# Patient Record
Sex: FEMALE | Race: WHITE | NOT HISPANIC OR LATINO | Employment: OTHER | URBAN - METROPOLITAN AREA
[De-identification: names, ages, dates, MRNs, and addresses within clinical notes are randomized per-mention and may not be internally consistent; named-entity substitution may affect disease eponyms.]

---

## 2017-01-05 ENCOUNTER — ALLSCRIPTS OFFICE VISIT (OUTPATIENT)
Dept: OTHER | Facility: OTHER | Age: 62
End: 2017-01-05

## 2017-01-10 ENCOUNTER — HOSPITAL ENCOUNTER (OUTPATIENT)
Dept: RADIOLOGY | Facility: HOSPITAL | Age: 62
Discharge: HOME | End: 2017-01-10
Attending: ORTHOPAEDIC SURGERY
Payer: COMMERCIAL

## 2017-01-10 ENCOUNTER — APPOINTMENT (OUTPATIENT)
Dept: PREADMISSION TESTING | Facility: HOSPITAL | Age: 62
End: 2017-01-10
Payer: COMMERCIAL

## 2017-01-10 ENCOUNTER — TRANSCRIBE ORDERS (OUTPATIENT)
Dept: ADMINISTRATIVE | Facility: HOSPITAL | Age: 62
End: 2017-01-10

## 2017-01-10 ENCOUNTER — ANESTHESIA EVENT (OUTPATIENT)
Dept: PERIOP | Facility: HOSPITAL | Age: 62
DRG: 470 | End: 2017-01-10
Payer: COMMERCIAL

## 2017-01-10 ENCOUNTER — HOSPITAL ENCOUNTER (OUTPATIENT)
Dept: NON INVASIVE DIAGNOSTICS | Facility: HOSPITAL | Age: 62
Discharge: HOME | End: 2017-01-10
Attending: ORTHOPAEDIC SURGERY
Payer: COMMERCIAL

## 2017-01-10 ENCOUNTER — APPOINTMENT (OUTPATIENT)
Dept: LAB | Facility: HOSPITAL | Age: 62
End: 2017-01-10
Attending: ORTHOPAEDIC SURGERY
Payer: COMMERCIAL

## 2017-01-10 VITALS
HEIGHT: 69 IN | RESPIRATION RATE: 16 BRPM | SYSTOLIC BLOOD PRESSURE: 128 MMHG | WEIGHT: 199.6 LBS | TEMPERATURE: 97.5 F | HEART RATE: 56 BPM | DIASTOLIC BLOOD PRESSURE: 70 MMHG | BODY MASS INDEX: 29.56 KG/M2

## 2017-01-10 DIAGNOSIS — M17.11 PRIMARY OSTEOARTHRITIS OF RIGHT KNEE: ICD-10-CM

## 2017-01-10 DIAGNOSIS — Z01.818 OTHER SPECIFIED PRE-OPERATIVE EXAMINATION: ICD-10-CM

## 2017-01-10 DIAGNOSIS — Z01.818 OTHER SPECIFIED PRE-OPERATIVE EXAMINATION: Primary | ICD-10-CM

## 2017-01-10 LAB
ALBUMIN SERPL BCP-MCNC: 3.7 G/DL (ref 3.5–5)
ALP SERPL-CCNC: 64 U/L (ref 46–116)
ALT SERPL W P-5'-P-CCNC: 39 U/L (ref 12–78)
ANION GAP SERPL CALCULATED.3IONS-SCNC: 8 MMOL/L (ref 4–13)
AST SERPL W P-5'-P-CCNC: 26 U/L (ref 5–45)
ATRIAL RATE: 56 BPM
BACTERIA UR QL AUTO: ABNORMAL /HPF
BASOPHILS # BLD AUTO: 0.01 THOUSANDS/ΜL (ref 0–0.1)
BASOPHILS NFR BLD AUTO: 0 % (ref 0–1)
BILIRUB SERPL-MCNC: 0.48 MG/DL (ref 0.2–1)
BILIRUB UR QL STRIP: NEGATIVE
BUN SERPL-MCNC: 18 MG/DL (ref 5–25)
CALCIUM SERPL-MCNC: 9.3 MG/DL (ref 8.3–10.1)
CHLORIDE SERPL-SCNC: 104 MMOL/L (ref 100–108)
CLARITY UR: CLEAR
CO2 SERPL-SCNC: 31 MMOL/L (ref 21–32)
COLOR UR: YELLOW
CREAT SERPL-MCNC: 0.82 MG/DL (ref 0.6–1.3)
EOSINOPHIL # BLD AUTO: 0.09 THOUSAND/ΜL (ref 0–0.61)
EOSINOPHIL NFR BLD AUTO: 2 % (ref 0–6)
ERYTHROCYTE [DISTWIDTH] IN BLOOD BY AUTOMATED COUNT: 12.7 % (ref 11.6–15.1)
GFR SERPL CREATININE-BSD FRML MDRD: >60 ML/MIN/1.73SQ M
GLUCOSE SERPL-MCNC: 87 MG/DL (ref 65–140)
GLUCOSE UR STRIP-MCNC: NEGATIVE MG/DL
HCT VFR BLD AUTO: 42.3 % (ref 34.8–46.1)
HGB BLD-MCNC: 14.4 G/DL (ref 11.5–15.4)
HGB UR QL STRIP.AUTO: ABNORMAL
INR PPP: 0.99 (ref 0.86–1.16)
KETONES UR STRIP-MCNC: NEGATIVE MG/DL
LEUKOCYTE ESTERASE UR QL STRIP: NEGATIVE
LYMPHOCYTES # BLD AUTO: 1.01 THOUSANDS/ΜL (ref 0.6–4.47)
LYMPHOCYTES NFR BLD AUTO: 20 % (ref 14–44)
MCH RBC QN AUTO: 32.3 PG (ref 26.8–34.3)
MCHC RBC AUTO-ENTMCNC: 34 G/DL (ref 31.4–37.4)
MCV RBC AUTO: 95 FL (ref 82–98)
MONOCYTES # BLD AUTO: 0.34 THOUSAND/ΜL (ref 0.17–1.22)
MONOCYTES NFR BLD AUTO: 7 % (ref 4–12)
NEUTROPHILS # BLD AUTO: 3.5 THOUSANDS/ΜL (ref 1.85–7.62)
NEUTS SEG NFR BLD AUTO: 71 % (ref 43–75)
NITRITE UR QL STRIP: NEGATIVE
NON-SQ EPI CELLS URNS QL MICRO: ABNORMAL /HPF
NRBC BLD AUTO-RTO: 0 /100 WBCS
P AXIS: 35 DEGREES
PH UR STRIP.AUTO: 6 [PH] (ref 4.5–8)
PLATELET # BLD AUTO: 215 THOUSANDS/UL (ref 149–390)
PMV BLD AUTO: 10.5 FL (ref 8.9–12.7)
POTASSIUM SERPL-SCNC: 4.3 MMOL/L (ref 3.5–5.3)
PR INTERVAL: 160 MS
PROT SERPL-MCNC: 7.3 G/DL (ref 6.4–8.2)
PROT UR STRIP-MCNC: NEGATIVE MG/DL
PROTHROMBIN TIME: 13.1 SECONDS (ref 12–14.3)
QRS AXIS: -21 DEGREES
QRSD INTERVAL: 100 MS
QT INTERVAL: 416 MS
QTC INTERVAL: 401 MS
RBC # BLD AUTO: 4.46 MILLION/UL (ref 3.81–5.12)
RBC #/AREA URNS AUTO: ABNORMAL /HPF
SODIUM SERPL-SCNC: 143 MMOL/L (ref 136–145)
SP GR UR STRIP.AUTO: 1.01 (ref 1–1.03)
T WAVE AXIS: 33 DEGREES
UROBILINOGEN UR QL STRIP.AUTO: 0.2 E.U./DL
VENTRICULAR RATE: 56 BPM
WBC # BLD AUTO: 4.95 THOUSAND/UL (ref 4.31–10.16)
WBC #/AREA URNS AUTO: ABNORMAL /HPF

## 2017-01-10 PROCEDURE — 80053 COMPREHEN METABOLIC PANEL: CPT

## 2017-01-10 PROCEDURE — 85610 PROTHROMBIN TIME: CPT

## 2017-01-10 PROCEDURE — 85025 COMPLETE CBC W/AUTO DIFF WBC: CPT

## 2017-01-10 PROCEDURE — 93005 ELECTROCARDIOGRAM TRACING: CPT

## 2017-01-10 PROCEDURE — 87086 URINE CULTURE/COLONY COUNT: CPT | Performed by: ORTHOPAEDIC SURGERY

## 2017-01-10 PROCEDURE — 81001 URINALYSIS AUTO W/SCOPE: CPT | Performed by: ORTHOPAEDIC SURGERY

## 2017-01-10 PROCEDURE — 71020 HB CHEST X-RAY 2VW FRONTAL&LATL: CPT

## 2017-01-10 PROCEDURE — 36415 COLL VENOUS BLD VENIPUNCTURE: CPT

## 2017-01-10 RX ORDER — LEVOTHYROXINE SODIUM 0.07 MG/1
75 TABLET ORAL DAILY
COMMUNITY
End: 2018-02-09

## 2017-01-10 RX ORDER — SODIUM CHLORIDE 9 MG/ML
125 INJECTION, SOLUTION INTRAVENOUS CONTINUOUS
Status: CANCELLED | OUTPATIENT
Start: 2017-01-10

## 2017-01-10 RX ORDER — LETROZOLE 2.5 MG/1
2.5 TABLET, FILM COATED ORAL EVERY EVENING
COMMUNITY
End: 2018-02-09 | Stop reason: SDUPTHER

## 2017-01-10 RX ORDER — LORATADINE 10 MG/1
10 TABLET ORAL DAILY
COMMUNITY
End: 2020-06-23 | Stop reason: ALTCHOICE

## 2017-01-11 ENCOUNTER — ALLSCRIPTS OFFICE VISIT (OUTPATIENT)
Dept: OTHER | Facility: OTHER | Age: 62
End: 2017-01-11

## 2017-01-11 LAB — BACTERIA UR CULT: NORMAL

## 2017-01-16 RX ORDER — METHOCARBAMOL 500 MG/1
500 TABLET, FILM COATED ORAL EVERY 6 HOURS PRN
Status: CANCELLED | OUTPATIENT
Start: 2017-01-16

## 2017-01-16 RX ORDER — ACETAMINOPHEN 325 MG/1
650 TABLET ORAL EVERY 4 HOURS PRN
Status: CANCELLED | OUTPATIENT
Start: 2017-01-16

## 2017-01-16 RX ORDER — ONDANSETRON 2 MG/ML
4 INJECTION INTRAMUSCULAR; INTRAVENOUS EVERY 6 HOURS PRN
Status: CANCELLED | OUTPATIENT
Start: 2017-01-16

## 2017-01-16 RX ORDER — PANTOPRAZOLE SODIUM 40 MG/1
40 TABLET, DELAYED RELEASE ORAL DAILY
Status: CANCELLED | OUTPATIENT
Start: 2017-01-17

## 2017-01-16 RX ORDER — WARFARIN SODIUM 5 MG/1
5 TABLET ORAL
Status: CANCELLED | OUTPATIENT
Start: 2017-01-16

## 2017-01-16 RX ORDER — SIMETHICONE 80 MG
80 TABLET,CHEWABLE ORAL 4 TIMES DAILY PRN
Status: CANCELLED | OUTPATIENT
Start: 2017-01-16

## 2017-01-16 RX ORDER — OXYCODONE HYDROCHLORIDE 5 MG/1
5 TABLET ORAL EVERY 4 HOURS PRN
Status: CANCELLED | OUTPATIENT
Start: 2017-01-16

## 2017-01-16 RX ORDER — OXYCODONE HYDROCHLORIDE 5 MG/1
10 TABLET ORAL EVERY 4 HOURS PRN
Status: CANCELLED | OUTPATIENT
Start: 2017-01-16

## 2017-01-16 RX ORDER — ACETAMINOPHEN 325 MG/1
650 TABLET ORAL EVERY 6 HOURS PRN
Status: CANCELLED | OUTPATIENT
Start: 2017-01-16

## 2017-01-16 RX ORDER — CALCIUM CARBONATE 200(500)MG
1000 TABLET,CHEWABLE ORAL DAILY PRN
Status: CANCELLED | OUTPATIENT
Start: 2017-01-16

## 2017-01-16 RX ORDER — SODIUM CHLORIDE, SODIUM LACTATE, POTASSIUM CHLORIDE, CALCIUM CHLORIDE 600; 310; 30; 20 MG/100ML; MG/100ML; MG/100ML; MG/100ML
100 INJECTION, SOLUTION INTRAVENOUS CONTINUOUS
Status: CANCELLED | OUTPATIENT
Start: 2017-01-16

## 2017-01-16 RX ORDER — TRAMADOL HYDROCHLORIDE 50 MG/1
50 TABLET ORAL EVERY 4 HOURS PRN
Status: CANCELLED | OUTPATIENT
Start: 2017-01-16

## 2017-01-16 RX ORDER — SENNOSIDES 8.6 MG
1 TABLET ORAL DAILY
Status: CANCELLED | OUTPATIENT
Start: 2017-01-17

## 2017-01-16 RX ORDER — KETOROLAC TROMETHAMINE 30 MG/ML
15 INJECTION, SOLUTION INTRAMUSCULAR; INTRAVENOUS EVERY 6 HOURS PRN
Status: CANCELLED | OUTPATIENT
Start: 2017-01-16 | End: 2017-01-18

## 2017-01-20 ENCOUNTER — APPOINTMENT (INPATIENT)
Dept: RADIOLOGY | Facility: HOSPITAL | Age: 62
DRG: 470 | End: 2017-01-20
Payer: COMMERCIAL

## 2017-01-20 ENCOUNTER — ANESTHESIA (OUTPATIENT)
Dept: PERIOP | Facility: HOSPITAL | Age: 62
DRG: 470 | End: 2017-01-20
Payer: COMMERCIAL

## 2017-01-20 ENCOUNTER — HOSPITAL ENCOUNTER (INPATIENT)
Facility: HOSPITAL | Age: 62
LOS: 2 days | Discharge: HOME HEALTH | DRG: 470 | End: 2017-01-22
Attending: ORTHOPAEDIC SURGERY | Admitting: ORTHOPAEDIC SURGERY
Payer: COMMERCIAL

## 2017-01-20 DIAGNOSIS — M17.11 PRIMARY OSTEOARTHRITIS OF RIGHT KNEE: Primary | ICD-10-CM

## 2017-01-20 LAB
ABO GROUP BLD: NORMAL
BLD GP AB SCN SERPL QL: NEGATIVE
INR PPP: 1.07 (ref 0.86–1.16)
PROTHROMBIN TIME: 13.9 SECONDS (ref 12–14.3)
RH BLD: POSITIVE

## 2017-01-20 PROCEDURE — 0SRC0J9 REPLACEMENT OF RIGHT KNEE JOINT WITH SYNTHETIC SUBSTITUTE, CEMENTED, OPEN APPROACH: ICD-10-PCS | Performed by: ORTHOPAEDIC SURGERY

## 2017-01-20 PROCEDURE — G8979 MOBILITY GOAL STATUS: HCPCS

## 2017-01-20 PROCEDURE — 73560 X-RAY EXAM OF KNEE 1 OR 2: CPT

## 2017-01-20 PROCEDURE — C1776 JOINT DEVICE (IMPLANTABLE): HCPCS | Performed by: ORTHOPAEDIC SURGERY

## 2017-01-20 PROCEDURE — G8978 MOBILITY CURRENT STATUS: HCPCS

## 2017-01-20 PROCEDURE — 85610 PROTHROMBIN TIME: CPT | Performed by: PHYSICIAN ASSISTANT

## 2017-01-20 PROCEDURE — 86901 BLOOD TYPING SEROLOGIC RH(D): CPT | Performed by: ORTHOPAEDIC SURGERY

## 2017-01-20 PROCEDURE — 86850 RBC ANTIBODY SCREEN: CPT | Performed by: ORTHOPAEDIC SURGERY

## 2017-01-20 PROCEDURE — 86900 BLOOD TYPING SEROLOGIC ABO: CPT | Performed by: ORTHOPAEDIC SURGERY

## 2017-01-20 PROCEDURE — 97162 PT EVAL MOD COMPLEX 30 MIN: CPT

## 2017-01-20 PROCEDURE — 94762 N-INVAS EAR/PLS OXIMTRY CONT: CPT

## 2017-01-20 PROCEDURE — C1713 ANCHOR/SCREW BN/BN,TIS/BN: HCPCS | Performed by: ORTHOPAEDIC SURGERY

## 2017-01-20 DEVICE — IMPLANTABLE DEVICE: Type: IMPLANTABLE DEVICE | Site: KNEE | Status: FUNCTIONAL

## 2017-01-20 DEVICE — POSTERIOR STABILIZED FEMORAL
Type: IMPLANTABLE DEVICE | Site: KNEE | Status: FUNCTIONAL
Brand: TRIATHLON

## 2017-01-20 DEVICE — IMPLANTABLE DEVICE: Type: IMPLANTABLE DEVICE | Status: FUNCTIONAL

## 2017-01-20 DEVICE — UNIVERSAL TIBIAL BASEPLATE
Type: IMPLANTABLE DEVICE | Site: KNEE | Status: FUNCTIONAL
Brand: TRIATHLON

## 2017-01-20 DEVICE — ASYMMETRIC PATELLA
Type: IMPLANTABLE DEVICE | Site: KNEE | Status: FUNCTIONAL
Brand: TRIATHLON

## 2017-01-20 RX ORDER — PROPOFOL 10 MG/ML
INJECTION, EMULSION INTRAVENOUS CONTINUOUS PRN
Status: DISCONTINUED | OUTPATIENT
Start: 2017-01-20 | End: 2017-01-20 | Stop reason: SURG

## 2017-01-20 RX ORDER — OXYCODONE HYDROCHLORIDE 5 MG/1
5 TABLET ORAL EVERY 4 HOURS PRN
Status: DISCONTINUED | OUTPATIENT
Start: 2017-01-21 | End: 2017-01-22 | Stop reason: HOSPADM

## 2017-01-20 RX ORDER — SIMETHICONE 80 MG
80 TABLET,CHEWABLE ORAL 4 TIMES DAILY PRN
Status: DISCONTINUED | OUTPATIENT
Start: 2017-01-20 | End: 2017-01-22 | Stop reason: HOSPADM

## 2017-01-20 RX ORDER — KETOROLAC TROMETHAMINE 30 MG/ML
15 INJECTION, SOLUTION INTRAMUSCULAR; INTRAVENOUS EVERY 6 HOURS PRN
Status: DISCONTINUED | OUTPATIENT
Start: 2017-01-20 | End: 2017-01-20

## 2017-01-20 RX ORDER — BUPIVACAINE HYDROCHLORIDE 7.5 MG/ML
INJECTION, SOLUTION INTRASPINAL AS NEEDED
Status: DISCONTINUED | OUTPATIENT
Start: 2017-01-20 | End: 2017-01-20 | Stop reason: SURG

## 2017-01-20 RX ORDER — TRAMADOL HYDROCHLORIDE 50 MG/1
50 TABLET ORAL EVERY 4 HOURS PRN
Qty: 30 TABLET | Refills: 0 | Status: SHIPPED | OUTPATIENT
Start: 2017-01-21 | End: 2017-01-31

## 2017-01-20 RX ORDER — CALCIUM CARBONATE 200(500)MG
1000 TABLET,CHEWABLE ORAL DAILY PRN
Status: DISCONTINUED | OUTPATIENT
Start: 2017-01-20 | End: 2017-01-22 | Stop reason: HOSPADM

## 2017-01-20 RX ORDER — LORATADINE 10 MG/1
10 TABLET ORAL DAILY
Status: DISCONTINUED | OUTPATIENT
Start: 2017-01-20 | End: 2017-01-22 | Stop reason: HOSPADM

## 2017-01-20 RX ORDER — NALOXONE HYDROCHLORIDE 0.4 MG/ML
0.1 INJECTION, SOLUTION INTRAMUSCULAR; INTRAVENOUS; SUBCUTANEOUS
Status: ACTIVE | OUTPATIENT
Start: 2017-01-20 | End: 2017-01-21

## 2017-01-20 RX ORDER — MIDAZOLAM HYDROCHLORIDE 1 MG/ML
INJECTION INTRAMUSCULAR; INTRAVENOUS AS NEEDED
Status: DISCONTINUED | OUTPATIENT
Start: 2017-01-20 | End: 2017-01-20 | Stop reason: SURG

## 2017-01-20 RX ORDER — WARFARIN SODIUM 5 MG/1
5 TABLET ORAL
Status: DISCONTINUED | OUTPATIENT
Start: 2017-01-20 | End: 2017-01-20

## 2017-01-20 RX ORDER — FENTANYL CITRATE/PF 50 MCG/ML
25 SYRINGE (ML) INJECTION
Status: DISCONTINUED | OUTPATIENT
Start: 2017-01-20 | End: 2017-01-20 | Stop reason: HOSPADM

## 2017-01-20 RX ORDER — TRANEXAMIC ACID 100 MG/ML
INJECTION, SOLUTION INTRAVENOUS AS NEEDED
Status: DISCONTINUED | OUTPATIENT
Start: 2017-01-20 | End: 2017-01-20 | Stop reason: SURG

## 2017-01-20 RX ORDER — NALBUPHINE HCL 10 MG/ML
2 AMPUL (ML) INJECTION
Status: ACTIVE | OUTPATIENT
Start: 2017-01-20 | End: 2017-01-21

## 2017-01-20 RX ORDER — HYDROMORPHONE HCL 110MG/55ML
0.5 PATIENT CONTROLLED ANALGESIA SYRINGE INTRAVENOUS EVERY 2 HOUR PRN
Status: ACTIVE | OUTPATIENT
Start: 2017-01-20 | End: 2017-01-21

## 2017-01-20 RX ORDER — SODIUM CHLORIDE 9 MG/ML
125 INJECTION, SOLUTION INTRAVENOUS CONTINUOUS
Status: DISCONTINUED | OUTPATIENT
Start: 2017-01-20 | End: 2017-01-22 | Stop reason: HOSPADM

## 2017-01-20 RX ORDER — ONDANSETRON 2 MG/ML
4 INJECTION INTRAMUSCULAR; INTRAVENOUS EVERY 6 HOURS PRN
Status: DISCONTINUED | OUTPATIENT
Start: 2017-01-21 | End: 2017-01-22 | Stop reason: HOSPADM

## 2017-01-20 RX ORDER — OXYCODONE HYDROCHLORIDE AND ACETAMINOPHEN 5; 325 MG/1; MG/1
2 TABLET ORAL EVERY 4 HOURS PRN
Status: DISCONTINUED | OUTPATIENT
Start: 2017-01-20 | End: 2017-01-22 | Stop reason: HOSPADM

## 2017-01-20 RX ORDER — ACETAMINOPHEN 325 MG/1
650 TABLET ORAL EVERY 4 HOURS PRN
Status: DISCONTINUED | OUTPATIENT
Start: 2017-01-20 | End: 2017-01-22 | Stop reason: HOSPADM

## 2017-01-20 RX ORDER — ACETAMINOPHEN 325 MG/1
650 TABLET ORAL EVERY 6 HOURS PRN
Status: DISCONTINUED | OUTPATIENT
Start: 2017-01-20 | End: 2017-01-22 | Stop reason: HOSPADM

## 2017-01-20 RX ORDER — METHOCARBAMOL 500 MG/1
500 TABLET, FILM COATED ORAL EVERY 6 HOURS PRN
Status: DISCONTINUED | OUTPATIENT
Start: 2017-01-20 | End: 2017-01-22 | Stop reason: HOSPADM

## 2017-01-20 RX ORDER — ONDANSETRON 2 MG/ML
4 INJECTION INTRAMUSCULAR; INTRAVENOUS ONCE
Status: DISCONTINUED | OUTPATIENT
Start: 2017-01-20 | End: 2017-01-20 | Stop reason: HOSPADM

## 2017-01-20 RX ORDER — SENNOSIDES 8.6 MG
1 TABLET ORAL DAILY
Status: DISCONTINUED | OUTPATIENT
Start: 2017-01-20 | End: 2017-01-22 | Stop reason: HOSPADM

## 2017-01-20 RX ORDER — OXYCODONE HYDROCHLORIDE 5 MG/1
10 TABLET ORAL EVERY 4 HOURS PRN
Status: DISCONTINUED | OUTPATIENT
Start: 2017-01-21 | End: 2017-01-22 | Stop reason: HOSPADM

## 2017-01-20 RX ORDER — OXYCODONE HYDROCHLORIDE 5 MG/1
5 TABLET ORAL EVERY 4 HOURS PRN
Qty: 30 TABLET | Refills: 0 | Status: SHIPPED | OUTPATIENT
Start: 2017-01-21 | End: 2017-01-31

## 2017-01-20 RX ORDER — PROPOFOL 10 MG/ML
INJECTION, EMULSION INTRAVENOUS AS NEEDED
Status: DISCONTINUED | OUTPATIENT
Start: 2017-01-20 | End: 2017-01-20 | Stop reason: SURG

## 2017-01-20 RX ORDER — MAGNESIUM HYDROXIDE 1200 MG/15ML
LIQUID ORAL AS NEEDED
Status: DISCONTINUED | OUTPATIENT
Start: 2017-01-20 | End: 2017-01-20 | Stop reason: HOSPADM

## 2017-01-20 RX ORDER — ROPIVACAINE HYDROCHLORIDE 5 MG/ML
INJECTION, SOLUTION EPIDURAL; INFILTRATION; PERINEURAL AS NEEDED
Status: DISCONTINUED | OUTPATIENT
Start: 2017-01-20 | End: 2017-01-20 | Stop reason: SURG

## 2017-01-20 RX ORDER — ONDANSETRON 2 MG/ML
INJECTION INTRAMUSCULAR; INTRAVENOUS AS NEEDED
Status: DISCONTINUED | OUTPATIENT
Start: 2017-01-20 | End: 2017-01-20 | Stop reason: SURG

## 2017-01-20 RX ORDER — MORPHINE SULFATE 0.5 MG/ML
INJECTION, SOLUTION EPIDURAL; INTRATHECAL; INTRAVENOUS AS NEEDED
Status: DISCONTINUED | OUTPATIENT
Start: 2017-01-20 | End: 2017-01-20 | Stop reason: SURG

## 2017-01-20 RX ORDER — WARFARIN SODIUM 5 MG/1
5 TABLET ORAL
Status: DISCONTINUED | OUTPATIENT
Start: 2017-01-21 | End: 2017-01-20

## 2017-01-20 RX ORDER — DIPHENHYDRAMINE HYDROCHLORIDE 50 MG/ML
25 INJECTION INTRAMUSCULAR; INTRAVENOUS EVERY 6 HOURS PRN
Status: DISPENSED | OUTPATIENT
Start: 2017-01-20 | End: 2017-01-21

## 2017-01-20 RX ORDER — FENTANYL CITRATE 50 UG/ML
INJECTION, SOLUTION INTRAMUSCULAR; INTRAVENOUS AS NEEDED
Status: DISCONTINUED | OUTPATIENT
Start: 2017-01-20 | End: 2017-01-20 | Stop reason: SURG

## 2017-01-20 RX ORDER — PANTOPRAZOLE SODIUM 40 MG/1
40 TABLET, DELAYED RELEASE ORAL
Status: DISCONTINUED | OUTPATIENT
Start: 2017-01-20 | End: 2017-01-22 | Stop reason: HOSPADM

## 2017-01-20 RX ORDER — TRAMADOL HYDROCHLORIDE 50 MG/1
50 TABLET ORAL EVERY 4 HOURS PRN
Status: DISCONTINUED | OUTPATIENT
Start: 2017-01-21 | End: 2017-01-22 | Stop reason: HOSPADM

## 2017-01-20 RX ORDER — ASPIRIN 325 MG
325 TABLET, DELAYED RELEASE (ENTERIC COATED) ORAL 2 TIMES DAILY
Status: DISCONTINUED | OUTPATIENT
Start: 2017-01-20 | End: 2017-01-22 | Stop reason: HOSPADM

## 2017-01-20 RX ORDER — OXYCODONE HYDROCHLORIDE AND ACETAMINOPHEN 5; 325 MG/1; MG/1
1 TABLET ORAL EVERY 4 HOURS PRN
Status: DISPENSED | OUTPATIENT
Start: 2017-01-20 | End: 2017-01-21

## 2017-01-20 RX ORDER — METOCLOPRAMIDE HYDROCHLORIDE 5 MG/ML
5 INJECTION INTRAMUSCULAR; INTRAVENOUS EVERY 6 HOURS PRN
Status: ACTIVE | OUTPATIENT
Start: 2017-01-20 | End: 2017-01-21

## 2017-01-20 RX ORDER — WARFARIN SODIUM 2 MG/1
2 TABLET ORAL
Refills: 0
Start: 2017-01-21 | End: 2017-01-20 | Stop reason: HOSPADM

## 2017-01-20 RX ORDER — SODIUM CHLORIDE, SODIUM LACTATE, POTASSIUM CHLORIDE, CALCIUM CHLORIDE 600; 310; 30; 20 MG/100ML; MG/100ML; MG/100ML; MG/100ML
100 INJECTION, SOLUTION INTRAVENOUS CONTINUOUS
Status: DISCONTINUED | OUTPATIENT
Start: 2017-01-20 | End: 2017-01-22 | Stop reason: HOSPADM

## 2017-01-20 RX ORDER — ONDANSETRON 2 MG/ML
4 INJECTION INTRAMUSCULAR; INTRAVENOUS EVERY 4 HOURS PRN
Status: ACTIVE | OUTPATIENT
Start: 2017-01-20 | End: 2017-01-21

## 2017-01-20 RX ORDER — LEVOTHYROXINE SODIUM 0.07 MG/1
75 TABLET ORAL
Status: DISCONTINUED | OUTPATIENT
Start: 2017-01-21 | End: 2017-01-22 | Stop reason: HOSPADM

## 2017-01-20 RX ADMIN — OXYCODONE HYDROCHLORIDE AND ACETAMINOPHEN 1 TABLET: 5; 325 TABLET ORAL at 15:34

## 2017-01-20 RX ADMIN — SODIUM CHLORIDE, SODIUM LACTATE, POTASSIUM CHLORIDE, AND CALCIUM CHLORIDE 100 ML/HR: .6; .31; .03; .02 INJECTION, SOLUTION INTRAVENOUS at 11:43

## 2017-01-20 RX ADMIN — FENTANYL CITRATE 100 MCG: 50 INJECTION, SOLUTION INTRAMUSCULAR; INTRAVENOUS at 08:12

## 2017-01-20 RX ADMIN — ROPIVACAINE HYDROCHLORIDE 25 ML: 5 INJECTION, SOLUTION EPIDURAL; INFILTRATION; PERINEURAL at 08:14

## 2017-01-20 RX ADMIN — PANTOPRAZOLE SODIUM 40 MG: 40 TABLET, DELAYED RELEASE ORAL at 14:56

## 2017-01-20 RX ADMIN — CEFAZOLIN SODIUM 1000 MG: 1 SOLUTION INTRAVENOUS at 18:00

## 2017-01-20 RX ADMIN — MORPHINE SULFATE 0.15 MG: 0.5 INJECTION, SOLUTION EPIDURAL; INTRATHECAL; INTRAVENOUS at 08:57

## 2017-01-20 RX ADMIN — SODIUM CHLORIDE: 0.9 INJECTION, SOLUTION INTRAVENOUS at 10:13

## 2017-01-20 RX ADMIN — PROPOFOL 50 MCG/KG/MIN: 10 INJECTION, EMULSION INTRAVENOUS at 09:04

## 2017-01-20 RX ADMIN — TRANEXAMIC ACID 1 G: 100 INJECTION, SOLUTION INTRAVENOUS at 09:00

## 2017-01-20 RX ADMIN — SODIUM CHLORIDE, SODIUM LACTATE, POTASSIUM CHLORIDE, AND CALCIUM CHLORIDE 100 ML/HR: .6; .31; .03; .02 INJECTION, SOLUTION INTRAVENOUS at 22:16

## 2017-01-20 RX ADMIN — ASPIRIN 325 MG: 325 TABLET, DELAYED RELEASE ORAL at 18:03

## 2017-01-20 RX ADMIN — SODIUM CHLORIDE 125 ML/HR: 0.9 INJECTION, SOLUTION INTRAVENOUS at 07:33

## 2017-01-20 RX ADMIN — PROPOFOL 20 MG: 10 INJECTION, EMULSION INTRAVENOUS at 09:20

## 2017-01-20 RX ADMIN — MIDAZOLAM HYDROCHLORIDE 4 MG: 1 INJECTION, SOLUTION INTRAMUSCULAR; INTRAVENOUS at 08:12

## 2017-01-20 RX ADMIN — VANCOMYCIN HYDROCHLORIDE 1 G: 1 INJECTION, POWDER, LYOPHILIZED, FOR SOLUTION INTRAVENOUS at 08:40

## 2017-01-20 RX ADMIN — BUPIVACAINE HYDROCHLORIDE IN DEXTROSE 1.5 ML: 7.5 INJECTION, SOLUTION SUBARACHNOID at 08:57

## 2017-01-20 RX ADMIN — ONDANSETRON HYDROCHLORIDE 4 MG: 2 INJECTION, SOLUTION INTRAVENOUS at 09:32

## 2017-01-20 RX ADMIN — CEFAZOLIN SODIUM 2000 MG: 2 SOLUTION INTRAVENOUS at 09:00

## 2017-01-20 RX ADMIN — SENNOSIDES 8.6 MG: 8.6 TABLET, FILM COATED ORAL at 14:56

## 2017-01-20 RX ADMIN — LORATADINE 10 MG: 10 TABLET ORAL at 14:55

## 2017-01-20 RX ADMIN — DEXAMETHASONE SODIUM PHOSPHATE 4 MG: 10 INJECTION INTRAMUSCULAR; INTRAVENOUS at 09:32

## 2017-01-21 LAB
ANION GAP SERPL CALCULATED.3IONS-SCNC: 5 MMOL/L (ref 4–13)
BUN SERPL-MCNC: 14 MG/DL (ref 5–25)
CALCIUM SERPL-MCNC: 8.4 MG/DL (ref 8.3–10.1)
CHLORIDE SERPL-SCNC: 107 MMOL/L (ref 100–108)
CO2 SERPL-SCNC: 29 MMOL/L (ref 21–32)
CREAT SERPL-MCNC: 0.82 MG/DL (ref 0.6–1.3)
GFR SERPL CREATININE-BSD FRML MDRD: >60 ML/MIN/1.73SQ M
GLUCOSE SERPL-MCNC: 149 MG/DL (ref 65–140)
HCT VFR BLD AUTO: 31.8 % (ref 34.8–46.1)
HGB BLD-MCNC: 10.6 G/DL (ref 11.5–15.4)
PLATELET # BLD AUTO: 148 THOUSANDS/UL (ref 149–390)
PMV BLD AUTO: 10.6 FL (ref 8.9–12.7)
POTASSIUM SERPL-SCNC: 4.4 MMOL/L (ref 3.5–5.3)
SODIUM SERPL-SCNC: 141 MMOL/L (ref 136–145)

## 2017-01-21 PROCEDURE — 85049 AUTOMATED PLATELET COUNT: CPT | Performed by: PHYSICIAN ASSISTANT

## 2017-01-21 PROCEDURE — 85014 HEMATOCRIT: CPT | Performed by: PHYSICIAN ASSISTANT

## 2017-01-21 PROCEDURE — 97535 SELF CARE MNGMENT TRAINING: CPT

## 2017-01-21 PROCEDURE — 97530 THERAPEUTIC ACTIVITIES: CPT

## 2017-01-21 PROCEDURE — 97116 GAIT TRAINING THERAPY: CPT

## 2017-01-21 PROCEDURE — 80048 BASIC METABOLIC PNL TOTAL CA: CPT | Performed by: INTERNAL MEDICINE

## 2017-01-21 PROCEDURE — 97110 THERAPEUTIC EXERCISES: CPT

## 2017-01-21 PROCEDURE — 85018 HEMOGLOBIN: CPT | Performed by: PHYSICIAN ASSISTANT

## 2017-01-21 RX ADMIN — METHOCARBAMOL 500 MG: 500 TABLET ORAL at 17:59

## 2017-01-21 RX ADMIN — OXYCODONE HYDROCHLORIDE 10 MG: 5 TABLET ORAL at 19:16

## 2017-01-21 RX ADMIN — LORATADINE 10 MG: 10 TABLET ORAL at 08:03

## 2017-01-21 RX ADMIN — PANTOPRAZOLE SODIUM 40 MG: 40 TABLET, DELAYED RELEASE ORAL at 05:33

## 2017-01-21 RX ADMIN — ASPIRIN 325 MG: 325 TABLET, DELAYED RELEASE ORAL at 17:58

## 2017-01-21 RX ADMIN — OXYCODONE HYDROCHLORIDE 10 MG: 5 TABLET ORAL at 23:20

## 2017-01-21 RX ADMIN — SENNOSIDES 8.6 MG: 8.6 TABLET, FILM COATED ORAL at 08:03

## 2017-01-21 RX ADMIN — TRAMADOL HYDROCHLORIDE 50 MG: 50 TABLET, COATED ORAL at 13:10

## 2017-01-21 RX ADMIN — TRAMADOL HYDROCHLORIDE 50 MG: 50 TABLET, COATED ORAL at 17:58

## 2017-01-21 RX ADMIN — SODIUM CHLORIDE, SODIUM LACTATE, POTASSIUM CHLORIDE, AND CALCIUM CHLORIDE 100 ML/HR: .6; .31; .03; .02 INJECTION, SOLUTION INTRAVENOUS at 16:25

## 2017-01-21 RX ADMIN — OXYCODONE HYDROCHLORIDE AND ACETAMINOPHEN 1 TABLET: 5; 325 TABLET ORAL at 03:08

## 2017-01-21 RX ADMIN — ASPIRIN 325 MG: 325 TABLET, DELAYED RELEASE ORAL at 08:03

## 2017-01-21 RX ADMIN — LEVOTHYROXINE SODIUM 75 MCG: 75 TABLET ORAL at 06:26

## 2017-01-21 RX ADMIN — CEFAZOLIN SODIUM 1000 MG: 1 SOLUTION INTRAVENOUS at 02:56

## 2017-01-21 RX ADMIN — OXYCODONE HYDROCHLORIDE AND ACETAMINOPHEN 1 TABLET: 5; 325 TABLET ORAL at 08:03

## 2017-01-21 RX ADMIN — OXYCODONE HYDROCHLORIDE 5 MG: 5 TABLET ORAL at 13:11

## 2017-01-21 RX ADMIN — DIPHENHYDRAMINE HYDROCHLORIDE 25 MG: 50 INJECTION, SOLUTION INTRAMUSCULAR; INTRAVENOUS at 03:03

## 2017-01-21 RX ADMIN — METHOCARBAMOL 500 MG: 500 TABLET ORAL at 23:20

## 2017-01-22 VITALS
HEART RATE: 74 BPM | WEIGHT: 190 LBS | DIASTOLIC BLOOD PRESSURE: 64 MMHG | TEMPERATURE: 99.9 F | RESPIRATION RATE: 18 BRPM | HEIGHT: 69 IN | SYSTOLIC BLOOD PRESSURE: 111 MMHG | BODY MASS INDEX: 28.14 KG/M2 | OXYGEN SATURATION: 96 %

## 2017-01-22 LAB
HCT VFR BLD AUTO: 33.8 % (ref 34.8–46.1)
HGB BLD-MCNC: 11.3 G/DL (ref 11.5–15.4)

## 2017-01-22 PROCEDURE — 85014 HEMATOCRIT: CPT | Performed by: PHYSICIAN ASSISTANT

## 2017-01-22 PROCEDURE — 85018 HEMOGLOBIN: CPT | Performed by: PHYSICIAN ASSISTANT

## 2017-01-22 PROCEDURE — 97530 THERAPEUTIC ACTIVITIES: CPT

## 2017-01-22 PROCEDURE — 97116 GAIT TRAINING THERAPY: CPT

## 2017-01-22 RX ORDER — KETOROLAC TROMETHAMINE 30 MG/ML
15 INJECTION, SOLUTION INTRAMUSCULAR; INTRAVENOUS EVERY 6 HOURS PRN
Status: DISCONTINUED | OUTPATIENT
Start: 2017-01-22 | End: 2017-01-22 | Stop reason: HOSPADM

## 2017-01-22 RX ADMIN — SENNOSIDES 8.6 MG: 8.6 TABLET, FILM COATED ORAL at 08:29

## 2017-01-22 RX ADMIN — OXYCODONE HYDROCHLORIDE 10 MG: 5 TABLET ORAL at 03:20

## 2017-01-22 RX ADMIN — OXYCODONE HYDROCHLORIDE 5 MG: 5 TABLET ORAL at 11:13

## 2017-01-22 RX ADMIN — LEVOTHYROXINE SODIUM 75 MCG: 75 TABLET ORAL at 06:00

## 2017-01-22 RX ADMIN — TRAMADOL HYDROCHLORIDE 50 MG: 50 TABLET, COATED ORAL at 12:36

## 2017-01-22 RX ADMIN — METHOCARBAMOL 500 MG: 500 TABLET ORAL at 11:14

## 2017-01-22 RX ADMIN — TRAMADOL HYDROCHLORIDE 50 MG: 50 TABLET, COATED ORAL at 08:30

## 2017-01-22 RX ADMIN — METHOCARBAMOL 500 MG: 500 TABLET ORAL at 05:48

## 2017-01-22 RX ADMIN — ASPIRIN 325 MG: 325 TABLET, DELAYED RELEASE ORAL at 08:29

## 2017-01-22 RX ADMIN — SODIUM CHLORIDE, SODIUM LACTATE, POTASSIUM CHLORIDE, AND CALCIUM CHLORIDE 100 ML/HR: .6; .31; .03; .02 INJECTION, SOLUTION INTRAVENOUS at 12:20

## 2017-01-22 RX ADMIN — PANTOPRAZOLE SODIUM 40 MG: 40 TABLET, DELAYED RELEASE ORAL at 05:48

## 2017-01-22 RX ADMIN — OXYCODONE HYDROCHLORIDE 5 MG: 5 TABLET ORAL at 16:00

## 2017-01-22 RX ADMIN — LORATADINE 10 MG: 10 TABLET ORAL at 08:29

## 2017-01-23 ENCOUNTER — GENERIC CONVERSION - ENCOUNTER (OUTPATIENT)
Dept: OTHER | Facility: OTHER | Age: 62
End: 2017-01-23

## 2017-02-08 ENCOUNTER — APPOINTMENT (OUTPATIENT)
Dept: PHYSICAL THERAPY | Age: 62
End: 2017-02-08
Payer: COMMERCIAL

## 2017-02-08 PROCEDURE — 97161 PT EVAL LOW COMPLEX 20 MIN: CPT

## 2017-02-13 ENCOUNTER — TRANSCRIBE ORDERS (OUTPATIENT)
Dept: ADMINISTRATIVE | Facility: HOSPITAL | Age: 62
End: 2017-02-13

## 2017-02-13 ENCOUNTER — GENERIC CONVERSION - ENCOUNTER (OUTPATIENT)
Dept: OTHER | Facility: OTHER | Age: 62
End: 2017-02-13

## 2017-02-13 ENCOUNTER — HOSPITAL ENCOUNTER (OUTPATIENT)
Dept: NON INVASIVE DIAGNOSTICS | Facility: CLINIC | Age: 62
Discharge: HOME | End: 2017-02-13
Payer: COMMERCIAL

## 2017-02-13 ENCOUNTER — APPOINTMENT (OUTPATIENT)
Dept: PHYSICAL THERAPY | Age: 62
End: 2017-02-13
Payer: COMMERCIAL

## 2017-02-13 DIAGNOSIS — R60.9 EDEMA, UNSPECIFIED TYPE: Primary | ICD-10-CM

## 2017-02-13 DIAGNOSIS — R60.9 EDEMA, UNSPECIFIED TYPE: ICD-10-CM

## 2017-02-13 PROCEDURE — 93971 EXTREMITY STUDY: CPT

## 2017-02-13 PROCEDURE — 97140 MANUAL THERAPY 1/> REGIONS: CPT

## 2017-02-13 PROCEDURE — 97110 THERAPEUTIC EXERCISES: CPT

## 2017-02-16 ENCOUNTER — APPOINTMENT (OUTPATIENT)
Dept: PHYSICAL THERAPY | Age: 62
End: 2017-02-16
Payer: COMMERCIAL

## 2017-02-16 PROCEDURE — 97110 THERAPEUTIC EXERCISES: CPT

## 2017-02-16 PROCEDURE — 97140 MANUAL THERAPY 1/> REGIONS: CPT

## 2017-02-20 ENCOUNTER — APPOINTMENT (OUTPATIENT)
Dept: PHYSICAL THERAPY | Age: 62
End: 2017-02-20
Payer: COMMERCIAL

## 2017-02-20 PROCEDURE — 97110 THERAPEUTIC EXERCISES: CPT

## 2017-02-20 PROCEDURE — 97140 MANUAL THERAPY 1/> REGIONS: CPT

## 2017-02-23 ENCOUNTER — APPOINTMENT (OUTPATIENT)
Dept: PHYSICAL THERAPY | Age: 62
End: 2017-02-23
Payer: COMMERCIAL

## 2017-02-23 PROCEDURE — 97110 THERAPEUTIC EXERCISES: CPT

## 2017-02-23 PROCEDURE — 97140 MANUAL THERAPY 1/> REGIONS: CPT

## 2017-02-27 ENCOUNTER — APPOINTMENT (OUTPATIENT)
Dept: PHYSICAL THERAPY | Age: 62
End: 2017-02-27
Payer: COMMERCIAL

## 2017-02-27 PROCEDURE — 97140 MANUAL THERAPY 1/> REGIONS: CPT

## 2017-02-27 PROCEDURE — 97110 THERAPEUTIC EXERCISES: CPT

## 2017-03-02 ENCOUNTER — APPOINTMENT (OUTPATIENT)
Dept: PHYSICAL THERAPY | Age: 62
End: 2017-03-02
Payer: COMMERCIAL

## 2017-03-02 PROCEDURE — 97110 THERAPEUTIC EXERCISES: CPT

## 2017-03-02 PROCEDURE — 97140 MANUAL THERAPY 1/> REGIONS: CPT

## 2017-03-02 PROCEDURE — 97010 HOT OR COLD PACKS THERAPY: CPT

## 2017-03-06 ENCOUNTER — APPOINTMENT (OUTPATIENT)
Dept: PHYSICAL THERAPY | Age: 62
End: 2017-03-06
Payer: COMMERCIAL

## 2017-03-06 PROCEDURE — 97110 THERAPEUTIC EXERCISES: CPT

## 2017-03-06 PROCEDURE — 97140 MANUAL THERAPY 1/> REGIONS: CPT

## 2017-03-09 ENCOUNTER — APPOINTMENT (OUTPATIENT)
Dept: PHYSICAL THERAPY | Age: 62
End: 2017-03-09
Payer: COMMERCIAL

## 2017-03-09 PROCEDURE — 97110 THERAPEUTIC EXERCISES: CPT

## 2017-03-09 PROCEDURE — 97140 MANUAL THERAPY 1/> REGIONS: CPT

## 2017-03-11 ENCOUNTER — LAB (OUTPATIENT)
Dept: LAB | Age: 62
End: 2017-03-11
Payer: COMMERCIAL

## 2017-03-11 ENCOUNTER — TRANSCRIBE ORDERS (OUTPATIENT)
Dept: ADMINISTRATIVE | Age: 62
End: 2017-03-11

## 2017-03-11 DIAGNOSIS — E03.9 UNSPECIFIED HYPOTHYROIDISM: Primary | ICD-10-CM

## 2017-03-11 DIAGNOSIS — E03.9 UNSPECIFIED HYPOTHYROIDISM: ICD-10-CM

## 2017-03-11 LAB
ALBUMIN SERPL BCP-MCNC: 3.6 G/DL (ref 3.5–5)
ALP SERPL-CCNC: 62 U/L (ref 46–116)
ALT SERPL W P-5'-P-CCNC: 25 U/L (ref 12–78)
ANION GAP SERPL CALCULATED.3IONS-SCNC: 6 MMOL/L (ref 4–13)
AST SERPL W P-5'-P-CCNC: 11 U/L (ref 5–45)
BASOPHILS # BLD AUTO: 0.01 THOUSANDS/ΜL (ref 0–0.1)
BASOPHILS NFR BLD AUTO: 0 % (ref 0–1)
BILIRUB SERPL-MCNC: 0.46 MG/DL (ref 0.2–1)
BUN SERPL-MCNC: 13 MG/DL (ref 5–25)
CALCIUM SERPL-MCNC: 9.1 MG/DL (ref 8.3–10.1)
CHLORIDE SERPL-SCNC: 105 MMOL/L (ref 100–108)
CO2 SERPL-SCNC: 30 MMOL/L (ref 21–32)
CREAT SERPL-MCNC: 0.85 MG/DL (ref 0.6–1.3)
CREAT UR-MCNC: 52.8 MG/DL
EOSINOPHIL # BLD AUTO: 0.14 THOUSAND/ΜL (ref 0–0.61)
EOSINOPHIL NFR BLD AUTO: 3 % (ref 0–6)
ERYTHROCYTE [DISTWIDTH] IN BLOOD BY AUTOMATED COUNT: 13.1 % (ref 11.6–15.1)
EST. AVERAGE GLUCOSE BLD GHB EST-MCNC: 111 MG/DL
GFR SERPL CREATININE-BSD FRML MDRD: >60 ML/MIN/1.73SQ M
GLUCOSE SERPL-MCNC: 84 MG/DL (ref 65–140)
HBA1C MFR BLD: 5.5 % (ref 4.2–6.3)
HCT VFR BLD AUTO: 40.5 % (ref 34.8–46.1)
HGB BLD-MCNC: 13.2 G/DL (ref 11.5–15.4)
LYMPHOCYTES # BLD AUTO: 1.15 THOUSANDS/ΜL (ref 0.6–4.47)
LYMPHOCYTES NFR BLD AUTO: 25 % (ref 14–44)
MAGNESIUM SERPL-MCNC: 2.3 MG/DL (ref 1.6–2.6)
MCH RBC QN AUTO: 31.1 PG (ref 26.8–34.3)
MCHC RBC AUTO-ENTMCNC: 32.6 G/DL (ref 31.4–37.4)
MCV RBC AUTO: 96 FL (ref 82–98)
MICROALBUMIN UR-MCNC: <5 MG/L (ref 0–20)
MICROALBUMIN/CREAT 24H UR: <9 MG/G CREATININE (ref 0–30)
MONOCYTES # BLD AUTO: 0.27 THOUSAND/ΜL (ref 0.17–1.22)
MONOCYTES NFR BLD AUTO: 6 % (ref 4–12)
NEUTROPHILS # BLD AUTO: 3.07 THOUSANDS/ΜL (ref 1.85–7.62)
NEUTS SEG NFR BLD AUTO: 66 % (ref 43–75)
NRBC BLD AUTO-RTO: 0 /100 WBCS
PHOSPHATE SERPL-MCNC: 3.4 MG/DL (ref 2.3–4.1)
PLATELET # BLD AUTO: 242 THOUSANDS/UL (ref 149–390)
PMV BLD AUTO: 10.5 FL (ref 8.9–12.7)
POTASSIUM SERPL-SCNC: 4 MMOL/L (ref 3.5–5.3)
PROT SERPL-MCNC: 7.1 G/DL (ref 6.4–8.2)
RBC # BLD AUTO: 4.24 MILLION/UL (ref 3.81–5.12)
SODIUM SERPL-SCNC: 141 MMOL/L (ref 136–145)
T4 FREE SERPL-MCNC: 1.09 NG/DL (ref 0.76–1.46)
TSH SERPL DL<=0.05 MIU/L-ACNC: 3.22 UIU/ML (ref 0.36–3.74)
WBC # BLD AUTO: 4.65 THOUSAND/UL (ref 4.31–10.16)

## 2017-03-11 PROCEDURE — 36415 COLL VENOUS BLD VENIPUNCTURE: CPT

## 2017-03-11 PROCEDURE — 83036 HEMOGLOBIN GLYCOSYLATED A1C: CPT

## 2017-03-11 PROCEDURE — 80053 COMPREHEN METABOLIC PANEL: CPT

## 2017-03-11 PROCEDURE — 82570 ASSAY OF URINE CREATININE: CPT | Performed by: SPECIALIST

## 2017-03-11 PROCEDURE — 84100 ASSAY OF PHOSPHORUS: CPT

## 2017-03-11 PROCEDURE — 84439 ASSAY OF FREE THYROXINE: CPT

## 2017-03-11 PROCEDURE — 82043 UR ALBUMIN QUANTITATIVE: CPT | Performed by: SPECIALIST

## 2017-03-11 PROCEDURE — 83735 ASSAY OF MAGNESIUM: CPT

## 2017-03-11 PROCEDURE — 85025 COMPLETE CBC W/AUTO DIFF WBC: CPT

## 2017-03-11 PROCEDURE — 84443 ASSAY THYROID STIM HORMONE: CPT

## 2017-03-13 ENCOUNTER — APPOINTMENT (OUTPATIENT)
Dept: PHYSICAL THERAPY | Age: 62
End: 2017-03-13
Payer: COMMERCIAL

## 2017-03-13 PROCEDURE — 97110 THERAPEUTIC EXERCISES: CPT

## 2017-03-13 PROCEDURE — 97140 MANUAL THERAPY 1/> REGIONS: CPT

## 2017-03-16 ENCOUNTER — APPOINTMENT (OUTPATIENT)
Dept: PHYSICAL THERAPY | Age: 62
End: 2017-03-16
Payer: COMMERCIAL

## 2017-03-16 PROCEDURE — 97140 MANUAL THERAPY 1/> REGIONS: CPT

## 2017-03-16 PROCEDURE — 97110 THERAPEUTIC EXERCISES: CPT

## 2017-03-20 ENCOUNTER — APPOINTMENT (OUTPATIENT)
Dept: PHYSICAL THERAPY | Age: 62
End: 2017-03-20
Payer: COMMERCIAL

## 2017-03-20 PROCEDURE — 97140 MANUAL THERAPY 1/> REGIONS: CPT

## 2017-03-20 PROCEDURE — 97110 THERAPEUTIC EXERCISES: CPT

## 2017-03-22 ENCOUNTER — HOSPITAL ENCOUNTER (OUTPATIENT)
Dept: NON INVASIVE DIAGNOSTICS | Facility: CLINIC | Age: 62
Discharge: HOME | End: 2017-03-22
Payer: COMMERCIAL

## 2017-03-22 ENCOUNTER — ALLSCRIPTS OFFICE VISIT (OUTPATIENT)
Dept: OTHER | Facility: OTHER | Age: 62
End: 2017-03-22

## 2017-03-22 DIAGNOSIS — M79.604 PAIN OF RIGHT LEG: ICD-10-CM

## 2017-03-22 PROCEDURE — 93971 EXTREMITY STUDY: CPT

## 2017-03-23 ENCOUNTER — APPOINTMENT (OUTPATIENT)
Dept: PHYSICAL THERAPY | Age: 62
End: 2017-03-23
Payer: COMMERCIAL

## 2017-03-23 PROCEDURE — 97140 MANUAL THERAPY 1/> REGIONS: CPT

## 2017-03-23 PROCEDURE — 97110 THERAPEUTIC EXERCISES: CPT

## 2017-03-27 ENCOUNTER — APPOINTMENT (OUTPATIENT)
Dept: PHYSICAL THERAPY | Age: 62
End: 2017-03-27
Payer: COMMERCIAL

## 2017-03-27 PROCEDURE — 97110 THERAPEUTIC EXERCISES: CPT

## 2017-03-27 PROCEDURE — 97140 MANUAL THERAPY 1/> REGIONS: CPT

## 2017-03-30 ENCOUNTER — APPOINTMENT (OUTPATIENT)
Dept: PHYSICAL THERAPY | Age: 62
End: 2017-03-30
Payer: COMMERCIAL

## 2017-03-30 PROCEDURE — 97140 MANUAL THERAPY 1/> REGIONS: CPT

## 2017-03-30 PROCEDURE — 97110 THERAPEUTIC EXERCISES: CPT

## 2017-04-03 ENCOUNTER — APPOINTMENT (OUTPATIENT)
Dept: PHYSICAL THERAPY | Age: 62
End: 2017-04-03
Payer: COMMERCIAL

## 2017-04-03 PROCEDURE — 97110 THERAPEUTIC EXERCISES: CPT

## 2017-04-03 PROCEDURE — 97140 MANUAL THERAPY 1/> REGIONS: CPT

## 2017-04-06 ENCOUNTER — APPOINTMENT (OUTPATIENT)
Dept: PHYSICAL THERAPY | Age: 62
End: 2017-04-06
Payer: COMMERCIAL

## 2017-04-06 PROCEDURE — 97110 THERAPEUTIC EXERCISES: CPT

## 2017-04-06 PROCEDURE — 97140 MANUAL THERAPY 1/> REGIONS: CPT

## 2017-04-10 ENCOUNTER — APPOINTMENT (OUTPATIENT)
Dept: PHYSICAL THERAPY | Age: 62
End: 2017-04-10
Payer: COMMERCIAL

## 2017-04-10 PROCEDURE — 97140 MANUAL THERAPY 1/> REGIONS: CPT

## 2017-04-10 PROCEDURE — 97110 THERAPEUTIC EXERCISES: CPT

## 2017-04-13 ENCOUNTER — APPOINTMENT (OUTPATIENT)
Dept: PHYSICAL THERAPY | Age: 62
End: 2017-04-13
Payer: COMMERCIAL

## 2017-04-13 PROCEDURE — 97140 MANUAL THERAPY 1/> REGIONS: CPT

## 2017-04-13 PROCEDURE — 97110 THERAPEUTIC EXERCISES: CPT

## 2017-05-15 ENCOUNTER — GENERIC CONVERSION - ENCOUNTER (OUTPATIENT)
Dept: OTHER | Facility: OTHER | Age: 62
End: 2017-05-15

## 2017-05-25 ENCOUNTER — APPOINTMENT (OUTPATIENT)
Dept: LAB | Age: 62
End: 2017-05-25
Payer: COMMERCIAL

## 2017-05-25 ENCOUNTER — TRANSCRIBE ORDERS (OUTPATIENT)
Dept: ADMINISTRATIVE | Age: 62
End: 2017-05-25

## 2017-05-25 DIAGNOSIS — C50.411 MALIGNANT NEOPLASM OF UPPER-OUTER QUADRANT OF RIGHT FEMALE BREAST (HCC): ICD-10-CM

## 2017-05-25 LAB
ALBUMIN SERPL BCP-MCNC: 3.6 G/DL (ref 3.5–5)
ALP SERPL-CCNC: 70 U/L (ref 46–116)
ALT SERPL W P-5'-P-CCNC: 28 U/L (ref 12–78)
ANION GAP SERPL CALCULATED.3IONS-SCNC: 6 MMOL/L (ref 4–13)
AST SERPL W P-5'-P-CCNC: 19 U/L (ref 5–45)
BASOPHILS # BLD AUTO: 0.02 THOUSANDS/ΜL (ref 0–0.1)
BASOPHILS NFR BLD AUTO: 0 % (ref 0–1)
BILIRUB SERPL-MCNC: 0.61 MG/DL (ref 0.2–1)
BUN SERPL-MCNC: 20 MG/DL (ref 5–25)
CALCIUM SERPL-MCNC: 9.5 MG/DL (ref 8.3–10.1)
CANCER AG27-29 SERPL-ACNC: 10.2 U/ML (ref 0–42.3)
CHLORIDE SERPL-SCNC: 105 MMOL/L (ref 100–108)
CO2 SERPL-SCNC: 32 MMOL/L (ref 21–32)
CREAT SERPL-MCNC: 0.83 MG/DL (ref 0.6–1.3)
EOSINOPHIL # BLD AUTO: 0.14 THOUSAND/ΜL (ref 0–0.61)
EOSINOPHIL NFR BLD AUTO: 3 % (ref 0–6)
ERYTHROCYTE [DISTWIDTH] IN BLOOD BY AUTOMATED COUNT: 13.5 % (ref 11.6–15.1)
GFR SERPL CREATININE-BSD FRML MDRD: >60 ML/MIN/1.73SQ M
GLUCOSE P FAST SERPL-MCNC: 87 MG/DL (ref 65–99)
HCT VFR BLD AUTO: 40.2 % (ref 34.8–46.1)
HGB BLD-MCNC: 13.4 G/DL (ref 11.5–15.4)
LYMPHOCYTES # BLD AUTO: 1.1 THOUSANDS/ΜL (ref 0.6–4.47)
LYMPHOCYTES NFR BLD AUTO: 24 % (ref 14–44)
MCH RBC QN AUTO: 30.7 PG (ref 26.8–34.3)
MCHC RBC AUTO-ENTMCNC: 33.3 G/DL (ref 31.4–37.4)
MCV RBC AUTO: 92 FL (ref 82–98)
MONOCYTES # BLD AUTO: 0.34 THOUSAND/ΜL (ref 0.17–1.22)
MONOCYTES NFR BLD AUTO: 7 % (ref 4–12)
NEUTROPHILS # BLD AUTO: 3.02 THOUSANDS/ΜL (ref 1.85–7.62)
NEUTS SEG NFR BLD AUTO: 66 % (ref 43–75)
NRBC BLD AUTO-RTO: 0 /100 WBCS
PLATELET # BLD AUTO: 206 THOUSANDS/UL (ref 149–390)
PMV BLD AUTO: 10.7 FL (ref 8.9–12.7)
POTASSIUM SERPL-SCNC: 4.2 MMOL/L (ref 3.5–5.3)
PROT SERPL-MCNC: 7.1 G/DL (ref 6.4–8.2)
RBC # BLD AUTO: 4.36 MILLION/UL (ref 3.81–5.12)
SODIUM SERPL-SCNC: 143 MMOL/L (ref 136–145)
WBC # BLD AUTO: 4.63 THOUSAND/UL (ref 4.31–10.16)

## 2017-05-25 PROCEDURE — 36415 COLL VENOUS BLD VENIPUNCTURE: CPT

## 2017-05-25 PROCEDURE — 86300 IMMUNOASSAY TUMOR CA 15-3: CPT

## 2017-05-25 PROCEDURE — 85025 COMPLETE CBC W/AUTO DIFF WBC: CPT

## 2017-05-25 PROCEDURE — 80053 COMPREHEN METABOLIC PANEL: CPT

## 2017-06-02 ENCOUNTER — ALLSCRIPTS OFFICE VISIT (OUTPATIENT)
Dept: OTHER | Facility: OTHER | Age: 62
End: 2017-06-02

## 2017-06-16 ENCOUNTER — TRANSCRIBE ORDERS (OUTPATIENT)
Dept: ADMINISTRATIVE | Age: 62
End: 2017-06-16

## 2017-06-16 ENCOUNTER — APPOINTMENT (OUTPATIENT)
Dept: LAB | Age: 62
End: 2017-06-16
Payer: COMMERCIAL

## 2017-06-16 DIAGNOSIS — Z00.8 HEALTH EXAMINATION IN POPULATION SURVEY: ICD-10-CM

## 2017-06-16 DIAGNOSIS — Z00.8 HEALTH EXAMINATION IN POPULATION SURVEY: Primary | ICD-10-CM

## 2017-06-16 LAB
CHOLEST SERPL-MCNC: 196 MG/DL (ref 50–200)
EST. AVERAGE GLUCOSE BLD GHB EST-MCNC: 117 MG/DL
HBA1C MFR BLD: 5.7 % (ref 4.2–6.3)
HDLC SERPL-MCNC: 67 MG/DL (ref 40–60)
LDLC SERPL CALC-MCNC: 111 MG/DL (ref 0–100)
TRIGL SERPL-MCNC: 90 MG/DL

## 2017-06-16 PROCEDURE — 36415 COLL VENOUS BLD VENIPUNCTURE: CPT

## 2017-06-16 PROCEDURE — 80061 LIPID PANEL: CPT

## 2017-06-16 PROCEDURE — 83036 HEMOGLOBIN GLYCOSYLATED A1C: CPT

## 2017-07-12 ENCOUNTER — TRANSCRIBE ORDERS (OUTPATIENT)
Dept: ADMINISTRATIVE | Facility: HOSPITAL | Age: 62
End: 2017-07-12

## 2017-07-12 ENCOUNTER — ALLSCRIPTS OFFICE VISIT (OUTPATIENT)
Dept: OTHER | Facility: OTHER | Age: 62
End: 2017-07-12

## 2017-07-12 DIAGNOSIS — C50.911 MALIGNANT NEOPLASM OF RIGHT FEMALE BREAST, UNSPECIFIED SITE OF BREAST: ICD-10-CM

## 2017-07-12 DIAGNOSIS — Z17.0 ESTROGEN RECEPTOR POSITIVE: Primary | ICD-10-CM

## 2017-08-17 ENCOUNTER — TRANSCRIBE ORDERS (OUTPATIENT)
Dept: ADMINISTRATIVE | Facility: HOSPITAL | Age: 62
End: 2017-08-17

## 2017-08-22 ENCOUNTER — TRANSCRIBE ORDERS (OUTPATIENT)
Dept: ADMINISTRATIVE | Facility: HOSPITAL | Age: 62
End: 2017-08-22

## 2017-08-22 DIAGNOSIS — N95.9 UNSPECIFIED MENOPAUSAL AND POSTMENOPAUSAL DISORDER: Primary | ICD-10-CM

## 2017-08-28 ENCOUNTER — HOSPITAL ENCOUNTER (OUTPATIENT)
Dept: RADIOLOGY | Age: 62
Discharge: HOME | End: 2017-08-28
Payer: COMMERCIAL

## 2017-08-28 DIAGNOSIS — N95.9 UNSPECIFIED MENOPAUSAL AND POSTMENOPAUSAL DISORDER: ICD-10-CM

## 2017-08-28 PROCEDURE — 77080 DXA BONE DENSITY AXIAL: CPT

## 2017-08-30 ENCOUNTER — APPOINTMENT (OUTPATIENT)
Dept: LAB | Age: 62
End: 2017-08-30
Payer: COMMERCIAL

## 2017-08-30 ENCOUNTER — TRANSCRIBE ORDERS (OUTPATIENT)
Dept: ADMINISTRATIVE | Age: 62
End: 2017-08-30

## 2017-08-30 DIAGNOSIS — E03.9 UNSPECIFIED HYPOTHYROIDISM: Primary | ICD-10-CM

## 2017-08-30 DIAGNOSIS — E03.9 UNSPECIFIED HYPOTHYROIDISM: ICD-10-CM

## 2017-08-30 DIAGNOSIS — C50.411 MALIGNANT NEOPLASM OF UPPER-OUTER QUADRANT OF RIGHT FEMALE BREAST (HCC): ICD-10-CM

## 2017-08-30 LAB
ALBUMIN SERPL BCP-MCNC: 3.4 G/DL (ref 3.5–5)
ALP SERPL-CCNC: 69 U/L (ref 46–116)
ALT SERPL W P-5'-P-CCNC: 25 U/L (ref 12–78)
ANION GAP SERPL CALCULATED.3IONS-SCNC: 6 MMOL/L (ref 4–13)
AST SERPL W P-5'-P-CCNC: 21 U/L (ref 5–45)
BASOPHILS # BLD AUTO: 0.02 THOUSANDS/ΜL (ref 0–0.1)
BASOPHILS NFR BLD AUTO: 0 % (ref 0–1)
BILIRUB SERPL-MCNC: 0.57 MG/DL (ref 0.2–1)
BUN SERPL-MCNC: 20 MG/DL (ref 5–25)
CALCIUM SERPL-MCNC: 9.3 MG/DL (ref 8.3–10.1)
CANCER AG27-29 SERPL-ACNC: 7.1 U/ML (ref 0–42.3)
CHLORIDE SERPL-SCNC: 102 MMOL/L (ref 100–108)
CO2 SERPL-SCNC: 31 MMOL/L (ref 21–32)
CORTIS AM PEAK SERPL-MCNC: 7.8 UG/DL (ref 4.2–22.4)
CREAT SERPL-MCNC: 0.8 MG/DL (ref 0.6–1.3)
CREAT UR-MCNC: 86.2 MG/DL
EOSINOPHIL # BLD AUTO: 0.18 THOUSAND/ΜL (ref 0–0.61)
EOSINOPHIL NFR BLD AUTO: 4 % (ref 0–6)
ERYTHROCYTE [DISTWIDTH] IN BLOOD BY AUTOMATED COUNT: 13 % (ref 11.6–15.1)
EST. AVERAGE GLUCOSE BLD GHB EST-MCNC: 117 MG/DL
GFR SERPL CREATININE-BSD FRML MDRD: 78 ML/MIN/1.73SQ M
GLUCOSE P FAST SERPL-MCNC: 86 MG/DL (ref 65–99)
HBA1C MFR BLD: 5.7 % (ref 4.2–6.3)
HCT VFR BLD AUTO: 41.9 % (ref 34.8–46.1)
HGB BLD-MCNC: 13.8 G/DL (ref 11.5–15.4)
LYMPHOCYTES # BLD AUTO: 1.16 THOUSANDS/ΜL (ref 0.6–4.47)
LYMPHOCYTES NFR BLD AUTO: 24 % (ref 14–44)
MAGNESIUM SERPL-MCNC: 2.3 MG/DL (ref 1.6–2.6)
MCH RBC QN AUTO: 31.2 PG (ref 26.8–34.3)
MCHC RBC AUTO-ENTMCNC: 32.9 G/DL (ref 31.4–37.4)
MCV RBC AUTO: 95 FL (ref 82–98)
MICROALBUMIN UR-MCNC: 8.7 MG/L (ref 0–20)
MICROALBUMIN/CREAT 24H UR: 10 MG/G CREATININE (ref 0–30)
MONOCYTES # BLD AUTO: 0.34 THOUSAND/ΜL (ref 0.17–1.22)
MONOCYTES NFR BLD AUTO: 7 % (ref 4–12)
NEUTROPHILS # BLD AUTO: 3.14 THOUSANDS/ΜL (ref 1.85–7.62)
NEUTS SEG NFR BLD AUTO: 65 % (ref 43–75)
NRBC BLD AUTO-RTO: 0 /100 WBCS
PHOSPHATE SERPL-MCNC: 3.4 MG/DL (ref 2.3–4.1)
PLATELET # BLD AUTO: 212 THOUSANDS/UL (ref 149–390)
PMV BLD AUTO: 10.3 FL (ref 8.9–12.7)
POTASSIUM SERPL-SCNC: 4.3 MMOL/L (ref 3.5–5.3)
PROT SERPL-MCNC: 7.2 G/DL (ref 6.4–8.2)
RBC # BLD AUTO: 4.42 MILLION/UL (ref 3.81–5.12)
SODIUM SERPL-SCNC: 139 MMOL/L (ref 136–145)
T4 FREE SERPL-MCNC: 1.19 NG/DL (ref 0.76–1.46)
TSH SERPL DL<=0.05 MIU/L-ACNC: 3.74 UIU/ML (ref 0.36–3.74)
WBC # BLD AUTO: 4.85 THOUSAND/UL (ref 4.31–10.16)

## 2017-08-30 PROCEDURE — 80053 COMPREHEN METABOLIC PANEL: CPT

## 2017-08-30 PROCEDURE — 84443 ASSAY THYROID STIM HORMONE: CPT

## 2017-08-30 PROCEDURE — 83735 ASSAY OF MAGNESIUM: CPT

## 2017-08-30 PROCEDURE — 82043 UR ALBUMIN QUANTITATIVE: CPT | Performed by: SPECIALIST

## 2017-08-30 PROCEDURE — 82024 ASSAY OF ACTH: CPT

## 2017-08-30 PROCEDURE — 36415 COLL VENOUS BLD VENIPUNCTURE: CPT

## 2017-08-30 PROCEDURE — 83036 HEMOGLOBIN GLYCOSYLATED A1C: CPT

## 2017-08-30 PROCEDURE — 84439 ASSAY OF FREE THYROXINE: CPT

## 2017-08-30 PROCEDURE — 86300 IMMUNOASSAY TUMOR CA 15-3: CPT

## 2017-08-30 PROCEDURE — 82570 ASSAY OF URINE CREATININE: CPT | Performed by: SPECIALIST

## 2017-08-30 PROCEDURE — 85025 COMPLETE CBC W/AUTO DIFF WBC: CPT

## 2017-08-30 PROCEDURE — 82533 TOTAL CORTISOL: CPT

## 2017-08-30 PROCEDURE — 84100 ASSAY OF PHOSPHORUS: CPT

## 2017-08-31 LAB — ACTH PLAS-MCNC: 22.3 PG/ML (ref 7.2–63.3)

## 2017-09-14 ENCOUNTER — GENERIC CONVERSION - ENCOUNTER (OUTPATIENT)
Dept: OTHER | Facility: OTHER | Age: 62
End: 2017-09-14

## 2017-10-18 ENCOUNTER — APPOINTMENT (OUTPATIENT)
Dept: LAB | Facility: HOSPITAL | Age: 62
End: 2017-10-18
Payer: COMMERCIAL

## 2017-10-18 ENCOUNTER — TRANSCRIBE ORDERS (OUTPATIENT)
Dept: LAB | Facility: HOSPITAL | Age: 62
End: 2017-10-18

## 2017-10-18 DIAGNOSIS — Z79.899 ENCOUNTER FOR LONG-TERM (CURRENT) USE OF MEDICATIONS: ICD-10-CM

## 2017-10-18 DIAGNOSIS — M81.0 SENILE OSTEOPOROSIS: ICD-10-CM

## 2017-10-18 DIAGNOSIS — M81.0 SENILE OSTEOPOROSIS: Primary | ICD-10-CM

## 2017-10-18 LAB
ANION GAP SERPL CALCULATED.3IONS-SCNC: 7 MMOL/L (ref 4–13)
BUN SERPL-MCNC: 22 MG/DL (ref 5–25)
CALCIUM SERPL-MCNC: 8.9 MG/DL (ref 8.3–10.1)
CHLORIDE SERPL-SCNC: 105 MMOL/L (ref 100–108)
CO2 SERPL-SCNC: 29 MMOL/L (ref 21–32)
CREAT SERPL-MCNC: 0.82 MG/DL (ref 0.6–1.3)
GFR SERPL CREATININE-BSD FRML MDRD: 76 ML/MIN/1.73SQ M
GLUCOSE SERPL-MCNC: 82 MG/DL (ref 65–140)
POTASSIUM SERPL-SCNC: 4 MMOL/L (ref 3.5–5.3)
SODIUM SERPL-SCNC: 141 MMOL/L (ref 136–145)

## 2017-10-18 PROCEDURE — 80048 BASIC METABOLIC PNL TOTAL CA: CPT

## 2017-10-18 PROCEDURE — 36415 COLL VENOUS BLD VENIPUNCTURE: CPT

## 2017-10-23 ENCOUNTER — GENERIC CONVERSION - ENCOUNTER (OUTPATIENT)
Dept: OTHER | Facility: OTHER | Age: 62
End: 2017-10-23

## 2017-11-01 DIAGNOSIS — C50.911 MALIGNANT NEOPLASM OF RIGHT FEMALE BREAST (HCC): ICD-10-CM

## 2017-11-15 ENCOUNTER — APPOINTMENT (OUTPATIENT)
Dept: LAB | Age: 62
End: 2017-11-15
Payer: COMMERCIAL

## 2017-11-15 ENCOUNTER — TRANSCRIBE ORDERS (OUTPATIENT)
Dept: ADMINISTRATIVE | Age: 62
End: 2017-11-15

## 2017-11-15 DIAGNOSIS — C50.911 MALIGNANT NEOPLASM OF RIGHT FEMALE BREAST (HCC): ICD-10-CM

## 2017-11-15 LAB
BUN SERPL-MCNC: 17 MG/DL (ref 5–25)
CREAT SERPL-MCNC: 0.78 MG/DL (ref 0.6–1.3)
GFR SERPL CREATININE-BSD FRML MDRD: 81 ML/MIN/1.73SQ M

## 2017-11-15 PROCEDURE — 84520 ASSAY OF UREA NITROGEN: CPT

## 2017-11-15 PROCEDURE — 82565 ASSAY OF CREATININE: CPT

## 2017-11-15 PROCEDURE — 36415 COLL VENOUS BLD VENIPUNCTURE: CPT

## 2017-11-17 ENCOUNTER — HOSPITAL ENCOUNTER (OUTPATIENT)
Dept: RADIOLOGY | Facility: HOSPITAL | Age: 62
Discharge: HOME | End: 2017-11-17
Payer: COMMERCIAL

## 2017-11-17 DIAGNOSIS — C50.911 MALIGNANT NEOPLASM OF RIGHT FEMALE BREAST (HCC): ICD-10-CM

## 2017-11-17 DIAGNOSIS — Z17.0 ESTROGEN RECEPTOR POSITIVE: ICD-10-CM

## 2017-11-17 PROCEDURE — 0159T HB CAD BREAST MRI: CPT

## 2017-11-17 PROCEDURE — C8908 MRI W/O FOL W/CONT, BREAST,: HCPCS

## 2017-11-20 ENCOUNTER — HOSPITAL ENCOUNTER (OUTPATIENT)
Dept: MAMMOGRAPHY | Facility: CLINIC | Age: 62
Discharge: HOME | End: 2017-11-20
Payer: COMMERCIAL

## 2017-11-20 DIAGNOSIS — Z17.0 ESTROGEN RECEPTOR POSITIVE: ICD-10-CM

## 2017-11-20 DIAGNOSIS — C50.911 MALIGNANT NEOPLASM OF RIGHT FEMALE BREAST (HCC): ICD-10-CM

## 2017-11-20 PROCEDURE — G0279 TOMOSYNTHESIS, MAMMO: HCPCS

## 2017-11-20 PROCEDURE — G0204 DX MAMMO INCL CAD BI: HCPCS

## 2018-01-03 ENCOUNTER — OFFICE VISIT (OUTPATIENT)
Dept: URGENT CARE | Age: 63
End: 2018-01-03
Payer: COMMERCIAL

## 2018-01-03 PROCEDURE — 99213 OFFICE O/P EST LOW 20 MIN: CPT | Performed by: FAMILY MEDICINE

## 2018-01-03 PROCEDURE — S9088 SERVICES PROVIDED IN URGENT: HCPCS | Performed by: FAMILY MEDICINE

## 2018-01-03 PROCEDURE — 90715 TDAP VACCINE 7 YRS/> IM: CPT

## 2018-01-05 NOTE — PROGRESS NOTES
Assessment   1  Cat bite (879 8,E906 3) (W55 01XA)    Plan   Cat bite    · Doxycycline Monohydrate 100 MG Oral Capsule; TAKE 1 CAPSULE EVERY 12    HOURS DAILY   · Tdap (Boostrix)    Discussion/Summary   Discussion Summary:    Keep site clean, dry and covered  Monitor for worsening signs of infection such as redness, increased swelling, drainage  Seek care if this occurs  Start antibiotic  Take probiotic  Follow up with PCP if no improvement  Cat bite form filled completed  Tdap updated today  Medication Side Effects Reviewed: Possible side effects of new medications were reviewed with the patient/guardian today  Understands and agrees with treatment plan: The treatment plan was reviewed with the patient/guardian  The patient/guardian understands and agrees with the treatment plan    Counseling Documentation With Imm: The patient was counseled regarding instructions for management,-- patient and family education,-- importance of compliance with treatment  Follow Up Instructions: Follow Up with your Primary Care Provider in days  If your symptoms worsen, go to the nearest [place] [address] Emergency Department  Chief Complaint   1  Skin Wound  Chief Complaint Free Text Note Form: c/o cat bite to left thumb x 1/1/2018, c/o redness  tetanus unknown  History of Present Illness   HPI: 57year old female here today with cat bite on left thumb  She was teasing her house cat yesterday and he bite her  She has small puncture on left thumb  She states she feels as though it has gotten red around the area  Unsure of last Tdap  Cat is not vaccinated but is her cat and is a house cat which has never been outside  Hospital Based Practices Required Assessment:      Pain Assessment      the patient states they do not have pain  Abuse And Domestic Violence Screen       Yes, the patient is safe at home  -- The patient states no one is hurting them  Depression And Suicide Screen   No, the patient has not had thoughts of hurting themself  No, the patient has not felt depressed in the past 7 days  Prefered Language is  english  Review of Systems   Focused-Female:      Constitutional: No fever, no chills, feels well, no tiredness, no recent weight gain or loss  Cardiovascular: no complaints of slow or fast heart rate, no chest pain, no palpitations, no leg claudication or lower extremity edema  Respiratory: no complaints of shortness of breath, no wheezing, no dyspnea on exertion, no orthopnea or PND  Integumentary: as noted in HPI  ROS Reviewed:    ROS reviewed  Active Problems   1  History of Atypical lobular hyperplasia of breast (610 8) (N60 99)   2  Carcinoma of right breast, estrogen receptor positive (174 9,V86 0) (C50 911,Z17 0)   3  Cervical Spondylolisthesis (738 4)   4  Encounter for gynecological examination without abnormal finding (V72 31) (Z01 419)   5  Hypothyroidism (244 9) (E03 9)   6  Leukopenia (288 50) (D72 819)   7  Lumbar radiculopathy (724 4) (M54 16)   8  Malignant neoplasm of upper-outer quadrant of right female breast (174 4) (C50 411)   9  Meralgia paraesthetica (355 1) (G57 10)   10  Myelopathy (336 9) (G95 9)   11  Osteoarthritis of right knee (715 96) (M17 11)   12  Osteopenia (733 90) (M85 80)   13  Pre-op examination (V72 84) (Z01 818)   14  Prophylactic use of letrozole (Femara) (V07 52) (Z79 811)   15  Right leg pain (729 5) (M79 604)   16  Seasonal allergies (477 9) (J30 2)   17  Urinary retention (788 20) (R33 9)   18  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 (454 8) (I83 819)   19  Vitamin D deficiency (268 9) (E55 9)    Past Medical History   1  History of Acute deep vein thrombosis of lower limb, unspecified laterality   2  History of Age At First Period 13 Years Old (Menarche)   3  History of Age At First Pregnancy 33 Years Old   4  History of Atypical lobular hyperplasia of breast (610 8) (N60 99)   5   History of Chronic venous hypertension with complication (337 66) (D17 980)   6  History of Encounter for screening mammogram for malignant neoplasm of breast     (V76 12) (Z12 31)   7  History of Fibroadenosis of breast (610 2) (N60 29)   8  History of Foreign body in eye (930 9) (T15 90XA)   9  History of abdominal pain (V13 89) (Z87 898)   10  History of radiation therapy (V15 3) (Z92 3)   11  History of upper respiratory infection (V12 09) (Z87 09)   12  History of urinary incontinence (V13 09) (Z87 898)   13  History of Mammogram Right Breast Microcalcifications (793 81)   14  History of Muscle weakness (728 87) (M62 81)   15  History of Neurogenic bladder (596 54) (N31 9)   16  History of Previously Pregnant With 3  Term Deliveries   16  History of Varicose Veins Of Lower Extremities (454 9)  Active Problems And Past Medical History Reviewed: The active problems and past medical history were reviewed and updated today  Family History   Mother    1  Family history of Bladder Cancer (V16 52)   2  Family history of Uterine Cancer (V16 49)  Father    3  Family history of Laryngeal Cancer (V16 2)   4  Family history of Lung Cancer (V16 1)  Paternal Aunt    11  Family history of Breast Cancer (V16 3)  Family History    6  Family history of Colon Cancer (V16 0)  Family History Reviewed: The family history was reviewed and updated today  Social History    · Denied: History of Alcohol Use (History)   · Caffeine use (V49 89) (F15 90)   · Drinks caffeinated tea   · Denied: History of Drug use   · Marital History -  (V61 03)   · Never A Smoker  Social History Reviewed: The social history was reviewed and updated today  The social history was reviewed and is unchanged  Surgical History   1  History of Biopsy Breast Open   2  History of Biopsy Breast Open   3  History of Foot Surgery   4  History of Hip Surgery   5  History of Right Breast Lumpectomy   6  History of [place] Lymph Node Biopsy   7   History of Tonsillectomy With Adenoidectomy   8  History of Total Knee Replacement Right   9  History of Venous Ligation  Surgical History Reviewed: The surgical history was reviewed and updated today  Current Meds    1  Letrozole 2 5 MG Oral Tablet; TAKE 1 TABLET DAILY; Therapy: 38VQK0146 to ([place])  Requested for: 78GCU7087; Last     Rx:13Nov2017 Ordered   2  Synthroid 75 MCG Oral Tablet; TAKE 1 TABLET EVERY OTHER DAY; Therapy: (Recorded:02Jun2017) to Recorded   3  Synthroid 88 MCG Oral Tablet; take 1 tablet every other day; Therapy: (Recorded:02Jun2017) to Recorded  Medication List Reviewed: The medication list was reviewed and updated today  Allergies   1  Actifed TABS   2  Penicillins   3  Vancomycin HCl SOLR   4  Actifed Cold/Allergy TABS    Vitals   Signs   Recorded: 04HXS1525 08:02AM   Temperature: 97 7 F, Temporal  Heart Rate: 76  Respiration: 20  Systolic: 880  Diastolic: 76  Weight: 611 lb   BMI Calculated: 28 38  BSA Calculated: 2 05  O2 Saturation: 78  LMP:   Pain Scale: 0    Physical Exam        Constitutional      General appearance: No acute distress, well appearing and well nourished  Pulmonary      Respiratory effort: No increased work of breathing or signs of respiratory distress  Auscultation of lungs: Clear to auscultation  Cardiovascular      Palpation of heart: Normal PMI, no thrills  Auscultation of heart: Normal rate and rhythm, normal S1 and S2, without murmurs  Skin      Examination of the skin for lesions: Abnormal  -- small puncture of left thumb, slight redness around site  no warmth        Psychiatric      Orientation to person, place, and time: Normal        Mood and affect: Normal        Future Appointments      Date/Time Provider Specialty Site   10/29/2018 02:40 PM Alejandro Murphy [facility] Obstetrics/Gynecology [facility] MEDICAL AND SURGICAL [facility]   01/10/2018 03:00 PM Sarika Valladares Surgical Oncology [facility]     Signatures    Electronically signed by : Esme Dorsey; [place]  3 2018  8:44AM EST                       (Author)     Electronically signed by : Vickey Swanson DO; Jan 4 2018  7:06AM EST                       (Co-author)

## 2018-01-09 NOTE — MISCELLANEOUS
History of Present Illness  TCM Communication [facility]: The patient is being contacted for follow-up after hospitalization  Hospital course was discussed with the inpatient physician  She was hospitalized at [facility]  The date of admission: 01/20/2017, date of discharge: 01/22/2017  Diagnosis: PRIMARY OSTEOARTHRITIS OF R KNEE, S/P RTKR  She was discharged to home  Medications were not reviewed today  She did not schedule a follow up appointment  She refused a follow up appointment due to PCP FEELS IT MORE IMPORTANT FOR PT TO F/U WITH ORTHO   Communication performed and completed by Jose Antonio Garcia      Active Problems    1  History of Atypical lobular hyperplasia of breast (610 8) (N62)   2  Cervical Spondylolisthesis (738 4)   3  Hypothyroidism (244 9) (E03 9)   4  Leukopenia (288 50) (D72 819)   5  Lumbar radiculopathy (724 4) (M54 16)   6  Malignant neoplasm of upper-outer quadrant of right female breast (174 4) (C50 411)   7  Meralgia paraesthetica (355 1) (G57 10)   8  Myelopathy (336 9) (G95 9)   9  Need for shingles vaccine (V04 89) (Z23)   10  Osteoarthritis of right knee (715 96) (M17 11)   11  Osteopenia (733 90) (M85 80)   12  Pre-op examination (V72 84) (Z01 818)   13  Prophylactic use of letrozole (Femara) (V07 52) (Z79 811)   14  Seasonal allergies (477 9) (J30 2)   15  Urinary retention (788 20) (R33 9)   16  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 (454 8) (I83 819)   17  Vitamin D deficiency (268 9) (E55 9)    Past Medical History    1  History of Acute deep vein thrombosis of lower limb, unspecified laterality   2  History of Age At First Period 13 Years Old (Menarche)   3  History of Age At First Pregnancy 33 Years Old   4  History of Atypical lobular hyperplasia of breast (610 8) (N62)   5  History of Chronic venous hypertension with complication (107 20) (N51 822)   6  History of Encounter for screening mammogram for malignant neoplasm of breast   (V76 12) (Z12 31)   7   History of Fibroadenosis of breast (610 2) (N60 29)   8  History of Foreign body in eye (930 9) (T15 90XA)   9  History of abdominal pain (V13 89) (Z87 898)   10  History of radiation therapy (V15 3) (Z92 3)   11  History of upper respiratory infection (V12 09) (Z87 09)   12  History of urinary incontinence (V13 09) (Z87 898)   13  History of Mammogram Right Breast Microcalcifications (793 81)   14  History of Muscle weakness (728 87) (M62 81)   15  History of Neurogenic bladder (596 54) (N31 9)   16  History of Pre-op evaluation (V72 84) (Z01 818)   17  History of Previously Pregnant With 3  Term Deliveries   25  History of Superficial thrombophlebitis of leg, unspecified laterality   19  History of Thrombophlebitis/phlebitis, deep (451 9) (I80 9)   20  History of Uterovaginal prolapse (618 4) (N81 4)   21  History of Varicose Veins Of Lower Extremities (454 9)   22  History of Visit for screening mammogram (V76 12) (Z12 31)    Surgical History    1  History of Biopsy Breast Open   2  History of Biopsy Breast Open   3  History of Foot Surgery   4  History of Hip Surgery   5  History of Right Breast Lumpectomy   6  History of [place] Lymph Node Biopsy   7  History of Tonsillectomy With Adenoidectomy   8  History of Venous Ligation    Family History  Mother    1  Family history of Bladder Cancer (V16 52)   2  Family history of Uterine Cancer (V16 49)  Father    3  Family history of Laryngeal Cancer (V16 2)   4  Family history of Lung Cancer (V16 1)  Paternal Aunt    11  Family history of Breast Cancer (V16 3)  Family History    6  Family history of Colon Cancer (V16 0)    Social History    · Denied: History of Alcohol Use (History)   · Caffeine use (V49 89) (F15 90)   · Drinks caffeinated tea   · Denied: History of Drug use   · Marital History -  (V61 03)   · Never A Smoker    Current Meds   1  Letrozole 2 5 MG Oral Tablet; TAKE 1 TABLET DAILY;    Therapy: 66MYR0776 to (Last Rx:25Nov2016)  Requested for: 82PEO7316 Ordered   2  Synthroid 75 MCG Oral Tablet; Therapy: (Recorded:98Atx4654) to Recorded   3  Varivax 1350 PFU/0 5ML Subcutaneous Injectable; To be given at pharmacy; Therapy: 60IIJ7012 to (Evaluate:12Jan2017); Last Rx:11Jan2017 Ordered    Allergies    1  Actifed TABS   2  Penicillins   3  Vancomycin HCl SOLR   4  Actifed Cold/Allergy TABS    Message   Recorded as Task   Date: 01/22/2017 04:36 PM, Created By: System   Task Name: [facility]   Assigned To: Xavi Rubio   Regarding Patient: Jose Galeano, Status: Active   Comment:    System - 22 Jan 2017 4:36 PM     Patient discharged from hospital   Patient Name: April Aguilar  Patient YOB: 1955  Discharge Date: 1/22/2017  Facility: [facility] 23 Jan 2017 7:35 AM     TASK REASSIGNED: Previously Assigned To Elissa Grover     [facility] Appointments    Date/Time Provider Specialty Site   10/04/2017 03:00 PM Jason Naqvi MD Obstetrics/Gynecology [facility]   06/02/2017 08:00 AM Aayush Caal MD Hematology Oncology [facility]   07/05/2017 03:30 PM Tiffany Valladares Surgical Oncology [facility]     Signatures   Electronically signed by :  Kuldip Gay DO; Jan 26 2017  4:42PM EST                       (Author)

## 2018-01-10 ENCOUNTER — ALLSCRIPTS OFFICE VISIT (OUTPATIENT)
Dept: OTHER | Facility: OTHER | Age: 63
End: 2018-01-10

## 2018-01-12 NOTE — PROGRESS NOTES
Assessment   1  Carcinoma of right breast, estrogen receptor positive (174 9,V86 0) (C50 911,Z17 0)   2  Malignant neoplasm of upper-outer quadrant of right female breast (174 4) (C50 411)    Plan   Malignant neoplasm of upper-outer quadrant of right female breast    · Follow-up visit in 6 months Evaluation and Treatment  Follow-up  Status: Hold For -    Scheduling  Requested for: 49HTE8167   Ordered; For: Malignant neoplasm of upper-outer quadrant of right female breast; Ordered By: Ailyn Hamilton Performed:  Due: 15ZDA6197    Discussion/Summary   Discussion Summary:    Patient is a 57year old female who presents today for a 6 month follow-up for right breast cancer diagnosed in January 2014  Her pathology revealed invasive ductal carcinoma, grade 1, ER/ID positive, HER-2 negative  At that time she underwent a right breast lumpectomy and sentinel node biopsy  She then completed whole breast radiation therapy and is currently on letrozole therapy  She has no new complaints today  No worrisome findings on physical exam  She had bilateral diagnostic mammogram and a bilateral breast MRI performed in November 2017 - BIRADS 2  Her breast cancer was only detected on MRI in the past  We will see her back in 6 months or sooner if the need arises  She was instructed to call with any new, persistent symptoms  All of her questions were answered  Counseling Documentation With Imm: The patient was counseled regarding diagnostic results,-- instructions for management,-- impressions  Chief Complaint   Chief Complaint Free Text Note Form: Patient presents for a 6 month follow-up for right breast cancer diagnosed in January 2014        History of Present Illness   Diagnosis and Staging: January 2014: Right breast, invasive ductal carcinoma, stage IA (T1b, N0, MX), grade 2 size 6 mm, %, %, HER-2/nina negative, lymph nodes 0/1  8    Treatment History: January 2014: Right breast lumpectomy with sentinel lymph node biopsy Whole breast radiation therapy Letrozole, Dr Selin Duarte    Current Therapy: Letrozole    Disease Status: MARQUITA    Interval History: Patient presents today for a 6 month follow-up for right breast cancer diagnosed in January 2014  She had a bilateral diagnostic mammogram and bilateral breast MRI done in November 2017 which were BI-RADS 2  She has no new complaints today  She denies headaches, back pain, bone pain, cough, SOB, abdominal pain  She continues to take Letrozole  She notices no changes to her breasts  Review of Systems   Complete Female ROS SurgOnc:      Constitutional: The patient denies new or recent history of general fatigue, no recent weight loss, no change in appetite  Eyes: No complaints of visual problems, no scleral icterus  ENT: no complaints of ear pain, no hoarseness, no difficulty swallowing,-- no tinnitus-- and-- no new masses in head, oral cavity, or neck  Cardiovascular: No complaints of chest pain, no palpitations, no ankle edema  Respiratory: No complaints of shortness of breath, no cough  Gastrointestinal: No complaints of jaundice, no bloody stools, no pale stools  Genitourinary: No complaints of dysuria, no hematuria, no nocturia, no frequent urination, no urethral discharge  Musculoskeletal: arthralgias,-- myalgias-- and-- chronic back pain  Integumentary: No complaints of rash, no new lesions  Neurological: No complaints of convulsions, no seizures, no dizziness  Hematologic/Lymphatic: No complaints of easy bruising  ROS Reviewed:    ROS reviewed  Active Problems   1  History of Atypical lobular hyperplasia of breast (610 8) (N60 99)   2  Carcinoma of right breast, estrogen receptor positive (174 9,V86 0) (C50 911,Z17 0)   3  Cat bite (879 8,E906 3) (W55 01XA)   4  Cervical Spondylolisthesis (738 4)   5  Encounter for gynecological examination without abnormal finding (V72 31) (Z01 419)   6   Hypothyroidism (244  9) (E03 9)   7  Leukopenia (288 50) (D72 819)   8  Lumbar radiculopathy (724 4) (M54 16)   9  Malignant neoplasm of upper-outer quadrant of right female breast (174 4) (C50 411)   10  Meralgia paraesthetica (355 1) (G57 10)   11  Myelopathy (336 9) (G95 9)   12  Osteoarthritis of right knee (715 96) (M17 11)   13  Osteopenia (733 90) (M85 80)   14  Pre-op examination (V72 84) (Z01 818)   15  Prophylactic use of letrozole (Femara) (V07 52) (Z79 811)   16  Right leg pain (729 5) (M79 604)   17  Seasonal allergies (477 9) (J30 2)   18  Urinary retention (788 20) (R33 9)   19  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 (454 8) (I83 819)   20  Vitamin D deficiency (268 9) (E55 9)    Past Medical History   1  History of Acute deep vein thrombosis of lower limb, unspecified laterality   2  History of Age At First Period 13 Years Old (Menarche)   3  History of Age At First Pregnancy 33 Years Old   4  History of Atypical lobular hyperplasia of breast (610 8) (N60 99)   5  History of Chronic venous hypertension with complication (285 17) (M53 490)   6  History of Encounter for screening mammogram for malignant neoplasm of breast     (V76 12) (Z12 31)   7  History of Fibroadenosis of breast (610 2) (N60 29)   8  History of Foreign body in eye (930 9) (T15 90XA)   9  History of abdominal pain (V13 89) (Z87 898)   10  History of radiation therapy (V15 3) (Z92 3)   11  History of upper respiratory infection (V12 09) (Z87 09)   12  History of urinary incontinence (V13 09) (Z87 898)   13  History of Mammogram Right Breast Microcalcifications (793 81)   14  History of Muscle weakness (728 87) (M62 81)   15  History of Neurogenic bladder (596 54) (N31 9)   16  History of Previously Pregnant With 3  Term Deliveries   16  History of Varicose Veins Of Lower Extremities (454 9)    Surgical History   1  History of Biopsy Breast Open   2  History of Biopsy Breast Open   3  History of Foot Surgery   4  History of Hip Surgery   5  History of Right Breast Lumpectomy   6  History of [place] Lymph Node Biopsy   7  History of Tonsillectomy With Adenoidectomy   8  History of Total Knee Replacement Right   9  History of Venous Ligation  Surgical History Reviewed: The surgical history was reviewed and updated today  Family History   Mother    1  Family history of Bladder Cancer (V16 52)   2  Family history of Uterine Cancer (V16 49)  Father    3  Family history of Laryngeal Cancer (V16 2)   4  Family history of Lung Cancer (V16 1)  Paternal Aunt    11  Family history of Breast Cancer (V16 3)  Family History    6  Family history of Colon Cancer (V16 0)  Family History Reviewed: The family history was reviewed and updated today  Social History    · Denied: History of Alcohol Use (History)   · Caffeine use (V49 89) (F15 90)   · Drinks caffeinated tea   · Denied: History of Drug use   · Marital History -  (V61 03)   · Never A Smoker  Social History Reviewed: The social history was reviewed and updated today  The social history was reviewed and is unchanged  Current Meds    1  Doxycycline Monohydrate 100 MG Oral Capsule; TAKE 1 CAPSULE EVERY 12 HOURS     DAILY; Therapy: 02PBA2682 to (Complete:13Jan2018)  Requested for: 24LJR2675; Last     Rx:03Jan2018 Ordered   2  Letrozole 2 5 MG Oral Tablet; TAKE 1 TABLET DAILY; Therapy: 87ZPO8011 to (Agnieszka Hensley)  Requested for: 10WST4755; Last     Rx:13Nov2017 Ordered   3  Synthroid 75 MCG Oral Tablet; TAKE 1 TABLET EVERY OTHER DAY; Therapy: (Recorded:02Jun2017) to Recorded   4  Synthroid 88 MCG Oral Tablet; take 1 tablet every other day; Therapy: (Recorded:02Jun2017) to Recorded  Medication List Reviewed: The medication list was reviewed and updated today  Allergies   1  Actifed TABS   2  Penicillins   3  Vancomycin HCl SOLR   4  Actifed Cold/Allergy TABS    Physical Exam        Constitutional: General appearance:  The Patient is well-developed, well-nourished female who appears her stated age in no acute distress  She is pleasant and talkative  HEENT: Sclerae are anicteric  -- Mucous membranes are moist  -- Neck is supple without adenopathy  No JVD  Chest: The lungs are clear to auscultation  Cardiac: Heart is regular rate  Abdomen: Abdomen is soft, nontender without masses  Extremities: There is no clubbing or cyanosis  -- There is no edema  Neuro: Grossly nonfocal  -- Gait is normal        Lymphatic: no evidence of cervical adenopathy bilaterally  -- no evidence of axillary adenopathy bilaterally  Skin: Warm, anicteric  Additional Exam:  Bilateral breasts were examined in the sitting and supine position  There are no new masses, skin changes, nipple discharge or nipple drainage  Right breast and right axilla surgical scar  Scar tissue noted at right breast surgical site- stable per patient  There is no bilateral supraclavicular or axillary lymphadenopathy noted  Future Appointments      Date/Time Provider Specialty Site   10/29/2018 02:40 PM Eva Bernardo [facility] Obstetrics/Gynecology [facility] [place]     End of Encounter Meds   1  Doxycycline Monohydrate 100 MG Oral Capsule; TAKE 1 CAPSULE EVERY 12 HOURS     DAILY; Therapy: 14AMQ9006 to (Complete:13Jan2018)  Requested for: 29ENY5130; Last     Rx:03Jan2018 Ordered  2  Synthroid 75 MCG Oral Tablet (Levothyroxine Sodium); TAKE 1 TABLET EVERY OTHER     DAY; Therapy: (Recorded:02Jun2017) to Recorded  3  Letrozole 2 5 MG Oral Tablet (Femara); TAKE 1 TABLET DAILY; Therapy: 80SUT6577 to (David Jarvis)  Requested for: 83WWN4303; Last     Rx:13Nov2017 Ordered  4  Synthroid 88 MCG Oral Tablet (Levothyroxine Sodium); take 1 tablet every other day; Therapy: (Recorded:02Jun2017) to Recorded    Signatures    Electronically signed by :  SRIKANTH Amaro; [place] 10 2018  3:30PM EST                       (Author) Electronically signed by : Dev Reed MD; Jan 11 2018  9:10AM EST

## 2018-01-13 VITALS
HEART RATE: 70 BPM | HEIGHT: 69 IN | SYSTOLIC BLOOD PRESSURE: 130 MMHG | OXYGEN SATURATION: 96 % | RESPIRATION RATE: 14 BRPM | DIASTOLIC BLOOD PRESSURE: 88 MMHG | WEIGHT: 188 LBS | BODY MASS INDEX: 27.85 KG/M2 | TEMPERATURE: 98.6 F

## 2018-01-13 VITALS
BODY MASS INDEX: 29.59 KG/M2 | DIASTOLIC BLOOD PRESSURE: 82 MMHG | TEMPERATURE: 97.2 F | HEIGHT: 69 IN | WEIGHT: 199.8 LBS | HEART RATE: 64 BPM | RESPIRATION RATE: 16 BRPM | SYSTOLIC BLOOD PRESSURE: 124 MMHG

## 2018-01-13 VITALS
DIASTOLIC BLOOD PRESSURE: 72 MMHG | HEIGHT: 69 IN | BODY MASS INDEX: 27.99 KG/M2 | RESPIRATION RATE: 16 BRPM | HEART RATE: 68 BPM | WEIGHT: 189 LBS | SYSTOLIC BLOOD PRESSURE: 110 MMHG

## 2018-01-14 VITALS
DIASTOLIC BLOOD PRESSURE: 82 MMHG | RESPIRATION RATE: 15 BRPM | BODY MASS INDEX: 27.47 KG/M2 | WEIGHT: 185.5 LBS | HEIGHT: 69 IN | TEMPERATURE: 98.7 F | HEART RATE: 74 BPM | SYSTOLIC BLOOD PRESSURE: 132 MMHG | OXYGEN SATURATION: 99 %

## 2018-01-14 VITALS
BODY MASS INDEX: 29.09 KG/M2 | WEIGHT: 196.38 LBS | HEIGHT: 69 IN | HEART RATE: 70 BPM | SYSTOLIC BLOOD PRESSURE: 158 MMHG | DIASTOLIC BLOOD PRESSURE: 98 MMHG | RESPIRATION RATE: 16 BRPM | TEMPERATURE: 98.7 F

## 2018-01-22 VITALS
HEIGHT: 70 IN | DIASTOLIC BLOOD PRESSURE: 94 MMHG | SYSTOLIC BLOOD PRESSURE: 156 MMHG | BODY MASS INDEX: 27.92 KG/M2 | WEIGHT: 195 LBS

## 2018-01-23 VITALS
OXYGEN SATURATION: 78 % | DIASTOLIC BLOOD PRESSURE: 76 MMHG | SYSTOLIC BLOOD PRESSURE: 126 MMHG | RESPIRATION RATE: 20 BRPM | TEMPERATURE: 97.7 F | BODY MASS INDEX: 28.8 KG/M2 | WEIGHT: 195 LBS | HEART RATE: 76 BPM

## 2018-01-23 VITALS
TEMPERATURE: 97.8 F | BODY MASS INDEX: 27.89 KG/M2 | WEIGHT: 188.31 LBS | DIASTOLIC BLOOD PRESSURE: 84 MMHG | SYSTOLIC BLOOD PRESSURE: 140 MMHG | RESPIRATION RATE: 14 BRPM | HEART RATE: 80 BPM | HEIGHT: 69 IN

## 2018-02-07 DIAGNOSIS — C50.411 MALIGNANT NEOPLASM OF UPPER-OUTER QUADRANT OF RIGHT FEMALE BREAST, UNSPECIFIED ESTROGEN RECEPTOR STATUS (HCC): Primary | ICD-10-CM

## 2018-02-08 ENCOUNTER — TRANSCRIBE ORDERS (OUTPATIENT)
Dept: ADMINISTRATIVE | Age: 63
End: 2018-02-08

## 2018-02-08 ENCOUNTER — APPOINTMENT (OUTPATIENT)
Dept: LAB | Age: 63
End: 2018-02-08
Payer: COMMERCIAL

## 2018-02-08 LAB
ALBUMIN SERPL BCP-MCNC: 3.5 G/DL (ref 3.5–5)
ALP SERPL-CCNC: 43 U/L (ref 46–116)
ALT SERPL W P-5'-P-CCNC: 18 U/L (ref 12–78)
ANION GAP SERPL CALCULATED.3IONS-SCNC: 5 MMOL/L (ref 4–13)
AST SERPL W P-5'-P-CCNC: 13 U/L (ref 5–45)
BASOPHILS # BLD AUTO: 0.01 THOUSANDS/ΜL (ref 0–0.1)
BASOPHILS NFR BLD AUTO: 0 % (ref 0–1)
BILIRUB SERPL-MCNC: 0.58 MG/DL (ref 0.2–1)
BUN SERPL-MCNC: 12 MG/DL (ref 5–25)
CALCIUM SERPL-MCNC: 8.9 MG/DL (ref 8.3–10.1)
CANCER AG27-29 SERPL-ACNC: 13.6 U/ML (ref 0–42.3)
CHLORIDE SERPL-SCNC: 106 MMOL/L (ref 100–108)
CO2 SERPL-SCNC: 31 MMOL/L (ref 21–32)
CREAT SERPL-MCNC: 0.87 MG/DL (ref 0.6–1.3)
EOSINOPHIL # BLD AUTO: 0.07 THOUSAND/ΜL (ref 0–0.61)
EOSINOPHIL NFR BLD AUTO: 2 % (ref 0–6)
ERYTHROCYTE [DISTWIDTH] IN BLOOD BY AUTOMATED COUNT: 12.8 % (ref 11.6–15.1)
GFR SERPL CREATININE-BSD FRML MDRD: 71 ML/MIN/1.73SQ M
GLUCOSE P FAST SERPL-MCNC: 80 MG/DL (ref 65–99)
HCT VFR BLD AUTO: 40.6 % (ref 34.8–46.1)
HGB BLD-MCNC: 13.6 G/DL (ref 11.5–15.4)
LYMPHOCYTES # BLD AUTO: 1.16 THOUSANDS/ΜL (ref 0.6–4.47)
LYMPHOCYTES NFR BLD AUTO: 28 % (ref 14–44)
MCH RBC QN AUTO: 31.6 PG (ref 26.8–34.3)
MCHC RBC AUTO-ENTMCNC: 33.5 G/DL (ref 31.4–37.4)
MCV RBC AUTO: 94 FL (ref 82–98)
MONOCYTES # BLD AUTO: 0.31 THOUSAND/ΜL (ref 0.17–1.22)
MONOCYTES NFR BLD AUTO: 8 % (ref 4–12)
NEUTROPHILS # BLD AUTO: 2.56 THOUSANDS/ΜL (ref 1.85–7.62)
NEUTS SEG NFR BLD AUTO: 62 % (ref 43–75)
NRBC BLD AUTO-RTO: 0 /100 WBCS
PLATELET # BLD AUTO: 207 THOUSANDS/UL (ref 149–390)
PMV BLD AUTO: 10.6 FL (ref 8.9–12.7)
POTASSIUM SERPL-SCNC: 4.4 MMOL/L (ref 3.5–5.3)
PROT SERPL-MCNC: 7.1 G/DL (ref 6.4–8.2)
RBC # BLD AUTO: 4.31 MILLION/UL (ref 3.81–5.12)
SODIUM SERPL-SCNC: 142 MMOL/L (ref 136–145)
WBC # BLD AUTO: 4.12 THOUSAND/UL (ref 4.31–10.16)

## 2018-02-08 PROCEDURE — 86300 IMMUNOASSAY TUMOR CA 15-3: CPT

## 2018-02-08 PROCEDURE — 85025 COMPLETE CBC W/AUTO DIFF WBC: CPT

## 2018-02-08 PROCEDURE — 80053 COMPREHEN METABOLIC PANEL: CPT

## 2018-02-08 PROCEDURE — 36415 COLL VENOUS BLD VENIPUNCTURE: CPT

## 2018-02-09 ENCOUNTER — OFFICE VISIT (OUTPATIENT)
Dept: HEMATOLOGY ONCOLOGY | Facility: CLINIC | Age: 63
End: 2018-02-09
Payer: COMMERCIAL

## 2018-02-09 VITALS
OXYGEN SATURATION: 99 % | DIASTOLIC BLOOD PRESSURE: 76 MMHG | HEART RATE: 73 BPM | SYSTOLIC BLOOD PRESSURE: 126 MMHG | HEIGHT: 68 IN | TEMPERATURE: 98.2 F | BODY MASS INDEX: 27.31 KG/M2 | WEIGHT: 180.2 LBS | RESPIRATION RATE: 16 BRPM

## 2018-02-09 DIAGNOSIS — Z17.0 MALIGNANT NEOPLASM OF UPPER-OUTER QUADRANT OF RIGHT BREAST IN FEMALE, ESTROGEN RECEPTOR POSITIVE (HCC): Primary | ICD-10-CM

## 2018-02-09 DIAGNOSIS — C50.411 MALIGNANT NEOPLASM OF UPPER-OUTER QUADRANT OF RIGHT BREAST IN FEMALE, ESTROGEN RECEPTOR POSITIVE (HCC): Primary | ICD-10-CM

## 2018-02-09 PROCEDURE — 99214 OFFICE O/P EST MOD 30 MIN: CPT | Performed by: INTERNAL MEDICINE

## 2018-02-09 RX ORDER — ASPIRIN 81 MG/1
81 TABLET ORAL DAILY
COMMUNITY
End: 2019-10-17

## 2018-02-09 RX ORDER — LEVOTHYROXINE SODIUM 0.07 MG/1
1 TABLET ORAL EVERY OTHER DAY
COMMUNITY

## 2018-02-09 RX ORDER — LEVOTHYROXINE SODIUM 88 UG/1
1 TABLET ORAL EVERY OTHER DAY
COMMUNITY

## 2018-02-09 RX ORDER — LETROZOLE 2.5 MG/1
2.5 TABLET, FILM COATED ORAL EVERY EVENING
Qty: 90 TABLET | Refills: 3 | Status: SHIPPED | OUTPATIENT
Start: 2018-02-09 | End: 2018-08-10 | Stop reason: SDUPTHER

## 2018-02-09 NOTE — LETTER
February 9, 2018     Keri Hernandez MD  [address] [place] [id number]    Patient: Sonia Dove   YOB: 1955   Date of Visit: 2/9/2018       Dear Dr Jennifer Rosales:    Thank you for referring Abeba Warner to me for evaluation  Below are my notes for this consultation  If you have questions, please do not hesitate to call me  I look forward to following your patient along with you  Sincerely,        Sebastien Lee MD        CC: No Recipients  Sebastien Lee MD  2/9/2018 10:51 AM  Sign at close encounter  CC: Breast cancer and patient is on adjuvant Femara  Follow-up visit  HPI:   Sonia Dove is a 57 y o  female    Patient is on adjuvant Femara for stage I, grade 1, 6 mm, hormone receptor strongly positive and HER2 negative, invasive ductal right breast cancer in the upper outer quadrant and patient  had lumpectomy and sentinel node sampling in January 2014    Negative margins  No lymphovascular and dermal invasion  Oncotype score was low, 8  Cancer was picked up on MRI scan and not by mammography  She received radiation therapy  She has been on Femara since 2014  She has   tolerable musculoskeletal symptoms  She had total knee replacement on the right into January 2017  She was sent home on aspirin and not on anticoagulation  There was some swelling of right lower leg  She had Doppler study and that showed chronic superficial phlebitis  She continues to take  1 aspirin daily  She is also taking vitamin-D and calcium in it he had to her other medication  She has requested Femara prescription      Current Outpatient Prescriptions:     aspirin (ECOTRIN LOW STRENGTH) 81 mg EC tablet, Take 81 mg by mouth daily, Disp: , Rfl:     denosumab (PROLIA) 60 mg/mL, Inject 60 mg under the skin every 6 (six) months, Disp: , Rfl:     letrozole (FEMARA) 2 5 mg tablet, Take 1 tablet (2 5 mg total) by mouth every evening, Disp: 90 tablet, Rfl: 3    levothyroxine (SYNTHROID) 75 mcg tablet, Take 1 tablet by mouth every other day, Disp: , Rfl:     levothyroxine (SYNTHROID) 88 mcg tablet, Take 1 tablet by mouth every other day, Disp: , Rfl:     loratadine (CLARITIN) 10 mg tablet, Take 10 mg by mouth daily, Disp: , Rfl:     VITAMIN D, ERGOCALCIFEROL, PO, Take 2,000 Units by mouth daily, Disp: , Rfl:     Allergies   Allergen Reactions    Actifed Cold-Allergy [Chlorpheniramine-Phenylephrine] Swelling and Rash     Throat and face swelled    Penicillins Rash    Vancomycin Other (See Comments)     redness       ROS:  02/09/18 Reviewed 14 systems:  Presently no headaches, seizures, dizziness, diplopia, dysphagia, hoarseness, chest pain, palpitations, shortness of breath, cough, hemoptysis, abdominal pain, nausea, vomiting, change in bowel habits, melena, hematuria, fever, chills, bleeding, bone pains, skin rash, weight loss,  numbness, tiredness , weakness, claudication and gait problem  No frequent infections  No hot flashes  Not unusually sensitive to heat or cold  Patient is anxious  No swelling of the ankles  No swollen glands  No GYN symptoms  Other symptoms as in HPI  /76 (BP Location: Left arm, Cuff Size: Adult)   Pulse 73   Temp 98 2 °F (36 8 °C) (Tympanic)   Resp 16   Ht 5' 8" (1 727 m)   Wt 81 7 kg (180 lb 3 2 oz)   SpO2 99%   BMI 27 40 kg/m²      Physical Exam:  Vitals:    02/09/18 1000   BP: 126/76   BP Location: Left arm   Cuff Size: Adult   Pulse: 73   Resp: 16   Temp: 98 2 °F (36 8 °C)   TempSrc: Tympanic   SpO2: 99%   Weight: 81 7 kg (180 lb 3 2 oz)   Height: 5' 8" (1 727 m)     Alert, oriented, not in distress, no icterus, no oral thrush, no palpable neck mass, clear lung foods, regular heart rate, abdomen  soft and non tender, no palpable abdominal mass, no ascites, no edema of ankles, no calf tenderness, no focal neurological deficit, no skin rash, no palpable lymphadenopathy, good arterial pulses, no clubbing  Patient is anxious  No lymphedema  Performance status 0  IMAGING:  No orders to display       Lab Results    Lab Results   Component Value Date    WBC 4 12 (L) 02/08/2018    HGB 13 6 02/08/2018    HCT 40 6 02/08/2018    MCV 94 02/08/2018     02/08/2018     Lab Results   Component Value Date     02/08/2018    K 4 4 02/08/2018     02/08/2018    CO2 31 02/08/2018    ANIONGAP 5 02/08/2018    BUN 12 02/08/2018    CREATININE 0 87 02/08/2018    GLUCOSE 82 10/18/2017    GLUF 80 02/08/2018    CALCIUM 8 9 02/08/2018    AST 13 02/08/2018    ALT 18 02/08/2018    ALKPHOS 43 (L) 02/08/2018    PROT 7 1 02/08/2018    BILITOT 0 58 02/08/2018    EGFR 71 02/08/2018             Lab Results   Component Value Date    APKDFNLZ80 533 03/04/2015           Reviewed and discussed with patient  Assessment and plan:  Maylin Murillo is a 57 y o  female    Patient is on adjuvant Femara for stage I, grade 1, 6 mm, hormone receptor strongly positive and HER2 negative, invasive ductal right breast cancer in the upper outer quadrant and patient  had lumpectomy and sentinel node sampling in January 2014    Negative margins  No lymphovascular and dermal invasion  Oncotype score was low, 8  Cancer was picked up on MRI scan and not by mammography  She received radiation therapy  She has been on Femara since 2014  She has   tolerable musculoskeletal symptoms  Breast cancer is in remission  Renewed Femara prescription  She goes to her breast surgeon for examination of breasts and imaging studies  Discussed the importance of self-breast examination, eating healthy foods and exercises as tolerated and  health screening tests  Condition and plan discussed  Questions answered  Patient voiced understanding and agreement in the discussion  She is aware to contact us with questions/symptoms in the interim  Counseling / Coordination of Care  Total floor / unit time spent today30 minutes   Greater than 50% of total time was spent with the patient and / or family counseling and / or coordination of care

## 2018-02-09 NOTE — PROGRESS NOTES
CC: Breast cancer and patient is on adjuvant Femara  Follow-up visit  HPI:   Juanito Opitz is a 57 y o  female    Patient is on adjuvant Femara for stage I, grade 1, 6 mm, hormone receptor strongly positive and HER2 negative, invasive ductal right breast cancer in the upper outer quadrant and patient  had lumpectomy and sentinel node sampling in January 2014    Negative margins  No lymphovascular and dermal invasion  Oncotype score was low, 8  Cancer was picked up on MRI scan and not by mammography  She received radiation therapy  She has been on Femara since 2014  She has   tolerable musculoskeletal symptoms  She had total knee replacement on the right into January 2017  She was sent home on aspirin and not on anticoagulation  There was some swelling of right lower leg  She had Doppler study and that showed chronic superficial phlebitis  She continues to take  1 aspirin daily  She is also taking vitamin-D and calcium in it he had to her other medication  She has requested Femara prescription      Current Outpatient Prescriptions:     aspirin (ECOTRIN LOW STRENGTH) 81 mg EC tablet, Take 81 mg by mouth daily, Disp: , Rfl:     denosumab (PROLIA) 60 mg/mL, Inject 60 mg under the skin every 6 (six) months, Disp: , Rfl:     letrozole (FEMARA) 2 5 mg tablet, Take 1 tablet (2 5 mg total) by mouth every evening, Disp: 90 tablet, Rfl: 3    levothyroxine (SYNTHROID) 75 mcg tablet, Take 1 tablet by mouth every other day, Disp: , Rfl:     levothyroxine (SYNTHROID) 88 mcg tablet, Take 1 tablet by mouth every other day, Disp: , Rfl:     loratadine (CLARITIN) 10 mg tablet, Take 10 mg by mouth daily, Disp: , Rfl:     VITAMIN D, ERGOCALCIFEROL, PO, Take 2,000 Units by mouth daily, Disp: , Rfl:     Allergies   Allergen Reactions    Actifed Cold-Allergy [Chlorpheniramine-Phenylephrine] Swelling and Rash     Throat and face swelled    Penicillins Rash    Vancomycin Other (See Comments)     redness       ROS:  02/09/18 Reviewed 14 systems:  Presently no headaches, seizures, dizziness, diplopia, dysphagia, hoarseness, chest pain, palpitations, shortness of breath, cough, hemoptysis, abdominal pain, nausea, vomiting, change in bowel habits, melena, hematuria, fever, chills, bleeding, bone pains, skin rash, weight loss,  numbness, tiredness , weakness, claudication and gait problem  No frequent infections  No hot flashes  Not unusually sensitive to heat or cold  Patient is anxious  No swelling of the ankles  No swollen glands  No GYN symptoms  Other symptoms as in HPI  /76 (BP Location: Left arm, Cuff Size: Adult)   Pulse 73   Temp 98 2 °F (36 8 °C) (Tympanic)   Resp 16   Ht 5' 8" (1 727 m)   Wt 81 7 kg (180 lb 3 2 oz)   SpO2 99%   BMI 27 40 kg/m²     Physical Exam:  Vitals:    02/09/18 1000   BP: 126/76   BP Location: Left arm   Cuff Size: Adult   Pulse: 73   Resp: 16   Temp: 98 2 °F (36 8 °C)   TempSrc: Tympanic   SpO2: 99%   Weight: 81 7 kg (180 lb 3 2 oz)   Height: 5' 8" (1 727 m)     Alert, oriented, not in distress, no icterus, no oral thrush, no palpable neck mass, clear lung foods, regular heart rate, abdomen  soft and non tender, no palpable abdominal mass, no ascites, no edema of ankles, no calf tenderness, no focal neurological deficit, no skin rash, no palpable lymphadenopathy, good arterial pulses, no clubbing  Patient is anxious  No lymphedema  Performance status 0      IMAGING:  No orders to display       Lab Results    Lab Results   Component Value Date    WBC 4 12 (L) 02/08/2018    HGB 13 6 02/08/2018    HCT 40 6 02/08/2018    MCV 94 02/08/2018     02/08/2018     Lab Results   Component Value Date     02/08/2018    K 4 4 02/08/2018     02/08/2018    CO2 31 02/08/2018    ANIONGAP 5 02/08/2018    BUN 12 02/08/2018    CREATININE 0 87 02/08/2018    GLUCOSE 82 10/18/2017    GLUF 80 02/08/2018    CALCIUM 8 9 02/08/2018    AST 13 02/08/2018    ALT 18 02/08/2018    ALKPHOS 43 (L) 02/08/2018    PROT 7 1 02/08/2018    BILITOT 0 58 02/08/2018    EGFR 71 02/08/2018             Lab Results   Component Value Date    [id number] 956 03/04/2015           Reviewed and discussed with patient  Assessment and plan:  Kal Bethea is a 57 y o  female    Patient is on adjuvant Femara for stage I, grade 1, 6 mm, hormone receptor strongly positive and HER2 negative, invasive ductal right breast cancer in the upper outer quadrant and patient  had lumpectomy and sentinel node sampling in January 2014    Negative margins  No lymphovascular and dermal invasion  Oncotype score was low, 8  Cancer was picked up on MRI scan and not by mammography  She received radiation therapy  She has been on Femara since 2014  She has   tolerable musculoskeletal symptoms  Breast cancer is in remission  Renewed Femara prescription  She goes to her breast surgeon for examination of breasts and imaging studies  Discussed the importance of self-breast examination, eating healthy foods and exercises as tolerated and  health screening tests  Condition and plan discussed  Questions answered  Patient voiced understanding and agreement in the discussion  She is aware to contact us with questions/symptoms in the interim  Counseling / Coordination of Care  Total floor / unit time spent today30 minutes  Greater than 50% of total time was spent with the patient and / or family counseling and / or coordination of care

## 2018-03-27 DIAGNOSIS — I83.813 VARICOSE VEINS OF BILATERAL LOWER EXTREMITIES WITH PAIN: Primary | ICD-10-CM

## 2018-03-30 ENCOUNTER — TRANSCRIBE ORDERS (OUTPATIENT)
Dept: LAB | Facility: CLINIC | Age: 63
End: 2018-03-30

## 2018-03-30 ENCOUNTER — APPOINTMENT (OUTPATIENT)
Dept: LAB | Facility: HOSPITAL | Age: 63
End: 2018-03-30
Payer: COMMERCIAL

## 2018-03-30 DIAGNOSIS — E03.9 MYXEDEMA HEART DISEASE: Primary | ICD-10-CM

## 2018-03-30 DIAGNOSIS — Z79.899 NEED FOR PROPHYLACTIC CHEMOTHERAPY: ICD-10-CM

## 2018-03-30 DIAGNOSIS — I51.9 MYXEDEMA HEART DISEASE: Primary | ICD-10-CM

## 2018-03-30 DIAGNOSIS — M81.0 SENILE OSTEOPOROSIS: ICD-10-CM

## 2018-03-30 LAB
ALBUMIN SERPL BCP-MCNC: 3.5 G/DL (ref 3.5–5)
ALP SERPL-CCNC: 46 U/L (ref 46–116)
ALT SERPL W P-5'-P-CCNC: 19 U/L (ref 12–78)
ANION GAP SERPL CALCULATED.3IONS-SCNC: 5 MMOL/L (ref 4–13)
AST SERPL W P-5'-P-CCNC: 13 U/L (ref 5–45)
BASOPHILS # BLD AUTO: 0.01 THOUSANDS/ΜL (ref 0–0.1)
BASOPHILS NFR BLD AUTO: 0 % (ref 0–1)
BILIRUB SERPL-MCNC: 0.59 MG/DL (ref 0.2–1)
BILIRUB UR QL STRIP: NEGATIVE
BUN SERPL-MCNC: 15 MG/DL (ref 5–25)
CALCIUM SERPL-MCNC: 9 MG/DL (ref 8.3–10.1)
CHLORIDE SERPL-SCNC: 108 MMOL/L (ref 100–108)
CLARITY UR: CLEAR
CO2 SERPL-SCNC: 30 MMOL/L (ref 21–32)
COLOR UR: YELLOW
CREAT SERPL-MCNC: 0.94 MG/DL (ref 0.6–1.3)
CREAT UR-MCNC: 42.8 MG/DL
EOSINOPHIL # BLD AUTO: 0.11 THOUSAND/ΜL (ref 0–0.61)
EOSINOPHIL NFR BLD AUTO: 3 % (ref 0–6)
ERYTHROCYTE [DISTWIDTH] IN BLOOD BY AUTOMATED COUNT: 13 % (ref 11.6–15.1)
ERYTHROCYTE [SEDIMENTATION RATE] IN BLOOD: 16 MM/HOUR (ref 0–20)
GFR SERPL CREATININE-BSD FRML MDRD: 64 ML/MIN/1.73SQ M
GLUCOSE P FAST SERPL-MCNC: 80 MG/DL (ref 65–99)
GLUCOSE UR STRIP-MCNC: NEGATIVE MG/DL
HCT VFR BLD AUTO: 41 % (ref 34.8–46.1)
HGB BLD-MCNC: 13.3 G/DL (ref 11.5–15.4)
HGB UR QL STRIP.AUTO: NEGATIVE
KETONES UR STRIP-MCNC: NEGATIVE MG/DL
LEUKOCYTE ESTERASE UR QL STRIP: NEGATIVE
LYMPHOCYTES # BLD AUTO: 0.92 THOUSANDS/ΜL (ref 0.6–4.47)
LYMPHOCYTES NFR BLD AUTO: 23 % (ref 14–44)
MAGNESIUM SERPL-MCNC: 2.2 MG/DL (ref 1.6–2.6)
MCH RBC QN AUTO: 31.7 PG (ref 26.8–34.3)
MCHC RBC AUTO-ENTMCNC: 32.4 G/DL (ref 31.4–37.4)
MCV RBC AUTO: 98 FL (ref 82–98)
MICROALBUMIN UR-MCNC: <5 MG/L (ref 0–20)
MICROALBUMIN/CREAT 24H UR: <12 MG/G CREATININE (ref 0–30)
MONOCYTES # BLD AUTO: 0.29 THOUSAND/ΜL (ref 0.17–1.22)
MONOCYTES NFR BLD AUTO: 7 % (ref 4–12)
NEUTROPHILS # BLD AUTO: 2.71 THOUSANDS/ΜL (ref 1.85–7.62)
NEUTS SEG NFR BLD AUTO: 67 % (ref 43–75)
NITRITE UR QL STRIP: NEGATIVE
NRBC BLD AUTO-RTO: 0 /100 WBCS
PH UR STRIP.AUTO: 7 [PH] (ref 4.5–8)
PHOSPHATE SERPL-MCNC: 3.9 MG/DL (ref 2.3–4.1)
PLATELET # BLD AUTO: 202 THOUSANDS/UL (ref 149–390)
PMV BLD AUTO: 10.9 FL (ref 8.9–12.7)
POTASSIUM SERPL-SCNC: 4.1 MMOL/L (ref 3.5–5.3)
PROT SERPL-MCNC: 7 G/DL (ref 6.4–8.2)
PROT UR STRIP-MCNC: NEGATIVE MG/DL
RBC # BLD AUTO: 4.19 MILLION/UL (ref 3.81–5.12)
SODIUM SERPL-SCNC: 143 MMOL/L (ref 136–145)
SP GR UR STRIP.AUTO: 1.01 (ref 1–1.03)
T4 FREE SERPL-MCNC: 1.25 NG/DL (ref 0.76–1.46)
TSH SERPL DL<=0.05 MIU/L-ACNC: 3.54 UIU/ML (ref 0.36–3.74)
UROBILINOGEN UR QL STRIP.AUTO: 0.2 E.U./DL
WBC # BLD AUTO: 4.04 THOUSAND/UL (ref 4.31–10.16)

## 2018-03-30 PROCEDURE — 83735 ASSAY OF MAGNESIUM: CPT

## 2018-03-30 PROCEDURE — 82024 ASSAY OF ACTH: CPT

## 2018-03-30 PROCEDURE — 85025 COMPLETE CBC W/AUTO DIFF WBC: CPT

## 2018-03-30 PROCEDURE — 81003 URINALYSIS AUTO W/O SCOPE: CPT

## 2018-03-30 PROCEDURE — 85652 RBC SED RATE AUTOMATED: CPT

## 2018-03-30 PROCEDURE — 82570 ASSAY OF URINE CREATININE: CPT

## 2018-03-30 PROCEDURE — 84439 ASSAY OF FREE THYROXINE: CPT

## 2018-03-30 PROCEDURE — 84443 ASSAY THYROID STIM HORMONE: CPT

## 2018-03-30 PROCEDURE — 84100 ASSAY OF PHOSPHORUS: CPT

## 2018-03-30 PROCEDURE — 36415 COLL VENOUS BLD VENIPUNCTURE: CPT

## 2018-03-30 PROCEDURE — 80053 COMPREHEN METABOLIC PANEL: CPT

## 2018-03-30 PROCEDURE — 82043 UR ALBUMIN QUANTITATIVE: CPT

## 2018-03-31 LAB — ACTH PLAS-MCNC: 24.3 PG/ML (ref 7.2–63.3)

## 2018-04-10 ENCOUNTER — TRANSCRIBE ORDERS (OUTPATIENT)
Dept: ADMINISTRATIVE | Facility: HOSPITAL | Age: 63
End: 2018-04-10

## 2018-04-10 DIAGNOSIS — G60.8 HEREDITARY SENSORY NEUROPATHY: Primary | ICD-10-CM

## 2018-05-03 ENCOUNTER — TRANSCRIBE ORDERS (OUTPATIENT)
Dept: ADMINISTRATIVE | Age: 63
End: 2018-05-03

## 2018-05-03 ENCOUNTER — APPOINTMENT (OUTPATIENT)
Dept: LAB | Age: 63
End: 2018-05-03
Payer: COMMERCIAL

## 2018-05-03 DIAGNOSIS — M81.0 SENILE OSTEOPOROSIS: Primary | ICD-10-CM

## 2018-05-03 DIAGNOSIS — Z79.899 NEED FOR PROPHYLACTIC CHEMOTHERAPY: ICD-10-CM

## 2018-05-03 DIAGNOSIS — M81.0 SENILE OSTEOPOROSIS: ICD-10-CM

## 2018-05-03 LAB
ANION GAP SERPL CALCULATED.3IONS-SCNC: 4 MMOL/L (ref 4–13)
BUN SERPL-MCNC: 15 MG/DL (ref 5–25)
CALCIUM SERPL-MCNC: 9.2 MG/DL (ref 8.3–10.1)
CHLORIDE SERPL-SCNC: 103 MMOL/L (ref 100–108)
CO2 SERPL-SCNC: 33 MMOL/L (ref 21–32)
CREAT SERPL-MCNC: 0.95 MG/DL (ref 0.6–1.3)
GFR SERPL CREATININE-BSD FRML MDRD: 63 ML/MIN/1.73SQ M
GLUCOSE SERPL-MCNC: 91 MG/DL (ref 65–140)
POTASSIUM SERPL-SCNC: 4.4 MMOL/L (ref 3.5–5.3)
SODIUM SERPL-SCNC: 140 MMOL/L (ref 136–145)

## 2018-05-03 PROCEDURE — 80048 BASIC METABOLIC PNL TOTAL CA: CPT

## 2018-05-03 PROCEDURE — 36415 COLL VENOUS BLD VENIPUNCTURE: CPT

## 2018-05-14 ENCOUNTER — HOSPITAL ENCOUNTER (OUTPATIENT)
Dept: RADIOLOGY | Facility: CLINIC | Age: 63
Discharge: HOME | End: 2018-05-14
Payer: COMMERCIAL

## 2018-05-14 DIAGNOSIS — G60.8 HEREDITARY SENSORY NEUROPATHY: ICD-10-CM

## 2018-05-14 PROCEDURE — 95910 NRV CNDJ TEST 7-8 STUDIES: CPT | Performed by: PHYSICAL MEDICINE & REHABILITATION

## 2018-05-14 PROCEDURE — 95886 MUSC TEST DONE W/N TEST COMP: CPT | Performed by: PHYSICAL MEDICINE & REHABILITATION

## 2018-05-14 PROCEDURE — 95861 NEEDLE EMG 2 EXTREMITIES: CPT | Performed by: PHYSICAL MEDICINE & REHABILITATION

## 2018-05-14 NOTE — PROCEDURES
Procedures      Electromyogram and Nerve Conduction Velocity Procedure Note    HX:  17-year-old female referred by Rheumatology for electrodiagnostic study of both lower extremities  She has a history of the nontraumatic onset about 5 years ago of numbness the anterolateral left thigh  She underwent electrodiagnostic testing at that time she describes results as inconclusive  She was evaluated by Neurosurgery and no surgical lesions were found the lumbar spine  For about a year she has begun to notice numbness in the ankles and feet on a larger area of numbness in the left lateral thigh  No history of trauma she does have chronic back pain but does not describe true radicular symptoms  She presents today for electrodiagnostic study     PMH:  Breast cancer status post lumpectomy and radiation she did not undergo chemotherapy, she has a history of stable hypothyroidism, right total knee replacement, 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  Exam:   There is atrophy of the left extensor digitorum brevis, no atrophy on the right side no long track signs no fasciculations or tremors  Reflex motor exam are intact there is sensory loss in the territory of the left lateral femoral cutaneous nerve in the dorsum of the right foot  Procedure:  Verbal informed consent was obtained as with all electrodiagnostic medicine patients  As with all patients this patient was informed that they may terminate the  examine at any time  Patient tolerated the procedure well with no adverse effects reported or observed  Findings:  Please see the [facility] data printout  Monopolar needle exploration revealed mild fasciculations in the right gastrocnemius only  No abnormalities are found the bilateral lumbar paraspinal muscles or any of the other limb muscles sampled      No response could be elicited from the left extensor digitorum brevis with stimulation of the peroneal nerve at any level  Proximally there was normal conduction to the tibialis anterior  Peroneal findings were normal on the right side, the bilateral sural sensory responses were present with normal amplitudes  The bilateral tibial H reflexes are symmetric easily obtained within the normal range  Studies of the left tibial motor fibers were normal  Conclusion:   1  Mildly abnormal study  The left peroneal motor response absence as measured to the foot only is of unclear significance  This could represent involvement of the nerve anywhere distal to the fibular head but I cannot localize a lesion any further in his no signs of any motor axonal loss occurring in the atrophy CHAU be muscle  2   There is no electrical evidence indicated suggest a lumbar radiculopathy or plexopathy  3   Preserved sensory responses and late responses are you again stopping the presence of a large fiber polyneuropathy  An underlying small fiber polyneuropathy is not measurable by standard EMG/NCV techniques and could be a source of the similar symptoms  No myopathic features are seen today and there is no evidence indicated suggest motor neuron disease  Recommendations:       Careful clinical correlation is advised  May be reasonable to have repeat neurologic investigation with scan and or nerve biopsy

## 2018-07-09 PROBLEM — S83.289A TEAR OF LATERAL MENISCUS OF KNEE: Status: ACTIVE | Noted: 2018-07-09

## 2018-07-09 PROBLEM — M25.469 KNEE JOINT EFFUSION: Status: ACTIVE | Noted: 2018-07-09

## 2018-07-09 PROBLEM — M19.039 LOCALIZED PRIMARY OSTEOARTHRITIS OF WRIST: Status: ACTIVE | Noted: 2018-07-09

## 2018-07-09 PROBLEM — G56.00 CARPAL TUNNEL SYNDROME: Status: ACTIVE | Noted: 2018-07-09

## 2018-07-09 PROBLEM — M25.569 KNEE PAIN: Status: ACTIVE | Noted: 2018-07-09
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
## 2018-07-09 PROBLEM — S83.249A TEAR OF MEDIAL MENISCUS OF KNEE: Status: ACTIVE | Noted: 2018-07-09

## 2018-07-09 PROBLEM — M79.604 RIGHT LEG PAIN: Status: ACTIVE | Noted: 2017-03-22

## 2018-07-12 ENCOUNTER — OFFICE VISIT (OUTPATIENT)
Dept: SURGICAL ONCOLOGY | Facility: CLINIC | Age: 63
End: 2018-07-12
Payer: COMMERCIAL

## 2018-07-12 VITALS
RESPIRATION RATE: 16 BRPM | WEIGHT: 186 LBS | HEIGHT: 68 IN | DIASTOLIC BLOOD PRESSURE: 88 MMHG | HEART RATE: 60 BPM | SYSTOLIC BLOOD PRESSURE: 118 MMHG | TEMPERATURE: 98.4 F | BODY MASS INDEX: 28.19 KG/M2

## 2018-07-12 DIAGNOSIS — C50.411 MALIGNANT NEOPLASM OF UPPER-OUTER QUADRANT OF RIGHT BREAST IN FEMALE, ESTROGEN RECEPTOR POSITIVE (HCC): Primary | ICD-10-CM

## 2018-07-12 DIAGNOSIS — Z17.0 MALIGNANT NEOPLASM OF UPPER-OUTER QUADRANT OF RIGHT BREAST IN FEMALE, ESTROGEN RECEPTOR POSITIVE (HCC): Primary | ICD-10-CM

## 2018-07-12 PROCEDURE — 99213 OFFICE O/P EST LOW 20 MIN: CPT | Performed by: NURSE PRACTITIONER

## 2018-07-12 RX ORDER — SOLIFENACIN SUCCINATE 10 MG/1
TABLET, FILM COATED ORAL
COMMUNITY
End: 2019-06-07 | Stop reason: SDUPTHER

## 2018-07-12 NOTE — PROGRESS NOTES
Surgical Oncology Follow Up       [address] [place] [address],[address] [facility]  [facility] [address]  1955  [id number]  [address] [place] [address],[address] [place] [address]  CANCER [facility] SURGICAL ONCOLOGY [facility] [address] [id number]    Chief Complaint   Patient presents with    Breast Cancer     6 month follow up     Assessment/Plan:  1  Malignant neoplasm of upper-outer quadrant of right breast in female, estrogen receptor positive ([facility] [address] )    - BUN; Future  - Creatinine, serum; Future  - MRI breast bilateral w wo contrast; Future  - Mammo diagnostic bilateral w 3d & cad; Future  - 6 mo f/u visit    Discussion/Summary: Patient is a 70year old female who presents today for a 6 month follow-up for right breast cancer diagnosed in January 2014  Her pathology revealed invasive ductal carcinoma, grade 1, ER/IN positive, HER-2 negative  She underwent a right breast lumpectomy and sentinel node biopsy by Dr Trena Johnson  She then completed whole breast radiation therapy and is currently on letrozole therapy  She has no new complaints today  No worrisome findings on physical exam  She had a bilateral diagnostic mammogram and a bilateral breast MRI performed in November 2017 - BIRADS 2  Her breast cancer was only detected on MRI in the past  No worrisome exam findings on today's visit  We will repeat her mammogram and MRI and will see her back in November and we will plan to see the patient back in 6 months or sooner if the need arises  She was instructed to call with any new, persistent symptoms  All of her questions were answered       History of Present Illness:        Malignant neoplasm of upper-outer quadrant of right breast in female, estrogen receptor positive ([facility] [address] )    12/26/2013 Biopsy     Right breast biopsy, 12:00    Infiltrating mammary carcinoma  %  %  HER-2 negative         1/17/2014 Surgery     Right lumpectomy (Dr Trena Johnson)    Stage IA- pT1b, pN0, G1         2/21/2014 - 4/3/2014 Radiation     WBRT (Dr Huan Lopes)         2/25/2014 Genomic Testing     Oncotype 8, low risk         2014 -  Hormone Therapy     Letrozole (Dr Ricardo Kruger)             -Interval History:  Patient presents today for a six-month follow-up visit for right breast cancer diagnosed in January 2014  She has no new complaints today  She states she was carrying a dog and had right breast/chest soreness which has since resolved  She reports chronic back pain, but denies headaches, bone pain, cough, SOB, abdominal pain  Review of Systems:  Review of Systems   Constitutional: Negative for activity change, appetite change, chills, fatigue, fever and unexpected weight change  HENT: Negative for trouble swallowing  Eyes: Negative for pain, redness and visual disturbance  Respiratory: Negative for cough, shortness of breath and wheezing  Cardiovascular: Negative for chest pain, palpitations and leg swelling  Gastrointestinal: Negative for abdominal pain, constipation, diarrhea, nausea and vomiting  Endocrine: Negative for cold intolerance and heat intolerance  Musculoskeletal: Positive for back pain (chronic)  Negative for arthralgias, gait problem and myalgias  Skin: Negative for color change and rash  Neurological: Negative for dizziness, syncope, light-headedness, numbness and headaches  Hematological: Negative for adenopathy  Psychiatric/Behavioral: Negative for agitation and confusion  All other systems reviewed and are negative        Patient Active Problem List   Diagnosis    Primary osteoarthritis of right knee    Meralgia paraesthetica, left    Carpal tunnel syndrome    Cat bite    Acquired spondylolisthesis    Hypothyroidism    Knee joint effusion    Knee pain    Leukopenia    Localized primary osteoarthritis of wrist    Lumbar radiculopathy    Malignant neoplasm of upper-outer quadrant of right breast in female, estrogen receptor positive ([facility] [address] )  Myelopathy (HCC)    Osteopenia    Right leg pain    Seasonal allergies    Tear of lateral meniscus of knee    Tear of medial meniscus of knee    Urinary retention    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    Vitamin D deficiency     Past Medical History:   Diagnosis Date    Arthritis     Breast cancer ([address] )     right    Disease of thyroid gland     Hypothyroid     Knee pain, right     Limb alert care status     no BP's /IV's right side    Mitral valve prolapse     Numbness of anterior thigh     left and tingling    Seasonal allergies     dust,mold,trees    Urinary incontinence     Varicose veins of both lower extremities     Wears glasses      Past Surgical History:   Procedure Laterality Date    BREAST SURGERY Right     lumpectomy    COLONOSCOPY      EXPLORATORY LAPAROTOMY      "for fertility"    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    KNEE ARTHROSCOPY Right     [place] TOTAL KNEE ARTHROPLASTY Right 1/20/2017    Procedure: ARTHROPLASTY KNEE TOTAL;  Surgeon: Nancy Tomlin MD;  Location: [facility];  Service: Orthopedics    TONSILLECTOMY      WISDOM TOOTH EXTRACTION       Family History   Problem Relation Age of Onset    Uterine cancer Mother     Throat cancer Father      Social History     Social History    Marital status: Single     Spouse name: N/A    Number of children: N/A    Years of education: N/A     Occupational History    Not on file       Social History Main Topics    Smoking status: Never Smoker    Smokeless tobacco: Never Used    Alcohol use Yes      Comment: "rare"    Drug use: Unknown    Sexual activity: Not on file     Other Topics Concern    Not on file     Social History Narrative    No narrative on file       Current Outpatient Prescriptions:     aspirin (ECOTRIN LOW STRENGTH) 81 mg EC tablet, Take 81 mg by mouth daily, Disp: , Rfl:     denosumab (PROLIA) 60 mg/mL, Inject 60 mg under the skin every 6 (six) months, Disp: , Rfl:    letrozole (FEMARA) 2 5 mg tablet, Take 1 tablet (2 5 mg total) by mouth every evening, Disp: 90 tablet, Rfl: 3    levothyroxine (SYNTHROID) 75 mcg tablet, Take 1 tablet by mouth every other day, Disp: , Rfl:     levothyroxine (SYNTHROID) 88 mcg tablet, Take 1 tablet by mouth every other day, Disp: , Rfl:     loratadine (CLARITIN) 10 mg tablet, Take 10 mg by mouth daily, Disp: , Rfl:     Multiple Vitamins-Minerals (MULTIVITAMIN ADULT PO), multivitamin, Disp: , Rfl:     solifenacin (VESICARE) 10 MG tablet, Vesicare 10 mg tablet, Disp: , Rfl:     VITAMIN D, ERGOCALCIFEROL, PO, Take 2,000 Units by mouth daily, Disp: , Rfl:   Allergies   Allergen Reactions    Actifed Cold-Allergy [Chlorpheniramine-Phenylephrine] Swelling and Rash     Throat and face swelled    Penicillins Rash    Vancomycin Other (See Comments)     redness     Vitals:    07/12/18 1447   BP: 118/88   Pulse: 60   Resp: 16   Temp: 98 4 °F (36 9 °C)       Physical Exam   Constitutional: She is oriented to person, place, and time  Vital signs are normal  She appears well-developed and well-nourished  No distress  HENT:   Head: Normocephalic and atraumatic  Neck: Normal range of motion  Cardiovascular: Normal rate, regular rhythm and normal heart sounds  Pulmonary/Chest: Effort normal and breath sounds normal    Bilateral breasts were examined in the sitting and supine position  There are no new masses, skin changes, nipple discharge or nipple drainage  Right breast and right axilla surgical scar  Scar tissue noted at right breast surgical site- stable per patient  There is no bilateral supraclavicular or axillary lymphadenopathy noted  Abdominal: Soft  Normal appearance  She exhibits no mass  There is no hepatosplenomegaly  There is no tenderness  Musculoskeletal: Normal range of motion  Lymphadenopathy:     She has no axillary adenopathy  Right: No supraclavicular adenopathy present          Left: No supraclavicular adenopathy present  Neurological: She is alert and oriented to person, place, and time  Skin: Skin is warm, dry and intact  No rash noted  She is not diaphoretic  Psychiatric: She has a normal mood and affect  Her speech is normal    Vitals reviewed  Advance Care Planning/Advance Directives:  Discussed disease status, cancer treatment plans and/or cancer treatment goals with the patient

## 2018-07-25 ENCOUNTER — TRANSCRIBE ORDERS (OUTPATIENT)
Dept: ADMINISTRATIVE | Age: 63
End: 2018-07-25

## 2018-07-25 ENCOUNTER — APPOINTMENT (OUTPATIENT)
Dept: LAB | Age: 63
End: 2018-07-25
Payer: COMMERCIAL

## 2018-07-25 DIAGNOSIS — Z00.8 HEALTH EXAMINATION IN POPULATION SURVEY: ICD-10-CM

## 2018-07-25 DIAGNOSIS — Z00.8 HEALTH EXAMINATION IN POPULATION SURVEY: Primary | ICD-10-CM

## 2018-07-25 DIAGNOSIS — Z17.0 MALIGNANT NEOPLASM OF UPPER-OUTER QUADRANT OF RIGHT BREAST IN FEMALE, ESTROGEN RECEPTOR POSITIVE (HCC): ICD-10-CM

## 2018-07-25 DIAGNOSIS — C50.411 MALIGNANT NEOPLASM OF UPPER-OUTER QUADRANT OF RIGHT BREAST IN FEMALE, ESTROGEN RECEPTOR POSITIVE (HCC): ICD-10-CM

## 2018-07-25 LAB
ALBUMIN SERPL BCP-MCNC: 3.5 G/DL (ref 3.5–5)
ALP SERPL-CCNC: 38 U/L (ref 46–116)
ALT SERPL W P-5'-P-CCNC: 23 U/L (ref 12–78)
ANION GAP SERPL CALCULATED.3IONS-SCNC: 4 MMOL/L (ref 4–13)
AST SERPL W P-5'-P-CCNC: 12 U/L (ref 5–45)
BASOPHILS # BLD AUTO: 0.02 THOUSANDS/ΜL (ref 0–0.1)
BASOPHILS NFR BLD AUTO: 1 % (ref 0–1)
BILIRUB SERPL-MCNC: 0.53 MG/DL (ref 0.2–1)
BUN SERPL-MCNC: 20 MG/DL (ref 5–25)
CALCIUM SERPL-MCNC: 9 MG/DL (ref 8.3–10.1)
CANCER AG27-29 SERPL-ACNC: 5.4 U/ML (ref 0–42.3)
CHLORIDE SERPL-SCNC: 103 MMOL/L (ref 100–108)
CHOLEST SERPL-MCNC: 177 MG/DL (ref 50–200)
CO2 SERPL-SCNC: 32 MMOL/L (ref 21–32)
CREAT SERPL-MCNC: 0.91 MG/DL (ref 0.6–1.3)
EOSINOPHIL # BLD AUTO: 0.07 THOUSAND/ΜL (ref 0–0.61)
EOSINOPHIL NFR BLD AUTO: 2 % (ref 0–6)
ERYTHROCYTE [DISTWIDTH] IN BLOOD BY AUTOMATED COUNT: 12.5 % (ref 11.6–15.1)
EST. AVERAGE GLUCOSE BLD GHB EST-MCNC: 114 MG/DL
GFR SERPL CREATININE-BSD FRML MDRD: 66 ML/MIN/1.73SQ M
GLUCOSE P FAST SERPL-MCNC: 79 MG/DL (ref 65–99)
HBA1C MFR BLD: 5.6 % (ref 4.2–6.3)
HCT VFR BLD AUTO: 41 % (ref 34.8–46.1)
HDLC SERPL-MCNC: 56 MG/DL (ref 40–60)
HGB BLD-MCNC: 13.4 G/DL (ref 11.5–15.4)
IMM GRANULOCYTES # BLD AUTO: 0.02 THOUSAND/UL (ref 0–0.2)
IMM GRANULOCYTES NFR BLD AUTO: 1 % (ref 0–2)
LDLC SERPL CALC-MCNC: 96 MG/DL (ref 0–100)
LYMPHOCYTES # BLD AUTO: 0.95 THOUSANDS/ΜL (ref 0.6–4.47)
LYMPHOCYTES NFR BLD AUTO: 24 % (ref 14–44)
MCH RBC QN AUTO: 32.1 PG (ref 26.8–34.3)
MCHC RBC AUTO-ENTMCNC: 32.7 G/DL (ref 31.4–37.4)
MCV RBC AUTO: 98 FL (ref 82–98)
MONOCYTES # BLD AUTO: 0.29 THOUSAND/ΜL (ref 0.17–1.22)
MONOCYTES NFR BLD AUTO: 7 % (ref 4–12)
NEUTROPHILS # BLD AUTO: 2.68 THOUSANDS/ΜL (ref 1.85–7.62)
NEUTS SEG NFR BLD AUTO: 65 % (ref 43–75)
NONHDLC SERPL-MCNC: 121 MG/DL
NRBC BLD AUTO-RTO: 0 /100 WBCS
PLATELET # BLD AUTO: 184 THOUSANDS/UL (ref 149–390)
PMV BLD AUTO: 10.9 FL (ref 8.9–12.7)
POTASSIUM SERPL-SCNC: 4.1 MMOL/L (ref 3.5–5.3)
PROT SERPL-MCNC: 6.9 G/DL (ref 6.4–8.2)
RBC # BLD AUTO: 4.18 MILLION/UL (ref 3.81–5.12)
SODIUM SERPL-SCNC: 139 MMOL/L (ref 136–145)
TRIGL SERPL-MCNC: 123 MG/DL
WBC # BLD AUTO: 4.03 THOUSAND/UL (ref 4.31–10.16)

## 2018-07-25 PROCEDURE — 36415 COLL VENOUS BLD VENIPUNCTURE: CPT

## 2018-07-25 PROCEDURE — 85025 COMPLETE CBC W/AUTO DIFF WBC: CPT

## 2018-07-25 PROCEDURE — 80061 LIPID PANEL: CPT

## 2018-07-25 PROCEDURE — 83036 HEMOGLOBIN GLYCOSYLATED A1C: CPT

## 2018-07-25 PROCEDURE — 86300 IMMUNOASSAY TUMOR CA 15-3: CPT

## 2018-07-25 PROCEDURE — 80053 COMPREHEN METABOLIC PANEL: CPT

## 2018-08-10 ENCOUNTER — OFFICE VISIT (OUTPATIENT)
Dept: HEMATOLOGY ONCOLOGY | Facility: CLINIC | Age: 63
End: 2018-08-10
Payer: COMMERCIAL

## 2018-08-10 VITALS
BODY MASS INDEX: 28.43 KG/M2 | WEIGHT: 187.6 LBS | RESPIRATION RATE: 16 BRPM | HEART RATE: 72 BPM | OXYGEN SATURATION: 96 % | HEIGHT: 68 IN | DIASTOLIC BLOOD PRESSURE: 76 MMHG | SYSTOLIC BLOOD PRESSURE: 128 MMHG | TEMPERATURE: 97.6 F

## 2018-08-10 DIAGNOSIS — C50.411 MALIGNANT NEOPLASM OF UPPER-OUTER QUADRANT OF RIGHT BREAST IN FEMALE, ESTROGEN RECEPTOR POSITIVE (HCC): Primary | ICD-10-CM

## 2018-08-10 DIAGNOSIS — Z17.0 MALIGNANT NEOPLASM OF UPPER-OUTER QUADRANT OF RIGHT BREAST IN FEMALE, ESTROGEN RECEPTOR POSITIVE (HCC): Primary | ICD-10-CM

## 2018-08-10 PROCEDURE — 99214 OFFICE O/P EST MOD 30 MIN: CPT | Performed by: INTERNAL MEDICINE

## 2018-08-10 RX ORDER — LETROZOLE 2.5 MG/1
2.5 TABLET, FILM COATED ORAL EVERY EVENING
Qty: 90 TABLET | Refills: 2 | Status: SHIPPED | OUTPATIENT
Start: 2018-08-10 | End: 2019-02-15 | Stop reason: SDUPTHER

## 2018-08-10 NOTE — PROGRESS NOTES
HPI:      Follow-up visit for Francine Wyatt with history of stage I, grade 1, 6 mm, hormone receptor strongly positive and HER2 negative, invasive ductal right breast cancer in the upper outer quadrant of right breast and for that  patient  had lumpectomy and sentinel node sampling in January 2014     Negative sentinel lymph node  Negative margins  No lymphovascular and dermal invasion  Oncotype score was low, 8  Cancer was picked up on MRI scan and not by mammography  She received radiation therapy  She has been on Femara since 2014  She has   tolerable musculoskeletal symptoms and chronic peripheral neuralgia that was evaluated by a neurologist   She has chronic low back discomfort         Current Outpatient Prescriptions:     aspirin (ECOTRIN LOW STRENGTH) 81 mg EC tablet, Take 81 mg by mouth daily, Disp: , Rfl:     denosumab (PROLIA) 60 mg/mL, Inject 60 mg under the skin every 6 (six) months, Disp: , Rfl:     letrozole (FEMARA) 2 5 mg tablet, Take 1 tablet (2 5 mg total) by mouth every evening, Disp: 90 tablet, Rfl: 3    levothyroxine (SYNTHROID) 75 mcg tablet, Take 1 tablet by mouth every other day, Disp: , Rfl:     levothyroxine (SYNTHROID) 88 mcg tablet, Take 1 tablet by mouth every other day, Disp: , Rfl:     loratadine (CLARITIN) 10 mg tablet, Take 10 mg by mouth daily, Disp: , Rfl:     Multiple Vitamins-Minerals (MULTIVITAMIN ADULT PO), multivitamin, Disp: , Rfl:     solifenacin (VESICARE) 10 MG tablet, Vesicare 10 mg tablet, Disp: , Rfl:     VITAMIN D, ERGOCALCIFEROL, PO, Take 2,000 Units by mouth daily, Disp: , Rfl:     Allergies   Allergen Reactions    Actifed Cold-Allergy [Chlorpheniramine-Phenylephrine] Swelling and Rash     Throat and face swelled    Penicillins Rash    Vancomycin Other (See Comments)     redness          Malignant neoplasm of upper-outer quadrant of right breast in female, estrogen receptor positive ([facility] [address] )    12/26/2013 Biopsy     Right breast biopsy, 12: 00    Infiltrating mammary carcinoma  %  %  HER-2 negative         1/17/2014 Surgery     Right lumpectomy (Dr Jack Calderón)    Stage IA- pT1b, pN0, G1         2/21/2014 - 4/3/2014 Radiation     WBRT (Dr Gomez Lyle)         2/25/2014 Genomic Testing     Oncotype 8, low risk         2014 -  Hormone Therapy     Letrozole (Dr Jason Phan)            ROS:  08/10/18 Reviewed 13 systems:  Presently no headaches, seizures, dizziness, diplopia, dysphagia, hoarseness, chest pain, palpitations, shortness of breath, cough, hemoptysis, abdominal pain, nausea, vomiting, change in bowel habits, melena, hematuria, fever, chills, bleeding, bone pains, skin rash, weight loss,  tiredness ,  weakness, numbness,  claudication and gait problem  No frequent infections  Not unusually sensitive to heat or cold  No swelling of the ankles  No swollen glands  No GYN symptoms  Patient is anxious  Other symptoms are in HPI        /76 (BP Location: Left arm, Cuff Size: Adult)   Pulse 72   Temp 97 6 °F (36 4 °C) (Tympanic)   Resp 16   Ht 5' 8" (1 727 m)   Wt 85 1 kg (187 lb 9 6 oz)   SpO2 96%   BMI 28 52 kg/m²     Physical Exam:  Alert, oriented, not in distress, no icterus, no oral thrush, no palpable neck mass, clear lung fields, regular heart rate, abdomen  soft and non tender, no palpable abdominal mass, no ascites, no edema of ankles, no calf tenderness, no focal neurological deficit, no skin rash, no palpable lymphadenopathy, good arterial pulses, no clubbing  Patient is anxious  Performance status 0   No lymphedema    IMAGING:  No orders to display       LABS:  Results for orders placed or performed in visit on 07/25/18   Hemoglobin A1C   Result Value Ref Range    Hemoglobin A1C 5 6 4 2 - 6 3 %     mg/dl   Lipid panel   Result Value Ref Range    Cholesterol 177 50 - 200 mg/dL    Triglycerides 123 <=150 mg/dL    HDL, Direct 56 40 - 60 mg/dL    LDL Calculated 96 0 - 100 mg/dL    Non-HDL-Chol (CHOL-HDL) 121 mg/dl Labs, Imaging, & Other studies:   All pertinent labs and imaging studies were personally reviewed    Lab Results   Component Value Date     07/25/2018    K 4 1 07/25/2018     07/25/2018    CO2 32 07/25/2018    ANIONGAP 4 07/25/2018    BUN 20 07/25/2018    CREATININE 0 91 07/25/2018    GLUCOSE 91 05/03/2018    GLUF 79 07/25/2018    CALCIUM 9 0 07/25/2018    AST 12 07/25/2018    ALT 23 07/25/2018    ALKPHOS 38 (L) 07/25/2018    PROT 6 9 07/25/2018    BILITOT 0 53 07/25/2018    EGFR 66 07/25/2018     Lab Results   Component Value Date    WBC 4 03 (L) 07/25/2018    HGB 13 4 07/25/2018    HCT 41 0 07/25/2018    MCV 98 07/25/2018     07/25/2018    Normal differential count  CA 27-29  5 4 of    Reviewed and discussed with patient  Assessment and plan: Follow-up visit for Ely Rodriguez with history of stage I, grade 1, 6 mm, hormone receptor strongly positive and HER2 negative, invasive ductal right breast cancer in the upper outer quadrant of right breast and for that  patient  had lumpectomy and sentinel node sampling in January 2014     Negative sentinel lymph node  Negative margins  No lymphovascular and dermal invasion  Oncotype score was low, 8  Cancer was picked up on MRI scan and not by mammography  She received radiation therapy  She has been on Femara since 2014  She has   tolerable musculoskeletal symptoms and chronic peripheral neuralgia that was evaluated by a neurologist   She has chronic low back discomfort      Breast cancer remains in remission  Goal is cure from breast cancer  We discussed 5 year versus 10 year of hormone therapy  Her tumor was very small, 6 mm and do not go type score was very low, 8 and after some discussion all parties agreed to stop hormonal therapy after 5 years and that will be next year  I have renewed Femara prescription for her  She will continue to take calcium vitamin-D and baby aspirin in addition to her other medication    Peripheral neuralgia in her toes is it is unrelated to her breast cancer or Femara  She goes to her breast surgeon for examination of breasts and imaging studies  Discussed the importance of self-breast examination, eating healthy foods and exercises as tolerated and  health screening tests  Condition and plan discussed  Questions answered  Patient voiced understanding and agreement in the discussion  She is aware to contact us with questions/symptoms in the interim  1  Malignant neoplasm of upper-outer quadrant of right breast in female, estrogen receptor positive ([facility] )    - Cancer antigen 27 29; Future  - CBC and differential; Future  - Comprehensive metabolic panel; Future  - letrozole (FEMARA) 2 5 mg tablet; Take 1 tablet (2 5 mg total) by mouth every evening  Dispense: 90 tablet; Refill: 2      Patient voiced understanding and agreement in the discussion  Counseling / Coordination of Care   Greater than 50% of total time was spent with the patient and / or family counseling and / or coordination of care

## 2018-08-10 NOTE — LETTER
August 10, 2018     Roseanna Head, DO  [address] [address]    Patient: Harsha Salazar   YOB: 1955   Date of Visit: 8/10/2018       Dear Dr Landry Coppola:    Thank you for referring Loretta Persaud to me for evaluation  Below are my notes for this consultation  If you have questions, please do not hesitate to call me  I look forward to following your patient along with you  Sincerely,        Kev Mccann MD        CC: No Recipients  Kev Mccann MD  8/10/2018  8:34 AM  Sign at close encounter    HPI:     Follow-up visit for Loretta Persaud with history of stage I, grade 1, 6 mm, hormone receptor strongly positive and HER2 negative, invasive ductal right breast cancer in the upper outer quadrant of right breast and for that  patient  had lumpectomy and sentinel node sampling in January 2014     Negative sentinel lymph node  Negative margins  No lymphovascular and dermal invasion  Oncotype score was low, 8  Cancer was picked up on MRI scan and not by mammography  She received radiation therapy  She has been on Femara since 2014  She has   tolerable musculoskeletal symptoms and chronic peripheral neuralgia that was evaluated by a neurologist   She has chronic low back discomfort         Current Outpatient Prescriptions:     aspirin (ECOTRIN LOW STRENGTH) 81 mg EC tablet, Take 81 mg by mouth daily, Disp: , Rfl:     denosumab (PROLIA) 60 mg/mL, Inject 60 mg under the skin every 6 (six) months, Disp: , Rfl:     letrozole (FEMARA) 2 5 mg tablet, Take 1 tablet (2 5 mg total) by mouth every evening, Disp: 90 tablet, Rfl: 3    levothyroxine (SYNTHROID) 75 mcg tablet, Take 1 tablet by mouth every other day, Disp: , Rfl:     levothyroxine (SYNTHROID) 88 mcg tablet, Take 1 tablet by mouth every other day, Disp: , Rfl:     loratadine (CLARITIN) 10 mg tablet, Take 10 mg by mouth daily, Disp: , Rfl:     Multiple Vitamins-Minerals (MULTIVITAMIN ADULT PO), multivitamin, Disp: , Rfl:     solifenacin (VESICARE) 10 MG tablet, Vesicare 10 mg tablet, Disp: , Rfl:     VITAMIN D, ERGOCALCIFEROL, PO, Take 2,000 Units by mouth daily, Disp: , Rfl:     Allergies   Allergen Reactions    Actifed Cold-Allergy [Chlorpheniramine-Phenylephrine] Swelling and Rash     Throat and face swelled    Penicillins Rash    Vancomycin Other (See Comments)     redness          Malignant neoplasm of upper-outer quadrant of right breast in female, estrogen receptor positive ([facility] [address] )    12/26/2013 Biopsy     Right breast biopsy, 12:00    Infiltrating mammary carcinoma  %  %  HER-2 negative         1/17/2014 Surgery     Right lumpectomy (Dr Socorro Price)    Stage IA- pT1b, pN0, G1         2/21/2014 - 4/3/2014 Radiation     WBRT (Dr Carlos Young)         2/25/2014 Genomic Testing     Oncotype 8, low risk         2014 -  Hormone Therapy     Letrozole (Dr Lyssa Buenrostro)            ROS:  08/10/18 Reviewed 13 systems:  Presently no headaches, seizures, dizziness, diplopia, dysphagia, hoarseness, chest pain, palpitations, shortness of breath, cough, hemoptysis, abdominal pain, nausea, vomiting, change in bowel habits, melena, hematuria, fever, chills, bleeding, bone pains, skin rash, weight loss,  tiredness ,  weakness, numbness,  claudication and gait problem  No frequent infections  Not unusually sensitive to heat or cold  No swelling of the ankles  No swollen glands  No GYN symptoms  Patient is anxious   Other symptoms are in HPI        /76 (BP Location: Left arm, Cuff Size: Adult)   Pulse 72   Temp 97 6 °F (36 4 °C) (Tympanic)   Resp 16   Ht 5' 8" (1 727 m)   Wt 85 1 kg (187 lb 9 6 oz)   SpO2 96%   BMI 28 52 kg/m²      Physical Exam:  Alert, oriented, not in distress, no icterus, no oral thrush, no palpable neck mass, clear lung fields, regular heart rate, abdomen  soft and non tender, no palpable abdominal mass, no ascites, no edema of ankles, no calf tenderness, no focal neurological deficit, no skin rash, no palpable lymphadenopathy, good arterial pulses, no clubbing  Patient is anxious  Performance status 0  No lymphedema    IMAGING:  No orders to display       LABS:  Results for orders placed or performed in visit on 07/25/18   Hemoglobin A1C   Result Value Ref Range    Hemoglobin A1C 5 6 4 2 - 6 3 %     mg/dl   Lipid panel   Result Value Ref Range    Cholesterol 177 50 - 200 mg/dL    Triglycerides 123 <=150 mg/dL    HDL, Direct 56 40 - 60 mg/dL    LDL Calculated 96 0 - 100 mg/dL    Non-HDL-Chol (CHOL-HDL) 121 mg/dl     Labs, Imaging, & Other studies:   All pertinent labs and imaging studies were personally reviewed    Lab Results   Component Value Date     07/25/2018    K 4 1 07/25/2018     07/25/2018    CO2 32 07/25/2018    ANIONGAP 4 07/25/2018    BUN 20 07/25/2018    CREATININE 0 91 07/25/2018    GLUCOSE 91 05/03/2018    GLUF 79 07/25/2018    CALCIUM 9 0 07/25/2018    AST 12 07/25/2018    ALT 23 07/25/2018    ALKPHOS 38 (L) 07/25/2018    PROT 6 9 07/25/2018    BILITOT 0 53 07/25/2018    EGFR 66 07/25/2018     Lab Results   Component Value Date    WBC 4 03 (L) 07/25/2018    HGB 13 4 07/25/2018    HCT 41 0 07/25/2018    MCV 98 07/25/2018     07/25/2018    Normal differential count  CA 27-29  5 4 of    Reviewed and discussed with patient  Assessment and plan: Follow-up visit for Douglas Jules with history of stage I, grade 1, 6 mm, hormone receptor strongly positive and HER2 negative, invasive ductal right breast cancer in the upper outer quadrant of right breast and for that  patient  had lumpectomy and sentinel node sampling in January 2014     Negative sentinel lymph node  Negative margins  No lymphovascular and dermal invasion  Oncotype score was low, 8  Cancer was picked up on MRI scan and not by mammography  She received radiation therapy  She has been on Femara since 2014    She has   tolerable musculoskeletal symptoms and chronic peripheral neuralgia that was evaluated by a neurologist   She has chronic low back discomfort      Breast cancer remains in remission  Goal is cure from breast cancer  We discussed 5 year versus 10 year of hormone therapy  Her tumor was very small, 6 mm and do not go type score was very low, 8 and after some discussion all parties agreed to stop hormonal therapy after 5 years and that will be next year  I have renewed Femara prescription for her  She will continue to take calcium vitamin-D and baby aspirin in addition to her other medication  Peripheral neuralgia in her toes is it is unrelated to her breast cancer or Femara  She goes to her breast surgeon for examination of breasts and imaging studies  Discussed the importance of self-breast examination, eating healthy foods and exercises as tolerated and  health screening tests  Condition and plan discussed  Questions answered  Patient voiced understanding and agreement in the discussion  She is aware to contact us with questions/symptoms in the interim  Patient voiced understanding and agreement in the discussion  Counseling / Coordination of Care   Greater than 50% of total time was spent with the patient and / or family counseling and / or coordination of care

## 2018-08-29 ENCOUNTER — TELEPHONE (OUTPATIENT)
Dept: HEMATOLOGY ONCOLOGY | Facility: CLINIC | Age: 63
End: 2018-08-29

## 2018-09-25 ENCOUNTER — APPOINTMENT (OUTPATIENT)
Dept: LAB | Age: 63
End: 2018-09-25
Payer: COMMERCIAL

## 2018-09-25 ENCOUNTER — TRANSCRIBE ORDERS (OUTPATIENT)
Dept: ADMINISTRATIVE | Age: 63
End: 2018-09-25

## 2018-09-25 DIAGNOSIS — Z79.899 NEED FOR PROPHYLACTIC CHEMOTHERAPY: ICD-10-CM

## 2018-09-25 DIAGNOSIS — E06.3 CHRONIC LYMPHOCYTIC THYROIDITIS: ICD-10-CM

## 2018-09-25 DIAGNOSIS — M81.0 SENILE OSTEOPOROSIS: Primary | ICD-10-CM

## 2018-09-25 DIAGNOSIS — I51.9 MYXEDEMA HEART DISEASE: Primary | ICD-10-CM

## 2018-09-25 DIAGNOSIS — M81.0 SENILE OSTEOPOROSIS: ICD-10-CM

## 2018-09-25 DIAGNOSIS — I51.9 MYXEDEMA HEART DISEASE: ICD-10-CM

## 2018-09-25 DIAGNOSIS — E55.9 AVITAMINOSIS D: ICD-10-CM

## 2018-09-25 DIAGNOSIS — E03.9 MYXEDEMA HEART DISEASE: ICD-10-CM

## 2018-09-25 DIAGNOSIS — E03.9 MYXEDEMA HEART DISEASE: Primary | ICD-10-CM

## 2018-09-25 LAB
25(OH)D3 SERPL-MCNC: 37.1 NG/ML (ref 30–100)
ALBUMIN SERPL BCP-MCNC: 3.6 G/DL (ref 3.5–5)
ALP SERPL-CCNC: 44 U/L (ref 46–116)
ALT SERPL W P-5'-P-CCNC: 23 U/L (ref 12–78)
ANION GAP SERPL CALCULATED.3IONS-SCNC: 3 MMOL/L (ref 4–13)
AST SERPL W P-5'-P-CCNC: 18 U/L (ref 5–45)
BASOPHILS # BLD AUTO: 0.03 THOUSANDS/ΜL (ref 0–0.1)
BASOPHILS NFR BLD AUTO: 1 % (ref 0–1)
BILIRUB SERPL-MCNC: 0.45 MG/DL (ref 0.2–1)
BILIRUB UR QL STRIP: NEGATIVE
BUN SERPL-MCNC: 17 MG/DL (ref 5–25)
CALCIUM SERPL-MCNC: 8.9 MG/DL (ref 8.3–10.1)
CHLORIDE SERPL-SCNC: 103 MMOL/L (ref 100–108)
CLARITY UR: CLEAR
CO2 SERPL-SCNC: 33 MMOL/L (ref 21–32)
COLOR UR: YELLOW
CREAT SERPL-MCNC: 0.89 MG/DL (ref 0.6–1.3)
CREAT UR-MCNC: 64.4 MG/DL
EOSINOPHIL # BLD AUTO: 0.14 THOUSAND/ΜL (ref 0–0.61)
EOSINOPHIL NFR BLD AUTO: 3 % (ref 0–6)
ERYTHROCYTE [DISTWIDTH] IN BLOOD BY AUTOMATED COUNT: 12.6 % (ref 11.6–15.1)
EST. AVERAGE GLUCOSE BLD GHB EST-MCNC: 114 MG/DL
GFR SERPL CREATININE-BSD FRML MDRD: 68 ML/MIN/1.73SQ M
GLUCOSE P FAST SERPL-MCNC: 77 MG/DL (ref 65–99)
GLUCOSE UR STRIP-MCNC: NEGATIVE MG/DL
HBA1C MFR BLD: 5.6 % (ref 4.2–6.3)
HCT VFR BLD AUTO: 41.6 % (ref 34.8–46.1)
HGB BLD-MCNC: 13.4 G/DL (ref 11.5–15.4)
HGB UR QL STRIP.AUTO: NEGATIVE
IMM GRANULOCYTES # BLD AUTO: 0.01 THOUSAND/UL (ref 0–0.2)
IMM GRANULOCYTES NFR BLD AUTO: 0 % (ref 0–2)
KETONES UR STRIP-MCNC: NEGATIVE MG/DL
LEUKOCYTE ESTERASE UR QL STRIP: NEGATIVE
LYMPHOCYTES # BLD AUTO: 1.06 THOUSANDS/ΜL (ref 0.6–4.47)
LYMPHOCYTES NFR BLD AUTO: 22 % (ref 14–44)
MAGNESIUM SERPL-MCNC: 2.3 MG/DL (ref 1.6–2.6)
MCH RBC QN AUTO: 31.9 PG (ref 26.8–34.3)
MCHC RBC AUTO-ENTMCNC: 32.2 G/DL (ref 31.4–37.4)
MCV RBC AUTO: 99 FL (ref 82–98)
MICROALBUMIN UR-MCNC: <5 MG/L (ref 0–20)
MICROALBUMIN/CREAT 24H UR: <8 MG/G CREATININE (ref 0–30)
MONOCYTES # BLD AUTO: 0.32 THOUSAND/ΜL (ref 0.17–1.22)
MONOCYTES NFR BLD AUTO: 7 % (ref 4–12)
NEUTROPHILS # BLD AUTO: 3.24 THOUSANDS/ΜL (ref 1.85–7.62)
NEUTS SEG NFR BLD AUTO: 67 % (ref 43–75)
NITRITE UR QL STRIP: NEGATIVE
NRBC BLD AUTO-RTO: 0 /100 WBCS
PH UR STRIP.AUTO: 6 [PH] (ref 4.5–8)
PHOSPHATE SERPL-MCNC: 3.2 MG/DL (ref 2.3–4.1)
PLATELET # BLD AUTO: 201 THOUSANDS/UL (ref 149–390)
PMV BLD AUTO: 10.8 FL (ref 8.9–12.7)
POTASSIUM SERPL-SCNC: 4.1 MMOL/L (ref 3.5–5.3)
PROT SERPL-MCNC: 7 G/DL (ref 6.4–8.2)
PROT UR STRIP-MCNC: NEGATIVE MG/DL
RBC # BLD AUTO: 4.2 MILLION/UL (ref 3.81–5.12)
SODIUM SERPL-SCNC: 139 MMOL/L (ref 136–145)
SP GR UR STRIP.AUTO: 1.01 (ref 1–1.03)
T4 FREE SERPL-MCNC: 1.11 NG/DL (ref 0.76–1.46)
TSH SERPL DL<=0.05 MIU/L-ACNC: 4.06 UIU/ML (ref 0.36–3.74)
UROBILINOGEN UR QL STRIP.AUTO: 0.2 E.U./DL
WBC # BLD AUTO: 4.8 THOUSAND/UL (ref 4.31–10.16)

## 2018-09-25 PROCEDURE — 82306 VITAMIN D 25 HYDROXY: CPT

## 2018-09-25 PROCEDURE — 84439 ASSAY OF FREE THYROXINE: CPT

## 2018-09-25 PROCEDURE — 82570 ASSAY OF URINE CREATININE: CPT | Performed by: SPECIALIST

## 2018-09-25 PROCEDURE — 36415 COLL VENOUS BLD VENIPUNCTURE: CPT

## 2018-09-25 PROCEDURE — 83036 HEMOGLOBIN GLYCOSYLATED A1C: CPT

## 2018-09-25 PROCEDURE — 81003 URINALYSIS AUTO W/O SCOPE: CPT | Performed by: SPECIALIST

## 2018-09-25 PROCEDURE — 85025 COMPLETE CBC W/AUTO DIFF WBC: CPT

## 2018-09-25 PROCEDURE — 84100 ASSAY OF PHOSPHORUS: CPT

## 2018-09-25 PROCEDURE — 82043 UR ALBUMIN QUANTITATIVE: CPT | Performed by: SPECIALIST

## 2018-09-25 PROCEDURE — 80053 COMPREHEN METABOLIC PANEL: CPT

## 2018-09-25 PROCEDURE — 84443 ASSAY THYROID STIM HORMONE: CPT

## 2018-09-25 PROCEDURE — 83735 ASSAY OF MAGNESIUM: CPT

## 2018-10-17 ENCOUNTER — TELEPHONE (OUTPATIENT)
Dept: NEUROLOGY | Facility: CLINIC | Age: 63
End: 2018-10-17

## 2018-10-29 ENCOUNTER — ANNUAL EXAM (OUTPATIENT)
Dept: OBGYN CLINIC | Facility: MEDICAL CENTER | Age: 63
End: 2018-10-29
Payer: COMMERCIAL

## 2018-10-29 VITALS
DIASTOLIC BLOOD PRESSURE: 92 MMHG | WEIGHT: 193 LBS | HEIGHT: 68 IN | SYSTOLIC BLOOD PRESSURE: 150 MMHG | BODY MASS INDEX: 29.25 KG/M2

## 2018-10-29 DIAGNOSIS — Z12.31 ENCOUNTER FOR SCREENING MAMMOGRAM FOR MALIGNANT NEOPLASM OF BREAST: ICD-10-CM

## 2018-10-29 DIAGNOSIS — Z01.419 ENCNTR FOR GYN EXAM (GENERAL) (ROUTINE) W/O ABN FINDINGS: ICD-10-CM

## 2018-10-29 PROCEDURE — 99396 PREV VISIT EST AGE 40-64: CPT | Performed by: PHYSICIAN ASSISTANT

## 2018-10-29 NOTE — PROGRESS NOTES
Serena South   1955    CC:  Yearly exam    S:  70 y o  female here for yearly exam  She is postmenopausal and has had no vaginal bleeding  She denies vaginal discharge, itching, odor or dryness  She is not sexually active  She continues to have issues with her rectocele and stools so large that they clog the toilet - she is going to follow-up with colorectal or GI For this  Last Pap 11/18/16 neg  Last Mammo 11/20/17 neg  Last DEXA 8/28/17 osteopenia  Last Colonoscopy 5 years ago       Current Outpatient Prescriptions:     aspirin (ECOTRIN LOW STRENGTH) 81 mg EC tablet, Take 81 mg by mouth daily, Disp: , Rfl:     denosumab (PROLIA) 60 mg/mL, Inject 60 mg under the skin every 6 (six) months, Disp: , Rfl:     letrozole (FEMARA) 2 5 mg tablet, Take 1 tablet (2 5 mg total) by mouth every evening, Disp: 90 tablet, Rfl: 2    levothyroxine (SYNTHROID) 75 mcg tablet, Take 1 tablet by mouth every other day, Disp: , Rfl:     levothyroxine (SYNTHROID) 88 mcg tablet, Take 1 tablet by mouth every other day, Disp: , Rfl:     loratadine (CLARITIN) 10 mg tablet, Take 10 mg by mouth daily, Disp: , Rfl:     Multiple Vitamins-Minerals (MULTIVITAMIN ADULT PO), multivitamin, Disp: , Rfl:     solifenacin (VESICARE) 10 MG tablet, Vesicare 10 mg tablet, Disp: , Rfl:     VITAMIN D, ERGOCALCIFEROL, PO, Take 2,000 Units by mouth daily, Disp: , Rfl:   Social History     Social History    Marital status: Single     Spouse name: N/A    Number of children: N/A    Years of education: N/A     Occupational History    Not on file       Social History Main Topics    Smoking status: Never Smoker    Smokeless tobacco: Never Used    Alcohol use Yes      Comment: "rare"    Drug use: No    Sexual activity: No     Other Topics Concern    Not on file     Social History Narrative    No narrative on file     Family History   Problem Relation Age of Onset    Uterine cancer Mother     Throat cancer Father      Past Medical History:   Diagnosis Date    Arthritis     Breast cancer ([facility] [address] )     right    Disease of thyroid gland     Hypothyroid     Knee pain, right     Limb alert care status     no BP's /IV's right side    Mitral valve prolapse     Numbness of anterior thigh     left and tingling    Seasonal allergies     dust,mold,trees    Urinary incontinence     Varicose veins of both lower extremities     Wears glasses         O:  Blood pressure 150/92, height 5' 8" (1 727 m), weight 87 5 kg (193 lb), not currently breastfeeding  Patient appears well and is not in distress  Neck is supple without masses  Breasts are symmetrical without mass, tenderness, nipple discharge, skin changes or adenopathy  Abdomen is soft and nontender without masses  External genitals are normal without lesions or rashes  Vagina is normal without discharge or bleeding  Cervix is normal without discharge or lesion  Uterus is normal, mobile, nontender without palpable mass  Adnexa are normal, nontender, without palpable mass  A:  Yearly exam      P:   Pap no longer indicated   Mammo slip given   RTO one year for yearly exam or sooner as needed

## 2018-10-30 ENCOUNTER — APPOINTMENT (OUTPATIENT)
Dept: LAB | Age: 63
End: 2018-10-30
Payer: COMMERCIAL

## 2018-10-30 ENCOUNTER — TRANSCRIBE ORDERS (OUTPATIENT)
Dept: ADMINISTRATIVE | Age: 63
End: 2018-10-30

## 2018-10-30 DIAGNOSIS — Z79.899 NEED FOR PROPHYLACTIC CHEMOTHERAPY: ICD-10-CM

## 2018-10-30 DIAGNOSIS — E03.9 MYXEDEMA HEART DISEASE: Primary | ICD-10-CM

## 2018-10-30 DIAGNOSIS — E66.8 OBESITY OF ENDOCRINE ORIGIN: ICD-10-CM

## 2018-10-30 DIAGNOSIS — I51.9 MYXEDEMA HEART DISEASE: ICD-10-CM

## 2018-10-30 DIAGNOSIS — E06.3 CHRONIC LYMPHOCYTIC THYROIDITIS: ICD-10-CM

## 2018-10-30 DIAGNOSIS — I51.9 MYXEDEMA HEART DISEASE: Primary | ICD-10-CM

## 2018-10-30 DIAGNOSIS — E03.9 MYXEDEMA HEART DISEASE: ICD-10-CM

## 2018-10-30 DIAGNOSIS — M81.0 SENILE OSTEOPOROSIS: ICD-10-CM

## 2018-10-30 DIAGNOSIS — M81.0 SENILE OSTEOPOROSIS: Primary | ICD-10-CM

## 2018-10-30 LAB
ALBUMIN SERPL BCP-MCNC: 3.4 G/DL (ref 3.5–5)
ALP SERPL-CCNC: 46 U/L (ref 46–116)
ALT SERPL W P-5'-P-CCNC: 25 U/L (ref 12–78)
ANION GAP SERPL CALCULATED.3IONS-SCNC: 4 MMOL/L (ref 4–13)
AST SERPL W P-5'-P-CCNC: 15 U/L (ref 5–45)
BILIRUB SERPL-MCNC: 0.48 MG/DL (ref 0.2–1)
BUN SERPL-MCNC: 17 MG/DL (ref 5–25)
CALCIUM SERPL-MCNC: 8.7 MG/DL (ref 8.3–10.1)
CHLORIDE SERPL-SCNC: 106 MMOL/L (ref 100–108)
CO2 SERPL-SCNC: 30 MMOL/L (ref 21–32)
CREAT SERPL-MCNC: 0.98 MG/DL (ref 0.6–1.3)
FOLATE SERPL-MCNC: 19.6 NG/ML (ref 3.1–17.5)
GFR SERPL CREATININE-BSD FRML MDRD: 61 ML/MIN/1.73SQ M
GLUCOSE P FAST SERPL-MCNC: 81 MG/DL (ref 65–99)
POTASSIUM SERPL-SCNC: 4.2 MMOL/L (ref 3.5–5.3)
PROT SERPL-MCNC: 6.7 G/DL (ref 6.4–8.2)
SODIUM SERPL-SCNC: 140 MMOL/L (ref 136–145)
VIT B12 SERPL-MCNC: 422 PG/ML (ref 100–900)

## 2018-10-30 PROCEDURE — 80053 COMPREHEN METABOLIC PANEL: CPT

## 2018-10-30 PROCEDURE — 82746 ASSAY OF FOLIC ACID SERUM: CPT

## 2018-10-30 PROCEDURE — 86618 LYME DISEASE ANTIBODY: CPT

## 2018-10-30 PROCEDURE — 36415 COLL VENOUS BLD VENIPUNCTURE: CPT

## 2018-10-30 PROCEDURE — 82607 VITAMIN B-12: CPT

## 2018-10-30 PROCEDURE — 84207 ASSAY OF VITAMIN B-6: CPT

## 2018-10-31 LAB
B BURGDOR IGG SER IA-ACNC: 0.25
B BURGDOR IGM SER IA-ACNC: 0.61

## 2018-11-02 LAB — VIT B6 SERPL-MCNC: 35.2 UG/L (ref 2–32.8)

## 2018-11-20 ENCOUNTER — HOSPITAL ENCOUNTER (OUTPATIENT)
Dept: RADIOLOGY | Facility: HOSPITAL | Age: 63
Discharge: HOME | End: 2018-11-20
Payer: COMMERCIAL

## 2018-11-20 DIAGNOSIS — C50.411 MALIGNANT NEOPLASM OF UPPER-OUTER QUADRANT OF RIGHT BREAST IN FEMALE, ESTROGEN RECEPTOR POSITIVE (HCC): ICD-10-CM

## 2018-11-20 DIAGNOSIS — Z17.0 MALIGNANT NEOPLASM OF UPPER-OUTER QUADRANT OF RIGHT BREAST IN FEMALE, ESTROGEN RECEPTOR POSITIVE (HCC): ICD-10-CM

## 2018-11-20 PROCEDURE — 0159T HB CAD BREAST MRI: CPT

## 2018-11-20 PROCEDURE — A9585 GADOBUTROL INJECTION: HCPCS | Performed by: NURSE PRACTITIONER

## 2018-11-20 PROCEDURE — C8908 MRI W/O FOL W/CONT, BREAST,: HCPCS

## 2018-11-20 RX ADMIN — GADOBUTROL 8 ML: 604.72 INJECTION INTRAVENOUS at 16:34

## 2018-11-21 ENCOUNTER — HOSPITAL ENCOUNTER (OUTPATIENT)
Dept: MAMMOGRAPHY | Facility: CLINIC | Age: 63
Discharge: HOME | End: 2018-11-21
Payer: COMMERCIAL

## 2018-11-21 DIAGNOSIS — Z17.0 MALIGNANT NEOPLASM OF UPPER-OUTER QUADRANT OF RIGHT BREAST IN FEMALE, ESTROGEN RECEPTOR POSITIVE (HCC): ICD-10-CM

## 2018-11-21 DIAGNOSIS — C50.411 MALIGNANT NEOPLASM OF UPPER-OUTER QUADRANT OF RIGHT BREAST IN FEMALE, ESTROGEN RECEPTOR POSITIVE (HCC): ICD-10-CM

## 2018-11-21 PROCEDURE — 77066 DX MAMMO INCL CAD BI: CPT

## 2018-11-21 PROCEDURE — G0279 TOMOSYNTHESIS, MAMMO: HCPCS

## 2018-12-20 ENCOUNTER — TELEPHONE (OUTPATIENT)
Dept: NEUROLOGY | Facility: CLINIC | Age: 63
End: 2018-12-20

## 2018-12-20 NOTE — TELEPHONE ENCOUNTER
Pt is asking for a copy of her complete medical record  Faxed a release to [phone] as per her request  Signed ROSA to be forwarded to [facility] once received

## 2019-01-15 ENCOUNTER — OFFICE VISIT (OUTPATIENT)
Dept: SURGICAL ONCOLOGY | Facility: CLINIC | Age: 64
End: 2019-01-15
Payer: COMMERCIAL

## 2019-01-15 VITALS
HEIGHT: 68 IN | TEMPERATURE: 98.7 F | DIASTOLIC BLOOD PRESSURE: 100 MMHG | BODY MASS INDEX: 29.4 KG/M2 | HEART RATE: 73 BPM | RESPIRATION RATE: 16 BRPM | WEIGHT: 194 LBS | SYSTOLIC BLOOD PRESSURE: 140 MMHG

## 2019-01-15 DIAGNOSIS — Z79.811 USE OF LETROZOLE (FEMARA): ICD-10-CM

## 2019-01-15 DIAGNOSIS — Z12.31 VISIT FOR SCREENING MAMMOGRAM: ICD-10-CM

## 2019-01-15 DIAGNOSIS — C50.411 MALIGNANT NEOPLASM OF UPPER-OUTER QUADRANT OF RIGHT BREAST IN FEMALE, ESTROGEN RECEPTOR POSITIVE (HCC): Primary | ICD-10-CM

## 2019-01-15 DIAGNOSIS — Z17.0 MALIGNANT NEOPLASM OF UPPER-OUTER QUADRANT OF RIGHT BREAST IN FEMALE, ESTROGEN RECEPTOR POSITIVE (HCC): Primary | ICD-10-CM

## 2019-01-15 PROCEDURE — 99214 OFFICE O/P EST MOD 30 MIN: CPT | Performed by: NURSE PRACTITIONER

## 2019-01-15 NOTE — PROGRESS NOTES
Surgical Oncology Follow Up       [address] [place] [address],[address]  [facility] [place]  [address]  1955  [id number]  [address] [place] [address],[address] [place] [address]  [facility] [id number]    Chief Complaint   Patient presents with    Breast Cancer     Pt is here for 6 month follow up        Assessment/Plan:  1  Malignant neoplasm of upper-outer quadrant of right breast in female, estrogen receptor positive ([facility] [address] )  - BUN; Future  - Creatinine, serum; Future  - MRI breast bilateral w wo contrast w cad; Future  - 1 year f/u visit    2  Use of letrozole (Femara)  - Continue use- per Dr Nataly Campbell recommendations    3  Visit for screening mammogram  - Mammo screening bilateral w 3d & cad; Future    Discussion/Summary: Patient is a 70year old female who presents today for a 6 month follow-up for right breast cancer diagnosed in January 2014  Her pathology revealed invasive ductal carcinoma, grade 1, ER/NE positive, HER-2 negative  She underwent a right breast lumpectomy and sentinel node biopsy by Dr Yvonne David  She then completed whole breast radiation therapy and is currently on letrozole therapy  She had a bilateral breast MRI performed on November 20, 2018 which was BI-RADS 2  She had a bilateral 3D mammogram performed on November 21, 2018 which was also BI-RADS 2  We discussed continued use of MRI surveillance since she is now five years from her original diagnosis  We ultimately decided to obtain one more breast MRI this year and will reassess at her next appointment  I will order a bilateral 3D screening mammogram as well as the MRI to be completed in November of 2019  We will plan to see the patient back in 1 year or sooner if the need arises  She was instructed to call with any new concerns or symptoms  All of her questions were answered        History of Present Illness:        Malignant neoplasm of upper-outer quadrant of right breast in female, estrogen receptor positive ([facility] [address] )    12/26/2013 Biopsy     Right breast biopsy, 12:00    Infiltrating mammary carcinoma  %  %  HER-2 negative         1/17/2014 Surgery     Right lumpectomy (Dr Lolis Crump)    Stage IA- pT1b, pN0, G1         2/21/2014 - 4/3/2014 Radiation     WBRT (Dr Crystal Garcia)         2/25/2014 Genomic Testing     Oncotype 8, low risk         2014 -  Hormone Therapy     Letrozole (Dr Brenda Mason)             -Interval History:  Patient presents today for a six-month follow-up visit for right breast cancer diagnosed in December 2013  She has no new complaints today  She notices no changes on self-exam   She had a bilateral mammogram performed on November 21, 2018 which was BI-RADS 2  She had a bilateral breast MRI performed on November 20, 2018 which was also BI-RADS 2  She denies headaches, back pain, bone pain, cough, shortness of breath, abdominal pain  She does report weight gain  Review of Systems:  Review of Systems   Constitutional: Negative for activity change, appetite change, chills, fatigue, fever and unexpected weight change  HENT: Negative for trouble swallowing  Eyes: Negative for pain, redness and visual disturbance  Respiratory: Negative for cough, shortness of breath and wheezing  Cardiovascular: Negative for chest pain, palpitations and leg swelling  Gastrointestinal: Negative for abdominal pain, constipation, diarrhea, nausea and vomiting  Endocrine: Negative for cold intolerance and heat intolerance  Musculoskeletal: Negative for arthralgias, back pain, gait problem and myalgias  Skin: Negative for color change and rash  Neurological: Negative for dizziness, syncope, light-headedness, numbness and headaches  Hematological: Negative for adenopathy  Psychiatric/Behavioral: Negative for agitation and confusion  All other systems reviewed and are negative        Patient Active Problem List   Diagnosis  Primary osteoarthritis of right knee    Meralgia paraesthetica, left    Carpal tunnel syndrome    Cat bite    Acquired spondylolisthesis    Hypothyroidism    Knee joint effusion    Knee pain    Leukopenia    Localized primary osteoarthritis of wrist    Lumbar radiculopathy    Malignant neoplasm of upper-outer quadrant of right breast in female, estrogen receptor positive (HCC)    Myelopathy (HCC)    Osteopenia    Right leg pain    Seasonal allergies    Tear of lateral meniscus of knee    Tear of medial meniscus of knee    Urinary retention    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    Vitamin D deficiency    Use of letrozole (Femara)     Past Medical History:   Diagnosis Date    Arthritis     Disease of thyroid gland     Hypothyroid     Knee pain, right     Limb alert care status     no BP's /IV's right side    Mitral valve prolapse     Numbness of anterior thigh     left and tingling    Seasonal allergies     dust,mold,trees    Urinary incontinence     Varicose veins of both lower extremities     Wears glasses      Past Surgical History:   Procedure Laterality Date    BREAST SURGERY Right     lumpectomy    COLONOSCOPY      EXPLORATORY LAPAROTOMY      "for fertility"    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    KNEE ARTHROSCOPY Right     AR TOTAL KNEE ARTHROPLASTY Right 1/20/2017    Procedure: ARTHROPLASTY KNEE TOTAL;  Surgeon: Neva Taylor MD;  [facility];  Service: Orthopedics    TONSILLECTOMY      WISDOM TOOTH EXTRACTION       Family History   Problem Relation Age of Onset    Uterine cancer Mother     Throat cancer Father     Breast cancer additional onset Paternal [de-identified]     Breast cancer additional onset Cousin      Social History     Social History    Marital status: Single     Spouse name: N/A    Number of children: N/A    Years of education: N/A     Occupational History    Not on file       Social History Main Topics    Smoking status: Never Smoker    Smokeless tobacco: Never Used    Alcohol use Yes      Comment: "rare"    Drug use: No    Sexual activity: No     Other Topics Concern    Not on file     Social History Narrative    No narrative on file       Current Outpatient Prescriptions:     aspirin (ECOTRIN LOW STRENGTH) 81 mg EC tablet, Take 81 mg by mouth daily, Disp: , Rfl:     denosumab (PROLIA) 60 mg/mL, Inject 60 mg under the skin every 6 (six) months, Disp: , Rfl:     letrozole (FEMARA) 2 5 mg tablet, Take 1 tablet (2 5 mg total) by mouth every evening, Disp: 90 tablet, Rfl: 2    levothyroxine (SYNTHROID) 75 mcg tablet, Take 1 tablet by mouth every other day, Disp: , Rfl:     levothyroxine (SYNTHROID) 88 mcg tablet, Take 1 tablet by mouth every other day, Disp: , Rfl:     loratadine (CLARITIN) 10 mg tablet, Take 10 mg by mouth daily, Disp: , Rfl:     Multiple Vitamins-Minerals (MULTIVITAMIN ADULT PO), multivitamin, Disp: , Rfl:     solifenacin (VESICARE) 10 MG tablet, Vesicare 10 mg tablet, Disp: , Rfl:     VITAMIN D, ERGOCALCIFEROL, PO, Take 2,000 Units by mouth daily, Disp: , Rfl:   Allergies   Allergen Reactions    Actifed Cold-Allergy [Chlorpheniramine-Phenylephrine] Swelling and Rash     Throat and face swelled    Penicillins Rash    Vancomycin Other (See Comments)     redness     Vitals:    01/15/19 1533   BP: 140/100   Pulse: 73   Resp: 16   Temp: 98 7 °F (37 1 °C)       Physical Exam   Constitutional: She is oriented to person, place, and time  Vital signs are normal  She appears well-developed and well-nourished  No distress  HENT:   Head: Normocephalic and atraumatic  Neck: Normal range of motion  Cardiovascular: Normal rate, regular rhythm and normal heart sounds  Pulmonary/Chest: Effort normal and breath sounds normal    Bilateral breasts were examined in the sitting and supine position  There are no masses, skin changes, nipple discharge or nipple changes   Right breast and right axilla surgical scar  Scar tissue noted at right breast surgical site- stable  There is no bilateral supraclavicular or axillary lymphadenopathy noted  Abdominal: Soft  Normal appearance  She exhibits no mass  There is no hepatosplenomegaly  There is no tenderness  Musculoskeletal: Normal range of motion  Lymphadenopathy:     She has no axillary adenopathy  Right: No supraclavicular adenopathy present  Left: No supraclavicular adenopathy present  Neurological: She is alert and oriented to person, place, and time  Skin: Skin is warm, dry and intact  No rash noted  She is not diaphoretic  Psychiatric: She has a normal mood and affect  Her speech is normal    Vitals reviewed  Advance Care Planning/Advance Directives:  Discussed disease status, cancer treatment plans and/or cancer treatment goals with the patient

## 2019-01-21 ENCOUNTER — TRANSCRIBE ORDERS (OUTPATIENT)
Dept: ADMINISTRATIVE | Facility: HOSPITAL | Age: 64
End: 2019-01-21

## 2019-01-21 DIAGNOSIS — G95.9 DISEASE OF SPINAL CORD (HCC): ICD-10-CM

## 2019-01-21 DIAGNOSIS — R93.0 FAMILIAL ENLARGEMENT OF THE SELLA TURCICA: ICD-10-CM

## 2019-01-21 DIAGNOSIS — N31.1 REFLEX NEUROPATHIC BLADDER, NOT ELSEWHERE CLASSIFIED: ICD-10-CM

## 2019-01-21 DIAGNOSIS — G93.9 DISEASE OF BRAIN: Primary | ICD-10-CM

## 2019-01-25 ENCOUNTER — APPOINTMENT (OUTPATIENT)
Dept: LAB | Age: 64
End: 2019-01-25
Payer: COMMERCIAL

## 2019-01-25 DIAGNOSIS — R93.0 FAMILIAL ENLARGEMENT OF THE SELLA TURCICA: ICD-10-CM

## 2019-01-25 DIAGNOSIS — N31.1 REFLEX NEUROPATHIC BLADDER, NOT ELSEWHERE CLASSIFIED: ICD-10-CM

## 2019-01-25 DIAGNOSIS — Z17.0 MALIGNANT NEOPLASM OF UPPER-OUTER QUADRANT OF RIGHT BREAST IN FEMALE, ESTROGEN RECEPTOR POSITIVE (HCC): ICD-10-CM

## 2019-01-25 DIAGNOSIS — G95.9 DISEASE OF SPINAL CORD (HCC): ICD-10-CM

## 2019-01-25 DIAGNOSIS — C50.411 MALIGNANT NEOPLASM OF UPPER-OUTER QUADRANT OF RIGHT BREAST IN FEMALE, ESTROGEN RECEPTOR POSITIVE (HCC): ICD-10-CM

## 2019-01-25 DIAGNOSIS — G93.9 DISEASE OF BRAIN: ICD-10-CM

## 2019-01-25 LAB
ALBUMIN SERPL BCP-MCNC: 3.7 G/DL (ref 3.5–5)
ALP SERPL-CCNC: 43 U/L (ref 46–116)
ALT SERPL W P-5'-P-CCNC: 26 U/L (ref 12–78)
ANION GAP SERPL CALCULATED.3IONS-SCNC: 4 MMOL/L (ref 4–13)
AST SERPL W P-5'-P-CCNC: 17 U/L (ref 5–45)
BASOPHILS # BLD AUTO: 0.02 THOUSANDS/ΜL (ref 0–0.1)
BASOPHILS NFR BLD AUTO: 1 % (ref 0–1)
BILIRUB SERPL-MCNC: 0.63 MG/DL (ref 0.2–1)
BUN SERPL-MCNC: 17 MG/DL (ref 5–25)
CALCIUM SERPL-MCNC: 8.7 MG/DL (ref 8.3–10.1)
CANCER AG27-29 SERPL-ACNC: 12 U/ML (ref 0–42.3)
CHLORIDE SERPL-SCNC: 104 MMOL/L (ref 100–108)
CO2 SERPL-SCNC: 30 MMOL/L (ref 21–32)
CREAT SERPL-MCNC: 0.92 MG/DL (ref 0.6–1.3)
EOSINOPHIL # BLD AUTO: 0.1 THOUSAND/ΜL (ref 0–0.61)
EOSINOPHIL NFR BLD AUTO: 2 % (ref 0–6)
ERYTHROCYTE [DISTWIDTH] IN BLOOD BY AUTOMATED COUNT: 12.4 % (ref 11.6–15.1)
ERYTHROCYTE [SEDIMENTATION RATE] IN BLOOD: 14 MM/HOUR (ref 0–20)
FOLATE SERPL-MCNC: >20 NG/ML (ref 3.1–17.5)
GFR SERPL CREATININE-BSD FRML MDRD: 66 ML/MIN/1.73SQ M
GLUCOSE P FAST SERPL-MCNC: 86 MG/DL (ref 65–99)
HCT VFR BLD AUTO: 42.2 % (ref 34.8–46.1)
HCYS SERPL-SCNC: 8.6 UMOL/L (ref 3.7–11.2)
HGB BLD-MCNC: 14.2 G/DL (ref 11.5–15.4)
IMM GRANULOCYTES # BLD AUTO: 0.01 THOUSAND/UL (ref 0–0.2)
IMM GRANULOCYTES NFR BLD AUTO: 0 % (ref 0–2)
LYMPHOCYTES # BLD AUTO: 0.84 THOUSANDS/ΜL (ref 0.6–4.47)
LYMPHOCYTES NFR BLD AUTO: 19 % (ref 14–44)
MCH RBC QN AUTO: 32.8 PG (ref 26.8–34.3)
MCHC RBC AUTO-ENTMCNC: 33.6 G/DL (ref 31.4–37.4)
MCV RBC AUTO: 98 FL (ref 82–98)
MONOCYTES # BLD AUTO: 0.26 THOUSAND/ΜL (ref 0.17–1.22)
MONOCYTES NFR BLD AUTO: 6 % (ref 4–12)
NEUTROPHILS # BLD AUTO: 3.1 THOUSANDS/ΜL (ref 1.85–7.62)
NEUTS SEG NFR BLD AUTO: 72 % (ref 43–75)
NRBC BLD AUTO-RTO: 0 /100 WBCS
PLATELET # BLD AUTO: 202 THOUSANDS/UL (ref 149–390)
PMV BLD AUTO: 10.6 FL (ref 8.9–12.7)
POTASSIUM SERPL-SCNC: 3.9 MMOL/L (ref 3.5–5.3)
PROT SERPL-MCNC: 7.2 G/DL (ref 6.4–8.2)
RBC # BLD AUTO: 4.33 MILLION/UL (ref 3.81–5.12)
SODIUM SERPL-SCNC: 138 MMOL/L (ref 136–145)
T4 FREE SERPL-MCNC: 1.1 NG/DL (ref 0.76–1.46)
TSH SERPL DL<=0.05 MIU/L-ACNC: 2.59 UIU/ML (ref 0.36–3.74)
VIT B12 SERPL-MCNC: 568 PG/ML (ref 100–900)
WBC # BLD AUTO: 4.33 THOUSAND/UL (ref 4.31–10.16)

## 2019-01-25 PROCEDURE — 86235 NUCLEAR ANTIGEN ANTIBODY: CPT

## 2019-01-25 PROCEDURE — 83090 ASSAY OF HOMOCYSTEINE: CPT

## 2019-01-25 PROCEDURE — 82746 ASSAY OF FOLIC ACID SERUM: CPT

## 2019-01-25 PROCEDURE — 84443 ASSAY THYROID STIM HORMONE: CPT

## 2019-01-25 PROCEDURE — 84165 PROTEIN E-PHORESIS SERUM: CPT | Performed by: PATHOLOGY

## 2019-01-25 PROCEDURE — 83825 ASSAY OF MERCURY: CPT

## 2019-01-25 PROCEDURE — 80053 COMPREHEN METABOLIC PANEL: CPT

## 2019-01-25 PROCEDURE — 86618 LYME DISEASE ANTIBODY: CPT

## 2019-01-25 PROCEDURE — 36415 COLL VENOUS BLD VENIPUNCTURE: CPT

## 2019-01-25 PROCEDURE — 86038 ANTINUCLEAR ANTIBODIES: CPT

## 2019-01-25 PROCEDURE — 82175 ASSAY OF ARSENIC: CPT

## 2019-01-25 PROCEDURE — 84439 ASSAY OF FREE THYROXINE: CPT

## 2019-01-25 PROCEDURE — 86300 IMMUNOASSAY TUMOR CA 15-3: CPT

## 2019-01-25 PROCEDURE — 82607 VITAMIN B-12: CPT

## 2019-01-25 PROCEDURE — 85652 RBC SED RATE AUTOMATED: CPT

## 2019-01-25 PROCEDURE — 84425 ASSAY OF VITAMIN B-1: CPT

## 2019-01-25 PROCEDURE — 83918 ORGANIC ACIDS TOTAL QUANT: CPT

## 2019-01-25 PROCEDURE — 84165 PROTEIN E-PHORESIS SERUM: CPT

## 2019-01-25 PROCEDURE — 83655 ASSAY OF LEAD: CPT

## 2019-01-25 PROCEDURE — 85025 COMPLETE CBC W/AUTO DIFF WBC: CPT

## 2019-01-26 LAB
B BURGDOR IGG SER IA-ACNC: 0.23
B BURGDOR IGM SER IA-ACNC: 0.5
ENA SS-A AB SER-ACNC: <0.2 AI (ref 0–0.9)
ENA SS-B AB SER-ACNC: <0.2 AI (ref 0–0.9)

## 2019-01-28 LAB
RYE IGE QN: NEGATIVE
VIT B1 BLD-SCNC: 145.1 NMOL/L (ref 66.5–200)

## 2019-01-29 LAB
ALBUMIN SERPL ELPH-MCNC: 4.08 G/DL (ref 3.5–5)
ALBUMIN SERPL ELPH-MCNC: 59.2 % (ref 52–65)
ALPHA1 GLOB SERPL ELPH-MCNC: 0.29 G/DL (ref 0.1–0.4)
ALPHA1 GLOB SERPL ELPH-MCNC: 4.2 % (ref 2.5–5)
ALPHA2 GLOB SERPL ELPH-MCNC: 0.77 G/DL (ref 0.4–1.2)
ALPHA2 GLOB SERPL ELPH-MCNC: 11.1 % (ref 7–13)
ARSENIC BLD-MCNC: 5 UG/L (ref 2–23)
BETA GLOB ABNORMAL SERPL ELPH-MCNC: 0.48 G/DL (ref 0.4–0.8)
BETA1 GLOB SERPL ELPH-MCNC: 7 % (ref 5–13)
BETA2 GLOB SERPL ELPH-MCNC: 5.2 % (ref 2–8)
BETA2+GAMMA GLOB SERPL ELPH-MCNC: 0.36 G/DL (ref 0.2–0.5)
GAMMA GLOB ABNORMAL SERPL ELPH-MCNC: 0.92 G/DL (ref 0.5–1.6)
GAMMA GLOB SERPL ELPH-MCNC: 13.3 % (ref 12–22)
IGG/ALB SER: 1.45 {RATIO} (ref 1.1–1.8)
LEAD BLD-MCNC: NORMAL UG/DL (ref 0–4)
MERCURY BLD-MCNC: NORMAL UG/L (ref 0–14.9)
METHYLMALONATE SERPL-SCNC: 160 NMOL/L (ref 0–378)
PROT PATTERN SERPL ELPH-IMP: NORMAL
PROT SERPL-MCNC: 6.9 G/DL (ref 6.4–8.2)
SL AMB DISCLAIMER: NORMAL

## 2019-01-31 ENCOUNTER — HOSPITAL ENCOUNTER (OUTPATIENT)
Dept: RADIOLOGY | Facility: HOSPITAL | Age: 64
Discharge: HOME | End: 2019-01-31
Attending: PSYCHIATRY & NEUROLOGY
Payer: COMMERCIAL

## 2019-01-31 DIAGNOSIS — G93.9 DISEASE OF BRAIN: ICD-10-CM

## 2019-01-31 DIAGNOSIS — G95.9 DISEASE OF SPINAL CORD (HCC): ICD-10-CM

## 2019-01-31 DIAGNOSIS — N31.1 REFLEX NEUROPATHIC BLADDER, NOT ELSEWHERE CLASSIFIED: ICD-10-CM

## 2019-01-31 DIAGNOSIS — R93.0 FAMILIAL ENLARGEMENT OF THE SELLA TURCICA: ICD-10-CM

## 2019-01-31 PROCEDURE — 70553 MRI BRAIN STEM W/O & W/DYE: CPT

## 2019-01-31 PROCEDURE — 72157 MRI CHEST SPINE W/O & W/DYE: CPT

## 2019-01-31 PROCEDURE — A9585 GADOBUTROL INJECTION: HCPCS | Performed by: PSYCHIATRY & NEUROLOGY

## 2019-01-31 RX ADMIN — GADOBUTROL 8 ML: 604.72 INJECTION INTRAVENOUS at 21:18

## 2019-02-06 ENCOUNTER — AMB VIDEO VISIT (OUTPATIENT)
Dept: URGENT CARE | Facility: CLINIC | Age: 64
End: 2019-02-06

## 2019-02-06 DIAGNOSIS — J06.9 ACUTE URI: Primary | ICD-10-CM

## 2019-02-06 NOTE — PROGRESS NOTES
[address]        NAME: Harry Falk is a 70 y o  female  : 1955    MRN: [id number]  DATE: 2019  TIME: 11:31 AM    Assessment and Plan   Acute URI [J06 9]  1  Acute URI           Patient Instructions     Patient Instructions   1  Acute viral URI (common cold)   - rest and drink plenty of fluids  - take Tylenol or Motrin as needed  - run a humidifier at home  - try warm salt water gargles and throat lozenges as needed   - may take OTC cold medications as needed   - if symptoms persist despite treatment or worsen, should be seen by PCP for re-check   - work excuse note emailed to patient     Follow up with PCP in 3-5 days  Proceed to  ER if symptoms worsen  Chief Complaint     Chief Complaint   Patient presents with   Srikanth Sierras Like Symptoms    Letter for School/Work         History of Present Illness       69 yo female, has been ill w/ 3 days  She states she missed work due to the symptoms and needs a work note  She has been experiencing headache, nasal congestion, rhinorrhea, watery eyes, mild dry cough, she had a low grade temp of 100 0, and had some chills, but feels she is getting better  No GI sx  No skin rashes  No chest pain, SOB, or wheezing  She did get the flu vaccine this year  She has not been taking for the symptoms  Review of Systems   Review of Systems   Constitutional:        As noted in HPI   HENT:        As noted in HPI   Eyes: Negative  Respiratory:        As noted in HPI   Cardiovascular: Negative  Gastrointestinal: Negative  Musculoskeletal: Negative  Skin: Negative  Neurological: Negative            Current Medications       Current Outpatient Prescriptions:     aspirin (ECOTRIN LOW STRENGTH) 81 mg EC tablet, Take 81 mg by mouth daily, Disp: , Rfl:     denosumab (PROLIA) 60 mg/mL, Inject 60 mg under the skin every 6 (six) months, Disp: , Rfl:     letrozole (FEMARA) 2 5 mg tablet, Take 1 tablet (2 5 mg total) by mouth every evening, Disp: 90 tablet, Rfl: 2    levothyroxine (SYNTHROID) 75 mcg tablet, Take 1 tablet by mouth every other day, Disp: , Rfl:     levothyroxine (SYNTHROID) 88 mcg tablet, Take 1 tablet by mouth every other day, Disp: , Rfl:     loratadine (CLARITIN) 10 mg tablet, Take 10 mg by mouth daily, Disp: , Rfl:     Multiple Vitamins-Minerals (MULTIVITAMIN ADULT PO), multivitamin, Disp: , Rfl:     solifenacin (VESICARE) 10 MG tablet, Vesicare 10 mg tablet, Disp: , Rfl:     VITAMIN D, ERGOCALCIFEROL, PO, Take 2,000 Units by mouth daily, Disp: , Rfl:     Current Allergies     Allergies as of 02/06/2019 - Reviewed 02/06/2019   Allergen Reaction Noted    Actifed cold-allergy [chlorpheniramine-phenylephrine] Swelling and Rash 01/10/2017    Penicillins Rash 01/10/2017    Vancomycin Other (See Comments) 01/10/2017            The following portions of the patient's history were reviewed and updated as appropriate: allergies, current medications, past family history, past medical history, past social history, past surgical history and problem list      Past Medical History:   Diagnosis Date    Arthritis     Disease of thyroid gland     Hypothyroid     Knee pain, right     Limb alert care status     no BP's /IV's right side    Mitral valve prolapse     Numbness of anterior thigh     left and tingling    Seasonal allergies     dust,mold,trees    Urinary incontinence     Varicose veins of both lower extremities     Wears glasses        Past Surgical History:   Procedure Laterality Date    BREAST SURGERY Right     lumpectomy    COLONOSCOPY      EXPLORATORY LAPAROTOMY      "for fertility"    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    KNEE ARTHROSCOPY Right     GA TOTAL KNEE ARTHROPLASTY Right 1/20/2017    Procedure: ARTHROPLASTY KNEE TOTAL;  Surgeon: Jagdish Schaffer MD;  Location: [facility];  Service: Orthopedics    TONSILLECTOMY      WISDOM TOOTH EXTRACTION         Family History   Problem Relation Age of Onset    Uterine cancer Mother     Throat cancer Father     Breast cancer additional onset Paternal Aunt     Breast cancer additional onset Cousin          Medications have been verified  Objective   There were no vitals taken for this visit  Physical Exam     Physical Exam   Constitutional: She is oriented to person, place, and time  She appears well-developed and well-nourished  She is active and cooperative  Non-toxic appearance  She does not have a sickly appearance  She does not appear ill  No distress  Pulmonary/Chest: Effort normal  No respiratory distress  Neurological: She is alert and oriented to person, place, and time  Skin: She is not diaphoretic  Psychiatric: She has a normal mood and affect   Her behavior is normal  Judgment and thought content normal

## 2019-02-06 NOTE — PATIENT INSTRUCTIONS
1  Acute viral URI (common cold)   - rest and drink plenty of fluids  - take Tylenol or Motrin as needed  - run a humidifier at home  - try warm salt water gargles and throat lozenges as needed   - may take OTC cold medications as needed   - if symptoms persist despite treatment or worsen, should be seen by PCP for re-check   - work excuse note emailed to patient

## 2019-02-06 NOTE — LETTER
February 6, 2019     Jeaneth Escudero MD  [address] [place] [address]    Patient: Jose Watkins   YOB: 1955   Date of Visit: 2/6/2019        Dear Kimberly Escudero MD:    Your patient, Cory Strange was seen by me via virtual visit on 2/6/2019  If you have any questions or concerns, please don't hesitate to call             Sincerely,        Natacha Daley MD

## 2019-02-15 ENCOUNTER — OFFICE VISIT (OUTPATIENT)
Dept: HEMATOLOGY ONCOLOGY | Facility: CLINIC | Age: 64
End: 2019-02-15
Payer: COMMERCIAL

## 2019-02-15 VITALS
HEART RATE: 84 BPM | BODY MASS INDEX: 29.18 KG/M2 | OXYGEN SATURATION: 98 % | HEIGHT: 69 IN | TEMPERATURE: 100.2 F | WEIGHT: 197 LBS | RESPIRATION RATE: 18 BRPM | SYSTOLIC BLOOD PRESSURE: 140 MMHG | DIASTOLIC BLOOD PRESSURE: 80 MMHG

## 2019-02-15 DIAGNOSIS — Z17.0 MALIGNANT NEOPLASM OF UPPER-OUTER QUADRANT OF RIGHT BREAST IN FEMALE, ESTROGEN RECEPTOR POSITIVE (HCC): Primary | ICD-10-CM

## 2019-02-15 DIAGNOSIS — C50.411 MALIGNANT NEOPLASM OF UPPER-OUTER QUADRANT OF RIGHT BREAST IN FEMALE, ESTROGEN RECEPTOR POSITIVE (HCC): Primary | ICD-10-CM

## 2019-02-15 PROCEDURE — 99214 OFFICE O/P EST MOD 30 MIN: CPT | Performed by: INTERNAL MEDICINE

## 2019-02-15 RX ORDER — LETROZOLE 2.5 MG/1
2.5 TABLET, FILM COATED ORAL EVERY EVENING
Qty: 90 TABLET | Refills: 2 | Status: SHIPPED | OUTPATIENT
Start: 2019-02-15 | End: 2019-08-12

## 2019-02-15 NOTE — LETTER
February 15, 2019     Lianet Pascal MD  [address] [place] [address]    Patient: Wilmer Bacon   YOB: 1955   Date of Visit: 2/15/2019       Dear Dr Epifanio Eldridge Recipients: Thank you for referring Merari Johnson to me for evaluation  Below are my notes for this consultation  If you have questions, please do not hesitate to call me  I look forward to following your patient along with you  Sincerely,        Katlyn Berger MD        CC: No Recipients  Katlyn Berger MD  2/15/2019 11:40 AM  Sign at close encounter    HPI:  In January 2014 patient had lumpectomy and sentinel lymph node sampling for stage I, grade 1, 6 mm, hormone receptor strongly positive and HER2 negative, invasive ductal  cancer in the upper outer quadrant of right breast     Negative sentinel lymph node  Negative margins  No lymphovascular and dermal invasion  Oncotype score was low, 8  Cancer was picked up on MRI scan and not by mammography  She received radiation therapy  She has been on Femara since 2014   She has   tolerable musculoskeletal symptoms and chronic peripheral neuropathy in left upper thigh and toes of both feet  She follows with her   neurologist   She has chronic low back discomfort                     Current Outpatient Medications:     aspirin (ECOTRIN LOW STRENGTH) 81 mg EC tablet, Take 81 mg by mouth daily, Disp: , Rfl:     denosumab (PROLIA) 60 mg/mL, Inject 60 mg under the skin every 6 (six) months, Disp: , Rfl:     letrozole (FEMARA) 2 5 mg tablet, Take 1 tablet (2 5 mg total) by mouth every evening, Disp: 90 tablet, Rfl: 2    levothyroxine (SYNTHROID) 75 mcg tablet, Take 1 tablet by mouth every other day, Disp: , Rfl:     levothyroxine (SYNTHROID) 88 mcg tablet, Take 1 tablet by mouth every other day, Disp: , Rfl:     loratadine (CLARITIN) 10 mg tablet, Take 10 mg by mouth daily, Disp: , Rfl:     Multiple Vitamins-Minerals (MULTIVITAMIN ADULT PO), multivitamin, Disp: , Rfl:    solifenacin (VESICARE) 10 MG tablet, Vesicare 10 mg tablet, Disp: , Rfl:     VITAMIN D, ERGOCALCIFEROL, PO, Take 2,000 Units by mouth daily, Disp: , Rfl:     Allergies   Allergen Reactions    Actifed Cold-Allergy [Chlorpheniramine-Phenylephrine] Swelling and Rash     Throat and face swelled    Penicillins Rash    Vancomycin Other (See Comments)     redness          Malignant neoplasm of upper-outer quadrant of right breast in female, estrogen receptor positive ([facility] [address] )    12/26/2013 Biopsy     Right breast biopsy, 12:00    Infiltrating mammary carcinoma  %  %  HER-2 negative         1/17/2014 Surgery     Right lumpectomy (Dr Ney Devi)    Stage IA- pT1b, pN0, G1         2/21/2014 - 4/3/2014 Radiation     WBRT (Dr Chirag Tran)         2/25/2014 Genomic Testing     Oncotype 8, low risk         2014 -  Hormone Therapy     Letrozole (Dr Naomi Gentile)            ROS:  02/15/19 Reviewed 13 systems:  Presently no other neurological, cardiac, pulmonary, GI and  symptoms other than mentioned above  No other symptoms like fever, chills, bleeding, bone pains, skin rash, weight loss,  tiredness ,  weakness,   claudication and gait problem  No frequent infections  Not unusually sensitive to heat or cold  No swelling of the ankles  No swollen glands  Patient is anxious  Other symptoms are in HPI        /80   Pulse 84   Temp 100 2 °F (37 9 °C) (Tympanic)   Resp 18   Ht 5' 9" (1 753 m)   Wt 89 4 kg (197 lb)   SpO2 98%   BMI 29 09 kg/m²      Physical Exam:    Patient is alert and oriented  She is not in distress  No icterus  No oral thrush  There is no palpable neck mass  Lung Fields are clear to percussion and auscultation  Heart rate is regular  Abdomen is  soft and non tender, no palpable abdominal mass, no ascites, no edema of ankles, no calf tenderness, no objective focal neurological deficit, no skin rash, no palpable lymphadenopathy in the neck and axillary areas, good arterial pulses, no clubbing  Patient is anxious  Performance status 1      IMAGING:      LABS:  Results for orders placed or performed in visit on 01/25/19   Cancer antigen 27 29   Result Value Ref Range    CA 27 29 12 0 0 0 - 42 3 U/mL   CBC and differential   Result Value Ref Range    WBC 4 33 4 31 - 10 16 Thousand/uL    RBC 4 33 3 81 - 5 12 Million/uL    Hemoglobin 14 2 11 5 - 15 4 g/dL    Hematocrit 42 2 34 8 - 46 1 %    MCV 98 82 - 98 fL    MCH 32 8 26 8 - 34 3 pg    MCHC 33 6 31 4 - 37 4 g/dL    RDW 12 4 11 6 - 15 1 %    MPV 10 6 8 9 - 12 7 fL    Platelets 928 799 - 142 Thousands/uL    nRBC 0 /100 WBCs    Neutrophils Relative 72 43 - 75 %    Immat GRANS % 0 0 - 2 %    Lymphocytes Relative 19 14 - 44 %    Monocytes Relative 6 4 - 12 %    Eosinophils Relative 2 0 - 6 %    Basophils Relative 1 0 - 1 %    Neutrophils Absolute 3 10 1 85 - 7 62 Thousands/µL    Immature Grans Absolute 0 01 0 00 - 0 20 Thousand/uL    Lymphocytes Absolute 0 84 0 60 - 4 47 Thousands/µL    Monocytes Absolute 0 26 0 17 - 1 22 Thousand/µL    Eosinophils Absolute 0 10 0 00 - 0 61 Thousand/µL    Basophils Absolute 0 02 0 00 - 0 10 Thousands/µL   Comprehensive metabolic panel   Result Value Ref Range    Sodium 138 136 - 145 mmol/L    Potassium 3 9 3 5 - 5 3 mmol/L    Chloride 104 100 - 108 mmol/L    CO2 30 21 - 32 mmol/L    ANION GAP 4 4 - 13 mmol/L    BUN 17 5 - 25 mg/dL    Creatinine 0 92 0 60 - 1 30 mg/dL    Glucose, Fasting 86 65 - 99 mg/dL    Calcium 8 7 8 3 - 10 1 mg/dL    AST 17 5 - 45 U/L    ALT 26 12 - 78 U/L    Alkaline Phosphatase 43 (L) 46 - 116 U/L    Total Protein 7 2 6 4 - 8 2 g/dL    Albumin 3 7 3 5 - 5 0 g/dL    Total Bilirubin 0 63 0 20 - 1 00 mg/dL    eGFR 66 ml/min/1 73sq m   TSH, 3rd generation   Result Value Ref Range    TSH 3RD GENERATON 2 590 0 358 - 3 740 uIU/mL   T4, free   Result Value Ref Range    Free T4 1 10 0 76 - 1 46 ng/dL   Vitamin B1, whole blood   Result Value Ref Range    Vitamin B1, Whole Blood 145 1 66 5 - 200 0 nmol/L   Vitamin B12 Result Value Ref Range    Vitamin B-12 568 100 - 900 pg/mL   Folate   Result Value Ref Range    Folate >20 0 (H) 3 1 - 17 5 ng/mL   Methylmalonic acid, serum   Result Value Ref Range    Methylmalonic Acid, S 160 0 - 378 nmol/L    Disclaimer: Comment    Homocysteine, serum   Result Value Ref Range    Homocyst(e)ine, P/S 8 6 3 7 - 11 2 umol/L   Sedimentation rate, automated   Result Value Ref Range    Sed Rate 14 0 - 20 mm/hour   SAI Screen w/ Reflex to Titer/Pattern   Result Value Ref Range    SAI Negative Negative   Sjogren's Antibodies   Result Value Ref Range    SS-A (RO) Ab <0 2 0 0 - 0 9 AI    SS-B (LA) Ab <0 2 0 0 - 0 9 AI   Heavy metals, blood   Result Value Ref Range    Arsenic 5 2 - 23 ug/L    Mercury None Detected 0 0 - 14 9 ug/L    Lead Blood None Detected 0 - 4 ug/dL   Protein electrophoresis, serum   Result Value Ref Range    A/G Ratio 1 45 1 10 - 1 80    Albumin Electrophoresis 59 2 52 0 - 65 0 %    Albumin CONC 4 08 3 50 - 5 00 g/dl    Alpha 1 4 2 2 5 - 5 0 %    ALPHA 1 CONC 0 29 0 10 - 0 40 g/dL    Alpha 2 11 1 7 0 - 13 0 %    ALPHA 2 CONC 0 77 0 40 - 1 20 g/dL    Beta-1 7 0 5 0 - 13 0 %    BETA 1 CONC 0 48 0 40 - 0 80 g/dL    Beta-2 5 2 2 0 - 8 0 %    BETA 2 CONC 0 36 0 20 - 0 50 g/dL    Gamma Globulin 13 3 12 0 - 22 0 %    GAMMA CONC 0 92 0 50 - 1 60 g/dL    SPEP Interpretation       The serum total protein, albumin and electrophoresis are within normal limits  No monoclonal bands noted   Reviewed by: Pennie Castillo MD **Electronic Signature**    Total Protein 6 9 6 4 - 8 2 g/dL   Lyme Antibody Profile with reflex to WB   Result Value Ref Range    LYME AB IGG 0 23 0 00 - 0 79    LYME AB IGM 0 50 0 00 - 0 79     Labs, Imaging, & Other studies:   All pertinent labs and imaging studies were personally reviewed    Lab Results   Component Value Date     09/30/2015    K 3 9 01/25/2019     01/25/2019    CO2 30 01/25/2019    ANIONGAP 8 09/30/2015    BUN 17 01/25/2019    CREATININE 0 92 01/25/2019    GLUCOSE 89 09/30/2015    GLUF 86 01/25/2019    CALCIUM 8 7 01/25/2019    AST 17 01/25/2019    ALT 26 01/25/2019    ALKPHOS 43 (L) 01/25/2019    PROT 7 0 09/30/2015    BILITOT 0 39 09/30/2015    EGFR 66 01/25/2019     Lab Results   Component Value Date    WBC 4 33 01/25/2019    HGB 14 2 01/25/2019    HCT 42 2 01/25/2019    MCV 98 01/25/2019     01/25/2019       Reviewed and discussed with patient  Assessment and plan: In January 2014 patient had lumpectomy and sentinel lymph node sampling for stage I, grade 1, 6 mm, hormone receptor strongly positive and HER2 negative, invasive ductal  cancer in the upper outer quadrant of right breast     Negative sentinel lymph node  Negative margins  No lymphovascular and dermal invasion  Oncotype score was low, 8  Cancer was picked up on MRI scan and not by mammography  She received radiation therapy  She has been on Femara since 2014   She has   tolerable musculoskeletal symptoms and chronic peripheral neuropathy in left upper thigh and toes of both feet  She follows with her   neurologist   She has chronic low back discomfort       Physical examination and test results are as recorded and discussed  Breast cancer remains in remission  Goal is cure from breast cancer  Condition discussed and explained  Questions answered  She will be finishing 5 years on Femara later this year and will stop taking Femara later this year  In the meantime she has requested renewal of Femara prescription  She is taking vitamin-D and calcium and she follows with her rheumatologist for osteopenia and is on Prolia  She knows to let her dentist know if she will be going for any invasive dental procedure that she has been on Prolia  All discussed in detail  Questions answered  Discussed the importance of self-breast examination, eating healthy foods, staying active and health screening tests    Patient is capable of self-care   Alex Holguin goes to her breast surgeon for examination of breasts and imaging studies     1  Malignant neoplasm of upper-outer quadrant of right breast in female, estrogen receptor positive ([facility] [address] )    - Cancer antigen 27 29; Future  - CBC and differential; Future  - Comprehensive metabolic panel; Future  - letrozole (FEMARA) 2 5 mg tablet; Take 1 tablet (2 5 mg total) by mouth every evening  Dispense: 90 tablet; Refill: 2    Patient voiced understanding and agreement in the discussion  She is aware to contact us with questions/symptoms in the interim  Patient voiced understanding and agreement in the discussion  Counseling / Coordination of Care   Greater than 50% of total time was spent with the patient and / or family counseling and / or coordination of care

## 2019-02-15 NOTE — PROGRESS NOTES
HPI:  In January 2014 patient had lumpectomy and sentinel lymph node sampling for stage I, grade 1, 6 mm, hormone receptor strongly positive and HER2 negative, invasive ductal  cancer in the upper outer quadrant of right breast     Negative sentinel lymph node  Negative margins  No lymphovascular and dermal invasion  Oncotype score was low, 8  Cancer was picked up on MRI scan and not by mammography  She received radiation therapy  She has been on Femara since 2014   She has   tolerable musculoskeletal symptoms and chronic peripheral neuropathy in left upper thigh and toes of both feet  She follows with her   neurologist   She has chronic low back discomfort                     Current Outpatient Medications:     aspirin (ECOTRIN LOW STRENGTH) 81 mg EC tablet, Take 81 mg by mouth daily, Disp: , Rfl:     denosumab (PROLIA) 60 mg/mL, Inject 60 mg under the skin every 6 (six) months, Disp: , Rfl:     letrozole (FEMARA) 2 5 mg tablet, Take 1 tablet (2 5 mg total) by mouth every evening, Disp: 90 tablet, Rfl: 2    levothyroxine (SYNTHROID) 75 mcg tablet, Take 1 tablet by mouth every other day, Disp: , Rfl:     levothyroxine (SYNTHROID) 88 mcg tablet, Take 1 tablet by mouth every other day, Disp: , Rfl:     loratadine (CLARITIN) 10 mg tablet, Take 10 mg by mouth daily, Disp: , Rfl:     Multiple Vitamins-Minerals (MULTIVITAMIN ADULT PO), multivitamin, Disp: , Rfl:     solifenacin (VESICARE) 10 MG tablet, Vesicare 10 mg tablet, Disp: , Rfl:     VITAMIN D, ERGOCALCIFEROL, PO, Take 2,000 Units by mouth daily, Disp: , Rfl:     Allergies   Allergen Reactions    Actifed Cold-Allergy [Chlorpheniramine-Phenylephrine] Swelling and Rash     Throat and face swelled    Penicillins Rash    Vancomycin Other (See Comments)     redness          Malignant neoplasm of upper-outer quadrant of right breast in female, estrogen receptor positive ([facility] [address] )    12/26/2013 Biopsy     Right breast biopsy, 12:00    Infiltrating mammary carcinoma  %  %  HER-2 negative         1/17/2014 Surgery     Right lumpectomy (Dr Ney Devi)    Stage IA- pT1b, pN0, G1         2/21/2014 - 4/3/2014 Radiation     WBRT (Dr Chirag Tran)         2/25/2014 Genomic Testing     Oncotype 8, low risk         2014 -  Hormone Therapy     Letrozole (Dr Naomi Gentile)            ROS:  02/15/19 Reviewed 13 systems:  Presently no other neurological, cardiac, pulmonary, GI and  symptoms other than mentioned above  No other symptoms like fever, chills, bleeding, bone pains, skin rash, weight loss,  tiredness ,  weakness,   claudication and gait problem  No frequent infections  Not unusually sensitive to heat or cold  No swelling of the ankles  No swollen glands  Patient is anxious  Other symptoms are in HPI        /80   Pulse 84   Temp 100 2 °F (37 9 °C) (Tympanic)   Resp 18   Ht 5' 9" (1 753 m)   Wt 89 4 kg (197 lb)   SpO2 98%   BMI 29 09 kg/m²     Physical Exam:    Patient is alert and oriented  She is not in distress  No icterus  No oral thrush  There is no palpable neck mass  Lung Fields are clear to percussion and auscultation  Heart rate is regular  Abdomen is  soft and non tender, no palpable abdominal mass, no ascites, no edema of ankles, no calf tenderness, no objective focal neurological deficit, no skin rash, no palpable lymphadenopathy in the neck and axillary areas, good arterial pulses, no clubbing  Patient is anxious  Performance status 1      IMAGING:      LABS:  Results for orders placed or performed in visit on 01/25/19   Cancer antigen 27 29   Result Value Ref Range    CA 27 29 12 0 0 0 - 42 3 U/mL   CBC and differential   Result Value Ref Range    WBC 4 33 4 31 - 10 16 Thousand/uL    RBC 4 33 3 81 - 5 12 Million/uL    Hemoglobin 14 2 11 5 - 15 4 g/dL    Hematocrit 42 2 34 8 - 46 1 %    MCV 98 82 - 98 fL    MCH 32 8 26 8 - 34 3 pg    MCHC 33 6 31 4 - 37 4 g/dL    RDW 12 4 11 6 - 15 1 %    MPV 10 6 8 9 - 12 7 fL    Platelets 602 296 - 390 Thousands/uL    nRBC 0 /100 WBCs    Neutrophils Relative 72 43 - 75 %    Immat GRANS % 0 0 - 2 %    Lymphocytes Relative 19 14 - 44 %    Monocytes Relative 6 4 - 12 %    Eosinophils Relative 2 0 - 6 %    Basophils Relative 1 0 - 1 %    Neutrophils Absolute 3 10 1 85 - 7 62 Thousands/µL    Immature Grans Absolute 0 01 0 00 - 0 20 Thousand/uL    Lymphocytes Absolute 0 84 0 60 - 4 47 Thousands/µL    Monocytes Absolute 0 26 0 17 - 1 22 Thousand/µL    Eosinophils Absolute 0 10 0 00 - 0 61 Thousand/µL    Basophils Absolute 0 02 0 00 - 0 10 Thousands/µL   Comprehensive metabolic panel   Result Value Ref Range    Sodium 138 136 - 145 mmol/L    Potassium 3 9 3 5 - 5 3 mmol/L    Chloride 104 100 - 108 mmol/L    CO2 30 21 - 32 mmol/L    ANION GAP 4 4 - 13 mmol/L    BUN 17 5 - 25 mg/dL    Creatinine 0 92 0 60 - 1 30 mg/dL    Glucose, Fasting 86 65 - 99 mg/dL    Calcium 8 7 8 3 - 10 1 mg/dL    AST 17 5 - 45 U/L    ALT 26 12 - 78 U/L    Alkaline Phosphatase 43 (L) 46 - 116 U/L    Total Protein 7 2 6 4 - 8 2 g/dL    Albumin 3 7 3 5 - 5 0 g/dL    Total Bilirubin 0 63 0 20 - 1 00 mg/dL    eGFR 66 ml/min/1 73sq m   TSH, 3rd generation   Result Value Ref Range    TSH 3RD GENERATON 2 590 0 358 - 3 740 uIU/mL   T4, free   Result Value Ref Range    Free T4 1 10 0 76 - 1 46 ng/dL   Vitamin B1, whole blood   Result Value Ref Range    Vitamin B1, Whole Blood 145 1 66 5 - 200 0 nmol/L   Vitamin B12   Result Value Ref Range    Vitamin B-12 568 100 - 900 pg/mL   Folate   Result Value Ref Range    Folate >20 0 (H) 3 1 - 17 5 ng/mL   Methylmalonic acid, serum   Result Value Ref Range    Methylmalonic Acid, S 160 0 - 378 nmol/L    Disclaimer: Comment    Homocysteine, serum   Result Value Ref Range    Homocyst(e)ine, P/S 8 6 3 7 - 11 2 umol/L   Sedimentation rate, automated   Result Value Ref Range    Sed Rate 14 0 - 20 mm/hour   SAI Screen w/ Reflex to Titer/Pattern   Result Value Ref Range    SAI Negative Negative   Sjogren's Antibodies Result Value Ref Range    SS-A (RO) Ab <0 2 0 0 - 0 9 AI    SS-B (LA) Ab <0 2 0 0 - 0 9 AI   Heavy metals, blood   Result Value Ref Range    Arsenic 5 2 - 23 ug/L    Mercury None Detected 0 0 - 14 9 ug/L    Lead Blood None Detected 0 - 4 ug/dL   Protein electrophoresis, serum   Result Value Ref Range    A/G Ratio 1 45 1 10 - 1 80    Albumin Electrophoresis 59 2 52 0 - 65 0 %    Albumin CONC 4 08 3 50 - 5 00 g/dl    Alpha 1 4 2 2 5 - 5 0 %    ALPHA 1 CONC 0 29 0 10 - 0 40 g/dL    Alpha 2 11 1 7 0 - 13 0 %    ALPHA 2 CONC 0 77 0 40 - 1 20 g/dL    Beta-1 7 0 5 0 - 13 0 %    BETA 1 CONC 0 48 0 40 - 0 80 g/dL    Beta-2 5 2 2 0 - 8 0 %    BETA 2 CONC 0 36 0 20 - 0 50 g/dL    Gamma Globulin 13 3 12 0 - 22 0 %    GAMMA CONC 0 92 0 50 - 1 60 g/dL    SPEP Interpretation       The serum total protein, albumin and electrophoresis are within normal limits  No monoclonal bands noted  Reviewed by: Mykel Zelaya MD **Electronic Signature**    Total Protein 6 9 6 4 - 8 2 g/dL   Lyme Antibody Profile with reflex to WB   Result Value Ref Range    LYME AB IGG 0 23 0 00 - 0 79    LYME AB IGM 0 50 0 00 - 0 79     Labs, Imaging, & Other studies:   All pertinent labs and imaging studies were personally reviewed    Lab Results   Component Value Date     09/30/2015    K 3 9 01/25/2019     01/25/2019    CO2 30 01/25/2019    ANIONGAP 8 09/30/2015    BUN 17 01/25/2019    CREATININE 0 92 01/25/2019    GLUCOSE 89 09/30/2015    GLUF 86 01/25/2019    CALCIUM 8 7 01/25/2019    AST 17 01/25/2019    ALT 26 01/25/2019    ALKPHOS 43 (L) 01/25/2019    PROT 7 0 09/30/2015    BILITOT 0 39 09/30/2015    EGFR 66 01/25/2019     Lab Results   Component Value Date    WBC 4 33 01/25/2019    HGB 14 2 01/25/2019    HCT 42 2 01/25/2019    MCV 98 01/25/2019     01/25/2019       Reviewed and discussed with patient  Assessment and plan:   In January 2014 patient had lumpectomy and sentinel lymph node sampling for stage I, grade 1, 6 mm, hormone receptor strongly positive and HER2 negative, invasive ductal  cancer in the upper outer quadrant of right breast     Negative sentinel lymph node  Negative margins  No lymphovascular and dermal invasion  Oncotype score was low, 8  Cancer was picked up on MRI scan and not by mammography  She received radiation therapy  She has been on Femara since 2014   She has   tolerable musculoskeletal symptoms and chronic peripheral neuropathy in left upper thigh and toes of both feet  She follows with her   neurologist   She has chronic low back discomfort       Physical examination and test results are as recorded and discussed  Breast cancer remains in remission  Goal is cure from breast cancer  Condition discussed and explained  Questions answered  She will be finishing 5 years on Femara later this year and will stop taking Femara later this year  In the meantime she has requested renewal of Femara prescription  She is taking vitamin-D and calcium and she follows with her rheumatologist for osteopenia and is on Prolia  She knows to let her dentist know if she will be going for any invasive dental procedure that she has been on Prolia  All discussed in detail  Questions answered  Discussed the importance of self-breast examination, eating healthy foods, staying active and health screening tests  Patient is capable of self-care   Dontaeadrienne Bhardwaj goes to her breast surgeon for examination of breasts and imaging studies     1  Malignant neoplasm of upper-outer quadrant of right breast in female, estrogen receptor positive ([facility] [address] )    - Cancer antigen 27 29; Future  - CBC and differential; Future  - Comprehensive metabolic panel; Future  - letrozole (FEMARA) 2 5 mg tablet; Take 1 tablet (2 5 mg total) by mouth every evening  Dispense: 90 tablet; Refill: 2    Patient voiced understanding and agreement in the discussion  She is aware to contact us with questions/symptoms in the interim            Patient voiced understanding and agreement in the discussion  Counseling / Coordination of Care   Greater than 50% of total time was spent with the patient and / or family counseling and / or coordination of care

## 2019-02-21 ENCOUNTER — OFFICE VISIT (OUTPATIENT)
Dept: FAMILY MEDICINE CLINIC | Facility: CLINIC | Age: 64
End: 2019-02-21
Payer: COMMERCIAL

## 2019-02-21 VITALS
BODY MASS INDEX: 29.8 KG/M2 | TEMPERATURE: 96.5 F | DIASTOLIC BLOOD PRESSURE: 104 MMHG | HEART RATE: 88 BPM | HEIGHT: 68 IN | WEIGHT: 196.6 LBS | SYSTOLIC BLOOD PRESSURE: 154 MMHG | RESPIRATION RATE: 17 BRPM

## 2019-02-21 DIAGNOSIS — E66.3 OVERWEIGHT: ICD-10-CM

## 2019-02-21 DIAGNOSIS — K21.9 GERD WITHOUT ESOPHAGITIS: ICD-10-CM

## 2019-02-21 DIAGNOSIS — I10 BENIGN ESSENTIAL HTN: Primary | ICD-10-CM

## 2019-02-21 PROBLEM — S83.249A TEAR OF MEDIAL MENISCUS OF KNEE: Status: RESOLVED | Noted: 2018-07-09 | Resolved: 2019-02-21

## 2019-02-21 PROBLEM — M25.569 KNEE PAIN: Status: RESOLVED | Noted: 2018-07-09 | Resolved: 2019-02-21

## 2019-02-21 PROBLEM — S83.289A TEAR OF LATERAL MENISCUS OF KNEE: Status: RESOLVED | Noted: 2018-07-09 | Resolved: 2019-02-21

## 2019-02-21 PROBLEM — M79.604 RIGHT LEG PAIN: Status: RESOLVED | Noted: 2017-03-22 | Resolved: 2019-02-21

## 2019-02-21 PROBLEM — G56.00 CARPAL TUNNEL SYNDROME: Status: RESOLVED | Noted: 2018-07-09 | Resolved: 2019-02-21

## 2019-02-21 PROBLEM — M25.469 KNEE JOINT EFFUSION: Status: RESOLVED | Noted: 2018-07-09 | Resolved: 2019-02-21
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
## 2019-02-21 PROBLEM — J06.9 ACUTE URI: Status: RESOLVED | Noted: 2019-02-06 | Resolved: 2019-02-21

## 2019-02-21 PROCEDURE — 1036F TOBACCO NON-USER: CPT | Performed by: FAMILY MEDICINE

## 2019-02-21 PROCEDURE — 1160F RVW MEDS BY RX/DR IN RCRD: CPT | Performed by: FAMILY MEDICINE

## 2019-02-21 PROCEDURE — 99214 OFFICE O/P EST MOD 30 MIN: CPT | Performed by: FAMILY MEDICINE

## 2019-02-21 PROCEDURE — 1101F PT FALLS ASSESS-DOCD LE1/YR: CPT | Performed by: FAMILY MEDICINE

## 2019-02-21 PROCEDURE — 3008F BODY MASS INDEX DOCD: CPT | Performed by: FAMILY MEDICINE

## 2019-02-21 RX ORDER — FAMOTIDINE 20 MG/1
20 TABLET, FILM COATED ORAL 2 TIMES DAILY PRN
Qty: 60 TABLET | Refills: 2 | Status: SHIPPED | OUTPATIENT
Start: 2019-02-21 | End: 2019-08-12

## 2019-02-21 RX ORDER — CHLORTHALIDONE 25 MG/1
25 TABLET ORAL DAILY
Qty: 30 TABLET | Refills: 5 | Status: SHIPPED | OUTPATIENT
Start: 2019-02-21 | End: 2019-08-21 | Stop reason: SDUPTHER

## 2019-02-21 NOTE — PROGRESS NOTES
FAMILY MEDICINE PROGRESS NOTE  Kal Bethea 70 y o  female   DATE: February 21, 2019     ASSESSMENT and PLAN:  Kal Bethea is a 70 y o  female with:     Benign essential HTN  BP Readings from Last 3 Encounters:   02/21/19 (!) 154/104   02/15/19 140/80   01/15/19 140/100     Results from last 6 Months   Lab Units 01/25/19  0759 10/30/18  0745 09/25/18  0715   POTASSIUM mmol/L 3 9 4 2 4 1   CHLORIDE mmol/L 104 106 103   CO2 mmol/L 30 30 33*   BUN mg/dL 17 17 17   CREATININE mg/dL 0 92 0 98 0 89   CALCIUM mg/dL 8 7 8 7 8 9   ALK PHOS U/L 43* 46 44*   ALT U/L 26 25 23   AST U/L 17 15 18     Uncontrolled, slowly been increasing over the past year with 20 lb weight gain  Will try chlorthalidone 25mg given trace b/l LE edema  Recheck BMP prior to 3-4 week f/u    GERD without esophagitis  Atypical chest pain, consistent with GERD given relation to food and symptoms are late at night  Reviewed weight loss, dietary and lifestyle modifications  Trial of Pepcid 20mg BID PRN  If persists, consider cardiac testing    Patient agreeable with the plan and expressed understanding  I discucssed signs and symptoms for which to RTC, go to ER or seek urgent medical care  SUBJECTIVE:  Kal Bethea is a 70 y o  female who presents today with a chief complaint of Hypertension (For the last weeks )  Pt is here for elevated blood pressures  She has gone to all her specialists appointments this year, and her blood pressure has been elevated all of the visits in the recommended that she follow up with PCP  She denies any headaches, dizziness, lightheadedness, new change in vision  She does state that she thinks she has gained weight in the last 2 months  Denies substance abuse  She has been having more acid reflux symptoms and more at night time  She does eat close to dinner  She says she had been having more anxiety due to work       Review of Systems   Constitutional: Positive for unexpected weight change (weight gain- 20 pounds in the past year)  Eyes: Negative for visual disturbance  Respiratory: Negative for chest tightness and shortness of breath  Cardiovascular: Positive for chest pain (GERD symptoms)  Negative for palpitations  Neurological: Negative for dizziness, light-headedness and headaches  Psychiatric/Behavioral: The patient is nervous/anxious  I have reviewed the patient's PMH, Social History, Medication List and Allergies as appropriate  REY-7 Flowsheet Screening      Most Recent Value   Over the last two weeks, how often have you been bothered by the following problems? Feeling nervous, anxious, or on edge  3   Not being able to stop or control worrying  3   Worrying too much about different things  3   Trouble relaxing   0   Being so restless that it's hard to sit still  0   Becoming easily annoyed or irritable   1   Feeling afraid as if something awful might happen  0   How difficult have these problems made it for you to do your work, take care of things at home, or get along with other people? Somewhat difficult   REY Score   10        PHQ-9 Depression Screening    PHQ-9:    Frequency of the following problems over the past two weeks:       Little interest or pleasure in doing things:  0 - not at all  Feeling down, depressed, or hopeless:  1 - several days  Trouble falling or staying asleep, or sleeping too much:  1 - several days  Feeling tired or having little energy:  0 - not at all  Poor appetite or overeatin - several days  Feeling bad about yourself - or that you are a failure or have let yourself or your family down:  0 - not at all  Trouble concentrating on things, such as reading the newspaper or watching television:  0 - not at all  Moving or speaking so slowly that other people could have noticed   Or the opposite - being so fidgety or restless that you have been moving around a lot more than usual:  0 - not at all  Thoughts that you would be better off dead, or of hurting yourself in some way:  0 - not at all  PHQ-2 Score:  1  PHQ-9 Score:  3           OBJECTIVE:  BP (!) 154/104 (BP Location: Left arm, Patient Position: Sitting, Cuff Size: Large)   Pulse 88   Temp (!) 96 5 °F (35 8 °C)   Resp 17   Ht 5' 8" (1 727 m)   Wt 89 2 kg (196 lb 9 6 oz)   BMI 29 89 kg/m²    Physical Exam   Constitutional: She appears well-developed and well-nourished  No distress  overweight   Cardiovascular: Normal rate, regular rhythm and normal heart sounds  Pulmonary/Chest: Effort normal and breath sounds normal  No respiratory distress  She has no wheezes  She has no rales  Musculoskeletal: She exhibits edema (trace b/l pitting edema to mid shin)  She exhibits no deformity  Skin: She is not diaphoretic  Vitals reviewed  Wt Readings from Last 9 Encounters:   02/21/19 89 2 kg (196 lb 9 6 oz)   02/15/19 89 4 kg (197 lb)   01/31/19 84 8 kg (187 lb)   01/15/19 88 kg (194 lb)   10/29/18 87 5 kg (193 lb)   08/10/18 85 1 kg (187 lb 9 6 oz)   11/20/18 84 8 kg (187 lb)   07/12/18 84 4 kg (186 lb)   02/09/18 81 7 kg (180 lb 3 2 oz)     George White MD    Note: Portions of the record may have been created with voice recognition software  Occasional wrong word or "sound a like" substitutions may have occurred due to the inherent limitations of voice recognition software  Read the chart carefully and recognize, using context, where substitutions have occurred  BMI Counseling: Body mass index is 29 89 kg/m²  Discussed the patient's BMI with her  The BMI is above average  BMI counseling and education was provided to the patient  Nutrition recommendations include reducing portion sizes, consuming healthier snacks, moderation in carbohydrate intake and reducing intake of cholesterol  Exercise recommendations include moderate aerobic physical activity for 150 minutes/week and exercising 3-5 times per week

## 2019-02-21 NOTE — ASSESSMENT & PLAN NOTE
BP Readings from Last 3 Encounters:   02/21/19 (!) 154/104   02/15/19 140/80   01/15/19 140/100     Results from last 6 Months   Lab Units 01/25/19  0759 10/30/18  0745 09/25/18  0715   POTASSIUM mmol/L 3 9 4 2 4 1   CHLORIDE mmol/L 104 106 103   CO2 mmol/L 30 30 33*   BUN mg/dL 17 17 17   CREATININE mg/dL 0 92 0 98 0 89   CALCIUM mg/dL 8 7 8 7 8 9   ALK PHOS U/L 43* 46 44*   ALT U/L 26 25 23   AST U/L 17 15 18     Uncontrolled, slowly been increasing over the past year with 20 lb weight gain  Will try chlorthalidone 25mg given trace b/l LE edema  Recheck BMP prior to 3-4 week f/u

## 2019-02-21 NOTE — PATIENT INSTRUCTIONS
Obesity   AMBULATORY CARE:   Obesity  is when your body mass index (BMI) is greater than 30  Your healthcare provider will use your height and weight to measure your BMI  The risks of obesity include  many health problems, such as injuries or physical disability  You may need tests to check for the following:  · Diabetes     · High blood pressure or high cholesterol     · Heart disease     · Gallbladder or liver disease     · Cancer of the colon, breast, prostate, liver, or kidney     · Sleep apnea     · Arthritis or gout  Seek care immediately if:   · You have a severe headache, confusion, or difficulty speaking  · You have weakness on one side of your body  · You have chest pain, sweating, or shortness of breath  Contact your healthcare provider if:   · You have symptoms of gallbladder or liver disease, such as pain in your upper abdomen  · You have knee or hip pain and discomfort while walking  · You have symptoms of diabetes, such as intense hunger and thirst, and frequent urination  · You have symptoms of sleep apnea, such as snoring or daytime sleepiness  · You have questions or concerns about your condition or care  Treatment for obesity  focuses on helping you lose weight to improve your health  Even a small decrease in BMI can reduce the risk for many health problems  Your healthcare provider will help you set a weight-loss goal   · Lifestyle changes  are the first step in treating obesity  These include making healthy food choices and getting regular physical activity  Your healthcare provider may suggest a weight-loss program that involves coaching, education, and therapy  · Medicine  may help you lose weight when it is used with a healthy diet and physical activity  · Surgery  can help you lose weight if you are very obese and have other health problems  There are several types of weight-loss surgery  Ask your healthcare provider for more information    Be successful losing weight:   · Set small, realistic goals  An example of a small goal is to walk for 20 minutes 5 days a week  Anther goal is to lose 5% of your body weight  · Tell friends, family members, and coworkers about your goals  and ask for their support  Ask a friend to lose weight with you, or join a weight-loss support group  · Identify foods or triggers that may cause you to overeat , and find ways to avoid them  Remove tempting high-calorie foods from your home and workplace  Place a bowl of fresh fruit on your kitchen counter  If stress causes you to eat, then find other ways to cope with stress  · Keep a diary to track what you eat and drink  Also write down how many minutes of physical activity you do each day  Weigh yourself once a week and record it in your diary  Eating changes: You will need to eat 500 to 1,000 fewer calories each day than you currently eat to lose 1 to 2 pounds a week  The following changes will help you cut calories:  · Eat smaller portions  Use small plates, no larger than 9 inches in diameter  Fill your plate half full of fruits and vegetables  Measure your food using measuring cups until you know what a serving size looks like  · Eat 3 meals and 1 or 2 snacks each day  Plan your meals in advance  Susan Laughter and eat at home most of the time  Eat slowly  · Eat fruits and vegetables at every meal   They are low in calories and high in fiber, which makes you feel full  Do not add butter, margarine, or cream sauce to vegetables  Use herbs to season steamed vegetables  · Eat less fat and fewer fried foods  Eat more baked or grilled chicken and fish  These protein sources are lower in calories and fat than red meat  Limit fast food  Dress your salads with olive oil and vinegar instead of bottled dressing  · Limit the amount of sugar you eat  Do not drink sugary beverages  Limit alcohol  Activity changes:  Physical activity is good for your body in many ways   It helps you burn calories and build strong muscles  It decreases stress and depression, and improves your mood  It can also help you sleep better  Talk to your healthcare provider before you begin an exercise program   · Exercise for at least 30 minutes 5 days a week  Start slowly  Set aside time each day for physical activity that you enjoy and that is convenient for you  It is best to do both weight training and an activity that increases your heart rate, such as walking, bicycling, or swimming  · Find ways to be more active  Do yard work and housecleaning  Walk up the stairs instead of using elevators  Spend your leisure time going to events that require walking, such as outdoor festivals or fairs  This extra physical activity can help you lose weight and keep it off  Follow up with your healthcare provider as directed: You may need to meet with a dietitian  Write down your questions so you remember to ask them during your visits  © 2017 [address] [employer] Information is for End User's use only and may not be sold, redistributed or otherwise used for commercial purposes  All illustrations and images included in CareNotes® are the copyrighted property of [employer] , [employer]  or Gil Oliveira  The above information is an  only  It is not intended as medical advice for individual conditions or treatments  Talk to your doctor, nurse or pharmacist before following any medical regimen to see if it is safe and effective for you  DASH Eating Plan   AMBULATORY CARE:   The DASH (Dietary Approaches to Stop Hypertension) Eating Plan  is designed to help prevent or lower high blood pressure  It can also help to lower LDL (bad) cholesterol and decrease your risk for heart disease  The plan is low in sodium, sugar, unhealthy fats, and total fat  It is high in potassium, calcium, magnesium, and fiber  These nutrients are added when you eat more fruits, vegetables, and whole grains     Your sodium limit each day: Your dietitian will tell you how much sodium is safe for you to have each day  People with high blood pressure should have no more than 1,500 to 2,300 mg of sodium in a day  A teaspoon (tsp) of salt has 2,300 mg of sodium  This may seem like a difficult goal, but small changes to the foods you eat can make a big difference  Your healthcare provider or dietitian can help you create a meal plan that follows your sodium limit  How to limit sodium:   · Read food labels  Food labels can help you choose foods that are low in sodium  The amount of sodium is listed in milligrams (mg)  The % Daily Value (DV) column tells you how much of your daily needs are met by 1 serving of the food for each nutrient listed  Choose foods that have less than 5% of the DV of sodium  These foods are considered low in sodium  Foods that have 20% or more of the DV of sodium are considered high in sodium  Avoid foods that have more than 300 mg of sodium in each serving  Choose foods that say low-sodium, reduced-sodium, or no salt added on the food label  · Avoid salt  Do not salt food at the table, and add very little salt to foods during cooking  Use herbs and spices, such as onions, garlic, and salt-free seasonings to add flavor to foods  Try lemon or lime juice or vinegar to give foods a tart flavor  Use hot peppers or a small amount of hot pepper sauce to add a spicy flavor to foods  · Ask about salt substitutes  Ask your healthcare provider if you may use salt substitutes  Some salt substitutes have ingredients that can be harmful if you have certain health conditions  · Choose foods carefully at restaurants  Meals from restaurants, especially fast food restaurants, are often high in sodium  Some restaurants have nutrition information that tells you the amount of sodium in their foods  Ask to have your food prepared with less, or no salt    What you need to know about fats:   · Include healthy fats   Examples are unsaturated fats and omega-3 fatty acids  Unsaturated fats are found in soybean, canola, olive, or sunflower oil, and liquid and soft tub margarines  Omega-3 fatty acids are found in fatty fish, such as salmon, tuna, mackerel, and sardines  It is also found in flaxseed oil and ground flaxseed  · Avoid unhealthy fats  Do not eat unhealthy fats, such as saturated fats and trans fats  Saturated fats are found in foods that contain fat from animals  Examples are fatty meats, whole milk, butter, cream, and other dairy foods  It is also found in shortening, stick margarine, palm oil, and coconut oil  Trans fats are found in fried foods, crackers, chips, and baked goods made with margarine or shortening  Foods to include: With the DASH eating plan, you need to eat a certain number of servings from each food group  This will help you get enough of certain nutrients and limit others  The amount of servings you should eat depends on how many calories you need  Your dietitian can tell you how many calories you need  The number of servings listed next to the food groups below are for people who need about 2,000 calories each day    · Grains:  6 to 8 servings (3 of these servings should be whole-grain foods)    ¨ 1 slice of whole-grain bread     ¨ 1 ounce of dry cereal    ¨ ½ cup of cooked cereal, pasta, or brown rice    · Vegetables and fruits:  4 to 5 servings of fruits and 4 to 5 servings of vegetables    ¨ 1 medium fruit    ¨ ½ cup of frozen, canned (no added salt), or chopped fresh vegetables     ¨ ½ cup of fresh, frozen, dried, or canned fruit (canned in light syrup or fruit juice)    ¨ ½ cup of vegetable or fruit juice    · Dairy:  2 to 3 servings    ¨ 1 cup of nonfat (skim) or 1% milk    ¨ 1½ ounces of fat-free or low-fat cheese    ¨ 6 ounces of nonfat or low-fat yogurt    · Lean meat, poultry, and fish:  6 ounces or less    [employer] (chicken, turkey) with no skin    ¨ Fish (especially fatty fish, such as salmon, fresh tuna, or mackerel)    ¨ Lean beef and pork (loin, round, extra lean hamburger)    ¨ Egg whites and egg substitutes    · Nuts, seeds, and legumes:  4 to 5 servings each week    ¨ ½ cup of cooked beans and peas    ¨ 1½ ounces of unsalted nuts    ¨ 2 tablespoons of peanut butter or seeds    · Sweets and added sugars:  5 or less each week    ¨ 1 tablespoon of sugar, jelly, or jam    ¨ ½ cup of sorbet or gelatin    ¨ 1 cup of lemonade    · Fats:  2 to 3 servings each week    ¨ 1 teaspoon of soft margarine or vegetable oil    ¨ 1 tablespoon of mayonnaise    ¨ 2 tablespoons of salad dressing  Foods to avoid:   · Grains:      [employer], such as doughnuts, pastries, cookies, and biscuits (high in fat and sugar)    ¨ Mixes for cornbread and biscuits, packaged foods, such as bread stuffing, rice and pasta mixes, macaroni and cheese, and instant cereals (high in sodium)    · Fruits and vegetables:      ¨ Regular, canned vegetables (high in sodium)    ¨ Sauerkraut, pickled vegetables, and other foods prepared in brine (high in sodium)    ¨ Fried vegetables or vegetables in butter or high-fat sauces    ¨ Fruit in cream or butter sauce (high in fat)    · Dairy:      ¨ Whole milk, 2% milk, and cream (high in fat)    ¨ Regular cheese and processed cheese (high in fat and sodium)    · Meats and protein foods:      ¨ Smoked or cured meat, such as corned beef, grullon, ham, hot dogs, and sausage (high in fat and sodium)    ¨ Canned beans and canned meats or spreads, such as potted meats, sardines, anchovies, and imitation seafood (high in sodium)    ¨ Deli or lunch meats, such as bologna, ham, turkey, and roast beef (high in sodium)    ¨ High-fat meat (T-bone steak, regular hamburger, and ribs)    ¨ Whole eggs and egg yolks (high in fat)    · Other:      ¨ Seasonings made with salt, such as garlic salt, celery salt, onion salt, seasoned salt, meat tenderizers, and monosodium glutamate (MSG)    ¨ Miso soup and canned or dried soup mixes (high in sodium)    ¨ Regular soy sauce, barbecue sauce, teriyaki sauce, steak sauce, Worcestershire sauce, and most flavored vinegars (high in sodium)    ¨ Regular condiments, such as mustard, ketchup, and salad dressings (high in sodium)    ¨ Gravy and sauces, such as Franco or cheese sauces (high in sodium and fat)    ¨ Drinks high in sugar, such as soda or fruit drinks    [employer] foods, such as salted chips, popcorn, pretzels, pork rinds, salted crackers, and salted nuts    ¨ Frozen foods, such as dinners, entrees, vegetables with sauces, and breaded meats (high in sodium)  Other guidelines to follow:   · Maintain a healthy weight  Your risk for heart disease is higher if you are overweight  Your healthcare provider may suggest that you lose weight if you are overweight  You can lose weight by eating fewer calories and foods that have added sugars and fat  The DASH meal plan can help you do this  Decrease calories by eating smaller portions at each meal and fewer snacks  Ask your healthcare provider for more information about how to lose weight  · Exercise regularly  Regular exercise can help you reach or maintain a healthy weight  Regular exercise can also help decrease your blood pressure and improve your cholesterol levels  Get 30 minutes or more of moderate exercise each day of the week  To lose weight, get at least 60 minutes of exercise  Talk to your healthcare provider about the best exercise program for you  · Limit alcohol  Women should limit alcohol to 1 drink a day  Men should limit alcohol to 2 drinks a day  A drink of alcohol is 12 ounces of beer, 5 ounces of wine, or 1½ ounces of liquor  © 2017 [address] [facility] Information is for End User's use only and may not be sold, redistributed or otherwise used for commercial purposes   All illustrations and images included in CareNotes® are the copyrighted property of A D A M , Inc  or [employer]  The above information is an  only  It is not intended as medical advice for individual conditions or treatments  Talk to your doctor, nurse or pharmacist before following any medical regimen to see if it is safe and effective for you

## 2019-02-21 NOTE — ASSESSMENT & PLAN NOTE
Atypical chest pain, consistent with GERD given relation to food and symptoms are late at night  Reviewed weight loss, dietary and lifestyle modifications  Trial of Pepcid 20mg BID PRN  If persists, consider cardiac testing

## 2019-03-01 NOTE — CARE ANYWHERE EVISITS
Visit Summary for [facility]ILL BAUMANNER - Gender: Female - Date of Birth: [id number]  Date: [id number] - Duration: 7 minutes  Patient: [facility]   [place]  Provider: Andres Lewis    Patient Contact Information  Address  [address] [facility]; [address] [facility]  [phone]    Visit Topics  Headache [Added By: Self - 2019-02-06]  Cold [Added By: Self - 2019-02-06]  Fever [Added By: Self - 2019-02-06]    Conversation Transcripts     [Notification] You are connected with Andres Lewis, Urgent Care Specialist  [Notification] Rebel Miranda is located in [place]  [Notification] Rebel Miranda has shared health history         Diagnosis    Procedures  Value: [id number] Code: CPT-4 UNLISTED E&M SERVICE    Medications Prescribed    No prescriptions ordered    Electronically signed by: Yasir Rodney(NPI [id number])

## 2019-03-18 ENCOUNTER — TRANSCRIBE ORDERS (OUTPATIENT)
Dept: ADMINISTRATIVE | Age: 64
End: 2019-03-18

## 2019-03-18 ENCOUNTER — APPOINTMENT (OUTPATIENT)
Dept: LAB | Age: 64
End: 2019-03-18
Payer: COMMERCIAL

## 2019-03-18 DIAGNOSIS — I51.9 MYXEDEMA HEART DISEASE: Primary | ICD-10-CM

## 2019-03-18 DIAGNOSIS — E06.3 CHRONIC LYMPHOCYTIC THYROIDITIS: ICD-10-CM

## 2019-03-18 DIAGNOSIS — M81.0 SENILE OSTEOPOROSIS: Primary | ICD-10-CM

## 2019-03-18 DIAGNOSIS — I51.9 MYXEDEMA HEART DISEASE: ICD-10-CM

## 2019-03-18 DIAGNOSIS — E03.9 MYXEDEMA HEART DISEASE: Primary | ICD-10-CM

## 2019-03-18 DIAGNOSIS — E66.8 OBESITY OF ENDOCRINE ORIGIN: ICD-10-CM

## 2019-03-18 DIAGNOSIS — M81.0 SENILE OSTEOPOROSIS: ICD-10-CM

## 2019-03-18 DIAGNOSIS — Z79.899 NEED FOR PROPHYLACTIC CHEMOTHERAPY: ICD-10-CM

## 2019-03-18 DIAGNOSIS — I10 BENIGN ESSENTIAL HTN: ICD-10-CM

## 2019-03-18 DIAGNOSIS — E03.9 MYXEDEMA HEART DISEASE: ICD-10-CM

## 2019-03-18 LAB
ALBUMIN SERPL BCP-MCNC: 3.7 G/DL (ref 3.5–5)
ALP SERPL-CCNC: 43 U/L (ref 46–116)
ALT SERPL W P-5'-P-CCNC: 26 U/L (ref 12–78)
ANION GAP SERPL CALCULATED.3IONS-SCNC: 3 MMOL/L (ref 4–13)
AST SERPL W P-5'-P-CCNC: 20 U/L (ref 5–45)
BASOPHILS # BLD AUTO: 0.02 THOUSANDS/ΜL (ref 0–0.1)
BASOPHILS NFR BLD AUTO: 0 % (ref 0–1)
BILIRUB SERPL-MCNC: 0.61 MG/DL (ref 0.2–1)
BILIRUB UR QL STRIP: NEGATIVE
BUN SERPL-MCNC: 21 MG/DL (ref 5–25)
CALCIUM SERPL-MCNC: 9 MG/DL (ref 8.3–10.1)
CHLORIDE SERPL-SCNC: 102 MMOL/L (ref 100–108)
CLARITY UR: CLEAR
CO2 SERPL-SCNC: 33 MMOL/L (ref 21–32)
COLOR UR: NORMAL
CREAT SERPL-MCNC: 0.89 MG/DL (ref 0.6–1.3)
CREAT UR-MCNC: 54 MG/DL
EOSINOPHIL # BLD AUTO: 0.13 THOUSAND/ΜL (ref 0–0.61)
EOSINOPHIL NFR BLD AUTO: 3 % (ref 0–6)
ERYTHROCYTE [DISTWIDTH] IN BLOOD BY AUTOMATED COUNT: 12.7 % (ref 11.6–15.1)
EST. AVERAGE GLUCOSE BLD GHB EST-MCNC: 120 MG/DL
GFR SERPL CREATININE-BSD FRML MDRD: 68 ML/MIN/1.73SQ M
GLUCOSE P FAST SERPL-MCNC: 90 MG/DL (ref 65–99)
GLUCOSE UR STRIP-MCNC: NEGATIVE MG/DL
HBA1C MFR BLD: 5.8 % (ref 4.2–6.3)
HCT VFR BLD AUTO: 40.1 % (ref 34.8–46.1)
HGB BLD-MCNC: 13.2 G/DL (ref 11.5–15.4)
HGB UR QL STRIP.AUTO: NEGATIVE
IMM GRANULOCYTES # BLD AUTO: 0.01 THOUSAND/UL (ref 0–0.2)
IMM GRANULOCYTES NFR BLD AUTO: 0 % (ref 0–2)
KETONES UR STRIP-MCNC: NEGATIVE MG/DL
LEUKOCYTE ESTERASE UR QL STRIP: NEGATIVE
LYMPHOCYTES # BLD AUTO: 0.99 THOUSANDS/ΜL (ref 0.6–4.47)
LYMPHOCYTES NFR BLD AUTO: 20 % (ref 14–44)
MAGNESIUM SERPL-MCNC: 2.3 MG/DL (ref 1.6–2.6)
MCH RBC QN AUTO: 31.8 PG (ref 26.8–34.3)
MCHC RBC AUTO-ENTMCNC: 32.9 G/DL (ref 31.4–37.4)
MCV RBC AUTO: 97 FL (ref 82–98)
MICROALBUMIN UR-MCNC: <5 MG/L (ref 0–20)
MICROALBUMIN/CREAT 24H UR: <9 MG/G CREATININE (ref 0–30)
MONOCYTES # BLD AUTO: 0.38 THOUSAND/ΜL (ref 0.17–1.22)
MONOCYTES NFR BLD AUTO: 8 % (ref 4–12)
NEUTROPHILS # BLD AUTO: 3.39 THOUSANDS/ΜL (ref 1.85–7.62)
NEUTS SEG NFR BLD AUTO: 69 % (ref 43–75)
NITRITE UR QL STRIP: NEGATIVE
NRBC BLD AUTO-RTO: 0 /100 WBCS
PH UR STRIP.AUTO: 7.5 [PH]
PHOSPHATE SERPL-MCNC: 3.8 MG/DL (ref 2.3–4.1)
PLATELET # BLD AUTO: 198 THOUSANDS/UL (ref 149–390)
PMV BLD AUTO: 10.6 FL (ref 8.9–12.7)
POTASSIUM SERPL-SCNC: 3.9 MMOL/L (ref 3.5–5.3)
PROT SERPL-MCNC: 6.9 G/DL (ref 6.4–8.2)
PROT UR STRIP-MCNC: NEGATIVE MG/DL
RBC # BLD AUTO: 4.15 MILLION/UL (ref 3.81–5.12)
SODIUM SERPL-SCNC: 138 MMOL/L (ref 136–145)
SP GR UR STRIP.AUTO: 1.01 (ref 1–1.03)
T4 FREE SERPL-MCNC: 1.26 NG/DL (ref 0.76–1.46)
TSH SERPL DL<=0.05 MIU/L-ACNC: 2.5 UIU/ML (ref 0.36–3.74)
UROBILINOGEN UR QL STRIP.AUTO: 0.2 E.U./DL
WBC # BLD AUTO: 4.92 THOUSAND/UL (ref 4.31–10.16)

## 2019-03-18 PROCEDURE — 36415 COLL VENOUS BLD VENIPUNCTURE: CPT

## 2019-03-18 PROCEDURE — 80053 COMPREHEN METABOLIC PANEL: CPT

## 2019-03-18 PROCEDURE — 81003 URINALYSIS AUTO W/O SCOPE: CPT | Performed by: SPECIALIST

## 2019-03-18 PROCEDURE — 83036 HEMOGLOBIN GLYCOSYLATED A1C: CPT

## 2019-03-18 PROCEDURE — 82043 UR ALBUMIN QUANTITATIVE: CPT | Performed by: SPECIALIST

## 2019-03-18 PROCEDURE — 84439 ASSAY OF FREE THYROXINE: CPT

## 2019-03-18 PROCEDURE — 84100 ASSAY OF PHOSPHORUS: CPT

## 2019-03-18 PROCEDURE — 84443 ASSAY THYROID STIM HORMONE: CPT

## 2019-03-18 PROCEDURE — 82570 ASSAY OF URINE CREATININE: CPT | Performed by: SPECIALIST

## 2019-03-18 PROCEDURE — 83735 ASSAY OF MAGNESIUM: CPT

## 2019-03-18 PROCEDURE — 85025 COMPLETE CBC W/AUTO DIFF WBC: CPT

## 2019-03-20 ENCOUNTER — OFFICE VISIT (OUTPATIENT)
Dept: FAMILY MEDICINE CLINIC | Facility: CLINIC | Age: 64
End: 2019-03-20
Payer: COMMERCIAL

## 2019-03-20 VITALS
WEIGHT: 193.8 LBS | DIASTOLIC BLOOD PRESSURE: 78 MMHG | BODY MASS INDEX: 28.71 KG/M2 | TEMPERATURE: 97.9 F | RESPIRATION RATE: 16 BRPM | HEART RATE: 74 BPM | OXYGEN SATURATION: 97 % | HEIGHT: 69 IN | SYSTOLIC BLOOD PRESSURE: 116 MMHG

## 2019-03-20 DIAGNOSIS — M85.89 OSTEOPENIA OF MULTIPLE SITES: ICD-10-CM

## 2019-03-20 DIAGNOSIS — N39.41 URGE INCONTINENCE OF URINE: ICD-10-CM

## 2019-03-20 DIAGNOSIS — Z23 NEED FOR VACCINATION: ICD-10-CM

## 2019-03-20 DIAGNOSIS — I10 BENIGN ESSENTIAL HTN: Primary | ICD-10-CM

## 2019-03-20 DIAGNOSIS — Z23 NEED FOR PNEUMOCOCCAL VACCINATION: ICD-10-CM

## 2019-03-20 PROBLEM — M19.039 LOCALIZED PRIMARY OSTEOARTHRITIS OF WRIST: Status: RESOLVED | Noted: 2018-07-09 | Resolved: 2019-03-20

## 2019-03-20 PROCEDURE — 3074F SYST BP LT 130 MM HG: CPT | Performed by: FAMILY MEDICINE

## 2019-03-20 PROCEDURE — 90670 PCV13 VACCINE IM: CPT

## 2019-03-20 PROCEDURE — 1160F RVW MEDS BY RX/DR IN RCRD: CPT

## 2019-03-20 PROCEDURE — 3008F BODY MASS INDEX DOCD: CPT | Performed by: FAMILY MEDICINE

## 2019-03-20 PROCEDURE — 1036F TOBACCO NON-USER: CPT | Performed by: FAMILY MEDICINE

## 2019-03-20 PROCEDURE — 90471 IMMUNIZATION ADMIN: CPT

## 2019-03-20 PROCEDURE — 99214 OFFICE O/P EST MOD 30 MIN: CPT | Performed by: FAMILY MEDICINE

## 2019-03-20 PROCEDURE — 3078F DIAST BP <80 MM HG: CPT | Performed by: FAMILY MEDICINE

## 2019-03-20 NOTE — ASSESSMENT & PLAN NOTE
BP Readings from Last 3 Encounters:   03/20/19 116/78   02/21/19 (!) 154/104   02/15/19 140/80     Lab Results   Component Value Date    CREATININE 0 89 03/18/2019    EGFR 68 03/18/2019     Controlled on current regimen:   -Chlorthalidone 25mg daily  -DASH diet reviewed  -RTC 6 months, if remains well controlled and pt loses weight, can do a trial of 12 5mg dose at that time

## 2019-03-20 NOTE — ASSESSMENT & PLAN NOTE
Due to Femara, currently sees Dr Crystal King (Rheum)  Likely due to Letrozole for breast cancer (started 5 years ago)  On Prolia q 6 months (April 2018)  DEXA Aug: T-score of -1 9 in the right femoral neck is consistent with low bone mineral density      Pt will likely transfer care to us in 2020 after insurance changes  Due for repeat DEXA in August 2019

## 2019-03-20 NOTE — ASSESSMENT & PLAN NOTE
Was seeing Pelvic Lincoln and doing well with Vesicare 10mg   Requesting that we take over prescriptions, advised I can do that

## 2019-03-20 NOTE — PROGRESS NOTES
FAMILY MEDICINE PROGRESS NOTE  Harry Falk 70 y o  female   DATE: March 20, 2019     ASSESSMENT and PLAN:  Harry Falk is a 70 y o  female with:     Urge incontinence of urine  Was seeing [facility] [place] and doing well with Vesicare 10mg   Requesting that we take over prescriptions, advised I can do that    Osteopenia of multiple sites  Due to Femara, currently sees Dr Chris Polanco ([facility])  Likely due to Letrozole for breast cancer (started 5 years ago)  On Prolia q 6 months (April 2018)  DEXA Aug: T-score of -1 9 in the right femoral neck is consistent with low bone mineral density  Pt will likely transfer care to us in 2020 after insurance changes  Due for repeat DEXA in August 2019    Benign essential HTN  BP Readings from Last 3 Encounters:   03/20/19 116/78   02/21/19 (!) 154/104   02/15/19 140/80     Lab Results   Component Value Date    CREATININE 0 89 03/18/2019    EGFR 68 03/18/2019     Controlled on current regimen:   -Chlorthalidone 25mg daily  -DASH diet reviewed  -RTC 6 months, if remains well controlled and pt loses weight, can do a trial of 12 5mg dose at that time      RTC 6 months or sooner PRN    SUBJECTIVE:  Harry Falk is a 70 y o  female who presents today with a chief complaint of Follow-up  Pt is here for f/u of elevated BP, she had been started on chlorthalidone 4 weeks ago after having elevated BP readings at multiple specialists offices  She has been checking her BP at home, the ranges for the past 2 weeks have been 114-138/76-88  She has chronic LE edema, has had multiple vascular studies, and did not notice a change with the addition of the diuretic  Denies HA, LH, muscle cramps, dizziness, blurry vision  She is also requesting that we take over prescriptions for Prolia and Vesicare if possible so she doesn't have to see multiple doctors  Also requesting immunizations    Review of Systems   Eyes: Negative for visual disturbance     Respiratory: Negative for shortness of breath  Cardiovascular: Negative for chest pain and palpitations  Neurological: Negative for dizziness and light-headedness  I have reviewed the patient's PMH, Social History, Medication List and Allergies as appropriate  OBJECTIVE:  /78   Pulse 74   Temp 97 9 °F (36 6 °C)   Resp 16   Ht 5' 9" (1 753 m)   Wt 87 9 kg (193 lb 12 8 oz)   SpO2 97%   BMI 28 62 kg/m²    Physical Exam   Constitutional: She appears well-developed and well-nourished  No distress  Cardiovascular: Normal rate, regular rhythm and normal heart sounds  Pulmonary/Chest: Effort normal and breath sounds normal  No respiratory distress  She has no wheezes  She has no rales  Musculoskeletal: She exhibits edema (1+, b/l, at baseline, compression stockings in place)  Skin: She is not diaphoretic  Vitals reviewed  I have spent 30 minutes with Patient  today in which greater than 50% of this time was spent in counseling/coordination of care regarding Risks and benefits of tx options, Intructions for management, Patient and family education, Importance of treatment compliance and Impressions  Luis Dailey MD    Note: Portions of the record have been created with voice recognition software  Occasional wrong word or "sound a like" substitutions may have occurred due to the inherent limitations of voice recognition software  Read the chart carefully and recognize, using context, where substitutions have occurred

## 2019-05-17 ENCOUNTER — TRANSCRIBE ORDERS (OUTPATIENT)
Dept: ADMINISTRATIVE | Facility: HOSPITAL | Age: 64
End: 2019-05-17

## 2019-05-17 DIAGNOSIS — M81.0 AGE-RELATED OSTEOPOROSIS WITHOUT CURRENT PATHOLOGICAL FRACTURE: Primary | ICD-10-CM

## 2019-06-07 DIAGNOSIS — N32.81 OAB (OVERACTIVE BLADDER): Primary | ICD-10-CM

## 2019-06-07 RX ORDER — SOLIFENACIN SUCCINATE 10 MG/1
10 TABLET, FILM COATED ORAL DAILY
Qty: 90 TABLET | Refills: 1 | Status: SHIPPED | OUTPATIENT
Start: 2019-06-07 | End: 2019-12-20 | Stop reason: SDUPTHER

## 2019-07-03 ENCOUNTER — OFFICE VISIT (OUTPATIENT)
Dept: URGENT CARE | Age: 64
End: 2019-07-03
Payer: COMMERCIAL

## 2019-07-03 ENCOUNTER — TRANSCRIBE ORDERS (OUTPATIENT)
Dept: URGENT CARE | Age: 64
End: 2019-07-03

## 2019-07-03 VITALS
OXYGEN SATURATION: 96 % | RESPIRATION RATE: 18 BRPM | DIASTOLIC BLOOD PRESSURE: 73 MMHG | SYSTOLIC BLOOD PRESSURE: 125 MMHG | HEART RATE: 85 BPM | TEMPERATURE: 97.3 F | BODY MASS INDEX: 27.96 KG/M2 | HEIGHT: 69 IN | WEIGHT: 188.8 LBS

## 2019-07-03 DIAGNOSIS — H10.9 CONJUNCTIVITIS OF BOTH EYES, UNSPECIFIED CONJUNCTIVITIS TYPE: Primary | ICD-10-CM

## 2019-07-03 PROCEDURE — S9088 SERVICES PROVIDED IN URGENT: HCPCS | Performed by: FAMILY MEDICINE

## 2019-07-03 PROCEDURE — 99213 OFFICE O/P EST LOW 20 MIN: CPT | Performed by: FAMILY MEDICINE

## 2019-07-03 RX ORDER — CIPROFLOXACIN HYDROCHLORIDE 3.5 MG/ML
1 SOLUTION/ DROPS TOPICAL 4 TIMES DAILY
Qty: 5 ML | Refills: 0 | Status: SHIPPED | OUTPATIENT
Start: 2019-07-03 | End: 2019-10-17 | Stop reason: ALTCHOICE

## 2019-07-03 RX ORDER — UREA 10 %
500 LOTION (ML) TOPICAL 2 TIMES DAILY
COMMUNITY
End: 2021-08-02

## 2019-07-03 NOTE — LETTER
July 3, 2019     Patient: Ananth Jeffries   YOB: 1955   Date of Visit: 7/3/2019       To Whom it May Concern:    Shakira Galeas was seen in my clinic on 7/3/2019  She may return to work 07/04/2019  If you have any questions or concerns, please don't hesitate to call           Sincerely,          SRIKANTH Chang        CC: No Recipients

## 2019-07-03 NOTE — PROGRESS NOTES
[facility]        NAME: Domenic Patel is a 70 y o  female  : 1955    MRN: [id number]  DATE: July 3, 2019  TIME: 11:56 AM    Assessment and Plan   Conjunctivitis of both eyes, unspecified conjunctivitis type [H10 9]  1  Conjunctivitis of both eyes, unspecified conjunctivitis type  ciprofloxacin (CILOXAN) 0 3 % ophthalmic solution         Patient Instructions     Frequent hand washing  Do not touch eyes with dropper  Follow up with PCP in 3-5 days  Proceed to  ER if symptoms worsen  Chief Complaint     Chief Complaint   Patient presents with    Eye Problem     Pt complaining of "pasty" eye for 2 days, she also complains of general malaise after a shingles vaccine on monday  Denies fevers  History of Present Illness       HPI   Reports discharge from the B/L eyes for 4 days  Denies fever or chills  No allergy symptoms    Review of Systems   Review of Systems   Eyes: Positive for discharge  Negative for pain (some eye discomfort but no actual pain), redness, itching and visual disturbance           Current Medications       Current Outpatient Medications:     aspirin (ECOTRIN LOW STRENGTH) 81 mg EC tablet, Take 81 mg by mouth daily, Disp: , Rfl:     chlorthalidone 25 mg tablet, Take 1 tablet (25 mg total) by mouth daily, Disp: 30 tablet, Rfl: 5    denosumab (PROLIA) 60 mg/mL, Inject 60 mg under the skin every 6 (six) months, Disp: , Rfl:     letrozole (FEMARA) 2 5 mg tablet, Take 1 tablet (2 5 mg total) by mouth every evening, Disp: 90 tablet, Rfl: 2    levothyroxine (SYNTHROID) 75 mcg tablet, Take 1 tablet by mouth every other day, Disp: , Rfl:     levothyroxine (SYNTHROID) 88 mcg tablet, Take 1 tablet by mouth every other day, Disp: , Rfl:     loratadine (CLARITIN) 10 mg tablet, Take 10 mg by mouth daily, Disp: , Rfl:     magnesium gluconate (MAGONATE) 500 mg tablet, Take 500 mg by mouth 2 (two) times a day, Disp: , Rfl:     Multiple Vitamins-Minerals (MULTIVITAMIN ADULT PO), multivitamin, Disp: , Rfl:     solifenacin (VESICARE) 10 MG tablet, Take 1 tablet (10 mg total) by mouth daily, Disp: 90 tablet, Rfl: 1    VITAMIN D, ERGOCALCIFEROL, PO, Take 2,000 Units by mouth daily, Disp: , Rfl:     ciprofloxacin (CILOXAN) 0 3 % ophthalmic solution, Administer 1 drop to both eyes 4 (four) times a day, Disp: 5 mL, Rfl: 0    famotidine (PEPCID) 20 mg tablet, Take 1 tablet (20 mg total) by mouth 2 (two) times a day as needed for heartburn for up to 30 days, Disp: 60 tablet, Rfl: 2    Current Allergies     Allergies as of 07/03/2019 - Reviewed 07/03/2019   Allergen Reaction Noted    Actifed cold-allergy [chlorpheniramine-phenylephrine] Swelling and Rash 01/10/2017    Penicillins Rash 01/10/2017    Vancomycin Other (See Comments) 01/10/2017            The following portions of the patient's history were reviewed and updated as appropriate: allergies, current medications, past family history, past medical history, past social history, past surgical history and problem list      Past Medical History:   Diagnosis Date    Arthritis     [facility] [address] )     Disease of thyroid gland     Hypothyroid     Knee pain, right     Limb alert care status     no BP's /IV's right side    Mitral valve prolapse     Numbness of anterior thigh     left and tingling    Seasonal allergies     dust,mold,trees    Urinary incontinence     Varicose veins of both lower extremities     Wears glasses        Past Surgical History:   Procedure Laterality Date    BREAST SURGERY Right     lumpectomy    COLONOSCOPY      EXPLORATORY LAPAROTOMY      "for fertility"    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    KNEE ARTHROSCOPY Right     NH TOTAL KNEE ARTHROPLASTY Right 1/20/2017    Procedure: ARTHROPLASTY KNEE TOTAL;  Surgeon: Jackie Figueroa MD;  Location: [facility];  Service: Orthopedics    TONSILLECTOMY      WISDOM TOOTH EXTRACTION         Family History   Problem Relation Age of Onset  Uterine cancer Mother     Throat cancer Father     Breast cancer additional onset Paternal Aunt     Breast cancer additional onset Cousin          Medications have been verified  Objective   /73 (BP Location: Left arm, Patient Position: Sitting)   Pulse 85   Temp (!) 97 3 °F (36 3 °C) (Temporal)   Resp 18   Ht 5' 9" (1 753 m)   Wt 85 6 kg (188 lb 12 8 oz)   SpO2 96%   BMI 27 88 kg/m²        Physical Exam     Physical Exam   Eyes:   Yellowish discharge from the B/L eyes, located on the B/L inner canthus  EOM is normal  Minimal erythema of the conjunctivae  Vision is normal, with glasses

## 2019-07-03 NOTE — LETTER
July 3, 2019     Patient: Samira Loja   YOB: 1955   Date of Visit: 7/3/2019       To Whom it May Concern:    Judd Ruff was seen in my clinic on 7/3/2019  She may return to work 09/04/2019  If you have any questions or concerns, please don't hesitate to call           Sincerely,          SRIKANTH Rojas        CC: No Recipients

## 2019-07-03 NOTE — PATIENT INSTRUCTIONS

## 2019-07-24 ENCOUNTER — TELEPHONE (OUTPATIENT)
Dept: OBGYN CLINIC | Facility: CLINIC | Age: 64
End: 2019-07-24

## 2019-07-24 ENCOUNTER — APPOINTMENT (OUTPATIENT)
Dept: LAB | Age: 64
End: 2019-07-24
Payer: COMMERCIAL

## 2019-07-24 ENCOUNTER — TRANSCRIBE ORDERS (OUTPATIENT)
Dept: ADMINISTRATIVE | Age: 64
End: 2019-07-24

## 2019-07-24 DIAGNOSIS — C50.411 MALIGNANT NEOPLASM OF UPPER-OUTER QUADRANT OF RIGHT BREAST IN FEMALE, ESTROGEN RECEPTOR POSITIVE (HCC): ICD-10-CM

## 2019-07-24 DIAGNOSIS — Z17.0 MALIGNANT NEOPLASM OF UPPER-OUTER QUADRANT OF RIGHT BREAST IN FEMALE, ESTROGEN RECEPTOR POSITIVE (HCC): ICD-10-CM

## 2019-07-24 LAB
ALBUMIN SERPL BCP-MCNC: 3.5 G/DL (ref 3.5–5)
ALP SERPL-CCNC: 36 U/L (ref 46–116)
ALT SERPL W P-5'-P-CCNC: 25 U/L (ref 12–78)
ANION GAP SERPL CALCULATED.3IONS-SCNC: 8 MMOL/L (ref 4–13)
AST SERPL W P-5'-P-CCNC: 16 U/L (ref 5–45)
BASOPHILS # BLD AUTO: 0.01 THOUSANDS/ΜL (ref 0–0.1)
BASOPHILS NFR BLD AUTO: 0 % (ref 0–1)
BILIRUB SERPL-MCNC: 0.57 MG/DL (ref 0.2–1)
BUN SERPL-MCNC: 17 MG/DL (ref 5–25)
CALCIUM SERPL-MCNC: 9.1 MG/DL (ref 8.3–10.1)
CANCER AG27-29 SERPL-ACNC: 13.1 U/ML (ref 0–42.3)
CHLORIDE SERPL-SCNC: 105 MMOL/L (ref 100–108)
CO2 SERPL-SCNC: 30 MMOL/L (ref 21–32)
CREAT SERPL-MCNC: 0.95 MG/DL (ref 0.6–1.3)
EOSINOPHIL # BLD AUTO: 0.12 THOUSAND/ΜL (ref 0–0.61)
EOSINOPHIL NFR BLD AUTO: 3 % (ref 0–6)
ERYTHROCYTE [DISTWIDTH] IN BLOOD BY AUTOMATED COUNT: 12.8 % (ref 11.6–15.1)
GFR SERPL CREATININE-BSD FRML MDRD: 63 ML/MIN/1.73SQ M
GLUCOSE P FAST SERPL-MCNC: 85 MG/DL (ref 65–99)
HCT VFR BLD AUTO: 42 % (ref 34.8–46.1)
HGB BLD-MCNC: 13.6 G/DL (ref 11.5–15.4)
IMM GRANULOCYTES # BLD AUTO: 0.02 THOUSAND/UL (ref 0–0.2)
IMM GRANULOCYTES NFR BLD AUTO: 1 % (ref 0–2)
LYMPHOCYTES # BLD AUTO: 0.93 THOUSANDS/ΜL (ref 0.6–4.47)
LYMPHOCYTES NFR BLD AUTO: 22 % (ref 14–44)
MCH RBC QN AUTO: 31.9 PG (ref 26.8–34.3)
MCHC RBC AUTO-ENTMCNC: 32.4 G/DL (ref 31.4–37.4)
MCV RBC AUTO: 98 FL (ref 82–98)
MONOCYTES # BLD AUTO: 0.27 THOUSAND/ΜL (ref 0.17–1.22)
MONOCYTES NFR BLD AUTO: 6 % (ref 4–12)
NEUTROPHILS # BLD AUTO: 2.9 THOUSANDS/ΜL (ref 1.85–7.62)
NEUTS SEG NFR BLD AUTO: 68 % (ref 43–75)
NRBC BLD AUTO-RTO: 0 /100 WBCS
PLATELET # BLD AUTO: 205 THOUSANDS/UL (ref 149–390)
PMV BLD AUTO: 10.9 FL (ref 8.9–12.7)
POTASSIUM SERPL-SCNC: 4.1 MMOL/L (ref 3.5–5.3)
PROT SERPL-MCNC: 6.9 G/DL (ref 6.4–8.2)
RBC # BLD AUTO: 4.27 MILLION/UL (ref 3.81–5.12)
SODIUM SERPL-SCNC: 143 MMOL/L (ref 136–145)
WBC # BLD AUTO: 4.25 THOUSAND/UL (ref 4.31–10.16)

## 2019-07-24 PROCEDURE — 85025 COMPLETE CBC W/AUTO DIFF WBC: CPT

## 2019-07-24 PROCEDURE — 36415 COLL VENOUS BLD VENIPUNCTURE: CPT

## 2019-07-24 PROCEDURE — 80053 COMPREHEN METABOLIC PANEL: CPT

## 2019-07-24 PROCEDURE — 86300 IMMUNOASSAY TUMOR CA 15-3: CPT

## 2019-07-29 ENCOUNTER — OFFICE VISIT (OUTPATIENT)
Dept: HEMATOLOGY ONCOLOGY | Facility: CLINIC | Age: 64
End: 2019-07-29
Payer: COMMERCIAL

## 2019-07-29 VITALS
SYSTOLIC BLOOD PRESSURE: 118 MMHG | WEIGHT: 193 LBS | OXYGEN SATURATION: 97 % | BODY MASS INDEX: 28.58 KG/M2 | HEART RATE: 64 BPM | RESPIRATION RATE: 18 BRPM | DIASTOLIC BLOOD PRESSURE: 80 MMHG | TEMPERATURE: 97.9 F | HEIGHT: 69 IN

## 2019-07-29 DIAGNOSIS — Z17.0 MALIGNANT NEOPLASM OF UPPER-OUTER QUADRANT OF RIGHT BREAST IN FEMALE, ESTROGEN RECEPTOR POSITIVE (HCC): Primary | ICD-10-CM

## 2019-07-29 DIAGNOSIS — C50.411 MALIGNANT NEOPLASM OF UPPER-OUTER QUADRANT OF RIGHT BREAST IN FEMALE, ESTROGEN RECEPTOR POSITIVE (HCC): Primary | ICD-10-CM

## 2019-07-29 PROCEDURE — 99214 OFFICE O/P EST MOD 30 MIN: CPT | Performed by: INTERNAL MEDICINE

## 2019-07-29 NOTE — LETTER
July 29, 2019     Irma Gaffney MD  [address] [place] [address]    Patient: Nyasia Beyer   YOB: 1955   Date of Visit: 7/29/2019       Dear Dr Marilyn Gaffney: Thank you for referring Quan Fierro to me for evaluation  Below are my notes for this consultation  If you have questions, please do not hesitate to call me  I look forward to following your patient along with you  Sincerely,        Maribel Hill MD        CC: No Recipients  Maribel Hill MD  7/29/2019  8:32 AM  Sign at close encounter    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, grade 1 breast Oncotype score was 8 cancer in the upper outer quadrant of right breast and patient had   lumpectomy and sentinel lymph node sampling in January 2014  That was followed by radiation  Letrozole was started in February 2014 and she has already finished 5 years and will be stopping letrozole when present supply runs out  She will continue to take vitamin-D and calcium and stay active  No change in left thigh neuropathy and numbness of the toes and she follows with her neurologist   Chronic low back discomfort and she follows with her orthopedic surgeon                Current Outpatient Medications:     aspirin (ECOTRIN LOW STRENGTH) 81 mg EC tablet, Take 81 mg by mouth daily, Disp: , Rfl:     chlorthalidone 25 mg tablet, Take 1 tablet (25 mg total) by mouth daily, Disp: 30 tablet, Rfl: 5    ciprofloxacin (CILOXAN) 0 3 % ophthalmic solution, Administer 1 drop to both eyes 4 (four) times a day, Disp: 5 mL, Rfl: 0    denosumab (PROLIA) 60 mg/mL, Inject 60 mg under the skin every 6 (six) months, Disp: , Rfl:     letrozole (FEMARA) 2 5 mg tablet, Take 1 tablet (2 5 mg total) by mouth every evening, Disp: 90 tablet, Rfl: 2    levothyroxine (SYNTHROID) 75 mcg tablet, Take 1 tablet by mouth every other day, Disp: , Rfl:     levothyroxine (SYNTHROID) 88 mcg tablet, Take 1 tablet by mouth every other day, Disp: , Rfl:     loratadine (CLARITIN) 10 mg tablet, Take 10 mg by mouth daily, Disp: , Rfl:     magnesium gluconate (MAGONATE) 500 mg tablet, Take 500 mg by mouth 2 (two) times a day, Disp: , Rfl:     Multiple Vitamins-Minerals (MULTIVITAMIN ADULT PO), multivitamin, Disp: , Rfl:     solifenacin (VESICARE) 10 MG tablet, Take 1 tablet (10 mg total) by mouth daily, Disp: 90 tablet, Rfl: 1    VITAMIN D, ERGOCALCIFEROL, PO, Take 2,000 Units by mouth daily, Disp: , Rfl:     famotidine (PEPCID) 20 mg tablet, Take 1 tablet (20 mg total) by mouth 2 (two) times a day as needed for heartburn for up to 30 days, Disp: 60 tablet, Rfl: 2    Allergies   Allergen Reactions    Actifed Cold-Allergy [Chlorpheniramine-Phenylephrine] Swelling and Rash     Throat and face swelled    Penicillins Rash    Vancomycin Other (See Comments)     redness          Malignant neoplasm of upper-outer quadrant of right breast in female, estrogen receptor positive ([facility] [address] )    12/26/2013 Biopsy     Right breast biopsy, 12:00    Infiltrating mammary carcinoma  %  %  HER-2 negative      1/17/2014 Surgery     Right lumpectomy (Dr Charli Cho)    Stage IA- pT1b, pN0, G1      2/21/2014 - 4/3/2014 Radiation     WBRT (Dr Merlinda Igo)      2/25/2014 Genomic Testing     Oncotype 8, low risk      2014 -  Hormone Therapy     Letrozole (Dr Isabel Murillo)         ROS:  07/29/19 Reviewed 13 systems:  Presently no headaches, seizures, dizziness, diplopia, dysphagia, hoarseness, chest pain, palpitations, shortness of breath, cough, hemoptysis, abdominal pain, nausea, vomiting, change in bowel habits, melena, hematuria, fever, chills, bleeding, bone pains, skin rash, weight loss,  tiredness ,  Weakness, claudication and gait problem  No frequent infections  Not unusually sensitive to heat or cold  No swelling of the ankles  No swollen glands  No GYN symptoms Patient is anxious   Other symptoms are in HPI        /80 (BP Location: Left arm, Patient Position: Sitting, Cuff Size: Adult)   Pulse 64   Temp 97 9 °F (36 6 °C)   Resp 18   Ht 5' 9" (1 753 m)   Wt 87 5 kg (193 lb)   SpO2 97%   BMI 28 50 kg/m²      Physical Exam:  Alert, oriented, not in distress, no icterus, no oral thrush, no palpable neck mass, clear lung fields, regular heart rate, abdomen  soft and non tender, no palpable abdominal mass, no ascites, no edema of ankles, no calf tenderness, no focal neurological deficit, no skin rash, no palpable lymphadenopathy in the neck and axillary areas, good arterial pulses, no clubbing  Patient is anxious  Performance status 1  No lymphedema    She goes to her breast surgeon for examination and imaging studies    IMAGING:      LABS:  Results for orders placed or performed in visit on 07/24/19   Cancer antigen 27 29   Result Value Ref Range    CA 27 29 13 1 0 0 - 42 3 U/mL   CBC and differential   Result Value Ref Range    WBC 4 25 (L) 4 31 - 10 16 Thousand/uL    RBC 4 27 3 81 - 5 12 Million/uL    Hemoglobin 13 6 11 5 - 15 4 g/dL    Hematocrit 42 0 34 8 - 46 1 %    MCV 98 82 - 98 fL    MCH 31 9 26 8 - 34 3 pg    MCHC 32 4 31 4 - 37 4 g/dL    RDW 12 8 11 6 - 15 1 %    MPV 10 9 8 9 - 12 7 fL    Platelets 512 204 - 913 Thousands/uL    nRBC 0 /100 WBCs    Neutrophils Relative 68 43 - 75 %    Immat GRANS % 1 0 - 2 %    Lymphocytes Relative 22 14 - 44 %    Monocytes Relative 6 4 - 12 %    Eosinophils Relative 3 0 - 6 %    Basophils Relative 0 0 - 1 %    Neutrophils Absolute 2 90 1 85 - 7 62 Thousands/µL    Immature Grans Absolute 0 02 0 00 - 0 20 Thousand/uL    Lymphocytes Absolute 0 93 0 60 - 4 47 Thousands/µL    Monocytes Absolute 0 27 0 17 - 1 22 Thousand/µL    Eosinophils Absolute 0 12 0 00 - 0 61 Thousand/µL    Basophils Absolute 0 01 0 00 - 0 10 Thousands/µL   Comprehensive metabolic panel   Result Value Ref Range    Sodium 143 136 - 145 mmol/L    Potassium 4 1 3 5 - 5 3 mmol/L    Chloride 105 100 - 108 mmol/L    CO2 30 21 - 32 mmol/L ANION GAP 8 4 - 13 mmol/L    BUN 17 5 - 25 mg/dL    Creatinine 0 95 0 60 - 1 30 mg/dL    Glucose, Fasting 85 65 - 99 mg/dL    Calcium 9 1 8 3 - 10 1 mg/dL    AST 16 5 - 45 U/L    ALT 25 12 - 78 U/L    Alkaline Phosphatase 36 (L) 46 - 116 U/L    Total Protein 6 9 6 4 - 8 2 g/dL    Albumin 3 5 3 5 - 5 0 g/dL    Total Bilirubin 0 57 0 20 - 1 00 mg/dL    eGFR 63 ml/min/1 73sq m     Labs, Imaging, & Other studies:   All pertinent labs and imaging studies were personally reviewed    Lab Results   Component Value Date     09/30/2015    K 4 1 07/24/2019     07/24/2019    CO2 30 07/24/2019    ANIONGAP 8 09/30/2015    BUN 17 07/24/2019    CREATININE 0 95 07/24/2019    GLUCOSE 89 09/30/2015    GLUF 85 07/24/2019    CALCIUM 9 1 07/24/2019    AST 16 07/24/2019    ALT 25 07/24/2019    ALKPHOS 36 (L) 07/24/2019    PROT 7 0 09/30/2015    BILITOT 0 39 09/30/2015    EGFR 63 07/24/2019     Lab Results   Component Value Date    WBC 4 25 (L) 07/24/2019    HGB 13 6 07/24/2019    HCT 42 0 07/24/2019    MCV 98 07/24/2019     07/24/2019       Reviewed and discussed with patient  Assessment and plan: Follow-up visit for hormone receptor positive, HER2 negative, stage I, T1 B, 6 mm, grade 1 breast Oncotype score was 8 cancer in the upper outer quadrant of right breast and patient had   lumpectomy and sentinel lymph node sampling in January 2014  That was followed by radiation  Letrozole was started in February 2014 and she has already finished 5 years and will be stopping letrozole when present supply runs out  She will continue to take vitamin-D and calcium and stay active  No change in left thigh neuropathy and numbness of the toes and she follows with her neurologist   Chronic low back discomfort and she follows with her orthopedic surgeon  Physical examination and test results are as recorded and discussed    Breast cancer remains in remission   Goal is cure from breast cancer    Condition discussed and explained  Questions answered     As above she will stop taking letrozole after present supply runs out  She is taking vitamin-D and calcium and she follows with her rheumatologist for osteopenia and is on Prolia  She knows to let her dentist know if she will be going for any invasive dental procedure that she has been on Prolia  All discussed in detail  Questions answered  Discussed the importance of self-breast examination, eating healthy foods, staying active and health screening tests  Patient is capable of self-care  Holmes Schilder goes to her breast surgeon for examination of breasts and imaging studies  1  Malignant neoplasm of upper-outer quadrant of right breast in female, estrogen receptor positive ([facility] [address] )    - Cancer antigen 27 29; Future  - CBC and differential; Future  - Comprehensive metabolic panel; Future            Patient voiced understanding and agreement in the discussion  Counseling / Coordination of Care   Greater than 50% of total time was spent with the patient and / or family counseling and / or coordination of care

## 2019-07-29 NOTE — PROGRESS NOTES
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, grade 1 breast Oncotype score was 8 cancer in the upper outer quadrant of right breast and patient had   lumpectomy and sentinel lymph node sampling in January 2014  That was followed by radiation  Letrozole was started in February 2014 and she has already finished 5 years and will be stopping letrozole when present supply runs out  She will continue to take vitamin-D and calcium and stay active  No change in left thigh neuropathy and numbness of the toes and she follows with her neurologist   Chronic low back discomfort and she follows with her orthopedic surgeon                Current Outpatient Medications:     aspirin (ECOTRIN LOW STRENGTH) 81 mg EC tablet, Take 81 mg by mouth daily, Disp: , Rfl:     chlorthalidone 25 mg tablet, Take 1 tablet (25 mg total) by mouth daily, Disp: 30 tablet, Rfl: 5    ciprofloxacin (CILOXAN) 0 3 % ophthalmic solution, Administer 1 drop to both eyes 4 (four) times a day, Disp: 5 mL, Rfl: 0    denosumab (PROLIA) 60 mg/mL, Inject 60 mg under the skin every 6 (six) months, Disp: , Rfl:     letrozole (FEMARA) 2 5 mg tablet, Take 1 tablet (2 5 mg total) by mouth every evening, Disp: 90 tablet, Rfl: 2    levothyroxine (SYNTHROID) 75 mcg tablet, Take 1 tablet by mouth every other day, Disp: , Rfl:     levothyroxine (SYNTHROID) 88 mcg tablet, Take 1 tablet by mouth every other day, Disp: , Rfl:     loratadine (CLARITIN) 10 mg tablet, Take 10 mg by mouth daily, Disp: , Rfl:     magnesium gluconate (MAGONATE) 500 mg tablet, Take 500 mg by mouth 2 (two) times a day, Disp: , Rfl:     Multiple Vitamins-Minerals (MULTIVITAMIN ADULT PO), multivitamin, Disp: , Rfl:     solifenacin (VESICARE) 10 MG tablet, Take 1 tablet (10 mg total) by mouth daily, Disp: 90 tablet, Rfl: 1    VITAMIN D, ERGOCALCIFEROL, PO, Take 2,000 Units by mouth daily, Disp: , Rfl:     famotidine (PEPCID) 20 mg tablet, Take 1 tablet (20 mg total) by mouth 2 (two) times a day as needed for heartburn for up to 30 days, Disp: 60 tablet, Rfl: 2    Allergies   Allergen Reactions    Actifed Cold-Allergy [Chlorpheniramine-Phenylephrine] Swelling and Rash     Throat and face swelled    Penicillins Rash    Vancomycin Other (See Comments)     redness          Malignant neoplasm of upper-outer quadrant of right breast in female, estrogen receptor positive ([facility] [address] )    12/26/2013 Biopsy     Right breast biopsy, 12:00    Infiltrating mammary carcinoma  %  %  HER-2 negative      1/17/2014 Surgery     Right lumpectomy (Dr Lonny Calabrese)    Stage IA- pT1b, pN0, G1      2/21/2014 - 4/3/2014 Radiation     WBRT (Dr Andre Salvador)      2/25/2014 Genomic Testing     Oncotype 8, low risk      2014 -  Hormone Therapy     Letrozole (Dr Geoff Hernandez)         ROS:  07/29/19 Reviewed 13 systems:  Presently no headaches, seizures, dizziness, diplopia, dysphagia, hoarseness, chest pain, palpitations, shortness of breath, cough, hemoptysis, abdominal pain, nausea, vomiting, change in bowel habits, melena, hematuria, fever, chills, bleeding, bone pains, skin rash, weight loss,  tiredness ,  Weakness, claudication and gait problem  No frequent infections  Not unusually sensitive to heat or cold  No swelling of the ankles  No swollen glands  No GYN symptoms Patient is anxious   Other symptoms are in HPI        /80 (BP Location: Left arm, Patient Position: Sitting, Cuff Size: Adult)   Pulse 64   Temp 97 9 °F (36 6 °C)   Resp 18   Ht 5' 9" (1 753 m)   Wt 87 5 kg (193 lb)   SpO2 97%   BMI 28 50 kg/m²     Physical Exam:  Alert, oriented, not in distress, no icterus, no oral thrush, no palpable neck mass, clear lung fields, regular heart rate, abdomen  soft and non tender, no palpable abdominal mass, no ascites, no edema of ankles, no calf tenderness, no focal neurological deficit, no skin rash, no palpable lymphadenopathy in the neck and axillary areas, good arterial pulses, no clubbing  Patient is anxious  Performance status 1  No lymphedema    She goes to her breast surgeon for examination and imaging studies    IMAGING:      LABS:  Results for orders placed or performed in visit on 07/24/19   Cancer antigen 27 29   Result Value Ref Range    CA 27 29 13 1 0 0 - 42 3 U/mL   CBC and differential   Result Value Ref Range    WBC 4 25 (L) 4 31 - 10 16 Thousand/uL    RBC 4 27 3 81 - 5 12 Million/uL    Hemoglobin 13 6 11 5 - 15 4 g/dL    Hematocrit 42 0 34 8 - 46 1 %    MCV 98 82 - 98 fL    MCH 31 9 26 8 - 34 3 pg    MCHC 32 4 31 4 - 37 4 g/dL    RDW 12 8 11 6 - 15 1 %    MPV 10 9 8 9 - 12 7 fL    Platelets 434 172 - 770 Thousands/uL    nRBC 0 /100 WBCs    Neutrophils Relative 68 43 - 75 %    Immat GRANS % 1 0 - 2 %    Lymphocytes Relative 22 14 - 44 %    Monocytes Relative 6 4 - 12 %    Eosinophils Relative 3 0 - 6 %    Basophils Relative 0 0 - 1 %    Neutrophils Absolute 2 90 1 85 - 7 62 Thousands/µL    Immature Grans Absolute 0 02 0 00 - 0 20 Thousand/uL    Lymphocytes Absolute 0 93 0 60 - 4 47 Thousands/µL    Monocytes Absolute 0 27 0 17 - 1 22 Thousand/µL    Eosinophils Absolute 0 12 0 00 - 0 61 Thousand/µL    Basophils Absolute 0 01 0 00 - 0 10 Thousands/µL   Comprehensive metabolic panel   Result Value Ref Range    Sodium 143 136 - 145 mmol/L    Potassium 4 1 3 5 - 5 3 mmol/L    Chloride 105 100 - 108 mmol/L    CO2 30 21 - 32 mmol/L    ANION GAP 8 4 - 13 mmol/L    BUN 17 5 - 25 mg/dL    Creatinine 0 95 0 60 - 1 30 mg/dL    Glucose, Fasting 85 65 - 99 mg/dL    Calcium 9 1 8 3 - 10 1 mg/dL    AST 16 5 - 45 U/L    ALT 25 12 - 78 U/L    Alkaline Phosphatase 36 (L) 46 - 116 U/L    Total Protein 6 9 6 4 - 8 2 g/dL    Albumin 3 5 3 5 - 5 0 g/dL    Total Bilirubin 0 57 0 20 - 1 00 mg/dL    eGFR 63 ml/min/1 73sq m     Labs, Imaging, & Other studies:   All pertinent labs and imaging studies were personally reviewed    Lab Results   Component Value Date     09/30/2015    K 4 1 07/24/2019     07/24/2019    CO2 30 07/24/2019    ANIONGAP 8 09/30/2015    BUN 17 07/24/2019    CREATININE 0 95 07/24/2019    GLUCOSE 89 09/30/2015    GLUF 85 07/24/2019    CALCIUM 9 1 07/24/2019    AST 16 07/24/2019    ALT 25 07/24/2019    ALKPHOS 36 (L) 07/24/2019    PROT 7 0 09/30/2015    BILITOT 0 39 09/30/2015    EGFR 63 07/24/2019     Lab Results   Component Value Date    WBC 4 25 (L) 07/24/2019    HGB 13 6 07/24/2019    HCT 42 0 07/24/2019    MCV 98 07/24/2019     07/24/2019       Reviewed and discussed with patient  Assessment and plan: Follow-up visit for hormone receptor positive, HER2 negative, stage I, 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, 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, grade 1 breast Oncotype score was 8 cancer in the upper outer quadrant of right breast and patient had   lumpectomy and sentinel lymph node sampling in January 2014  That was followed by radiation  Letrozole was started in February 2014 and she has already finished 5 years and will be stopping letrozole when present supply runs out  She will continue to take vitamin-D and calcium and stay active  No change in left thigh neuropathy and numbness of the toes and she follows with her neurologist   Chronic low back discomfort and she follows with her orthopedic surgeon  Physical examination and test results are as recorded and discussed    Breast cancer remains in remission   Goal is cure from breast cancer  Condition discussed and explained  Questions answered     As above she will stop taking letrozole after present supply runs out  She is taking vitamin-D and calcium and she follows with her rheumatologist for osteopenia and is on Prolia  She knows to let her dentist know if she will be going for any invasive dental procedure that she has been on Prolia  All discussed in detail  Questions answered  Discussed the importance of self-breast examination, eating healthy foods, staying active and health screening tests    Patient is capable of self-care  Silver Rajput goes to her breast surgeon for examination of breasts and imaging studies  1  Malignant neoplasm of upper-outer quadrant of right breast in female, estrogen receptor positive ([facility] [address] )    - Cancer antigen 27 29; Future  - CBC and differential; Future  - Comprehensive metabolic panel; Future            Patient voiced understanding and agreement in the discussion  Counseling / Coordination of Care   Greater than 50% of total time was spent with the patient and / or family counseling and / or coordination of care

## 2019-08-11 ENCOUNTER — ANESTHESIA EVENT (OUTPATIENT)
Dept: GASTROENTEROLOGY | Facility: HOSPITAL | Age: 64
End: 2019-08-11

## 2019-08-12 ENCOUNTER — HOSPITAL ENCOUNTER (OUTPATIENT)
Dept: GASTROENTEROLOGY | Facility: HOSPITAL | Age: 64
Setting detail: OUTPATIENT SURGERY
Discharge: HOME | End: 2019-08-12
Attending: COLON & RECTAL SURGERY | Admitting: COLON & RECTAL SURGERY
Payer: COMMERCIAL

## 2019-08-12 ENCOUNTER — ANESTHESIA (OUTPATIENT)
Dept: GASTROENTEROLOGY | Facility: HOSPITAL | Age: 64
End: 2019-08-12

## 2019-08-12 VITALS
BODY MASS INDEX: 26.66 KG/M2 | OXYGEN SATURATION: 96 % | SYSTOLIC BLOOD PRESSURE: 112 MMHG | RESPIRATION RATE: 16 BRPM | WEIGHT: 180 LBS | DIASTOLIC BLOOD PRESSURE: 55 MMHG | HEART RATE: 59 BPM | TEMPERATURE: 98.5 F | HEIGHT: 69 IN

## 2019-08-12 DIAGNOSIS — Z86.010 PERSONAL HISTORY OF COLONIC POLYPS: ICD-10-CM

## 2019-08-12 PROCEDURE — 99213 OFFICE O/P EST LOW 20 MIN: CPT | Performed by: COLON & RECTAL SURGERY

## 2019-08-12 PROCEDURE — G0105 COLORECTAL SCRN; HI RISK IND: HCPCS | Performed by: COLON & RECTAL SURGERY

## 2019-08-12 RX ORDER — PROPOFOL 10 MG/ML
INJECTION, EMULSION INTRAVENOUS CONTINUOUS PRN
Status: DISCONTINUED | OUTPATIENT
Start: 2019-08-12 | End: 2019-08-12 | Stop reason: SURG

## 2019-08-12 RX ORDER — SODIUM CHLORIDE 9 MG/ML
100 INJECTION, SOLUTION INTRAVENOUS CONTINUOUS
Status: CANCELLED | OUTPATIENT
Start: 2019-08-12

## 2019-08-12 RX ORDER — SODIUM CHLORIDE 9 MG/ML
INJECTION, SOLUTION INTRAVENOUS CONTINUOUS PRN
Status: DISCONTINUED | OUTPATIENT
Start: 2019-08-12 | End: 2019-08-12 | Stop reason: SURG

## 2019-08-12 RX ORDER — SODIUM CHLORIDE 9 MG/ML
125 INJECTION, SOLUTION INTRAVENOUS CONTINUOUS
Status: DISCONTINUED | OUTPATIENT
Start: 2019-08-12 | End: 2019-08-16 | Stop reason: HOSPADM

## 2019-08-12 RX ORDER — PROPOFOL 10 MG/ML
INJECTION, EMULSION INTRAVENOUS AS NEEDED
Status: DISCONTINUED | OUTPATIENT
Start: 2019-08-12 | End: 2019-08-12 | Stop reason: SURG

## 2019-08-12 RX ADMIN — PROPOFOL 80 MG: 10 INJECTION, EMULSION INTRAVENOUS at 09:04

## 2019-08-12 RX ADMIN — SODIUM CHLORIDE 125 ML/HR: 0.9 INJECTION, SOLUTION INTRAVENOUS at 08:13

## 2019-08-12 RX ADMIN — PROPOFOL 100 MCG/KG/MIN: 10 INJECTION, EMULSION INTRAVENOUS at 09:04

## 2019-08-12 RX ADMIN — SODIUM CHLORIDE: 0.9 INJECTION, SOLUTION INTRAVENOUS at 08:54

## 2019-08-12 NOTE — H&P
History and Physical   Colon and Rectal Surgery   Jing Gilmore 75 y o  female MRN: [id number]  Unit/Bed#:  Encounter: [id number]  08/12/19   8:49 AM      No chief complaint on file  History of Present Illness   HPI:  Jing Gilmore is a 75 y o  female who presents with h/o polyp        Historical Information   Past Medical History:   Diagnosis Date    Arthritis     Breast cancer ([address] )     radiation    Disease of thyroid gland     Hypothyroid     Knee pain, right     Limb alert care status     no BP's /IV's right side    Mitral valve prolapse     Numbness of anterior thigh     left and tingling    Seasonal allergies     dust,mold,trees    Urinary incontinence     Varicose veins of both lower extremities     Wears glasses      Past Surgical History:   Procedure Laterality Date    BREAST SURGERY Right     lumpectomy    COLONOSCOPY      EXPLORATORY LAPAROTOMY      "for fertility"    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    KNEE ARTHROSCOPY Right     GA TOTAL KNEE ARTHROPLASTY Right 1/20/2017    Procedure: ARTHROPLASTY KNEE TOTAL;  Surgeon: Dayana Reid MD;  Location: [facility];  Service: Orthopedics    TONSILLECTOMY      WISDOM TOOTH EXTRACTION         Meds/Allergies       (Not in a hospital admission)      Current Outpatient Medications:     aspirin (ECOTRIN LOW STRENGTH) 81 mg EC tablet, Take 81 mg by mouth daily, Disp: , Rfl:     levothyroxine (SYNTHROID) 75 mcg tablet, Take 1 tablet by mouth every other day, Disp: , Rfl:     chlorthalidone 25 mg tablet, Take 1 tablet (25 mg total) by mouth daily, Disp: 30 tablet, Rfl: 5    ciprofloxacin (CILOXAN) 0 3 % ophthalmic solution, Administer 1 drop to both eyes 4 (four) times a day, Disp: 5 mL, Rfl: 0    denosumab (PROLIA) 60 mg/mL, Inject 60 mg under the skin every 6 (six) months, Disp: , Rfl:     levothyroxine (SYNTHROID) 88 mcg tablet, Take 1 tablet by mouth every other day, Disp: , Rfl:     loratadine (CLARITIN) 10 mg tablet, Take 10 mg by mouth daily, Disp: , Rfl:     magnesium gluconate (MAGONATE) 500 mg tablet, Take 500 mg by mouth 2 (two) times a day, Disp: , Rfl:     Multiple Vitamins-Minerals (MULTIVITAMIN ADULT PO), multivitamin, Disp: , Rfl:     solifenacin (VESICARE) 10 MG tablet, Take 1 tablet (10 mg total) by mouth daily, Disp: 90 tablet, Rfl: 1    VITAMIN D, ERGOCALCIFEROL, PO, Take 2,000 Units by mouth daily, Disp: , Rfl:     Current Facility-Administered Medications:     sodium chloride 0 9 % infusion, 125 mL/hr, Intravenous, Continuous, Ana Rae MD, Last Rate: 125 mL/hr at 08/12/19 0813, 125 mL/hr at 08/12/19 0813    Allergies   Allergen Reactions    Actifed Cold-Allergy [Chlorpheniramine-Phenylephrine] Swelling and Rash     Throat and face swelled    Penicillins Rash    Vancomycin Other (See Comments)     redness         Social History   Social History     Substance and Sexual Activity   Alcohol Use Yes    Frequency: Monthly or less    Drinks per session: 1 or 2    Binge frequency: Never    Comment: "rare"     Social History     Substance and Sexual Activity   Drug Use No     Social History     Tobacco Use   Smoking Status Never Smoker   Smokeless Tobacco Never Used         Family History:   Family History   Problem Relation Age of Onset    Uterine cancer Mother     Throat cancer Father     Breast cancer additional onset Paternal Aunt     Breast cancer additional onset Cousin          Objective     Current Vitals:   Blood Pressure: 120/62 (08/12/19 0803)  Pulse: 60 (08/12/19 0803)  Temperature: 98 5 °F (36 9 °C) (08/12/19 0803)  Temp Source: Oral (08/12/19 0803)  Respirations: 18 (08/12/19 0803)  Height: 5' 9" (175 3 cm) (08/12/19 0803)  Weight - Scale: 81 6 kg (180 lb) (08/12/19 0803)  SpO2: 97 % (08/12/19 0803)  No intake or output data in the 24 hours ending 08/12/19 0849    Physical Exam:  General: No acute distress  Eyes: Normal   ENT: Normal   Neck: No JVD  Pulm: Normal in A&P  CV: NSR no murmur  Abdomen: Soft and normal on palpation, no mass, no tenderness, no guarding  Rectal: Normal sphincter tone, no perianal skin lesions  Extremities: Normal  Lymphatics: Normal        Lab Results: I have personally reviewed pertinent lab results  Imaging: I have personally reviewed pertinent reports  Patient was consented by myself for procedure as explained earlier with all the risks and benefits described  All questions answered  ASSESSMENT:  Duc Santiago is a 75 y o  female who presents with h/o polyp        PLAN:    colonoscopy

## 2019-08-12 NOTE — ANESTHESIA POSTPROCEDURE EVALUATION
Post-Op Assessment Note    CV Status:  Stable    Pain management: adequate     Mental Status:  Alert and awake   Hydration Status:  Euvolemic   PONV Controlled:  Controlled   Airway Patency:  Patent   Post Op Vitals Reviewed: Yes      Staff: Anesthesiologist, CRNA           /59 (08/12/19 0931)    Temp      Pulse 56 (08/12/19 0931)   Resp 16 (08/12/19 0931)    SpO2 100 % (08/12/19 0931)

## 2019-08-12 NOTE — ANESTHESIA PREPROCEDURE EVALUATION
Review of Systems/Medical History  Patient summary reviewed  Chart reviewed  No history of anesthetic complications     Cardiovascular  EKG reviewed, Hypertension ,   Comment: Sinus bradycardia  Poor R wave progression  Abnormal ECG  When compared with ECG of 2014 13:13,  Poor anterior R wave progression is noted  Confirmed by Meryle Course, MD, Anushka Blake ([id number]) on 1/10/2017 4:59:59 PM,  Pulmonary       GI/Hepatic    GERD ,             Endo/Other  History of thyroid disease , hypothyroidism,      GYN  Not currently pregnant , Prior pregnancy/OB history : 3 Parity: 2,          Hematology   Musculoskeletal  Back pain , lumbar pain,   Comment: Primary osteoarthritis of right knee  Meralgia paraesthetica, left  Acquired spondylolisthesis    Osteopenia of multiple sites Arthritis     Neurology   Psychology           Physical Exam    Airway    Mallampati score: II  TM Distance: >3 FB  Neck ROM: full     Dental   No notable dental hx     Cardiovascular  Cardiovascular exam normal    Pulmonary  Pulmonary exam normal Breath sounds clear to auscultation,     Other Findings        Anesthesia Plan  ASA Score- 2     Anesthesia Type- IV sedation with anesthesia with ASA Monitors  Additional Monitors:   Airway Plan:         Plan Factors- Patient instructed to abstain from smoking on day of procedure       Induction- intravenous  Postoperative Plan-     Informed Consent- Anesthetic plan and risks discussed with patient  I personally reviewed this patient with the CRNA  Discussed and agreed on the Anesthesia Plan with the CRNA             Lab Results   Component Value Date    WBC 4 25 (L) 2019    HGB 13 6 2019    HCT 42 0 2019    MCV 98 2019     2019     Lab Results   Component Value Date    GLUCOSE 89 2015    CALCIUM 9 1 2019     2015    K 4 1 2019    CO2 30 2019     2019    BUN 17 2019    CREATININE 0 95 2019     Lab Results   Component Value Date    INR 1 07 01/20/2017    INR 0 99 01/10/2017    INR 0 98 01/03/2014    PROTIME 13 9 01/20/2017    PROTIME 13 1 01/10/2017    PROTIME 12 5 01/03/2014     Lab Results   Component Value Date    PTT 28 01/03/2014     Type and Screen:  A     Discussed with pt the benefits/alternatives and risks or General Anesthesia including breathing tube remaining in place if not strong enough, PONV, damage to lips and teeth, sore throat, eye injury or blindness    I, Dr Chanda Poe, the attending physician, have personally seen and evaluated the patient prior to anesthetic care  I have reviewed the pre-anesthetic record, and other medical records if appropriate to the anesthetic care  If a CRNA is involved in the case, I have reviewed the CRNA assessment, if present, and agree  The patient is in a suitable condition to proceed with my formulated anesthetic plan

## 2019-08-21 DIAGNOSIS — I10 BENIGN ESSENTIAL HTN: ICD-10-CM

## 2019-08-21 RX ORDER — CHLORTHALIDONE 25 MG/1
25 TABLET ORAL DAILY
Qty: 90 TABLET | Refills: 3 | Status: SHIPPED | OUTPATIENT
Start: 2019-08-21 | End: 2020-09-15

## 2019-08-21 NOTE — TELEPHONE ENCOUNTER
Patient called requesting refill on  Chlorthalidone 25 mg 90 day supply   Please send to [facility]

## 2019-08-29 ENCOUNTER — HOSPITAL ENCOUNTER (OUTPATIENT)
Dept: RADIOLOGY | Age: 64
Discharge: HOME | End: 2019-08-29
Payer: COMMERCIAL

## 2019-08-29 DIAGNOSIS — M81.0 AGE-RELATED OSTEOPOROSIS WITHOUT CURRENT PATHOLOGICAL FRACTURE: ICD-10-CM

## 2019-08-29 PROCEDURE — 77080 DXA BONE DENSITY AXIAL: CPT

## 2019-08-31 ENCOUNTER — OFFICE VISIT (OUTPATIENT)
Dept: URGENT CARE | Age: 64
End: 2019-08-31
Payer: COMMERCIAL

## 2019-08-31 VITALS
BODY MASS INDEX: 29.1 KG/M2 | HEART RATE: 71 BPM | RESPIRATION RATE: 18 BRPM | DIASTOLIC BLOOD PRESSURE: 82 MMHG | OXYGEN SATURATION: 99 % | TEMPERATURE: 97.5 F | HEIGHT: 68 IN | WEIGHT: 192 LBS | SYSTOLIC BLOOD PRESSURE: 139 MMHG

## 2019-08-31 DIAGNOSIS — H10.13 ALLERGIC CONJUNCTIVITIS OF BOTH EYES: Primary | ICD-10-CM

## 2019-08-31 PROCEDURE — 99213 OFFICE O/P EST LOW 20 MIN: CPT | Performed by: PHYSICIAN ASSISTANT

## 2019-08-31 PROCEDURE — S9088 SERVICES PROVIDED IN URGENT: HCPCS | Performed by: PHYSICIAN ASSISTANT

## 2019-08-31 RX ORDER — OLOPATADINE HYDROCHLORIDE 1 MG/ML
1 SOLUTION/ DROPS OPHTHALMIC 2 TIMES DAILY
Qty: 5 ML | Refills: 1 | Status: SHIPPED | OUTPATIENT
Start: 2019-08-31 | End: 2019-10-17 | Stop reason: ALTCHOICE

## 2019-08-31 NOTE — PATIENT INSTRUCTIONS
Follow-up with your ophthalmologist if symptoms are persisting    You may use over-the-counter Flonase nasal spray as directed    Continue Claritin as directed

## 2019-08-31 NOTE — PROGRESS NOTES
[id number]        NAME: Jennifer Boogie is a 75 y o  female  : 1955    MRN: [id number]  DATE: 2019  TIME: 9:51 AM    Assessment and Plan   Allergic conjunctivitis of both eyes [H10 13]  1  Allergic conjunctivitis of both eyes  olopatadine (PATANOL) 0 1 % ophthalmic solution         Patient Instructions       Follow up with PCP in 3-5 days  Proceed to  ER if symptoms worsen  Chief Complaint     Chief Complaint   Patient presents with    Eye Pain     Bilteral eye pain and discharge color yellow for 4 weeks  pt was seen here for the same thing and has not gotten better   History of Present Illness       Patient is here for evaluation of persistent crusting in both eyes specially the morning when she wakes up  She denies any thick purulent drainage  She denies any eye pain, redness  She does take Claritin daily  She states her ears are itchy at times  She denies any runny nose, congestion, cough  Review of Systems   Review of Systems   Constitutional: Negative  HENT: Negative  Eyes: Positive for itching  Negative for photophobia, pain, discharge, redness and visual disturbance           Current Medications       Current Outpatient Medications:     aspirin (ECOTRIN LOW STRENGTH) 81 mg EC tablet, Take 81 mg by mouth daily, Disp: , Rfl:     chlorthalidone 25 mg tablet, Take 1 tablet (25 mg total) by mouth daily, Disp: 90 tablet, Rfl: 3    ciprofloxacin (CILOXAN) 0 3 % ophthalmic solution, Administer 1 drop to both eyes 4 (four) times a day, Disp: 5 mL, Rfl: 0    denosumab (PROLIA) 60 mg/mL, Inject 60 mg under the skin every 6 (six) months, Disp: , Rfl:     levothyroxine (SYNTHROID) 75 mcg tablet, Take 1 tablet by mouth every other day, Disp: , Rfl:     levothyroxine (SYNTHROID) 88 mcg tablet, Take 1 tablet by mouth every other day, Disp: , Rfl:     loratadine (CLARITIN) 10 mg tablet, Take 10 mg by mouth daily, Disp: , Rfl:     magnesium gluconate (MAGONATE) 500 mg tablet, Take 500 mg by mouth 2 (two) times a day, Disp: , Rfl:     Multiple Vitamins-Minerals (MULTIVITAMIN ADULT PO), multivitamin, Disp: , Rfl:     olopatadine (PATANOL) 0 1 % ophthalmic solution, Administer 1 drop to both eyes 2 (two) times a day, Disp: 5 mL, Rfl: 1    solifenacin (VESICARE) 10 MG tablet, Take 1 tablet (10 mg total) by mouth daily, Disp: 90 tablet, Rfl: 1    VITAMIN D, ERGOCALCIFEROL, PO, Take 2,000 Units by mouth daily, Disp: , Rfl:     Current Allergies     Allergies as of 08/31/2019 - Reviewed 08/31/2019   Allergen Reaction Noted    Actifed cold-allergy [chlorpheniramine-phenylephrine] Swelling and Rash 01/10/2017    Penicillins Rash 01/10/2017    Vancomycin Other (See Comments) 01/10/2017            The following portions of the patient's history were reviewed and updated as appropriate: allergies, current medications, past family history, past medical history, past social history, past surgical history and problem list      Past Medical History:   Diagnosis Date    Arthritis     Breast cancer ([address] )     radiation    Disease of thyroid gland     Hypothyroid     Knee pain, right     Limb alert care status     no BP's /IV's right side    Mitral valve prolapse     Numbness of anterior thigh     left and tingling    Seasonal allergies     dust,mold,trees    Urinary incontinence     Varicose veins of both lower extremities     Wears glasses        Past Surgical History:   Procedure Laterality Date    BREAST SURGERY Right     lumpectomy    COLONOSCOPY      EXPLORATORY LAPAROTOMY      "for fertility"    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    KNEE ARTHROSCOPY Right     AR TOTAL KNEE ARTHROPLASTY Right 1/20/2017    Procedure: ARTHROPLASTY KNEE TOTAL;  Surgeon: Dipti Villagomez MD;  Location: [facility];  Service: Orthopedics    TONSILLECTOMY      WISDOM TOOTH EXTRACTION         Family History   Problem Relation Age of Onset    Uterine cancer Mother    Tirso Gomez Throat cancer Father     Breast cancer additional onset Paternal Aunt     Breast cancer additional onset Cousin          Medications have been verified  Objective   /82 (BP Location: Left arm, Patient Position: Sitting, Cuff Size: Large)   Pulse 71   Temp 97 5 °F (36 4 °C) (Temporal)   Resp 18   Ht 5' 8" (1 727 m)   Wt 87 1 kg (192 lb)   SpO2 99%   BMI 29 19 kg/m²        Physical Exam     Physical Exam   Constitutional: She is oriented to person, place, and time  She appears well-developed and well-nourished  No distress  HENT:   Head: Normocephalic and atraumatic  Right Ear: External ear normal    Left Ear: External ear normal    Nose: Nose normal    Mouth/Throat: Oropharynx is clear and moist  No oropharyngeal exudate  Eyes: Pupils are equal, round, and reactive to light  EOM are normal  Right eye exhibits no discharge  Left eye exhibits no discharge  Bilateral conjunctival pallor  No erythema  Neurological: She is alert and oriented to person, place, and time  Skin: Skin is warm and dry  No rash noted  She is not diaphoretic  Psychiatric: She has a normal mood and affect  Her behavior is normal  Judgment and thought content normal    Nursing note and vitals reviewed

## 2019-09-24 ENCOUNTER — APPOINTMENT (OUTPATIENT)
Dept: LAB | Age: 64
End: 2019-09-24
Payer: COMMERCIAL

## 2019-09-24 ENCOUNTER — TRANSCRIBE ORDERS (OUTPATIENT)
Dept: ADMINISTRATIVE | Age: 64
End: 2019-09-24

## 2019-09-24 DIAGNOSIS — E66.8 OBESITY OF ENDOCRINE ORIGIN: ICD-10-CM

## 2019-09-24 DIAGNOSIS — Z79.899 ENCOUNTER FOR LONG-TERM (CURRENT) USE OF OTHER MEDICATIONS: ICD-10-CM

## 2019-09-24 DIAGNOSIS — M81.0 SENILE OSTEOPOROSIS: ICD-10-CM

## 2019-09-24 DIAGNOSIS — I51.9 MYXEDEMA HEART DISEASE: ICD-10-CM

## 2019-09-24 DIAGNOSIS — E06.3 CHRONIC LYMPHOCYTIC THYROIDITIS: ICD-10-CM

## 2019-09-24 DIAGNOSIS — I51.9 MYXEDEMA HEART DISEASE: Primary | ICD-10-CM

## 2019-09-24 DIAGNOSIS — E55.9 AVITAMINOSIS D: ICD-10-CM

## 2019-09-24 DIAGNOSIS — M81.0 SENILE OSTEOPOROSIS: Primary | ICD-10-CM

## 2019-09-24 DIAGNOSIS — I10 ESSENTIAL HYPERTENSION, MALIGNANT: ICD-10-CM

## 2019-09-24 DIAGNOSIS — E03.9 MYXEDEMA HEART DISEASE: Primary | ICD-10-CM

## 2019-09-24 DIAGNOSIS — E03.9 MYXEDEMA HEART DISEASE: ICD-10-CM

## 2019-09-24 LAB
25(OH)D3 SERPL-MCNC: 47.8 NG/ML (ref 30–100)
ALBUMIN SERPL BCP-MCNC: 3.5 G/DL (ref 3.5–5)
ALP SERPL-CCNC: 40 U/L (ref 46–116)
ALT SERPL W P-5'-P-CCNC: 23 U/L (ref 12–78)
ANION GAP SERPL CALCULATED.3IONS-SCNC: 4 MMOL/L (ref 4–13)
AST SERPL W P-5'-P-CCNC: 16 U/L (ref 5–45)
BACTERIA UR QL AUTO: ABNORMAL /HPF
BASOPHILS # BLD AUTO: 0.02 THOUSANDS/ΜL (ref 0–0.1)
BASOPHILS NFR BLD AUTO: 0 % (ref 0–1)
BILIRUB SERPL-MCNC: 0.45 MG/DL (ref 0.2–1)
BILIRUB UR QL STRIP: NEGATIVE
BUN SERPL-MCNC: 22 MG/DL (ref 5–25)
CALCIUM SERPL-MCNC: 9.1 MG/DL (ref 8.3–10.1)
CHLORIDE SERPL-SCNC: 103 MMOL/L (ref 100–108)
CLARITY UR: CLEAR
CO2 SERPL-SCNC: 31 MMOL/L (ref 21–32)
COLOR UR: YELLOW
CREAT SERPL-MCNC: 0.97 MG/DL (ref 0.6–1.3)
CREAT UR-MCNC: 59.8 MG/DL
EOSINOPHIL # BLD AUTO: 0.13 THOUSAND/ΜL (ref 0–0.61)
EOSINOPHIL NFR BLD AUTO: 3 % (ref 0–6)
ERYTHROCYTE [DISTWIDTH] IN BLOOD BY AUTOMATED COUNT: 12.9 % (ref 11.6–15.1)
EST. AVERAGE GLUCOSE BLD GHB EST-MCNC: 120 MG/DL
GFR SERPL CREATININE-BSD FRML MDRD: 61 ML/MIN/1.73SQ M
GLUCOSE P FAST SERPL-MCNC: 78 MG/DL (ref 65–99)
GLUCOSE UR STRIP-MCNC: NEGATIVE MG/DL
HBA1C MFR BLD: 5.8 % (ref 4.2–6.3)
HCT VFR BLD AUTO: 39.6 % (ref 34.8–46.1)
HGB BLD-MCNC: 13 G/DL (ref 11.5–15.4)
HGB UR QL STRIP.AUTO: NEGATIVE
HYALINE CASTS #/AREA URNS LPF: ABNORMAL /LPF
IMM GRANULOCYTES # BLD AUTO: 0.01 THOUSAND/UL (ref 0–0.2)
IMM GRANULOCYTES NFR BLD AUTO: 0 % (ref 0–2)
KETONES UR STRIP-MCNC: NEGATIVE MG/DL
LEUKOCYTE ESTERASE UR QL STRIP: ABNORMAL
LYMPHOCYTES # BLD AUTO: 0.94 THOUSANDS/ΜL (ref 0.6–4.47)
LYMPHOCYTES NFR BLD AUTO: 20 % (ref 14–44)
MAGNESIUM SERPL-MCNC: 2.2 MG/DL (ref 1.6–2.6)
MCH RBC QN AUTO: 32.1 PG (ref 26.8–34.3)
MCHC RBC AUTO-ENTMCNC: 32.8 G/DL (ref 31.4–37.4)
MCV RBC AUTO: 98 FL (ref 82–98)
MICROALBUMIN UR-MCNC: 8.8 MG/L (ref 0–20)
MICROALBUMIN/CREAT 24H UR: 15 MG/G CREATININE (ref 0–30)
MONOCYTES # BLD AUTO: 0.3 THOUSAND/ΜL (ref 0.17–1.22)
MONOCYTES NFR BLD AUTO: 6 % (ref 4–12)
NEUTROPHILS # BLD AUTO: 3.27 THOUSANDS/ΜL (ref 1.85–7.62)
NEUTS SEG NFR BLD AUTO: 71 % (ref 43–75)
NITRITE UR QL STRIP: NEGATIVE
NON-SQ EPI CELLS URNS QL MICRO: ABNORMAL /HPF
NRBC BLD AUTO-RTO: 0 /100 WBCS
PH UR STRIP.AUTO: 6.5 [PH]
PHOSPHATE SERPL-MCNC: 3.2 MG/DL (ref 2.3–4.1)
PLATELET # BLD AUTO: 204 THOUSANDS/UL (ref 149–390)
PMV BLD AUTO: 11.1 FL (ref 8.9–12.7)
POTASSIUM SERPL-SCNC: 3.6 MMOL/L (ref 3.5–5.3)
PROT SERPL-MCNC: 7 G/DL (ref 6.4–8.2)
PROT UR STRIP-MCNC: NEGATIVE MG/DL
RBC # BLD AUTO: 4.05 MILLION/UL (ref 3.81–5.12)
RBC #/AREA URNS AUTO: ABNORMAL /HPF
SODIUM SERPL-SCNC: 138 MMOL/L (ref 136–145)
SP GR UR STRIP.AUTO: 1.01 (ref 1–1.03)
T4 FREE SERPL-MCNC: 1.29 NG/DL (ref 0.76–1.46)
TSH SERPL DL<=0.05 MIU/L-ACNC: 3.36 UIU/ML (ref 0.36–3.74)
UROBILINOGEN UR QL STRIP.AUTO: 0.2 E.U./DL
WBC # BLD AUTO: 4.67 THOUSAND/UL (ref 4.31–10.16)
WBC #/AREA URNS AUTO: ABNORMAL /HPF

## 2019-09-24 PROCEDURE — 84443 ASSAY THYROID STIM HORMONE: CPT

## 2019-09-24 PROCEDURE — 83735 ASSAY OF MAGNESIUM: CPT

## 2019-09-24 PROCEDURE — 84100 ASSAY OF PHOSPHORUS: CPT

## 2019-09-24 PROCEDURE — 84439 ASSAY OF FREE THYROXINE: CPT

## 2019-09-24 PROCEDURE — 80053 COMPREHEN METABOLIC PANEL: CPT

## 2019-09-24 PROCEDURE — 85025 COMPLETE CBC W/AUTO DIFF WBC: CPT

## 2019-09-24 PROCEDURE — 82043 UR ALBUMIN QUANTITATIVE: CPT | Performed by: SPECIALIST

## 2019-09-24 PROCEDURE — 82306 VITAMIN D 25 HYDROXY: CPT

## 2019-09-24 PROCEDURE — 81001 URINALYSIS AUTO W/SCOPE: CPT | Performed by: SPECIALIST

## 2019-09-24 PROCEDURE — 83036 HEMOGLOBIN GLYCOSYLATED A1C: CPT

## 2019-09-24 PROCEDURE — 82570 ASSAY OF URINE CREATININE: CPT | Performed by: SPECIALIST

## 2019-09-24 PROCEDURE — 36415 COLL VENOUS BLD VENIPUNCTURE: CPT

## 2019-10-17 ENCOUNTER — OFFICE VISIT (OUTPATIENT)
Dept: FAMILY MEDICINE CLINIC | Facility: CLINIC | Age: 64
End: 2019-10-17
Payer: COMMERCIAL

## 2019-10-17 VITALS
TEMPERATURE: 98.4 F | WEIGHT: 192.5 LBS | HEART RATE: 60 BPM | RESPIRATION RATE: 16 BRPM | SYSTOLIC BLOOD PRESSURE: 122 MMHG | DIASTOLIC BLOOD PRESSURE: 80 MMHG | BODY MASS INDEX: 29.27 KG/M2 | OXYGEN SATURATION: 98 %

## 2019-10-17 DIAGNOSIS — E74.39 GLUCOSE INTOLERANCE: ICD-10-CM

## 2019-10-17 DIAGNOSIS — Z11.59 ENCOUNTER FOR HEPATITIS C SCREENING TEST FOR LOW RISK PATIENT: ICD-10-CM

## 2019-10-17 DIAGNOSIS — E03.9 ACQUIRED HYPOTHYROIDISM: ICD-10-CM

## 2019-10-17 DIAGNOSIS — Z13.220 SCREENING FOR HYPERLIPIDEMIA: ICD-10-CM

## 2019-10-17 DIAGNOSIS — M85.89 OSTEOPENIA OF MULTIPLE SITES: ICD-10-CM

## 2019-10-17 DIAGNOSIS — C50.411 MALIGNANT NEOPLASM OF UPPER-OUTER QUADRANT OF RIGHT BREAST IN FEMALE, ESTROGEN RECEPTOR POSITIVE (HCC): ICD-10-CM

## 2019-10-17 DIAGNOSIS — I10 BENIGN ESSENTIAL HTN: Primary | ICD-10-CM

## 2019-10-17 DIAGNOSIS — Z17.0 MALIGNANT NEOPLASM OF UPPER-OUTER QUADRANT OF RIGHT BREAST IN FEMALE, ESTROGEN RECEPTOR POSITIVE (HCC): ICD-10-CM

## 2019-10-17 DIAGNOSIS — N39.41 URGE INCONTINENCE OF URINE: ICD-10-CM

## 2019-10-17 PROBLEM — K21.9 GERD WITHOUT ESOPHAGITIS: Status: RESOLVED | Noted: 2019-02-21 | Resolved: 2019-10-17

## 2019-10-17 PROBLEM — Z79.811 USE OF LETROZOLE (FEMARA): Status: RESOLVED | Noted: 2019-01-15 | Resolved: 2019-10-17

## 2019-10-17 PROCEDURE — 3074F SYST BP LT 130 MM HG: CPT | Performed by: FAMILY MEDICINE

## 2019-10-17 PROCEDURE — 99214 OFFICE O/P EST MOD 30 MIN: CPT | Performed by: FAMILY MEDICINE

## 2019-10-17 PROCEDURE — 3079F DIAST BP 80-89 MM HG: CPT | Performed by: FAMILY MEDICINE

## 2019-10-17 NOTE — PROGRESS NOTES
FAMILY MEDICINE PROGRESS NOTE  lAicia Cevallos 75 y o  female   DATE: October 17, 2019     ASSESSMENT and PLAN:  Alicia Cevallos is a 75 y o  female with:     Benign essential HTN  BP Readings from Last 3 Encounters:   10/17/19 122/80   08/31/19 139/82   08/12/19 112/55     Lab Results   Component Value Date    CREATININE 0 97 09/24/2019    EGFR 61 09/24/2019     Controlled on current regimen: Chlorthalidone 25mg       Glucose intolerance  Lab Results   Component Value Date    HGBA1C 5 8 09/24/2019     Recommended low carb diet and weight loss, goal to use apps like [employer] to monitor calroies and limit carbs    Acquired hypothyroidism  F/w Dr Eran Steinberg (endo)    Malignant neoplasm of upper-outer quadrant of right breast in female, estrogen receptor positive ([address] )  F/w Dr Jeffery Busby (Hem/Onc), no longer on Letrozole    Osteopenia of multiple sites  Due to Femara, currently sees Dr Milton Cruz ([facility])  Likely due to Letrozole for breast cancer (started 5 years ago), but has now stopped it  On Prolia q 6 months (April 2018)  DEXA Aug 2019: Improvement in osteopenia    Pt will likely transfer care to us in 2020 after insurance changes    Urge incontinence of urine  Was seeing [facility] and doing well with Vesicare 10mg, now prescribed by our office    RTC 6 months, likely Welcome to Medicare visit    SUBJECTIVE:  Alicia Cevallos is a 75 y o  female who presents today with a chief complaint of Follow-up  Alicia Cevallos is here for a 6 month follow-up, she did have labs done prior to this visit  The active chronic medical problems and medications are as below:   1  HTN- hasnt been able to lose weight, compliant with BP med  2  Pre-diabetes- eats a lot of carbs, knows she does mindless snacking and that is her main problem  3  Osteoporosis- due for prolia next month, normal calcium   4  Urinary problems- well controlled with generic Vesicare    Review of Systems   Eyes: Negative for visual disturbance  Respiratory: Negative for shortness of breath  Cardiovascular: Negative for chest pain and palpitations  Genitourinary: Negative for decreased urine volume  Neurological: Negative for dizziness and light-headedness  I have reviewed the patient's Past Medical History  OBJECTIVE:  /80   Pulse 60   Temp 98 4 °F (36 9 °C)   Resp 16   Wt 87 3 kg (192 lb 8 oz)   SpO2 98%   BMI 29 27 kg/m²    Physical Exam   Constitutional: She appears well-developed and well-nourished  No distress  Cardiovascular: Normal rate, regular rhythm and normal heart sounds  Pulmonary/Chest: Effort normal and breath sounds normal  No respiratory distress  She has no wheezes  She has no rales  Skin: She is not diaphoretic  Vitals reviewed        Appointment on 09/24/2019   Component Date Value Ref Range Status    WBC 09/24/2019 4 67  4 31 - 10 16 Thousand/uL Final    RBC 09/24/2019 4 05  3 81 - 5 12 Million/uL Final    Hemoglobin 09/24/2019 13 0  11 5 - 15 4 g/dL Final    Hematocrit 09/24/2019 39 6  34 8 - 46 1 % Final    MCV 09/24/2019 98  82 - 98 fL Final    MCH 09/24/2019 32 1  26 8 - 34 3 pg Final    MCHC 09/24/2019 32 8  31 4 - 37 4 g/dL Final    RDW 09/24/2019 12 9  11 6 - 15 1 % Final    MPV 09/24/2019 11 1  8 9 - 12 7 fL Final    Platelets 09/51/4134 204  149 - 390 Thousands/uL Final    nRBC 09/24/2019 0  /100 WBCs Final    Neutrophils Relative 09/24/2019 71  43 - 75 % Final    Immat GRANS % 09/24/2019 0  0 - 2 % Final    Lymphocytes Relative 09/24/2019 20  14 - 44 % Final    Monocytes Relative 09/24/2019 6  4 - 12 % Final    Eosinophils Relative 09/24/2019 3  0 - 6 % Final    Basophils Relative 09/24/2019 0  0 - 1 % Final    Neutrophils Absolute 09/24/2019 3 27  1 85 - 7 62 Thousands/µL Final    Immature Grans Absolute 09/24/2019 0 01  0 00 - 0 20 Thousand/uL Final    Lymphocytes Absolute 09/24/2019 0 94  0 60 - 4 47 Thousands/µL Final    Monocytes Absolute 09/24/2019 0 30  0 17 - 1 22 Thousand/µL Final    Eosinophils Absolute 09/24/2019 0 13  0 00 - 0 61 Thousand/µL Final    Basophils Absolute 09/24/2019 0 02  0 00 - 0 10 Thousands/µL Final    Sodium 09/24/2019 138  136 - 145 mmol/L Final    Potassium 09/24/2019 3 6  3 5 - 5 3 mmol/L Final    Chloride 09/24/2019 103  100 - 108 mmol/L Final    CO2 09/24/2019 31  21 - 32 mmol/L Final    ANION GAP 09/24/2019 4  4 - 13 mmol/L Final    BUN 09/24/2019 22  5 - 25 mg/dL Final    Creatinine 09/24/2019 0 97  0 60 - 1 30 mg/dL Final    Standardized to IDMS reference method    Glucose, Fasting 09/24/2019 78  65 - 99 mg/dL Final      Specimen collection should occur prior to Sulfasalazine administration due to the potential for falsely depressed results  Specimen collection should occur prior to Sulfapyridine administration due to the potential for falsely elevated results   Calcium 09/24/2019 9 1  8 3 - 10 1 mg/dL Final    AST 09/24/2019 16  5 - 45 U/L Final      Specimen collection should occur prior to Sulfasalazine administration due to the potential for falsely depressed results   ALT 09/24/2019 23  12 - 78 U/L Final      Specimen collection should occur prior to Sulfasalazine and/or Sulfapyridine administration due to the potential for falsely depressed results       Alkaline Phosphatase 09/24/2019 40* 46 - 116 U/L Final    Total Protein 09/24/2019 7 0  6 4 - 8 2 g/dL Final    Albumin 09/24/2019 3 5  3 5 - 5 0 g/dL Final    Total Bilirubin 09/24/2019 0 45  0 20 - 1 00 mg/dL Final    eGFR 09/24/2019 61  ml/min/1 73sq m Final    Magnesium 09/24/2019 2 2  1 6 - 2 6 mg/dL Final    TSH 3RD GENERATON 09/24/2019 3 360  0 358 - 3 740 uIU/mL Final      The recommended reference ranges for TSH during pregnancy are as follows:   First trimester 0 1 to 2 5 uIU/mL   Second trimester  0 2 to 3 0 uIU/mL   Third trimester 0 3 to 3 0 uIU/m    Note: Normal ranges may not apply to patients who are transgender, non-binary, or whose legal sex, sex at birth, and gender identity differ  Using supplements with high doses of biotin 20 to more than 300 times greater than the adequate daily intake for adults of 30 mcg/day as established by the [employer], can cause falsely depress results   Free T4 09/24/2019 1 29  0 76 - 1 46 ng/dL Final      Specimen collection should occur prior to Sulfasalazine administration due to the potential for falsely elevated results   Hemoglobin A1C 09/24/2019 5 8  4 2 - 6 3 % Final    EAG 09/24/2019 120  mg/dl Final    Phosphorus 09/24/2019 3 2  2 3 - 4 1 mg/dL Final    Vit D, 25-Hydroxy 09/24/2019 47 8  30 0 - 100 0 ng/mL Final   Transcribe Orders on 09/24/2019   Component Date Value Ref Range Status    Color, UA 09/24/2019 Yellow   Final    Clarity, UA 09/24/2019 Clear   Final    Specific Gravity, UA 09/24/2019 1 013  1 003 - 1 030 Final    pH, UA 09/24/2019 6 5  4 5, 5 0, 5 5, 6 0, 6 5, 7 0, 7 5, 8 0 Final    Leukocytes, UA 09/24/2019 Trace* Negative Final    Nitrite, UA 09/24/2019 Negative  Negative Final    Protein, UA 09/24/2019 Negative  Negative mg/dl Final    Glucose, UA 09/24/2019 Negative  Negative mg/dl Final    Ketones, UA 09/24/2019 Negative  Negative mg/dl Final    Urobilinogen, UA 09/24/2019 0 2  0 2, 1 0 E U /dl E U /dl Final    Bilirubin, UA 09/24/2019 Negative  Negative Final    Blood, UA 09/24/2019 Negative  Negative Final    Creatinine, Ur 09/24/2019 59 8  mg/dL Final    Microalbum  ,U,Random 09/24/2019 8 8  0 0 - 20 0 mg/L Final    Microalb Creat Ratio 09/24/2019 15  0 - 30 mg/g creatinine Final    RBC, UA 09/24/2019 None Seen  None Seen, 0-5 /hpf Final    WBC, UA 09/24/2019 2-4* None Seen, 0-5, 5-55, 5-65 /hpf Final    Epithelial Cells 09/24/2019 None Seen  None Seen, Occasional /hpf Final    Bacteria, UA 09/24/2019 None Seen  None Seen, Occasional /hpf Final    Hyaline Casts, UA 09/24/2019 None Seen  None Seen /lpf Final     BMI Counseling:  Body mass index is 29 27 kg/m²  The BMI is above normal  Nutrition recommendations include moderation in carbohydrate intake  Rosa Kong MD    Note: Portions of the record have been created with voice recognition software  Occasional wrong word or "sound a like" substitutions may have occurred due to the inherent limitations of voice recognition software  Read the chart carefully and recognize, using context, where substitutions have occurred

## 2019-10-17 NOTE — ASSESSMENT & PLAN NOTE
Due to Femara, currently sees Dr Daniel Garcia ([facility])  Likely due to Letrozole for breast cancer (started 5 years ago), but has now stopped it  On Prolia q 6 months (April 2018)  DEXA Aug 2019: Improvement in osteopenia    Pt will likely transfer care to us in 2020 after insurance changes

## 2019-10-17 NOTE — ASSESSMENT & PLAN NOTE
Lab Results   Component Value Date    HGBA1C 5 8 09/24/2019     Recommended low carb diet and weight loss, goal to use apps like [employer] to monitor calroies and limit carbs

## 2019-10-17 NOTE — ASSESSMENT & PLAN NOTE
BP Readings from Last 3 Encounters:   10/17/19 122/80   08/31/19 139/82   08/12/19 112/55     Lab Results   Component Value Date    CREATININE 0 97 09/24/2019    EGFR 61 09/24/2019     Controlled on current regimen: Chlorthalidone 25mg

## 2019-10-30 DIAGNOSIS — I83.813 VARICOSE VEINS OF BILATERAL LOWER EXTREMITIES WITH PAIN: Primary | ICD-10-CM

## 2019-11-14 ENCOUNTER — TRANSCRIBE ORDERS (OUTPATIENT)
Dept: LAB | Facility: HOSPITAL | Age: 64
End: 2019-11-14

## 2019-11-14 ENCOUNTER — APPOINTMENT (OUTPATIENT)
Dept: LAB | Facility: HOSPITAL | Age: 64
End: 2019-11-14
Payer: COMMERCIAL

## 2019-11-14 ENCOUNTER — OFFICE VISIT (OUTPATIENT)
Dept: OBGYN CLINIC | Facility: OTHER | Age: 64
End: 2019-11-14
Payer: COMMERCIAL

## 2019-11-14 VITALS
DIASTOLIC BLOOD PRESSURE: 60 MMHG | WEIGHT: 184 LBS | HEART RATE: 65 BPM | SYSTOLIC BLOOD PRESSURE: 100 MMHG | HEIGHT: 68 IN | BODY MASS INDEX: 27.89 KG/M2

## 2019-11-14 DIAGNOSIS — Z79.899 ENCOUNTER FOR LONG-TERM (CURRENT) USE OF OTHER MEDICATIONS: ICD-10-CM

## 2019-11-14 DIAGNOSIS — E55.9 AVITAMINOSIS D: ICD-10-CM

## 2019-11-14 DIAGNOSIS — C50.411 MALIGNANT NEOPLASM OF UPPER-OUTER QUADRANT OF RIGHT FEMALE BREAST, UNSPECIFIED ESTROGEN RECEPTOR STATUS (HCC): ICD-10-CM

## 2019-11-14 DIAGNOSIS — M15.0 PRIMARY GENERALIZED (OSTEO)ARTHRITIS: ICD-10-CM

## 2019-11-14 DIAGNOSIS — M85.89 DISAPPEARING BONE DISEASE: ICD-10-CM

## 2019-11-14 DIAGNOSIS — I87.2 PERIPHERAL VENOUS INSUFFICIENCY: ICD-10-CM

## 2019-11-14 DIAGNOSIS — M75.41 SUBACROMIAL IMPINGEMENT OF RIGHT SHOULDER: Primary | ICD-10-CM

## 2019-11-14 DIAGNOSIS — C50.411 MALIGNANT NEOPLASM OF UPPER-OUTER QUADRANT OF RIGHT FEMALE BREAST, UNSPECIFIED ESTROGEN RECEPTOR STATUS (HCC): Primary | ICD-10-CM

## 2019-11-14 PROCEDURE — 82523 COLLAGEN CROSSLINKS: CPT

## 2019-11-14 PROCEDURE — 99243 OFF/OP CNSLTJ NEW/EST LOW 30: CPT | Performed by: ORTHOPAEDIC SURGERY

## 2019-11-14 PROCEDURE — 36415 COLL VENOUS BLD VENIPUNCTURE: CPT

## 2019-11-16 LAB — COLLAGEN CTX SERPL-MCNC: 383 PG/ML

## 2019-11-21 ENCOUNTER — EVALUATION (OUTPATIENT)
Dept: PHYSICAL THERAPY | Age: 64
End: 2019-11-21
Payer: COMMERCIAL

## 2019-11-21 ENCOUNTER — HOSPITAL ENCOUNTER (OUTPATIENT)
Dept: RADIOLOGY | Facility: HOSPITAL | Age: 64
Discharge: HOME | End: 2019-11-21
Payer: COMMERCIAL

## 2019-11-21 VITALS — BODY MASS INDEX: 27.89 KG/M2 | HEIGHT: 68 IN | WEIGHT: 184 LBS

## 2019-11-21 DIAGNOSIS — C50.411 MALIGNANT NEOPLASM OF UPPER-OUTER QUADRANT OF RIGHT BREAST IN FEMALE, ESTROGEN RECEPTOR POSITIVE (HCC): ICD-10-CM

## 2019-11-21 DIAGNOSIS — Z17.0 MALIGNANT NEOPLASM OF UPPER-OUTER QUADRANT OF RIGHT BREAST IN FEMALE, ESTROGEN RECEPTOR POSITIVE (HCC): ICD-10-CM

## 2019-11-21 DIAGNOSIS — M25.511 ACUTE PAIN OF RIGHT SHOULDER: Primary | ICD-10-CM

## 2019-11-21 PROCEDURE — C8908 MRI W/O FOL W/CONT, BREAST,: HCPCS

## 2019-11-21 PROCEDURE — A9585 GADOBUTROL INJECTION: HCPCS | Performed by: NURSE PRACTITIONER

## 2019-11-21 PROCEDURE — 97161 PT EVAL LOW COMPLEX 20 MIN: CPT | Performed by: PHYSICAL THERAPIST

## 2019-11-21 RX ADMIN — GADOBUTROL 8 ML: 604.72 INJECTION INTRAVENOUS at 16:51

## 2019-11-21 NOTE — PROGRESS NOTES
PT Evaluation     Today's date: 2019  Patient name: Disha Martin  : 1955  MRN: [id number]  Referring provider: Hannah Stratton MD  Dx:   Encounter Diagnosis     ICD-10-CM    1  Acute pain of right shoulder M25 511                   Assessment  Assessment details: Pt is a 75 y o  Female seen for initial PT evaluation for R shoulder pain  Pt saw ortho on , no injection or medications  Onset of symptoms started 2 weeks ago after performing a lot of raking yard work  Pt presents with increased pain, decreased ROM, decreased scapular strength difficulty with overhead activities, reaching, performance of ADL's and IADL's, which is limiting overall functional independence and QOL  Pts main complaint is performing R shoulder ABD  Pt educated to not perform yard work and avoid painful activities  Pt provided HEP to perform daily  Pt would benefit from continued PT to address the above impairments, increase stability, mobility, functional independence and QOL  Impairments: abnormal or restricted ROM, abnormal movement, activity intolerance, lacks appropriate home exercise program, pain with function and poor posture   Functional limitations: Pt has increased pain, decreased ROM, decreased strength, decreased abilities to perform lifting and overhead activities, lifting, performance of ADL's and IADL's  Symptom irritability: lowUnderstanding of Dx/Px/POC: good   Prognosis: good    Goals  STG in 4 weeks:  1  Pt will be independent with HEP  2  Pt will increase shoulder ROM 5-10 degrees  3  Pt will perform active shoulder ABD with 0/10 pain  LTG in 4-8 weeks:  1  Pt will be able to perform raking yard work with 0/10 pain  2  Pt will be able to turn off light switches with 0/10 pain  3  Pt will be able to lift >10 pounds with 0/10 pain       Plan  Patient would benefit from: skilled physical therapy  Planned modality interventions: cryotherapy and TENS  Planned therapy interventions: joint mobilization, manual therapy, neuromuscular re-education, patient education, postural training, strengthening, stretching, therapeutic activities, therapeutic exercise, functional ROM exercises, flexibility, activity modification, home exercise program and graded exercise  Frequency: 1x week  Duration in weeks: 6        Subjective Evaluation    History of Present Illness  Mechanism of injury: Pt thinks she over did it doing yardwork, no trauma  Pt reports shoulder flexion is okay but shoudler ABD and ER bothers her  Pts symptoms started 2 weeks ago and has gotten a little better and bothers her most during certain arm movements  There is nothing she is unable to do but things are painful  No neck pain and no numbness or tingling down into the arm  Pt reports she has a h/o L leg numbness that the doctors haven't been able to figure out why  Pt had R TKA about 3 years ago  No other significant PMH    Quality of life: good    Pain  Current pain ratin  At best pain ratin  At worst pain ratin  Location: R shoulder  Quality: sharp and knife-like  Relieving factors: rest and change in position  Aggravating factors: overhead activity and lifting    Social Support  Steps to enter house: yes  Stairs in house: yes   [facility]: [facility]    Employment status: working  Hand dominance: right      Diagnostic Tests  No diagnostic tests performed  Patient Goals  Patient goals for therapy: decreased edema, increased strength, decreased pain, independence with ADLs/IADLs, return to sport/leisure activities, improved balance and increased motion          Objective     Postural Observations  Seated posture: fair  Standing posture: fair        Cervical/Thoracic Screen   Cervical range of motion within normal limits    Neurological Testing     Sensation     Shoulder   Left Shoulder   Intact: light touch    Right Shoulder   Intact: light touch    Active Range of Motion   Left Shoulder   Normal active range of motion    Right Shoulder   Flexion: 160 degrees   Abduction: 172 degrees with pain  External rotation BTH: T1   Internal rotation BTB: T10     Strength/Myotome Testing     Left Shoulder   Normal muscle strength    Right Shoulder   Normal muscle strength    Tests     Right Shoulder   Positive Hawkin's, painful arc, scapular relocation and scapular assistance   Negative drop arm and empty can       Additional Tests Details  SSMP performed with R shld ABD   -Thoracic Kyphosis (-)  -Scapular Assist and Scapular Retraction Test (+)  -Humeral Head Positioning: (-) Inferior, (+) AP               Precautions     Specialty Daily Treatment Diary       Manual        Shoulder PROM        Shld inf, PA mobs                Exercise Diary                 UBE                TB rows, ext        TB ER/IR                TB serratus wall slides        Pec Stretch        Wall Push ups        Supine serratus punch        S/l ER        S/l ABD        Prone Rows        Prone flex, abd, ext                                                                                Modalities

## 2019-11-22 ENCOUNTER — HOSPITAL ENCOUNTER (OUTPATIENT)
Dept: RADIOLOGY | Age: 64
Discharge: HOME | End: 2019-11-22
Payer: COMMERCIAL

## 2019-11-22 VITALS — HEIGHT: 68 IN | WEIGHT: 184 LBS | BODY MASS INDEX: 27.89 KG/M2

## 2019-11-22 DIAGNOSIS — Z12.31 VISIT FOR SCREENING MAMMOGRAM: ICD-10-CM

## 2019-11-22 PROCEDURE — 77063 BREAST TOMOSYNTHESIS BI: CPT

## 2019-11-22 PROCEDURE — 77067 SCR MAMMO BI INCL CAD: CPT

## 2019-11-26 ENCOUNTER — OFFICE VISIT (OUTPATIENT)
Dept: PHYSICAL THERAPY | Age: 64
End: 2019-11-26
Payer: COMMERCIAL

## 2019-11-26 DIAGNOSIS — M25.511 ACUTE PAIN OF RIGHT SHOULDER: Primary | ICD-10-CM

## 2019-11-26 PROCEDURE — 97110 THERAPEUTIC EXERCISES: CPT | Performed by: PHYSICAL THERAPIST

## 2019-11-26 PROCEDURE — 97140 MANUAL THERAPY 1/> REGIONS: CPT | Performed by: PHYSICAL THERAPIST

## 2019-11-26 NOTE — PROGRESS NOTES
Daily Note     Today's date: 2019  Patient name: Alicia Cevallos  : 1955  MRN: [id number]  Referring provider: Reese Chairez MD  Dx:   Encounter Diagnosis     ICD-10-CM    1  Acute pain of right shoulder M25 511                   Subjective: Pt reports she is feeling good, only has pain when she moves her right arm a certain way  Pt has been doing exercises at home  Objective: See treatment diary below      Assessment: Initiation of treatment today  Patient tolerated treatment well  Patient experienced slight discomfort only at end range of shoulder flexion during manual therapy  Patient reported prone exercises and ER/IR exercises were most challenging for her and she can feel her weakness  Pt demonstrated good technique with all exercises today  Pt provided YTB to perform shoulder and scapular strengthening exercises for HEP  Plan to progress patient next treatment to tolerance  Patient would benefit from continued PT to decrease pain, increase ROM, increase strength and improve functional abilities and QOL  Plan: Continue per plan of care        Precautions     Specialty Daily Treatment Diary       Manual        Shoulder PROM, shld inf, PA mobs 10'                       Exercise Diary                 UBE 3/3               TB rows, ext YTB 10x2       TB ER/IR YTB 10x2               TB serratus wall slides  Add      Pec Stretch 15sec x5       Wall Push ups 5x3       Supine serratus punch 2# 10x2       S/l ER 2# 10x2       S/l ABD 1# 10x2       Prone Rows 2# 10x2       Prone flex, abd, ext 10x2                                                                               Modalities

## 2019-12-04 ENCOUNTER — OFFICE VISIT (OUTPATIENT)
Dept: PHYSICAL THERAPY | Age: 64
End: 2019-12-04
Payer: COMMERCIAL

## 2019-12-04 DIAGNOSIS — M25.511 ACUTE PAIN OF RIGHT SHOULDER: Primary | ICD-10-CM

## 2019-12-04 PROCEDURE — 97140 MANUAL THERAPY 1/> REGIONS: CPT | Performed by: PHYSICAL THERAPIST

## 2019-12-04 PROCEDURE — 97110 THERAPEUTIC EXERCISES: CPT | Performed by: PHYSICAL THERAPIST

## 2019-12-04 NOTE — PROGRESS NOTES
Daily Note     Today's date: 2019  Patient name: Susanna Ashley  : 1955  MRN: [id number]  Referring provider: Jermaine Macdonald MD  Dx:   Encounter Diagnosis     ICD-10-CM    1  Acute pain of right shoulder M25 511                   Subjective: Pt reports she is feeling better and hasn't had as much pain  Objective: See treatment diary below      Assessment: Patient tolerated treatment well  Patient has been compliant with HEP, TB ER has been most challenging for her  Added ball on wall ABC's, serratus wall slides with TB, and shoulder taps today  Pt demonstrated slight discomfort during manual therapy during PROM at end range of shld flexion and abduction  Printed prone exercises and new exercises performed today for patient to perform for HEP  Patient had limited time today d/t having work today  Pt needing VC for proper exercise technique during new exercises today  Pt continues to improve with strength and functional abilities  Patient would benefit from continued PT to decrease pain, increase ROM, increase strength and improve functional abilities and QOL  Plan: Continue per plan of care        Precautions     Specialty Daily Treatment Diary       Manual       Shoulder PROM, shld inf, PA mobs 10' 10'                      Exercise Diary                 UBE 3/3 4/4              TB rows, ext YTB 10x2 RTB 10x2      TB ER/IR YTB 10x2 YTB 10x2              TB serratus wall slides  YTB 10x2      Pec Stretch 15sec x5 Home      Wall Push ups 5x3 Home      Supine serratus punch 2# 10x2 2# 10x2      S/l ER 2# 10x2 3# 10x2      S/l ABD 1# 10x2 2# 10x2      Prone Rows 2# 10x2 Home      Prone flex, abd, ext 10x2 Home               Shoulder Taps  10x2                                                              Modalities

## 2019-12-09 ENCOUNTER — ANNUAL EXAM (OUTPATIENT)
Dept: OBGYN CLINIC | Facility: MEDICAL CENTER | Age: 64
End: 2019-12-09
Payer: COMMERCIAL

## 2019-12-09 VITALS
BODY MASS INDEX: 28.19 KG/M2 | DIASTOLIC BLOOD PRESSURE: 82 MMHG | SYSTOLIC BLOOD PRESSURE: 144 MMHG | HEIGHT: 68 IN | WEIGHT: 186 LBS

## 2019-12-09 DIAGNOSIS — Z01.419 ENCOUNTER FOR GYNECOLOGICAL EXAMINATION (GENERAL) (ROUTINE) WITHOUT ABNORMAL FINDINGS: Primary | ICD-10-CM

## 2019-12-09 DIAGNOSIS — Z12.31 ENCOUNTER FOR SCREENING MAMMOGRAM FOR MALIGNANT NEOPLASM OF BREAST: ICD-10-CM

## 2019-12-09 PROCEDURE — 99397 PER PM REEVAL EST PAT 65+ YR: CPT | Performed by: OBSTETRICS & GYNECOLOGY

## 2019-12-09 RX ORDER — TRIAMCINOLONE ACETONIDE 40 MG/ML
INJECTION, SUSPENSION INTRA-ARTICULAR; INTRAMUSCULAR
COMMUNITY
End: 2020-06-23

## 2019-12-09 NOTE — PROGRESS NOTES
Ples Isabel   1955    CC:  Yearly exam    S:  75 y o  female here for yearly exam  She is postmenopausal and has had no vaginal bleeding  She denies vaginal discharge, itching, odor or dryness  She does have urinary incontinence/OAB - is on vesicare  Was offered botox injections in the bladder, but she declined  Is unhappy with her leaking, but does not want to puruse intervention  Notes some "bumps" near her clitoris, not painful, only notes when wiping  Two daughters in [place], [place] - one is pregnant with first child ! 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, but staying on per [employer]  Sexual activity: She is not sexually active  Last Pap: 11/2016  Last Mammo: 11/22/19 - benign, managed by Dr Montana Gipson  Last Colonoscopy: 8/12/19 - Dr Rashad Delaney  Last DEXA: 8/29/19 - osteopenia right hip, Dr Thomas Dalton    [facility]       Family hx of breast cancer: self, aunt, cousin  Family hx of ovarian cancer:  no  Family hx of colon cancer: uncle      Current Outpatient Medications:     chlorthalidone 25 mg tablet, Take 1 tablet (25 mg total) by mouth daily, Disp: 90 tablet, Rfl: 3    levothyroxine (SYNTHROID) 75 mcg tablet, Take 1 tablet by mouth every other day, Disp: , Rfl:     levothyroxine (SYNTHROID) 88 mcg tablet, Take 1 tablet by mouth every other day, Disp: , Rfl:     loratadine (CLARITIN) 10 mg tablet, Take 10 mg by mouth daily, Disp: , Rfl:     magnesium gluconate (MAGONATE) 500 mg tablet, Take 500 mg by mouth 2 (two) times a day, Disp: , Rfl:     Multiple Vitamins-Minerals (MULTIVITAMIN ADULT PO), multivitamin, Disp: , Rfl:     solifenacin (VESICARE) 10 MG tablet, Take 1 tablet (10 mg total) by mouth daily, Disp: 90 tablet, Rfl: 1    VITAMIN D, ERGOCALCIFEROL, PO, Take 2,000 Units by mouth daily, Disp: , Rfl:     denosumab (PROLIA) 60 mg/mL, Inject 60 mg under the skin every 6 (six) months, Disp: , Rfl:     sodium hyaluronate (ORTHOVISC) 30 mg/2 mL SOSY injection, ORTHOVISC 30 mg/2 mL intra-articular syringe  Inject 2 mL every week by intra-articular route , Disp: , Rfl:     triamcinolone acetonide (KENALOG-40) 40 mg/mL, Kenalog 40 mg/mL suspension for injection  Take 1 mL by injection route , Disp: , Rfl:     Zoster Vaccine Live (ZOSTAVAX) 90601 UNT/0 65ML SUSR, Zostavax (PF) 19,400 unit/0 65 mL subcutaneous suspension, Disp: , Rfl:   Social History     Socioeconomic History    Marital status: Single     Spouse name: Not on file    Number of children: Not on file    Years of education: Not on file    Highest education level: Not on file   Occupational History    Not on file   Social Needs    Financial resource strain: Not on file    Food insecurity:     Worry: Not on file     Inability: Not on file    Transportation needs:     Medical: Not on file     Non-medical: Not on file   Tobacco Use    Smoking status: Never Smoker    Smokeless tobacco: Never Used   Substance and Sexual Activity    Alcohol use: Yes     Frequency: Monthly or less     Drinks per session: 1 or 2     Binge frequency: Never     Comment: "rare"    Drug use: No    Sexual activity: Not Currently     Birth control/protection: Abstinence, Post-menopausal   Lifestyle    Physical activity:     Days per week: Not on file     Minutes per session: Not on file    Stress: Not on file   Relationships    Social connections:     Talks on phone: Not on file     Gets together: Not on file     Attends [employer] service: Not on file     Active member of club or organization: Not on file     Attends meetings of clubs or organizations: Not on file     Relationship status: Not on file    Intimate partner violence:     Fear of current or ex partner: Not on file     Emotionally abused: Not on file     Physically abused: Not on file     Forced sexual activity: Not on file   Other Topics Concern    Not on file   Social History Narrative    Not on file     Family History   Problem Relation Age of Onset    Uterine cancer Mother 71    Throat cancer Father [de-identified]    Breast cancer additional onset Paternal Aunt 54    Breast cancer additional onset Cousin 28    No Known Problems Daughter     No Known Problems Maternal Grandmother     No Known Problems Maternal Grandfather     No Known Problems Paternal Grandmother     No Known Problems Paternal Grandfather     No Known Problems Daughter     No Known Problems Maternal Aunt     No Known Problems Maternal Aunt     No Known Problems Maternal Aunt     No Known Problems Maternal Aunt     No Known Problems Paternal Aunt     Colon cancer Maternal Uncle 79     Past Medical History:   Diagnosis Date    Arthritis     Breast cancer ([facility] [address] ) 01/17/2014    radiation    Disease of thyroid gland     History of radiation therapy 2014    breast cancer    Hypothyroid     Knee pain, right     Limb alert care status     no BP's /IV's right side    Mitral valve prolapse     Numbness of anterior thigh     left and tingling    Seasonal allergies     dust,mold,trees    Urinary incontinence     Varicose veins of both lower extremities     Wears glasses         Review of Systems   Respiratory: Negative  Cardiovascular: Negative  Gastrointestinal: Negative for constipation and diarrhea  Genitourinary: Negative for difficulty urinating, pelvic pain, vaginal bleeding, vaginal discharge, itching or odor  O:  Blood pressure 144/82, height 5' 7 5" (1 715 m), weight 84 4 kg (186 lb), not currently breastfeeding  Patient appears well and is not in distress  Neck is supple without masses  Breasts are symmetrical without mass, tenderness, nipple discharge, skin changes or adenopathy  Abdomen is soft and nontender without masses  External genitals are normal without lesions or rashes    Urethral meatus + caruncle  Bladder is normal to palpation  Vagina is atrophic, + rectocele and cystocele, grade 1   Cervix is normal without discharge or lesion  Uterus is normal, mobile, nontender without palpable mass  Adnexa are normal, nontender, without palpable mass  A:  Yearly exam      P:   Pap completed  Mammo slip per Dr Shirely Puckett   Colonoscopy per Dr Sho Win per Dr Glenda Schmidt one year for yearly exam or sooner as needed

## 2019-12-10 ENCOUNTER — OFFICE VISIT (OUTPATIENT)
Dept: PHYSICAL THERAPY | Age: 64
End: 2019-12-10
Payer: COMMERCIAL

## 2019-12-10 DIAGNOSIS — M25.511 ACUTE PAIN OF RIGHT SHOULDER: Primary | ICD-10-CM

## 2019-12-10 PROCEDURE — 97140 MANUAL THERAPY 1/> REGIONS: CPT | Performed by: PHYSICAL THERAPIST

## 2019-12-10 PROCEDURE — 97110 THERAPEUTIC EXERCISES: CPT | Performed by: PHYSICAL THERAPIST

## 2019-12-10 NOTE — PROGRESS NOTES
Daily Note     Today's date: 12/10/2019  Patient name: Oanh Corea  : 1955  MRN: [id number]  Referring provider: Neymar Guy MD  Dx:   Encounter Diagnosis     ICD-10-CM    1  Acute pain of right shoulder M25 511                   Subjective: "I feel like I am slowly getting better "      Objective: See treatment diary below      Assessment: Patient tolerated treatment well  Patient demonstrated less pain and irritability during exercise program today  Patient demonstrated increased ROM during manual therapy today with less irritation  Patient reports serratus anterior and wall slides and ball on wall ABCs are the most challenging for her  Pt performed s/l shoulder abduction in painfree range  Pt is planning on coming to therapy 1x next week, will hold off the last 2 weeks of December and will start therapy again the first week of January  Patient would benefit from continued PT to decrease pain, increase ROM, increase strength and improve functional abilities and QOL  Plan: Continue per plan of care        Precautions     Specialty Daily Treatment Diary       Manual 11/26 12/4 12/10, FOTO     Shoulder PROM, shld inf, PA mobs 10' 10' 10'                     Exercise Diary                 UBE 3/3 4/4 4/4             TB rows, ext YTB 10x2 RTB 10x2 RTB 10x2     TB ER/IR YTB 10x2 YTB 10x2 YTB 10x2             TB serratus wall slides  YTB 10x2 YTB 10x2     Pec Stretch 15sec x5 Home 15sec x5     Wall Push ups 5x3 Home 5x3     Supine serratus punch 2# 10x2 2# 10x2 3# 10x2     S/l ER 2# 10x2 3# 10x2 3# 10x2     S/l ABD 1# 10x2 2# 10x2 2# 10x2     Prone Rows 2# 10x2 Home 3# 10x2     Prone flex, abd, ext 10x2 Home  3# flex, ext, 3# abd 10x2             Shoulder Taps  10x2 10x2     Ball on wall ABC's  2x 2x                                                     Modalities

## 2019-12-17 ENCOUNTER — OFFICE VISIT (OUTPATIENT)
Dept: PHYSICAL THERAPY | Age: 64
End: 2019-12-17
Payer: COMMERCIAL

## 2019-12-17 DIAGNOSIS — M25.511 ACUTE PAIN OF RIGHT SHOULDER: Primary | ICD-10-CM

## 2019-12-17 PROCEDURE — 97140 MANUAL THERAPY 1/> REGIONS: CPT | Performed by: PHYSICAL THERAPIST

## 2019-12-17 PROCEDURE — 97110 THERAPEUTIC EXERCISES: CPT | Performed by: PHYSICAL THERAPIST

## 2019-12-17 NOTE — PROGRESS NOTES
Daily Note     Today's date: 2019  Patient name: Kanika Wagoner  : 1955  MRN: [id number]  Referring provider: Scott Goodwin MD  Dx:   Encounter Diagnosis     ICD-10-CM    1  Acute pain of right shoulder M25 511                   Subjective: Pt reports she was unable to perform standing shld abduction with a 3# weight and it gave her some pain  Objective: See treatment diary below      Assessment: Patient tolerated treatment well  Pt demonstrated increase in ROM before feeling slight irritation during manual therapy  Pt states she has no pain during any exercises today during therapy  Increased dumbbell weights and added standing flexion/scaption today  Pt educated to not perform standing shld abduction with weights at home for now  Pt had better technique with ball on wall ABCs and serratus slides on wall  Pt needing VCs to keep back straight during shoulder taps  Pt performed TB exercises at home today d/t being short on time  Pt will continue with HEP for the next two weeks and will return to therapy in the beginning of January  Pt states she feels comfortable with all of her exercises to perform  Pt will benefit from continued skilled PT to address impairments and increase functional abilities  Plan: Continue per plan of care        Precautions     Specialty Daily Treatment Diary       Manual 11/26 12/4 12/10, FOTO     Shoulder PROM, shld inf, PA mobs 10' 10' 10' 10'                    Exercise Diary                 UBE 3/3 4/4 4/4 4/4            TB rows, ext YTB 10x2 RTB 10x2 RTB 10x2 Home    TB ER/IR YTB 10x2 YTB 10x2 YTB 10x2 Home            TB serratus wall slides  YTB 10x2 YTB 10x2 YTB 10x2    Pec Stretch 15sec x5 Home 15sec x5 15sec x5    Wall Push ups 5x3 Home 5x3 10x2    Supine serratus punch 2# 10x2 2# 10x2 3# 10x2 4# 10x2    S/l ER 2# 10x2 3# 10x2 3# 10x2 4# 10x2    S/l ABD 1# 10x2 2# 10x2 2# 10x2 2# 10x2    Prone Rows 2# 10x2 Home 3# 10x2 4# 10x3    Prone flex, abd, ext 10x2 Home  3# flex, ext, 3# abd 10x2 3# 10x2            Shoulder Taps  10x2 10x2 10x2    Ball on wall ABC's  2x 2x 2x    Standing shld flex, scaption    2# 10x2                                            Modalities

## 2019-12-20 DIAGNOSIS — N32.81 OAB (OVERACTIVE BLADDER): ICD-10-CM

## 2019-12-20 RX ORDER — SOLIFENACIN SUCCINATE 10 MG/1
TABLET, FILM COATED ORAL
Qty: 90 TABLET | Refills: 0 | Status: SHIPPED | OUTPATIENT
Start: 2019-12-20 | End: 2020-04-02 | Stop reason: SDUPTHER

## 2019-12-27 DIAGNOSIS — I83.813 VARICOSE VEINS OF BILATERAL LOWER EXTREMITIES WITH PAIN: Primary | ICD-10-CM

## 2020-01-07 ENCOUNTER — OFFICE VISIT (OUTPATIENT)
Dept: PHYSICAL THERAPY | Age: 65
End: 2020-01-07
Payer: COMMERCIAL

## 2020-01-07 DIAGNOSIS — M54.50 ACUTE BILATERAL LOW BACK PAIN WITHOUT SCIATICA: Primary | ICD-10-CM

## 2020-01-07 PROCEDURE — 97110 THERAPEUTIC EXERCISES: CPT | Performed by: PHYSICAL THERAPIST

## 2020-01-07 PROCEDURE — 97162 PT EVAL MOD COMPLEX 30 MIN: CPT | Performed by: PHYSICAL THERAPIST

## 2020-01-07 NOTE — PROGRESS NOTES
PT Evaluation     Today's date: 2020  Patient name: Oanh Corea  : 1955  MRN: [id number]  Referring provider: Viviana Alejandro PT  Dx:   Encounter Diagnosis     ICD-10-CM    1  Acute bilateral low back pain without sciatica M54 5                   Assessment  Assessment details: Patient seen for PT evaluation for acute on chronic low back pain and for patient's complaint of generalized weakness in LEs  PT reviewed HEP and reviewed gym routine  Made recommendations for gym routine and established HEP for simple stretches and strengthening exercises  Patient with mild-moderate curvature in thoracolumbar spine, 1" leg length discrepancy which contributes to the occasional back pain, lacks core strength, glute strength, quad and hamstring strength to be able to assist with lifting self from 1/2 kneel position to standing position (which is patient's primary complaint)  Performed HEP during evaluation, ready for higher level progressions next session and will progress accordingly  Impairments: abnormal or restricted ROM, activity intolerance, difficulty understanding, impaired physical strength, lacks appropriate home exercise program, poor posture  and poor body mechanics  Functional limitations: Patient reports moderate limitations with IADLs, with standing tolreance, with getting up/down from the floor, with recreational activities, and with walking tolerance  Understanding of Dx/Px/POC: good   Prognosis: good    Goals  Impairment Goals to be met within 4 weeks  - Improve ROM by 5-10 degrees lumbar ROM  - Increase strength to 5/5 throughout  - Improve postural control and core strength     Functional Goals to be met within 4-6 weeks     - Return to Prior Level of Function  - Increase Functional Status Measure to: expected  - Patient will be independent with HEP  - Patient to be able to get up/down from the floor without UE assist        Plan  Patient would benefit from: skilled physical therapy  Planned modality interventions: cryotherapy and thermotherapy: hydrocollator packs  Planned therapy interventions: abdominal trunk stabilization, joint mobilization, manual therapy, neuromuscular re-education, balance, patient education, postural training, strengthening, stretching, therapeutic activities, therapeutic exercise, home exercise program and gait training  Frequency: 2x week  Duration in weeks: 6  Treatment plan discussed with: patient        Subjective Evaluation    History of Present Illness  Mechanism of injury: Patient reports long history of back pain and has a recent increase in back pain recently over the holidays  Wanting a home program, to exercise at the gym and to strengthen her legs  Knows that she has a shorter leg - 1" shorter on L side; has had since birth  Patient reports no back pain currently, feels that she doesn't have the strength to support lifting herself up from the floor    Pain  No pain reported  Aggravating factors: lifting, overhead activity, stair climbing and walking  Progression: no change    Social Support  Steps to enter house: yes  Stairs in house: yes   Lives in: multiple-level home  Lives with: spouse    Employment status: not working  Treatments  No previous or current treatments  Patient Goals  Patient goals for therapy: improved balance, increased motion, increased strength and return to sport/leisure activities          Objective     Postural Observations  Seated posture: fair  Standing posture: fair  Correction of posture: has no consistent effect        Neurological Testing     Sensation     Lumbar   Left   Intact: light touch    Right   Intact: light touch    Reflexes   Left   Clonus sign: negative    Right   Clonus sign: negative    Active Range of Motion     Lumbar   Flexion: 70 degrees   Extension: 25 degrees   Left lateral flexion: 25 degrees       Right lateral flexion: 25 (with R foot PF) degrees   Left rotation: 15 degrees   Right rotation: 15 degrees   Left Hip   Normal active range of motion    Right Hip   Normal active range of motion  Left Knee   Normal active range of motion    Right Knee   Normal active range of motion    Strength/Myotome Testing     Left Hip   Planes of Motion   Flexion: 4  Abduction: 4  Adduction: 5    Right Hip   Planes of Motion   Flexion: 4-  Abduction: 4  Adduction: 5    Left Knee   Extension: 4-    Right Knee   Extension: 4-    Left Ankle/Foot   Dorsiflexion: 4-  Plantar flexion: 5  Inversion: 5    Right Ankle/Foot   Dorsiflexion: 4-  Plantar flexion: 5  Inversion: 5             Precautions none    Specialty Daily Treatment Diary       Manual 1/7                               Exercise Diary                 TM walk                Piriformis stretch?                 LTR FOR HEP       DBL knee to chest FOR HEP       Bridges with TB 2x10 BTB       Clam shells with TB 10x BTB       Supine p ball press 5x:05       Prone Is, Ts        Prone Ws        Prone DLS alt arm/leg                Quadruped alt arms/legs                Squats to hi/lo        Step ups FW, lateral        Step downs                Modalities

## 2020-01-10 ENCOUNTER — OFFICE VISIT (OUTPATIENT)
Dept: PHYSICAL THERAPY | Age: 65
End: 2020-01-10
Payer: COMMERCIAL

## 2020-01-10 DIAGNOSIS — M54.50 ACUTE BILATERAL LOW BACK PAIN WITHOUT SCIATICA: Primary | ICD-10-CM

## 2020-01-10 DIAGNOSIS — M25.511 ACUTE PAIN OF RIGHT SHOULDER: ICD-10-CM

## 2020-01-10 PROCEDURE — 97110 THERAPEUTIC EXERCISES: CPT | Performed by: PHYSICAL THERAPIST

## 2020-01-14 ENCOUNTER — OFFICE VISIT (OUTPATIENT)
Dept: SURGICAL ONCOLOGY | Facility: CLINIC | Age: 65
End: 2020-01-14
Payer: COMMERCIAL

## 2020-01-14 ENCOUNTER — TELEPHONE (OUTPATIENT)
Dept: SURGICAL ONCOLOGY | Facility: CLINIC | Age: 65
End: 2020-01-14

## 2020-01-14 ENCOUNTER — OFFICE VISIT (OUTPATIENT)
Dept: PHYSICAL THERAPY | Age: 65
End: 2020-01-14
Payer: COMMERCIAL

## 2020-01-14 VITALS
SYSTOLIC BLOOD PRESSURE: 130 MMHG | BODY MASS INDEX: 28.79 KG/M2 | DIASTOLIC BLOOD PRESSURE: 80 MMHG | WEIGHT: 190 LBS | TEMPERATURE: 98.4 F | HEIGHT: 68 IN | RESPIRATION RATE: 16 BRPM | HEART RATE: 64 BPM

## 2020-01-14 DIAGNOSIS — Z08 ENCOUNTER FOR FOLLOW-UP EXAMINATION AFTER COMPLETED TREATMENT FOR MALIGNANT NEOPLASM: Primary | ICD-10-CM

## 2020-01-14 DIAGNOSIS — M25.511 ACUTE PAIN OF RIGHT SHOULDER: ICD-10-CM

## 2020-01-14 DIAGNOSIS — M54.50 ACUTE BILATERAL LOW BACK PAIN WITHOUT SCIATICA: Primary | ICD-10-CM

## 2020-01-14 DIAGNOSIS — Z85.3 HISTORY OF RIGHT BREAST CANCER: ICD-10-CM

## 2020-01-14 DIAGNOSIS — Z12.31 VISIT FOR SCREENING MAMMOGRAM: ICD-10-CM

## 2020-01-14 PROCEDURE — 99213 OFFICE O/P EST LOW 20 MIN: CPT | Performed by: NURSE PRACTITIONER

## 2020-01-14 PROCEDURE — 97110 THERAPEUTIC EXERCISES: CPT | Performed by: PHYSICAL THERAPIST

## 2020-01-14 NOTE — PROGRESS NOTES
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, knows that she had 1" of bone removed  Is wearing her sneaker and has been wearing more at home and outside of her home  Feels a little off when walking as her shoes correct her leg length discrepancy  No pain from TE last time  Feeling looser in hips as she's been stretching more at home, each morning  Objective: See treatment diary below      Assessment: Continued with current program  Once patient's leg discrepancy corrected, stands with trunk laterally flexed; cues and added side glides to address postural deficits, able to correct, however, unable to maintain  Performing prone press ups for ROM only  Continued with current program  Good core control with bridges with ruthie  Plan: Continue per plan of care  Precautions none    Specialty Daily Treatment Diary       Manual 1/7 1/10 1/14                             Exercise Diary                 TM walk  x10' x10'        Standing side glides 10x- L     Piriformis stretch?   5x:20 ea -             LTR FOR HEP 5x:10 5x:10     DBL knee to chest FOR HEP Single 5x:20 ea 5x:20       Supine hip flexor stretch 5x:20 ea 5x:20       VIPUL 2x:30 Dc- unchanged symptoms in toes       Prone press ups 2x10 2x10             Bridges with TB 2x10 BTB  Bridges with marches 2x10     Clam shells with TB 10x BTB       Supine p ball press 5x:05       Prone Is, Ts   NV     Prone Ws   NV     Prone DLS alt arm/leg  41E alt 2x10             Quadruped alt arms/legs                Squats to hi/lo   NV     Step ups FW, lateral   NV     Step downs Modalities

## 2020-01-14 NOTE — TELEPHONE ENCOUNTER
Genetics New Patient Intake Form   Patient Details:     Jeanette White    1955    [id number]    Background Information:       Who is calling to schedule? If not self, relationship to patient? Provider- Gilmar Rhoades   Referring Provider Candance Overland   Is this a personal or family history? personal   If personal history, what age were you at diagnosis? 2014   What is the type of tumor? Right breast cancer    Did the patient schedule an appointment? No   Date and Provider Name  Natalie Union [employer] Information:   Preferred [place]: [facility]   Are there any dates/time the patient cannot be seen? na   Miscellaneous: Tuesday and Friday are best   After completing the above information, please route to [address]

## 2020-01-14 NOTE — PROGRESS NOTES
Pre-Test Genetic Counseling Consult Note    Patient Name: Nakul Landa   /Age: / y o  Referring Provider: SRIKANTH Fletcher     Date of Service: 2020  Genetic Counselor: Luis Felipe Mckeon [facility]    Interpretation Services: None    Service: Type: In-person consult at [facility] of Visit: 61 minutes      Drewsey was referred to the [address] [place] [address] and Genetic Assessment Program due to her personal history of breast cancer and her family history of breast, colon and uterine cancer  Cancer and Treatment History:     History of right breast cancer    2013 Biopsy (Age 62)  Right breast biopsy, 12:00  Infiltrating mammary carcinoma  %  %  HER-2 negative     2014 Surgery  Right lumpectomy (Dr Trisha Pappas)  Stage IA- pT1b, pN0, G1     2014 - 4/3/2014 Radiation     2014 Genomic Testing  Oncotype 8, low risk      -  Hormone Therapy  Letrozole (Dr Kayla Epps)    Cancer Screening:     Breast  -Self-breast exams: Monthly    -Clinical breast exam: Annual    -Mammogram:See below   19 Bilateral  There are no suspicious masses, grouped microcalcifications or areas of architectural distortion  The skin and nipple areolar complex are unremarkable  IMPRESSION:  No mammographic evidence of malignancy  -Breast ultrasound: None   -Breast MRI: See below   19  FINDINGS:   Bilateral  There are no suspicious enhancing masses or suspicious areas of nonmass enhancement  There is no axillary or internal mammary adenopathy     Postsurgical changes are noted the right breast   IMPRESSION:  No suspicious enhancement identified in either breast   ASSESSMENT/BI-RADS CATEGORY:  Left: 2 - Benign  Right: 2 - Benign  Overall: 2 - Benign  RECOMMENDATION:       - Routine screening mammogram in 1 year for both breasts  -Breast biopsy: x4     Colon  -Colonoscopy:  Most recent in 2019-no polyps; expecting to repeat in 5 years     FINAL PATHOLOGIC DIAGNOSIS  Reported:  2014  A  Transverse colon, polyp:       - Tubular adenoma  - No high grade dysplasia or carcinoma identified       Gynecologic  -Pelvic exam:Annual    -Pap smear: None   -Trans-vaginal ultrasound: None   -CA-125 level:None   -Ovaries and uterus: Intact    Skin   -Skin cancer screening: Annual with dermatologist     Reproductive History  -Age at menarche: 11y  -Parity:    -Age at first live birth: 30y  -:Yes  -Oral Contraception: None   -Fertility Medication: None   -Menopause: 60; natural   -Hormone replacement: None      Medical and Surgical History  Pertinent surgical history:   Past Surgical History:   Procedure Laterality Date    BREAST BIOPSY Left 2002    BREAST BIOPSY Left 2005    high risk    BREAST BIOPSY Right 2013    positive    BREAST EXCISIONAL BIOPSY Left 2006    high risk    BREAST EXCISIONAL BIOPSY Left 10/13/2006    BREAST LUMPECTOMY Right 2014    malignant    BREAST SURGERY Right     lumpectomy    BUNIONECTOMY Bilateral     COLONOSCOPY      EXPLORATORY LAPAROTOMY      "for fertility"    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    KNEE ARTHROSCOPY Right     MS TOTAL KNEE ARTHROPLASTY Right 2017    Procedure: ARTHROPLASTY KNEE TOTAL;  Surgeon: Aris Griffith MD;  Location: [facility];  Service: Orthopedics    TONSILLECTOMY      WISDOM TOOTH EXTRACTION        Pertinent medical history:  Past Medical History:   Diagnosis Date    Arthritis     Breast cancer ([facility] [address] ) 2014    radiation    Disease of thyroid gland     History of radiation therapy     breast cancer    Hypothyroid     Knee pain, right     Limb alert care status     no BP's /IV's right side    Mitral valve prolapse     Numbness of anterior thigh     left and tingling    Seasonal allergies     dust,mold,trees    Urinary incontinence     Varicose veins of both lower extremities     Wears glasses        Other History:  Height:   Ht Readings from Last 1 Encounters:   01/14/20 5' 7 5" (1 715 m)     Weight:   Wt Readings from Last 1 Encounters:   01/14/20 86 2 kg (190 lb)      Relevant Family History:  [facility]  (Non-Gn[facility]) both maternal and paternal     Refer to the scanned pedigree in the Media Tab for a complete family history  Relevant History: Mother: Bladder cancer age 71; history of heavy smoking  Maternal Uncle: Colon cancer age 78  Maternal first cousin (female): Breast cancer in 42's  Maternal first cousin (female): Uterine cancer age 59    Father: Throat cancer; history of heavy smoking  Paternal Aunt: Breast cancer age 42's    *All history is reported as provided by the patient; records are not available for review, except where indicated  Assessment:    Meets NCCN V1 2020 Testing Criteria for High-Penetrance Breast and/or Ovarian Caner Susceptibility genes   (This often includes BRCA1, BRCA2, CDH1, PALB2, PTEN and TP53 among others )   1  Breast cancer with at least one of the following:   o Diagnosed at any age with:  o ?1 close blood relative with breast cancer at age ? 48 y   * Close blood relatives include first-, second-, or third-degree relatives     Ny Justice meets NCCN testing criteria based upon both her maternal and paternal family histories  Specifically, she was diagnosed with breast cancer (any age) and has a paternal aunt (second-degree relative) diagnosed with breast cancer under the age of 48  She also has a maternal first cousin (third-degree relative) diagnosed with breast cancer under the age of 48  Genetic testing for hereditary cancer is available via single gene analysis or panel testing of multiple genes associated with an increased risk of cancer    The risks, benefits, and limitations of genetic testing were reviewed with the patient, as well as genetic discrimination laws, and possible test results (positive, negative, variants of uncertain significance) and their clinical implications  Jamari Callahan decided to proceed with testing and provided consent  Plan:     Summary: Genetic testing is indicated for Jamari Callaahn based on meeting NCCN testing criteria  Sample Collection: Saliva sample was collected in the office on 1/17/20      Genetic Testing Preformed: [facility] Cancers Panel (23 genes): GILLIAN, BARD1, BRCA1, BRCA2, BRIP1, CDH1, CHEK2, DICER1, EPCAM, MLH1, MSH2, MSH6, NBN, NF1, PALB2, PMS2, PTEN, RAD50, RAD51C, RAD51D, SMARCA4, STK11, TP53     Genetic Testing Lab: [facility]    Results take approximately 2-3 weeks to complete once test is started  We will contact Jamari Callahan once results are available  Additional recommendations for surveillance/medical management will be made pending genetic test results

## 2020-01-17 ENCOUNTER — CONSULT (OUTPATIENT)
Dept: GYNECOLOGIC ONCOLOGY | Facility: CLINIC | Age: 65
End: 2020-01-17

## 2020-01-17 ENCOUNTER — APPOINTMENT (OUTPATIENT)
Dept: LAB | Age: 65
End: 2020-01-17
Payer: COMMERCIAL

## 2020-01-17 ENCOUNTER — OFFICE VISIT (OUTPATIENT)
Dept: PHYSICAL THERAPY | Age: 65
End: 2020-01-17
Payer: COMMERCIAL

## 2020-01-17 DIAGNOSIS — Z13.220 SCREENING FOR HYPERLIPIDEMIA: ICD-10-CM

## 2020-01-17 DIAGNOSIS — M54.50 ACUTE BILATERAL LOW BACK PAIN WITHOUT SCIATICA: Primary | ICD-10-CM

## 2020-01-17 DIAGNOSIS — Z85.3 HISTORY OF RIGHT BREAST CANCER: Primary | ICD-10-CM

## 2020-01-17 DIAGNOSIS — Z17.0 MALIGNANT NEOPLASM OF UPPER-OUTER QUADRANT OF RIGHT BREAST IN FEMALE, ESTROGEN RECEPTOR POSITIVE (HCC): ICD-10-CM

## 2020-01-17 DIAGNOSIS — C50.411 MALIGNANT NEOPLASM OF UPPER-OUTER QUADRANT OF RIGHT BREAST IN FEMALE, ESTROGEN RECEPTOR POSITIVE (HCC): ICD-10-CM

## 2020-01-17 DIAGNOSIS — M25.511 ACUTE PAIN OF RIGHT SHOULDER: ICD-10-CM

## 2020-01-17 DIAGNOSIS — Z11.59 ENCOUNTER FOR HEPATITIS C SCREENING TEST FOR LOW RISK PATIENT: ICD-10-CM

## 2020-01-17 DIAGNOSIS — Z80.3 FAMILY HISTORY OF BREAST CANCER: ICD-10-CM

## 2020-01-17 DIAGNOSIS — Z80.0 FAMILY HISTORY OF MALIGNANT NEOPLASM OF DIGESTIVE ORGAN: ICD-10-CM

## 2020-01-17 LAB
ALBUMIN SERPL BCP-MCNC: 3.6 G/DL (ref 3.5–5)
ALP SERPL-CCNC: 50 U/L (ref 46–116)
ALT SERPL W P-5'-P-CCNC: 28 U/L (ref 12–78)
ANION GAP SERPL CALCULATED.3IONS-SCNC: 5 MMOL/L (ref 4–13)
AST SERPL W P-5'-P-CCNC: 15 U/L (ref 5–45)
BASOPHILS # BLD AUTO: 0.02 THOUSANDS/ΜL (ref 0–0.1)
BASOPHILS NFR BLD AUTO: 0 % (ref 0–1)
BILIRUB SERPL-MCNC: 0.55 MG/DL (ref 0.2–1)
BUN SERPL-MCNC: 22 MG/DL (ref 5–25)
CALCIUM SERPL-MCNC: 9 MG/DL (ref 8.3–10.1)
CANCER AG27-29 SERPL-ACNC: 12 U/ML (ref 0–42.3)
CHLORIDE SERPL-SCNC: 105 MMOL/L (ref 100–108)
CHOLEST SERPL-MCNC: 207 MG/DL (ref 50–200)
CO2 SERPL-SCNC: 32 MMOL/L (ref 21–32)
CREAT SERPL-MCNC: 0.98 MG/DL (ref 0.6–1.3)
EOSINOPHIL # BLD AUTO: 0.08 THOUSAND/ΜL (ref 0–0.61)
EOSINOPHIL NFR BLD AUTO: 2 % (ref 0–6)
ERYTHROCYTE [DISTWIDTH] IN BLOOD BY AUTOMATED COUNT: 13.4 % (ref 11.6–15.1)
GFR SERPL CREATININE-BSD FRML MDRD: 60 ML/MIN/1.73SQ M
GLUCOSE P FAST SERPL-MCNC: 92 MG/DL (ref 65–99)
HCT VFR BLD AUTO: 40.7 % (ref 34.8–46.1)
HCV AB SER QL: NORMAL
HDLC SERPL-MCNC: 55 MG/DL
HGB BLD-MCNC: 13 G/DL (ref 11.5–15.4)
IMM GRANULOCYTES # BLD AUTO: 0.01 THOUSAND/UL (ref 0–0.2)
IMM GRANULOCYTES NFR BLD AUTO: 0 % (ref 0–2)
LDLC SERPL CALC-MCNC: 133 MG/DL (ref 0–100)
LYMPHOCYTES # BLD AUTO: 1.03 THOUSANDS/ΜL (ref 0.6–4.47)
LYMPHOCYTES NFR BLD AUTO: 22 % (ref 14–44)
MCH RBC QN AUTO: 30.9 PG (ref 26.8–34.3)
MCHC RBC AUTO-ENTMCNC: 31.9 G/DL (ref 31.4–37.4)
MCV RBC AUTO: 97 FL (ref 82–98)
MONOCYTES # BLD AUTO: 0.3 THOUSAND/ΜL (ref 0.17–1.22)
MONOCYTES NFR BLD AUTO: 7 % (ref 4–12)
NEUTROPHILS # BLD AUTO: 3.15 THOUSANDS/ΜL (ref 1.85–7.62)
NEUTS SEG NFR BLD AUTO: 69 % (ref 43–75)
NRBC BLD AUTO-RTO: 0 /100 WBCS
PLATELET # BLD AUTO: 238 THOUSANDS/UL (ref 149–390)
PMV BLD AUTO: 10.5 FL (ref 8.9–12.7)
POTASSIUM SERPL-SCNC: 3.8 MMOL/L (ref 3.5–5.3)
PROT SERPL-MCNC: 7.3 G/DL (ref 6.4–8.2)
RBC # BLD AUTO: 4.21 MILLION/UL (ref 3.81–5.12)
SODIUM SERPL-SCNC: 142 MMOL/L (ref 136–145)
TRIGL SERPL-MCNC: 95 MG/DL
WBC # BLD AUTO: 4.59 THOUSAND/UL (ref 4.31–10.16)

## 2020-01-17 PROCEDURE — 97110 THERAPEUTIC EXERCISES: CPT | Performed by: PHYSICAL THERAPIST

## 2020-01-17 PROCEDURE — 80053 COMPREHEN METABOLIC PANEL: CPT

## 2020-01-17 PROCEDURE — 86803 HEPATITIS C AB TEST: CPT

## 2020-01-17 PROCEDURE — 86300 IMMUNOASSAY TUMOR CA 15-3: CPT

## 2020-01-17 PROCEDURE — 36415 COLL VENOUS BLD VENIPUNCTURE: CPT

## 2020-01-17 PROCEDURE — 80061 LIPID PANEL: CPT

## 2020-01-17 PROCEDURE — 85025 COMPLETE CBC W/AUTO DIFF WBC: CPT

## 2020-01-17 PROCEDURE — NC001 PR NO CHARGE: Performed by: GENETIC COUNSELOR, MS

## 2020-01-17 NOTE — PROGRESS NOTES
Daily Note     Today's date: 2020  Patient name: Geovany Villagran  : 1955  MRN: [id number]  Referring provider: Debi No, PT  Dx:   Encounter Diagnosis     ICD-10-CM    1  Acute bilateral low back pain without sciatica M54 5    2  Acute pain of right shoulder M25 511                   Subjective: Patient feels no pain today, feels that her back is loosening up well today  Upset because her dog had a seizure this morning  Objective: See treatment diary below      Assessment: Progressed program to include additional strengthening exercises  Fatigued in UE strength after prone press ups  Able to tolerate prone Is, Ts and Ws without increase in shoulder pain- would be able to tolerate increased reps NV  Added LE strengthening TE,  Moderately weak in L LE with SLR exercises  Needing cues for proper technique with squats, tends to flex knees past toes; able to correct, will need to continue to practice to work on form  Plan: Continue per plan of care  Precautions none    Specialty Daily Treatment Diary       Manual 1/7 1/10 1/14 1/17                            Exercise Diary                 TM walk  x10' x10' x10'       Standing side glides 10x- L     Piriformis stretch?   5x:20 ea - -            LTR FOR HEP 5x:10 5x:10 5x:10    DBL knee to chest FOR HEP Single 5x:20 ea 5x:20 -      Supine hip flexor stretch 5x:20 ea 5x:20 5x:20      VIPUL 2x:30 Dc- unchanged symptoms in toes       Prone press ups 2x10 2x10 2x10            Bridges with TB 2x10 BTB  Bridges with marches 2x10 Bridges 20x  Bridges with marches 2x10    Clam shells with TB 10x BTB   -    Supine p ball press 5x:05   -    Prone Is, Ts   NV 10x    Prone Ws   NV 10x    Prone DLS alt arm/leg  31D alt 2x10 2x10        SLR flexion 2# 15x  S/l hip abd 2# 15x     Quadruped alt arms/legs                Squats to hi/lo   NV 15x    Step ups FW, lateral   NV -    Step downs                Modalities

## 2020-01-17 NOTE — PATIENT INSTRUCTIONS
Plan:     Summary: Genetic testing is indicated for Shima Valenzuela based on meeting NCCN testing criteria  Sample Collection: Saliva sample was collected in the office on 1/17/20      Genetic Testing Preformed: [facility] Cancers Panel (23 genes): GILLIAN, BARD1, BRCA1, BRCA2, BRIP1, CDH1, CHEK2, DICER1, EPCAM, MLH1, MSH2, MSH6, NBN, NF1, PALB2, PMS2, PTEN, RAD50, RAD51C, RAD51D, SMARCA4, STK11, TP53     Genetic Testing Lab: [facility]    Results take approximately 2-3 weeks to complete once test is started  We will contact Shima Valenzuela once results are available  Additional recommendations for surveillance/medical management will be made pending genetic test results

## 2020-01-19 ENCOUNTER — TELEPHONE (OUTPATIENT)
Dept: FAMILY MEDICINE CLINIC | Facility: CLINIC | Age: 65
End: 2020-01-19

## 2020-01-19 DIAGNOSIS — E78.00 PURE HYPERCHOLESTEROLEMIA: ICD-10-CM

## 2020-01-19 DIAGNOSIS — I10 BENIGN ESSENTIAL HTN: Primary | ICD-10-CM

## 2020-01-19 PROBLEM — Z08 ENCOUNTER FOR FOLLOW-UP EXAMINATION AFTER COMPLETED TREATMENT FOR MALIGNANT NEOPLASM: Status: RESOLVED | Noted: 2020-01-14 | Resolved: 2020-01-19

## 2020-01-20 NOTE — TELEPHONE ENCOUNTER
Recheck levels in 4-6 months, orders placed, have her re-schedule her follow up with me for the same amount of time so we can review the labs in person

## 2020-01-20 NOTE — TELEPHONE ENCOUNTER
Please call Williammeagan aBn and let her know that her labs were normal, Hep C test negative, but her cholesterol is elevated  she has a 9% risk of having a stroke or heart attack in the next 10 years  she should follow a low cholesterol diet  I do recommend a statin to help reduce her overall risk since her levels are high enough, once she gets this message, I will send it in  Thank you!     Appointment on 01/17/2020   Component Date Value Ref Range Status    CA 27 29 01/17/2020 12 0  0 0 - 42 3 U/mL Final    WBC 01/17/2020 4 59  4 31 - 10 16 Thousand/uL Final    RBC 01/17/2020 4 21  3 81 - 5 12 Million/uL Final    Hemoglobin 01/17/2020 13 0  11 5 - 15 4 g/dL Final    Hematocrit 01/17/2020 40 7  34 8 - 46 1 % Final    MCV 01/17/2020 97  82 - 98 fL Final    MCH 01/17/2020 30 9  26 8 - 34 3 pg Final    MCHC 01/17/2020 31 9  31 4 - 37 4 g/dL Final    RDW 01/17/2020 13 4  11 6 - 15 1 % Final    MPV 01/17/2020 10 5  8 9 - 12 7 fL Final    Platelets 53/88/5411 238  149 - 390 Thousands/uL Final    nRBC 01/17/2020 0  /100 WBCs Final    Neutrophils Relative 01/17/2020 69  43 - 75 % Final    Immat GRANS % 01/17/2020 0  0 - 2 % Final    Lymphocytes Relative 01/17/2020 22  14 - 44 % Final    Monocytes Relative 01/17/2020 7  4 - 12 % Final    Eosinophils Relative 01/17/2020 2  0 - 6 % Final    Basophils Relative 01/17/2020 0  0 - 1 % Final    Neutrophils Absolute 01/17/2020 3 15  1 85 - 7 62 Thousands/µL Final    Immature Grans Absolute 01/17/2020 0 01  0 00 - 0 20 Thousand/uL Final    Lymphocytes Absolute 01/17/2020 1 03  0 60 - 4 47 Thousands/µL Final    Monocytes Absolute 01/17/2020 0 30  0 17 - 1 22 Thousand/µL Final    Eosinophils Absolute 01/17/2020 0 08  0 00 - 0 61 Thousand/µL Final    Basophils Absolute 01/17/2020 0 02  0 00 - 0 10 Thousands/µL Final    Sodium 01/17/2020 142  136 - 145 mmol/L Final    Potassium 01/17/2020 3 8  3 5 - 5 3 mmol/L Final    Chloride 01/17/2020 105  100 - 108 mmol/L Final    CO2 01/17/2020 32  21 - 32 mmol/L Final    ANION GAP 01/17/2020 5  4 - 13 mmol/L Final    BUN 01/17/2020 22  5 - 25 mg/dL Final    Creatinine 01/17/2020 0 98  0 60 - 1 30 mg/dL Final    Standardized to IDMS reference method    Glucose, Fasting 01/17/2020 92  65 - 99 mg/dL Final      Specimen collection should occur prior to Sulfasalazine administration due to the potential for falsely depressed results  Specimen collection should occur prior to Sulfapyridine administration due to the potential for falsely elevated results   Calcium 01/17/2020 9 0  8 3 - 10 1 mg/dL Final    AST 01/17/2020 15  5 - 45 U/L Final      Specimen collection should occur prior to Sulfasalazine administration due to the potential for falsely depressed results   ALT 01/17/2020 28  12 - 78 U/L Final      Specimen collection should occur prior to Sulfasalazine and/or Sulfapyridine administration due to the potential for falsely depressed results   Alkaline Phosphatase 01/17/2020 50  46 - 116 U/L Final    Total Protein 01/17/2020 7 3  6 4 - 8 2 g/dL Final    Albumin 01/17/2020 3 6  3 5 - 5 0 g/dL Final    Total Bilirubin 01/17/2020 0 55  0 20 - 1 00 mg/dL Final    eGFR 01/17/2020 60  ml/min/1 73sq m Final    Hepatitis C Ab 01/17/2020 Non-reactive  Non-reactive Final    Cholesterol 01/17/2020 207* 50 - 200 mg/dL Final      Cholesterol:       Desirable         <200 mg/dl       Borderline         200-239 mg/dl       High              >239           Triglycerides 01/17/2020 95  <=150 mg/dL Final      Triglyceride:     Normal          <150 mg/dl     Borderline High 150-199 mg/dl     High            200-499 mg/dl        Very High       >499 mg/dl    Specimen collection should occur prior to N-Acetylcysteine or Metamizole administration due to the potential for falsely depressed results      HDL, Direct 01/17/2020 55  >=40 mg/dL Final      HDL Cholesterol:       Low     <41 mg/dL  Specimen collection should occur prior to Metamizole administration due to the potential for falsley depressed results   LDL Calculated 01/17/2020 133* 0 - 100 mg/dL Final      LDL Cholesterol:     Optimal           <100 mg/dl     Near Optimal      100-129 mg/dl     Above Optimal       Borderline High 130-159 mg/dl       High            160-189 mg/dl       Very High       >189 mg/dl         This screening LDL is a calculated result  It does not have the accuracy of the Direct Measured LDL in the monitoring of patients with hyperlipidemia and/or statin therapy  Direct Measure LDL (XCL558) must be ordered separately in these patients

## 2020-01-20 NOTE — TELEPHONE ENCOUNTER
Spoke with patient, gave message  She would like to try doing it on her own, she does not want a statin at this time  When do you recommend rechecking levels?

## 2020-01-21 ENCOUNTER — OFFICE VISIT (OUTPATIENT)
Dept: PHYSICAL THERAPY | Age: 65
End: 2020-01-21
Payer: COMMERCIAL

## 2020-01-21 DIAGNOSIS — M25.511 ACUTE PAIN OF RIGHT SHOULDER: ICD-10-CM

## 2020-01-21 DIAGNOSIS — M54.50 ACUTE BILATERAL LOW BACK PAIN WITHOUT SCIATICA: Primary | ICD-10-CM

## 2020-01-21 PROCEDURE — 97110 THERAPEUTIC EXERCISES: CPT | Performed by: PHYSICAL THERAPIST

## 2020-01-21 NOTE — PROGRESS NOTES
Daily Note     Today's date: 2020  Patient name: Prachi Credit  : 1955  MRN: [id number]  Referring provider: Elodia Stratton, PT  Dx:   Encounter Diagnosis     ICD-10-CM    1  Acute bilateral low back pain without sciatica M54 5    2  Acute pain of right shoulder M25 511                   Subjective: Patient reports she pulled something in her back when trying to restrain her dog when it was having a seizure  Reports that her pain has fully subsided today, but was sore yesterday  Objective: See treatment diary below      Assessment: Discussed PT contacting patient's PCP at 30 day kavon for patient to continue with PT  Continued with stretching today, focusing on progressing flexibility, light strengthening  Has been compliant with stretching at home  Discussed progressing to strengthening program NV or the week after (pending patient's ability to stretch this week while dog being assessed and evaluated for seizures  Improving L hip flexibility since IE  Good tolerance to strengthening today despite recent increase in back pain  Plan: Continue per plan of care  Precautions none    Specialty Daily Treatment Diary       Manual 1/7 1/10 1/14 1/17 1/21                           Exercise Diary                 TM walk  x10' x10' x10' x10'      Standing side glides 10x- L     Piriformis stretch?   5x:20 ea - - -           LTR FOR HEP 5x:10 5x:10 5x:10 5x:10   DBL knee to chest FOR HEP Single 5x:20 ea 5x:20 - -     Supine hip flexor stretch 5x:20 ea 5x:20 5x:20 5x:20     VIPUL 2x:30 Dc- unchanged symptoms in toes       Prone press ups 2x10 2x10 2x10 2x10           Bridges with TB 2x10 BTB  Bridges with marches 2x10 Bridges 20x  Bridges with marches 2x10 20x  10x   Clam shells with TB 10x BTB   -    Supine p ball press 5x:05   -    Prone Is, Ts   NV 10x -   Prone Ws   NV 10x -   Prone DLS alt arm/leg  39K alt 2x10 2x10 2x10       SLR flexion 2# 15x  S/l hip abd 2# 15x  2# 15x  2#15x   Quadruped alt arms/legs             Leg press 50# 2x10   Squats to hi/lo   NV 15x -   Step ups FW, lateral   NV -    Step downs                Modalities

## 2020-01-24 ENCOUNTER — OFFICE VISIT (OUTPATIENT)
Dept: PHYSICAL THERAPY | Age: 65
End: 2020-01-24
Payer: COMMERCIAL

## 2020-01-24 DIAGNOSIS — M54.50 ACUTE BILATERAL LOW BACK PAIN WITHOUT SCIATICA: Primary | ICD-10-CM

## 2020-01-24 DIAGNOSIS — M25.511 ACUTE PAIN OF RIGHT SHOULDER: ICD-10-CM

## 2020-01-24 PROCEDURE — 97110 THERAPEUTIC EXERCISES: CPT | Performed by: PHYSICAL THERAPIST

## 2020-01-24 NOTE — PROGRESS NOTES
Daily Note     Today's date: 2020  Patient name: Dameon Holt  : 1955  MRN: [id number]  Referring provider: Lei Bal, TUTU  Dx:   Encounter Diagnosis     ICD-10-CM    1  Acute bilateral low back pain without sciatica M54 5    2  Acute pain of right shoulder M25 511                   Subjective: Patient feeling that she may be ready to discharge after next week  Feels less back pain when she gets out of bed in the morning  Objective: See treatment diary below      Assessment: Patient ready for discharge next week  Patient is independent in stretching at home, added new TE to program today to improve patient's strength to be able to get in/out of the car and on/off the floor  Discussed progressions for community based fitness for patient to improve endurance, activity tolerance, and strength  Plan: Continue per plan of care  Precautions none    Specialty Daily Treatment Diary       Manual 1/24 1/10 1/14 1/17 1/21                           Exercise Diary                 TM walk x10' x10' x10' x10' x10'      Standing side glides 10x- L     Piriformis stretch?   5x:20 ea - - -           LTR  5x:10 5x:10 5x:10 5x:10   DBL knee to chest  Single 5x:20 ea 5x:20 - -   Supine hip flexor stretch 5x:20 ea Supine hip flexor stretch 5x:20 ea 5x:20 5x:20 5x:20    - VIPUL 2x:30 Dc- unchanged symptoms in toes      - Prone press ups 2x10 2x10 2x10 2x10           Bridges with marches 20x regular  10x marches  Bridges with marches 2x10 Bridges 20x  Bridges with marches 2x10 20x  10x       -        -    Prone Is, Ts -  NV 10x -   Prone Ws -  NV 10x -   Prone DLS alt arm/leg - 53C alt 2x10 2x10 2x10   SLR flexion  S/l hip abd 2# 15x  2# 15x   SLR flexion 2# 15x  S/l hip abd 2# 15x  2# 15x  2#15x   Quadruped alt arms/legs NV            Leg press 50# 2x10   Squats to hi/lo 15x  NV 15x -   Step ups FW, lateral Lunges // bars, single 10x ea  NV -    Step downs                Leg press 70# 2x10 Knee extension 20# 2x10       Hip abd 40# 2x10       Ham curls 40# 2x10       Modalities

## 2020-01-28 ENCOUNTER — OFFICE VISIT (OUTPATIENT)
Dept: PHYSICAL THERAPY | Age: 65
End: 2020-01-28
Payer: COMMERCIAL

## 2020-01-28 DIAGNOSIS — M54.50 ACUTE BILATERAL LOW BACK PAIN WITHOUT SCIATICA: Primary | ICD-10-CM

## 2020-01-28 PROCEDURE — 97112 NEUROMUSCULAR REEDUCATION: CPT | Performed by: PHYSICAL THERAPIST

## 2020-01-28 PROCEDURE — 97110 THERAPEUTIC EXERCISES: CPT | Performed by: PHYSICAL THERAPIST

## 2020-01-28 NOTE — PROGRESS NOTES
Daily Note     Today's date: 2020  Patient name: Rashad Méndez  : 1955  MRN: [id number]  Referring provider: Primo Khan PT  Dx:   Encounter Diagnosis     ICD-10-CM    1  Acute bilateral low back pain without sciatica M54 5                   Subjective: Patient feels the difference when she stretches daily, had an easier time kneeling at [employer] over the weekend  Still feels weak when getting up from the kneeling position, but has better flexibility  Objective: See treatment diary below      Assessment: Continued with strengthening program only today as patient stretched in the morning  Significant difficulty with correct technique with squats to hi/lo surface  Modified to chair sit<>stands and technique improved immediately  Good technique with lunges with UE support  Educated about decreasing UE support once strength improves to challenge balance  Talked about patient discharging this week or next week pending patient's self discipline to start the community based fitness and to continue to stretch       Plan: Continue per plan of care        Precautions none    Specialty Daily Treatment Diary       Manual                            Exercise Diary                 TM walk x10' x10' x10' x10' x10'      Standing side glides 10x- L     Piriformis stretch?   - - -           LTR   5x:10 5x:10 5x:10   DBL knee to chest   5x:20 - -   Supine hip flexor stretch 5x:20 ea  5x:20 5x:20 5x:20    -  Dc- unchanged symptoms in toes      -  2x10 2x10 2x10           Bridges with marches 20x regular  10x marches  Bridges with marches 2x10 Bridges 20x  Bridges with marches 2x10 20x  10x       -        -    Prone Is, Ts -  NV 10x -   Prone Ws -  NV 10x -   Prone DLS alt arm/leg - 94H alt 2x10 2x10 2x10   SLR flexion  S/l hip abd 2# 15x  2# 15x   SLR flexion 2# 15x  S/l hip abd 2# 15x  2# 15x  2#15x   Quadruped alt arms/legs NV 15x           Leg press 50# 2x10   Squats to hi/lo 15x 15x to chair today   NV 15x -   Step ups FW, lateral Lunges // bars, single 10x ea 10x ea // bars NV -    Step downs  Steps FW, lateral and down 10x ea              Leg press 70# 2x10 70# 2x10      Knee extension 20# 2x10 20# 2x10      Hip abd 40# 2x10 40# 2x10      Ham curls 40# 2x10 40# 2x10      Modalities

## 2020-01-31 ENCOUNTER — OFFICE VISIT (OUTPATIENT)
Dept: PHYSICAL THERAPY | Age: 65
End: 2020-01-31
Payer: COMMERCIAL

## 2020-01-31 DIAGNOSIS — M54.50 ACUTE BILATERAL LOW BACK PAIN WITHOUT SCIATICA: Primary | ICD-10-CM

## 2020-01-31 DIAGNOSIS — M25.511 ACUTE PAIN OF RIGHT SHOULDER: ICD-10-CM

## 2020-01-31 PROCEDURE — 97110 THERAPEUTIC EXERCISES: CPT | Performed by: PHYSICAL THERAPIST

## 2020-01-31 NOTE — PROGRESS NOTES
Daily Note     Today's date: 2020  Patient name: Jose Luis Gaming  : 1955  MRN: [id number]  Referring provider: Isadora Brittle, PT  Dx:   Encounter Diagnosis     ICD-10-CM    1  Acute bilateral low back pain without sciatica M54 5    2  Acute pain of right shoulder M25 511                   Subjective: Patient feeling that she's ready to discharge to [facility]  Patient reports no pain with program, noticing that she's weak especially with the step downs  Objective: See treatment diary below      Assessment: Patient able to complete full program today  Still feeling moderately weak with step downs  Reviewed techniques with squats, lunges, and step downs  Good form and technique with exercising  Reviewed gym program and equipment to use  In agreement with patient's want to discharge  Patient is independent with exercise program  Demonstrates improving core stability, improved spine strength and improved flexibility  Plan: discharge to [facility]     Precautions none    Specialty Daily Treatment Diary       Manual                            Exercise Diary                 TM walk x10' x10' x10' x10' x10'           Piriformis stretch?    - -           LTR    5x:10 5x:10   DBL knee to chest    - -   Supine hip flexor stretch 5x:20 ea   5x:20 5x:20    -        -   2x10 2x10           Bridges with marches 20x regular  10x marches   Bridges 20x  Bridges with marches 2x10 20x  10x       -        -    Prone Is, Ts -   10x -   Prone Ws -   10x -   Prone DLS alt arm/leg - 59A alt  2x10 2x10   SLR flexion  S/l hip abd 2# 15x  2# 15x   SLR flexion 2# 15x  S/l hip abd 2# 15x  2# 15x  2#15x   Quadruped alt arms/legs NV 15x           Leg press 50# 2x10   Squats to hi/lo 15x 15x to chair today   15x to chair 15x -   Step ups FW, lateral Lunges // bars, single 10x ea 10x ea // bars 10x ea // bars -    Step downs  Steps FW, lateral and down 10x ea 2x10 ea             Leg press 70# 2x10 70# 2x10 703 2x10     Knee extension 20# 2x10 20# 2x10 20# 2x10     Hip abd 40# 2x10 40# 2x10 40# 2x10     Ham curls 40# 2x10 40# 2x10 40# 2x10     Modalities

## 2020-02-07 ENCOUNTER — OFFICE VISIT (OUTPATIENT)
Dept: HEMATOLOGY ONCOLOGY | Facility: CLINIC | Age: 65
End: 2020-02-07
Payer: COMMERCIAL

## 2020-02-07 VITALS
SYSTOLIC BLOOD PRESSURE: 128 MMHG | OXYGEN SATURATION: 96 % | WEIGHT: 190 LBS | DIASTOLIC BLOOD PRESSURE: 80 MMHG | HEIGHT: 69 IN | RESPIRATION RATE: 16 BRPM | HEART RATE: 73 BPM | TEMPERATURE: 96.8 F | BODY MASS INDEX: 28.14 KG/M2

## 2020-02-07 DIAGNOSIS — M19.90 ARTHRITIS: ICD-10-CM

## 2020-02-07 DIAGNOSIS — E03.9 ACQUIRED HYPOTHYROIDISM: ICD-10-CM

## 2020-02-07 DIAGNOSIS — M85.89 OSTEOPENIA OF MULTIPLE SITES: ICD-10-CM

## 2020-02-07 DIAGNOSIS — Z85.3 HISTORY OF RIGHT BREAST CANCER: Primary | ICD-10-CM

## 2020-02-07 DIAGNOSIS — E55.9 VITAMIN D DEFICIENCY: ICD-10-CM

## 2020-02-07 PROCEDURE — 4040F PNEUMOC VAC/ADMIN/RCVD: CPT | Performed by: INTERNAL MEDICINE

## 2020-02-07 PROCEDURE — 3074F SYST BP LT 130 MM HG: CPT | Performed by: INTERNAL MEDICINE

## 2020-02-07 PROCEDURE — 1036F TOBACCO NON-USER: CPT | Performed by: INTERNAL MEDICINE

## 2020-02-07 PROCEDURE — 3079F DIAST BP 80-89 MM HG: CPT | Performed by: INTERNAL MEDICINE

## 2020-02-07 PROCEDURE — 99214 OFFICE O/P EST MOD 30 MIN: CPT | Performed by: INTERNAL MEDICINE

## 2020-02-07 PROCEDURE — 1160F RVW MEDS BY RX/DR IN RCRD: CPT | Performed by: INTERNAL MEDICINE

## 2020-02-07 NOTE — PROGRESS NOTES
HPI:  In January 2014 patient had right breast lumpectomy and sentinel lymph node sampling for   hormone receptor positive, HER2 negative, stage I, T1 B, 6 mm, grade 1, in the upper outer quadrant of right breast and  breast Oncotype score was 8    That was followed by radiation     In February 2014 patient was started on  Letrozole and she took that for 5 years   Cristy Hoyos She is continuing to take vitamin-D and calcium and she stays active  No change in left thigh neuropathy and numbness of the toes and she follows with her neurologist   Chronic low back discomfort and she follows with her orthopedic surgeon  She is doing back exercises and is receiving physical therapy  For osteopenia she follows with her rheumatologist   She was on Prolia  She states she will be starting intravenous medication once yearly to replace Prolia and that could be Zometa  Patient does not have problem with her teeth        Current Outpatient Medications:     chlorthalidone 25 mg tablet, Take 1 tablet (25 mg total) by mouth daily, Disp: 90 tablet, Rfl: 3    denosumab (PROLIA) 60 mg/mL, Inject 60 mg under the skin every 6 (six) months, Disp: , Rfl:     levothyroxine (SYNTHROID) 75 mcg tablet, Take 1 tablet by mouth every other day, Disp: , Rfl:     levothyroxine (SYNTHROID) 88 mcg tablet, Take 1 tablet by mouth every other day, Disp: , Rfl:     loratadine (CLARITIN) 10 mg tablet, Take 10 mg by mouth daily, Disp: , Rfl:     magnesium gluconate (MAGONATE) 500 mg tablet, Take 500 mg by mouth 2 (two) times a day, Disp: , Rfl:     Multiple Vitamins-Minerals (MULTIVITAMIN ADULT PO), multivitamin, Disp: , Rfl:     sodium hyaluronate (ORTHOVISC) 30 mg/2 mL SOSY injection, ORTHOVISC 30 mg/2 mL intra-articular syringe  Inject 2 mL every week by intra-articular route , Disp: , Rfl:     solifenacin (VESICARE) 10 MG tablet, TAKE ONE TABLET BY MOUTH DAILY, Disp: 90 tablet, Rfl: 0    triamcinolone acetonide (KENALOG-40) 40 mg/mL, Kenalog 40 mg/mL suspension for injection  Take 1 mL by injection route , Disp: , Rfl:     VITAMIN D, ERGOCALCIFEROL, PO, Take 2,000 Units by mouth daily, Disp: , Rfl:     Zoster Vaccine Live (ZOSTAVAX) 72418 UNT/0 65ML SUSR, Zostavax (PF) 19,400 unit/0 65 mL subcutaneous suspension, Disp: , Rfl:     Allergies   Allergen Reactions    Actifed Cold-Allergy [Chlorpheniramine-Phenylephrine] Swelling and Rash     Throat and face swelled    Penicillins Rash    Vancomycin Other (See Comments)     redness          History of right breast cancer    12/26/2013 Biopsy     Right breast biopsy, 12:00    Infiltrating mammary carcinoma  %  %  HER-2 negative      1/17/2014 Surgery     Right lumpectomy (Dr Marilee Hicks)    Stage IA- pT1b, pN0, G1      2/21/2014 - 4/3/2014 Radiation     WBRT (Dr Mulugeta Rae)      2/25/2014 Genomic Testing     Oncotype 8, low risk      2014 - 2019 Hormone Therapy     Letrozole (Dr Ling Devi)         ROS:  02/07/20 Reviewed 13 systems:  Presently no other neurological, cardiac, pulmonary, GI and  symptoms other than mentioned above  No other symptoms like fever, chills, bleeding, bone pains, skin rash, weight loss,  tiredness ,  weakness,  claudication and gait problem  No frequent infections  Not unusually sensitive to heat or cold  No swelling of the ankles  No swollen glands  Patient is anxious   Other symptoms are in HPI        /80 (BP Location: Left arm, Patient Position: Sitting, Cuff Size: Adult)   Pulse 73   Temp (!) 96 8 °F (36 °C)   Resp 16   Ht 5' 9" (1 753 m)   Wt 86 2 kg (190 lb)   LMP  (LMP Unknown)   SpO2 96%   BMI 28 06 kg/m²     Physical Exam:  Alert, oriented, not in distress, no icterus, no oral thrush, no palpable neck mass, clear lung fields, regular heart rate, abdomen  soft and non tender, no palpable abdominal mass, no ascites, no edema of ankles, no calf tenderness, no focal neurological deficit, no skin rash, no palpable lymphadenopathy in the neck and axillary areas, good arterial pulses, no clubbing  Patient is anxious  Performance status 1      IMAGING:      LABS:  Results for orders placed or performed in visit on 01/17/20   Cancer antigen 27 29   Result Value Ref Range    CA 27 29 12 0 0 0 - 42 3 U/mL   CBC and differential   Result Value Ref Range    WBC 4 59 4 31 - 10 16 Thousand/uL    RBC 4 21 3 81 - 5 12 Million/uL    Hemoglobin 13 0 11 5 - 15 4 g/dL    Hematocrit 40 7 34 8 - 46 1 %    MCV 97 82 - 98 fL    MCH 30 9 26 8 - 34 3 pg    MCHC 31 9 31 4 - 37 4 g/dL    RDW 13 4 11 6 - 15 1 %    MPV 10 5 8 9 - 12 7 fL    Platelets 079 623 - 216 Thousands/uL    nRBC 0 /100 WBCs    Neutrophils Relative 69 43 - 75 %    Immat GRANS % 0 0 - 2 %    Lymphocytes Relative 22 14 - 44 %    Monocytes Relative 7 4 - 12 %    Eosinophils Relative 2 0 - 6 %    Basophils Relative 0 0 - 1 %    Neutrophils Absolute 3 15 1 85 - 7 62 Thousands/µL    Immature Grans Absolute 0 01 0 00 - 0 20 Thousand/uL    Lymphocytes Absolute 1 03 0 60 - 4 47 Thousands/µL    Monocytes Absolute 0 30 0 17 - 1 22 Thousand/µL    Eosinophils Absolute 0 08 0 00 - 0 61 Thousand/µL    Basophils Absolute 0 02 0 00 - 0 10 Thousands/µL   Comprehensive metabolic panel   Result Value Ref Range    Sodium 142 136 - 145 mmol/L    Potassium 3 8 3 5 - 5 3 mmol/L    Chloride 105 100 - 108 mmol/L    CO2 32 21 - 32 mmol/L    ANION GAP 5 4 - 13 mmol/L    BUN 22 5 - 25 mg/dL    Creatinine 0 98 0 60 - 1 30 mg/dL    Glucose, Fasting 92 65 - 99 mg/dL    Calcium 9 0 8 3 - 10 1 mg/dL    AST 15 5 - 45 U/L    ALT 28 12 - 78 U/L    Alkaline Phosphatase 50 46 - 116 U/L    Total Protein 7 3 6 4 - 8 2 g/dL    Albumin 3 6 3 5 - 5 0 g/dL    Total Bilirubin 0 55 0 20 - 1 00 mg/dL    eGFR 60 ml/min/1 73sq m   Hepatitis C antibody   Result Value Ref Range    Hepatitis C Ab Non-reactive Non-reactive   Lipid Panel with Direct LDL reflex   Result Value Ref Range    Cholesterol 207 (H) 50 - 200 mg/dL    Triglycerides 95 <=150 mg/dL    HDL, Direct 55 >=40 mg/dL    LDL Calculated 133 (H) 0 - 100 mg/dL     Labs, Imaging, & Other studies:   All pertinent labs and imaging studies were personally reviewed    Lab Results   Component Value Date     09/30/2015    K 3 8 01/17/2020     01/17/2020    CO2 32 01/17/2020    ANIONGAP 8 09/30/2015    BUN 22 01/17/2020    CREATININE 0 98 01/17/2020    GLUCOSE 89 09/30/2015    GLUF 92 01/17/2020    CALCIUM 9 0 01/17/2020    AST 15 01/17/2020    ALT 28 01/17/2020    ALKPHOS 50 01/17/2020    PROT 7 0 09/30/2015    BILITOT 0 39 09/30/2015    EGFR 60 01/17/2020     Lab Results   Component Value Date    WBC 4 59 01/17/2020    HGB 13 0 01/17/2020    HCT 40 7 01/17/2020    MCV 97 01/17/2020     01/17/2020       Reviewed and discussed with patient  Assessment and plan: In January 2014 patient had right breast lumpectomy and sentinel lymph node sampling for   hormone receptor positive, HER2 negative, stage I, T1 B, 6 mm, grade 1, in the upper outer quadrant of right breast and  breast Oncotype score was 8    That was followed by radiation     In February 2014 patient was started on  Letrozole and she took that for 5 years   Ivy Mendoza She is continuing to take vitamin-D and calcium and she stays active  No change in left thigh neuropathy and numbness of the toes and she follows with her neurologist   Chronic low back discomfort and she follows with her orthopedic surgeon  She is doing back exercises and is receiving physical therapy  For osteopenia she follows with her rheumatologist   She was on Prolia  She states she will be starting intravenous medication once yearly to replace Prolia and that could be Zometa  Patient does not have problem with her teeth  Physical examination and test results are as recorded and discussed    Breast cancer remains in remission   Goal is cure from breast cancer   Condition discussed and explained   Questions answered     See above    She knows to let her dentist know if she will be going for any invasive dental procedure    All discussed in detail   Questions answered   Discussed the importance of self-breast examination, eating healthy foods, staying active and health screening tests   Patient is capable of self-care  Dane Morton goes to her breast surgeon for examination of breasts and imaging studies     Patient will continue to follow with her primary physician and other consultants  1  History of right breast cancer    - Cancer antigen 27 29; Future  - CBC and differential; Future  - Comprehensive metabolic panel; Future    2  Vitamin D deficiency      3  Osteopenia of multiple sites      4  Acquired hypothyroidism      5  Arthritis            Patient voiced understanding and agreement in the discussion  Counseling / Coordination of Care   Greater than 50% of total time was spent with the patient and / or family counseling and / or coordination of care

## 2020-02-10 ENCOUNTER — TELEPHONE (OUTPATIENT)
Dept: GYNECOLOGIC ONCOLOGY | Facility: CLINIC | Age: 65
End: 2020-02-10

## 2020-02-11 NOTE — TELEPHONE ENCOUNTER
Post-Test Genetic Counseling Consult Note    Patient Name: Bashir Chavira   /Age: /86 y o  Referring Provider: SRIKANTH Robles      Date of Service: 2020  Genetic Counselor: Leonard Rojas, [facility]    Interpretation Services: None    Service: Type: Telephone consult   Length of Visit: <30 minutes       Grace and I spoke today for post-test genetic counseling to discuss the results of her genetic test for hereditary cancer  We met previously on 20 for pre-test counseling  SUMMARY:    Test(s): [employer] Breast [facility] G[facility] Cancers Panel (23 genes): GILLIAN, BARD1, BRCA1, BRCA2, BRIP1, CDH1, CHEK2, DICER1, EPCAM, MLH1, MSH2, MSH6, NBN, NF1, PALB2, PMS2, PTEN, RAD50, RAD51C, RAD51D, SMARCA4, STK11, TP53      Result: Negative - No reportable genetic variants were identified by this analysis  Assessment:     A negative result significantly reduces the likelihood that Grace has a hereditary cancer syndrome  However, this testing is unable to completely rule out the presence of hereditary cancer  It remains possible that:  - There is a variant in an area of a gene which was not tested or there is a variant not detectable due to technical limitations of this this test      - There is a variant in another gene that was not included in this test or in a gene not known to be linked to cancer or tumors  - A family member has a genetic variant that the patient did not inherit  - The cancer in the family is sporadic and is related to non-hereditary factors  Risks for Family Members: Grace was made aware that her daughters may still be at increased risk to develop breast cancer based on the family history of breast cancer  We recommend that her daughters make their healthcare providers aware of the family history and discuss their options for screening and risk-reduction       Recommendations:  Recommendations for Grace are outlined below however the surveillance and medical management should continue as clinically indicated and as determined appropriate by her healthcare providers  Breast Cancer Screening:  -Surveillance and medical management should continue as clinically indicated and as determined appropriate by her healthcare providers  Colon Cancer Screening:   -Population-based screening guidelines are appropriate or as otherwise clinically indicated  Gynecologic Cancer Screening/Risk Reduction  -Routine gynecologic screening   -There are no standard screening recommendations for endometrial or ovarian cancer  Skin Cancer Screening   -Continue skin cancer screening as recommended by your healthcare provider  Shlomo Palacios was strongly encouraged to contact us regarding any changes in her personal or family history of cancer as these changes could alter our recommendation regarding genetic testing and/or cancer screening

## 2020-02-21 ENCOUNTER — CLINICAL SUPPORT (OUTPATIENT)
Dept: FAMILY MEDICINE CLINIC | Facility: CLINIC | Age: 65
End: 2020-02-21
Payer: COMMERCIAL

## 2020-02-21 DIAGNOSIS — Z23 NEED FOR VACCINATION: Primary | ICD-10-CM

## 2020-02-21 PROCEDURE — 90715 TDAP VACCINE 7 YRS/> IM: CPT

## 2020-02-21 PROCEDURE — 90471 IMMUNIZATION ADMIN: CPT

## 2020-04-02 DIAGNOSIS — N32.81 OAB (OVERACTIVE BLADDER): ICD-10-CM

## 2020-04-02 RX ORDER — SOLIFENACIN SUCCINATE 10 MG/1
10 TABLET, FILM COATED ORAL DAILY
Qty: 90 TABLET | Refills: 3 | Status: SHIPPED | OUTPATIENT
Start: 2020-04-02 | End: 2021-02-12 | Stop reason: SDUPTHER

## 2020-06-20 ENCOUNTER — APPOINTMENT (OUTPATIENT)
Dept: LAB | Age: 65
End: 2020-06-20
Payer: COMMERCIAL

## 2020-06-20 ENCOUNTER — TRANSCRIBE ORDERS (OUTPATIENT)
Dept: ADMINISTRATIVE | Age: 65
End: 2020-06-20

## 2020-06-20 DIAGNOSIS — E03.9 HYPOTHYROIDISM, UNSPECIFIED TYPE: ICD-10-CM

## 2020-06-20 DIAGNOSIS — Z00.8 SPECIAL EXAMINATIONS: Primary | ICD-10-CM

## 2020-06-20 DIAGNOSIS — Z00.8 ENCOUNTER FOR OTHER GENERAL EXAMINATION: Primary | ICD-10-CM

## 2020-06-20 DIAGNOSIS — Z00.8 SPECIAL EXAMINATIONS: ICD-10-CM

## 2020-06-20 LAB
ALBUMIN SERPL BCP-MCNC: 3.7 G/DL (ref 3.5–5)
ALP SERPL-CCNC: 44 U/L (ref 46–116)
ALT SERPL W P-5'-P-CCNC: 23 U/L (ref 12–78)
ANION GAP SERPL CALCULATED.3IONS-SCNC: 3 MMOL/L (ref 4–13)
AST SERPL W P-5'-P-CCNC: 16 U/L (ref 5–45)
BASOPHILS # BLD AUTO: 0.02 THOUSANDS/ΜL (ref 0–0.1)
BASOPHILS NFR BLD AUTO: 1 % (ref 0–1)
BILIRUB SERPL-MCNC: 0.66 MG/DL (ref 0.2–1)
BILIRUB UR QL STRIP: NEGATIVE
BUN SERPL-MCNC: 18 MG/DL (ref 5–25)
CALCIUM SERPL-MCNC: 9.9 MG/DL (ref 8.3–10.1)
CHLORIDE SERPL-SCNC: 106 MMOL/L (ref 100–108)
CHOLEST SERPL-MCNC: 183 MG/DL (ref 50–200)
CLARITY UR: CLEAR
CO2 SERPL-SCNC: 33 MMOL/L (ref 21–32)
COLOR UR: YELLOW
CREAT SERPL-MCNC: 1.02 MG/DL (ref 0.6–1.3)
CREAT UR-MCNC: 38.3 MG/DL
EOSINOPHIL # BLD AUTO: 0.09 THOUSAND/ΜL (ref 0–0.61)
EOSINOPHIL NFR BLD AUTO: 2 % (ref 0–6)
ERYTHROCYTE [DISTWIDTH] IN BLOOD BY AUTOMATED COUNT: 13.2 % (ref 11.6–15.1)
EST. AVERAGE GLUCOSE BLD GHB EST-MCNC: 111 MG/DL
GFR SERPL CREATININE-BSD FRML MDRD: 57 ML/MIN/1.73SQ M
GLUCOSE P FAST SERPL-MCNC: 88 MG/DL (ref 65–99)
GLUCOSE UR STRIP-MCNC: NEGATIVE MG/DL
HBA1C MFR BLD: 5.5 %
HCT VFR BLD AUTO: 42.2 % (ref 34.8–46.1)
HDLC SERPL-MCNC: 52 MG/DL
HGB BLD-MCNC: 14 G/DL (ref 11.5–15.4)
HGB UR QL STRIP.AUTO: NEGATIVE
IMM GRANULOCYTES # BLD AUTO: 0.01 THOUSAND/UL (ref 0–0.2)
IMM GRANULOCYTES NFR BLD AUTO: 0 % (ref 0–2)
KETONES UR STRIP-MCNC: NEGATIVE MG/DL
LDLC SERPL CALC-MCNC: 114 MG/DL (ref 0–100)
LEUKOCYTE ESTERASE UR QL STRIP: NEGATIVE
LYMPHOCYTES # BLD AUTO: 0.96 THOUSANDS/ΜL (ref 0.6–4.47)
LYMPHOCYTES NFR BLD AUTO: 24 % (ref 14–44)
MAGNESIUM SERPL-MCNC: 2.2 MG/DL (ref 1.6–2.6)
MCH RBC QN AUTO: 31.3 PG (ref 26.8–34.3)
MCHC RBC AUTO-ENTMCNC: 33.2 G/DL (ref 31.4–37.4)
MCV RBC AUTO: 94 FL (ref 82–98)
MICROALBUMIN UR-MCNC: <5 MG/L (ref 0–20)
MICROALBUMIN/CREAT 24H UR: <13 MG/G CREATININE (ref 0–30)
MONOCYTES # BLD AUTO: 0.28 THOUSAND/ΜL (ref 0.17–1.22)
MONOCYTES NFR BLD AUTO: 7 % (ref 4–12)
NEUTROPHILS # BLD AUTO: 2.68 THOUSANDS/ΜL (ref 1.85–7.62)
NEUTS SEG NFR BLD AUTO: 66 % (ref 43–75)
NITRITE UR QL STRIP: NEGATIVE
NONHDLC SERPL-MCNC: 131 MG/DL
NRBC BLD AUTO-RTO: 0 /100 WBCS
PH UR STRIP.AUTO: 6 [PH]
PHOSPHATE SERPL-MCNC: 3.6 MG/DL (ref 2.3–4.1)
PLATELET # BLD AUTO: 188 THOUSANDS/UL (ref 149–390)
PMV BLD AUTO: 10.9 FL (ref 8.9–12.7)
POTASSIUM SERPL-SCNC: 3.6 MMOL/L (ref 3.5–5.3)
PROT SERPL-MCNC: 7.2 G/DL (ref 6.4–8.2)
PROT UR STRIP-MCNC: NEGATIVE MG/DL
RBC # BLD AUTO: 4.47 MILLION/UL (ref 3.81–5.12)
SODIUM SERPL-SCNC: 142 MMOL/L (ref 136–145)
SP GR UR STRIP.AUTO: 1.01 (ref 1–1.03)
T4 FREE SERPL-MCNC: 1.27 NG/DL (ref 0.76–1.46)
TRIGL SERPL-MCNC: 84 MG/DL
TSH SERPL DL<=0.05 MIU/L-ACNC: 1.21 UIU/ML (ref 0.36–3.74)
UROBILINOGEN UR QL STRIP.AUTO: 0.2 E.U./DL
WBC # BLD AUTO: 4.04 THOUSAND/UL (ref 4.31–10.16)

## 2020-06-20 PROCEDURE — 82043 UR ALBUMIN QUANTITATIVE: CPT | Performed by: SPECIALIST

## 2020-06-20 PROCEDURE — 81003 URINALYSIS AUTO W/O SCOPE: CPT

## 2020-06-20 PROCEDURE — 82570 ASSAY OF URINE CREATININE: CPT | Performed by: SPECIALIST

## 2020-06-20 PROCEDURE — 84100 ASSAY OF PHOSPHORUS: CPT

## 2020-06-20 PROCEDURE — 85025 COMPLETE CBC W/AUTO DIFF WBC: CPT

## 2020-06-20 PROCEDURE — 84439 ASSAY OF FREE THYROXINE: CPT

## 2020-06-20 PROCEDURE — 36415 COLL VENOUS BLD VENIPUNCTURE: CPT

## 2020-06-20 PROCEDURE — 84443 ASSAY THYROID STIM HORMONE: CPT

## 2020-06-20 PROCEDURE — 83735 ASSAY OF MAGNESIUM: CPT

## 2020-06-20 PROCEDURE — 80053 COMPREHEN METABOLIC PANEL: CPT

## 2020-06-20 PROCEDURE — 80061 LIPID PANEL: CPT

## 2020-06-20 PROCEDURE — 83036 HEMOGLOBIN GLYCOSYLATED A1C: CPT

## 2020-06-22 ENCOUNTER — TELEPHONE (OUTPATIENT)
Dept: ADMINISTRATIVE | Facility: HOSPITAL | Age: 65
End: 2020-06-22

## 2020-06-23 ENCOUNTER — OFFICE VISIT (OUTPATIENT)
Dept: VASCULAR SURGERY | Facility: CLINIC | Age: 65
End: 2020-06-23
Payer: COMMERCIAL

## 2020-06-23 ENCOUNTER — OFFICE VISIT (OUTPATIENT)
Dept: FAMILY MEDICINE CLINIC | Facility: CLINIC | Age: 65
End: 2020-06-23
Payer: COMMERCIAL

## 2020-06-23 VITALS
RESPIRATION RATE: 16 BRPM | HEART RATE: 64 BPM | OXYGEN SATURATION: 99 % | DIASTOLIC BLOOD PRESSURE: 88 MMHG | SYSTOLIC BLOOD PRESSURE: 130 MMHG | HEIGHT: 69 IN | WEIGHT: 168 LBS | BODY MASS INDEX: 24.88 KG/M2 | TEMPERATURE: 96.5 F

## 2020-06-23 VITALS
TEMPERATURE: 98.4 F | DIASTOLIC BLOOD PRESSURE: 74 MMHG | HEIGHT: 69 IN | BODY MASS INDEX: 25.03 KG/M2 | HEART RATE: 67 BPM | SYSTOLIC BLOOD PRESSURE: 126 MMHG | WEIGHT: 169 LBS

## 2020-06-23 DIAGNOSIS — I10 BENIGN ESSENTIAL HTN: ICD-10-CM

## 2020-06-23 DIAGNOSIS — E78.00 PURE HYPERCHOLESTEROLEMIA: ICD-10-CM

## 2020-06-23 DIAGNOSIS — E03.9 ACQUIRED HYPOTHYROIDISM: Primary | ICD-10-CM

## 2020-06-23 DIAGNOSIS — E74.39 GLUCOSE INTOLERANCE: ICD-10-CM

## 2020-06-23 DIAGNOSIS — I83.811 VARICOSE VEINS OF RIGHT LOWER EXTREMITY WITH PAIN: Primary | ICD-10-CM

## 2020-06-23 DIAGNOSIS — M85.89 OSTEOPENIA OF MULTIPLE SITES: ICD-10-CM

## 2020-06-23 DIAGNOSIS — Z23 NEED FOR VACCINATION: ICD-10-CM

## 2020-06-23 DIAGNOSIS — Z85.3 HISTORY OF RIGHT BREAST CANCER: ICD-10-CM

## 2020-06-23 DIAGNOSIS — N39.41 URGE INCONTINENCE OF URINE: ICD-10-CM

## 2020-06-23 DIAGNOSIS — M17.11 PRIMARY OSTEOARTHRITIS OF RIGHT KNEE: ICD-10-CM

## 2020-06-23 DIAGNOSIS — R79.89 ELEVATED SERUM CREATININE: ICD-10-CM

## 2020-06-23 PROBLEM — M75.41 SUBACROMIAL IMPINGEMENT OF RIGHT SHOULDER: Status: RESOLVED | Noted: 2019-11-14 | Resolved: 2020-06-23

## 2020-06-23 PROBLEM — M19.90 ARTHRITIS: Status: RESOLVED | Noted: 2020-02-07 | Resolved: 2020-06-23

## 2020-06-23 PROBLEM — E66.3 OVERWEIGHT: Status: RESOLVED | Noted: 2019-02-21 | Resolved: 2020-06-23

## 2020-06-23 PROCEDURE — 4040F PNEUMOC VAC/ADMIN/RCVD: CPT | Performed by: FAMILY MEDICINE

## 2020-06-23 PROCEDURE — 90471 IMMUNIZATION ADMIN: CPT

## 2020-06-23 PROCEDURE — 1160F RVW MEDS BY RX/DR IN RCRD: CPT | Performed by: FAMILY MEDICINE

## 2020-06-23 PROCEDURE — 4040F PNEUMOC VAC/ADMIN/RCVD: CPT | Performed by: NURSE PRACTITIONER

## 2020-06-23 PROCEDURE — 3008F BODY MASS INDEX DOCD: CPT | Performed by: FAMILY MEDICINE

## 2020-06-23 PROCEDURE — 3075F SYST BP GE 130 - 139MM HG: CPT | Performed by: FAMILY MEDICINE

## 2020-06-23 PROCEDURE — 99203 OFFICE O/P NEW LOW 30 MIN: CPT | Performed by: NURSE PRACTITIONER

## 2020-06-23 PROCEDURE — 3079F DIAST BP 80-89 MM HG: CPT | Performed by: FAMILY MEDICINE

## 2020-06-23 PROCEDURE — 1036F TOBACCO NON-USER: CPT | Performed by: FAMILY MEDICINE

## 2020-06-23 PROCEDURE — 3008F BODY MASS INDEX DOCD: CPT | Performed by: NURSE PRACTITIONER

## 2020-06-23 PROCEDURE — 1036F TOBACCO NON-USER: CPT | Performed by: NURSE PRACTITIONER

## 2020-06-23 PROCEDURE — 90732 PPSV23 VACC 2 YRS+ SUBQ/IM: CPT

## 2020-06-23 PROCEDURE — 99214 OFFICE O/P EST MOD 30 MIN: CPT | Performed by: FAMILY MEDICINE

## 2020-06-23 PROCEDURE — 3074F SYST BP LT 130 MM HG: CPT | Performed by: NURSE PRACTITIONER

## 2020-06-23 PROCEDURE — 3078F DIAST BP <80 MM HG: CPT | Performed by: NURSE PRACTITIONER

## 2020-06-23 PROCEDURE — 1160F RVW MEDS BY RX/DR IN RCRD: CPT | Performed by: NURSE PRACTITIONER

## 2020-06-23 RX ORDER — ZOLEDRONIC ACID 5 MG/100ML
5 INJECTION, SOLUTION INTRAVENOUS ONCE
COMMUNITY
End: 2020-12-28 | Stop reason: ALTCHOICE

## 2020-06-23 RX ORDER — LORATADINE 10 MG/1
10 TABLET ORAL DAILY
COMMUNITY

## 2020-06-24 ENCOUNTER — TELEPHONE (OUTPATIENT)
Dept: FAMILY MEDICINE CLINIC | Facility: CLINIC | Age: 65
End: 2020-06-24

## 2020-07-06 ENCOUNTER — OFFICE VISIT (OUTPATIENT)
Dept: URGENT CARE | Age: 65
End: 2020-07-06
Payer: COMMERCIAL

## 2020-07-06 VITALS
SYSTOLIC BLOOD PRESSURE: 135 MMHG | HEART RATE: 60 BPM | OXYGEN SATURATION: 97 % | TEMPERATURE: 96.7 F | RESPIRATION RATE: 18 BRPM | DIASTOLIC BLOOD PRESSURE: 83 MMHG

## 2020-07-06 DIAGNOSIS — S05.01XA CORNEA ABRASION, RIGHT, INITIAL ENCOUNTER: Primary | ICD-10-CM

## 2020-07-06 PROCEDURE — G0382 LEV 3 HOSP TYPE B ED VISIT: HCPCS | Performed by: PHYSICIAN ASSISTANT

## 2020-07-06 PROCEDURE — 65205 REMOVE FOREIGN BODY FROM EYE: CPT | Performed by: PHYSICIAN ASSISTANT

## 2020-07-06 RX ORDER — CIPROFLOXACIN HYDROCHLORIDE 3.5 MG/ML
1 SOLUTION/ DROPS TOPICAL EVERY 4 HOURS
Qty: 2.5 ML | Refills: 0 | Status: SHIPPED | OUTPATIENT
Start: 2020-07-06 | End: 2020-07-13

## 2020-07-06 NOTE — PATIENT INSTRUCTIONS
Use eyedrops or ointment in affected eyes as prescribed  Follow-up with Optometrist and PCP in the next 1-2 days for further evaluation and treatment  Go to the ED if any eye pain, pain with eye movement, change in vision,    fevers, rashes, unable to stay hydrated, headache, facial swelling redness or pain, abdominal pain, chest pain, shortness of breath, new or worsening symptoms or other concerning symptoms

## 2020-07-06 NOTE — PROGRESS NOTES
[id number]        NAME: Tej Dutta is a 75 y o  female  : 1955    MRN: [id number]  DATE: 2020  TIME: 8:59 AM    Assessment and Plan   Cornea abrasion, right, initial encounter [S05 01XA]  1  Cornea abrasion, right, initial encounter  ciprofloxacin (CILOXAN) 0 3 % ophthalmic solution         Patient Instructions     Use eyedrops or ointment in affected eyes as prescribed  Follow-up with Optometrist and PCP in the next 1-2 days for further evaluation and treatment  Go to the ED if any eye pain, pain with eye movement, change in vision,    fevers, rashes, unable to stay hydrated, headache, facial swelling redness or pain, abdominal pain, chest pain, shortness of breath, new or worsening symptoms or other concerning symptoms  Chief Complaint     Chief Complaint   Patient presents with    Eye Problem     pain to right eye x yesterday, denies discharge          History of Present Illness       53-year-old female presents with foreign body sensation to the right eye since yesterday  States she woke up and she felt like she had an eyelash or may be a dog hair or something in her right eye  She denies any injury or trauma to the area  States she tried to flush it out with water/saline with little relief  States he did try some over-the-counter eye wetting drops with little relief  She denies waking up with her eye crusted shut  Denies any eye drainage, but states sometimes the right eye was slightly watering  Denies any fevers, recent cough or cold-like symptoms, rashes  Patient states she was having some mild right eye irritation so she was rubbing her eye  Thinks she also may have scratched it or irritated more from rubbing it  She denies any vision changes, eye pain, pain with eye movement, pain swelling or redness around the eye, headaches, nausea, vomiting, chest pain, shortness of breath, light or sound sensitivity, dizziness, or other complaints   She does not wear contacts, states she does wear glasses  States tetanus up-to-date      Review of Systems   Review of Systems   Constitutional: Negative for activity change, appetite change, chills and fever  HENT: Negative  Negative for congestion, ear pain, facial swelling, hearing loss, postnasal drip, sore throat, trouble swallowing and voice change  Eyes: Negative for photophobia, pain, discharge, redness, itching and visual disturbance  Foreign body sensation in the right eye, mild right eye irritation   Respiratory: Negative for cough, chest tightness, shortness of breath and wheezing  Cardiovascular: Negative for chest pain  Gastrointestinal: Negative for abdominal pain, diarrhea, nausea and vomiting  Musculoskeletal: Negative for back pain, neck pain and neck stiffness  Skin: Negative for rash and wound  Neurological: Negative for dizziness, syncope, weakness, numbness and headaches  All other systems reviewed and are negative          Current Medications       Current Outpatient Medications:     chlorthalidone 25 mg tablet, Take 1 tablet (25 mg total) by mouth daily, Disp: 90 tablet, Rfl: 3    ciprofloxacin (CILOXAN) 0 3 % ophthalmic solution, Administer 1 drop to the right eye every 4 (four) hours for 7 days, Disp: 2 5 mL, Rfl: 0    levothyroxine (SYNTHROID) 75 mcg tablet, Take 1 tablet by mouth every other day, Disp: , Rfl:     levothyroxine (SYNTHROID) 88 mcg tablet, Take 1 tablet by mouth every other day, Disp: , Rfl:     loratadine (CLARITIN) 10 mg tablet, Take 10 mg by mouth daily, Disp: , Rfl:     magnesium gluconate (MAGONATE) 500 mg tablet, Take 500 mg by mouth 2 (two) times a day, Disp: , Rfl:     Multiple Vitamins-Minerals (MULTIVITAMIN ADULT PO), multivitamin, Disp: , Rfl:     solifenacin (VESICARE) 10 MG tablet, Take 1 tablet (10 mg total) by mouth daily, Disp: 90 tablet, Rfl: 3    VITAMIN D, ERGOCALCIFEROL, PO, Take 2,000 Units by mouth daily, Disp: , Rfl:    zoledronic acid (RECLAST) 5 mg/100 mL IV infusion (premix), Infuse 5 mg into a venous catheter once, Disp: , Rfl:     Current Allergies     Allergies as of 07/06/2020 - Reviewed 07/06/2020   Allergen Reaction Noted    Actifed cold-allergy [chlorpheniramine-phenylephrine] Swelling and Rash 01/10/2017    Penicillins Rash 01/10/2017    Vancomycin Other (See Comments) 01/10/2017            The following portions of the patient's history were reviewed and updated as appropriate: allergies, current medications, past family history, past medical history, past social history, past surgical history and problem list      Past Medical History:   Diagnosis Date    Arthritis     Breast cancer ([facility] [address] ) 01/17/2014    radiation    Disease of thyroid gland     History of radiation therapy 2014    breast cancer    Hypothyroid     Knee pain, right     Limb alert care status     no BP's /IV's right side    Mitral valve prolapse     Numbness of anterior thigh     left and tingling    Seasonal allergies     dust,mold,trees    Urinary incontinence     Varicose veins of both lower extremities     Wears glasses        Past Surgical History:   Procedure Laterality Date    BREAST BIOPSY Left 01/16/2002    BREAST BIOPSY Left 08/05/2005    high risk    BREAST BIOPSY Right 12/26/2013    positive    BREAST EXCISIONAL BIOPSY Left 04/06/2006    high risk    BREAST EXCISIONAL BIOPSY Left 10/13/2006    BREAST LUMPECTOMY Right 01/17/2014    malignant    BREAST SURGERY Right     lumpectomy    BUNIONECTOMY Bilateral     COLONOSCOPY      EXPLORATORY LAPAROTOMY      "for fertility"    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    KNEE ARTHROSCOPY Right     NM TOTAL KNEE ARTHROPLASTY Right 1/20/2017    Procedure: ARTHROPLASTY KNEE TOTAL;  Surgeon: Arlyn Rodrigues MD;  Location: [facility];  Service: Orthopedics    TONSILLECTOMY      WISDOM TOOTH EXTRACTION         Family History   Problem Relation Age of Onset    Uterine cancer Mother 71    Throat cancer Father [de-identified]    Breast cancer additional onset Paternal Aunt 54    Breast cancer additional onset Cousin 28    No Known Problems Daughter     No Known Problems Maternal Grandmother     No Known Problems Maternal Grandfather     No Known Problems Paternal Grandmother     No Known Problems Paternal Grandfather     No Known Problems Daughter     No Known Problems Maternal Aunt     No Known Problems Maternal Aunt     No Known Problems Maternal Aunt     No Known Problems Maternal Aunt     No Known Problems Paternal Aunt     Colon cancer Maternal Uncle 70         Medications have been verified  Objective   /83   Pulse 60   Temp (!) 96 7 °F (35 9 °C) (Temporal)   Resp 18   LMP  (LMP Unknown)   SpO2 97%        Physical Exam     Physical Exam   Constitutional: She is oriented to person, place, and time  She appears well-developed and well-nourished  No distress  HENT:   Head: Normocephalic and atraumatic  Right Ear: Tympanic membrane normal    Left Ear: Tympanic membrane normal    Mouth/Throat: Uvula is midline and oropharynx is clear and moist  No posterior oropharyngeal edema or posterior oropharyngeal erythema  Eyes: Pupils are equal, round, and reactive to light  EOM are normal  Foreign body (lids everted, eyelash/FB visualied in inside of upper eyelid (easily removed with moist cotton swab)) present in the right eye  No nystagmus, no pain with EOMs  Visual acuity with correction is right eye 20/40, left eye 20/30, both eyes 20/40  Mild right conjunctiva irritation/redness  No sign of globe rupture  No surrounding erythema, warmth, swelling or tenderness to palpation   Neck: Normal range of motion  Neck supple  Cardiovascular: Normal rate, regular rhythm and normal heart sounds  Pulmonary/Chest: Effort normal and breath sounds normal  She has no wheezes  Neurological: She is alert and oriented to person, place, and time   She has normal reflexes  Skin: Capillary refill takes less than 2 seconds  Psychiatric: She has a normal mood and affect  Her behavior is normal    Nursing note and vitals reviewed  Foreign body removal  Date/Time: 7/6/2020 9:11 AM  Performed by: Alva Zarco PA-C  Authorized by: Alva Zarco PA-C   [place] Protocol:Consent: Verbal consent obtained  Risks and benefits: risks, benefits and alternatives were discussed  Consent given by: patient  Patient understanding: patient states understanding of the procedure being performed  Patient identity confirmed: verbally with patient    Body area: eye  Location details: right eyelid    Anesthesia:  Local Anesthetic: tetracaine drops  Anesthetic total (drops): 1Patient restrained: no  Patient cooperative: yes  Localization method: eyelid eversion, visualized and magnification  Removal mechanism: eyelid eversion, irrigation and moist cotton swab  Eye examined with fluorescein  Fluorescein uptake  Corneal abrasion size: small  Corneal abrasion location: inferior  Dressing: antibiotic drops  Complexity: simple  1 objects recovered  Objects recovered: eyelash completely removed  Post-procedure assessment: foreign body removed  Patient tolerance: Patient tolerated the procedure well with no immediate complications  Comments: Eyelash/foreign body completely removed  Small corneal abrasion was visualized  Patient felt well after procedure, states all of her symptoms resolved  Denies any further foreign body sensation or eye irritation  She denies any eye pain, vision changes, pain with eye movement or other complaints  She is requesting discharge at this time

## 2020-07-31 ENCOUNTER — APPOINTMENT (OUTPATIENT)
Dept: LAB | Facility: HOSPITAL | Age: 65
End: 2020-07-31
Payer: COMMERCIAL

## 2020-07-31 DIAGNOSIS — Z85.3 HISTORY OF RIGHT BREAST CANCER: ICD-10-CM

## 2020-07-31 DIAGNOSIS — E78.00 PURE HYPERCHOLESTEROLEMIA: ICD-10-CM

## 2020-07-31 DIAGNOSIS — I10 BENIGN ESSENTIAL HTN: ICD-10-CM

## 2020-07-31 LAB — CANCER AG27-29 SERPL-ACNC: 7.7 U/ML (ref 0–42.3)

## 2020-07-31 PROCEDURE — 36415 COLL VENOUS BLD VENIPUNCTURE: CPT

## 2020-07-31 PROCEDURE — 86300 IMMUNOASSAY TUMOR CA 15-3: CPT

## 2020-08-05 ENCOUNTER — TELEPHONE (OUTPATIENT)
Dept: HEMATOLOGY ONCOLOGY | Facility: MEDICAL CENTER | Age: 65
End: 2020-08-05

## 2020-08-05 NOTE — TELEPHONE ENCOUNTER
Spoke to patient and informed her that her appt on 8/14/20 needed to be R/S  Her new appt is 9/18/20 at 2:40 pm with Dr Jerardo Dwyer at the [facility] location  Patient was fine with the appt change

## 2020-08-13 NOTE — PROGRESS NOTES
Surgical Oncology Follow Up       [address] [facility]  [address]  [facility] [address]    [place]  1955  [id number]  [address] [facility]  [address] [address]    Chief Complaint   Patient presents with    Breast Cancer     Pt is here for 1 year follow up        Assessment/Plan:  1  Encounter for follow-up examination after completed treatment for malignant neoplasm    2  History of right breast cancer  - BUN; Future  - Creatinine, serum; Future  - Ambulatory Referral to Oncology Genetics; Future  - MRI breast bilateral w and wo contrast w cad; Future    3  Visit for screening mammogram  - Mammo screening bilateral w 3d & cad; Future    Discussion/Summary: Patient is a 75year old female who presents today for a 6 month follow-up for right breast cancer diagnosed in January 2014  Her pathology revealed invasive ductal carcinoma, grade 1, ER/MD positive, HER-2 negative  She underwent a right breast lumpectomy and sentinel node biopsy by Dr Gus Mackey  She then completed whole breast radiation therapy and completed 5 years of aromatase inhibitor therapy  She had a bilateral breast MRI performed on November 21, 2019 which was BI-RADS 2, category 2 density  She had a bilateral 3D screening mammogram on November 22, 2019 which was BI-RADS 2, category 2 density  She has no new complaints today and there are no concerns on today's exam   I will order a bilateral breast MRI and a bilateral 3D screening mammogram to be performed in November and we will plan to see the patient back in 1 year or sooner if the need arises  She was instructed to call with any new concerns prior to that time  She states that her daughters are inquiring whether the patient did undergo genetic testing  I did explain that we prefer to text the affected family member    Therefore, the patient is interested What Is The Reason For Today's Visit?: Full Body Skin Examination What Is The Reason For Today's Visit? (Being Monitored For X): concerning skin lesions on an annual basis in referral to our Genetics counselor  Order placed  History of Present Illness:        History of right breast cancer    12/26/2013 Biopsy     Right breast biopsy, 12:00    Infiltrating mammary carcinoma  %  %  HER-2 negative      1/17/2014 Surgery     Right lumpectomy (Dr Guilherme Kay)    Stage IA- pT1b, pN0, G1      2/21/2014 - 4/3/2014 Radiation     WBRT (Dr Gaviota Lowry)      2/25/2014 Genomic Testing     Oncotype 8, low risk      2014 - 2019 Hormone Therapy     Letrozole (Dr Monalisa German)          -Interval History: Patient presents today for a one year follow up visit for right breast cancer  She had a bilateral breast MRI performed on November 21, 2019 which was BI-RADS 2, category 2 density  She had a bilateral 3D screening mammogram on November 22, 2019 which was BI-RADS 2, category 2 density  Review of Systems:  Review of Systems   Constitutional: Negative for activity change, appetite change, chills, fatigue, fever and unexpected weight change  HENT: Negative for trouble swallowing  Eyes: Negative for pain, redness and visual disturbance  Respiratory: Negative for cough, shortness of breath and wheezing  Cardiovascular: Negative for chest pain, palpitations and leg swelling  Gastrointestinal: Negative for abdominal pain, constipation, diarrhea, nausea and vomiting  Endocrine: Negative for cold intolerance and heat intolerance  Musculoskeletal: Positive for back pain (improving with PT)  Negative for arthralgias, gait problem and myalgias  Skin: Negative for color change and rash  Neurological: Negative for dizziness, syncope, light-headedness, numbness and headaches  Hematological: Negative for adenopathy  Psychiatric/Behavioral: Negative for agitation and confusion  All other systems reviewed and are negative        Patient Active Problem List   Diagnosis    Primary osteoarthritis of right knee    Meralgia paraesthetica, left    Acquired spondylolisthesis    Acquired hypothyroidism    Leukopenia    History of right breast cancer    Myelopathy (HCC)    Osteopenia of multiple sites    Seasonal allergies    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    Vitamin D deficiency    Benign essential HTN    Overweight    Urge incontinence of urine    Glucose intolerance    Subacromial impingement of right shoulder    Encounter for follow-up examination after completed treatment for malignant neoplasm     Past Medical History:   Diagnosis Date    Arthritis     Breast cancer ([facility] [address] ) 01/17/2014    radiation    Disease of thyroid gland     History of radiation therapy 2014    breast cancer    Hypothyroid     Knee pain, right     Limb alert care status     no BP's /IV's right side    Mitral valve prolapse     Numbness of anterior thigh     left and tingling    Seasonal allergies     dust,mold,trees    Urinary incontinence     Varicose veins of both lower extremities     Wears glasses      Past Surgical History:   Procedure Laterality Date    BREAST BIOPSY Left 01/16/2002    BREAST BIOPSY Left 08/05/2005    high risk    BREAST BIOPSY Right 12/26/2013    positive    BREAST EXCISIONAL BIOPSY Left 04/06/2006    high risk    BREAST EXCISIONAL BIOPSY Left 10/13/2006    BREAST LUMPECTOMY Right 01/17/2014    malignant    BREAST SURGERY Right     lumpectomy    BUNIONECTOMY Bilateral     COLONOSCOPY      EXPLORATORY LAPAROTOMY      "for fertility"    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    KNEE ARTHROSCOPY Right     [place] TOTAL KNEE ARTHROPLASTY Right 1/20/2017    Procedure: ARTHROPLASTY KNEE TOTAL;  Surgeon: Yamileth Abbasi MD;  Location: [facility];  Service: Orthopedics    TONSILLECTOMY      WISDOM TOOTH EXTRACTION       Family History   Problem Relation Age of Onset    Uterine cancer Mother 71    Throat cancer Father [de-identified]    Breast cancer additional onset Paternal Aunt 54    Breast cancer additional onset Cousin 28    No Known Problems Daughter  No Known Problems Maternal Grandmother     No Known Problems Maternal Grandfather     No Known Problems Paternal Grandmother     No Known Problems Paternal Grandfather     No Known Problems Daughter     No Known Problems Maternal Aunt     No Known Problems Maternal Aunt     No Known Problems Maternal Aunt     No Known Problems Maternal Aunt     No Known Problems Paternal Aunt     Colon cancer Maternal Uncle 79     Social History     Socioeconomic History    Marital status: Single     Spouse name: Not on file    Number of children: Not on file    Years of education: Not on file    Highest education level: Not on file   Occupational History    Not on file   Social Needs    Financial resource strain: Not on file    Food insecurity:     Worry: Not on file     Inability: Not on file    Transportation needs:     Medical: Not on file     Non-medical: Not on file   Tobacco Use    Smoking status: Never Smoker    Smokeless tobacco: Never Used   Substance and Sexual Activity    Alcohol use: Yes     Frequency: Monthly or less     Drinks per session: 1 or 2     Binge frequency: Never     Comment: "rare"    Drug use: No    Sexual activity: Not Currently     Birth control/protection: Abstinence, Post-menopausal   Lifestyle    Physical activity:     Days per week: Not on file     Minutes per session: Not on file    Stress: Not on file   Relationships    Social connections:     Talks on phone: Not on file     Gets together: Not on file     Attends Bahai service: Not on file     Active member of club or organization: Not on file     Attends meetings of clubs or organizations: Not on file     Relationship status: Not on file    Intimate partner violence:     Fear of current or ex partner: Not on file     Emotionally abused: Not on file     Physically abused: Not on file     Forced sexual activity: Not on file   Other Topics Concern    Not on file   Social History Narrative    Not on file Current Outpatient Medications:     chlorthalidone 25 mg tablet, Take 1 tablet (25 mg total) by mouth daily, Disp: 90 tablet, Rfl: 3    levothyroxine (SYNTHROID) 75 mcg tablet, Take 1 tablet by mouth every other day, Disp: , Rfl:     levothyroxine (SYNTHROID) 88 mcg tablet, Take 1 tablet by mouth every other day, Disp: , Rfl:     loratadine (CLARITIN) 10 mg tablet, Take 10 mg by mouth daily, Disp: , Rfl:     magnesium gluconate (MAGONATE) 500 mg tablet, Take 500 mg by mouth 2 (two) times a day, Disp: , Rfl:     Multiple Vitamins-Minerals (MULTIVITAMIN ADULT PO), multivitamin, Disp: , Rfl:     solifenacin (VESICARE) 10 MG tablet, TAKE ONE TABLET BY MOUTH DAILY, Disp: 90 tablet, Rfl: 0    VITAMIN D, ERGOCALCIFEROL, PO, Take 2,000 Units by mouth daily, Disp: , Rfl:     denosumab (PROLIA) 60 mg/mL, Inject 60 mg under the skin every 6 (six) months, Disp: , Rfl:     sodium hyaluronate (ORTHOVISC) 30 mg/2 mL SOSY injection, ORTHOVISC 30 mg/2 mL intra-articular syringe  Inject 2 mL every week by intra-articular route , Disp: , Rfl:     triamcinolone acetonide (KENALOG-40) 40 mg/mL, Kenalog 40 mg/mL suspension for injection  Take 1 mL by injection route , Disp: , Rfl:     Zoster Vaccine Live (ZOSTAVAX) 57791 UNT/0 65ML SUSR, Zostavax (PF) 19,400 unit/0 65 mL subcutaneous suspension, Disp: , Rfl:   Allergies   Allergen Reactions    Actifed Cold-Allergy [Chlorpheniramine-Phenylephrine] Swelling and Rash     Throat and face swelled    Penicillins Rash    Vancomycin Other (See Comments)     redness     Vitals:    01/14/20 0802   BP: 130/80   Pulse: 64   Resp: 16   Temp: 98 4 °F (36 9 °C)       Physical Exam   Constitutional: She is oriented to person, place, and time  Vital signs are normal  She appears well-developed and well-nourished  No distress  HENT:   Head: Normocephalic and atraumatic  Neck: Normal range of motion  Cardiovascular: Normal rate, regular rhythm and normal heart sounds  Pulmonary/Chest: Effort normal and breath sounds normal    Bilateral breasts were examined in the sitting and supine position  There are no masses, skin changes, nipple discharge or nipple changes  Right breast and right axilla surgical scar  Scar tissue noted at right breast surgical site- stable  There is no bilateral supraclavicular or axillary lymphadenopathy noted  Abdominal: Soft  Normal appearance  She exhibits no mass  There is no hepatosplenomegaly  There is no tenderness  Musculoskeletal: Normal range of motion  Lymphadenopathy:     She has no axillary adenopathy  Right: No supraclavicular adenopathy present  Left: No supraclavicular adenopathy present  Neurological: She is alert and oriented to person, place, and time  Skin: Skin is warm, dry and intact  No rash noted  She is not diaphoretic  Psychiatric: She has a normal mood and affect  Her speech is normal    Vitals reviewed  Advance Care Planning/Advance Directives:  Discussed disease status, cancer treatment plans and/or cancer treatment goals with the patient

## 2020-09-15 DIAGNOSIS — I10 BENIGN ESSENTIAL HTN: ICD-10-CM

## 2020-09-15 RX ORDER — CHLORTHALIDONE 25 MG/1
TABLET ORAL
Qty: 90 TABLET | Refills: 0 | Status: SHIPPED | OUTPATIENT
Start: 2020-09-15 | End: 2020-12-28 | Stop reason: SDUPTHER

## 2020-09-17 ENCOUNTER — TELEPHONE (OUTPATIENT)
Dept: HEMATOLOGY ONCOLOGY | Facility: CLINIC | Age: 65
End: 2020-09-17

## 2020-09-18 ENCOUNTER — OFFICE VISIT (OUTPATIENT)
Dept: HEMATOLOGY ONCOLOGY | Facility: CLINIC | Age: 65
End: 2020-09-18
Payer: COMMERCIAL

## 2020-09-18 VITALS
OXYGEN SATURATION: 100 % | RESPIRATION RATE: 18 BRPM | HEART RATE: 65 BPM | HEIGHT: 69 IN | SYSTOLIC BLOOD PRESSURE: 126 MMHG | WEIGHT: 156 LBS | TEMPERATURE: 96.9 F | BODY MASS INDEX: 23.11 KG/M2 | DIASTOLIC BLOOD PRESSURE: 80 MMHG

## 2020-09-18 DIAGNOSIS — Z85.3 HISTORY OF RIGHT BREAST CANCER: Primary | ICD-10-CM

## 2020-09-18 PROCEDURE — 99214 OFFICE O/P EST MOD 30 MIN: CPT | Performed by: INTERNAL MEDICINE

## 2020-09-18 NOTE — PROGRESS NOTES
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, 6 mm, grade 1, cancer  in the upper outer quadrant of right breast and   Oncotype score was 8  In January 2014 patient had right breast lumpectomy and sentinel lymph node sampling   Heather Nathan That was followed by radiation     In February 2014 patient was started on [facility]-ER and she took that for 5 years   Ronald Began is continuing to take vitamin-D and calcium and she stays active     She has some tiredness     Neuropathy  in left thigh  and numbness of the toes as before  Less chronic low back discomfort post physical therapy      For osteopenia she used to be on Prolia and now she is on Reclast                      Current Outpatient Medications:     chlorthalidone 25 mg tablet, TAKE ONE TABLET BY MOUTH DAILY, Disp: 90 tablet, Rfl: 0    levothyroxine (SYNTHROID) 75 mcg tablet, Take 1 tablet by mouth every other day, Disp: , Rfl:     levothyroxine (SYNTHROID) 88 mcg tablet, Take 1 tablet by mouth every other day, Disp: , Rfl:     loratadine (CLARITIN) 10 mg tablet, Take 10 mg by mouth daily, Disp: , Rfl:     magnesium gluconate (MAGONATE) 500 mg tablet, Take 500 mg by mouth 2 (two) times a day, Disp: , Rfl:     Multiple Vitamins-Minerals (MULTIVITAMIN ADULT PO), multivitamin, Disp: , Rfl:     solifenacin (VESICARE) 10 MG tablet, Take 1 tablet (10 mg total) by mouth daily, Disp: 90 tablet, Rfl: 3    VITAMIN D, ERGOCALCIFEROL, PO, Take 2,000 Units by mouth daily, Disp: , Rfl:     zoledronic acid (RECLAST) 5 mg/100 mL IV infusion (premix), Infuse 5 mg into a venous catheter once, Disp: , Rfl:     Allergies   Allergen Reactions    Actifed Cold-Allergy [Chlorpheniramine-Phenylephrine] Swelling and Rash     Throat and face swelled    Penicillins Rash    Vancomycin Other (See Comments)     redness       Oncology History   History of right breast cancer   12/26/2013 Biopsy    Right breast biopsy, 12:00    Infiltrating mammary carcinoma  %  %  HER-2 negative     1/17/2014 Surgery    Right lumpectomy (Dr Rose Records)    Stage IA- pT1b, pN0, G1     2/21/2014 - 4/3/2014 Radiation    WBRT (Dr Yelena Hannah)     2/25/2014 Genomic Testing    Oncotype 8, low risk     2014 - 2019 Hormone Therapy    Letrozole (Dr Evelyn Javier)     2/2020 Genetic Testing    [facility] Cancers Panel (23 genes): GILLIAN, BARD1, BRCA1, BRCA2, BRIP1, CDH1, CHEK2, DICER1, EPCAM, MLH1, MSH2, MSH6, NBN, NF1, PALB2, PMS2, PTEN, RAD50, RAD51C, RAD51D, SMARCA4, STK11, TP53           ROS:  09/18/20 Reviewed 13 systems: See symptoms in HPI:  Tiredness  Neuropathy  Low back discomfort  Presently no headaches, seizures, dizziness, diplopia, dysphagia, hoarseness, chest pain, palpitations, shortness of breath, cough, hemoptysis, abdominal pain, nausea, vomiting, change in bowel habits, melena, hematuria, fever, chills, bleeding, bone pains, skin rash, weight loss,  weakness,  claudication and gait problem  No frequent infections  Not unusually sensitive to heat or cold  No swelling of the ankles  No swollen glands  Patient is anxious  /80 (BP Location: Left arm, Patient Position: Sitting, Cuff Size: Adult)   Pulse 65   Temp (!) 96 9 °F (36 1 °C)   Resp 18   Ht 5' 9" (1 753 m)   Wt 70 8 kg (156 lb)   LMP  (LMP Unknown)   SpO2 100%   BMI 23 04 kg/m²     Physical Exam:  Alert, oriented, not in distress, no icterus, no oral thrush, no palpable neck mass, clear lung fields, regular heart rate, abdomen  soft and non tender, no palpable abdominal mass, no ascites, no edema of ankles, no calf tenderness, no objective focal neurological deficit, no skin rash, no palpable lymphadenopathy in the neck and axillary areas, good arterial pulses, no clubbing  Patient is anxious  Performance status 1   No lymphedema    IMAGING:      LABS:  Results for orders placed or performed in visit on 07/31/20   Cancer antigen 27 29   Result Value Ref Range    CA 27 29 7 7 0 0 - 42 3 U/mL     Labs, Imaging, & Other studies:   All pertinent labs and imaging studies were personally reviewed    Lab Results   Component Value Date     09/30/2015    K 3 6 06/20/2020     06/20/2020    CO2 33 (H) 06/20/2020    ANIONGAP 8 09/30/2015    BUN 18 06/20/2020    CREATININE 1 02 06/20/2020    GLUCOSE 89 09/30/2015    GLUF 88 06/20/2020    CALCIUM 9 9 06/20/2020    AST 16 06/20/2020    ALT 23 06/20/2020    ALKPHOS 44 (L) 06/20/2020    PROT 7 0 09/30/2015    BILITOT 0 39 09/30/2015    EGFR 57 06/20/2020     Lab Results   Component Value Date    WBC 4 04 (L) 06/20/2020    HGB 14 0 06/20/2020    HCT 42 2 06/20/2020    MCV 94 06/20/2020     06/20/2020       Reviewed and discussed with patient  Assessment and plan: Follow-up visit for history of hormone receptor positive, HER2 negative, stage I, T1 B, 6 mm, grade 1, cancer  in the upper outer quadrant of right breast and   Oncotype score was 8  In January 2014 patient had right breast lumpectomy and sentinel lymph node sampling   Melinda Hollis That was followed by radiation     In February 2014 patient was started on [facility]-ER and she took that for 5 years   Miguel Ángelramesh Torrez is continuing to take vitamin-D and calcium and she stays active     She has some tiredness     Neuropathy  in left thigh  and numbness of the toes as before  Less chronic low back discomfort post physical therapy  For osteopenia she used to be on Prolia and now she is on Reclast     Physical examination and test results are as recorded and discussed    Breast cancer remains in remission   Goal is cure from breast cancer   Condition discussed and explained   Questions answered      Discussed the importance of self-breast examination, eating healthy foods, staying active and health screening tests   Patient is capable of self-care  Zulema Callaway goes to her breast surgeon for examination of breasts and imaging studies     Patient will continue to follow with her primary physician and other consultants  She states she had negative genetic test for breast cancer  Discussed precautions against coronavirus  1  History of right breast cancer    - CBC and differential; Future  - Comprehensive metabolic panel; Future  - Cancer antigen 27 29; Future    2  Peripheral neuropathy             3  Osteopenia of multiple sites        4  Acquired hypothyroidism        5  Arthritis             Patient voiced understanding and agreement in the discussion  Counseling / Coordination of Care   Greater than 50% of total time was spent with the patient and / or family counseling and / or coordination of care

## 2020-10-13 ENCOUNTER — OFFICE VISIT (OUTPATIENT)
Dept: OBGYN CLINIC | Facility: HOSPITAL | Age: 65
End: 2020-10-13
Payer: COMMERCIAL

## 2020-10-13 VITALS
BODY MASS INDEX: 23.7 KG/M2 | DIASTOLIC BLOOD PRESSURE: 74 MMHG | SYSTOLIC BLOOD PRESSURE: 134 MMHG | WEIGHT: 160 LBS | HEIGHT: 69 IN | HEART RATE: 54 BPM

## 2020-10-13 DIAGNOSIS — M75.41 IMPINGEMENT SYNDROME OF RIGHT SHOULDER: Primary | ICD-10-CM

## 2020-10-13 PROCEDURE — 99213 OFFICE O/P EST LOW 20 MIN: CPT | Performed by: ORTHOPAEDIC SURGERY

## 2020-11-21 ENCOUNTER — HOSPITAL ENCOUNTER (OUTPATIENT)
Dept: RADIOLOGY | Facility: HOSPITAL | Age: 65
Discharge: HOME | End: 2020-11-21
Payer: COMMERCIAL

## 2020-11-21 DIAGNOSIS — Z85.3 HISTORY OF RIGHT BREAST CANCER: ICD-10-CM

## 2020-11-21 PROCEDURE — A9585 GADOBUTROL INJECTION: HCPCS | Performed by: NURSE PRACTITIONER

## 2020-11-21 PROCEDURE — C8908 MRI W/O FOL W/CONT, BREAST,: HCPCS

## 2020-11-21 RX ADMIN — GADOBUTROL 7 ML: 604.72 INJECTION INTRAVENOUS at 14:13

## 2020-11-24 ENCOUNTER — TELEPHONE (OUTPATIENT)
Dept: FAMILY MEDICINE CLINIC | Facility: CLINIC | Age: 65
End: 2020-11-24

## 2020-11-25 ENCOUNTER — TRANSCRIBE ORDERS (OUTPATIENT)
Dept: ADMINISTRATIVE | Age: 65
End: 2020-11-25

## 2020-11-25 ENCOUNTER — LAB (OUTPATIENT)
Dept: LAB | Age: 65
End: 2020-11-25
Payer: COMMERCIAL

## 2020-11-25 DIAGNOSIS — E66.8 OBESITY OF ENDOCRINE ORIGIN: ICD-10-CM

## 2020-11-25 DIAGNOSIS — I51.9 MYXEDEMA HEART DISEASE: Primary | ICD-10-CM

## 2020-11-25 DIAGNOSIS — I87.2 PERIPHERAL VENOUS INSUFFICIENCY: ICD-10-CM

## 2020-11-25 DIAGNOSIS — E03.9 MYXEDEMA HEART DISEASE: ICD-10-CM

## 2020-11-25 DIAGNOSIS — E03.9 HYPOTHYROIDISM: ICD-10-CM

## 2020-11-25 DIAGNOSIS — M18.0 PRIMARY OSTEOARTHRITIS OF BOTH FIRST CARPOMETACARPAL JOINTS: ICD-10-CM

## 2020-11-25 DIAGNOSIS — C50.919 POSTMASTECTOMY LYMPHANGIOSARCOMA SYNDROME, UNSPECIFIED LATERALITY (HCC): ICD-10-CM

## 2020-11-25 DIAGNOSIS — M85.89 DISAPPEARING BONE DISEASE: Primary | ICD-10-CM

## 2020-11-25 DIAGNOSIS — E03.9 MYXEDEMA HEART DISEASE: Primary | ICD-10-CM

## 2020-11-25 DIAGNOSIS — E55.9 VITAMIN D DEFICIENCY, UNSPECIFIED: ICD-10-CM

## 2020-11-25 DIAGNOSIS — M85.89 DISAPPEARING BONE DISEASE: ICD-10-CM

## 2020-11-25 DIAGNOSIS — Z79.899 ENCOUNTER FOR LONG-TERM (CURRENT) USE OF OTHER MEDICATIONS: ICD-10-CM

## 2020-11-25 DIAGNOSIS — Z90.10 POSTMASTECTOMY LYMPHANGIOSARCOMA SYNDROME, UNSPECIFIED LATERALITY (HCC): ICD-10-CM

## 2020-11-25 DIAGNOSIS — I51.9 MYXEDEMA HEART DISEASE: ICD-10-CM

## 2020-11-25 LAB
25(OH)D3 SERPL-MCNC: 48.6 NG/ML (ref 30–100)
ALBUMIN SERPL BCP-MCNC: 3.6 G/DL (ref 3.5–5)
ALP SERPL-CCNC: 47 U/L (ref 46–116)
ALT SERPL W P-5'-P-CCNC: 22 U/L (ref 12–78)
ANION GAP SERPL CALCULATED.3IONS-SCNC: 1 MMOL/L (ref 4–13)
AST SERPL W P-5'-P-CCNC: 17 U/L (ref 5–45)
BASOPHILS # BLD AUTO: 0.03 THOUSANDS/ΜL (ref 0–0.1)
BASOPHILS NFR BLD AUTO: 1 % (ref 0–1)
BILIRUB SERPL-MCNC: 0.56 MG/DL (ref 0.2–1)
BILIRUB UR QL STRIP: NEGATIVE
BUN SERPL-MCNC: 19 MG/DL (ref 5–25)
CALCIUM SERPL-MCNC: 9.7 MG/DL (ref 8.3–10.1)
CHLORIDE SERPL-SCNC: 104 MMOL/L (ref 100–108)
CHOLEST SERPL-MCNC: 194 MG/DL (ref 50–200)
CLARITY UR: CLEAR
CO2 SERPL-SCNC: 35 MMOL/L (ref 21–32)
COLOR UR: YELLOW
CREAT SERPL-MCNC: 0.9 MG/DL (ref 0.6–1.3)
CREAT UR-MCNC: 31.9 MG/DL
EOSINOPHIL # BLD AUTO: 0.08 THOUSAND/ΜL (ref 0–0.61)
EOSINOPHIL NFR BLD AUTO: 2 % (ref 0–6)
ERYTHROCYTE [DISTWIDTH] IN BLOOD BY AUTOMATED COUNT: 12.5 % (ref 11.6–15.1)
EST. AVERAGE GLUCOSE BLD GHB EST-MCNC: 108 MG/DL
GFR SERPL CREATININE-BSD FRML MDRD: 66 ML/MIN/1.73SQ M
GLUCOSE P FAST SERPL-MCNC: 84 MG/DL (ref 65–99)
GLUCOSE UR STRIP-MCNC: NEGATIVE MG/DL
HBA1C MFR BLD: 5.4 %
HCT VFR BLD AUTO: 41.6 % (ref 34.8–46.1)
HDLC SERPL-MCNC: 63 MG/DL
HGB BLD-MCNC: 13.6 G/DL (ref 11.5–15.4)
HGB UR QL STRIP.AUTO: NEGATIVE
IMM GRANULOCYTES # BLD AUTO: 0.01 THOUSAND/UL (ref 0–0.2)
IMM GRANULOCYTES NFR BLD AUTO: 0 % (ref 0–2)
KETONES UR STRIP-MCNC: NEGATIVE MG/DL
LDLC SERPL CALC-MCNC: 116 MG/DL (ref 0–100)
LEUKOCYTE ESTERASE UR QL STRIP: NEGATIVE
LYMPHOCYTES # BLD AUTO: 0.88 THOUSANDS/ΜL (ref 0.6–4.47)
LYMPHOCYTES NFR BLD AUTO: 24 % (ref 14–44)
MAGNESIUM SERPL-MCNC: 2.2 MG/DL (ref 1.6–2.6)
MCH RBC QN AUTO: 31.3 PG (ref 26.8–34.3)
MCHC RBC AUTO-ENTMCNC: 32.7 G/DL (ref 31.4–37.4)
MCV RBC AUTO: 96 FL (ref 82–98)
MICROALBUMIN UR-MCNC: <5 MG/L (ref 0–20)
MICROALBUMIN/CREAT 24H UR: <16 MG/G CREATININE (ref 0–30)
MONOCYTES # BLD AUTO: 0.27 THOUSAND/ΜL (ref 0.17–1.22)
MONOCYTES NFR BLD AUTO: 8 % (ref 4–12)
NEUTROPHILS # BLD AUTO: 2.33 THOUSANDS/ΜL (ref 1.85–7.62)
NEUTS SEG NFR BLD AUTO: 65 % (ref 43–75)
NITRITE UR QL STRIP: NEGATIVE
NRBC BLD AUTO-RTO: 0 /100 WBCS
PH UR STRIP.AUTO: 7 [PH]
PHOSPHATE SERPL-MCNC: 3.5 MG/DL (ref 2.3–4.1)
PLATELET # BLD AUTO: 199 THOUSANDS/UL (ref 149–390)
PMV BLD AUTO: 10.8 FL (ref 8.9–12.7)
POTASSIUM SERPL-SCNC: 3.8 MMOL/L (ref 3.5–5.3)
PROT SERPL-MCNC: 6.9 G/DL (ref 6.4–8.2)
PROT UR STRIP-MCNC: NEGATIVE MG/DL
RBC # BLD AUTO: 4.34 MILLION/UL (ref 3.81–5.12)
SODIUM SERPL-SCNC: 140 MMOL/L (ref 136–145)
SP GR UR STRIP.AUTO: 1.01 (ref 1–1.03)
T4 FREE SERPL-MCNC: 1.23 NG/DL (ref 0.76–1.46)
TRIGL SERPL-MCNC: 77 MG/DL
TSH SERPL DL<=0.05 MIU/L-ACNC: 2.73 UIU/ML (ref 0.36–3.74)
UROBILINOGEN UR QL STRIP.AUTO: 0.2 E.U./DL
WBC # BLD AUTO: 3.6 THOUSAND/UL (ref 4.31–10.16)

## 2020-11-25 PROCEDURE — 80053 COMPREHEN METABOLIC PANEL: CPT

## 2020-11-25 PROCEDURE — 36415 COLL VENOUS BLD VENIPUNCTURE: CPT

## 2020-11-25 PROCEDURE — 84100 ASSAY OF PHOSPHORUS: CPT

## 2020-11-25 PROCEDURE — 80061 LIPID PANEL: CPT

## 2020-11-25 PROCEDURE — 82523 COLLAGEN CROSSLINKS: CPT

## 2020-11-25 PROCEDURE — 81003 URINALYSIS AUTO W/O SCOPE: CPT | Performed by: SPECIALIST

## 2020-11-25 PROCEDURE — 82570 ASSAY OF URINE CREATININE: CPT | Performed by: SPECIALIST

## 2020-11-25 PROCEDURE — 85025 COMPLETE CBC W/AUTO DIFF WBC: CPT

## 2020-11-25 PROCEDURE — 82306 VITAMIN D 25 HYDROXY: CPT

## 2020-11-25 PROCEDURE — 84443 ASSAY THYROID STIM HORMONE: CPT

## 2020-11-25 PROCEDURE — 83735 ASSAY OF MAGNESIUM: CPT

## 2020-11-25 PROCEDURE — 83036 HEMOGLOBIN GLYCOSYLATED A1C: CPT

## 2020-11-25 PROCEDURE — 82043 UR ALBUMIN QUANTITATIVE: CPT | Performed by: SPECIALIST

## 2020-11-25 PROCEDURE — 84439 ASSAY OF FREE THYROXINE: CPT

## 2020-11-27 ENCOUNTER — HOSPITAL ENCOUNTER (OUTPATIENT)
Dept: RADIOLOGY | Age: 65
Discharge: HOME | End: 2020-11-27
Payer: COMMERCIAL

## 2020-11-27 VITALS — HEIGHT: 69 IN | BODY MASS INDEX: 22.66 KG/M2 | WEIGHT: 153 LBS

## 2020-11-27 DIAGNOSIS — Z12.31 VISIT FOR SCREENING MAMMOGRAM: ICD-10-CM

## 2020-11-27 PROCEDURE — 77063 BREAST TOMOSYNTHESIS BI: CPT

## 2020-11-27 PROCEDURE — 77067 SCR MAMMO BI INCL CAD: CPT

## 2020-12-04 LAB — COLLAGEN CTX SERPL-MCNC: 325 PG/ML

## 2020-12-18 ENCOUNTER — HOSPITAL ENCOUNTER (OUTPATIENT)
Dept: RADIOLOGY | Facility: HOSPITAL | Age: 65
Discharge: HOME | End: 2020-12-18
Attending: SPECIALIST
Payer: COMMERCIAL

## 2020-12-18 DIAGNOSIS — E03.9 HYPOTHYROIDISM: ICD-10-CM

## 2020-12-18 PROCEDURE — 75571 CT HRT W/O DYE W/CA TEST: CPT

## 2020-12-18 PROCEDURE — G1004 CDSM NDSC: HCPCS

## 2020-12-28 ENCOUNTER — OFFICE VISIT (OUTPATIENT)
Dept: FAMILY MEDICINE CLINIC | Facility: CLINIC | Age: 65
End: 2020-12-28
Payer: COMMERCIAL

## 2020-12-28 VITALS
SYSTOLIC BLOOD PRESSURE: 132 MMHG | TEMPERATURE: 96.5 F | RESPIRATION RATE: 12 BRPM | WEIGHT: 157 LBS | BODY MASS INDEX: 23.25 KG/M2 | DIASTOLIC BLOOD PRESSURE: 84 MMHG | HEIGHT: 69 IN | HEART RATE: 78 BPM

## 2020-12-28 DIAGNOSIS — I10 BENIGN ESSENTIAL HTN: ICD-10-CM

## 2020-12-28 DIAGNOSIS — Z85.3 HISTORY OF RIGHT BREAST CANCER: ICD-10-CM

## 2020-12-28 DIAGNOSIS — E78.00 PURE HYPERCHOLESTEROLEMIA: ICD-10-CM

## 2020-12-28 DIAGNOSIS — I77.810 ASCENDING AORTA DILATATION (HCC): ICD-10-CM

## 2020-12-28 DIAGNOSIS — E74.39 GLUCOSE INTOLERANCE: ICD-10-CM

## 2020-12-28 DIAGNOSIS — M85.89 OSTEOPENIA OF MULTIPLE SITES: ICD-10-CM

## 2020-12-28 DIAGNOSIS — Z00.00 ANNUAL PHYSICAL EXAM: Primary | ICD-10-CM

## 2020-12-28 DIAGNOSIS — E03.9 ACQUIRED HYPOTHYROIDISM: ICD-10-CM

## 2020-12-28 PROCEDURE — 99397 PER PM REEVAL EST PAT 65+ YR: CPT | Performed by: FAMILY MEDICINE

## 2020-12-28 RX ORDER — CHLORTHALIDONE 25 MG/1
25 TABLET ORAL DAILY
Qty: 90 TABLET | Refills: 1 | Status: SHIPPED | OUTPATIENT
Start: 2020-12-28 | End: 2021-01-25 | Stop reason: ALTCHOICE

## 2020-12-28 RX ORDER — CHLORTHALIDONE 25 MG/1
25 TABLET ORAL DAILY
Qty: 90 TABLET | Refills: 0 | Status: SHIPPED | OUTPATIENT
Start: 2020-12-28 | End: 2021-01-25 | Stop reason: ALTCHOICE

## 2020-12-30 ENCOUNTER — IMMUNIZATIONS (OUTPATIENT)
Dept: FAMILY MEDICINE CLINIC | Facility: HOSPITAL | Age: 65
End: 2020-12-30
Payer: COMMERCIAL

## 2020-12-30 DIAGNOSIS — Z23 ENCOUNTER FOR IMMUNIZATION: ICD-10-CM

## 2020-12-30 PROCEDURE — 91301 SARS-COV-2 / COVID-19 MRNA VACCINE (MODERNA) 100 MCG: CPT

## 2020-12-30 PROCEDURE — 0011A SARS-COV-2 / COVID-19 MRNA VACCINE (MODERNA) 100 MCG: CPT

## 2021-01-18 ENCOUNTER — TELEPHONE (OUTPATIENT)
Dept: HEMATOLOGY ONCOLOGY | Facility: CLINIC | Age: 66
End: 2021-01-18

## 2021-01-18 ENCOUNTER — TELEPHONE (OUTPATIENT)
Dept: SURGICAL ONCOLOGY | Facility: CLINIC | Age: 66
End: 2021-01-18

## 2021-01-18 NOTE — TELEPHONE ENCOUNTER
Reschedule Appointment     Who is calling in Patient    Doctor Appointment Scheduled with Morelia Garcia date and time 3/19/21 2pm    New date and time 4/9/21 8am    Location [place]    Patient verbalized understanding   Yes

## 2021-01-19 ENCOUNTER — OFFICE VISIT (OUTPATIENT)
Dept: SURGICAL ONCOLOGY | Facility: CLINIC | Age: 66
End: 2021-01-19
Payer: MEDICARE

## 2021-01-19 VITALS
HEART RATE: 78 BPM | RESPIRATION RATE: 16 BRPM | TEMPERATURE: 97 F | BODY MASS INDEX: 23.25 KG/M2 | SYSTOLIC BLOOD PRESSURE: 108 MMHG | DIASTOLIC BLOOD PRESSURE: 60 MMHG | WEIGHT: 157 LBS | HEIGHT: 69 IN

## 2021-01-19 DIAGNOSIS — Z12.31 VISIT FOR SCREENING MAMMOGRAM: ICD-10-CM

## 2021-01-19 DIAGNOSIS — Z08 ENCOUNTER FOR FOLLOW-UP EXAMINATION AFTER COMPLETED TREATMENT FOR MALIGNANT NEOPLASM: Primary | ICD-10-CM

## 2021-01-19 DIAGNOSIS — Z85.3 HISTORY OF RIGHT BREAST CANCER: ICD-10-CM

## 2021-01-19 PROCEDURE — 99213 OFFICE O/P EST LOW 20 MIN: CPT | Performed by: NURSE PRACTITIONER

## 2021-01-19 RX ORDER — THIAMINE MONONITRATE (VIT B1) 100 MG
100 TABLET ORAL DAILY
COMMUNITY

## 2021-01-19 RX ORDER — ZINC GLUCONATE 50 MG
50 TABLET ORAL DAILY
COMMUNITY

## 2021-01-19 NOTE — PROGRESS NOTES
Surgical Oncology Follow Up       [address] [place] [address],[address] [place] [address]  CANCER CARE [facility]  [address] [id number]    Rashad Honey  1955  [id number]  [address] [place] [address],[address] [place] [address]  CANCER [facility] ONCOL[facility]  [address] [place] [address]    Chief Complaint   Patient presents with    Breast Cancer     Pt is here for 1 year f/u        Assessment/Plan:  1  Encounter for follow-up examination after completed treatment for malignant neoplasm    2  History of right breast cancer  - 1 year f/u visit  - MRI breast bilateral w and wo contrast w cad; Future    3  Visit for screening mammogram  - Mammo screening bilateral w 3d & cad; Future      Discussion/Summary: Patient is a 77year old female who presents today for a one year follow-up for right breast cancer diagnosed in January 2014  Her pathology revealed invasive ductal carcinoma, grade 1, ER/NY positive, HER-2 negative  She underwent a right breast lumpectomy and sentinel node biopsy by Dr Skylar Stone  She completed whole breast radiation therapy and completed 5 years of aromatase inhibitor therapy  She underwent genetic testing within the today in February of 2020 which was negative  She had a bilateral breast MRI performed on November 21, 2020 which was BI-RADS 2  Incidentally noted was dilation of the ascending thoracic aorta measuring 4 2 cm  She is followed up with her PCP/cardiology for this matter  She had a bilateral 3D screening mammogram on November 27, 2020 which was BI-RADS 2, category 2 density  She has no new complaints today and there are no concerns on today's exam   She has lost 35 lb since April by using the [facility] appt/portion control  I will plan to see her back again in 1 year after her imaging or sooner should the need arise  She was instructed to call with any new concerns or symptoms prior to that time    All of her questions were answered today        History of Present Illness:     Oncology History   History of right breast cancer   12/26/2013 Biopsy    Right breast biopsy, 12:00    Infiltrating mammary carcinoma  %  %  HER-2 negative     1/17/2014 Surgery    Right lumpectomy (Dr Juliette Person)    Stage IA- pT1b, pN0, G1     2/21/2014 - 4/3/2014 Radiation    WBRT (  [facility])     2/25/2014 Genomic Testing    Oncotype 8, low risk     2014 - 2019 Hormone Therapy    Letrozole (Dr Enzo Ross)     2/2020 Genetic Testing    [facility] Cancers Panel (23 genes): GILLIAN, BARD1, BRCA1, BRCA2, BRIP1, CDH1, CHEK2, DICER1, EPCAM, MLH1, MSH2, MSH6, NBN, NF1, PALB2, PMS2, PTEN, RAD50, RAD51C, RAD51D, SMARCA4, STK11, TP53            -Interval History:  Patient presents today for follow-up visit for breast cancer diagnosed in 2014  She underwent genetic testing last year which was negative  Her MRI and mammogram are BI-RADS 2  She is followed up with her PCP regarding an incidentally noted ascending thoracic aorta dilation  She notices no new breast lumps, skin changes, nipple changes or nipple discharge  She denies headaches, back pain or bone pain, cough or shortness of breath, abdominal pain  She has lost 35 lb since April by following the [employer] [facility]  Review of Systems:  Review of Systems   Constitutional: Negative for activity change, appetite change, chills, fatigue, fever and unexpected weight change  Respiratory: Negative for cough and shortness of breath  Cardiovascular: Negative for chest pain  Gastrointestinal: Negative for abdominal pain, constipation, diarrhea, nausea and vomiting  Musculoskeletal: Negative for arthralgias, back pain, gait problem and myalgias  Skin: Negative for color change and rash  Neurological: Negative for dizziness and headaches  Hematological: Negative for adenopathy  Psychiatric/Behavioral: Negative for agitation and confusion  All other systems reviewed and are negative        Patient Active Problem List   Diagnosis    Primary osteoarthritis of right knee    Meralgia paraesthetica, left    Acquired spondylolisthesis    Acquired hypothyroidism    Leukopenia    History of right breast cancer    Osteopenia of multiple sites    Seasonal allergies    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    Benign essential HTN    Urge incontinence of urine    Glucose intolerance    Encounter for follow-up examination after completed treatment for malignant neoplasm    Pure hypercholesterolemia    Impingement syndrome of right shoulder    Ascending aorta dilatation (HCC)     Past Medical History:   Diagnosis Date    Arthritis     BRCA1 negative     BRCA2 negative     Breast cancer ([facility] ) 01/17/2014    radiation    Disease of thyroid gland     History of radiation therapy 2014    breast cancer    Hypothyroid     Knee pain, right     Limb alert care status     no BP's /IV's right side    Mitral valve prolapse     Numbness of anterior thigh     left and tingling    Seasonal allergies     dust,mold,trees    Urinary incontinence     Varicose veins of both lower extremities     Wears glasses      Past Surgical History:   Procedure Laterality Date    BREAST BIOPSY Left 01/16/2002    BREAST BIOPSY Left 08/05/2005    high risk    BREAST BIOPSY Right 12/26/2013    positive    BREAST EXCISIONAL BIOPSY Left 04/06/2006    high risk    BREAST EXCISIONAL BIOPSY Left 10/13/2006    BREAST LUMPECTOMY Right 01/17/2014    malignant    BREAST SURGERY Right     lumpectomy    BUNIONECTOMY Bilateral     COLONOSCOPY      EXPLORATORY LAPAROTOMY      "for fertility"    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    KNEE ARTHROSCOPY Right     SC TOTAL KNEE ARTHROPLASTY Right 1/20/2017    Procedure: ARTHROPLASTY KNEE TOTAL;  Surgeon: Rafi Burr MD;  Location: [facility];  Service: Orthopedics    TONSILLECTOMY      WISDOM TOOTH EXTRACTION       Family History   Problem Relation Age of Onset    Uterine cancer Mother 71  Throat cancer Father [de-identified]    Breast cancer additional onset Paternal Aunt 54    Breast cancer additional onset Cousin 28    No Known Problems Daughter     No Known Problems Maternal Grandmother     No Known Problems Maternal Grandfather     No Known Problems Paternal Grandmother     No Known Problems Paternal Grandfather     No Known Problems Daughter     No Known Problems Maternal Aunt     No Known Problems Maternal Aunt     No Known Problems Maternal Aunt     No Known Problems Maternal Aunt     No Known Problems Paternal Aunt     Colon cancer Maternal Uncle 79     Social History     Socioeconomic History    Marital status: Single     Spouse name: Not on file    Number of children: Not on file    Years of education: Not on file    Highest education level: Not on file   Occupational History    Not on file   Social Needs    Financial resource strain: Not on file    Food insecurity     Worry: Not on file     Inability: Not on file    Transportation needs     Medical: Not on file     Non-medical: Not on file   Tobacco Use    Smoking status: Never Smoker    Smokeless tobacco: Never Used   Substance and Sexual Activity    Alcohol use: Yes     Frequency: 2-4 times a month     Drinks per session: 1 or 2     Binge frequency: Never    Drug use: No    Sexual activity: Not Currently     Birth control/protection: Abstinence, Post-menopausal   Lifestyle    Physical activity     Days per week: Not on file     Minutes per session: Not on file    Stress: Not on file   Relationships    Social connections     Talks on phone: Not on file     Gets together: Not on file     Attends [employer] service: Not on file     Active member of club or organization: Not on file     Attends meetings of clubs or organizations: Not on file     Relationship status: Not on file    Intimate partner violence     Fear of current or ex partner: Not on file     Emotionally abused: Not on file     Physically abused: Not on file     Forced sexual activity: Not on file   Other Topics Concern    Not on file   Social History Narrative    Not on file       Current Outpatient Medications:     levothyroxine (SYNTHROID) 75 mcg tablet, Take 1 tablet by mouth every other day, Disp: , Rfl:     levothyroxine (SYNTHROID) 88 mcg tablet, Take 1 tablet by mouth every other day, Disp: , Rfl:     loratadine (CLARITIN) 10 mg tablet, Take 10 mg by mouth daily, Disp: , Rfl:     magnesium gluconate (MAGONATE) 500 mg tablet, Take 500 mg by mouth 2 (two) times a day, Disp: , Rfl:     Multiple Vitamins-Minerals (MULTIVITAMIN ADULT PO), multivitamin, Disp: , Rfl:     solifenacin (VESICARE) 10 MG tablet, Take 1 tablet (10 mg total) by mouth daily, Disp: 90 tablet, Rfl: 3    thiamine (VITAMIN B1) 100 mg tablet, Take 100 mg by mouth daily, Disp: , Rfl:     VITAMIN D, ERGOCALCIFEROL, PO, Take 2,000 Units by mouth daily, Disp: , Rfl:     zinc gluconate 50 mg tablet, Take 50 mg by mouth daily, Disp: , Rfl:     chlorthalidone 25 mg tablet, Take 1 tablet (25 mg total) by mouth daily (Patient not taking: Reported on 1/19/2021), Disp: 90 tablet, Rfl: 0    chlorthalidone 25 mg tablet, Take 1 tablet (25 mg total) by mouth daily, Disp: 90 tablet, Rfl: 1  Allergies   Allergen Reactions    Actifed Cold-Allergy [Chlorpheniramine-Phenylephrine] Swelling and Rash     Throat and face swelled    Penicillins Rash    Vancomycin Other (See Comments)     redness     Vitals:    01/19/21 0808   BP: 108/60   Pulse: 78   Resp: 16   Temp: (!) 97 °F (36 1 °C)       Physical Exam  Vitals signs reviewed  Constitutional:       General: She is not in acute distress  Appearance: Normal appearance  She is well-developed  She is not diaphoretic  HENT:      Head: Normocephalic and atraumatic  Neck:      Musculoskeletal: Normal range of motion  Cardiovascular:      Rate and Rhythm: Normal rate and regular rhythm  Heart sounds: Normal heart sounds     Pulmonary: Effort: Pulmonary effort is normal       Breath sounds: Normal breath sounds  Chest:      Breasts:         Right: Skin change (surgical scars) and tenderness (stable) present  No swelling, bleeding, inverted nipple, mass or nipple discharge  Left: Skin change (surgical scar) present  No swelling, bleeding, inverted nipple, mass, nipple discharge or tenderness  Abdominal:      Palpations: Abdomen is soft  There is no mass  Tenderness: There is no abdominal tenderness  Musculoskeletal: Normal range of motion  Lymphadenopathy:      Upper Body:      Right upper body: No supraclavicular or axillary adenopathy  Left upper body: No supraclavicular or axillary adenopathy  Skin:     General: Skin is warm and dry  Findings: No rash  Neurological:      Mental Status: She is alert and oriented to person, place, and time  Psychiatric:         Speech: Speech normal            Advance Care Planning/Advance Directives:  Discussed disease status, cancer treatment plans and/or cancer treatment goals with the patient

## 2021-01-25 ENCOUNTER — OFFICE VISIT (OUTPATIENT)
Dept: CARDIOLOGY CLINIC | Facility: CLINIC | Age: 66
End: 2021-01-25
Payer: MEDICARE

## 2021-01-25 ENCOUNTER — IMMUNIZATIONS (OUTPATIENT)
Dept: FAMILY MEDICINE CLINIC | Facility: HOSPITAL | Age: 66
End: 2021-01-25

## 2021-01-25 VITALS
OXYGEN SATURATION: 96 % | SYSTOLIC BLOOD PRESSURE: 110 MMHG | BODY MASS INDEX: 23.77 KG/M2 | HEART RATE: 65 BPM | DIASTOLIC BLOOD PRESSURE: 74 MMHG | HEIGHT: 69 IN | WEIGHT: 160.5 LBS

## 2021-01-25 DIAGNOSIS — I10 BENIGN ESSENTIAL HTN: ICD-10-CM

## 2021-01-25 DIAGNOSIS — E78.00 PURE HYPERCHOLESTEROLEMIA: ICD-10-CM

## 2021-01-25 DIAGNOSIS — I25.10 CORONARY ARTERY DISEASE, ANGINA PRESENCE UNSPECIFIED, UNSPECIFIED VESSEL OR LESION TYPE, UNSPECIFIED WHETHER NATIVE OR TRANSPLANTED HEART: Primary | ICD-10-CM

## 2021-01-25 DIAGNOSIS — Z23 ENCOUNTER FOR IMMUNIZATION: Primary | ICD-10-CM

## 2021-01-25 DIAGNOSIS — I77.810 ASCENDING AORTA DILATATION (HCC): ICD-10-CM

## 2021-01-25 PROCEDURE — 99204 OFFICE O/P NEW MOD 45 MIN: CPT | Performed by: INTERNAL MEDICINE

## 2021-01-25 PROCEDURE — 93000 ELECTROCARDIOGRAM COMPLETE: CPT | Performed by: INTERNAL MEDICINE

## 2021-01-25 PROCEDURE — 0012A SARS-COV-2 / COVID-19 MRNA VACCINE (MODERNA) 100 MCG: CPT

## 2021-01-25 PROCEDURE — 91301 SARS-COV-2 / COVID-19 MRNA VACCINE (MODERNA) 100 MCG: CPT

## 2021-01-25 NOTE — PATIENT INSTRUCTIONS
Thoracic Aortic Aneurysm   WHAT YOU NEED TO KNOW:   What is a thoracic aortic aneurysm? A thoracic aortic aneurysm (TAA) is a bulge in your aorta that occurs when the aorta's walls are weakened  The aorta is a large blood vessel that extends from your heart down the center of your chest and into your abdomen  What causes a TAA? · Atherosclerosis: This is hardening of the arteries  Hardening occurs when fatty deposits, called plaque, build up along the walls of your arteries  · Abnormal development:  A part of your heart and aorta may not have developed normally  · Inflammation:  You may have inflammation in the walls of your aorta from an inflammatory disease or certain infections, such as syphilis  · Loss of elasticity:  As you age, blood vessels in the body may lose some of their elasticity  It may be worsened by long-term increased blood pressure  Certain disorders also can make the walls of your arteries lose elasticity  · Trauma:  Injury, particularly on the chest area, may lead to a partial or complete cut in the aorta  What increases my risk of a TAA? · Cigarette smoking    · High blood pressure    · A close family member has had a TAA    · Being male and older than 72years of age    · Diabetes     · Being overweight    What are the signs and symptoms of a TAA? You may have no signs or symptoms  The following are common when signs and symptoms do occur:  · Chest, neck, back, or abdominal pain    · Cough or blood in sputum    · Hoarseness    · Trouble breathing, which may be affected by position    · Trouble swallowing    · Wheezing    How is a TAA diagnosed? You may need more than one of the following tests  You may be given contrast dye before some tests to help the pictures show up better  Tell the healthcare provider if you have ever had an allergic reaction to contrast dye  · X-rays: These show your lungs, heart, aorta, and other parts around them   An x-ray of the aorta with dye is called an aortogram or aortography  · CT scan: This test is also called a CAT scan  An x-ray machine uses a computer to take pictures of your chest and blood vessels  The pictures may show a TAA  · MRI:  This scan uses powerful magnets and a computer to take pictures of your chest and blood vessels  An MRI may show a TAA  Do not enter the MRI room with anything metal  Metal can cause serious injury  Tell the healthcare provider if you have any metal in or on your body  · Transesophageal echocardiogram:      ? A transesophageal echocardiogram (JUAN DAVID) is a type of ultrasound that shows pictures of the size and shape of your heart  It also looks at how your heart moves when it is beating  These pictures are seen on a TV-like screen  You may need a JUAN DAVID if your heart does not show up very well in a regular echocardiogram  You may also need a JUAN DAVID to check for certain problems such as blood clots or infection inside the heart  ? You will be given medicine to relax you during a JUAN DAVID  Healthcare providers put a tube in your mouth that is moved down into your esophagus (food pipe)  The tube has a small ultrasound sensor on the end  Since your esophagus is right next to your heart, your healthcare provider can see your heart clearly  How is a TAA treated? · Medicines: You may be given blood pressure or cholesterol medicine to help stop your TAA from growing  · Repair:  Healthcare providers may cut out the aneurysm and replace the cut area with an artificial tube  If the aortic valve (flap) also has a problem, it may also be replaced  · Stenting:  A stent (tube) may be put in the portion of the aorta with aneurysm  The stent may strengthen your aorta and reduce pressure on the wall of your aorta  What are the risks of a TAA? Surgery may damage to your spinal cord, or cause you to bleed or get an infection  Too much blood loss can cause your organs to fail, such as your kidneys   You may need to have surgery again  If untreated, TAA may cause more serious problems  The aneurysm may get larger, burst, and be life-threatening  How can I manage my symptoms? · Check your blood pressure as directed:  Keep a record of your blood pressure and bring it with you to follow-up visits  Ask your healthcare provider what your blood pressure should be and how to check it  High blood pressure can make your aneurysm worse  · Exercise:  Ask your healthcare provider to help you create an exercise plan  This may help lower blood pressure  · Eat a variety of healthy foods:  Healthy foods include fruits, vegetables, whole-grain breads, low-fat dairy products, beans, lean meats, and fish  Ask if you need to be on a special diet  · Do not smoke: If you smoke, it is never too late to quit  Smoking increases your risk for TAA and heart disease  Ask your healthcare provider for information if you need help quitting  When should I contact my healthcare provider? · You have a fever  · You have new symptoms since your last appointment  · Your symptoms prevent you from doing your daily activities  · You have questions or concerns about your condition or care  When should I seek immediate care or call 911? · You have nausea and are vomiting  · You have sudden chest, neck, back, or abdominal pain  · Your skin becomes pale and sweaty, or you feel very weak  · You are dizzy, or you faint  CARE AGREEMENT:   You have the right to help plan your care  Learn about your health condition and how it may be treated  Discuss treatment options with your healthcare providers to decide what care you want to receive  You always have the right to refuse treatment  The above information is an  only  It is not intended as medical advice for individual conditions or treatments   Talk to your doctor, nurse or pharmacist before following any medical regimen to see if it is safe and effective for you   © Copyright [employer] Information is for End User's use only and may not be sold, redistributed or otherwise used for commercial purposes   All illustrations and images included in CareNotes® are the copyrighted property of A D A M , Inc  or [address] [place], [place] [address]marian mohsen

## 2021-01-25 NOTE — PROGRESS NOTES
Cardiology Consultation     Jaylin Miller  [id number]  1955  [facility]  [address] [place] [id number]    1  Coronary artery disease, angina presence unspecified, unspecified vessel or lesion type, unspecified whether native or transplanted heart  POCT ECG   2  Benign essential HTN     3  Ascending aorta dilatation (HCC)     4  Pure hypercholesterolemia       Chief Complaint   Patient presents with    New Patient Visit     no cardiac complaints     HPI: Patient is here for establishment of care  Patient feels well, without complaints  No reported chest pain, shortness of breath, palpitations, lightheadedness, syncope, LE edema, orthopnea, PND, or significant weight changes  Patient remains active without any increased fatigue out of the ordinary        Patient Active Problem List   Diagnosis    Primary osteoarthritis of right knee    Meralgia paraesthetica, left    Acquired spondylolisthesis    Acquired hypothyroidism    Leukopenia    History of right breast cancer    Osteopenia of multiple sites    Seasonal allergies    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    Benign essential HTN    Urge incontinence of urine    Glucose intolerance    Encounter for follow-up examination after completed treatment for malignant neoplasm    Pure hypercholesterolemia    Impingement syndrome of right shoulder    Ascending aorta dilatation (HCC)    Coronary artery disease     Past Medical History:   Diagnosis Date    Arthritis     BRCA1 negative     BRCA2 negative     Breast cancer ([facility] [address] ) 01/17/2014    radiation    Disease of thyroid gland     History of radiation therapy 2014    breast cancer    Hypothyroid     Knee pain, right     Limb alert care status     no BP's /IV's right side    Mitral valve prolapse     Numbness of anterior thigh     left and tingling    Seasonal allergies dust,mold,trees    Urinary incontinence     Varicose veins of both lower extremities     Wears glasses      Social History     Socioeconomic History    Marital status: Single     Spouse name: Not on file    Number of children: Not on file    Years of education: Not on file    Highest education level: Not on file   Occupational History    Not on file   Social Needs    Financial resource strain: Not on file    Food insecurity     Worry: Not on file     Inability: Not on file    Transportation needs     Medical: Not on file     Non-medical: Not on file   Tobacco Use    Smoking status: Never Smoker    Smokeless tobacco: Never Used   Substance and Sexual Activity    Alcohol use: Yes     Frequency: 2-4 times a month     Drinks per session: 1 or 2     Binge frequency: Never    Drug use: No    Sexual activity: Not Currently     Birth control/protection: Abstinence, Post-menopausal   Lifestyle    Physical activity     Days per week: Not on file     Minutes per session: Not on file    Stress: Not on file   Relationships    Social connections     Talks on phone: Not on file     Gets together: Not on file     Attends Voodoo service: Not on file     Active member of club or organization: Not on file     Attends meetings of clubs or organizations: Not on file     Relationship status: Not on file    Intimate partner violence     Fear of current or ex partner: Not on file     Emotionally abused: Not on file     Physically abused: Not on file     Forced sexual activity: Not on file   Other Topics Concern    Not on file   Social History Narrative    Not on file      Family History   Problem Relation Age of Onset    Uterine cancer Mother 71    Throat cancer Father [de-identified]    Breast cancer additional onset Paternal Aunt 54    Breast cancer additional onset Cousin 28    No Known Problems Daughter     No Known Problems Maternal Grandmother     No Known Problems Maternal Grandfather     No Known Problems Paternal Grandmother     No Known Problems Paternal Grandfather     No Known Problems Daughter     No Known Problems Maternal Aunt     No Known Problems Maternal Aunt     No Known Problems Maternal Aunt     No Known Problems Maternal Aunt     No Known Problems Paternal [de-identified]     Colon cancer Maternal Uncle 79   No family history of aortic dilation/aneurysm    Past Surgical History:   Procedure Laterality Date    BREAST BIOPSY Left 01/16/2002    BREAST BIOPSY Left 08/05/2005    high risk    BREAST BIOPSY Right 12/26/2013    positive    BREAST EXCISIONAL BIOPSY Left 04/06/2006    high risk    BREAST EXCISIONAL BIOPSY Left 10/13/2006    BREAST LUMPECTOMY Right 01/17/2014    malignant    BREAST SURGERY Right     lumpectomy    BUNIONECTOMY Bilateral     COLONOSCOPY      EXPLORATORY LAPAROTOMY      "for fertility"    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    KNEE ARTHROSCOPY Right     [place] TOTAL KNEE ARTHROPLASTY Right 1/20/2017    Procedure: ARTHROPLASTY KNEE TOTAL;  Surgeon: Benjie Conway MD;  Location: [facility];  Service: Orthopedics    TONSILLECTOMY      WISDOM TOOTH EXTRACTION         Current Outpatient Medications:     levothyroxine (SYNTHROID) 75 mcg tablet, Take 1 tablet by mouth every other day, Disp: , Rfl:     levothyroxine (SYNTHROID) 88 mcg tablet, Take 1 tablet by mouth every other day, Disp: , Rfl:     loratadine (CLARITIN) 10 mg tablet, Take 10 mg by mouth daily, Disp: , Rfl:     magnesium gluconate (MAGONATE) 500 mg tablet, Take 500 mg by mouth 2 (two) times a day, Disp: , Rfl:     Multiple Vitamins-Minerals (MULTIVITAMIN ADULT PO), multivitamin, Disp: , Rfl:     solifenacin (VESICARE) 10 MG tablet, Take 1 tablet (10 mg total) by mouth daily, Disp: 90 tablet, Rfl: 3    thiamine (VITAMIN B1) 100 mg tablet, Take 100 mg by mouth daily, Disp: , Rfl:     VITAMIN D, ERGOCALCIFEROL, PO, Take 2,000 Units by mouth daily, Disp: , Rfl:     zinc gluconate 50 mg tablet, Take 50 mg by mouth daily, Disp: , Rfl:   Allergies   Allergen Reactions    Actifed Cold-Allergy [Chlorpheniramine-Phenylephrine] Swelling and Rash     Throat and face swelled    Penicillins Rash    Vancomycin Other (See Comments)     redness     Vitals:    01/25/21 1054   BP: 110/74   BP Location: Left arm   Patient Position: Sitting   Cuff Size: Standard   Pulse: 65   SpO2: 96%   Weight: 72 8 kg (160 lb 8 oz)   Height: 5' 9" (1 753 m)       Labs:  Lab on 11/25/2020   Component Date Value    Sodium 11/25/2020 140     Potassium 11/25/2020 3 8     Chloride 11/25/2020 104     CO2 11/25/2020 35*    ANION GAP 11/25/2020 1*    BUN 11/25/2020 19     Creatinine 11/25/2020 0 90     Glucose, Fasting 11/25/2020 84     Calcium 11/25/2020 9 7     AST 11/25/2020 17     ALT 11/25/2020 22     Alkaline Phosphatase 11/25/2020 47     Total Protein 11/25/2020 6 9     Albumin 11/25/2020 3 6     Total Bilirubin 11/25/2020 0 56     eGFR 11/25/2020 66     WBC 11/25/2020 3 60*    RBC 11/25/2020 4 34     Hemoglobin 11/25/2020 13 6     Hematocrit 11/25/2020 41 6     MCV 11/25/2020 96     MCH 11/25/2020 31 3     MCHC 11/25/2020 32 7     RDW 11/25/2020 12 5     MPV 11/25/2020 10 8     Platelets 01/16/6052 199     nRBC 11/25/2020 0     Neutrophils Relative 11/25/2020 65     Immat GRANS % 11/25/2020 0     Lymphocytes Relative 11/25/2020 24     Monocytes Relative 11/25/2020 8     Eosinophils Relative 11/25/2020 2     Basophils Relative 11/25/2020 1     Neutrophils Absolute 11/25/2020 2 33     Immature Grans Absolute 11/25/2020 0 01     Lymphocytes Absolute 11/25/2020 0 88     Monocytes Absolute 11/25/2020 0 27     Eosinophils Absolute 11/25/2020 0 08     Basophils Absolute 11/25/2020 0 03     TSH 3RD GENERATON 11/25/2020 2 730     Free T4 11/25/2020 1 23     Hemoglobin A1C 11/25/2020 5 4     EAG 11/25/2020 108     Magnesium 11/25/2020 2 2     Phosphorus 11/25/2020 3 5     Vit D, 25-Hydroxy 11/25/2020 48 6     C-TELOPEPTIDE,SERUM 11/25/2020 325    Transcribe Orders on 11/25/2020   Component Date Value    Color, UA 11/25/2020 Yellow     Clarity, UA 11/25/2020 Clear     Specific Gravity, UA 11/25/2020 1 011     pH, UA 11/25/2020 7 0     Leukocytes, UA 11/25/2020 Negative     Nitrite, UA 11/25/2020 Negative     Protein, UA 11/25/2020 Negative     Glucose, UA 11/25/2020 Negative     Ketones, UA 11/25/2020 Negative     Urobilinogen, UA 11/25/2020 0 2     Bilirubin, UA 11/25/2020 Negative     Blood, UA 11/25/2020 Negative     Creatinine, Ur 11/25/2020 31 9     Microalbum  ,U,Random 11/25/2020 <5 0     Microalb Creat Ratio 11/25/2020 <16    Appointment on 07/31/2020   Component Date Value    Cholesterol 11/25/2020 194     Triglycerides 11/25/2020 77     HDL, Direct 11/25/2020 63     LDL Calculated 11/25/2020 116*    CA 27 29 07/31/2020 7 7      Lab Results   Component Value Date    CHOL 190 06/03/2015    TRIG 77 11/25/2020    TRIG 87 06/03/2015    HDL 63 11/25/2020    HDL 70 06/03/2015     Imaging: No results found  Review of Systems:  Review of Systems   Constitutional: Negative for activity change, appetite change, chills, diaphoresis, fatigue and unexpected weight change  HENT: Negative for hearing loss, nosebleeds and sore throat  Eyes: Negative for photophobia and visual disturbance  Respiratory: Negative for cough, chest tightness, shortness of breath and wheezing  Cardiovascular: Negative for chest pain, palpitations and leg swelling  Gastrointestinal: Negative for abdominal pain, diarrhea, nausea and vomiting  Endocrine: Negative for polyuria  Genitourinary: Negative for dysuria, frequency and hematuria  Musculoskeletal: Negative for arthralgias, back pain, gait problem and neck pain  Skin: Negative for pallor and rash  Neurological: Negative for dizziness, syncope and headaches  Hematological: Does not bruise/bleed easily     Psychiatric/Behavioral: Negative for behavioral problems and confusion  Physical Exam:  Physical Exam  Vitals signs reviewed  Constitutional:       Appearance: She is well-developed  She is not diaphoretic  HENT:      Head: Normocephalic and atraumatic  Nose: Nose normal    Eyes:      General: No scleral icterus  Pupils: Pupils are equal, round, and reactive to light  Neck:      Musculoskeletal: Normal range of motion and neck supple  Vascular: No JVD  Cardiovascular:      Rate and Rhythm: Normal rate and regular rhythm  Heart sounds: Normal heart sounds  No murmur  No friction rub  No gallop  Pulmonary:      Effort: Pulmonary effort is normal  No respiratory distress  Breath sounds: Normal breath sounds  No wheezing or rales  Abdominal:      General: Bowel sounds are normal  There is no distension  Palpations: Abdomen is soft  Tenderness: There is no abdominal tenderness  Musculoskeletal: Normal range of motion  General: No deformity  Skin:     General: Skin is warm and dry  Findings: No rash  Neurological:      Mental Status: She is alert and oriented to person, place, and time  Cranial Nerves: No cranial nerve deficit  Psychiatric:         Behavior: Behavior normal        Blood pressure 110/74, pulse 65, height 5' 9" (1 753 m), weight 72 8 kg (160 lb 8 oz), SpO2 96 %, not currently breastfeeding  EKG:  Normal sinus rhythm  Low voltage QRS  Borderline ECG    Discussion/Summary:  HTN: well controlled off chlorthalidone for the time being  She has not taken it for over 8 days and her BPs are all less than 130/80    HLD: currently not on medical therapy   in Nov 2020, which is at goal of <120  Ascending aortic aneurysm: noted to be 4 4 cm on CT for CCS in Dec 2020  CTA is ordered for further evaluation of extent of aorta - depending of severity, will decide on plan for continued serial follow-up - this will be due in June 2021        CAD: reported as part of history, but CCS is 0

## 2021-02-12 DIAGNOSIS — N32.81 OAB (OVERACTIVE BLADDER): ICD-10-CM

## 2021-02-12 RX ORDER — SOLIFENACIN SUCCINATE 10 MG/1
10 TABLET, FILM COATED ORAL DAILY
Qty: 90 TABLET | Refills: 3 | Status: SHIPPED | OUTPATIENT
Start: 2021-02-12 | End: 2022-03-07 | Stop reason: SDUPTHER

## 2021-02-12 NOTE — TELEPHONE ENCOUNTER
Patient requests refill on solifenacin 10 mg tabs to be called to [facility] in [place]    She is on Medicare now and only p r n  @ [facility], so she cannot use [facility]

## 2021-02-16 ENCOUNTER — TELEPHONE (OUTPATIENT)
Dept: FAMILY MEDICINE CLINIC | Facility: CLINIC | Age: 66
End: 2021-02-16

## 2021-02-16 DIAGNOSIS — Z11.9 ENCOUNTER FOR SCREENING FOR INFECTIOUS AND PARASITIC DISEASES, UNSPECIFIED: Primary | ICD-10-CM

## 2021-02-26 ENCOUNTER — TRANSCRIBE ORDERS (OUTPATIENT)
Dept: ADMINISTRATIVE | Facility: HOSPITAL | Age: 66
End: 2021-02-26

## 2021-02-26 DIAGNOSIS — M81.0 OSTEOPOROSIS, POST-MENOPAUSAL: Primary | ICD-10-CM

## 2021-04-05 ENCOUNTER — APPOINTMENT (OUTPATIENT)
Dept: LAB | Age: 66
End: 2021-04-05
Payer: MEDICARE

## 2021-04-05 ENCOUNTER — TRANSCRIBE ORDERS (OUTPATIENT)
Dept: ADMINISTRATIVE | Age: 66
End: 2021-04-05

## 2021-04-05 ENCOUNTER — ANNUAL EXAM (OUTPATIENT)
Dept: OBGYN CLINIC | Facility: CLINIC | Age: 66
End: 2021-04-05
Payer: MEDICARE

## 2021-04-05 VITALS
DIASTOLIC BLOOD PRESSURE: 84 MMHG | WEIGHT: 156 LBS | SYSTOLIC BLOOD PRESSURE: 122 MMHG | HEIGHT: 69 IN | BODY MASS INDEX: 23.11 KG/M2

## 2021-04-05 DIAGNOSIS — E66.8 OBESITY OF ENDOCRINE ORIGIN: ICD-10-CM

## 2021-04-05 DIAGNOSIS — Z85.3 HISTORY OF RIGHT BREAST CANCER: ICD-10-CM

## 2021-04-05 DIAGNOSIS — E06.3 CHRONIC LYMPHOCYTIC THYROIDITIS: ICD-10-CM

## 2021-04-05 DIAGNOSIS — I51.9 MYXEDEMA HEART DISEASE: ICD-10-CM

## 2021-04-05 DIAGNOSIS — I51.9 MYXEDEMA HEART DISEASE: Primary | ICD-10-CM

## 2021-04-05 DIAGNOSIS — E03.9 MYXEDEMA HEART DISEASE: ICD-10-CM

## 2021-04-05 DIAGNOSIS — E03.9 MYXEDEMA HEART DISEASE: Primary | ICD-10-CM

## 2021-04-05 DIAGNOSIS — Z01.419 WOMEN'S ANNUAL ROUTINE GYNECOLOGICAL EXAMINATION: Primary | ICD-10-CM

## 2021-04-05 LAB
ALBUMIN SERPL BCP-MCNC: 3.5 G/DL (ref 3.5–5)
ALP SERPL-CCNC: 50 U/L (ref 46–116)
ALT SERPL W P-5'-P-CCNC: 24 U/L (ref 12–78)
ANION GAP SERPL CALCULATED.3IONS-SCNC: 2 MMOL/L (ref 4–13)
AST SERPL W P-5'-P-CCNC: 16 U/L (ref 5–45)
BACTERIA UR QL AUTO: NORMAL /HPF
BASOPHILS # BLD AUTO: 0.02 THOUSANDS/ΜL (ref 0–0.1)
BASOPHILS NFR BLD AUTO: 1 % (ref 0–1)
BILIRUB SERPL-MCNC: 0.5 MG/DL (ref 0.2–1)
BILIRUB UR QL STRIP: NEGATIVE
BUN SERPL-MCNC: 24 MG/DL (ref 5–25)
CALCIUM SERPL-MCNC: 9.1 MG/DL (ref 8.3–10.1)
CANCER AG27-29 SERPL-ACNC: 7.9 U/ML (ref 0–42.3)
CHLORIDE SERPL-SCNC: 106 MMOL/L (ref 100–108)
CLARITY UR: CLEAR
CO2 SERPL-SCNC: 33 MMOL/L (ref 21–32)
COLOR UR: YELLOW
CREAT SERPL-MCNC: 0.91 MG/DL (ref 0.6–1.3)
CREAT UR-MCNC: 47.5 MG/DL
EOSINOPHIL # BLD AUTO: 0.11 THOUSAND/ΜL (ref 0–0.61)
EOSINOPHIL NFR BLD AUTO: 3 % (ref 0–6)
ERYTHROCYTE [DISTWIDTH] IN BLOOD BY AUTOMATED COUNT: 12 % (ref 11.6–15.1)
GFR SERPL CREATININE-BSD FRML MDRD: 65 ML/MIN/1.73SQ M
GLUCOSE P FAST SERPL-MCNC: 87 MG/DL (ref 65–99)
GLUCOSE UR STRIP-MCNC: NEGATIVE MG/DL
HCT VFR BLD AUTO: 38.9 % (ref 34.8–46.1)
HGB BLD-MCNC: 12.8 G/DL (ref 11.5–15.4)
HGB UR QL STRIP.AUTO: NEGATIVE
HYALINE CASTS #/AREA URNS LPF: NORMAL /LPF
IMM GRANULOCYTES # BLD AUTO: 0.01 THOUSAND/UL (ref 0–0.2)
IMM GRANULOCYTES NFR BLD AUTO: 0 % (ref 0–2)
KETONES UR STRIP-MCNC: NEGATIVE MG/DL
LEUKOCYTE ESTERASE UR QL STRIP: ABNORMAL
LYMPHOCYTES # BLD AUTO: 1.02 THOUSANDS/ΜL (ref 0.6–4.47)
LYMPHOCYTES NFR BLD AUTO: 25 % (ref 14–44)
MAGNESIUM SERPL-MCNC: 2.3 MG/DL (ref 1.6–2.6)
MCH RBC QN AUTO: 31.5 PG (ref 26.8–34.3)
MCHC RBC AUTO-ENTMCNC: 32.9 G/DL (ref 31.4–37.4)
MCV RBC AUTO: 96 FL (ref 82–98)
MICROALBUMIN UR-MCNC: 5.4 MG/L (ref 0–20)
MICROALBUMIN/CREAT 24H UR: 11 MG/G CREATININE (ref 0–30)
MONOCYTES # BLD AUTO: 0.24 THOUSAND/ΜL (ref 0.17–1.22)
MONOCYTES NFR BLD AUTO: 6 % (ref 4–12)
NEUTROPHILS # BLD AUTO: 2.7 THOUSANDS/ΜL (ref 1.85–7.62)
NEUTS SEG NFR BLD AUTO: 65 % (ref 43–75)
NITRITE UR QL STRIP: NEGATIVE
NON-SQ EPI CELLS URNS QL MICRO: NORMAL /HPF
NRBC BLD AUTO-RTO: 0 /100 WBCS
PH UR STRIP.AUTO: 7 [PH]
PHOSPHATE SERPL-MCNC: 3.6 MG/DL (ref 2.3–4.1)
PLATELET # BLD AUTO: 186 THOUSANDS/UL (ref 149–390)
PMV BLD AUTO: 10.9 FL (ref 8.9–12.7)
POTASSIUM SERPL-SCNC: 3.5 MMOL/L (ref 3.5–5.3)
PROT SERPL-MCNC: 6.7 G/DL (ref 6.4–8.2)
PROT UR STRIP-MCNC: NEGATIVE MG/DL
RBC # BLD AUTO: 4.06 MILLION/UL (ref 3.81–5.12)
RBC #/AREA URNS AUTO: NORMAL /HPF
SODIUM SERPL-SCNC: 141 MMOL/L (ref 136–145)
SP GR UR STRIP.AUTO: 1.01 (ref 1–1.03)
T4 FREE SERPL-MCNC: 1.25 NG/DL (ref 0.76–1.46)
TSH SERPL DL<=0.05 MIU/L-ACNC: 1.47 UIU/ML (ref 0.36–3.74)
UROBILINOGEN UR QL STRIP.AUTO: 0.2 E.U./DL
WBC # BLD AUTO: 4.1 THOUSAND/UL (ref 4.31–10.16)
WBC #/AREA URNS AUTO: NORMAL /HPF

## 2021-04-05 PROCEDURE — 81001 URINALYSIS AUTO W/SCOPE: CPT | Performed by: SPECIALIST

## 2021-04-05 PROCEDURE — 83735 ASSAY OF MAGNESIUM: CPT

## 2021-04-05 PROCEDURE — 82570 ASSAY OF URINE CREATININE: CPT | Performed by: SPECIALIST

## 2021-04-05 PROCEDURE — 82043 UR ALBUMIN QUANTITATIVE: CPT | Performed by: SPECIALIST

## 2021-04-05 PROCEDURE — 36415 COLL VENOUS BLD VENIPUNCTURE: CPT

## 2021-04-05 PROCEDURE — G0101 CA SCREEN;PELVIC/BREAST EXAM: HCPCS | Performed by: STUDENT IN AN ORGANIZED HEALTH CARE EDUCATION/TRAINING PROGRAM

## 2021-04-05 PROCEDURE — 84439 ASSAY OF FREE THYROXINE: CPT

## 2021-04-05 PROCEDURE — 85025 COMPLETE CBC W/AUTO DIFF WBC: CPT

## 2021-04-05 PROCEDURE — 80053 COMPREHEN METABOLIC PANEL: CPT

## 2021-04-05 PROCEDURE — 84100 ASSAY OF PHOSPHORUS: CPT

## 2021-04-05 PROCEDURE — G0145 SCR C/V CYTO,THINLAYER,RESCR: HCPCS | Performed by: STUDENT IN AN ORGANIZED HEALTH CARE EDUCATION/TRAINING PROGRAM

## 2021-04-05 PROCEDURE — 86300 IMMUNOASSAY TUMOR CA 15-3: CPT

## 2021-04-05 PROCEDURE — 84443 ASSAY THYROID STIM HORMONE: CPT

## 2021-04-05 RX ORDER — CHLORTHALIDONE 25 MG/1
25 TABLET ORAL DAILY
COMMUNITY
Start: 2021-02-26 | End: 2021-10-14 | Stop reason: SDUPTHER

## 2021-04-05 NOTE — PROGRESS NOTES
Established patient annual exam - Gynecology    Chief complaint: annual exam    Chief Complaint   Patient presents with    Gynecologic Exam     annual exam    Last pap: 8/18/15-neg  Last mammo: 11/27/20-neg/BR2-benign  hx of R breast cancer  Last colonoscopy: 6/7/19-lipoma, repeat in 2024  Last dexa: 8/29/19-R hip osteopenia  Assessment/Plan     77 y o  [id number] with normal annual gynecologic examination  Problem List Items Addressed This Visit     None      Visit Diagnoses     Women's annual routine gynecological examination    -  Primary    Relevant Orders    Liquid-based pap, screening        Normal findings on routine gynecologic exam today  Reviewed annual screening mammogram and annual clinical breast exam     Discussed ASCCP guidelines and Pap smear with cotesting frequency, collected today  STD/STI testing offered, declined today - reports low risk  Encouraged daily calcium and vitamin D intake was well as weight bearing exercise daily for bone health  Congratulated on recent efforts and success with weight loss! Follow up PRN or for annual exam   --------------------------------------------------------  History of Present Illness     Johnnie Oates is a 77 y o  female [id number] No LMP recorded (lmp unknown)  Patient is postmenopausal  who presents for annual examination  Concerns today: none, new to Medicare    New updates: Noom diet, lost 30 lbs ! Feels she may have lost some muscle mass  Long standing Orthopedic issues    Health maintenance  Last pap smear: Not on file 8/8/2015 neg  Bone density scan: 8/2019 R hip osteopenia, follows with Rheum  Last mammogram: 11/27/2020 neg BIRAD 2, benign, history of R breast cancer, reprinted today  Last colonoscopy: 08/12/2019 repeat in 5 years (2024)  HPV vaccination?: no    GYN History  Menarche age 13  No LMP recorded (lmp unknown)   Patient is postmenopausal   Last one age 44    Previously regular periods  No history abnormal Pap smears  No history of cryotherapy, LEEP, or cold knife cone of the cervix    Sexually active? no  Current sexual partner(s): n/a, lack of partner  History of STD: no  Interested in STD screening today? 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    OB History    Para Term  AB Living   3 2 2   1 2   SAB TAB Ectopic Multiple Live Births   1       2      # Outcome Date GA Lbr Sachin/2nd Weight Sex Delivery Anes PTL Lv   3 SAB            2 Term     F Vag-Spont   JASON   1 Term     F Vag-Spont   JASON     # 1 - Date: None, Sex: Female, Weight: None, GA: None, Delivery: Vaginal, Spontaneous, Apgar1: None, Apgar5: None, Living: Living, Birth Comments: None    # 2 - Date: None, Sex: Female, Weight: None, GA: None, Delivery: Vaginal, Spontaneous, Apgar1: None, Apgar5: None, Living: Living, Birth Comments: None    # 3 - Date: None, Sex: None, Weight: None, GA: None, Delivery: None, Apgar1: None, Apgar5: None, Living: None, Birth Comments: None    NSVDx2    Past Surgical History:   Procedure Laterality Date    BREAST BIOPSY Left 2002    BREAST BIOPSY Left 2005    high risk    BREAST BIOPSY Right 2013    positive    BREAST EXCISIONAL BIOPSY Left 2006    high risk    BREAST EXCISIONAL BIOPSY Left 10/13/2006    BREAST LUMPECTOMY Right 2014    malignant    BREAST SURGERY Right     lumpectomy    BUNIONECTOMY Bilateral     COLONOSCOPY      EXPLORATORY LAPAROTOMY      "for fertility"    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    KNEE ARTHROSCOPY Right     [place] TOTAL KNEE ARTHROPLASTY Right 2017    Procedure: ARTHROPLASTY KNEE TOTAL;  Surgeon: Duc Evans MD;  Location: [facility];  Service: Orthopedics    TONSILLECTOMY      WISDOM TOOTH EXTRACTION       Past Medical History:   Diagnosis Date    Arthritis     BRCA1 negative     BRCA2 negative     Breast cancer ([facility] [address] ) 2014    radiation    Disease of thyroid gland     History of radiation therapy 2014    breast cancer    Hypothyroid     Knee pain, right     Limb alert care status     no BP's /IV's right side    Mitral valve prolapse     Numbness of anterior thigh     left and tingling    Seasonal allergies     dust,mold,trees    Urinary incontinence     Varicose veins of both lower extremities     Wears glasses      Family History   Problem Relation Age of Onset    Uterine cancer Mother 71    Throat cancer Father [de-identified]    Breast cancer additional onset Paternal Aunt 54    Breast cancer additional onset Cousin 28    No Known Problems Daughter     No Known Problems Maternal Grandmother     No Known Problems Maternal Grandfather     No Known Problems Paternal Grandmother     No Known Problems Paternal Grandfather     No Known Problems Daughter     No Known Problems Maternal Aunt     No Known Problems Maternal Aunt     No Known Problems Maternal Aunt     No Known Problems Maternal Aunt     No Known Problems Paternal Aunt     Colon cancer Maternal Uncle 79     Social History   Social History     Substance and Sexual Activity   Alcohol Use Yes    Frequency: 2-4 times a month    Drinks per session: 1 or 2    Binge frequency: Never     Social History     Substance and Sexual Activity   Drug Use No     Social History     Tobacco Use   Smoking Status Never Smoker   Smokeless Tobacco Never Used       Current Outpatient Medications:     chlorthalidone 25 mg tablet, Take 25 mg by mouth daily, Disp: , Rfl:     levothyroxine (SYNTHROID) 75 mcg tablet, Take 1 tablet by mouth every other day, Disp: , Rfl:     levothyroxine (SYNTHROID) 88 mcg tablet, Take 1 tablet by mouth every other day, Disp: , Rfl:     loratadine (CLARITIN) 10 mg tablet, Take 10 mg by mouth daily, Disp: , Rfl:     magnesium gluconate (MAGONATE) 500 mg tablet, Take 500 mg by mouth 2 (two) times a day, Disp: , Rfl:     Multiple Vitamins-Minerals (MULTIVITAMIN ADULT PO), multivitamin, Disp: , Rfl:     solifenacin (VESICARE) 10 MG tablet, Take 1 tablet (10 mg total) by mouth daily, Disp: 90 tablet, Rfl: 3    thiamine (VITAMIN B1) 100 mg tablet, Take 100 mg by mouth daily, Disp: , Rfl:     VITAMIN D, ERGOCALCIFEROL, PO, Take 2,000 Units by mouth daily, Disp: , Rfl:     zinc gluconate 50 mg tablet, Take 50 mg by mouth daily, Disp: , Rfl:   Allergies   Allergen Reactions    Actifed Cold-Allergy [Chlorpheniramine-Phenylephrine] Swelling and Rash     Throat and face swelled    Penicillins Rash    Vancomycin Other (See Comments)     redness       REVIEW OF SYSTEMS  CONSTITUTIONAL:  No weight loss, fever, chills, weakness  HEENT: No visual loss, blurred vision  SKIN: No rash or itching  CARDIOVASCULAR: No chest pain, chest pressure, or chest discomfort  RESPIRATORY: No shortness of breath, cough or sputum  GASTROINTESTINAL: No nausea, emesis, or diarrhea  NEUROLOGICAL: No headache, dizziness, syncope, paralysis  MUSCUOSKELETAL: No muscle, back pain, joint stiffness or bruising  INFECTIOUS: No fever, chills  PSYCHIATRIC: No disorder of thought or mood  HEMATOLOGIC: No easy bruising or bleeding  GYN: No postmenopausal bleeding  Urge involuntary urine loss, longstanding    Objective   Vitals: Blood pressure 122/84, height 5' 9" (1 753 m), weight 70 8 kg (156 lb), not currently breastfeeding  Body mass index is 23 04 kg/m²  General: NAD, AAOx3  Heart: RRR  Lungs: CTAB  Neck: supple, no thyromegaly or thyroid nodules appreciated  Breast: nipples everted bilaterally, no skin changes  No dimpling, redness, or erythema  No breast masses or axillary masses bilaterally  Fibrocystic breast changes  Well-healed incision on right from previous procedure  Abdomen: soft, non-distended, non tender to palpation   Pfannenstiel incision well-healed  Extremities: non-tender to palpation, right knee well-healed vertical incision  Speculum exam: Normal appearing external genitalia, normal hair distribution  Urethra well-suspended, no clitoromegaly noted  Vagina pale minimal rugation  No discharge noted  Cervix without lesion, parous appearing  no blood in vaginal vault    Pelvic exam: no cervical motion tenderness  No adnexal masses or tenderness  anteverted uterus, normal size  Lab Results:   No visits with results within 1 Day(s) from this visit     Latest known visit with results is:   Lab on 11/25/2020   Component Date Value    Sodium 11/25/2020 140     Potassium 11/25/2020 3 8     Chloride 11/25/2020 104     CO2 11/25/2020 35*    ANION GAP 11/25/2020 1*    BUN 11/25/2020 19     Creatinine 11/25/2020 0 90     Glucose, Fasting 11/25/2020 84     Calcium 11/25/2020 9 7     AST 11/25/2020 17     ALT 11/25/2020 22     Alkaline Phosphatase 11/25/2020 47     Total Protein 11/25/2020 6 9     Albumin 11/25/2020 3 6     Total Bilirubin 11/25/2020 0 56     eGFR 11/25/2020 66     WBC 11/25/2020 3 60*    RBC 11/25/2020 4 34     Hemoglobin 11/25/2020 13 6     Hematocrit 11/25/2020 41 6     MCV 11/25/2020 96     MCH 11/25/2020 31 3     MCHC 11/25/2020 32 7     RDW 11/25/2020 12 5     MPV 11/25/2020 10 8     Platelets 93/25/9511 199     nRBC 11/25/2020 0     Neutrophils Relative 11/25/2020 65     Immat GRANS % 11/25/2020 0     Lymphocytes Relative 11/25/2020 24     Monocytes Relative 11/25/2020 8     Eosinophils Relative 11/25/2020 2     Basophils Relative 11/25/2020 1     Neutrophils Absolute 11/25/2020 2 33     Immature Grans Absolute 11/25/2020 0 01     Lymphocytes Absolute 11/25/2020 0 88     Monocytes Absolute 11/25/2020 0 27     Eosinophils Absolute 11/25/2020 0 08     Basophils Absolute 11/25/2020 0 03     TSH 3RD GENERATON 11/25/2020 2 730     Free T4 11/25/2020 1 23     Hemoglobin A1C 11/25/2020 5 4     EAG 11/25/2020 108     Magnesium 11/25/2020 2 2     Phosphorus 11/25/2020 3 5     Vit D, 25-Hydroxy 11/25/2020 48 6     C-TELOPEPTIDE,SERUM 11/25/2020 325

## 2021-04-09 ENCOUNTER — OFFICE VISIT (OUTPATIENT)
Dept: HEMATOLOGY ONCOLOGY | Facility: CLINIC | Age: 66
End: 2021-04-09
Payer: MEDICARE

## 2021-04-09 VITALS
RESPIRATION RATE: 18 BRPM | DIASTOLIC BLOOD PRESSURE: 80 MMHG | OXYGEN SATURATION: 98 % | BODY MASS INDEX: 23.4 KG/M2 | HEART RATE: 69 BPM | SYSTOLIC BLOOD PRESSURE: 132 MMHG | HEIGHT: 69 IN | TEMPERATURE: 98.3 F | WEIGHT: 158 LBS

## 2021-04-09 DIAGNOSIS — Z85.3 HISTORY OF RIGHT BREAST CANCER: Primary | ICD-10-CM

## 2021-04-09 PROCEDURE — 99213 OFFICE O/P EST LOW 20 MIN: CPT | Performed by: PHYSICIAN ASSISTANT

## 2021-04-09 NOTE — PROGRESS NOTES
Hematology/Oncology Outpatient Follow-up  Neel De Los Santos 77 y o  female 1955 [id number]    Date:  4/9/2021      Assessment and Plan:  1  History of right breast cancer  55-year-old female presents for follow-up regarding history of breast cancer diagnosed in December 2013  She completed adjuvant aromatase inhibitor in 2019, 5 years  She had labs prior to today's visit  CBC, CMP, tumor marker are all acceptable  Follow up in 1 year  - CBC and differential; Future  - Comprehensive metabolic panel; Future  - Cancer antigen 27 29; Future    HPI:  Oncology History   History of right breast cancer   12/26/2013 Biopsy    Right breast biopsy, 12:00    Infiltrating mammary carcinoma  %  %  HER-2 negative     1/17/2014 Surgery    Right lumpectomy (Dr Reyes Brand)    Stage IA- pT1b, pN0, G1     2/21/2014 - 4/3/2014 Radiation    WBRT (Dr Margareth Markham)     2/25/2014 Genomic Testing    Oncotype 8, low risk     2014 - 2019 Hormone Therapy    Letrozole (Dr Everette Garcia)     2/2020 Genetic Testing    [facility] C[facility] Panel (23 genes): GILLIAN, BARD1, BRCA1, BRCA2, BRIP1, CDH1, CHEK2, DICER1, EPCAM, MLH1, MSH2, MSH6, NBN, NF1, PALB2, PMS2, PTEN, RAD50, RAD51C, RAD51D, SMARCA4, STK11, TP48        55-year-old female presents for follow-up regarding history of 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, stage I, T1b, 6 mm, grade 1, breast cancer of the right upper outer quadrant of the right breast      She underwent right breast lumpectomy and sentinel lymph node sampling in January 2014  She had adjuvant radiation followed by adjuvant letrozole which she completed in February 2019  She has history of osteoporosis and follows with an outside provider regarding this  She previously was on Prolia now is on Reclast   She also continues to follow with Surgical Oncology for imaging studies      Interval history:    ROS: Review of Systems   Constitutional: Negative for appetite change, chills, fatigue, fever and unexpected weight change  Respiratory: Negative for cough and shortness of breath  Cardiovascular: Negative for chest pain, palpitations and leg swelling  Gastrointestinal: Negative for abdominal pain, blood in stool, constipation, diarrhea, nausea and vomiting  Genitourinary: Negative for difficulty urinating, dysuria and hematuria  Musculoskeletal: Negative for arthralgias  Skin: Negative  Neurological: Positive for numbness (chronic on left thigh and bilateral feet)  Negative for dizziness, weakness, light-headedness and headaches  Hematological: Negative  Psychiatric/Behavioral: Negative          Past Medical History:   Diagnosis Date    Arthritis     BRCA1 negative     BRCA2 negative     Breast cancer ([facility] [address] ) 01/17/2014    radiation    Disease of thyroid gland     History of radiation therapy 2014    breast cancer    Hypothyroid     Knee pain, right     Limb alert care status     no BP's /IV's right side    Mitral valve prolapse     Numbness of anterior thigh     left and tingling    Seasonal allergies     dust,mold,trees    Urinary incontinence     Varicose veins of both lower extremities     Wears glasses        Past Surgical History:   Procedure Laterality Date    BREAST BIOPSY Left 01/16/2002    BREAST BIOPSY Left 08/05/2005    high risk    BREAST BIOPSY Right 12/26/2013    positive    BREAST EXCISIONAL BIOPSY Left 04/06/2006    high risk    BREAST EXCISIONAL BIOPSY Left 10/13/2006    BREAST LUMPECTOMY Right 01/17/2014    malignant    BREAST SURGERY Right     lumpectomy    BUNIONECTOMY Bilateral     COLONOSCOPY      EXPLORATORY LAPAROTOMY      "for fertility"    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    KNEE ARTHROSCOPY Right     [place] TOTAL KNEE ARTHROPLASTY Right 1/20/2017    Procedure: ARTHROPLASTY KNEE TOTAL;  Surgeon: Duc Evans MD;  Location: [facility] OR;  Service: Orthopedics    TONSILLECTOMY      WISDOM TOOTH EXTRACTION         Social History Socioeconomic History    Marital status: Single     Spouse name: None    Number of children: None    Years of education: None    Highest education level: None   Occupational History    None   Social Needs    Financial resource strain: None    Food insecurity     Worry: None     Inability: None    Transportation needs     Medical: None     Non-medical: None   Tobacco Use    Smoking status: Never Smoker    Smokeless tobacco: Never Used   Substance and Sexual Activity    Alcohol use: Yes     Frequency: 2-4 times a month     Drinks per session: 1 or 2     Binge frequency: Never    Drug use: No    Sexual activity: Not Currently     Birth control/protection: Abstinence, Post-menopausal   Lifestyle    Physical activity     Days per week: None     Minutes per session: None    Stress: None   Relationships    Social connections     Talks on phone: None     Gets together: None     Attends [employer] service: None     Active member of club or organization: None     Attends meetings of clubs or organizations: None     Relationship status: None    Intimate partner violence     Fear of current or ex partner: None     Emotionally abused: None     Physically abused: None     Forced sexual activity: None   Other Topics Concern    None   Social History Narrative    None       Family History   Problem Relation Age of Onset    Uterine cancer Mother 71    Throat cancer Father [de-identified]    Breast cancer additional onset Paternal Aunt 54    Breast cancer additional onset Cousin 28    No Known Problems Daughter     No Known Problems Maternal Grandmother     No Known Problems Maternal Grandfather     No Known Problems Paternal Grandmother     No Known Problems Paternal Grandfather     No Known Problems Daughter     No Known Problems Maternal Aunt     No Known Problems Maternal Aunt     No Known Problems Maternal Aunt     No Known Problems Maternal Aunt     No Known Problems Paternal Aunt     Colon cancer Maternal Uncle 70       Allergies   Allergen Reactions    Actifed Cold-Allergy [Chlorpheniramine-Phenylephrine] Swelling and Rash     Throat and face swelled    Penicillins Rash    Vancomycin Other (See Comments)     redness         Current Outpatient Medications:     chlorthalidone 25 mg tablet, Take 25 mg by mouth daily, Disp: , Rfl:     levothyroxine (SYNTHROID) 75 mcg tablet, Take 1 tablet by mouth every other day, Disp: , Rfl:     levothyroxine (SYNTHROID) 88 mcg tablet, Take 1 tablet by mouth every other day, Disp: , Rfl:     loratadine (CLARITIN) 10 mg tablet, Take 10 mg by mouth daily, Disp: , Rfl:     magnesium gluconate (MAGONATE) 500 mg tablet, Take 500 mg by mouth 2 (two) times a day, Disp: , Rfl:     Multiple Vitamins-Minerals (MULTIVITAMIN ADULT PO), multivitamin, Disp: , Rfl:     solifenacin (VESICARE) 10 MG tablet, Take 1 tablet (10 mg total) by mouth daily, Disp: 90 tablet, Rfl: 3    thiamine (VITAMIN B1) 100 mg tablet, Take 100 mg by mouth daily, Disp: , Rfl:     VITAMIN D, ERGOCALCIFEROL, PO, Take 2,000 Units by mouth daily, Disp: , Rfl:     zinc gluconate 50 mg tablet, Take 50 mg by mouth daily, Disp: , Rfl:       Physical Exam:  /80 (BP Location: Left arm, Patient Position: Sitting, Cuff Size: Adult)   Pulse 69   Temp 98 3 °F (36 8 °C)   Resp 18   Ht 5' 9" (1 753 m)   Wt 71 7 kg (158 lb)   LMP  (LMP Unknown)   SpO2 98%   BMI 23 33 kg/m²     Physical Exam  Vitals signs reviewed  Constitutional:       General: She is not in acute distress  Appearance: She is well-developed  She is not ill-appearing  HENT:      Head: Normocephalic and atraumatic  Eyes:      General: No scleral icterus  Conjunctiva/sclera: Conjunctivae normal    Neck:      Musculoskeletal: Normal range of motion and neck supple  Cardiovascular:      Rate and Rhythm: Normal rate and regular rhythm  Heart sounds: Normal heart sounds  No murmur     Pulmonary:      Effort: Pulmonary effort is normal  No respiratory distress  Breath sounds: Normal breath sounds  Abdominal:      Palpations: Abdomen is soft  Tenderness: There is no abdominal tenderness  Musculoskeletal: Normal range of motion  General: No tenderness  Lymphadenopathy:      Cervical: No cervical adenopathy  Skin:     General: Skin is warm and dry  Neurological:      Mental Status: She is alert and oriented to person, place, and time  Cranial Nerves: No cranial nerve deficit  Psychiatric:         Mood and Affect: Mood normal          Behavior: Behavior normal        Labs:  Lab Results   Component Value Date    WBC 4 10 (L) 04/05/2021    HGB 12 8 04/05/2021    HCT 38 9 04/05/2021    MCV 96 04/05/2021     04/05/2021     Lab Results   Component Value Date     09/30/2015    K 3 5 04/05/2021     04/05/2021    CO2 33 (H) 04/05/2021    ANIONGAP 8 09/30/2015    BUN 24 04/05/2021    CREATININE 0 91 04/05/2021    GLUCOSE 89 09/30/2015    GLUF 87 04/05/2021    CALCIUM 9 1 04/05/2021    AST 16 04/05/2021    ALT 24 04/05/2021    ALKPHOS 50 04/05/2021    PROT 7 0 09/30/2015    BILITOT 0 39 09/30/2015    EGFR 65 04/05/2021     Patient voiced understanding and agreement in the above discussion  Aware to contact our office with questions/symptoms in the interim  This note has been generated by voice recognition software system  Therefore, there may be spelling, grammar, and or syntax errors  Please contact if questions arise

## 2021-04-11 LAB
LAB AP GYN PRIMARY INTERPRETATION: NORMAL
Lab: NORMAL

## 2021-04-29 ENCOUNTER — TRANSCRIBE ORDERS (OUTPATIENT)
Dept: ADMINISTRATIVE | Age: 66
End: 2021-04-29

## 2021-04-29 ENCOUNTER — APPOINTMENT (OUTPATIENT)
Dept: LAB | Age: 66
End: 2021-04-29

## 2021-04-29 DIAGNOSIS — Z00.8 HEALTH EXAMINATION IN POPULATION SURVEY: Primary | ICD-10-CM

## 2021-04-29 DIAGNOSIS — Z00.8 HEALTH EXAMINATION IN POPULATION SURVEY: ICD-10-CM

## 2021-04-29 DIAGNOSIS — Z85.3 HISTORY OF RIGHT BREAST CANCER: ICD-10-CM

## 2021-04-29 LAB
CHOLEST SERPL-MCNC: 199 MG/DL (ref 50–200)
EST. AVERAGE GLUCOSE BLD GHB EST-MCNC: 108 MG/DL
HBA1C MFR BLD: 5.4 %
HDLC SERPL-MCNC: 71 MG/DL
LDLC SERPL CALC-MCNC: 114 MG/DL (ref 0–100)
NONHDLC SERPL-MCNC: 128 MG/DL
TRIGL SERPL-MCNC: 68 MG/DL

## 2021-04-29 PROCEDURE — 36415 COLL VENOUS BLD VENIPUNCTURE: CPT

## 2021-04-29 PROCEDURE — 83036 HEMOGLOBIN GLYCOSYLATED A1C: CPT

## 2021-04-29 PROCEDURE — 80061 LIPID PANEL: CPT

## 2021-05-04 ENCOUNTER — HOSPITAL ENCOUNTER (OUTPATIENT)
Dept: RADIOLOGY | Facility: HOSPITAL | Age: 66
Discharge: HOME | End: 2021-05-04
Attending: ORTHOPAEDIC SURGERY
Payer: MEDICARE

## 2021-05-04 ENCOUNTER — OFFICE VISIT (OUTPATIENT)
Dept: OBGYN CLINIC | Facility: HOSPITAL | Age: 66
End: 2021-05-04
Payer: MEDICARE

## 2021-05-04 VITALS
BODY MASS INDEX: 23.4 KG/M2 | DIASTOLIC BLOOD PRESSURE: 80 MMHG | HEIGHT: 69 IN | SYSTOLIC BLOOD PRESSURE: 122 MMHG | WEIGHT: 158 LBS | HEART RATE: 67 BPM

## 2021-05-04 DIAGNOSIS — M53.3 CHRONIC LEFT SI JOINT PAIN: ICD-10-CM

## 2021-05-04 DIAGNOSIS — M25.552 PAIN IN LEFT HIP: ICD-10-CM

## 2021-05-04 DIAGNOSIS — M62.81 QUADRICEPS WEAKNESS: ICD-10-CM

## 2021-05-04 DIAGNOSIS — M54.16 LUMBAR RADICULOPATHY: Primary | ICD-10-CM

## 2021-05-04 DIAGNOSIS — R20.2 NUMBNESS AND TINGLING OF BOTH FEET: ICD-10-CM

## 2021-05-04 DIAGNOSIS — G89.29 CHRONIC LEFT SI JOINT PAIN: ICD-10-CM

## 2021-05-04 DIAGNOSIS — R20.0 NUMBNESS AND TINGLING OF BOTH FEET: ICD-10-CM

## 2021-05-04 PROCEDURE — 73502 X-RAY EXAM HIP UNI 2-3 VIEWS: CPT

## 2021-05-04 PROCEDURE — 99214 OFFICE O/P EST MOD 30 MIN: CPT | Performed by: ORTHOPAEDIC SURGERY

## 2021-05-04 NOTE — PROGRESS NOTES
Assessment:   Diagnosis ICD-10-CM Associated Orders   1  Pain in left hip  M25 552 XR hip/pelv 2-3 vws left if performed   2  Chronic left SI joint pain  M53 3 Ambulatory referral to Physical Therapy    G89 29    3  Quadriceps weakness  M62 81 Ambulatory referral to Physical Therapy     MRI lumbar spine wo contrast     Ambulatory referral to Pain Management   4  Numbness and tingling of both feet  R20 0 MRI lumbar spine wo contrast    R20 2 Ambulatory referral to Pain Management   5  Lumbar radiculopathy  M54 16 Ambulatory referral to Pain Management       Plan:   *  New x-rays of the left hip and pelvis were obtained today and reviewed with the patient that shows the Blade hardware from 50 years ago is still intact and in place  There is still good joint space the left hip joint, limited visual of lumbar spine although osteoarthritis is noted with curvature  *  Due to patient's recent weight loss, it is recommended to start some physical therapy to help with muscle building and re-education of the core, hip and thigh  Emphasis at home exercise program   * As for the numbness in the anterior thigh and bilateral anterior ankles, it is recommended to obtain an updated MRI of the lumbar spine to further evaluate for compression at those nerve roots that correlate with the distribution  *   Referral to Pain and Spine to review the MRI of the lumbar spine for further evaluation and treatment  *   A localized SI joint injection was discussed with the patient, but at this time patient would like to hold off  She understands that this is an option in she can call in but that order to be placed if needed  To do next visit:  Return for  follow-up with Pain and Spine to review MRI of the lumbar spine  The above stated was discussed in layman's terms and the patient expressed understanding  All questions were answered to the patient's satisfaction         Scribe Attestation    I,:  Lucy Diop am acting as a scribe while in the presence of the attending physician :       I,:  Lonny Neal MD personally performed the services described in this documentation    as scribed in my presence :             Subjective: Serena South is a 77 y o  female who presents today for an evaluation for her left hip pain  Patient states that 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6 that left her about an inch shorter on the left side  She does wear heel lift on the left shoe for comfort  She notices that  Her lumbar spine on the left side bothers her due to her limp  She notes that she has had increased back pain after losing 35 lb recently and wishes to get back to exercising but is weary of doing that  She notes that she also has numbness in the anterior left thigh that started about 7 years ago and has now extended into bilateral anterior ankles  This makes her feel unsteady on her feet and not confident in her activities  She has been worked up for her back in the past with a number of MRIs and therapy  She has even seen a spine surgeon Dr Daryle Childs, who felt that she was not a surgical candidate at that time  Review of systems negative unless otherwise specified in HPI  Review of Systems   Constitutional: Negative for chills, fever and unexpected weight change  HENT: Negative for hearing loss, nosebleeds and sore throat  Eyes: Negative for pain, redness and visual disturbance  Respiratory: Negative for cough, shortness of breath and wheezing  Cardiovascular: Negative for chest pain, palpitations and leg swelling  Gastrointestinal: Negative for abdominal pain and nausea  Genitourinary: Negative for dyspareunia, dysuria and frequency  Skin: Negative for rash and wound  Neurological: Negative for dizziness, numbness and headaches  Psychiatric/Behavioral: Negative for decreased concentration and suicidal ideas  The patient is not nervous/anxious          Past Medical History:   Diagnosis Date    Arthritis     BRCA1 negative     BRCA2 negative     Breast cancer ([facility] [address] ) 01/17/2014    radiation    Disease of thyroid gland     History of radiation therapy 2014    breast cancer    Hypothyroid     Knee pain, right     Limb alert care status     no BP's /IV's right side    Mitral valve prolapse     Numbness of anterior thigh     left and tingling    Seasonal allergies     dust,mold,trees    Urinary incontinence     Varicose veins of both lower extremities     Wears glasses        Past Surgical History:   Procedure Laterality Date    BREAST BIOPSY Left 01/16/2002    BREAST BIOPSY Left 08/05/2005    high risk    BREAST BIOPSY Right 12/26/2013    positive    BREAST EXCISIONAL BIOPSY Left 04/06/2006    high risk    BREAST EXCISIONAL BIOPSY Left 10/13/2006    BREAST LUMPECTOMY Right 01/17/2014    malignant    BREAST SURGERY Right     lumpectomy    BUNIONECTOMY Bilateral     COLONOSCOPY      EXPLORATORY LAPAROTOMY      "for fertility"    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    KNEE ARTHROSCOPY Right     [place] TOTAL KNEE ARTHROPLASTY Right 1/20/2017    Procedure: ARTHROPLASTY KNEE TOTAL;  Surgeon: Haydee Gay MD;  Location: [facility];  Service: Orthopedics    TONSILLECTOMY      WISDOM TOOTH EXTRACTION         Family History   Problem Relation Age of Onset    Uterine cancer Mother 71    Throat cancer Father [de-identified]    Breast cancer additional onset Paternal Aunt 54    Breast cancer additional onset Cousin 28    No Known Problems Daughter     No Known Problems Maternal Grandmother     No Known Problems Maternal Grandfather     No Known Problems Paternal Grandmother     No Known Problems Paternal Grandfather     No Known Problems Daughter     No Known Problems Maternal Aunt     No Known Problems Maternal Aunt     No Known Problems Maternal Aunt     No Known Problems Maternal Aunt     No Known Problems Paternal Aunt     Colon cancer Maternal Uncle 79       Social History     Occupational History    Not on file   Tobacco Use    Smoking status: Never Smoker    Smokeless tobacco: Never Used   Substance and Sexual Activity    Alcohol use: Yes     Frequency: 2-4 times a month     Drinks per session: 1 or 2     Binge frequency: Never    Drug use: No    Sexual activity: Not Currently     Birth control/protection: Abstinence, Post-menopausal         Current Outpatient Medications:     chlorthalidone 25 mg tablet, Take 25 mg by mouth daily, Disp: , Rfl:     levothyroxine (SYNTHROID) 75 mcg tablet, Take 1 tablet by mouth every other day, Disp: , Rfl:     levothyroxine (SYNTHROID) 88 mcg tablet, Take 1 tablet by mouth every other day, Disp: , Rfl:     loratadine (CLARITIN) 10 mg tablet, Take 10 mg by mouth daily, Disp: , Rfl:     magnesium gluconate (MAGONATE) 500 mg tablet, Take 500 mg by mouth 2 (two) times a day, Disp: , Rfl:     Multiple Vitamins-Minerals (MULTIVITAMIN ADULT PO), multivitamin, Disp: , Rfl:     solifenacin (VESICARE) 10 MG tablet, Take 1 tablet (10 mg total) by mouth daily, Disp: 90 tablet, Rfl: 3    thiamine (VITAMIN B1) 100 mg tablet, Take 100 mg by mouth daily, Disp: , Rfl:     VITAMIN D, ERGOCALCIFEROL, PO, Take 2,000 Units by mouth daily, Disp: , Rfl:     zinc gluconate 50 mg tablet, Take 50 mg by mouth daily, Disp: , Rfl:     Allergies   Allergen Reactions    Actifed Cold-Allergy [Chlorpheniramine-Phenylephrine] Swelling and Rash     Throat and face swelled    Penicillins Rash    Vancomycin Other (See Comments)     redness            Vitals:    05/04/21 0735   BP: 122/80   Pulse: 67       Objective:                    Left Hip Exam     Tenderness   Left hip tenderness location: Numbness noted on the anterior thigh  Range of Motion   Flexion: 100 (No pain)   External rotation: 30 (No pain)   Internal rotation: 15 (No pain)     Muscle Strength   The patient has normal left hip strength       Tests   IVETH: positive    Other   Erythema: absent  Sensation: normal  Pulse: present    Comments:    Calf is soft nontender  Numbness on the anterior ankle within the L4-L5 nerve distribution  Dorsiflexion 5/5  Plantar flexion 5/5  EHL intact 5/5  Knee extension 4+/ 5 with atrophy noted within the left thigh  Knee flexion 5/5            Diagnostics, reviewed and taken today if performed as documented: The attending physician has personally reviewed the pertinent films in PACS and interpretation is as follows:   x-rays left hip and pelvis: Blade hardware from 50 years ago is still intact and in place  There is still good joint space the left hip joint, limited visual of lumbar spine although osteoarthritis is noted with curvature    Procedures, if performed today:      None performed      Portions of the record may have been created with voice recognition software  Occasional wrong word or "sound a like" substitutions may have occurred due to the inherent limitations of voice recognition software  Read the chart carefully and recognize, using context, where substitutions have occurred

## 2021-05-06 ENCOUNTER — APPOINTMENT (OUTPATIENT)
Dept: PHYSICAL THERAPY | Age: 66
End: 2021-05-06
Payer: MEDICARE

## 2021-05-07 ENCOUNTER — EVALUATION (OUTPATIENT)
Dept: PHYSICAL THERAPY | Age: 66
End: 2021-05-07
Payer: MEDICARE

## 2021-05-07 DIAGNOSIS — M62.81 QUADRICEPS WEAKNESS: ICD-10-CM

## 2021-05-07 DIAGNOSIS — G89.29 CHRONIC LEFT SI JOINT PAIN: Primary | ICD-10-CM

## 2021-05-07 DIAGNOSIS — M53.3 CHRONIC LEFT SI JOINT PAIN: Primary | ICD-10-CM

## 2021-05-07 PROCEDURE — 97110 THERAPEUTIC EXERCISES: CPT | Performed by: PHYSICAL THERAPIST

## 2021-05-07 PROCEDURE — 97162 PT EVAL MOD COMPLEX 30 MIN: CPT | Performed by: PHYSICAL THERAPIST

## 2021-05-07 NOTE — PROGRESS NOTES
PT Evaluation     Today's date: 2021  Patient name: Kal Bethea  : 1955  MRN: [id number]  Referring provider: Izzy Maldonado MD  Dx:   Encounter Diagnosis     ICD-10-CM    1  Chronic left SI joint pain  M53 3 Ambulatory referral to Physical Therapy    G89 29    2  Quadriceps weakness  M62 81 Ambulatory referral to Physical Therapy                  Assessment  Assessment details: Patient seen for PT evaluation for SI pain and LE weakness  Patient presents with L SI pain that is worse with flexion movements and transitions  Patient also presents with weakness in L quads and hamstrings compared to R LE  Patient has tried her HEP from previous sessions in PT without relief of symptoms in hip  PT initiated Jcarlos extension biased movements, good relief of pain initially and decrease in pain with sit>stand transfers afterwards  Recommended patient to perform repeated extension 10x every hour, especially when at work as patient is sitting 8+ hours at work  Discussed patient using a lumbar roll to support her lumbar spine into extension while she's seated at work to continue with extension posture  No current change in L LE symptoms with repeated extension  Impairments: abnormal gait, abnormal or restricted ROM, activity intolerance, impaired balance, impaired physical strength, lacks appropriate home exercise program, pain with function, poor posture  and poor body mechanics  Functional limitations: Patient reports increased pain with transitions- sit<>stand, getting in/out of bed and with getting in/out of the car  Understanding of Dx/Px/POC: good   Prognosis: good    Goals  Impairment Goals to be met within 4 weeks  - Decrease pain to 0/10 with sit<>stand  - Improve ROMby 5-10 degrees extension  - Increase strength to 5/5 throughout B LEs  - Increase lumbar lordosis with sitting posture     Functional Goals to be met within 4-6 weeks     - Return to Prior Level of Function  - Increase Functional Status Measure to: expected  - Patient will be independent with HEP  - Patient to be able to perform sit<>stand consistently         Plan  Patient would benefit from: skilled physical therapy  Planned modality interventions: cryotherapy and thermotherapy: hydrocollator packs  Planned therapy interventions: manual therapy, joint mobilization, abdominal trunk stabilization, balance, body mechanics training, neuromuscular re-education, patient education, postural training, strengthening, stretching, therapeutic activities, therapeutic exercise, home exercise program and gait training  Frequency: 2x week  Treatment plan discussed with: patient        Subjective Evaluation    History of Present Illness  Mechanism of injury: Patient with new increase in L sided back pain  Patient initially thought her pain was related to her hip and the surgery she had 55+ years ago as a child  Patient with history of scoliosis, has had PT in the past and felt better with exercising  Patient reports that she purposely lost 35 lbs this past year, has been focusing on her diet and exercising  Patient reported her pain started ~ 2 months ago  Patient reports her pain is mostly with transitions - sit<>stand transfers specifically  No pain with walking, and sitting; increased pain with static standing (possibly related to standing, flexed posture - ie dishes, preparing meals)  Pain  Current pain ratin  At best pain ratin  At worst pain rating: 10  Location: L SI joint  Quality: sharp, dull ache and tight  Aggravating factors: sitting, standing and walking  Progression: no change    Social Support    Employment status: working    Diagnostic Tests  X-ray: normal  Treatments  Previous treatment: physical therapy  Patient Goals  Patient goals for therapy: decreased pain, increased motion, increased strength, return to sport/leisure activities and improved balance          Objective     Concurrent Complaints  Positive for disturbed sleep  Negative for night pain, bladder dysfunction, bowel dysfunction and saddle (S4) numbness    Postural Observations  Seated posture: fair  Standing posture: fair  Correction of posture: makes symptoms better        Neurological Testing     Sensation     Lumbar   Left   Diminished: light touch    Right   Diminished: light touch    Reflexes   Left   Clonus sign: negative    Right   Clonus sign: negative    Additional Neurological Details  Diminished sensation at B mukesh-lateral ankles, and L lateral proximal thigh  Active Range of Motion     Lumbar   Flexion: 75 degrees   Extension: 20 degrees   Left lateral flexion: 22 degrees       Right lateral flexion: 16 degrees   Left rotation: 15 degrees   Right rotation: 15 degrees   Left Hip   Flexion: 100 degrees     Right Hip   Flexion: 100 degrees   Left Ankle/Foot   Normal active range of motion    Right Ankle/Foot   Normal active range of motion  Mechanical Assessment    Cervical      Thoracic      Lumbar    Standing extension: repeated movements  Pain location: centralized  Pain intensity: better  Pain level: decreased    Strength/Myotome Testing     Lumbar   Left   Normal strength    Right   Normal strength    Left Hip   Planes of Motion   Flexion: 4  Abduction: 4    Right Hip   Planes of Motion   Flexion: 4+  Abduction: 4    Left Knee   Flexion: 4-  Extension: 4-    Right Knee   Flexion: 5  Extension: 5    Left Ankle/Foot   Normal strength    Right Ankle/Foot   Normal strength    Ambulation     Observational Gait   Gait: antalgic   Decreased walking speed and stride length                Precautions: scoliosis      Manuals 5/7                                       Neuro Re-Ed                SLS foam        Tandem walking foam                                        Ther Ex                Current symptoms L SI pain,  H/o L thigh numbness and B ankle numbness x 8 years, unchanged       Repeated extension Standing at the  10x- likes extension               TM walk bridges        Sup alt arms/legs        Bridges with marches?                 SLR flex        S/l hip abd        SAQ                Leg press        Knee extension        Hip abd        Ham curls                                                        Ther Activity                        Gait Training                        Modalities                        Progress as able

## 2021-05-11 ENCOUNTER — OFFICE VISIT (OUTPATIENT)
Dept: PHYSICAL THERAPY | Age: 66
End: 2021-05-11
Payer: MEDICARE

## 2021-05-11 DIAGNOSIS — G89.29 CHRONIC LEFT SI JOINT PAIN: Primary | ICD-10-CM

## 2021-05-11 DIAGNOSIS — M62.81 QUADRICEPS WEAKNESS: ICD-10-CM

## 2021-05-11 DIAGNOSIS — M53.3 CHRONIC LEFT SI JOINT PAIN: Primary | ICD-10-CM

## 2021-05-11 PROCEDURE — 97110 THERAPEUTIC EXERCISES: CPT

## 2021-05-11 NOTE — PROGRESS NOTES
Daily Note     Today's date: 2021  Patient name: Naveed Mathis  : 1955  MRN: [id number]  Referring provider: Johanny Means MD  Dx:   Encounter Diagnosis     ICD-10-CM    1  Chronic left SI joint pain  M53 3     G89 29    2  Quadriceps weakness  M62 81                   Subjective: Pt feels diet of LB extensions have helped LBP  Pt is doing them faithfully at work  Pt has not seen difference in L anterior thigh pain      Objective: See treatment diary below      Assessment: Tolerated treatment well   Continued with LB extensions  No change in L LE numbness during session  Added in LE strengthening to aid with both strength and balance deficits pt feels she has  Went with standing ex as seated ex may increase LBP at this time  Also given lumbar roll and instructed in it's use for work in sitting  Pt will report back on it's effectiveness      Plan: Cont PT     Precautions: scoliosis      Manuals                                       Neuro Re-Ed                SLS foam        Tandem walking foam                                        Ther Ex                Current symptoms L SI pain,  H/o L thigh numbness and B ankle numbness x 8 years, unchanged       Repeated extension Standing at the TM 10x- likes extension 3x20  Light mobs between to increase ROM              TM walk  15 min              bridges  x20      Sup alt arms/legs        Bridges with marches?                 SLR x 3 all  2# x30      S/l hip abd        SAQ        Wall squats  [id number]      Leg press        Knee extension        Hip abd        Ham curls                                                        Ther Activity                        Gait Training                        Modalities                        Progress as able

## 2021-05-13 ENCOUNTER — APPOINTMENT (OUTPATIENT)
Dept: PHYSICAL THERAPY | Age: 66
End: 2021-05-13
Payer: MEDICARE

## 2021-05-17 ENCOUNTER — HOSPITAL ENCOUNTER (OUTPATIENT)
Dept: RADIOLOGY | Facility: HOSPITAL | Age: 66
Discharge: HOME | End: 2021-05-17
Attending: ORTHOPAEDIC SURGERY
Payer: MEDICARE

## 2021-05-17 DIAGNOSIS — R20.2 NUMBNESS AND TINGLING OF BOTH FEET: ICD-10-CM

## 2021-05-17 DIAGNOSIS — R20.0 NUMBNESS AND TINGLING OF BOTH FEET: ICD-10-CM

## 2021-05-17 DIAGNOSIS — M62.81 QUADRICEPS WEAKNESS: ICD-10-CM

## 2021-05-17 PROCEDURE — 72148 MRI LUMBAR SPINE W/O DYE: CPT

## 2021-05-17 PROCEDURE — G1004 CDSM NDSC: HCPCS

## 2021-05-18 ENCOUNTER — OFFICE VISIT (OUTPATIENT)
Dept: URGENT CARE | Age: 66
End: 2021-05-18
Payer: MEDICARE

## 2021-05-18 ENCOUNTER — APPOINTMENT (OUTPATIENT)
Dept: RADIOLOGY | Age: 66
End: 2021-05-18
Attending: NURSE PRACTITIONER
Payer: MEDICARE

## 2021-05-18 ENCOUNTER — OFFICE VISIT (OUTPATIENT)
Dept: PHYSICAL THERAPY | Age: 66
End: 2021-05-18
Payer: MEDICARE

## 2021-05-18 VITALS
OXYGEN SATURATION: 100 % | RESPIRATION RATE: 18 BRPM | BODY MASS INDEX: 23.4 KG/M2 | HEART RATE: 68 BPM | SYSTOLIC BLOOD PRESSURE: 123 MMHG | TEMPERATURE: 95.5 F | DIASTOLIC BLOOD PRESSURE: 66 MMHG | HEIGHT: 69 IN | WEIGHT: 158 LBS

## 2021-05-18 DIAGNOSIS — M62.81 QUADRICEPS WEAKNESS: ICD-10-CM

## 2021-05-18 DIAGNOSIS — M53.3 CHRONIC LEFT SI JOINT PAIN: Primary | ICD-10-CM

## 2021-05-18 DIAGNOSIS — R52 PAIN: ICD-10-CM

## 2021-05-18 DIAGNOSIS — M25.441 SWELLING OF FINGER JOINT OF RIGHT HAND: Primary | ICD-10-CM

## 2021-05-18 DIAGNOSIS — G89.29 CHRONIC LEFT SI JOINT PAIN: Primary | ICD-10-CM

## 2021-05-18 PROCEDURE — G0463 HOSPITAL OUTPT CLINIC VISIT: HCPCS | Performed by: NURSE PRACTITIONER

## 2021-05-18 PROCEDURE — 73130 X-RAY EXAM OF HAND: CPT

## 2021-05-18 PROCEDURE — 99213 OFFICE O/P EST LOW 20 MIN: CPT | Performed by: NURSE PRACTITIONER

## 2021-05-18 PROCEDURE — 97110 THERAPEUTIC EXERCISES: CPT

## 2021-05-18 NOTE — PROGRESS NOTES
[facility]        NAME: Jeronimo Juarez is a 77 y o  female  : 1955    MRN: [id number]  DATE: May 18, 2021  TIME: 6:54 PM    Assessment and Plan   Swelling of finger joint of right hand [M25 441]  1  Swelling of finger joint of right hand     2  Pain  XR hand 3+ vw right         Patient Instructions       Follow up with PCP in 3-5 days  Proceed to  ER if symptoms worsen  Your xray was preliminarily read by your provider  You should follow up with hand surgery for further evaluation - Dr Katherine Krishnamurthy, Dr Jackie William  Follow up with your PCP  Go to ED if symptoms worsen  Chief Complaint     Chief Complaint   Patient presents with    Hand Pain     right middle finger is swollen and pain for 1 week  ( no injury)         History of Present Illness       Patient reports right sided 3rd digit swelling at the proximal interphalangeal joint for 1 week  She denies injury  She denies pain and warmth  She reports full range of motion  She denies aggravating factors and alleviating factors  Hand Pain   The incident occurred 5 to 7 days ago  There was no injury mechanism  Review of Systems   Review of Systems   Constitutional: Negative  HENT: Negative  Eyes: Negative  Respiratory: Negative  Cardiovascular: Negative  Gastrointestinal: Negative  Endocrine: Negative  Genitourinary: Negative  Musculoskeletal: Positive for joint swelling (right 3rd digit, proximal interphalangeal joint  reports clicking and sticking of joint  )  Skin: Negative  Allergic/Immunologic: Negative  Neurological: Negative  Hematological: Negative  Psychiatric/Behavioral: Negative  All other systems reviewed and are negative          Current Medications       Current Outpatient Medications:     chlorthalidone 25 mg tablet, Take 25 mg by mouth daily, Disp: , Rfl:     levothyroxine (SYNTHROID) 75 mcg tablet, Take 1 tablet by mouth every other day, Disp: , Rfl:    levothyroxine (SYNTHROID) 88 mcg tablet, Take 1 tablet by mouth every other day, Disp: , Rfl:     loratadine (CLARITIN) 10 mg tablet, Take 10 mg by mouth daily, Disp: , Rfl:     magnesium gluconate (MAGONATE) 500 mg tablet, Take 500 mg by mouth 2 (two) times a day, Disp: , Rfl:     Multiple Vitamins-Minerals (MULTIVITAMIN ADULT PO), multivitamin, Disp: , Rfl:     solifenacin (VESICARE) 10 MG tablet, Take 1 tablet (10 mg total) by mouth daily, Disp: 90 tablet, Rfl: 3    thiamine (VITAMIN B1) 100 mg tablet, Take 100 mg by mouth daily, Disp: , Rfl:     VITAMIN D, ERGOCALCIFEROL, PO, Take 2,000 Units by mouth daily, Disp: , Rfl:     zinc gluconate 50 mg tablet, Take 50 mg by mouth daily, Disp: , Rfl:     Current Allergies     Allergies as of 05/18/2021 - Reviewed 05/18/2021   Allergen Reaction Noted    Actifed cold-allergy [chlorpheniramine-phenylephrine] Swelling and Rash 01/10/2017    Penicillins Rash 01/10/2017    Vancomycin Other (See Comments) 01/10/2017            The following portions of the patient's history were reviewed and updated as appropriate: allergies, current medications, past family history, past medical history, past social history, past surgical history and problem list      Past Medical History:   Diagnosis Date    Arthritis     BRCA1 negative     BRCA2 negative     [facility]    radiation    Disease of thyroid gland     History of radiation therapy 2014    breast cancer    Hypothyroid     Knee pain, right     Limb alert care status     no BP's /IV's right side    Mitral valve prolapse     Numbness of anterior thigh     left and tingling    Seasonal allergies     dust,mold,trees    Urinary incontinence     Varicose veins of both lower extremities     Wears glasses        Past Surgical History:   Procedure Laterality Date    BREAST BIOPSY Left 01/16/2002    BREAST BIOPSY Left 08/05/2005    high risk    BREAST BIOPSY Right 12/26/2013    positive    BREAST EXCISIONAL BIOPSY Left 04/06/2006    high risk    BREAST EXCISIONAL BIOPSY Left 10/13/2006    BREAST LUMPECTOMY Right 01/17/2014    malignant    BREAST SURGERY Right     lumpectomy    BUNIONECTOMY Bilateral     COLONOSCOPY      EXPLORATORY LAPAROTOMY      "for fertility"    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    KNEE ARTHROSCOPY Right     KS TOTAL KNEE ARTHROPLASTY Right 1/20/2017    Procedure: ARTHROPLASTY KNEE TOTAL;  Surgeon: Jaun Ornelas MD;  Location: [facility];  Service: Orthopedics    TONSILLECTOMY      WISDOM TOOTH EXTRACTION         Family History   Problem Relation Age of Onset    Uterine cancer Mother 71    Throat cancer Father [de-identified]    Breast cancer additional onset Paternal Aunt 54    Breast cancer additional onset Cousin 28    No Known Problems Daughter     No Known Problems Maternal Grandmother     No Known Problems Maternal Grandfather     No Known Problems Paternal Grandmother     No Known Problems Paternal Grandfather     No Known Problems Daughter     No Known Problems Maternal Aunt     No Known Problems Maternal Aunt     No Known Problems Maternal Aunt     No Known Problems Maternal Aunt     No Known Problems Paternal Aunt     Colon cancer Maternal Uncle 70         Medications have been verified  Objective   /66 (BP Location: Left arm, Patient Position: Sitting, Cuff Size: Extra-Large)   Pulse 68   Temp (!) 95 5 °F (35 3 °C) (Temporal)   Resp 18   Ht 5' 9" (1 753 m)   Wt 71 7 kg (158 lb)   LMP  (LMP Unknown)   SpO2 100%   BMI 23 33 kg/m²   No LMP recorded (lmp unknown)  Patient is postmenopausal        Physical Exam     Physical Exam  Vitals signs and nursing note reviewed  Constitutional:       General: She is not in acute distress  Appearance: Normal appearance  She is normal weight  She is not ill-appearing, toxic-appearing or diaphoretic  HENT:      Head: Normocephalic and atraumatic        Right Ear: External ear normal       Left Ear: External ear normal    Eyes:      General: No scleral icterus  Right eye: No discharge  Left eye: No discharge  Extraocular Movements: Extraocular movements intact  Neck:      Musculoskeletal: Normal range of motion  Cardiovascular:      Rate and Rhythm: Normal rate  Pulmonary:      Effort: Pulmonary effort is normal    Musculoskeletal: Normal range of motion  General: Swelling (right 3rd digit proximal phalangeal joint swelling, clicking and sticking of joint noted  no erythema or warmth noted  ) present  No tenderness or signs of injury  Comments: FROM of finger however does "stick" and click with movement on exam   Finger joint 3rd proximal interphalangeal joint is noted to be swollen  No TTP  Skin:     General: Skin is warm and dry  Neurological:      General: No focal deficit present  Mental Status: She is alert and oriented to person, place, and time  Mental status is at baseline  Psychiatric:         Mood and Affect: Mood normal          Behavior: Behavior normal          Thought Content: Thought content normal          Judgment: Judgment normal        Preliminary read of xray-  Questionable joint space reduction in right proximal interphalangeal joint 3rd digit right hand  Waiting on official radiology read

## 2021-05-18 NOTE — PROGRESS NOTES
Daily Note     Today's date: 2021  Patient name: Jacey Solis  : 1955  MRN: [id number]  Referring provider: Sean Carson MD  Dx:   Encounter Diagnosis     ICD-10-CM    1  Chronic left SI joint pain  M53 3     G89 29    2  Quadriceps weakness  M62 81                   Subjective: Pt feels diet of LB extensions have helped LBP  Pt is doing them faithfully at work  Pt has not seen difference in L anterior thigh pain  Notes that yoga is an irritant due to flexion motions and twisting getting in and out of car is also LBP irritant  Pt is awaiting results of recent MRI for LB      Objective: See treatment diary below      Assessment: Tolerated treatment well   Continued with LB extensions  No change in L LE numbness during session  Adding in LE strengthening to aid with both strength and balance deficits pt feels she has  Went with standing ex as seated ex may increase LBP at this time  Added in rises from chair to strengthen LE to aid with LBP pt gets during transfer/change of position  Will add Cybex as better LE strengthening base is set  Plan: Cont PT     Precautions: scoliosis      Manuals                                      Neuro Re-Ed                SLS foam        Tandem walking foam                                        Ther Ex                Current symptoms L SI pain,  H/o L thigh numbness and B ankle numbness x 8 years, unchanged       Repeated extension Standing at the TM 10x- likes extension 3x20   Light mobs between to increase ROM 3x20 light mobs between to increase ROM             TM walk  15 min              bridges  x20 x20     Sup alt arms/legs        Bridges with leg ext   x10     T/A with ball   2x10     SLR x 3 all  2# x30 2# x30     Heel raises   x30     standing ham curls   2# x30     Rises from chair   2 pads 2x10     S/l hip abd        SAQ        Wall squats  [id number]     Leg press        Knee extension        Hip abd        Ham curls Ther Activity                        Gait Training                        Modalities                        Progress as able

## 2021-05-18 NOTE — PATIENT INSTRUCTIONS
Your xray was preliminarily read by your provider  You should follow up with hand surgery for further evaluation - Dr Mitali Vitale, Dr Keegan Navas  Follow up with your PCP  Go to ED if symptoms worsen  Swollen Joint, Ambulatory Care   GENERAL INFORMATION:   Joint swelling  may occur in one or more joints  You may have other symptoms, such as pain, tenderness, or stiffness  A swollen joint may be caused by a variety of conditions such as arthritis, pseudogout, gout, tendinitis, or injury  Seek immediate care for the following symptoms:   · You cannot move your joint at all  · You have severe pain that does not get better with medicine  Treatment for a swollen joint  depends on the cause of your swollen joint  Your healthcare provider may recommend any of the following:  · Rest  your swollen joint  Avoid activities that make the swelling or pain worse  You may need to avoid putting weight on your joint while you have pain  Crutches or a walker can be used to avoid putting weight on joints in your lower body  · Apply ice  on your swollen joint for 15 to 20 minutes every hour or as directed  Use an ice pack, or put crushed ice in a plastic bag  Cover it with a towel  Ice helps prevent tissue damage and decreases swelling and pain  · Apply heat  on your swollen joint for 20 to 30 minutes every 2 hours for as many days as directed  Heat helps decrease pain  · Elevate  your swollen joint above the level of your heart as often as you can  This will help decrease swelling and pain  Prop your joint on pillows or blankets to keep it elevated comfortably  · NSAIDs  help decrease swelling and pain or fever  This medicine is available with or without a doctor's order  NSAIDs can cause stomach bleeding or kidney problems in certain people  If you take blood thinner medicine, always ask your healthcare provider if NSAIDs are safe for you  Always read the medicine label and follow directions    Follow up with your healthcare provider as directed:  Write down your questions so you remember to ask them during your visits  CARE AGREEMENT:   You have the right to help plan your care  Learn about your health condition and how it may be treated  Discuss treatment options with your caregivers to decide what care you want to receive  You always have the right to refuse treatment  The above information is an  only  It is not intended as medical advice for individual conditions or treatments  Talk to your doctor, nurse or pharmacist before following any medical regimen to see if it is safe and effective for you  © 2014 [address] is for End User's use only and may not be sold, redistributed or otherwise used for commercial purposes  All illustrations and images included in CareNotes® are the copyrighted property of [employer]  or Gil Oliveira

## 2021-05-19 ENCOUNTER — CONSULT (OUTPATIENT)
Dept: PAIN MEDICINE | Facility: CLINIC | Age: 66
End: 2021-05-19
Payer: MEDICARE

## 2021-05-19 VITALS
DIASTOLIC BLOOD PRESSURE: 78 MMHG | TEMPERATURE: 97.9 F | HEIGHT: 69 IN | BODY MASS INDEX: 22.96 KG/M2 | WEIGHT: 155 LBS | SYSTOLIC BLOOD PRESSURE: 135 MMHG | HEART RATE: 76 BPM

## 2021-05-19 DIAGNOSIS — M51.36 DDD (DEGENERATIVE DISC DISEASE), LUMBAR: ICD-10-CM

## 2021-05-19 DIAGNOSIS — M47.816 LUMBAR SPONDYLOSIS: ICD-10-CM

## 2021-05-19 DIAGNOSIS — M54.16 LUMBAR RADICULOPATHY: ICD-10-CM

## 2021-05-19 DIAGNOSIS — M48.061 SPINAL STENOSIS OF LUMBAR REGION, UNSPECIFIED WHETHER NEUROGENIC CLAUDICATION PRESENT: ICD-10-CM

## 2021-05-19 DIAGNOSIS — M46.1 SACROILIITIS (HCC): Primary | ICD-10-CM

## 2021-05-19 PROBLEM — M51.369 DDD (DEGENERATIVE DISC DISEASE), LUMBAR: Status: ACTIVE | Noted: 2021-05-19

## 2021-05-19 PROCEDURE — 99204 OFFICE O/P NEW MOD 45 MIN: CPT | Performed by: ANESTHESIOLOGY

## 2021-05-19 RX ORDER — MELOXICAM 15 MG/1
15 TABLET ORAL DAILY
Qty: 30 TABLET | Refills: 1 | Status: SHIPPED | OUTPATIENT
Start: 2021-05-19 | End: 2021-05-19 | Stop reason: SDUPTHER

## 2021-05-19 RX ORDER — MELOXICAM 15 MG/1
15 TABLET ORAL DAILY PRN
Qty: 30 TABLET | Refills: 1 | Status: SHIPPED | OUTPATIENT
Start: 2021-05-19 | End: 2021-08-25 | Stop reason: ALTCHOICE

## 2021-05-19 NOTE — PROGRESS NOTES
Assessment  1  Sacroiliitis ([address] )    2  Lumbar radiculopathy    3  Lumbar spondylosis    4  Spinal stenosis of lumbar region, unspecified whether neurogenic claudication present    5  DDD (degenerative disc disease), lumbar        Plan   24-year-old female with a history of ORIF of the left femur, referred by Dr Fiona Bruner, presenting for initial consultation regarding a long-standing history of left-sided lumbosacral back pain that radiates into the buttock with associated numbness in the anterolateral aspect of the left thigh and anteromedial aspect of the ankles bilaterally and weakness bilaterally without any specific trauma or inciting event  MRI of the lumbar spine shows multilevel degenerative disc disease and spondylosis  Anterolisthesis of L4 on L5  She has varying degrees of central and foraminal stenosis most pronounced at L2-3 and L3-4, followed by L4-5  Of note, the formal read has not been finalized  These images were independently viewed by me  The patient is not really taking anything for the pain as it is intermittent at this point  Her major focus is the left-sided low back and buttock pain  She is currently engaged in physical therapy and does find some relief with this  The patient's left-sided low back pain seems to be mostly secondary to sacroiliitis and there may be a facet mediated component  Patient's lower extremity symptoms are consistent with lumbar radiculopathy  She did have an EMG of the lower extremities in 2018 which did not show any evidence of radiculopathy although there was absence of the left peroneal response which was of unknown etiology  Lower on the differential is meralgia paresthetica considering lack of evidence on EMG  1  I did offer the patient a left SI joint injection, however the patient would like to hold off on interventional therapy at this time as she does not feel her pain is bad enough to warrant it right now  2   I will prescribe a trial of meloxicam 15 mg daily p r n  and the patient was advised to avoid any other NSAIDs while on this medication and take the medication with food to avoid GI upset  3  Patient will continue with physical therapy and her home exercise program  4  I will follow up the patient in 8 weeks or sooner if needed       My impressions and treatment recommendations were discussed in detail with the patient who verbalized understanding and had no further questions  Discharge instructions were provided  I personally saw and examined the patient and I agree with the above discussed plan of care  No orders of the defined types were placed in this encounter  No orders of the defined types were placed in this encounter  History of Present Illness    Maylin Murillo is a 77 y o  female  with a history of ORIF of the left femur, referred by Dr Lizzy Vee, presenting for initial consultation regarding a long-standing history of left-sided lumbosacral back pain that radiates into the buttock with associated numbness in the anterolateral aspect of the left thigh and anteromedial aspect of the ankles bilaterally and weakness bilaterally without any specific trauma or inciting event  She denies any bladder or bowel incontinence or saddle anesthesia  MRI of the lumbar spine shows multilevel degenerative disc disease and spondylosis  Anterolisthesis of L4 on L5  She has varying degrees of central and foraminal stenosis most pronounced at L2-3 and L3-4, followed by L4-5  Of note, the formal read has not been finalized  These images were independently viewed by me  The patient is not really taking anything for the pain as it is intermittent at this point  Her major focus is the left-sided low back and buttock pain  She is currently engaged in physical therapy and does find some relief with this  The patient rates her pain a 3 to 6/10 depending upon her activity level    The pain is occasional and does not follow any particular pattern throughout the day  The pain is described as shooting, numbness, and sharp  The pain is increased with bending and exercise  The pain is alleviated with exercise and physical therapy  Other than as stated above, the patient denies any interval changes in medications, medical condition, mental condition, symptoms, or allergies since the last office visit  I have personally reviewed and/or updated the patient's past medical history, past surgical history, family history, social history, current medications, allergies, and vital signs today  Review of Systems   Constitutional: Negative for fever and unexpected weight change  HENT: Negative for trouble swallowing  Eyes: Negative for visual disturbance  Respiratory: Negative for shortness of breath and wheezing  Cardiovascular: Negative for chest pain and palpitations  Gastrointestinal: Negative for constipation, diarrhea, nausea and vomiting  Endocrine: Negative for cold intolerance, heat intolerance and polydipsia  Genitourinary: Negative for difficulty urinating and frequency  Musculoskeletal: Negative for arthralgias, gait problem, joint swelling and myalgias  Skin: Negative for rash  Neurological: Positive for numbness  Negative for dizziness, seizures, syncope, weakness and headaches  Hematological: Does not bruise/bleed easily  Psychiatric/Behavioral: Negative for dysphoric mood  All other systems reviewed and are negative        Patient Active Problem List   Diagnosis    Primary osteoarthritis of right knee    Meralgia paraesthetica, left    Acquired spondylolisthesis    Acquired hypothyroidism    Leukopenia    History of right breast cancer    Osteopenia of multiple sites    Seasonal allergies    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    Benign essential HTN    Urge incontinence of urine    Glucose intolerance    Encounter for follow-up examination after completed treatment for malignant neoplasm    Pure hypercholesterolemia    Impingement syndrome of right shoulder    Ascending aorta dilatation (HCC)    Coronary artery disease       Past Medical History:   Diagnosis Date    Arthritis     BRCA1 negative     BRCA2 negative     Breast cancer [address] [address] ) 01/17/2014    radiation    Disease of thyroid gland     History of radiation therapy 2014    breast cancer    Hypothyroid     Knee pain, right     Limb alert care status     no BP's /IV's right side    Mitral valve prolapse     Numbness of anterior thigh     left and tingling    Seasonal allergies     dust,mold,trees    Urinary incontinence     Varicose veins of both lower extremities     Wears glasses        Past Surgical History:   Procedure Laterality Date    BREAST BIOPSY Left 01/16/2002    BREAST BIOPSY Left 08/05/2005    high risk    BREAST BIOPSY Right 12/26/2013    positive    BREAST EXCISIONAL BIOPSY Left 04/06/2006    high risk    BREAST EXCISIONAL BIOPSY Left 10/13/2006    BREAST LUMPECTOMY Right 01/17/2014    malignant    BREAST SURGERY Right     lumpectomy    BUNIONECTOMY Bilateral     COLONOSCOPY      EXPLORATORY LAPAROTOMY      "for fertility"    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    KNEE ARTHROSCOPY Right     LA TOTAL KNEE ARTHROPLASTY Right 1/20/2017    Procedure: ARTHROPLASTY KNEE TOTAL;  Surgeon: Cody Hoffman MD;  Location: [facility];  Service: Orthopedics    TONSILLECTOMY      WISDOM TOOTH EXTRACTION         Family History   Problem Relation Age of Onset    Uterine cancer Mother 71    Throat cancer Father [de-identified]    Breast cancer additional onset Paternal Aunt 54    Breast cancer additional onset Cousin 28    No Known Problems Daughter     No Known Problems Maternal Grandmother     No Known Problems Maternal Grandfather     No Known Problems Paternal Grandmother     No Known Problems Paternal Grandfather     No Known Problems Daughter     No Known Problems Maternal Aunt     No Known Problems Maternal Aunt     No Known Problems Maternal Aunt     No Known Problems Maternal Aunt     No Known Problems Paternal Aunt     Colon cancer Maternal Uncle 79       Social History     Occupational History    Not on file   Tobacco Use    Smoking status: Never Smoker    Smokeless tobacco: Never Used   Substance and Sexual Activity    Alcohol use: Yes     Frequency: 2-4 times a month     Drinks per session: 1 or 2     Binge frequency: Never    Drug use: No    Sexual activity: Not Currently     Birth control/protection: Abstinence, Post-menopausal       Current Outpatient Medications on File Prior to Visit   Medication Sig    chlorthalidone 25 mg tablet Take 25 mg by mouth daily    levothyroxine (SYNTHROID) 75 mcg tablet Take 1 tablet by mouth every other day    levothyroxine (SYNTHROID) 88 mcg tablet Take 1 tablet by mouth every other day    loratadine (CLARITIN) 10 mg tablet Take 10 mg by mouth daily    magnesium gluconate (MAGONATE) 500 mg tablet Take 500 mg by mouth 2 (two) times a day    Multiple Vitamins-Minerals (MULTIVITAMIN ADULT PO) multivitamin    solifenacin (VESICARE) 10 MG tablet Take 1 tablet (10 mg total) by mouth daily    thiamine (VITAMIN B1) 100 mg tablet Take 100 mg by mouth daily    VITAMIN D, ERGOCALCIFEROL, PO Take 2,000 Units by mouth daily    zinc gluconate 50 mg tablet Take 50 mg by mouth daily     No current facility-administered medications on file prior to visit  Allergies   Allergen Reactions    Actifed Cold-Allergy [Chlorpheniramine-Phenylephrine] Swelling and Rash     Throat and face swelled    Penicillins Rash    Vancomycin Other (See Comments)     redness       Physical Exam    LMP  (LMP Unknown)     Constitutional: normal, well developed, well nourished, alert, in no distress and non-toxic and no overt pain behavior    Eyes: anicteric  HEENT: grossly intact  Neck: supple, symmetric, trachea midline and no masses   Pulmonary:even and unlabored  Cardiovascular:No edema or pitting edema present  Skin:Normal without rashes or lesions and well hydrated  Psychiatric:Mood and affect appropriate  Neurologic:Cranial Nerves II-XII grossly intact  Musculoskeletal:normal gait  Left lumbar paraspinals and SI joint mildly tender to palpation  Positive Conner finger sign on the left  Bilateral patellar and Achilles reflexes were 2/4 and symmetrical   No clonus was noted bilaterally  Bilateral lower extremity strength 5/5 in all muscle groups  Sensation diminished to light touch on the anterolateral aspect of the left thigh from the hip to the knee  Negative straight leg raise bilaterally  Positive Jonny's, Gaenslen's, and AP compression test on the left  Imaging      PACS Images     Show images for MRI lumbar spine wo contrast   Imaging    MRI lumbar spine wo contrast (Order: [id number]) - 5/17/2021  Order Report    Order Details        PACS Images     Show images for XR spine lumbar minimum 4 views   Study Result    LUMBAR SPINE     INDICATION:  Lower back pain      COMPARISON: 1/22/2013     VIEWS:  AP, lateral, bilateral oblique and coned down projections; 5 images     FINDINGS:     There is a rotatory levoscoliosis, there is no subluxation      There is no radiographic evidence of acute fracture or destructive osseous lesion      Discogenic degenerative changes of the lumbar spine noted, with mild narrowing of bowel through L4, L4-L5     Bilateral facet joint osteoarthritis present at L5-S1     IMPRESSION:     Degenerative disc disease and facet joint arthritis of the lumbar spine as well as mild rotatory levoscoliosis    There is mild interval progression since the prior study        Workstation performed: [id number]

## 2021-05-19 NOTE — PATIENT INSTRUCTIONS
Meloxicam (By mouth)   Meloxicam ([id number])  Treats symptoms of osteoarthritis (OA) and rheumatoid arthritis (RA)  This medicine is an NSAID  Brand Name(s): Mobic, Qmiiz, Vivlodex   There may be other brand names for this medicine  When This Medicine Should Not Be Used: This medicine is not right for everyone  Do not use it if you had an allergic reaction to meloxicam or another NSAID drug, including aspirin  Do not use it right before or after a heart surgery, called coronary artery bypass graft (CABG)  How to Use This Medicine:   Capsule, Liquid, Tablet, Dissolving Tablet  · Take your medicine as directed  Your dose may need to be changed several times to find what works best for you  · If this medicine upsets your stomach, take it with food or milk  · Capsule or tablet: Swallow whole  Do not crush, break, or chew it  · Disintegrating tablet: Make sure your hands are dry before you handle the tablet  Do not open the blister pack that contains the tablet until you are ready to use it  Peel back the foil on the blister to remove the tablet  Do not push the tablet through the foil  Place the tablet in your mouth or onto your tongue  It should melt quickly  Swallow the melted tablet with or without drinking any liquid  · Oral liquid: Shake the bottle gently before use  Measure the oral liquid medicine with a marked measuring spoon, oral syringe, or medicine cup  · This medicine should come with a Medication Guide  Ask your pharmacist for a copy if you do not have one  · Missed dose: Take a dose as soon as you remember  If it is almost time for your next dose, wait until then and take a regular dose  Do not take extra medicine to make up for a missed dose  · Store the medicine in a closed container at room temperature, away from heat, moisture, and direct light    Drugs and Foods to Avoid:   Ask your doctor or pharmacist before using any other medicine, including over-the-counter medicines, vitamins, and herbal products  · Do not use aspirin or any other NSAID medicine (including diflunisal, salsalate) unless your doctor says it is okay  · Do not take the oral liquid together with sodium polystyrene sulfonate  Meloxicam oral liquid contains sorbitol, which may cause a serious bowel problem if taken with sodium polystyrene sulfonate  · Some medicines can affect how meloxicam works  Tell your doctor if you are using any of the following:  ? Amiodarone, cholestyramine, cyclosporine, digoxin, fluconazole, lithium, methotrexate, pemetrexed, sulphaphenazole  ? Blood pressure medicine  ? Blood thinner (including warfarin)  ? Diuretic (water pill)  ? Medicine to treat depression (including SNRIs, SSRIs)  ? Steroid medicine (including hydrocortisone, methylprednisolone, prednisolone, prednisone)  Warnings While Using This Medicine:   · Tell your doctor if you are pregnant or breastfeeding  Do not use this medicine during the later part of a pregnancy unless your doctor tells you it is okay  · Tell your doctor if you have kidney disease, liver disease, heart disease, heart failure, asthma, bleeding problems, high blood pressure, any heart or blood vessel problems, phenylketonuria, a recent heart attack, or a history of ulcers or other stomach problems  Tell your doctor if you smoke or drink alcohol regularly  · This medicine may cause the following problems:  ? Increased risk of blood clots, heart attack, stroke, or heart failure  ? Bleeding problems, including stomach or bowel bleeding or ulcer  ? Liver problems  ? High blood pressure  ? Kidney problems  ? Serious skin reactions  · This medicine may cause a delay in ovulation for women and may decrease sperm count in men, which can affect their ability to have children  If you plan to have children, talk with your doctor before using this medicine  · Your doctor will do lab tests at regular visits to check on the effects of this medicine   Keep all appointments  · Keep all medicine out of the reach of children  Never share your medicine with anyone  Possible Side Effects While Using This Medicine:   Call your doctor right away if you notice any of these side effects:  · Allergic reaction: Itching or hives, swelling in your face or hands, swelling or tingling in your mouth or throat, chest tightness, trouble breathing  · Blistering, peeling, red skin rash  · Change in how much or how often you urinate, painful urination  · Chest pain, trouble breathing, coughing up blood  · Dark urine or pale stools, nausea, vomiting, loss of appetite, stomach pain, yellow skin or eyes  · Numbness or weakness on one side of your body, sudden or severe headache, problems with vision, speech, or walking  · Rapid weight gain, swelling in your hands, ankles, or feet  · Severe stomach pain, vomiting blood or material that looks like coffee grounds, bloody or black, tarry stools  · Unusual bleeding, bruising, or weakness  If you notice these less serious side effects, talk with your doctor:   · Mild nausea, diarrhea, heartburn  If you notice other side effects that you think are caused by this medicine, tell your doctor  Call your doctor for medical advice about side effects  You may report side effects to FDA at 9-536-FDA-0694  © Copyright [employer] 2021 Information is for End User's use only and may not be sold, redistributed or otherwise used for commercial purposes  The above information is an  only  It is not intended as medical advice for individual conditions or treatments  Talk to your doctor, nurse or pharmacist before following any medical regimen to see if it is safe and effective for you

## 2021-05-20 ENCOUNTER — OFFICE VISIT (OUTPATIENT)
Dept: PHYSICAL THERAPY | Age: 66
End: 2021-05-20
Payer: MEDICARE

## 2021-05-20 DIAGNOSIS — G89.29 CHRONIC LEFT SI JOINT PAIN: Primary | ICD-10-CM

## 2021-05-20 DIAGNOSIS — M53.3 CHRONIC LEFT SI JOINT PAIN: Primary | ICD-10-CM

## 2021-05-20 DIAGNOSIS — M62.81 QUADRICEPS WEAKNESS: ICD-10-CM

## 2021-05-20 PROCEDURE — 97112 NEUROMUSCULAR REEDUCATION: CPT

## 2021-05-20 PROCEDURE — 97110 THERAPEUTIC EXERCISES: CPT

## 2021-05-20 NOTE — PROGRESS NOTES
Daily Note     Today's date: 2021  Patient name: Serena South  : 1955  MRN: [id number]  Referring provider: Nuris Mix MD  Dx:   Encounter Diagnosis     ICD-10-CM    1  Chronic left SI joint pain  M53 3     G89 29    2  Quadriceps weakness  M62 81                   Subjective: Pt feels diet of LB extensions have helped LBP  Pt is doing them faithfully at work  Pt has not seen difference in L anterior thigh pain  Notes that yoga is an irritant due to flexion motions and twisting getting in and out of car is also LBP irritant  Pt reports that MD did feel there was signifcant change in her back and wants to inject her back  Pt is apprehensive about injection  " I just want to get strong, I still struggle going step over step at home      Objective: See treatment diary below      Assessment: Tolerated treatment well   Continued with LB extensions  No change in L LE numbness during session  Adding in LE strengthening to aid with both strength and balance deficits pt feels she has  Continued with standing ex as seated ex may increase LBP at this time  Added in rises from chair to strengthen LE to aid with LBP pt gets during transfer/change of position  Added in control/eccentric on steps to improve that area as it main functional c/o by patient  Will add Cybex as better LE strengthening base is set  Plan: Cont PT     Precautions: scoliosis      Manuals                                     Neuro Re-Ed                SLS foam        Tandem walking foam                                        Ther Ex                Current symptoms L SI pain,  H/o L thigh numbness and B ankle numbness x 8 years, unchanged       Repeated extension Standing at the TM 10x- likes extension 3x20   Light mobs between to increase ROM 3x20 light mobs between to increase ROM 3x20 light mobs between            TM walk  15 min              bridges  x20 x20 x30    Sup alt arms/legs    2# x20 single &alt Bridges with leg ext   x10 2x10    T/A with ball   2x10 3x10    SLR x 3 all  2# x30 2# x30 2 5# x30    Heel raises   x30 x30    standing ham curls   2# x30 2 5# x30    Rises from chair   2 pads 2x10 2 pads 2x10    Step ups     8in 2x10 no support    Lateral ecc lower     6in 2x10 cg support    Step downs    6 in 2x10 cg support            SAQ        Wall squats  [id number]     Leg press        Knee extension        Hip abd        Ham curls                                                        Ther Activity                        Gait Training                        Modalities                        Progress as able

## 2021-05-21 ENCOUNTER — TELEPHONE (OUTPATIENT)
Dept: OBGYN CLINIC | Facility: HOSPITAL | Age: 66
End: 2021-05-21

## 2021-05-21 DIAGNOSIS — N13.30 BILATERAL HYDRONEPHROSIS: Primary | ICD-10-CM

## 2021-05-21 NOTE — TELEPHONE ENCOUNTER
Call patient we received notification re abnormal finding seen on her MRI lumbar spine-bilateral hydronephrosis-meaning both kidneys are enlarged often from some type of obstruction of the ureters such as a kidney stone  Recommendation u/s kidneys and bladder and labs to check kidney function  Orders placed     Procedure: Xr Hand 3+ Vw Right    Result Date: 5/19/2021  Narrative: RIGHT HAND INDICATION:   R52: Pain, unspecified  COMPARISON:  X-ray 7/23/2016 VIEWS:  XR HAND 3+ VW RIGHT For the purposes of institution wide universal language the following terms will apply: (thumb=1st digit/finger, index finger=2nd digit/finger, long finger=3rd digit/finger, ring=4th digit/finger and small finger=5th digit/finger) FINDINGS: There is no acute fracture or dislocation  Osteoarthritis of the PIP and to lesser degree DIP joints  No lytic or blastic osseous lesion  Soft tissue swelling around the PIP joints more pronounced at 3rd digit  Impression: No acute osseous abnormality  Osteoarthritic changes as above  Workstation performed: [id number]     Procedure: Xr Hip/pelv 2-3 Vws Left If Performed    Result Date: 5/9/2021  Narrative: LEFT HIP INDICATION:   M25 552: Pain in left hip  COMPARISON:  7/23/2016 VIEWS:  XR HIP/PELV 2-3 VWS LEFT  W PELVIS IF PERFORMED FINDINGS: Stable postoperative alignment after ORIF for a proximal left femur fracture that appears healed  Persistent foreshortening of the left femoral neck with diminutive appearance to the left femoral head  Moderate left hip osteoarthritis is seen  No lytic or blastic osseous lesion  Soft tissues are unremarkable  Degenerative changes visualized lower lumbar spine  Mild right hip degenerative change also evident  Impression: Stable postoperative appearance left proximal femur and persistent foreshortening of the left femoral neck  Persistent degenerative change as above without acute osseous abnormality   Workstation performed: [id number]     Procedure: Mri Lumbar Spine Wo Contrast    Result Date: 5/21/2021  Narrative: MRI LUMBAR SPINE WITHOUT CONTRAST INDICATION: Back pain, numbness anterior left thigh  COMPARISON:  MRI lumbar spine 9/16/2014 TECHNIQUE:  Sagittal T1, sagittal T2, sagittal inversion recovery, axial T1 and axial T2, coronal T2   IMAGE QUALITY:  Diagnostic FINDINGS: VERTEBRAL BODIES:  There are 5 lumbar type vertebral bodies  Stable mild 1 anterolisthesis of L4 on L5  There is no abnormal bone marrow signal or suspicious discrete marrow lesion  SACRUM:  Normal signal within the sacrum  No evidence of insufficiency or stress fracture  DISTAL CORD AND CONUS:  Normal size and signal within the distal cord and conus  PARASPINAL SOFT TISSUES:  Paraspinal soft tissues are unremarkable  Bilateral hydronephrosis is noted  LOWER THORACIC DISC SPACES:  Normal disc height and signal   No disc herniation, canal stenosis or foraminal narrowing  LUMBAR DISC SPACES: L1-L2:  Minimal disc bulge  Mild facet arthropathy  No significant canal or foraminal stenosis  L2-L3:  There is disc bulge and superimposed left foraminal disc protrusion  There is mild canal narrowing  No significant foraminal narrowing  L3-L4:  There is mild disc bulge and superimposed left foraminal disc protrusion  Mild facet arthropathy  There is mild canal narrowing  No significant foraminal stenosis  L4-L5:  Grade 1 anterolisthesis uncovering posterior disc margin  There is severe bilateral facet arthropathy  There is mild canal narrowing  No significant foraminal stenosis  L5-S1:  There is left eccentric disc bulge and left foraminal/extraforaminal osteophytes  There is severe left and moderate left facet arthropathy  There is no significant canal stenosis  Mild left foraminal narrowing  Impression: 1  Degenerative changes without high-grade canal stenosis as detailed  2   Mild left foraminal narrowing at L5-S1  3   Bilateral hydronephrosis    Recommend ultrasound to evaluate for urinary retention and further renal assessment  This study was marked in [facility] for notification and follow-up   Workstation performed: [id number]     Lab Results   Component Value Date    WBC 4 10 (L) 04/05/2021    HGB 12 8 04/05/2021    HCT 38 9 04/05/2021    MCV 96 04/05/2021     04/05/2021     Lab Results   Component Value Date    SODIUM 141 04/05/2021    K 3 5 04/05/2021     04/05/2021    CO2 33 (H) 04/05/2021    BUN 24 04/05/2021    CREATININE 0 91 04/05/2021    GLUC 91 05/03/2018    CALCIUM 9 1 04/05/2021

## 2021-05-21 NOTE — TELEPHONE ENCOUNTER
Dr Yamile Schmitt patient Lumbar MRI results are available in epic with significant findings  PCP is going to be notified of imaging results as well for his review

## 2021-05-21 NOTE — TELEPHONE ENCOUNTER
Patient informed, she will schedule  Instructed patient if she should develop any pain in kidney area, urinary problems or swelling to go to ER over weekend   Patient agreed

## 2021-05-24 ENCOUNTER — HOSPITAL ENCOUNTER (OUTPATIENT)
Dept: RADIOLOGY | Age: 66
Discharge: HOME | End: 2021-05-24
Payer: MEDICARE

## 2021-05-24 ENCOUNTER — TRANSCRIBE ORDERS (OUTPATIENT)
Dept: ADMINISTRATIVE | Age: 66
End: 2021-05-24

## 2021-05-24 ENCOUNTER — APPOINTMENT (OUTPATIENT)
Dept: LAB | Age: 66
End: 2021-05-24
Payer: MEDICARE

## 2021-05-24 DIAGNOSIS — N13.30 BILATERAL HYDRONEPHROSIS: ICD-10-CM

## 2021-05-24 LAB
ALBUMIN SERPL BCP-MCNC: 3.2 G/DL (ref 3.5–5)
ALP SERPL-CCNC: 45 U/L (ref 46–116)
ALT SERPL W P-5'-P-CCNC: 18 U/L (ref 12–78)
ANION GAP SERPL CALCULATED.3IONS-SCNC: 6 MMOL/L (ref 4–13)
AST SERPL W P-5'-P-CCNC: 14 U/L (ref 5–45)
BASOPHILS # BLD AUTO: 0.01 THOUSANDS/ΜL (ref 0–0.1)
BASOPHILS NFR BLD AUTO: 0 % (ref 0–1)
BILIRUB SERPL-MCNC: 0.65 MG/DL (ref 0.2–1)
BUN SERPL-MCNC: 18 MG/DL (ref 5–25)
CALCIUM ALBUM COR SERPL-MCNC: 9.3 MG/DL (ref 8.3–10.1)
CALCIUM SERPL-MCNC: 8.7 MG/DL (ref 8.3–10.1)
CHLORIDE SERPL-SCNC: 101 MMOL/L (ref 100–108)
CO2 SERPL-SCNC: 31 MMOL/L (ref 21–32)
CREAT SERPL-MCNC: 0.92 MG/DL (ref 0.6–1.3)
EOSINOPHIL # BLD AUTO: 0.04 THOUSAND/ΜL (ref 0–0.61)
EOSINOPHIL NFR BLD AUTO: 1 % (ref 0–6)
ERYTHROCYTE [DISTWIDTH] IN BLOOD BY AUTOMATED COUNT: 12.7 % (ref 11.6–15.1)
GFR SERPL CREATININE-BSD FRML MDRD: 65 ML/MIN/1.73SQ M
GLUCOSE P FAST SERPL-MCNC: 85 MG/DL (ref 65–99)
HCT VFR BLD AUTO: 35.6 % (ref 34.8–46.1)
HGB BLD-MCNC: 11.6 G/DL (ref 11.5–15.4)
IMM GRANULOCYTES # BLD AUTO: 0.01 THOUSAND/UL (ref 0–0.2)
IMM GRANULOCYTES NFR BLD AUTO: 0 % (ref 0–2)
LYMPHOCYTES # BLD AUTO: 0.56 THOUSANDS/ΜL (ref 0.6–4.47)
LYMPHOCYTES NFR BLD AUTO: 18 % (ref 14–44)
MCH RBC QN AUTO: 31.3 PG (ref 26.8–34.3)
MCHC RBC AUTO-ENTMCNC: 32.6 G/DL (ref 31.4–37.4)
MCV RBC AUTO: 96 FL (ref 82–98)
MONOCYTES # BLD AUTO: 0.3 THOUSAND/ΜL (ref 0.17–1.22)
MONOCYTES NFR BLD AUTO: 10 % (ref 4–12)
NEUTROPHILS # BLD AUTO: 2.17 THOUSANDS/ΜL (ref 1.85–7.62)
NEUTS SEG NFR BLD AUTO: 71 % (ref 43–75)
NRBC BLD AUTO-RTO: 0 /100 WBCS
PLATELET # BLD AUTO: 156 THOUSANDS/UL (ref 149–390)
PMV BLD AUTO: 10.4 FL (ref 8.9–12.7)
POTASSIUM SERPL-SCNC: 3.3 MMOL/L (ref 3.5–5.3)
PROT SERPL-MCNC: 6.2 G/DL (ref 6.4–8.2)
RBC # BLD AUTO: 3.71 MILLION/UL (ref 3.81–5.12)
SODIUM SERPL-SCNC: 138 MMOL/L (ref 136–145)
WBC # BLD AUTO: 3.09 THOUSAND/UL (ref 4.31–10.16)

## 2021-05-24 PROCEDURE — 76770 US EXAM ABDO BACK WALL COMP: CPT

## 2021-05-24 PROCEDURE — 80053 COMPREHEN METABOLIC PANEL: CPT | Performed by: FAMILY MEDICINE

## 2021-05-24 PROCEDURE — 36415 COLL VENOUS BLD VENIPUNCTURE: CPT | Performed by: FAMILY MEDICINE

## 2021-05-24 PROCEDURE — 85025 COMPLETE CBC W/AUTO DIFF WBC: CPT | Performed by: FAMILY MEDICINE

## 2021-05-24 NOTE — RESULT ENCOUNTER NOTE
Brandy  Your chemistry profile is normal except for a low potassium at 3 3   this may be the result of your diuretic use  You can get an over-the-counter potassium supplement 99 mg once a day  Your renal function is normal with a creatinine of 0 92  Your CBC shows a low normal white count this has been similar to previous CBCs although slightly decreased from 1 month ago  Your hemoglobin 11 6 is normal however this has dropped from 14 0  11 months ago  I would recommend scheduling a follow up visit with one of our providers       WBC       Date                     Value               Ref Range           Status                05/24/2021               3 09 (L)            4 31 - 10 16 T*     Final                 04/05/2021               4 10 (L)            4 31 - 10 16 T*     Final                 11/25/2020               3 60 (L)            4 31 - 10 16 T*     Final                 09/30/2015               4 55                4 31 - 10 16 T*     Final                 06/03/2015               4 19 (L)            4 31 - 10 16 T*     Final                 03/04/2015               4 22 (L)            4 31 - 10 16 T*     Final              Hemoglobin       Date                     Value               Ref Range           Status                05/24/2021               11 6                11 5 - 15 4 g/*     Final                 04/05/2021               12 8                11 5 - 15 4 g/*     Final                 11/25/2020               13 6                11 5 - 15 4 g/*     Final                 09/30/2015               13 5                11 5 - 15 4 g/*     Final                 06/03/2015               13 7                11 5 - 15 4 g/*     Final                 03/04/2015               13 6                11 5 - 15 4 g/*     Final

## 2021-05-25 ENCOUNTER — OFFICE VISIT (OUTPATIENT)
Dept: PHYSICAL THERAPY | Age: 66
End: 2021-05-25
Payer: MEDICARE

## 2021-05-25 DIAGNOSIS — M53.3 CHRONIC LEFT SI JOINT PAIN: Primary | ICD-10-CM

## 2021-05-25 DIAGNOSIS — G89.29 CHRONIC LEFT SI JOINT PAIN: Primary | ICD-10-CM

## 2021-05-25 DIAGNOSIS — M62.81 QUADRICEPS WEAKNESS: ICD-10-CM

## 2021-05-25 PROCEDURE — 97110 THERAPEUTIC EXERCISES: CPT

## 2021-05-25 NOTE — PROGRESS NOTES
Daily Note     Today's date: 2021  Patient name: Jeronimo Juarez  : 1955  MRN: [id number]  Referring provider: Sudhakar Smith MD  Dx:   Encounter Diagnosis     ICD-10-CM    1  Chronic left SI joint pain  M53 3     G89 29    2  Quadriceps weakness  M62 81                   Subjective: Pt feels diet of LB extensions have helped LBP  Pt is doing them faithfully at work  Pt has not seen difference in L anterior thigh pain  Reports LB pain still occurs when she rises out of a chair at times  Reports she was exhausted after last visit      Objective: See treatment diary below      Assessment: Tolerated treatment well   Continued with LB extensions  No change in L LE numbness during session  Adding in LE strengthening to aid with both strength and balance deficits pt feels she has  Continued with standing ex as seated ex may increase LBP at this time  Using rises from chair to strengthen LE to aid with LBP pt gets during transfer/change of position  using control/eccentric on steps to improve that area as it main functional c/o by patient  Tolerated intensity initially but will monitor for any delayed reaction  Will add Cybex as better LE strengthening base is set  Plan: Cont PT     Precautions: scoliosis      Manuals                                    Neuro Re-Ed                SLS foam        Tandem walking foam                                        Ther Ex                Current symptoms L SI pain,  H/o L thigh numbness and B ankle numbness x 8 years, unchanged       Repeated extension Standing at the TM 10x- likes extension 3x20   Light mobs between to increase ROM 3x20 light mobs between to increase ROM 3x20 light mobs between 3x20 light mobs           TM walk  15 min x15 x15 x15           bridges  x20 x20 x30 x30   Sup alt arms/legs    2# x20 single &alt 2# x20   Bridges with leg ext   x10 2x10 2x10   T/A with ball   2x10 3x10 3x10   SLR x 3 all  2# x30 2# x30 2 5# x30 3# 3x30   Heel raises   x30 x30 x30   standing ham curls   2# x30 2 5# x30 3# x30   Rises from chair   2 pads 2x10 2 pads 2x10 2 pads 2x10   Step ups     8in 2x10 no support  8in 2x10 no support   Lateral ecc lower     6in 2x10 cg support 6in 2x10 cg support   Step downs    6 in 2x10 cg support 6in 2x10 cg support           SAQ        Wall squats  [id number] NT     Leg press        Knee extension        Hip abd        Ham curls                                                        Ther Activity                        Gait Training                        Modalities                        Progress as able

## 2021-05-27 ENCOUNTER — OFFICE VISIT (OUTPATIENT)
Dept: PHYSICAL THERAPY | Age: 66
End: 2021-05-27
Payer: MEDICARE

## 2021-05-27 DIAGNOSIS — G89.29 CHRONIC LEFT SI JOINT PAIN: Primary | ICD-10-CM

## 2021-05-27 DIAGNOSIS — M53.3 CHRONIC LEFT SI JOINT PAIN: Primary | ICD-10-CM

## 2021-05-27 DIAGNOSIS — M62.81 QUADRICEPS WEAKNESS: ICD-10-CM

## 2021-05-27 PROCEDURE — 97110 THERAPEUTIC EXERCISES: CPT

## 2021-05-27 NOTE — PROGRESS NOTES
Daily Note     Today's date: 2021  Patient name: Alina Carson  : 1955  MRN: [id number]  Referring provider: Renata Finley MD  Dx:   Encounter Diagnosis     ICD-10-CM    1  Chronic left SI joint pain  M53 3     G89 29    2  Quadriceps weakness  M62 81                   Subjective: Pt feels diet of LB extensions have helped LBP  Pt is doing them faithfully at work  Pt has not seen difference in L anterior thigh pain  Reports LB pain still occurs when she rises out of a chair at times  Pt frustrated with LBP with rising from chair      Objective: See treatment diary below      Assessment: Tolerated treatment well   Continued with LB extensions  No change in L LE numbness during session  Adding in LE strengthening to aid with both strength and balance deficits pt feels she has  Continued with standing ex as seated ex may increase LBP at this time  Using rises from chair to strengthen LE to aid with LBP pt gets during transfer/change of position  using control/eccentric on steps to improve that area as it main functional c/o by patient  Tolerated intensity initially but will monitor for any delayed reaction  Will add Cybex as better LE strengthening base is set  , possibly next visit      Plan: Cont PT     Precautions: scoliosis      Manuals                                    Neuro Re-Ed                SLS foam        Tandem walking foam                                        Ther Ex                Current symptoms        Repeated extension 3x 10 light mobs between 3x20   Light mobs between to increase ROM 3x20 light mobs between to increase ROM 3x20 light mobs between 3x20 light mobs           TM walk 15 min 15 min x15 x15 x15   LTR x10       bridges x20 x20 x20 x30 x30   Sup alt arms/legs 2# x30   2# x20 single &alt 2# x20   Bridges with leg ext x20  x10 2x10 2x10   T/A with ball   2x10 3x10 3x10   SLR x 3 all 3# x30 2# x30 2# x30 2 5# x30 3# 3x30   Heel raises x30  x30 x30 x30   standing ham curls 3# x30  2# x30 2 5# x30 3# x30   Rises from chair 2 pads 2x15  2 pads 2x10 2 pads 2x10 2 pads 2x10   Step ups  8 in x20 no hands    8in 2x10 no support  8in 2x10 no support   Lateral ecc lower  6in 2x10 cg support    6in 2x10 cg support 6in 2x10 cg support   Step downs  6in 2x10 cg support   6 in 2x10 cg support 6in 2x10 cg support           SAQ        Wall squats  [id number] NT     Leg press        Knee extension        Hip abd        Ham curls                                                        Ther Activity                        Gait Training                        Modalities                        Progress as able

## 2021-06-01 ENCOUNTER — EVALUATION (OUTPATIENT)
Dept: PHYSICAL THERAPY | Age: 66
End: 2021-06-01
Payer: MEDICARE

## 2021-06-01 DIAGNOSIS — M53.3 CHRONIC LEFT SI JOINT PAIN: Primary | ICD-10-CM

## 2021-06-01 DIAGNOSIS — M62.81 QUADRICEPS WEAKNESS: ICD-10-CM

## 2021-06-01 DIAGNOSIS — G89.29 CHRONIC LEFT SI JOINT PAIN: Primary | ICD-10-CM

## 2021-06-01 PROCEDURE — 97110 THERAPEUTIC EXERCISES: CPT

## 2021-06-01 NOTE — PROGRESS NOTES
Daily Note     Today's date: 2021  Patient name: Mercedes Boyle  : 1955  MRN: [id number]  Referring provider: Krista Christianson MD  Dx:   Encounter Diagnosis     ICD-10-CM    1  Chronic left SI joint pain  M53 3     G89 29    2  Quadriceps weakness  M62 81                   Subjective: Pt feels diet of LB extensions have helped LBP  Pt is doing them faithfully at work  Pt has not seen difference in L anterior thigh pain  Reports LB pain still occurs when she rises out of a chair at times and with getting out of car  Pt feel pain in L LB/hip with twisting as well  Pt does feel she is getting stronger in legs with ADL's with therapy      Objective: See treatment diary below      Assessment: Tolerated treatment well   Continued with LB extensions  No change in L LE numbness during session  Adding in LE strengthening to aid with both strength and balance deficits pt feels she has  Using rises from chair to strengthen LE to aid with LBP pt gets during transfer/change of position  Using control/eccentric on steps to improve that area as it main functional c/o by patient  Able to do 8 in step for all step activities today  Will add Cybex as better LE strengthening base is set  , possibly next visit and replace SLR if not fatigue      Plan: Cont PT     Precautions: scoliosis      Manuals                                    Neuro Re-Ed                SLS foam        Tandem walking foam                                        Ther Ex                Current symptoms        Repeated extension 3x 10 light mobs between 3x20   Light mobs between to increase ROM 3x20 light mobs between to increase ROM 3x20 light mobs between 3x20 light mobs   Prone single and alt UE/LE lifts  2x10 ea      TM walk 15 min 15 min x15 x15 x15   LTR x10       bridges x20 x20 x20 x30 x30   Sup alt arms/legs 2# x30 2# x30  2# x20 single &alt 2# x20   Bridges with leg ext x20 2# x30 x10 2x10 2x10   T/A with ball  3x10 2x10 3x10 3x10   SLR x 3 all 3# x30 3# x30 2# x30 2 5# x30 3# 3x30   Heel raises x30 x30 x30 x30 x30   standing ham curls 3# x30 3# x30 2# x30 2 5# x30 3# x30   Rises from chair 2 pads 2x15 1 pad 2 x15 2 pads 2x10 2 pads 2x10 2 pads 2x10   Step ups  8 in x20 no hands  8 in x20 no hands   8in 2x10 no support  8in 2x10 no support   Lateral ecc lower  6in 2x10 cg support  8in 2x10 cg support   6in 2x10 cg support 6in 2x10 cg support   Step downs  6in 2x10 cg support  8in 2x10 cg support  6 in 2x10 cg support 6in 2x10 cg support           SAQ        Wall squats   NT     Leg press        Knee extension        Hip abd        Ham curls                                                        Ther Activity                        Gait Training                        Modalities                        Progress as able

## 2021-06-02 ENCOUNTER — TELEPHONE (OUTPATIENT)
Dept: FAMILY MEDICINE CLINIC | Facility: CLINIC | Age: 66
End: 2021-06-02

## 2021-06-02 ENCOUNTER — OFFICE VISIT (OUTPATIENT)
Dept: OBGYN CLINIC | Facility: HOSPITAL | Age: 66
End: 2021-06-02
Payer: MEDICARE

## 2021-06-02 VITALS
DIASTOLIC BLOOD PRESSURE: 82 MMHG | BODY MASS INDEX: 23.08 KG/M2 | WEIGHT: 155.8 LBS | HEIGHT: 69 IN | SYSTOLIC BLOOD PRESSURE: 130 MMHG | HEART RATE: 58 BPM

## 2021-06-02 DIAGNOSIS — M65.331 TRIGGER MIDDLE FINGER OF RIGHT HAND: Primary | ICD-10-CM

## 2021-06-02 PROCEDURE — 99213 OFFICE O/P EST LOW 20 MIN: CPT | Performed by: ORTHOPAEDIC SURGERY

## 2021-06-02 PROCEDURE — 20550 NJX 1 TENDON SHEATH/LIGAMENT: CPT | Performed by: ORTHOPAEDIC SURGERY

## 2021-06-02 RX ORDER — BETAMETHASONE SODIUM PHOSPHATE AND BETAMETHASONE ACETATE 3; 3 MG/ML; MG/ML
6 INJECTION, SUSPENSION INTRA-ARTICULAR; INTRALESIONAL; INTRAMUSCULAR; SOFT TISSUE
Status: COMPLETED | OUTPATIENT
Start: 2021-06-02 | End: 2021-06-02

## 2021-06-02 RX ORDER — LIDOCAINE HYDROCHLORIDE 10 MG/ML
1 INJECTION, SOLUTION INFILTRATION; PERINEURAL
Status: COMPLETED | OUTPATIENT
Start: 2021-06-02 | End: 2021-06-02

## 2021-06-02 RX ADMIN — BETAMETHASONE SODIUM PHOSPHATE AND BETAMETHASONE ACETATE 6 MG: 3; 3 INJECTION, SUSPENSION INTRA-ARTICULAR; INTRALESIONAL; INTRAMUSCULAR; SOFT TISSUE at 12:10

## 2021-06-02 RX ADMIN — LIDOCAINE HYDROCHLORIDE 1 ML: 10 INJECTION, SOLUTION INFILTRATION; PERINEURAL at 12:10

## 2021-06-02 NOTE — PATIENT INSTRUCTIONS
What is it TRIGGER FINGER? Stenosing tenosynovitis, commonly known as trigger finger or trigger thumb, involves the pulleys and tendons in the hand that bend the fingers  The tendons work like long ropes connecting the muscles of the forearm with the bones of the fingers and thumb  In the finger, the pulleys are a series of rings that form a tunnel through which the tendons must glide, much like the guides on a fishing samara through which the line (or tendon) must pass  These pulleys hold the tendons close against the bone  The tendons and the tunnel have a slick lining that allows easy gliding of the tendon through the pulleys (see Figure 1)  Trigger finger/thumb occurs when the pulley at the base of the finger becomes too thick and constricting around the tendon, making it hard for the tendon to move freely through the pulley  Sometimes the tendon develops a nodule (knot) or swelling of its lining  Because of the increased resistance to the gliding of the tendon through the  pulley, one may feel pain, popping, or a catching feeling in the finger or thumb (see Figure 2)  When the tendon catches, it produces irritation and more swelling  This causes a vicious cycle of triggering, irritation, and swelling  Sometimes the finger becomes stuck or locked, and is hard to straighten or bend  What causes it? Causes for this condition are not always clear  Some trigger fingers are associated with medical conditions such as rheumatoid arthritis, gout, and diabetes  Local trauma to the palm/base of the finger may be a factor on occasion, but in most cases there is not a clear cause  Signs and symptoms   Trigger finger/thumb may start with discomfort felt at the base of the finger or thumb, where they join the palm  This area is often tender to local pressure  A nodule may sometimes be found in this area   When the finger begins to trigger or lock, the patient may think the  problem is at the middle knuckle of the finger or the tip knuckle of the thumb, since the tendon that is sticking is the one that moves these joints  Treatment  The goal of treatment in trigger finger/thumb is to eliminate the catching or locking and allow full movement of the finger or thumb without discomfort  Swelling around the flexor tendon and tendon sheath must be reduced to allow smooth gliding of the tendon  The wearing of a splint or taking an oral anti-inflammatory medication may sometimes  help  Treatment may also include changing activities to reduce swelling  An injection of steroid into the area around the tendon and pulley is often effective in relieving the trigger finger/thumb  If non-surgical forms of treatment do not relieve the symptoms, surgery may be recommended  This surgery is performed as an outpatient, usually with simple local anesthesia  The goal of surgery is to open the pulley at the base of the finger so that the tendon can glide more freely (see Figure 3)  Active motion of the finger generally begins immediately after surgery  Normal use of the hand can usually be resumed once comfort permits  Some patients may feel tenderness, discomfort, and swelling about the area of their surgery longer than others  Occasionally, hand therapy is required after surgery to regain  better use  © 2012 [employer]  www handcare  org

## 2021-06-02 NOTE — TELEPHONE ENCOUNTER
Call re  Kidney ultrasound- no evidence of hydronephrosis  Benign bilateral renal cyst   Postvoid residual meaning- how much urine remains in bladder after urinating was elevated- cause of this  unclear  I would recommend seeing Urology    Procedure: Xr Hand 3+ Vw Right    Result Date: 5/19/2021  Narrative: RIGHT HAND INDICATION:   R52: Pain, unspecified  COMPARISON:  X-ray 7/23/2016 VIEWS:  XR HAND 3+ VW RIGHT For the purposes of institution wide universal language the following terms will apply: (thumb=1st digit/finger, index finger=2nd digit/finger, long finger=3rd digit/finger, ring=4th digit/finger and small finger=5th digit/finger) FINDINGS: There is no acute fracture or dislocation  Osteoarthritis of the PIP and to lesser degree DIP joints  No lytic or blastic osseous lesion  Soft tissue swelling around the PIP joints more pronounced at 3rd digit  Impression: No acute osseous abnormality  Osteoarthritic changes as above  Workstation performed: [id number]     Procedure: Xr Hip/pelv 2-3 Vws Left If Performed    Result Date: 5/9/2021  Narrative: LEFT HIP INDICATION:   M25 552: Pain in left hip  COMPARISON:  7/23/2016 VIEWS:  XR HIP/PELV 2-3 VWS LEFT  W PELVIS IF PERFORMED FINDINGS: Stable postoperative alignment after ORIF for a proximal left femur fracture that appears healed  Persistent foreshortening of the left femoral neck with diminutive appearance to the left femoral head  Moderate left hip osteoarthritis is seen  No lytic or blastic osseous lesion  Soft tissues are unremarkable  Degenerative changes visualized lower lumbar spine  Mild right hip degenerative change also evident  Impression: Stable postoperative appearance left proximal femur and persistent foreshortening of the left femoral neck  Persistent degenerative change as above without acute osseous abnormality   Workstation performed: [id number]     Procedure: Mri Lumbar Spine Wo Contrast    Result Date: 5/21/2021  Narrative: MRI LUMBAR SPINE WITHOUT CONTRAST INDICATION: Back pain, numbness anterior left thigh  COMPARISON:  MRI lumbar spine 9/16/2014 TECHNIQUE:  Sagittal T1, sagittal T2, sagittal inversion recovery, axial T1 and axial T2, coronal T2   IMAGE QUALITY:  Diagnostic FINDINGS: VERTEBRAL BODIES:  There are 5 lumbar type vertebral bodies  Stable mild 1 anterolisthesis of L4 on L5  There is no abnormal bone marrow signal or suspicious discrete marrow lesion  SACRUM:  Normal signal within the sacrum  No evidence of insufficiency or stress fracture  DISTAL CORD AND CONUS:  Normal size and signal within the distal cord and conus  PARASPINAL SOFT TISSUES:  Paraspinal soft tissues are unremarkable  Bilateral hydronephrosis is noted  LOWER THORACIC DISC SPACES:  Normal disc height and signal   No disc herniation, canal stenosis or foraminal narrowing  LUMBAR DISC SPACES: L1-L2:  Minimal disc bulge  Mild facet arthropathy  No significant canal or foraminal stenosis  L2-L3:  There is disc bulge and superimposed left foraminal disc protrusion  There is mild canal narrowing  No significant foraminal narrowing  L3-L4:  There is mild disc bulge and superimposed left foraminal disc protrusion  Mild facet arthropathy  There is mild canal narrowing  No significant foraminal stenosis  L4-L5:  Grade 1 anterolisthesis uncovering posterior disc margin  There is severe bilateral facet arthropathy  There is mild canal narrowing  No significant foraminal stenosis  L5-S1:  There is left eccentric disc bulge and left foraminal/extraforaminal osteophytes  There is severe left and moderate left facet arthropathy  There is no significant canal stenosis  Mild left foraminal narrowing  Impression: 1  Degenerative changes without high-grade canal stenosis as detailed  2   Mild left foraminal narrowing at L5-S1  3   Bilateral hydronephrosis  Recommend ultrasound to evaluate for urinary retention and further renal assessment   This study was marked in EPIC for notification and follow-up  Workstation performed: [id number]     Procedure: Us Kidney And Bladder    Result Date: 5/29/2021  Narrative: RENAL ULTRASOUND INDICATION:   N13 30: Unspecified hydronephrosis  COMPARISON: Lumbar spine MRI 5/17/2021  Renal ultrasound 1/30/2013  TECHNIQUE:   Ultrasound of the retroperitoneum was performed with a curvilinear transducer utilizing volumetric sweeps and still imaging techniques  FINDINGS: KIDNEYS: Symmetric and normal size  Right kidney:  11 3 cm  Left kidney:  12 5 cm  Right kidney Normal echogenicity and contour  Small parapelvic cyst in the lower pole  No hydronephrosis  No shadowing calculi  No perinephric fluid collections  Left kidney Normal echogenicity and contour  Multiple small parapelvic cysts  No hydronephrosis  No shadowing calculi  No perinephric fluid collections  URETERS: Nonvisualized  BLADDER: Normally distended  No focal thickening or mass lesions  Bilateral ureteral jets detected  Small to moderate post void residual urine volume  Prevoid volume is 382 mL, and the postvoid volume is 138 mL  Impression: No hydronephrosis  Bilateral parapelvic cysts, left greater than right, which appear to correspond with the findings on the prior MRI   Small to moderate post void residual  Workstation performed: [id number]

## 2021-06-02 NOTE — PROGRESS NOTES
ASSESSMENT/PLAN:    Assessment:   Trigger Finger  right  long finger    Plan:   steroid injections  - first injection provided in office today     Follow Up:  6  week(s)    To Do Next Visit:  Assess response to treatment    General Discussions:  Trigger FInger: The anatomy and physiology of trigger finger was discussed with the patient today in the office  Edema and increased contact pressure within the flexor tendons at the A1 pulley can cause pain, crepitation, and limitation of function  Treatment options include resting MP blocking splints to decrease edema, oral anti-inflammatory medications, home or formal therapy exercises, up to 2 steroid injections within the tendon sheath, or surgical release  While majority of patients do respond to conservative treatment, up to 20% may require surgical release        _____________________________________________________  CHIEF COMPLAINT:  Chief Complaint   Patient presents with    Right Hand - Pain         SUBJECTIVE:  Jeronimo Juarez is a 77 y o  female who presents with  Popping and locking of the right long finger  Patient states this is going on for about a month  She denies any trauma  She denies any numbness or tingling  She denies any other acute complaints      PAST MEDICAL HISTORY:  Past Medical History:   Diagnosis Date    Arthritis     BRCA1 negative     BRCA2 negative     Breast cancer ([facility] [address] ) 01/17/2014    radiation    Disease of thyroid gland     History of radiation therapy 2014    breast cancer    Hypothyroid     Knee pain, right     Limb alert care status     no BP's /IV's right side    Mitral valve prolapse     Numbness of anterior thigh     left and tingling    Seasonal allergies     dust,mold,trees    Urinary incontinence     Varicose veins of both lower extremities     Wears glasses        PAST SURGICAL HISTORY:  Past Surgical History:   Procedure Laterality Date    BREAST BIOPSY Left 01/16/2002    BREAST BIOPSY Left 08/05/2005 high risk    BREAST BIOPSY Right 12/26/2013    positive    BREAST EXCISIONAL BIOPSY Left 04/06/2006    high risk    BREAST EXCISIONAL BIOPSY Left 10/13/2006    BREAST LUMPECTOMY Right 01/17/2014    malignant    BREAST SURGERY Right     lumpectomy    BUNIONECTOMY Bilateral     COLONOSCOPY      EXPLORATORY LAPAROTOMY      "for fertility"    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    KNEE ARTHROSCOPY Right     ME TOTAL KNEE ARTHROPLASTY Right 1/20/2017    Procedure: ARTHROPLASTY KNEE TOTAL;  Surgeon: Sujatha Fleming MD;  Location: [facility];  Service: Orthopedics    TONSILLECTOMY      WISDOM TOOTH EXTRACTION         FAMILY HISTORY:  Family History   Problem Relation Age of Onset    Uterine cancer Mother 71    Throat cancer Father [de-identified]    Breast cancer additional onset Paternal Aunt 54    Breast cancer additional onset Cousin 28    No Known Problems Daughter     No Known Problems Maternal Grandmother     No Known Problems Maternal Grandfather     No Known Problems Paternal Grandmother     No Known Problems Paternal Grandfather     No Known Problems Daughter     No Known Problems Maternal Aunt     No Known Problems Maternal Aunt     No Known Problems Maternal Aunt     No Known Problems Maternal Aunt     No Known Problems Paternal Aunt     Colon cancer Maternal Uncle 79       SOCIAL HISTORY:  Social History     Tobacco Use    Smoking status: Never Smoker    Smokeless tobacco: Never Used   Substance Use Topics    Alcohol use: Yes     Frequency: 2-4 times a month     Drinks per session: 1 or 2     Binge frequency: Never    Drug use: No       MEDICATIONS:    Current Outpatient Medications:     chlorthalidone 25 mg tablet, Take 25 mg by mouth daily, Disp: , Rfl:     levothyroxine (SYNTHROID) 75 mcg tablet, Take 1 tablet by mouth every other day, Disp: , Rfl:     levothyroxine (SYNTHROID) 88 mcg tablet, Take 1 tablet by mouth every other day, Disp: , Rfl:     loratadine (CLARITIN) 10 mg tablet, Take 10 mg by mouth daily, Disp: , Rfl:     magnesium gluconate (MAGONATE) 500 mg tablet, Take 500 mg by mouth 2 (two) times a day, Disp: , Rfl:     meloxicam (MOBIC) 15 mg tablet, Take 1 tablet (15 mg total) by mouth daily as needed for moderate pain, Disp: 30 tablet, Rfl: 1    Multiple Vitamins-Minerals (MULTIVITAMIN ADULT PO), multivitamin, Disp: , Rfl:     solifenacin (VESICARE) 10 MG tablet, Take 1 tablet (10 mg total) by mouth daily, Disp: 90 tablet, Rfl: 3    thiamine (VITAMIN B1) 100 mg tablet, Take 100 mg by mouth daily, Disp: , Rfl:     VITAMIN D, ERGOCALCIFEROL, PO, Take 2,000 Units by mouth daily, Disp: , Rfl:     zinc gluconate 50 mg tablet, Take 50 mg by mouth daily, Disp: , Rfl:     ALLERGIES:  Allergies   Allergen Reactions    Actifed Cold-Allergy [Chlorpheniramine-Phenylephrine] Swelling and Rash     Throat and face swelled    Penicillins Rash    Vancomycin Other (See Comments)     redness       REVIEW OF SYSTEMS:  Pertinent items are noted in HPI  A comprehensive review of systems was negative      LABS:  HgA1c:   Lab Results   Component Value Date    HGBA1C 5 4 04/29/2021     BMP:   Lab Results   Component Value Date    GLUCOSE 89 09/30/2015    CALCIUM 8 7 05/24/2021     09/30/2015    K 3 3 (L) 05/24/2021    CO2 31 05/24/2021     05/24/2021    BUN 18 05/24/2021    CREATININE 0 92 05/24/2021         _____________________________________________________  PHYSICAL EXAMINATION:  Vital signs: /82   Pulse 58   Ht 5' 9" (1 753 m)   Wt 70 7 kg (155 lb 12 8 oz)   LMP  (LMP Unknown)   BMI 23 01 kg/m²   General: well developed and well nourished, alert, oriented times 3 and appears comfortable  Psychiatric: Normal  HEENT: Trachea Midline, No torticollis  Cardiovascular: No discernable arrhythmia  Pulmonary: No wheezing or stridor  Abdomen: No rebound or guarding  Extremities: No peripheral edema  Skin: No masses, erythema, lacerations, fluctation, ulcerations  Neurovascular: Sensation Intact to the Median, Ulnar, Radial Nerve, Motor Intact to the Median, Ulnar, Radial Nerve and Pulses Intact    MUSCULOSKELETAL EXAMINATION:  RIGHT SIDE:  Finger:  Triggering  long finger, Crepitation long finger and Nodules  long finger    _____________________________________________________  STUDIES REVIEWED:  No Studies to review      PROCEDURES PERFORMED:  Hand/upper extremity injection: R long A1  Universal Protocol:  Consent: Verbal consent obtained  Consent given by: patient  Time out: Immediately prior to procedure a "time out" was called to verify the correct patient, procedure, equipment, support staff and site/side marked as required    Timeout called at: 6/2/2021 11:09 AM   Supporting Documentation  Indications: pain and therapeutic   Procedure Details  Condition:trigger finger Location: long finger - R long A1   Preparation: Patient was prepped and draped in the usual sterile fashion  Needle size: 25 G  Ultrasound guidance: no  Approach: volar  Medications administered: 1 mL lidocaine 1 %; 6 mg betamethasone acetate-betamethasone sodium phosphate 6 (3-3) mg/mL    Patient tolerance: patient tolerated the procedure well with no immediate complications  Dressing:  Sterile dressing applied              Scribe Attestation    I,:  Norris Gotti PA-C am acting as a scribe while in the presence of the attending physician :       I,:  Darlene Vitale MD personally performed the services described in this documentation    as scribed in my presence :           Anamaria Gaston,:  Norris Gotti PA-C am acting as a scribe while in the presence of the attending physician :       I,:  Darlene Vitale MD personally performed the services described in this documentation    as scribed in my presence :

## 2021-06-03 ENCOUNTER — OFFICE VISIT (OUTPATIENT)
Dept: PHYSICAL THERAPY | Age: 66
End: 2021-06-03
Payer: MEDICARE

## 2021-06-03 DIAGNOSIS — G89.29 CHRONIC LEFT SI JOINT PAIN: Primary | ICD-10-CM

## 2021-06-03 DIAGNOSIS — M62.81 QUADRICEPS WEAKNESS: ICD-10-CM

## 2021-06-03 DIAGNOSIS — M53.3 CHRONIC LEFT SI JOINT PAIN: Primary | ICD-10-CM

## 2021-06-03 PROCEDURE — 97110 THERAPEUTIC EXERCISES: CPT

## 2021-06-03 NOTE — PROGRESS NOTES
Daily Note     Today's date: 6/3/2021  Patient name: Loc Walker  : 1955  MRN: [id number]  Referring provider: Alfredo Babb MD  Dx:   Encounter Diagnosis     ICD-10-CM    1  Chronic left SI joint pain  M53 3     G89 29    2  Quadriceps weakness  M62 81                   Subjective: Pt felt a improvement after last tx  pt has not had any LBP with sitting or twisting since last visit  Pt would like to repeat those exercises at home as well as at PT      Objective: See treatment diary below      Assessment: Tolerated treatment well   Continued with LB extensions  Adding in LE strengthening to aid with both strength and balance deficits pt feels she has  Added in Cybex and D/C standing SLR to maximize leg strengthening  Using rises from chair to strengthen LE to aid with LBP pt gets during transfer/change of position  Using control/eccentric on steps to improve that area as it main functional c/o by patient  Able to do 8 in step for all step activities today  Will adjust ex as pt pain dictates      Plan: Cont PT     Precautions: scoliosis      Manuals 5/27 6/1 6/3 5/20 5/25                                   Neuro Re-Ed                SLS foam        Tandem walking foam                                        Ther Ex                Current symptoms        Repeated extension 3x 10 light mobs between 3x20   Light mobs between to increase ROM 3x20 light mobs between to increase ROM 3x20 light mobs between 3x20 light mobs   Prone single and alt UE/LE lifts  2x10 ea      TM walk 15 min 15 min x15 x15 x15   LTR x10       bridges x20 x20 x20 x30 x30   Sup alt arms/legs 2# x30 2# x30 2# x30 2# x20 single &alt 2# x20   Bridges with leg ext x20 2# x30 2# 2x15 2x10 2x10   T/A with ball  3x10 3x10 3x10 3x10   SLR x 3 all 3# x30 3# x30 2# x30- D/c 2 5# x30 3# 3x30   Heel raises x30 x30 x30- D/C x30 x30   standing ham curls 3# x30 3# x30 2# x30 2 5# x30 3# x30   Rises from chair 2 pads 2x15 1 pad 2 x15 1 pads 2x15 2 pads 2x10 2 pads 2x10   Step ups  8 in x20 no hands  8 in x20 no hands 8in x30 no hands  8in 2x10 no support  8in 2x10 no support   Lateral ecc lower  6in 2x10 cg support  8in 2x10 cg support 8in x20 cg support  6in 2x10 cg support 6in 2x10 cg support   Step downs  6in 2x10 cg support  8in 2x10 cg support 8 in x20 cg support 6 in 2x10 cg support 6in 2x10 cg support           SAQ        Wall squats   NT     Leg press   #60 3x10     Knee extension   # 25# 2x10     Hip abd   #35 2x15     Ham curls   40# 3x10     Cybex toe press   # 50 3x10                                             Ther Activity                        Gait Training                        Modalities                        Progress as able

## 2021-06-08 ENCOUNTER — OFFICE VISIT (OUTPATIENT)
Dept: PHYSICAL THERAPY | Age: 66
End: 2021-06-08
Payer: MEDICARE

## 2021-06-08 DIAGNOSIS — M53.3 CHRONIC LEFT SI JOINT PAIN: Primary | ICD-10-CM

## 2021-06-08 DIAGNOSIS — G89.29 CHRONIC LEFT SI JOINT PAIN: Primary | ICD-10-CM

## 2021-06-08 DIAGNOSIS — M62.81 QUADRICEPS WEAKNESS: ICD-10-CM

## 2021-06-08 PROCEDURE — 97110 THERAPEUTIC EXERCISES: CPT

## 2021-06-08 PROCEDURE — 97112 NEUROMUSCULAR REEDUCATION: CPT

## 2021-06-08 NOTE — PROGRESS NOTES
Daily Note     Today's date: 2021  Patient name: Lynette Goddard  : 1955  MRN: [id number]  Referring provider: Tatianna Manuel MD  Dx:   Encounter Diagnosis     ICD-10-CM    1  Chronic left SI joint pain  M53 3     G89 29    2  Quadriceps weakness  M62 81                   Subjective: Pt felt a improvement last week, pt has not had any LBP with sitting or twisting since last visit  Pt encouraged with progress and want to get stronger and improve her balance      Objective: See treatment diary below  FOTO taken       Assessment: Tolerated treatment well   Continued with LB extensions  Adding in LE strengthening to aid with both strength and balance deficits pt feels she has  Added in Cybex and D/C standing SLR to maximize leg strengthening  Using rises from chair to strengthen LE to aid with LBP pt gets during transfer/change of position  Using control/eccentric on steps to improve that area as it main functional c/o by patient  Able to do 8 in step for all step activities today  Will adjust ex as pt pain dictates      Plan: Cont PT     Precautions: scoliosis      Manuals 5/27 6/1 6/3 6/8 5/25                                   Neuro Re-Ed                SLS foam        Tandem walking foam    Floor fwd/bwd x3                                     Ther Ex                Current symptoms        Repeated extension 3x 10 light mobs between 3x20   Light mobs between to increase ROM 3x20 light mobs between to increase ROM 3x20 light mobs between 3x20 light mobs   Prone single and alt UE/LE lifts  2x10 ea 2x10 1#2x20    TM walk 15 min 15 min x15 x15 x15   LTR x10       bridges x20 x20 x20 x30 x30   Sup alt arms/legs 2# x30 2# x30 2# x30 2 5# x20 single &alt 2# x20   Bridges with leg ext x20 2# x30 2# 2x15 2x10 2x10   T/A with ball  3x10 3x10 3x10 3x10   SLR x 3 all 3# x30 3# x30 2# x30- D/c 2 5# x30 3# 3x30   Heel raises x30 x30 x30- D/C x30 x30   standing ham curls 3# x30 3# x30 2# x30 2 5# x30 3# x30 Rises from chair 2 pads 2x15 1 pad 2 x15 1 pads 2x15 1 pads 2x15 2 pads 2x10   Step ups  8 in x20 no hands  8 in x20 no hands 8in x30 no hands  8in 2x10 no support  8in 2x10 no support   Lateral ecc lower  6in 2x10 cg support  8in 2x10 cg support 8in x20 cg support  8in 2x10 cg support 6in 2x10 cg support   Step downs  6in 2x10 cg support  8in 2x10 cg support 8 in x20 cg support 8 in 2x10 cg support 6in 2x10 cg support           SAQ        Wall squats   NT     Leg press   #60 3x10 60# 3x10    Knee extension   # 25# 2x10 25# 3x10    Hip abd   #35 2x15 35# 2x15    Ham curls   40# 3x10 40# 3x10    Cybex toe press   # 50 3x10                                             Ther Activity                        Gait Training                        Modalities                        Progress as able

## 2021-06-10 ENCOUNTER — OFFICE VISIT (OUTPATIENT)
Dept: PHYSICAL THERAPY | Age: 66
End: 2021-06-10
Payer: MEDICARE

## 2021-06-10 DIAGNOSIS — G89.29 CHRONIC LEFT SI JOINT PAIN: Primary | ICD-10-CM

## 2021-06-10 DIAGNOSIS — M53.3 CHRONIC LEFT SI JOINT PAIN: Primary | ICD-10-CM

## 2021-06-10 DIAGNOSIS — M62.81 QUADRICEPS WEAKNESS: ICD-10-CM

## 2021-06-10 PROCEDURE — 97110 THERAPEUTIC EXERCISES: CPT

## 2021-06-10 NOTE — PROGRESS NOTES
Daily Note     Today's date: 6/10/2021  Patient name: Jose Watkins  : 1955  MRN: [id number]  Referring provider: Charlette Wilkins MD  Dx:   Encounter Diagnosis     ICD-10-CM    1  Chronic left SI joint pain  M53 3     G89 29    2  Quadriceps weakness  M62 81                   Subjective: Pt feels improvement in LBP issues but still with pain at times with rising from cahir and getting into her car      Objective: See treatment diary below  FOTO taken       Assessment: Tolerated treatment well   Continued with LB extensions  Adding in LE strengthening to aid with both strength and balance deficits pt feels she has  Added in Cybex and D/C standing SLR to maximize leg strengthening  Using rises from chair to strengthen LE to aid with LBP pt gets during transfer/change of position  Using control/eccentric on steps to improve that area as it main functional c/o by patient and pt wants to go step over step  Altered techniue with rising from chair as to decrease extension aspect  Also altered transfer in car by pt sitting and lifting leg as opposed to rotating back to get in      Plan: Cont PT     Precautions: scoliosis      Manuals 5/27 6/1 6/3 6/8 6/10                                   Neuro Re-Ed                SLS foam        Tandem walking foam    Floor fwd/bwd x3  Floor fwd/ bwd x3        Bala x3                           Ther Ex                Current symptoms        Repeated extension 3x 10 light mobs between 3x20   Light mobs between to increase ROM 3x20 light mobs between to increase ROM 3x20 light mobs between 3x20 light mobs   Prone single and alt UE/LE lifts  2x10 ea 2x10 1#2x20 1 5# 2x20   TM walk 15 min 15 min x15 x15 x15   LTR x10       bridges x20 x20 x20 x30 x30   Sup alt arms/legs 2# x30 2# x30 2# x30 2 5# x20 single &alt 2 5# x20 single & alt   Bridges with leg ext x20 2# x30 2# 2x15 2x10 2x10   T/A with ball  3x10 3x10 3x10 3x10   SLR x 3 all 3# x30 3# x30 2# x30- D/c 2    Heel raises x30 x30 x30- D/C     standing ham curls 3# x30 3# x30 2# x30 2    Rises from chair 2 pads 2x15 1 pad 2 x15 1 pads 2x15 1 pads 2x15 21pads 2x15   Step ups  8 in x20 no hands  8 in x20 no hands 8in x30 no hands  8in 2x10 no support  8in 2x10 no support   Lateral ecc lower  6in 2x10 cg support  8in 2x10 cg support 8in x20 cg support  8in 2x10 cg support 8in 2x10 cg support   Step downs  6in 2x10 cg support  8in 2x10 cg support 8 in x20 cg support 8 in 2x10 cg support 8in 2x10 cg support           SAQ        Wall squats   NT     Leg press   #60 3x10 60# 3x10 70# 3x10   Knee extension   # 25# 2x10 25# 3x10 25# 3x10   Hip abd   #35 2x15 35# 2x15 40# 3x10   Ham curls   40# 3x10 40# 3x10 40# 3x10   Cybex toe press   # 50 3x10  70# 3x10                                           Ther Activity                        Gait Training                        Modalities                        Progress as able

## 2021-06-15 ENCOUNTER — OFFICE VISIT (OUTPATIENT)
Dept: PHYSICAL THERAPY | Age: 66
End: 2021-06-15
Payer: MEDICARE

## 2021-06-15 DIAGNOSIS — G89.29 CHRONIC LEFT SI JOINT PAIN: Primary | ICD-10-CM

## 2021-06-15 DIAGNOSIS — M53.3 CHRONIC LEFT SI JOINT PAIN: Primary | ICD-10-CM

## 2021-06-15 DIAGNOSIS — M62.81 QUADRICEPS WEAKNESS: ICD-10-CM

## 2021-06-15 PROCEDURE — 97110 THERAPEUTIC EXERCISES: CPT

## 2021-06-15 NOTE — PROGRESS NOTES
Daily Note     Today's date: 6/15/2021  Patient name: Jose Watkins  : 1955  MRN: [id number]  Referring provider: Charlette Wilkins MD  Dx:   Encounter Diagnosis     ICD-10-CM    1  Chronic left SI joint pain  M53 3     G89 29    2  Quadriceps weakness  M62 81                   Subjective: Pt feels improved pain levels in low back and reports using a topical cream for pain relief  Objective: See treatment diary below  Assessment: Tolerated treatment well  Pt has good response with repeated extensions and mobs  Pt demonstrates good tolerance to LE strengthening, notes some difficulty with quad control on steps  Pt able to perform TE with no increased back pain  Pt would benefit from continued therapy to improve LE strength and functional deficits and continue decreasing back pain      Plan: Cont PT     Precautions: scoliosis      Manuals 6/15  6/3 6/8 6/10                                   Neuro Re-Ed                SLS foam        Tandem walking foam Fw/bw on floor x3     Floor fwd/bwd x3  Floor fwd/ bwd x3    Bala x3    Bala x3                           Ther Ex                Current symptoms        Repeated extension 3x20 light mobs, HG  3x20 light mobs between to increase ROM 3x20 light mobs between 3x20 light mobs   Prone single and alt UE/LE lifts 1 5# 2x20  2x10 1#2x20 1 5# 2x20   TM walk x15  x15 x15 x15   LTR        bridges x30  x20 x30 x30   Sup alt arms/legs 3# x20  2# x30 2 5# x20 single &alt 2 5# x20 single & alt   Bridges with leg ext 2x10  2# 2x15 2x10 2x10   T/A with ball 3x10  3x10 3x10 3x10   SLR x 3 all   2# x30- D/c 2    Heel raises   x30- D/C     standing ham curls   2# x30 2    Rises from chair 1 [id number]  1 pads 2x15 1 pads 2x15 21pads 2x15   Step ups 8 in 2x10 no support  8in x30 no hands  8in 2x10 no support  8in 2x10 no support   Lateral ecc lower 8 in 2x10 HHA support  8in x20 cg support  8in 2x10 cg support 8in 2x10 cg support   Step downs 8 in 2x10 cg support  8 in x20 cg support 8 in 2x10 cg support 8in 2x10 cg support           SAQ        Wall squats   NT     Leg press 70# 3x10  #60 3x10 60# 3x10 70# 3x10   Knee extension 25# 3x10  # 25# 2x10 25# 3x10 25# 3x10   Hip abd *40# 3x10  #35 2x15 35# 2x15 40# 3x10   Ham curls *40# 3x10  40# 3x10 40# 3x10 40# 3x10   Cybex toe press np  # 50 3x10  70# 3x10                                           Ther Activity                        Gait Training                        Modalities                        Progress as able

## 2021-06-17 ENCOUNTER — TELEPHONE (OUTPATIENT)
Dept: PAIN MEDICINE | Facility: CLINIC | Age: 66
End: 2021-06-17

## 2021-06-17 ENCOUNTER — APPOINTMENT (OUTPATIENT)
Dept: PHYSICAL THERAPY | Age: 66
End: 2021-06-17
Payer: MEDICARE

## 2021-06-17 DIAGNOSIS — M46.1 SACROILIITIS (HCC): Primary | ICD-10-CM

## 2021-06-17 NOTE — TELEPHONE ENCOUNTER
Patient called in stating that PT isnt really working and at times her pain level can go to 7/10   Pt is stating weakness when walking and would like to schedule her SIJ        cb [phone]

## 2021-06-17 NOTE — TELEPHONE ENCOUNTER
Patient contacted and scheduled for left SI joint injection  All pre procedure instructions were given to patient   Nothing to eat or drink for 1 hour prior  Loose fitting clothing   NSAIDS, ANTICOAG'S OKAY   Denies Antibx      Needs    Patient directed to call the office if becomes sick, as we will most likely reschedule       Insurance auth received but is not a guarantee of payment per your insurance company's authorization disclaimer and it is your responsibility to verify your benefits     COVID -19 screening complete

## 2021-06-21 ENCOUNTER — HOSPITAL ENCOUNTER (OUTPATIENT)
Dept: RADIOLOGY | Facility: HOSPITAL | Age: 66
Discharge: HOME | End: 2021-06-21
Payer: MEDICARE

## 2021-06-21 DIAGNOSIS — I77.810 ASCENDING AORTA DILATATION (HCC): ICD-10-CM

## 2021-06-21 PROCEDURE — 71275 CT ANGIOGRAPHY CHEST: CPT

## 2021-06-21 PROCEDURE — G1004 CDSM NDSC: HCPCS

## 2021-06-21 RX ADMIN — IOHEXOL 100 ML: 350 INJECTION, SOLUTION INTRAVENOUS at 07:43

## 2021-06-22 ENCOUNTER — EVALUATION (OUTPATIENT)
Dept: PHYSICAL THERAPY | Age: 66
End: 2021-06-22
Payer: MEDICARE

## 2021-06-22 DIAGNOSIS — M62.81 QUADRICEPS WEAKNESS: ICD-10-CM

## 2021-06-22 DIAGNOSIS — M53.3 CHRONIC LEFT SI JOINT PAIN: Primary | ICD-10-CM

## 2021-06-22 DIAGNOSIS — G89.29 CHRONIC LEFT SI JOINT PAIN: Primary | ICD-10-CM

## 2021-06-22 PROCEDURE — 97140 MANUAL THERAPY 1/> REGIONS: CPT | Performed by: PHYSICAL THERAPIST

## 2021-06-22 PROCEDURE — 97110 THERAPEUTIC EXERCISES: CPT | Performed by: PHYSICAL THERAPIST

## 2021-06-22 NOTE — PROGRESS NOTES
PT Discharge    Today's date: 2021  Patient name: Mikala Patrick  : 1955  MRN: [id number]  Referring provider: Leni Quiñones MD  Dx:   Encounter Diagnosis     ICD-10-CM    1  Chronic left SI joint pain  M53 3     G89 29    2  Quadriceps weakness  M62 81                   Assessment  Assessment details: Patient seen for PT  Discharge assessment  Patient is independent with HEP and has met all goals, except she still has moments where she has significant SI pain  Patient's core strength, spine strength and LE strength has progressed significantly since she's started  Patient is ready to discharge to [facility]  Impairments: abnormal gait, abnormal or restricted ROM, activity intolerance, impaired balance, impaired physical strength, lacks appropriate home exercise program, pain with function, poor posture  and poor body mechanics  Functional limitations: Patient reports increased pain with transitions- sit<>stand, getting in/out of bed and with getting in/out of the car  Understanding of Dx/Px/POC: good   Prognosis: good    Goals  Impairment Goals to be met within 4 weeks  - Decrease pain to 0/10 with sit<>stand met  - Improve ROMby 5-10 degrees extension met  - Increase strength to 5/5 throughout B LEs met  - Increase lumbar lordosis with sitting posture  met    Functional Goals to be met within 4-6 weeks     - Return to Prior Level of Function met  - Increase Functional Status Measure to: expected met  - Patient will be independent with HEP met  - Patient to be able to perform sit<>stand consistently met         Plan  Patient would benefit from: skilled physical therapy  Planned modality interventions: cryotherapy and thermotherapy: hydrocollator packs  Planned therapy interventions: manual therapy, joint mobilization, abdominal trunk stabilization, balance, body mechanics training, neuromuscular re-education, patient education, postural training, strengthening, stretching, therapeutic activities, therapeutic exercise, home exercise program and gait training        Subjective Evaluation    History of Present Illness  Mechanism of injury: Patient reports that her pain is much better than last time (last week), did call for an appt for the injection as she still has SI pain and is unsure what causes it  Patient does still note her pain will increase to 10/10 at times and has no apparent reason or cause for her pain  Patient does feel stronger, feels therapy has helped immensely and has been keeping up with her exercises at home and will continue them  Patient will be discharging today as she will be on vacation  Pain  Current pain ratin  At best pain ratin  At worst pain rating: 10  Location: L SI joint  Quality: sharp, dull ache and tight  Aggravating factors: sitting, standing and walking  Progression: no change    Social Support    Employment status: working    Diagnostic Tests  X-ray: normal  Treatments  Previous treatment: physical therapy  Patient Goals  Patient goals for therapy: decreased pain, increased motion, increased strength, return to sport/leisure activities and improved balance          Objective     Concurrent Complaints  Positive for disturbed sleep  Negative for night pain, bladder dysfunction, bowel dysfunction and saddle (S4) numbness    Postural Observations  Seated posture: good  Standing posture: good  Correction of posture: makes symptoms better        Neurological Testing     Sensation     Lumbar   Left   Intact: light touch    Right   Intact: light touch    Reflexes   Left   Clonus sign: negative    Right   Clonus sign: negative    Additional Neurological Details  Diminished sensation at B mukesh-lateral ankles, and L lateral proximal thigh      Active Range of Motion     Lumbar   Flexion: 85 degrees   Extension: 30 degrees   Left lateral flexion: 25 degrees       Right lateral flexion: 20 degrees   Left rotation: 25 degrees   Right rotation: 25 degrees   Left Hip Flexion: 100 degrees     Right Hip   Flexion: 100 degrees   Left Ankle/Foot   Normal active range of motion    Right Ankle/Foot   Normal active range of motion  Mechanical Assessment    Cervical      Thoracic      Lumbar    Standing extension: repeated movements  Pain location: centralized  Pain intensity: better  Pain level: decreased    Strength/Myotome Testing     Lumbar   Left   Normal strength    Right   Normal strength    Left Hip   Normal muscle strength  Planes of Motion   Flexion: 5  Abduction: 5    Right Hip   Normal muscle strength  Planes of Motion   Flexion: 5  Abduction: 5    Left Knee   Normal strength    Right Knee   Normal strength    Left Ankle/Foot   Normal strength    Right Ankle/Foot   Normal strength    Ambulation     Observational Gait   Gait: antalgic   Decreased walking speed and stride length                Precautions: scoliosis      Manuals 6/15 6/22 6/3 6/8 6/10                                   Neuro Re-Ed                SLS foam        Tandem walking foam Fw/bw on floor x3     Floor fwd/bwd x3  Floor fwd/ bwd x3    Bala x3    Bala x3                           Ther Ex                Current symptoms        Repeated extension 3x20 light mobs, HG 3x20 light mobs 3x20 light mobs between to increase ROM 3x20 light mobs between 3x20 light mobs   Prone single and alt UE/LE lifts 1 5# 2x20 1 5# 2x20 2x10 1#2x20 1 5# 2x20   TM walk x15 x10' x15 x15 x15   LTR        bridges x30 30x x20 x30 x30   Sup alt arms/legs 3# x20 3# 20x 2# x30 2 5# x20 single &alt 2 5# x20 single & alt   Bridges with leg ext 2x10 2x10 2# 2x15 2x10 2x10   T/A with ball 3x10 3x10 3x10 3x10 3x10   SLR x 3 all   2# x30- D/c 2    Heel raises   x30- D/C     standing ham curls   2# x30 2    Rises from chair 1 [id number] 1 pad 2x15 1 pads 2x15 1 pads 2x15 21pads 2x15   Step ups 8 in 2x10 no support 8" 2x10 8in x30 no hands  8in 2x10 no support  8in 2x10 no support   Lateral ecc lower 8 in 2x10 HHA support 8" 2x10 8in x20 cg support  8in 2x10 cg support 8in 2x10 cg support   Step downs 8 in 2x10 cg support 8" 2x10 CG support 8 in x20 cg support 8 in 2x10 cg support 8in 2x10 cg support           SAQ        Wall squats   NT     Leg press 70# 3x10 Patient preferring to hold off as she thinks this may be causing her SI pain to worsen #60 3x10 60# 3x10 70# 3x10   Knee extension 25# 3x10 - # 25# 2x10 25# 3x10 25# 3x10   Hip abd *40# 3x10 - #35 2x15 35# 2x15 40# 3x10   Ham curls *40# 3x10 - 40# 3x10 40# 3x10 40# 3x10   Cybex toe press np  # 50 3x10  70# 3x10                                           Ther Activity                        Gait Training                        Modalities                        Progress as able

## 2021-06-24 ENCOUNTER — APPOINTMENT (OUTPATIENT)
Dept: PHYSICAL THERAPY | Age: 66
End: 2021-06-24
Payer: MEDICARE

## 2021-07-16 ENCOUNTER — OFFICE VISIT (OUTPATIENT)
Dept: OBGYN CLINIC | Facility: HOSPITAL | Age: 66
End: 2021-07-16
Payer: MEDICARE

## 2021-07-16 VITALS
BODY MASS INDEX: 23.02 KG/M2 | HEIGHT: 69 IN | WEIGHT: 155.4 LBS | SYSTOLIC BLOOD PRESSURE: 109 MMHG | HEART RATE: 71 BPM | DIASTOLIC BLOOD PRESSURE: 69 MMHG

## 2021-07-16 DIAGNOSIS — M65.331 TRIGGER MIDDLE FINGER OF RIGHT HAND: Primary | ICD-10-CM

## 2021-07-16 PROCEDURE — 20550 NJX 1 TENDON SHEATH/LIGAMENT: CPT | Performed by: ORTHOPAEDIC SURGERY

## 2021-07-16 PROCEDURE — 99213 OFFICE O/P EST LOW 20 MIN: CPT | Performed by: ORTHOPAEDIC SURGERY

## 2021-07-16 RX ORDER — BETAMETHASONE SODIUM PHOSPHATE AND BETAMETHASONE ACETATE 3; 3 MG/ML; MG/ML
6 INJECTION, SUSPENSION INTRA-ARTICULAR; INTRALESIONAL; INTRAMUSCULAR; SOFT TISSUE
Status: COMPLETED | OUTPATIENT
Start: 2021-07-16 | End: 2021-07-16

## 2021-07-16 RX ORDER — LIDOCAINE HYDROCHLORIDE 10 MG/ML
1 INJECTION, SOLUTION INFILTRATION; PERINEURAL
Status: COMPLETED | OUTPATIENT
Start: 2021-07-16 | End: 2021-07-16

## 2021-07-16 RX ADMIN — BETAMETHASONE SODIUM PHOSPHATE AND BETAMETHASONE ACETATE 6 MG: 3; 3 INJECTION, SUSPENSION INTRA-ARTICULAR; INTRALESIONAL; INTRAMUSCULAR; SOFT TISSUE at 14:56

## 2021-07-16 RX ADMIN — LIDOCAINE HYDROCHLORIDE 1 ML: 10 INJECTION, SOLUTION INFILTRATION; PERINEURAL at 14:56

## 2021-07-16 NOTE — PROGRESS NOTES
ASSESSMENT/PLAN:    Assessment:   Trigger Finger  right  long finger    Plan:   steroid injections  To the right long finger  -   Second steroid injection provided in the office today    Follow Up:  6  week(s)    To Do Next Visit:   assess response to treatment      _____________________________________________________  CHIEF COMPLAINT:  Chief Complaint   Patient presents with    Right Middle Finger - Follow-up, Triggering     1st inj given 6/2/21         SUBJECTIVE:  Hetal Coreas is a 74 y o  female who presents for follow up regarding Trigger Finger  right  long finger  Since last visit, Hetal Coreas has tried steroid injections without relief  Today there isPain and discomfort localized into the right hand ring finger  Catching, clicking, and locking without numbness, tingling, fevers, chills, constitutional symptoms  Previous injection provided some relief    PAST MEDICAL HISTORY:  Past Medical History:   Diagnosis Date    Arthritis     BRCA1 negative     BRCA2 negative     [facility]2014    radiation    Disease of thyroid gland     History of radiation therapy 2014    breast cancer    Hypothyroid     Knee pain, right     Limb alert care status     no BP's /IV's right side    Mitral valve prolapse     Numbness of anterior thigh     left and tingling    Seasonal allergies     dust,mold,trees    Urinary incontinence     Varicose veins of both lower extremities     Wears glasses        PAST SURGICAL HISTORY:  Past Surgical History:   Procedure Laterality Date    BREAST BIOPSY Left 01/16/2002    BREAST BIOPSY Left 08/05/2005    high risk    BREAST BIOPSY Right 12/26/2013    positive    BREAST EXCISIONAL BIOPSY Left 04/06/2006    high risk    BREAST EXCISIONAL BIOPSY Left 10/13/2006    BREAST LUMPECTOMY Right 01/17/2014    malignant    BREAST SURGERY Right     lumpectomy    BUNIONECTOMY Bilateral     COLONOSCOPY      EXPLORATORY LAPAROTOMY      "for fertility"    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    KNEE ARTHROSCOPY Right     NE TOTAL KNEE ARTHROPLASTY Right 1/20/2017    Procedure: ARTHROPLASTY KNEE TOTAL;  Surgeon: Steven Duque MD;  Location: [facility];  Service: Orthopedics    TONSILLECTOMY      WISDOM TOOTH EXTRACTION         FAMILY HISTORY:  Family History   Problem Relation Age of Onset    Uterine cancer Mother 71    Throat cancer Father [de-identified]    Breast cancer additional onset Paternal Aunt [address]    Breast cancer additional onset Cousin 28    No Known Problems Daughter     No Known Problems Maternal Grandmother     No Known Problems Maternal Grandfather     No Known Problems Paternal Grandmother     No Known Problems Paternal Grandfather     No Known Problems Daughter     No Known Problems Maternal Aunt     No Known Problems Maternal Aunt     No Known Problems Maternal Aunt     No Known Problems Maternal Aunt     No Known Problems Paternal Aunt     Colon cancer Maternal Uncle 79       SOCIAL HISTORY:  Social History     Tobacco Use    Smoking status: Never Smoker    Smokeless tobacco: Never Used   Vaping Use    Vaping Use: Never used   Substance Use Topics    Alcohol use:  Yes    Drug use: No       MEDICATIONS:    Current Outpatient Medications:     chlorthalidone 25 mg tablet, Take 25 mg by mouth daily, Disp: , Rfl:     levothyroxine (SYNTHROID) 75 mcg tablet, Take 1 tablet by mouth every other day, Disp: , Rfl:     levothyroxine (SYNTHROID) 88 mcg tablet, Take 1 tablet by mouth every other day, Disp: , Rfl:     loratadine (CLARITIN) 10 mg tablet, Take 10 mg by mouth daily, Disp: , Rfl:     magnesium gluconate (MAGONATE) 500 mg tablet, Take 500 mg by mouth 2 (two) times a day, Disp: , Rfl:     meloxicam (MOBIC) 15 mg tablet, Take 1 tablet (15 mg total) by mouth daily as needed for moderate pain, Disp: 30 tablet, Rfl: 1    Multiple Vitamins-Minerals (MULTIVITAMIN ADULT PO), multivitamin, Disp: , Rfl:     solifenacin (VESICARE) 10 MG tablet, Take 1 tablet (10 mg total) by mouth daily, Disp: 90 tablet, Rfl: 3    thiamine (VITAMIN B1) 100 mg tablet, Take 100 mg by mouth daily, Disp: , Rfl:     VITAMIN D, ERGOCALCIFEROL, PO, Take 2,000 Units by mouth daily, Disp: , Rfl:     zinc gluconate 50 mg tablet, Take 50 mg by mouth daily, Disp: , Rfl:     ALLERGIES:  Allergies   Allergen Reactions    Actifed Cold-Allergy [Chlorpheniramine-Phenylephrine] Swelling and Rash     Throat and face swelled    Penicillins Rash    Vancomycin Other (See Comments)     redness       REVIEW OF SYSTEMS:  Pertinent items are noted in HPI  A comprehensive review of systems was negative  LABS:  HgA1c:   Lab Results   Component Value Date    HGBA1C 5 4 04/29/2021     BMP:   Lab Results   Component Value Date    GLUCOSE 89 09/30/2015    CALCIUM 8 7 05/24/2021     09/30/2015    K 3 3 (L) 05/24/2021    CO2 31 05/24/2021     05/24/2021    BUN 18 05/24/2021    CREATININE 0 92 05/24/2021           _____________________________________________________  PHYSICAL EXAMINATION:  Vital signs: /69   Pulse 71   Ht 5' 9" (1 753 m)   Wt 70 5 kg (155 lb 6 4 oz)   LMP  (LMP Unknown)   BMI 22 95 kg/m²   General: well developed and well nourished, alert, oriented times 3 and appears comfortable  Psychiatric: Normal  HEENT: Trachea Midline, No torticollis  Cardiovascular: No discernable arrhythmia  Pulmonary: No wheezing or stridor  Abdomen: No rebound or guarding  Extremities: No peripheral edema  Skin: No masses, erythema, lacerations, fluctation, ulcerations  Neurovascular: Sensation Intact to the Median, Ulnar, Radial Nerve, Motor Intact to the Median, Ulnar, Radial Nerve and Pulses Intact    MUSCULOSKELETAL EXAMINATION:  Right hand long finger positive nodule, crepitation, full range of motion with some intrinsic tightness noted    No evidence of instability, extensor tendons are intact     _____________________________________________________  STUDIES REVIEWED:  No Studies to review      PROCEDURES PERFORMED:  Hand/upper extremity injection: R long A1  Universal Protocol:  Consent: Verbal consent obtained  Written consent not obtained    Consent given by: patient    Supporting Documentation  Indications: pain   Procedure Details  Condition:trigger finger Location: long finger - R long A1   Needle size: 25 G  Ultrasound guidance: no  Medications administered: 1 mL lidocaine 1 %; 6 mg betamethasone acetate-betamethasone sodium phosphate 6 (3-3) mg/mL    Patient tolerance: patient tolerated the procedure well with no immediate complications  Dressing:  Sterile dressing applied                Scribe Attestation    I,:  Angel Chandler PA-C am acting as a scribe while in the presence of the attending physician :       I,:  Jeremi Neville MD personally performed the services described in this documentation    as scribed in my presence :           Scribe Attestation    I,:  Angel Chandler PA-C am acting as a scribe while in the presence of the attending physician :       I,:  Jeremi Neville MD personally performed the services described in this documentation    as scribed in my presence :

## 2021-08-02 ENCOUNTER — OFFICE VISIT (OUTPATIENT)
Dept: PAIN MEDICINE | Facility: CLINIC | Age: 66
End: 2021-08-02
Payer: MEDICARE

## 2021-08-02 VITALS
WEIGHT: 154 LBS | SYSTOLIC BLOOD PRESSURE: 101 MMHG | HEART RATE: 66 BPM | DIASTOLIC BLOOD PRESSURE: 64 MMHG | HEIGHT: 69 IN | BODY MASS INDEX: 22.81 KG/M2

## 2021-08-02 DIAGNOSIS — M54.16 LUMBAR RADICULOPATHY: ICD-10-CM

## 2021-08-02 DIAGNOSIS — M48.061 SPINAL STENOSIS OF LUMBAR REGION, UNSPECIFIED WHETHER NEUROGENIC CLAUDICATION PRESENT: ICD-10-CM

## 2021-08-02 DIAGNOSIS — M43.10 ACQUIRED SPONDYLOLISTHESIS: ICD-10-CM

## 2021-08-02 DIAGNOSIS — M47.816 LUMBAR SPONDYLOSIS: ICD-10-CM

## 2021-08-02 DIAGNOSIS — M46.1 SACROILIITIS (HCC): Primary | ICD-10-CM

## 2021-08-02 DIAGNOSIS — M51.36 DDD (DEGENERATIVE DISC DISEASE), LUMBAR: ICD-10-CM

## 2021-08-02 PROCEDURE — 99213 OFFICE O/P EST LOW 20 MIN: CPT | Performed by: NURSE PRACTITIONER

## 2021-08-02 NOTE — PROGRESS NOTES
Assessment:  1  Sacroiliitis ([address] )    2  Lumbar radiculopathy    3  Acquired spondylolisthesis    4  Lumbar spondylosis    5  DDD (degenerative disc disease), lumbar    6  Spinal stenosis of lumbar region, unspecified whether neurogenic claudication present        Plan:  1  Should the patient's pain return, would be a candidate for a left SI joint injection  2  Patient may continue meloxicam 15 mg p r n  as prescribed  She is using this sparingly at this point does not require refills  3  Patient will continue with her home exercise program as taught to her by physical therapy  4  Patient will follow up on a p r n  basis at this time     M*Provade software was used to dictate this note  It may contain errors with dictating incorrect words or incorrect spelling  Please contact the provider directly with any questions  History of Present Illness: The patient is a 74 y o  female  With a history of an ORIF of the left femur last seen on 5/19/21 who presents for a follow up office visit in regards to chronic left-sided low back pain  The patient denies any radicular symptoms into the lower extremities other than baseline left thigh numbness, bowel or bladder incontinence or saddle anesthesia  The patient states she has chronic left sacroiliac joint pain  She did participate in physical therapy recently with significant improvement of her pain  She was prescribed meloxicam 15 mg p r n  however is no longer needing to take this medication  She rates her pain a 2/10 on the numeric pain rating scale  She states her pain is occasional in nature and follows no particular pattern throughout the day  She characterizes the pain as sharp    I have personally reviewed and/or updated the patient's past medical history, past surgical history, family history, social history, current medications, allergies, and vital signs today         Review of Systems:    Review of Systems      Past Medical History:   Diagnosis Date    Arthritis     BRCA1 negative     BRCA2 negative     Breast cancer ([facility] ) 01/17/2014    radiation    Disease of thyroid gland     History of radiation therapy 2014    breast cancer    Hypothyroid     Knee pain, right     Limb alert care status     no BP's /IV's right side    Mitral valve prolapse     Numbness of anterior thigh     left and tingling    Seasonal allergies     dust,mold,trees    Urinary incontinence     Varicose veins of both lower extremities     Wears glasses        Past Surgical History:   Procedure Laterality Date    BREAST BIOPSY Left 01/16/2002    BREAST BIOPSY Left 08/05/2005    high risk    BREAST BIOPSY Right 12/26/2013    positive    BREAST EXCISIONAL BIOPSY Left 04/06/2006    high risk    BREAST EXCISIONAL BIOPSY Left 10/13/2006    BREAST LUMPECTOMY Right 01/17/2014    malignant    BREAST SURGERY Right     lumpectomy    BUNIONECTOMY Bilateral     COLONOSCOPY      EXPLORATORY LAPAROTOMY      "for fertility"    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    KNEE ARTHROSCOPY Right     NM TOTAL KNEE ARTHROPLASTY Right 1/20/2017    Procedure: ARTHROPLASTY KNEE TOTAL;  Surgeon: Ann Tomlin MD;  Location: [facility];  Service: Orthopedics    TONSILLECTOMY      WISDOM TOOTH EXTRACTION         Family History   Problem Relation Age of Onset    Uterine cancer Mother 71    Throat cancer Father [de-identified]    Breast cancer additional onset Paternal Aunt 54    Breast cancer additional onset Cousin 28    No Known Problems Daughter     No Known Problems Maternal Grandmother     No Known Problems Maternal Grandfather     No Known Problems Paternal Grandmother     No Known Problems Paternal Grandfather     No Known Problems Daughter     No Known Problems Maternal Aunt     No Known Problems Maternal Aunt     No Known Problems Maternal Aunt     No Known Problems Maternal Aunt     No Known Problems Paternal Aunt     Colon cancer Maternal Uncle 79       Social History Occupational History    Not on file   Tobacco Use    Smoking status: Never Smoker    Smokeless tobacco: Never Used   Vaping Use    Vaping Use: Never used   Substance and Sexual Activity    Alcohol use: Yes    Drug use: No    Sexual activity: Not Currently     Birth control/protection: Abstinence, Post-menopausal         Current Outpatient Medications:     chlorthalidone 25 mg tablet, Take 25 mg by mouth daily, Disp: , Rfl:     levothyroxine (SYNTHROID) 75 mcg tablet, Take 1 tablet by mouth every other day, Disp: , Rfl:     levothyroxine (SYNTHROID) 88 mcg tablet, Take 1 tablet by mouth every other day, Disp: , Rfl:     loratadine (CLARITIN) 10 mg tablet, Take 10 mg by mouth daily, Disp: , Rfl:     meloxicam (MOBIC) 15 mg tablet, Take 1 tablet (15 mg total) by mouth daily as needed for moderate pain, Disp: 30 tablet, Rfl: 1    Multiple Vitamins-Minerals (MULTIVITAMIN ADULT PO), multivitamin, Disp: , Rfl:     solifenacin (VESICARE) 10 MG tablet, Take 1 tablet (10 mg total) by mouth daily, Disp: 90 tablet, Rfl: 3    thiamine (VITAMIN B1) 100 mg tablet, Take 100 mg by mouth daily, Disp: , Rfl:     VITAMIN D, ERGOCALCIFEROL, PO, Take 2,000 Units by mouth daily, Disp: , Rfl:     zinc gluconate 50 mg tablet, Take 50 mg by mouth daily, Disp: , Rfl:     Allergies   Allergen Reactions    Actifed Cold-Allergy [Chlorpheniramine-Phenylephrine] Swelling and Rash     Throat and face swelled    Penicillins Rash    Vancomycin Other (See Comments)     redness       Physical Exam:    /64   Pulse 66   Ht 5' 9" (1 753 m)   Wt 69 9 kg (154 lb)   LMP  (LMP Unknown)   BMI 22 74 kg/m²     Constitutional:normal, well developed, well nourished, alert, in no distress and non-toxic and no overt pain behavior    Eyes:anicteric  HEENT:grossly intact  Neck:supple, symmetric, trachea midline and no masses   Pulmonary:even and unlabored  Cardiovascular:No edema or pitting edema present  Skin:Normal without rashes or lesions and well hydrated  Psychiatric:Mood and affect appropriate  Neurologic:Cranial Nerves II-XII grossly intact  Musculoskeletal:normal gait      Imaging  No orders to display       MRI LUMBAR SPINE WITHOUT CONTRAST   INDICATION: Back pain, numbness anterior left thigh  COMPARISON: MRI lumbar spine 9/16/2014   TECHNIQUE: Sagittal T1, sagittal T2, sagittal inversion recovery, axial T1 and axial T2, coronal T2    IMAGE QUALITY: Diagnostic   FINDINGS:   VERTEBRAL BODIES: There are 5 lumbar type vertebral bodies  Stable mild 1 anterolisthesis of L4 on L5  There is no abnormal bone marrow signal or suspicious discrete marrow lesion  SACRUM: Normal signal within the sacrum  No evidence of insufficiency or stress fracture  DISTAL CORD AND CONUS: Normal size and signal within the distal cord and conus  PARASPINAL SOFT TISSUES: Paraspinal soft tissues are unremarkable  Bilateral hydronephrosis is noted  LOWER THORACIC DISC SPACES: Normal disc height and signal  No disc herniation, canal stenosis or foraminal narrowing  LUMBAR DISC SPACES:   L1-L2: Minimal disc bulge  Mild facet arthropathy  No significant canal or foraminal stenosis  L2-L3: There is disc bulge and superimposed left foraminal disc protrusion  There is mild canal narrowing  No significant foraminal narrowing  L3-L4: There is mild disc bulge and superimposed left foraminal disc protrusion  Mild facet arthropathy  There is mild canal narrowing  No significant foraminal stenosis  L4-L5: Grade 1 anterolisthesis uncovering posterior disc margin  There is severe bilateral facet arthropathy  There is mild canal narrowing  No significant foraminal stenosis  L5-S1: There is left eccentric disc bulge and left foraminal/extraforaminal osteophytes  There is severe left and moderate left facet arthropathy  There is no significant canal stenosis  Mild left foraminal narrowing  IMPRESSION:   1   Degenerative changes without high-grade canal stenosis as detailed  2  Mild left foraminal narrowing at L5-S1    3  Bilateral hydronephrosis  Recommend ultrasound to evaluate for urinary retention and further renal assessment  No orders of the defined types were placed in this encounter

## 2021-08-16 ENCOUNTER — OFFICE VISIT (OUTPATIENT)
Dept: CARDIOLOGY CLINIC | Facility: CLINIC | Age: 66
End: 2021-08-16
Payer: MEDICARE

## 2021-08-16 VITALS
DIASTOLIC BLOOD PRESSURE: 72 MMHG | HEIGHT: 69 IN | BODY MASS INDEX: 23.18 KG/M2 | HEART RATE: 65 BPM | SYSTOLIC BLOOD PRESSURE: 122 MMHG | WEIGHT: 156.5 LBS

## 2021-08-16 DIAGNOSIS — I10 BENIGN ESSENTIAL HTN: Primary | ICD-10-CM

## 2021-08-16 DIAGNOSIS — E78.00 PURE HYPERCHOLESTEROLEMIA: ICD-10-CM

## 2021-08-16 DIAGNOSIS — I77.810 ASCENDING AORTA DILATATION (HCC): ICD-10-CM

## 2021-08-16 DIAGNOSIS — I25.10 CORONARY ARTERY DISEASE INVOLVING NATIVE CORONARY ARTERY OF NATIVE HEART WITHOUT ANGINA PECTORIS: ICD-10-CM

## 2021-08-16 PROCEDURE — 99214 OFFICE O/P EST MOD 30 MIN: CPT | Performed by: INTERNAL MEDICINE

## 2021-08-16 NOTE — PATIENT INSTRUCTIONS
Chronic Hypertension   AMBULATORY CARE:   Hypertension  is high blood pressure  Your blood pressure is the force of your blood moving against the walls of your arteries  Hypertension causes your blood pressure to get so high that your heart has to work much harder than normal  This can damage your heart  Even if you have hypertension for years, lifestyle changes, medicines, or both can help bring your blood pressure to normal   Call your local emergency number (911 in the [address] [facility]) or have someone call if:   · You have chest pain  · You have any of the following signs of a heart attack:      ? Squeezing, pressure, or pain in your chest    ? You may  also have any of the following:     § Discomfort or pain in your back, neck, jaw, stomach, or arm    § Shortness of breath    § Nausea or vomiting    § Lightheadedness or a sudden cold sweat    · You become confused or have difficulty speaking  · You suddenly feel lightheaded or have trouble breathing  Seek care immediately if:   · You have a severe headache or vision loss  · You have weakness in an arm or leg  Call your doctor if:   · You feel faint, dizzy, confused, or drowsy  · You have been taking your blood pressure medicine but your pressure is higher than your provider says it should be  · You have questions or concerns about your condition or care  Treatment for chronic hypertension  may include medicine to lower your blood pressure and cholesterol levels  A low cholesterol level helps prevent heart disease and makes it easier to control your blood pressure  Heart disease can make your blood pressure harder to control  You may also need to make lifestyle changes  What you need to know about the stages of hypertension:       · Normal blood pressure is 119/79 or lower   Your healthcare provider may only check your blood pressure each year if it stays at a normal level  · Elevated blood pressure is 120/79 to 129/79    This is sometimes called prehypertension  Your healthcare provider may suggest lifestyle changes to help lower your blood pressure to a normal level  He or she may then check it again in 3 to 6 months  · Stage 1 hypertension is 130/80  to 139/89   Your provider may recommend lifestyle changes, medication, and checks every 3 to 6 months until your blood pressure is controlled  · Stage 2 hypertension is 140/90 or higher   Your provider will recommend lifestyle changes and have you take 2 kinds of hypertension medicines  You will also need to have your blood pressure checked monthly until it is controlled  Manage chronic hypertension:   · Check your blood pressure at home  Avoid smoking, caffeine, and exercise at least 30 minutes before checking your blood pressure  Sit and rest for 5 minutes before you take your blood pressure  Extend your arm and support it on a flat surface  Your arm should be at the same level as your heart  Follow the directions that came with your blood pressure monitor  Check your blood pressure 2 times, 1 minute apart, before you take your medicine in the morning  Also check your blood pressure before your evening meal  Keep a record of your readings and bring it to your follow-up visits  Ask your healthcare provider what your blood pressure should be  · Manage any other health conditions you have  Health conditions such as diabetes can increase your risk for hypertension  Follow your healthcare provider's instructions and take all your medicines as directed  Talk to your healthcare provider about any new health conditions you have recently developed  · Ask about all medicines  Certain medicines can increase your blood pressure  Examples include oral birth control pills, decongestants, herbal supplements, and NSAIDs, such as ibuprofen  Your healthcare provider can tell you which medicines are safe for you to take  This includes prescription and over-the-counter medicines      Lifestyle changes you can make to lower your blood pressure: Your provider may want you to make more lifestyle changes if you are having trouble controlling your blood pressure  This may feel difficult over time, especially if you think you are making good changes but your pressure is still high  It might help to focus on one new change at a time  For example, try to add 1 more day of exercise, or exercise for an extra 10 minutes on 2 days  Small changes can make a big difference  Your healthcare provider can also refer you to specialists such as a dietitian who can help you make small changes  Your family members may be included in helping you learn to create lifestyle changes, such as the following:  · Limit sodium (salt) as directed  Too much sodium can affect your fluid balance  Check labels to find low-sodium or no-salt-added foods  Some low-sodium foods use potassium salts for flavor  Too much potassium can also cause health problems  Your healthcare provider will tell you how much sodium and potassium are safe for you to have in a day  He or she may recommend that you limit sodium to 2,300 mg a day  · Follow the meal plan recommended by your healthcare provider  A dietitian or your provider can give you more information on low-sodium plans or the DASH (Dietary Approaches to Stop Hypertension) eating plan  The DASH plan is low in sodium, processed sugar, unhealthy fats, and total fat  It is high in potassium, calcium, and fiber  These can be found in vegetables, fruit, and whole-grain foods  · Be physically active throughout the day  Physical activity, such as exercise, can help control your blood pressure and your weight  Be physically active for at least 30 minutes per day, on most days of the week  Include aerobic activity, such as walking or riding a bicycle  Also include strength training at least 2 times each week  Your healthcare providers can help you create a physical activity plan  · Decrease stress  This may help lower your blood pressure  Learn ways to relax, such as deep breathing or listening to music  · Limit alcohol as directed  Alcohol can increase your blood pressure  A drink of alcohol is 12 ounces of beer, 5 ounces of wine, or 1½ ounces of liquor  · Do not smoke  Nicotine and other chemicals in cigarettes and cigars can increase your blood pressure and also cause lung damage  Ask your healthcare provider for information if you currently smoke and need help to quit  E-cigarettes or smokeless tobacco still contain nicotine  Talk to your healthcare provider before you use these products  Follow up with your doctor as directed: You will need to return to have your blood pressure checked and to have other lab tests done  Write down your questions so you remember to ask them during your visits  © Copyright [employer] 2021 Information is for End User's use only and may not be sold, redistributed or otherwise used for commercial purposes  All illustrations and images included in CareNotes® are the copyrighted property of A D A M , Inc  or [facility] Marisabel Dee   The above information is an  only  It is not intended as medical advice for individual conditions or treatments  Talk to your doctor, nurse or pharmacist before following any medical regimen to see if it is safe and effective for you

## 2021-08-16 NOTE — PROGRESS NOTES
Cardiology Consultation     Nelsy Montemayor  [id number]  1955  HEART & VASCULAR [facility]  [address] [place] [id number]    1  Benign essential HTN     2  Ascending aorta dilatation (HCC)     3  Coronary artery disease involving native coronary artery of native heart without angina pectoris     4  Pure hypercholesterolemia       Chief Complaint   Patient presents with    Follow-up     6 m f/u, no cardiac complaints     HPI:Patient feels well, without complaints  No reported chest pain, shortness of breath, palpitations, lightheadedness, syncope, LE edema, orthopnea, PND, or significant weight changes  Patient remains active without any increased fatigue out of the ordinary          Patient Active Problem List   Diagnosis    Primary osteoarthritis of right knee    Meralgia paraesthetica, left    Acquired spondylolisthesis    Acquired hypothyroidism    Leukopenia    Lumbar radiculopathy    History of right breast cancer    Osteopenia of multiple sites    Seasonal allergies    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    Benign essential HTN    Urge incontinence of urine    Glucose intolerance    Encounter for follow-up examination after completed treatment for malignant neoplasm    Pure hypercholesterolemia    Impingement syndrome of right shoulder    Ascending aorta dilatation (HCC)    Coronary artery disease    Sacroiliitis (HCC)    Lumbar spondylosis    Spinal stenosis of lumbar region    DDD (degenerative disc disease), lumbar     Past Medical History:   Diagnosis Date    Arthritis     BRCA1 negative     BRCA2 negative     [facility]    [facility]    Disease of thyroid gland     History of radiation therapy 2014    breast cancer    Hypothyroid     Knee pain, right     Limb alert care status     no BP's /IV's right side    Mitral valve prolapse     Numbness of anterior thigh     left and tingling    Seasonal allergies     dust,mold,trees    Urinary incontinence     Varicose veins of both lower extremities     Wears glasses      Social History     Socioeconomic History    Marital status: Single     Spouse name: Not on file    Number of children: Not on file    Years of education: Not on file    Highest education level: Not on file   Occupational History    Not on file   Tobacco Use    Smoking status: Never Smoker    Smokeless tobacco: Never Used   Vaping Use    Vaping Use: Never used   Substance and Sexual Activity    Alcohol use: Yes    Drug use: No    Sexual activity: Not Currently     Birth control/protection: Abstinence, Post-menopausal   Other Topics Concern    Not on file   Social History Narrative    Not on file     Social Determinants of Health     Financial Resource Strain:     Difficulty of Paying Living Expenses:    Food Insecurity:     Worried About Running Out of Food in the Last Year:     [address] in the Last Year:    Transportation Needs:     Lack of Transportation (Medical):      Lack of Transportation (Non-Medical):    Physical Activity:     Days of Exercise per Week:     Minutes of Exercise per Session:    Stress:     Feeling of Stress :    Social Connections:     Frequency of Communication with Friends and Family:     Frequency of Social Gatherings with Friends and Family:     Attends Sabianism Services:     Active Member of Clubs or Organizations:     Attends Club or Organization Meetings:     Marital Status:    Intimate Partner Violence:     Fear of Current or Ex-Partner:     Emotionally Abused:     Physically Abused:     Sexually Abused:       Family History   Problem Relation Age of Onset    Uterine cancer Mother 71    Throat cancer Father [de-identified]    Breast cancer additional onset Paternal Aunt 54    Breast cancer additional onset Cousin 28    No Known Problems Daughter     No Known Problems Maternal Grandmother     No Known Problems Maternal Grandfather     No Known Problems Paternal Grandmother     No Known Problems Paternal Grandfather     No Known Problems Daughter     No Known Problems Maternal Aunt     No Known Problems Maternal Aunt     No Known Problems Maternal Aunt     No Known Problems Maternal Aunt     No Known Problems Paternal [de-identified]     Colon cancer Maternal Uncle 79   No family history of aortic dilation/aneurysm    Past Surgical History:   Procedure Laterality Date    BREAST BIOPSY Left 01/16/2002    BREAST BIOPSY Left 08/05/2005    high risk    BREAST BIOPSY Right 12/26/2013    positive    BREAST EXCISIONAL BIOPSY Left 04/06/2006    high risk    BREAST EXCISIONAL BIOPSY Left 10/13/2006    BREAST LUMPECTOMY Right 01/17/2014    malignant    BREAST SURGERY Right     lumpectomy    BUNIONECTOMY Bilateral     COLONOSCOPY      EXPLORATORY LAPAROTOMY      "for fertility"    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    KNEE ARTHROSCOPY Right     [place] TOTAL KNEE ARTHROPLASTY Right 1/20/2017    Procedure: ARTHROPLASTY KNEE TOTAL;  Surgeon: Jd Dickerson MD;  Location: [facility];  Service: Orthopedics    TONSILLECTOMY      WISDOM TOOTH EXTRACTION         Current Outpatient Medications:     chlorthalidone 25 mg tablet, Take 25 mg by mouth daily, Disp: , Rfl:     levothyroxine (SYNTHROID) 75 mcg tablet, Take 1 tablet by mouth every other day, Disp: , Rfl:     levothyroxine (SYNTHROID) 88 mcg tablet, Take 1 tablet by mouth every other day, Disp: , Rfl:     loratadine (CLARITIN) 10 mg tablet, Take 10 mg by mouth daily, Disp: , Rfl:     meloxicam (MOBIC) 15 mg tablet, Take 1 tablet (15 mg total) by mouth daily as needed for moderate pain, Disp: 30 tablet, Rfl: 1    Multiple Vitamins-Minerals (MULTIVITAMIN ADULT PO), multivitamin, Disp: , Rfl:     solifenacin (VESICARE) 10 MG tablet, Take 1 tablet (10 mg total) by mouth daily, Disp: 90 tablet, Rfl: 3    thiamine (VITAMIN B1) 100 mg tablet, Take 100 mg by mouth daily, Disp: , Rfl:     VITAMIN D, ERGOCALCIFEROL, PO, Take 2,000 Units by mouth daily, Disp: , Rfl:     zinc gluconate 50 mg tablet, Take 50 mg by mouth daily, Disp: , Rfl:   Allergies   Allergen Reactions    Actifed Cold-Allergy [Chlorpheniramine-Phenylephrine] Swelling and Rash     Throat and face swelled    Penicillins Rash    Vancomycin Other (See Comments)     redness     Vitals:    08/16/21 1412   BP: 122/72   BP Location: Left arm   Patient Position: Sitting   Cuff Size: Standard   Pulse: 65   Weight: 71 kg (156 lb 8 oz)   Height: 5' 9" (1 753 m)       Labs:  Orders Only on 05/21/2021   Component Date Value    WBC 05/24/2021 3 09*    RBC 05/24/2021 3 71*    Hemoglobin 05/24/2021 11 6     Hematocrit 05/24/2021 35 6     MCV 05/24/2021 96     MCH 05/24/2021 31 3     MCHC 05/24/2021 32 6     RDW 05/24/2021 12 7     MPV 05/24/2021 10 4     Platelets 33/78/7233 156     nRBC 05/24/2021 0     Neutrophils Relative 05/24/2021 71     Immat GRANS % 05/24/2021 0     Lymphocytes Relative 05/24/2021 18     Monocytes Relative 05/24/2021 10     Eosinophils Relative 05/24/2021 1     Basophils Relative 05/24/2021 0     Neutrophils Absolute 05/24/2021 2 17     Immature Grans Absolute 05/24/2021 0 01     Lymphocytes Absolute 05/24/2021 0 56*    Monocytes Absolute 05/24/2021 0 30     Eosinophils Absolute 05/24/2021 0 04     Basophils Absolute 05/24/2021 0 01     Sodium 05/24/2021 138     Potassium 05/24/2021 3 3*    Chloride 05/24/2021 101     CO2 05/24/2021 31     ANION GAP 05/24/2021 6     BUN 05/24/2021 18     Creatinine 05/24/2021 0 92     Glucose, Fasting 05/24/2021 85     Calcium 05/24/2021 8 7     Corrected Calcium 05/24/2021 9 3     AST 05/24/2021 14     ALT 05/24/2021 18     Alkaline Phosphatase 05/24/2021 45*    Total Protein 05/24/2021 6 2*    Albumin 05/24/2021 3 2*    Total Bilirubin 05/24/2021 0 65     eGFR 05/24/2021 65 Appointment on 04/29/2021   Component Date Value    Hemoglobin A1C 04/29/2021 5 4     EAG 04/29/2021 108     Cholesterol 04/29/2021 199     Triglycerides 04/29/2021 68     HDL, Direct 04/29/2021 71     LDL Calculated 04/29/2021 114*    Non-HDL-Chol (CHOL-HDL) 04/29/2021 128    Annual Exam on 04/05/2021   Component Date Value    Case Report 04/05/2021                      Value:Gynecologic Cytology Report                       Case: [id number]                                  Authorizing Provider:  Kate Ledezma MD          Collected:           04/05/2021 0944              Ordering Location:     [facility]      Received:            04/05/2021 [address] [place] [address]                                     [facility]                                                                               [place]                                                                    First Screen:          FOSTER Florentino                                                         Specimen:    LIQUID-BASED PAP, SCREENING, Cervix                                                        Primary Interpretation 04/05/2021 Negative for intraepithelial lesion or malignancy     Specimen Adequacy 04/05/2021 Satisfactory for evaluation  Endocervical/transformation zone component present   Additional Information 04/05/2021                      Value: This result contains rich text formatting which cannot be displayed here     Appointment on 04/05/2021   Component Date Value    WBC 04/05/2021 4 10*    RBC 04/05/2021 4 06     Hemoglobin 04/05/2021 12 8     Hematocrit 04/05/2021 38 9     MCV 04/05/2021 96     MCH 04/05/2021 31 5     MCHC 04/05/2021 32 9     RDW 04/05/2021 12 0     MPV 04/05/2021 10 9     Platelets 33/05/4204 186     nRBC 04/05/2021 0     Neutrophils Relative 04/05/2021 65     Immat GRANS % 04/05/2021 0     Lymphocytes Relative 04/05/2021 25     Monocytes Relative 04/05/2021 6     Eosinophils Relative 04/05/2021 3     Basophils Relative 04/05/2021 1     Neutrophils Absolute 04/05/2021 2 70     Immature Grans Absolute 04/05/2021 0 01     Lymphocytes Absolute 04/05/2021 1 02     Monocytes Absolute 04/05/2021 0 24     Eosinophils Absolute 04/05/2021 0 11     Basophils Absolute 04/05/2021 0 02     Sodium 04/05/2021 141     Potassium 04/05/2021 3 5     Chloride 04/05/2021 106     CO2 04/05/2021 33*    ANION GAP 04/05/2021 2*    BUN 04/05/2021 24     Creatinine 04/05/2021 0 91     Glucose, Fasting 04/05/2021 87     Calcium 04/05/2021 9 1     AST 04/05/2021 16     ALT 04/05/2021 24     Alkaline Phosphatase 04/05/2021 50     Total Protein 04/05/2021 6 7     Albumin 04/05/2021 3 5     Total Bilirubin 04/05/2021 0 50     eGFR 04/05/2021 65     CA 27 29 04/05/2021 7 9     TSH 3RD GENERATON 04/05/2021 1 470     Free T4 04/05/2021 1 25     Magnesium 04/05/2021 2 3     Phosphorus 04/05/2021 3 6    Transcribe Orders on 04/05/2021   Component Date Value    Color, UA 04/05/2021 Yellow     Clarity, UA 04/05/2021 Clear     Specific Gravity, UA 04/05/2021 1 013     pH, UA 04/05/2021 7 0     Leukocytes, UA 04/05/2021 Trace*    Nitrite, UA 04/05/2021 Negative     Protein, UA 04/05/2021 Negative     Glucose, UA 04/05/2021 Negative     Ketones, UA 04/05/2021 Negative     Urobilinogen, UA 04/05/2021 0 2     Bilirubin, UA 04/05/2021 Negative     Blood, UA 04/05/2021 Negative     Creatinine, Ur 04/05/2021 47 5     Microalbum  ,U,Random 04/05/2021 5 4     Microalb Creat Ratio 04/05/2021 11     RBC, UA 04/05/2021 None Seen     WBC, UA 04/05/2021 None Seen     Epithelial Cells 04/05/2021 None Seen     Bacteria, UA 04/05/2021 None Seen     Hyaline Casts, UA 04/05/2021 None Seen      Lab Results   Component Value Date    CHOL 190 06/03/2015    TRIG 68 04/29/2021    TRIG 87 06/03/2015    HDL 71 04/29/2021    HDL 70 06/03/2015     Imaging: No results found      Review of Systems:  Review of Systems Constitutional: Negative for activity change, appetite change, chills, diaphoresis, fatigue and unexpected weight change  HENT: Negative for hearing loss, nosebleeds and sore throat  Eyes: Negative for photophobia and visual disturbance  Respiratory: Negative for cough, chest tightness, shortness of breath and wheezing  Cardiovascular: Negative for chest pain, palpitations and leg swelling  Gastrointestinal: Negative for abdominal pain, diarrhea, nausea and vomiting  Endocrine: Negative for polyuria  Genitourinary: Negative for dysuria, frequency and hematuria  Musculoskeletal: Negative for arthralgias, back pain, gait problem and neck pain  Skin: Negative for pallor and rash  Neurological: Negative for dizziness, syncope and headaches  Hematological: Does not bruise/bleed easily  Psychiatric/Behavioral: Negative for behavioral problems and confusion  Physical Exam:  Physical Exam  Vitals reviewed  Constitutional:       Appearance: She is well-developed  She is not diaphoretic  HENT:      Head: Normocephalic and atraumatic  Nose: Nose normal    Eyes:      General: No scleral icterus  Pupils: Pupils are equal, round, and reactive to light  Neck:      Vascular: No JVD  Cardiovascular:      Rate and Rhythm: Normal rate and regular rhythm  Heart sounds: Normal heart sounds  No murmur heard  No friction rub  No gallop  Pulmonary:      Effort: Pulmonary effort is normal  No respiratory distress  Breath sounds: Normal breath sounds  No wheezing or rales  Abdominal:      General: Bowel sounds are normal  There is no distension  Palpations: Abdomen is soft  Tenderness: There is no abdominal tenderness  Musculoskeletal:         General: No deformity  Normal range of motion  Cervical back: Normal range of motion and neck supple  Skin:     General: Skin is warm and dry  Findings: No rash     Neurological:      Mental Status: She is alert and oriented to person, place, and time  Cranial Nerves: No cranial nerve deficit  Psychiatric:         Behavior: Behavior normal        Blood pressure 122/72, pulse 65, height 5' 9" (1 753 m), weight 71 kg (156 lb 8 oz), not currently breastfeeding  EKG:  Normal sinus rhythm  Low voltage QRS  Borderline ECG    Discussion/Summary:  HTN: well controlled, now back on chlorthalidone  HLD: currently not on medical therapy   in Nov 2020, which is at goal of <120   in April 2021  Ascending aortic aneurysm: noted to be 4 4 cm on CT for CCS in Dec 2020  CTA in June 2021 revealed 4 1 cm  Repeat testing now due in 1 year  CAD: reported as part of history, but CCS is 0  Asymptomatic

## 2021-08-24 NOTE — PROGRESS NOTES
FAMILY MEDICINE PROGRESS NOTE    Date of Service: 21  Primary Care Provider:   Kimberly Evans MD       Name: Shady Shukla       : 1955       Age:66 y o  Sex: female      MRN: [id number]      Chief Complaint:Medicare Wellness Visit       ASSESSMENT and PLAN:  Shady Shukla is a 74 y o  female with:     Problem List Items Addressed This Visit        Endocrine    Acquired hypothyroidism     Lab Results   Component Value Date    [id number] 1 470 2021     Follows with endocrinology, on alternating doses of 75 and 88 mcg daily  Controlled, continue current managment            Cardiovascular and Mediastinum    Benign essential HTN     BP Readings from Last 3 Encounters:   21 118/70   21 122/72   21 101/64     Lab Results   Component Value Date    CREATININE 0 92 2021    EGFR 65 2021     Controlled, continue chlorthalidone 25 mg daily with potassium supplementation          Ascending aorta dilatation (HCC)     Noted on recent imaging, specifically MRI of the chest noted dilation of 4 2 cm, recent coronary artery calcium score with dilation measuring 4 4 cm  CTA done in  measuring 4 1 cm, continue yearly monitoring  Musculoskeletal and Integument    Osteopenia of multiple sites     Due to Femara, currently sees Dr Sandoval Sood (Rheum)  She was on Prolia and Reclast infusion which were discontinued since she is no longer on Femara  Recommended that she continue calcium and vitamin-D supplementation and counseled the importance of regular weight-bearing physical activity  Repeat DEXA pending  Discussed that I would be happy to manage osteopenia/osteoporosis going forward               Other    History of right breast cancer     Follows with Heme/Onc and Surg/Onc  Has routine mammograms         Glucose intolerance    Pure hypercholesterolemia     Lab Results   Component Value Date    CHOLESTEROL 199 2021    HDL 71 2021    LDLCALC 114 (H) 04/29/2021    TRIG 68 04/29/2021     The 10-year ASCVD risk score (Angel Norman et al , 2013) is: 8 1%    Values used to calculate the score:      Age: 74 years      Sex: Female      Is Non- : No      Diabetic: No      Tobacco smoker: No      Systolic Blood Pressure: 000 mmHg      Is BP treated: Yes      HDL Cholesterol: 71 mg/dL      Total Cholesterol: 199 mg/dL    Recent CT coronary calcium score was 0  She remains on the borderline for requiring statin though given recent coronary calcium score will continue off of medications  Other Visit Diagnoses     Medicare annual wellness visit, subsequent    -  Primary          SUBJECTIVE:  Hetal Coreas is a 74 y o  female history of hypertension, hypercholesterolemia, osteopenia, hypothyroidism, breast cancer who presents today with a chief complaint of Medicare Wellness Visit     HPI     Hypertension  She is currently on chlorthalidone 25 mg daily  She is also following with cardiology  She does not check her blood pressure at home  She denies symptoms of hypo or hypertension       Osteopenia   Due to use Femara use for history of breast cancer, which she is no longer taking  She was on Prolia and Reclast  Follows with rheumatology, Dr Nelson Daly       Hypothyroid  She follows with endocrinology, is on alternating doses of 75 and 88 mcg levothyroxine       Hyperlipidemia  She has had elevated cholesterol, with ASCVD risk score in treatment range  Endocrine ordered a coronary artery calcium score which was 0       Dilated Thoracic Aorta  Noted on CT scan for coronary artery calcium score as well as MRIs done to monitor breast cancer  IT was noted to be 4 4 cm on Coronary Artery Calcium Score done in December  CTA in June measured 4 1 cm  History of Breast Cancer  Follows with Dr Mann Crockett of heme/onc; she last saw heme/onc in June  She will now follow-up yearly   She was diagnosed with hormone receptor positive breast cancer in 2014  She underwent lumpectomy, radiation  She subsequently was on letrozole for 5 years    Review of Systems   Constitutional: Negative for activity change, appetite change, fatigue and unexpected weight change  Eyes: Negative for visual disturbance  Respiratory: Negative for shortness of breath  Cardiovascular: Negative for chest pain, palpitations and leg swelling  Gastrointestinal: Negative for constipation and diarrhea  Genitourinary: Positive for urgency  Musculoskeletal: Positive for arthralgias and gait problem (due to left leg numbness)  Neurological: Positive for numbness  Negative for dizziness, light-headedness and headaches  Psychiatric/Behavioral: Negative for dysphoric mood and sleep disturbance  The patient is not nervous/anxious  I have reviewed the patient's Past Medical History      Current Outpatient Medications:     chlorthalidone 25 mg tablet, Take 25 mg by mouth daily, Disp: , Rfl:     levothyroxine (SYNTHROID) 75 mcg tablet, Take 1 tablet by mouth every other day, Disp: , Rfl:     levothyroxine (SYNTHROID) 88 mcg tablet, Take 1 tablet by mouth every other day, Disp: , Rfl:     loratadine (CLARITIN) 10 mg tablet, Take 10 mg by mouth daily, Disp: , Rfl:     Multiple Vitamins-Minerals (MULTIVITAMIN ADULT PO), multivitamin, Disp: , Rfl:     potassium chloride (K-DUR,KLOR-CON) 10 mEq tablet, Take 1 tablet by mouth daily, Disp: , Rfl:     solifenacin (VESICARE) 10 MG tablet, Take 1 tablet (10 mg total) by mouth daily, Disp: 90 tablet, Rfl: 3    thiamine (VITAMIN B1) 100 mg tablet, Take 100 mg by mouth daily, Disp: , Rfl:     VITAMIN D, ERGOCALCIFEROL, PO, Take 2,000 Units by mouth daily, Disp: , Rfl:     zinc gluconate 50 mg tablet, Take 50 mg by mouth daily, Disp: , Rfl:     OBJECTIVE:  /70   Pulse 62   Temp (!) 96 9 °F (36 1 °C)   Resp 16   Ht 5' 9" (1 753 m)   Wt 70 8 kg (156 lb)   LMP  (LMP Unknown)   BMI 23 04 kg/m²    BP Readings from Last 3 Encounters:   08/25/21 118/70   08/16/21 122/72   08/02/21 101/64      Wt Readings from Last 3 Encounters:   08/25/21 70 8 kg (156 lb)   08/16/21 71 kg (156 lb 8 oz)   08/02/21 69 9 kg (154 lb)      Physical Exam  Constitutional:       General: She is not in acute distress  Appearance: Normal appearance  She is normal weight  She is not ill-appearing or toxic-appearing  HENT:      Head: Normocephalic and atraumatic  Right Ear: Tympanic membrane and external ear normal       Left Ear: Tympanic membrane and external ear normal       Nose: Nose normal    Eyes:      Extraocular Movements: Extraocular movements intact  Conjunctiva/sclera: Conjunctivae normal    Cardiovascular:      Rate and Rhythm: Normal rate and regular rhythm  Pulses: Normal pulses  Heart sounds: Normal heart sounds  No murmur heard  No friction rub  No gallop  Pulmonary:      Effort: Pulmonary effort is normal  No respiratory distress  Breath sounds: Normal breath sounds  No stridor  No wheezing, rhonchi or rales  Abdominal:      General: Bowel sounds are normal  There is no distension  Palpations: Abdomen is soft  Tenderness: There is no abdominal tenderness  Musculoskeletal:         General: Normal range of motion  Cervical back: Normal range of motion and neck supple  No rigidity  Right lower leg: No edema  Left lower leg: No edema  Lymphadenopathy:      Cervical: No cervical adenopathy  Skin:     General: Skin is warm and dry  Findings: No erythema or rash  Neurological:      General: No focal deficit present  Mental Status: She is alert and oriented to person, place, and time        Gait: Gait abnormal    Psychiatric:         Mood and Affect: Mood normal          Behavior: Behavior normal          Lab Results   Component Value Date    SODIUM 138 05/24/2021    K 3 3 (L) 05/24/2021     05/24/2021    CO2 31 05/24/2021    BUN 18 05/24/2021    CREATININE 0 92

## 2021-08-25 ENCOUNTER — OFFICE VISIT (OUTPATIENT)
Dept: FAMILY MEDICINE CLINIC | Facility: CLINIC | Age: 66
End: 2021-08-25
Payer: MEDICARE

## 2021-08-25 VITALS
HEART RATE: 62 BPM | HEIGHT: 69 IN | TEMPERATURE: 96.9 F | WEIGHT: 156 LBS | BODY MASS INDEX: 23.11 KG/M2 | DIASTOLIC BLOOD PRESSURE: 70 MMHG | SYSTOLIC BLOOD PRESSURE: 118 MMHG | RESPIRATION RATE: 16 BRPM

## 2021-08-25 DIAGNOSIS — E74.39 GLUCOSE INTOLERANCE: ICD-10-CM

## 2021-08-25 DIAGNOSIS — Z85.3 HISTORY OF RIGHT BREAST CANCER: ICD-10-CM

## 2021-08-25 DIAGNOSIS — Z00.00 MEDICARE ANNUAL WELLNESS VISIT, SUBSEQUENT: Primary | ICD-10-CM

## 2021-08-25 DIAGNOSIS — I10 BENIGN ESSENTIAL HTN: ICD-10-CM

## 2021-08-25 DIAGNOSIS — I77.810 ASCENDING AORTA DILATATION (HCC): ICD-10-CM

## 2021-08-25 DIAGNOSIS — E03.9 ACQUIRED HYPOTHYROIDISM: ICD-10-CM

## 2021-08-25 DIAGNOSIS — M85.89 OSTEOPENIA OF MULTIPLE SITES: ICD-10-CM

## 2021-08-25 DIAGNOSIS — E78.00 PURE HYPERCHOLESTEROLEMIA: ICD-10-CM

## 2021-08-25 PROBLEM — M75.41 IMPINGEMENT SYNDROME OF RIGHT SHOULDER: Status: RESOLVED | Noted: 2020-10-13 | Resolved: 2021-08-25

## 2021-08-25 PROCEDURE — G0438 PPPS, INITIAL VISIT: HCPCS | Performed by: FAMILY MEDICINE

## 2021-08-25 PROCEDURE — 99214 OFFICE O/P EST MOD 30 MIN: CPT | Performed by: FAMILY MEDICINE

## 2021-08-25 PROCEDURE — 1123F ACP DISCUSS/DSCN MKR DOCD: CPT | Performed by: FAMILY MEDICINE

## 2021-08-25 RX ORDER — POTASSIUM CHLORIDE 750 MG/1
1 TABLET, EXTENDED RELEASE ORAL DAILY
COMMUNITY
Start: 2021-08-05

## 2021-08-25 NOTE — PATIENT INSTRUCTIONS
Medicare Preventive Visit Patient Instructions  Thank you for completing your Welcome to Medicare Visit or Medicare Annual Wellness Visit today  Your next wellness visit will be due in one year (8/26/2022)  The screening/preventive services that you may require over the next 5-10 years are detailed below  Some tests may not apply to you based off risk factors and/or age  Screening tests ordered at today's visit but not completed yet may show as past due  Also, please note that scanned in results may not display below  Preventive Screenings:  Service Recommendations Previous Testing/Comments   Colorectal Cancer Screening  * Colonoscopy    * Fecal Occult Blood Test (FOBT)/Fecal Immunochemical Test (FIT)  * Fecal DNA/Cologuard Test  * Flexible Sigmoidoscopy Age: 54-65 years old   Colonoscopy: every 10 years (may be performed more frequently if at higher risk)  OR  FOBT/FIT: every 1 year  OR  Cologuard: every 3 years  OR  Sigmoidoscopy: every 5 years  Screening may be recommended earlier than age 48 if at higher risk for colorectal cancer  Also, an individualized decision between you and your healthcare provider will decide whether screening between the ages of 74-80 would be appropriate  Colonoscopy: 08/12/2019  FOBT/FIT: Not on file  Cologuard: Not on file  Sigmoidoscopy: Not on file    Screening Current     Breast Cancer Screening Age: 36 years old  Frequency: every 1-2 years  Not required if history of left and right mastectomy Mammogram: 11/27/2020    History Breast Cancer   Cervical Cancer Screening Between the ages of 21-29, pap smear recommended once every 3 years  Between the ages of 33-67, can perform pap smear with HPV co-testing every 5 years     Recommendations may differ for women with a history of total hysterectomy, cervical cancer, or abnormal pap smears in past  Pap Smear: 04/05/2021    Screening Not Indicated   Hepatitis C Screening Once for adults born between 1945 and 1965  More frequently in patients at high risk for Hepatitis C Hep C Antibody: 01/17/2020    Screening Current   Diabetes Screening 1-2 times per year if you're at risk for diabetes or have pre-diabetes Fasting glucose: 85 mg/dL   A1C: 5 4 %    Screening Current   Cholesterol Screening Once every 5 years if you don't have a lipid disorder  May order more often based on risk factors  Lipid panel: 04/29/2021    Screening Not Indicated  History Lipid Disorder     Other Preventive Screenings Covered by Medicare:  1  Abdominal Aortic Aneurysm (AAA) Screening: covered once if your at risk  You're considered to be at risk if you have a family history of AAA  2  Lung Cancer Screening: covers low dose CT scan once per year if you meet all of the following conditions: (1) Age 50-69; (2) No signs or symptoms of lung cancer; (3) Current smoker or have quit smoking within the last 15 years; (4) You have a tobacco smoking history of at least 30 pack years (packs per day multiplied by number of years you smoked); (5) You get a written order from a healthcare provider  3  Glaucoma Screening: covered annually if you're considered high risk: (1) You have diabetes OR (2) Family history of glaucoma OR (3)  aged 48 and older OR (3)  American aged 72 and older  3  Osteoporosis Screening: covered every 2 years if you meet one of the following conditions: (1) You're estrogen deficient and at risk for osteoporosis based off medical history and other findings; (2) Have a vertebral abnormality; (3) On glucocorticoid therapy for more than 3 months; (4) Have primary hyperparathyroidism; (5) On osteoporosis medications and need to assess response to drug therapy  · Last bone density test (DXA Scan): 08/29/2019   5  HIV Screening: covered annually if you're between the age of 15-65  Also covered annually if you are younger than 13 and older than 72 with risk factors for HIV infection   For pregnant patients, it is covered up to 3 times per pregnancy  Immunizations:  Immunization Recommendations   Influenza Vaccine Annual influenza vaccination during flu season is recommended for all persons aged >= 6 months who do not have contraindications   Pneumococcal Vaccine (Prevnar and Pneumovax)  * Prevnar = PCV13  * Pneumovax = PPSV23   Adults 25-60 years old: 1-3 doses may be recommended based on certain risk factors  Adults 72 years old: Prevnar (PCV13) vaccine recommended followed by Pneumovax (PPSV23) vaccine  If already received PPSV23 since turning 65, then PCV13 recommended at least one year after PPSV23 dose  Hepatitis B Vaccine 3 dose series if at intermediate or high risk (ex: diabetes, end stage renal disease, liver disease)   Tetanus (Td) Vaccine - COST NOT COVERED BY MEDICARE PART B Following completion of primary series, a booster dose should be given every 10 years to maintain immunity against tetanus  Td may also be given as tetanus wound prophylaxis  Tdap Vaccine - COST NOT COVERED BY MEDICARE PART B Recommended at least once for all adults  For pregnant patients, recommended with each pregnancy  Shingles Vaccine (Shingrix) - COST NOT COVERED BY MEDICARE PART B  2 shot series recommended in those aged 48 and above     Health Maintenance Due:      Topic Date Due    Breast Cancer Screening: Mammogram  11/27/2021    Colorectal Cancer Screening  08/12/2024    Hepatitis C Screening  Completed     Immunizations Due:      Topic Date Due    Influenza Vaccine (1) 09/01/2021     Advance Directives   What are advance directives? Advance directives are legal documents that state your wishes and plans for medical care  These plans are made ahead of time in case you lose your ability to make decisions for yourself  Advance directives can apply to any medical decision, such as the treatments you want, and if you want to donate organs  What are the types of advance directives?   There are many types of advance directives, and each state has rules about how to use them  You may choose a combination of any of the following:  · Living will: This is a written record of the treatment you want  You can also choose which treatments you do not want, which to limit, and which to stop at a certain time  This includes surgery, medicine, IV fluid, and tube feedings  · Durable power of  for healthcare [facility]): This is a written record that states who you want to make healthcare choices for you when you are unable to make them for yourself  This person, called a proxy, is usually a family member or a friend  You may choose more than 1 proxy  · Do not resuscitate (DNR) order:  A DNR order is used in case your heart stops beating or you stop breathing  It is a request not to have certain forms of treatment, such as CPR  A DNR order may be included in other types of advance directives  · Medical directive: This covers the care that you want if you are in a coma, near death, or unable to make decisions for yourself  You can list the treatments you want for each condition  Treatment may include pain medicine, surgery, blood transfusions, dialysis, IV or tube feedings, and a ventilator (breathing machine)  · Values history: This document has questions about your views, beliefs, and how you feel and think about life  This information can help others choose the care that you would choose  Why are advance directives important? An advance directive helps you control your care  Although spoken wishes may be used, it is better to have your wishes written down  Spoken wishes can be misunderstood, or not followed  Treatments may be given even if you do not want them  An advance directive may make it easier for your family to make difficult choices about your care  Fall Prevention    Fall prevention  includes ways to make your home and other areas safer  It also includes ways you can move more carefully to prevent a fall   Health conditions that cause changes in your blood pressure, vision, or muscle strength and coordination may increase your risk for falls  Medicines may also increase your risk for falls if they make you dizzy, weak, or sleepy  Fall prevention tips:   · Stand or sit up slowly  · Use assistive devices as directed  · Wear shoes that fit well and have soles that   · Wear a personal alarm  · Stay active  · Manage your medical conditions  Home Safety Tips:  · Add items to prevent falls in the bathroom  · Keep paths clear  · Install bright lights in your home  · Keep items you use often on shelves within reach  · Paint or place reflective tape on the edges of your stairs  Urinary Incontinence   Urinary incontinence (UI)  is when you lose control of your bladder  UI develops because your bladder cannot store or empty urine properly  The 3 most common types of UI are stress incontinence, urge incontinence, or both  Medicines:   · May be given to help strengthen your bladder control  Report any side effects of medication to your healthcare provider  Do pelvic muscle exercises often:  Your pelvic muscles help you stop urinating  Squeeze these muscles tight for 5 seconds, then relax for 5 seconds  Gradually work up to squeezing for 10 seconds  Do 3 sets of 15 repetitions a day, or as directed  This will help strengthen your pelvic muscles and improve bladder control  Train your bladder:  Go to the bathroom at set times, such as every 2 hours, even if you do not feel the urge to go  You can also try to hold your urine when you feel the urge to go  For example, hold your urine for 5 minutes when you feel the urge to go  As that becomes easier, hold your urine for 10 minutes  Self-care:   · Keep a UI record  Write down how often you leak urine and how much you leak  Make a note of what you were doing when you leaked urine  · Drink liquids as directed   You may need to limit the amount of liquid you drink to help control your urine leakage  Do not drink any liquid right before you go to bed  Limit or do not have drinks that contain caffeine or alcohol  · Prevent constipation  Eat a variety of high-fiber foods  Good examples are high-fiber cereals, beans, vegetables, and whole-grain breads  Walking is the best way to trigger your intestines to have a bowel movement  · Exercise regularly and maintain a healthy weight  Weight loss and exercise will decrease pressure on your bladder and help you control your leakage  · Use a catheter as directed  to help empty your bladder  A catheter is a tiny, plastic tube that is put into your bladder to drain your urine  · Go to behavior therapy as directed  Behavior therapy may be used to help you learn to control your urge to urinate  © Copyright [address] 2018 Information is for End User's use only and may not be sold, redistributed or otherwise used for commercial purposes   All illustrations and images included in CareNotes® are the copyrighted property of [employer]  or [address] [place] [address]

## 2021-08-25 NOTE — ASSESSMENT & PLAN NOTE
Lab Results   Component Value Date    [id number] 1 470 04/05/2021     Follows with endocrinology, on alternating doses of 75 and 88 mcg daily   Controlled, continue current managment

## 2021-08-25 NOTE — ASSESSMENT & PLAN NOTE
Lab Results   Component Value Date    CHOLESTEROL 199 04/29/2021    HDL 71 04/29/2021    LDLCALC 114 (H) 04/29/2021    TRIG 68 04/29/2021     The 10-year ASCVD risk score (Yasmeen Chiu et al , 2013) is: 8 1%    Values used to calculate the score:      Age: 74 years      Sex: Female      Is Non- : No      Diabetic: No      Tobacco smoker: No      Systolic Blood Pressure: 464 mmHg      Is BP treated: Yes      HDL Cholesterol: 71 mg/dL      Total Cholesterol: 199 mg/dL    Recent CT coronary calcium score was 0  She remains on the borderline for requiring statin though given recent coronary calcium score will continue off of medications

## 2021-08-25 NOTE — PROGRESS NOTES
Assessment and Plan:     Problem List Items Addressed This Visit        Endocrine    Acquired hypothyroidism     Lab Results   Component Value Date    [id number] 1 470 04/05/2021     Follows with endocrinology, on alternating doses of 75 and 88 mcg daily  Controlled, continue current managment            Cardiovascular and Mediastinum    Benign essential HTN     BP Readings from Last 3 Encounters:   08/25/21 118/70   08/16/21 122/72   08/02/21 101/64     Lab Results   Component Value Date    CREATININE 0 92 05/24/2021    EGFR 65 05/24/2021     Controlled, continue chlorthalidone 25 mg daily with potassium supplementation          Ascending aorta dilatation (HCC)     Noted on recent imaging, specifically MRI of the chest noted dilation of 4 2 cm, recent coronary artery calcium score with dilation measuring 4 4 cm  CTA done in June measuring 4 1 cm, continue yearly monitoring  Musculoskeletal and Integument    Osteopenia of multiple sites     Due to Femara, currently sees Dr Sangeetha Bliss ([facility])  She was on Prolia and Reclast infusion which were discontinued since she is no longer on Femara  Recommended that she continue calcium and vitamin-D supplementation and counseled the importance of regular weight-bearing physical activity  Repeat DEXA pending  Discussed that I would be happy to manage osteopenia/osteoporosis going forward               Other    History of right breast cancer     Follows with Heme/Onc and Surg/Onc  Has routine mammograms         Glucose intolerance    Pure hypercholesterolemia     Lab Results   Component Value Date    CHOLESTEROL 199 04/29/2021    HDL 71 04/29/2021    LDLCALC 114 (H) 04/29/2021    TRIG 68 04/29/2021     The 10-year ASCVD risk score (Mg Fishman et al , 2013) is: 8 1%    Values used to calculate the score:      Age: 74 years      Sex: Female      Is Non- : No      Diabetic: No      Tobacco smoker: No      Systolic Blood Pressure: 584 mmHg Is BP treated: Yes      HDL Cholesterol: 71 mg/dL      Total Cholesterol: 199 mg/dL    Recent CT coronary calcium score was 0  She remains on the borderline for requiring statin though given recent coronary calcium score will continue off of medications  Other Visit Diagnoses     Medicare annual wellness visit, subsequent    -  Primary           Preventive health issues were discussed with patient, and age appropriate screening tests were ordered as noted in patient's After Visit Summary  Personalized health advice and appropriate referrals for health education or preventive services given if needed, as noted in patient's After Visit Summary       History of Present Illness:     Patient presents for Medicare Annual Wellness visit    Patient Care Team:  Whitney Mendoza MD as PCP - General (Family Medicine)  Tish Kaufmann, MD Haidee Sever, MD Lethaniel Lucy, MD Perry Melia, MD Starla Calamity, DO (Pain Medicine)     Problem List:     Patient Active Problem List   Diagnosis    Primary osteoarthritis of right knee    Meralgia paraesthetica, left    Acquired spondylolisthesis    Acquired hypothyroidism    Leukopenia    Lumbar radiculopathy    History of right breast cancer    Osteopenia of multiple sites    Seasonal allergies    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    Benign essential HTN    Urge incontinence of urine    Glucose intolerance    Encounter for follow-up examination after completed treatment for malignant neoplasm    Pure hypercholesterolemia    [facility]ending [facility] dilatation ([facility] [address]    Coronary artery disease    Sacroiliitis ([address]    Lumbar spondylosis    Spinal stenosis of lumbar region    DDD (degenerative disc disease), lumbar      Past Medical and Surgical History:     Past Medical History:   Diagnosis Date    Arthritis     BRCA1 negative     BRCA2 negative     Breast cancer ([facility] [address] ) 01/17/2014    radiation    Disease of thyroid gland     History of radiation therapy 2014    breast cancer    Hypothyroid     Knee pain, right     Limb alert care status     no BP's /IV's right side    Mitral valve prolapse     Numbness of anterior thigh     left and tingling    Seasonal allergies     dust,mold,trees    Urinary incontinence     Varicose veins of both lower extremities     Wears glasses      Past Surgical History:   Procedure Laterality Date    BREAST BIOPSY Left 01/16/2002    BREAST BIOPSY Left 08/05/2005    high risk    BREAST BIOPSY Right 12/26/2013    positive    BREAST EXCISIONAL BIOPSY Left 04/06/2006    high risk    BREAST EXCISIONAL BIOPSY Left 10/13/2006    BREAST LUMPECTOMY Right 01/17/2014    malignant    BREAST SURGERY Right     lumpectomy    BUNIONECTOMY Bilateral     COLONOSCOPY      EXPLORATORY LAPAROTOMY      "for fertility"    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    KNEE ARTHROSCOPY Right     PA TOTAL KNEE ARTHROPLASTY Right 1/20/2017    Procedure: ARTHROPLASTY KNEE TOTAL;  Surgeon: Brit Rivera MD;  Location: [facility];  Service: Orthopedics    TONSILLECTOMY      WISDOM TOOTH EXTRACTION        Family History:     Family History   Problem Relation Age of Onset    Uterine cancer Mother 71    Throat cancer Father [de-identified]    Breast cancer additional onset Paternal Aunt 54    Breast cancer additional onset Cousin 28    No Known Problems Daughter     No Known Problems Maternal Grandmother     No Known Problems Maternal Grandfather     No Known Problems Paternal Grandmother     No Known Problems Paternal Grandfather     No Known Problems Daughter     No Known Problems Maternal Aunt     No Known Problems Maternal Aunt     No Known Problems Maternal Aunt     No Known Problems Maternal Aunt     No Known Problems Paternal Aunt     Colon cancer Maternal Uncle 79      Social History:     Social History     Socioeconomic History    Marital status: Single     Spouse name: None    Number of children: None    Years of education: None    Highest education level: None   Occupational History    None   Tobacco Use    Smoking status: Never Smoker    Smokeless tobacco: Never Used   Vaping Use    Vaping Use: Never used   Substance and Sexual Activity    Alcohol use: Yes     Comment: rare    Drug use: No    Sexual activity: Not Currently     Birth control/protection: Abstinence, Post-menopausal   Other Topics Concern    None   Social History Narrative    None     Social Determinants of Health     Financial Resource Strain:     Difficulty of Paying Living Expenses:    Food Insecurity:     Worried About Running Out of Food in the Last Year:     Ran Out of Food in the Last Year:    Transportation Needs:     Lack of Transportation (Medical):      Lack of Transportation (Non-Medical):    Physical Activity:     Days of Exercise per Week:     Minutes of Exercise per Session:    Stress:     Feeling of Stress :    Social Connections:     Frequency of Communication with Friends and Family:     Frequency of Social Gatherings with Friends and Family:     Attends [employer] Services:     Active Member of Clubs or Organizations:     Attends Club or Organization Meetings:     Marital Status:    Intimate Partner Violence:     Fear of Current or Ex-Partner:     Emotionally Abused:     Physically Abused:     Sexually Abused:       Medications and Allergies:     Current Outpatient Medications   Medication Sig Dispense Refill    chlorthalidone 25 mg tablet Take 25 mg by mouth daily      levothyroxine (SYNTHROID) 75 mcg tablet Take 1 tablet by mouth every other day      levothyroxine (SYNTHROID) 88 mcg tablet Take 1 tablet by mouth every other day      loratadine (CLARITIN) 10 mg tablet Take 10 mg by mouth daily      Multiple Vitamins-Minerals (MULTIVITAMIN ADULT PO) multivitamin      potassium chloride (K-DUR,KLOR-CON) 10 mEq tablet Take 1 tablet by mouth daily      solifenacin (VESICARE) 10 MG tablet Take 1 tablet (10 mg total) by mouth daily 90 tablet 3    thiamine (VITAMIN B1) 100 mg tablet Take 100 mg by mouth daily      VITAMIN D, ERGOCALCIFEROL, PO Take 2,000 Units by mouth daily      zinc gluconate 50 mg tablet Take 50 mg by mouth daily       No current facility-administered medications for this visit  Allergies   Allergen Reactions    Actifed Cold-Allergy [Chlorpheniramine-Phenylephrine] Swelling and Rash     Throat and face swelled    Penicillins Rash    Vancomycin Other (See Comments)     redness      Immunizations:     Immunization History   Administered Date(s) Administered    H1N1, All Formulations 10/26/2009    INFLUENZA 10/01/2016, 10/26/2018    Influenza, seasonal, injectable 10/01/2016    Pneumococcal Conjugate 13-Valent 03/20/2019    Pneumococcal Polysaccharide PPV23 06/23/2020    SARS-CoV-2 / COVID-19 mRNA IM (Moderna) 12/30/2020, 01/25/2021    Tdap 01/03/2018, 02/21/2020    Zoster Vaccine Recombinant 05/13/2019, 09/03/2019      Health Maintenance:         Topic Date Due    Breast Cancer Screening: Mammogram  11/27/2021    Colorectal Cancer Screening  08/12/2024    Hepatitis C Screening  Completed         Topic Date Due    Influenza Vaccine (1) 09/01/2021      Medicare Health Risk Assessment:     /70   Pulse 62   Temp (!) 96 9 °F (36 1 °C)   Resp 16   Ht 5' 9" (1 753 m)   Wt 70 8 kg (156 lb)   LMP  (LMP Unknown)   BMI 23 04 kg/m²      Rosalia Juarez is here for her Subsequent Wellness visit  Last Medicare Wellness visit information reviewed, patient interviewed and updates made to the record today  Health Risk Assessment:   Patient rates overall health as very good  Patient feels that their physical health rating is slightly better  Patient is satisfied with their life  Eyesight was rated as same  Hearing was rated as same  Patient feels that their emotional and mental health rating is slightly better  Patients states they are never, rarely angry   Patient states they are never, rarely unusually tired/fatigued  Pain experienced in the last 7 days has been some  Patient's pain rating has been 3/10  Patient states that she has experienced no weight loss or gain in last 6 months  Fall Risk Screening: In the past year, patient has experienced: history of falling in past year    Number of falls: 1  Injured during fall?: No    Feels unsteady when standing or walking?: No    Worried about falling?: Yes      Urinary Incontinence Screening:   Patient has leaked urine accidently in the last six months  Since 2013 episode    Home Safety:  Patient does not have trouble with stairs inside or outside of their home  Patient has working smoke alarms and has working carbon monoxide detector  Home safety hazards include: none  Nutrition:   Current diet is Regular  Medications:   Patient is not currently taking any over-the-counter supplements  Patient is able to manage medications  Activities of Daily Living (ADLs)/Instrumental Activities of Daily Living (IADLs):   Walk and transfer into and out of bed and chair?: Yes  Dress and groom yourself?: Yes    Bathe or shower yourself?: Yes    Feed yourself? Yes  Do your laundry/housekeeping?: Yes  Manage your money, pay your bills and track your expenses?: Yes  Make your own meals?: Yes    Do your own shopping?: Yes    Previous Hospitalizations:   Any hospitalizations or ED visits within the last 12 months?: No      Advance Care Planning:   Living will: Yes    Durable POA for healthcare:  Yes    Advanced directive: Yes    Advanced directive counseling given: Yes    End of Life Decisions reviewed with patient: Yes      Comments: Brother is healthcare power of , daughter would be second in line    Cognitive Screening:   Provider or family/friend/caregiver concerned regarding cognition?: No    PREVENTIVE SCREENINGS      Cardiovascular Screening:    General: Screening Not Indicated and History Lipid Disorder      Diabetes Screening: General: Screening Current      Colorectal Cancer Screening:     General: Screening Current      Breast Cancer Screening:     General: History Breast Cancer      Cervical Cancer Screening:    General: Screening Not Indicated      Osteoporosis Screening:    General: Screening Not Indicated and History Osteoporosis      Lung Cancer Screening:     General: Screening Not Indicated      Hepatitis C Screening:    General: Screening Current    Screening, Brief Intervention, and Referral to Treatment (SBIRT)    Screening  Typical number of drinks in a day: 0  Typical number of drinks in a week: 0  Interpretation: Low risk drinking behavior  AUDIT-C Screenin) How often did you have a drink containing alcohol in the past year? monthly or less  2) How many drinks did you have on a typical day when you were drinking in the past year? 1 to 2  3) How often did you have 6 or more drinks on one occasion in the past year? never    AUDIT-C Score: 1  Interpretation: Score 0-2 (female): Negative screen for alcohol misuse    Single Item Drug Screening:  How often have you used an illegal drug (including marijuana) or a prescription medication for non-medical reasons in the past year? never    Single Item Drug Screen Score: 0  Interpretation: Negative screen for possible drug use disorder    Brief Intervention  Alcohol & drug use screenings were reviewed  No concerns regarding substance use disorder identified  Other Counseling Topics:   Calcium and vitamin D intake and regular weightbearing exercise         Jelani Martienz MD

## 2021-08-25 NOTE — ASSESSMENT & PLAN NOTE
Due to Femara, currently sees Dr Sami Bose ([facility])  She was on Prolia and Reclast infusion which were discontinued since she is no longer on Femara  Recommended that she continue calcium and vitamin-D supplementation and counseled the importance of regular weight-bearing physical activity  Repeat DEXA pending  Discussed that I would be happy to manage osteopenia/osteoporosis going forward

## 2021-08-25 NOTE — ASSESSMENT & PLAN NOTE
Noted on recent imaging, specifically MRI of the chest noted dilation of 4 2 cm, recent coronary artery calcium score with dilation measuring 4 4 cm  CTA done in June measuring 4 1 cm, continue yearly monitoring

## 2021-08-25 NOTE — ASSESSMENT & PLAN NOTE
BP Readings from Last 3 Encounters:   08/25/21 118/70   08/16/21 122/72   08/02/21 101/64     Lab Results   Component Value Date    CREATININE 0 92 05/24/2021    EGFR 65 05/24/2021     Controlled, continue chlorthalidone 25 mg daily with potassium supplementation

## 2021-09-10 ENCOUNTER — HOSPITAL ENCOUNTER (OUTPATIENT)
Dept: RADIOLOGY | Age: 66
Discharge: HOME | End: 2021-09-10
Payer: MEDICARE

## 2021-09-10 ENCOUNTER — APPOINTMENT (OUTPATIENT)
Dept: LAB | Age: 66
End: 2021-09-10
Payer: MEDICARE

## 2021-09-10 DIAGNOSIS — M85.89 DISAPPEARING BONE DISEASE: ICD-10-CM

## 2021-09-10 DIAGNOSIS — I51.9 MYXEDEMA HEART DISEASE: ICD-10-CM

## 2021-09-10 DIAGNOSIS — M81.0 OSTEOPOROSIS, POST-MENOPAUSAL: ICD-10-CM

## 2021-09-10 DIAGNOSIS — H35.52: ICD-10-CM

## 2021-09-10 DIAGNOSIS — E66.8 OBESITY OF ENDOCRINE ORIGIN: ICD-10-CM

## 2021-09-10 DIAGNOSIS — Z79.899 ENCOUNTER FOR LONG-TERM (CURRENT) USE OF OTHER MEDICATIONS: ICD-10-CM

## 2021-09-10 DIAGNOSIS — E55.9 AVITAMINOSIS D: ICD-10-CM

## 2021-09-10 DIAGNOSIS — E03.9 MYXEDEMA HEART DISEASE: ICD-10-CM

## 2021-09-10 DIAGNOSIS — I10 ESSENTIAL HYPERTENSION, MALIGNANT: ICD-10-CM

## 2021-09-10 LAB
25(OH)D3 SERPL-MCNC: 51.9 NG/ML (ref 30–100)
ANION GAP SERPL CALCULATED.3IONS-SCNC: 4 MMOL/L (ref 4–13)
BUN SERPL-MCNC: 18 MG/DL (ref 5–25)
CALCIUM SERPL-MCNC: 9.2 MG/DL (ref 8.3–10.1)
CHLORIDE SERPL-SCNC: 105 MMOL/L (ref 100–108)
CO2 SERPL-SCNC: 31 MMOL/L (ref 21–32)
CREAT SERPL-MCNC: 0.87 MG/DL (ref 0.6–1.3)
GFR SERPL CREATININE-BSD FRML MDRD: 69 ML/MIN/1.73SQ M
GLUCOSE P FAST SERPL-MCNC: 81 MG/DL (ref 65–99)
POTASSIUM SERPL-SCNC: 3.8 MMOL/L (ref 3.5–5.3)
SODIUM SERPL-SCNC: 140 MMOL/L (ref 136–145)

## 2021-09-10 PROCEDURE — 82306 VITAMIN D 25 HYDROXY: CPT

## 2021-09-10 PROCEDURE — 36415 COLL VENOUS BLD VENIPUNCTURE: CPT

## 2021-09-10 PROCEDURE — 80048 BASIC METABOLIC PNL TOTAL CA: CPT

## 2021-09-10 PROCEDURE — 77080 DXA BONE DENSITY AXIAL: CPT

## 2021-09-17 ENCOUNTER — ESTABLISHED COMPREHENSIVE EXAM (OUTPATIENT)
Dept: URBAN - METROPOLITAN AREA CLINIC 6 | Facility: CLINIC | Age: 66
End: 2021-09-17

## 2021-09-17 ENCOUNTER — PREPPED CHART (OUTPATIENT)
Dept: URBAN - METROPOLITAN AREA CLINIC 6 | Facility: CLINIC | Age: 66
End: 2021-09-17

## 2021-09-17 DIAGNOSIS — H57.813: ICD-10-CM

## 2021-09-17 DIAGNOSIS — H04.123: ICD-10-CM

## 2021-09-17 DIAGNOSIS — H25.813: ICD-10-CM

## 2021-09-17 PROCEDURE — 92014 COMPRE OPH EXAM EST PT 1/>: CPT

## 2021-09-17 PROCEDURE — 1036F TOBACCO NON-USER: CPT

## 2021-09-17 PROCEDURE — G8427 DOCREV CUR MEDS BY ELIG CLIN: HCPCS

## 2021-09-17 ASSESSMENT — TONOMETRY
OD_IOP_MMHG: 13
OS_IOP_MMHG: 15

## 2021-09-17 ASSESSMENT — VISUAL ACUITY
OD_CC: 20/25-1
OS_CC: 20/25-1

## 2021-10-11 PROBLEM — M65.331 TRIGGER FINGER, RIGHT MIDDLE FINGER: Status: ACTIVE | Noted: 2021-10-11

## 2021-10-14 DIAGNOSIS — I10 BENIGN ESSENTIAL HTN: Primary | ICD-10-CM

## 2021-10-14 RX ORDER — CHLORTHALIDONE 25 MG/1
25 TABLET ORAL DAILY
Qty: 90 TABLET | Refills: 3 | Status: SHIPPED | OUTPATIENT
Start: 2021-10-14

## 2021-11-09 ENCOUNTER — IMMUNIZATIONS (OUTPATIENT)
Dept: FAMILY MEDICINE CLINIC | Facility: HOSPITAL | Age: 66
End: 2021-11-09

## 2021-11-09 DIAGNOSIS — Z23 ENCOUNTER FOR IMMUNIZATION: Primary | ICD-10-CM

## 2021-11-09 PROCEDURE — 91306 COVID-19 MODERNA VACC 0.25 ML BOOSTER: CPT

## 2021-11-09 PROCEDURE — 0013A COVID-19 MODERNA VACC 0.25 ML BOOSTER: CPT

## 2021-11-10 ENCOUNTER — OFFICE VISIT (OUTPATIENT)
Dept: OBGYN CLINIC | Facility: HOSPITAL | Age: 66
End: 2021-11-10
Payer: MEDICARE

## 2021-11-10 VITALS
HEIGHT: 69 IN | HEART RATE: 83 BPM | SYSTOLIC BLOOD PRESSURE: 109 MMHG | BODY MASS INDEX: 22.39 KG/M2 | WEIGHT: 151.2 LBS | DIASTOLIC BLOOD PRESSURE: 69 MMHG

## 2021-11-10 DIAGNOSIS — M65.331 TRIGGER MIDDLE FINGER OF RIGHT HAND: Primary | ICD-10-CM

## 2021-11-10 PROCEDURE — 99214 OFFICE O/P EST MOD 30 MIN: CPT | Performed by: ORTHOPAEDIC SURGERY

## 2021-11-10 RX ORDER — LIDOCAINE HYDROCHLORIDE AND EPINEPHRINE 10; 10 MG/ML; UG/ML
20 INJECTION, SOLUTION INFILTRATION; PERINEURAL ONCE
Status: CANCELLED | OUTPATIENT
Start: 2021-11-10 | End: 2021-11-10

## 2021-11-12 ENCOUNTER — APPOINTMENT (OUTPATIENT)
Dept: LAB | Age: 66
End: 2021-11-12

## 2021-11-12 DIAGNOSIS — Z01.84 IMMUNITY STATUS TESTING: ICD-10-CM

## 2021-11-12 DIAGNOSIS — Z11.1 SCREENING EXAMINATION FOR PULMONARY TUBERCULOSIS: ICD-10-CM

## 2021-11-12 PROCEDURE — 86787 VARICELLA-ZOSTER ANTIBODY: CPT

## 2021-11-12 PROCEDURE — 86765 RUBEOLA ANTIBODY: CPT

## 2021-11-12 PROCEDURE — 86480 TB TEST CELL IMMUN MEASURE: CPT

## 2021-11-13 LAB
MEV IGG SER QL: NORMAL
VZV IGG SER IA-ACNC: NORMAL

## 2021-11-15 LAB
GAMMA INTERFERON BACKGROUND BLD IA-ACNC: 0.02 IU/ML
M TB IFN-G BLD-IMP: NEGATIVE
M TB IFN-G CD4+ BCKGRND COR BLD-ACNC: 0.01 IU/ML
M TB IFN-G CD4+ BCKGRND COR BLD-ACNC: 0.01 IU/ML
MITOGEN IGNF BCKGRD COR BLD-ACNC: 9.58 IU/ML

## 2021-11-16 PROBLEM — J34.89 NASAL MASS: Status: ACTIVE | Noted: 2021-11-16

## 2021-11-16 PROBLEM — H61.22 IMPACTED CERUMEN OF LEFT EAR: Status: ACTIVE | Noted: 2021-11-16

## 2021-11-22 ENCOUNTER — HOSPITAL ENCOUNTER (OUTPATIENT)
Dept: RADIOLOGY | Facility: HOSPITAL | Age: 66
Discharge: HOME | End: 2021-11-22
Payer: MEDICARE

## 2021-11-22 DIAGNOSIS — Z85.3 HISTORY OF RIGHT BREAST CANCER: ICD-10-CM

## 2021-11-22 PROCEDURE — C8937 CAD BREAST MRI: HCPCS

## 2021-11-22 PROCEDURE — A9585 GADOBUTROL INJECTION: HCPCS | Performed by: NURSE PRACTITIONER

## 2021-11-22 PROCEDURE — C8908 MRI W/O FOL W/CONT, BREAST,: HCPCS

## 2021-11-22 PROCEDURE — G1004 CDSM NDSC: HCPCS

## 2021-11-22 RX ADMIN — GADOBUTROL 7 ML: 604.72 INJECTION INTRAVENOUS at 13:47

## 2021-12-06 ENCOUNTER — HOSPITAL ENCOUNTER (OUTPATIENT)
Dept: RADIOLOGY | Age: 66
Discharge: HOME | End: 2021-12-06
Payer: MEDICARE

## 2021-12-06 VITALS — HEIGHT: 69 IN | WEIGHT: 150 LBS | BODY MASS INDEX: 22.22 KG/M2

## 2021-12-06 DIAGNOSIS — Z12.31 VISIT FOR SCREENING MAMMOGRAM: ICD-10-CM

## 2021-12-06 PROCEDURE — 77063 BREAST TOMOSYNTHESIS BI: CPT

## 2021-12-06 PROCEDURE — 77067 SCR MAMMO BI INCL CAD: CPT

## 2021-12-14 ENCOUNTER — TELEPHONE (OUTPATIENT)
Dept: OBGYN CLINIC | Facility: HOSPITAL | Age: 66
End: 2021-12-14

## 2021-12-16 ENCOUNTER — TELEPHONE (OUTPATIENT)
Dept: OBGYN CLINIC | Facility: HOSPITAL | Age: 66
End: 2021-12-16

## 2021-12-17 ENCOUNTER — TELEPHONE (OUTPATIENT)
Dept: OBGYN CLINIC | Facility: MEDICAL CENTER | Age: 66
End: 2021-12-17

## 2021-12-22 ENCOUNTER — APPOINTMENT (OUTPATIENT)
Dept: LAB | Age: 66
End: 2021-12-22
Payer: MEDICARE

## 2021-12-22 DIAGNOSIS — E03.9 MYXEDEMA HEART DISEASE: ICD-10-CM

## 2021-12-22 DIAGNOSIS — E06.3 CHRONIC LYMPHOCYTIC THYROIDITIS: ICD-10-CM

## 2021-12-22 DIAGNOSIS — E66.8 OBESITY OF ENDOCRINE ORIGIN: ICD-10-CM

## 2021-12-22 DIAGNOSIS — I10 ESSENTIAL HYPERTENSION, MALIGNANT: ICD-10-CM

## 2021-12-22 DIAGNOSIS — I51.9 MYXEDEMA HEART DISEASE: ICD-10-CM

## 2021-12-22 LAB
ALBUMIN SERPL BCP-MCNC: 3.7 G/DL (ref 3.5–5)
ALP SERPL-CCNC: 50 U/L (ref 46–116)
ALT SERPL W P-5'-P-CCNC: 23 U/L (ref 12–78)
ANION GAP SERPL CALCULATED.3IONS-SCNC: 3 MMOL/L (ref 4–13)
AST SERPL W P-5'-P-CCNC: 17 U/L (ref 5–45)
BASOPHILS # BLD AUTO: 0.02 THOUSANDS/ΜL (ref 0–0.1)
BASOPHILS NFR BLD AUTO: 1 % (ref 0–1)
BILIRUB SERPL-MCNC: 0.5 MG/DL (ref 0.2–1)
BUN SERPL-MCNC: 25 MG/DL (ref 5–25)
CALCIUM SERPL-MCNC: 9.2 MG/DL (ref 8.3–10.1)
CHLORIDE SERPL-SCNC: 104 MMOL/L (ref 100–108)
CO2 SERPL-SCNC: 32 MMOL/L (ref 21–32)
CREAT SERPL-MCNC: 1.01 MG/DL (ref 0.6–1.3)
EOSINOPHIL # BLD AUTO: 0.1 THOUSAND/ΜL (ref 0–0.61)
EOSINOPHIL NFR BLD AUTO: 2 % (ref 0–6)
ERYTHROCYTE [DISTWIDTH] IN BLOOD BY AUTOMATED COUNT: 15.1 % (ref 11.6–15.1)
EST. AVERAGE GLUCOSE BLD GHB EST-MCNC: 120 MG/DL
GFR SERPL CREATININE-BSD FRML MDRD: 57 ML/MIN/1.73SQ M
GLUCOSE P FAST SERPL-MCNC: 83 MG/DL (ref 65–99)
HBA1C MFR BLD: 5.8 %
HCT VFR BLD AUTO: 39.5 % (ref 34.8–46.1)
HGB BLD-MCNC: 12.4 G/DL (ref 11.5–15.4)
IMM GRANULOCYTES # BLD AUTO: 0.01 THOUSAND/UL (ref 0–0.2)
IMM GRANULOCYTES NFR BLD AUTO: 0 % (ref 0–2)
LYMPHOCYTES # BLD AUTO: 1.05 THOUSANDS/ΜL (ref 0.6–4.47)
LYMPHOCYTES NFR BLD AUTO: 24 % (ref 14–44)
MAGNESIUM SERPL-MCNC: 2.2 MG/DL (ref 1.6–2.6)
MCH RBC QN AUTO: 28.4 PG (ref 26.8–34.3)
MCHC RBC AUTO-ENTMCNC: 31.4 G/DL (ref 31.4–37.4)
MCV RBC AUTO: 90 FL (ref 82–98)
MONOCYTES # BLD AUTO: 0.36 THOUSAND/ΜL (ref 0.17–1.22)
MONOCYTES NFR BLD AUTO: 8 % (ref 4–12)
NEUTROPHILS # BLD AUTO: 2.83 THOUSANDS/ΜL (ref 1.85–7.62)
NEUTS SEG NFR BLD AUTO: 65 % (ref 43–75)
NRBC BLD AUTO-RTO: 0 /100 WBCS
PHOSPHATE SERPL-MCNC: 3.8 MG/DL (ref 2.3–4.1)
PLATELET # BLD AUTO: 230 THOUSANDS/UL (ref 149–390)
PMV BLD AUTO: 10.7 FL (ref 8.9–12.7)
POTASSIUM SERPL-SCNC: 3.8 MMOL/L (ref 3.5–5.3)
PROT SERPL-MCNC: 7 G/DL (ref 6.4–8.2)
RBC # BLD AUTO: 4.37 MILLION/UL (ref 3.81–5.12)
SODIUM SERPL-SCNC: 139 MMOL/L (ref 136–145)
T4 FREE SERPL-MCNC: 1.26 NG/DL (ref 0.76–1.46)
TSH SERPL DL<=0.05 MIU/L-ACNC: 3.07 UIU/ML (ref 0.36–3.74)
WBC # BLD AUTO: 4.37 THOUSAND/UL (ref 4.31–10.16)

## 2021-12-22 PROCEDURE — 36415 COLL VENOUS BLD VENIPUNCTURE: CPT

## 2021-12-22 PROCEDURE — 84439 ASSAY OF FREE THYROXINE: CPT

## 2021-12-22 PROCEDURE — 83735 ASSAY OF MAGNESIUM: CPT

## 2021-12-22 PROCEDURE — 83036 HEMOGLOBIN GLYCOSYLATED A1C: CPT

## 2021-12-22 PROCEDURE — 84443 ASSAY THYROID STIM HORMONE: CPT

## 2021-12-22 PROCEDURE — 80053 COMPREHEN METABOLIC PANEL: CPT

## 2021-12-22 PROCEDURE — 85025 COMPLETE CBC W/AUTO DIFF WBC: CPT

## 2021-12-22 PROCEDURE — 84100 ASSAY OF PHOSPHORUS: CPT

## 2022-01-26 ENCOUNTER — OFFICE VISIT (OUTPATIENT)
Dept: SURGICAL ONCOLOGY | Facility: CLINIC | Age: 67
End: 2022-01-26
Payer: MEDICARE

## 2022-01-26 VITALS
OXYGEN SATURATION: 97 % | HEIGHT: 69 IN | WEIGHT: 154 LBS | SYSTOLIC BLOOD PRESSURE: 124 MMHG | DIASTOLIC BLOOD PRESSURE: 72 MMHG | RESPIRATION RATE: 16 BRPM | TEMPERATURE: 97.1 F | BODY MASS INDEX: 22.81 KG/M2 | HEART RATE: 72 BPM

## 2022-01-26 DIAGNOSIS — Z85.3 HISTORY OF RIGHT BREAST CANCER: ICD-10-CM

## 2022-01-26 DIAGNOSIS — Z08 ENCOUNTER FOR FOLLOW-UP EXAMINATION AFTER COMPLETED TREATMENT FOR MALIGNANT NEOPLASM: Primary | ICD-10-CM

## 2022-01-26 DIAGNOSIS — Z12.31 VISIT FOR SCREENING MAMMOGRAM: ICD-10-CM

## 2022-01-26 PROCEDURE — 99213 OFFICE O/P EST LOW 20 MIN: CPT | Performed by: NURSE PRACTITIONER

## 2022-01-26 NOTE — PROGRESS NOTES
Surgical Oncology Follow Up       [address] [place] [address]  CANCER CARE [facility] SURGICAL ONCOLOGY [place]  [address] [place] [address]    Hanny Perry  1955  [id number]  [address]  CANCER [facility] SURGICAL ONCOLOGY [place]  [address] [facility] [address]    Chief Complaint   Patient presents with    Other     office visit       Assessment/Plan:  1  Encounter for follow-up examination after completed treatment for malignant neoplasm    2  History of right breast cancer  - BUN; Future  - Creatinine, serum; Future  - MRI breast bilateral w and wo contrast w cad; Future    3  Visit for screening mammogram  - Mammo screening bilateral w 3d & cad; Future      Discussion/Summary: Patient is a 76 year old female who presents today for a one year follow-up for right breast cancer diagnosed in January 2014  Her pathology revealed invasive ductal carcinoma, grade 1, ER/MT positive, HER-2 negative  She underwent a right breast lumpectomy and sentinel node biopsy by Dr Yvonne David  She completed whole breast radiation therapy and completed 5 years of aromatase inhibitor therapy   She underwent genetic testing within the today in February of 2020 which was negative    She had a bilateral breast MRI performed on  November 22, 2021 which was BI-RADS 2  She had a screening mammogram on December 6, 2021 which was BI-RADS 1, category 2 density  She offers no new complaints today and there are no concerns on today's exam   I will make arrangements for her breast imaging and plan to see the patient back in 1 year for a survivorship visit  She was instructed to contact me with any changes or concerns in the interim  All of her questions were answered today        History of Present Illness:     Oncology History   History of right breast cancer   12/26/2013 Biopsy    Right breast biopsy, 12:00    Infiltrating mammary carcinoma  %  %  HER-2 negative     1/17/2014 Surgery Right lumpectomy (Dr Anne Wolff)    Stage IA- pT1b, pN0, G1     2/21/2014 - 4/3/2014 Radiation    WBRT (Dr Melania Raya)     2/25/2014 Genomic Testing    Oncotype 8, low risk     2014 - 2019 Hormone Therapy    Letrozole (Dr Francisco J Martin)     2/2020 Genetic Testing    [facility] Cancers Panel (23 genes): GILLIAN, BARD1, BRCA1, BRCA2, BRIP1, CDH1, CHEK2, DICER1, EPCAM, MLH1, MSH2, MSH6, NBN, NF1, PALB2, PMS2, PTEN, RAD50, RAD51C, RAD51D, SMARCA4, STK11, TP53            -Interval History:  Patient presents today for follow-up visit for right breast cancer  She notices no changes on her self breast exam   She had an MRI and mammogram which revealed no worrisome findings  She denies persistent headaches, bone pain, cough or shortness of breath, abdominal pain  She has chronic lower back pain  She recently retired  Review of Systems:  Review of Systems   Constitutional: Negative for activity change, appetite change, chills, fatigue, fever and unexpected weight change  Respiratory: Negative for cough and shortness of breath  Cardiovascular: Negative for chest pain  Gastrointestinal: Negative for abdominal pain, constipation, diarrhea, nausea and vomiting  Musculoskeletal: Negative for arthralgias, back pain, gait problem and myalgias  Skin: Negative for color change and rash  Neurological: Negative for dizziness and headaches  Hematological: Negative for adenopathy  Psychiatric/Behavioral: Negative for agitation and confusion  All other systems reviewed and are negative        Patient Active Problem List   Diagnosis    Primary osteoarthritis of right knee    Meralgia paraesthetica, left    Acquired spondylolisthesis    Acquired hypothyroidism    Leukopenia    Lumbar radiculopathy    History of right breast cancer    Osteopenia of multiple sites    Seasonal allergies    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    Benign essential HTN    Urge incontinence of urine    Glucose intolerance  Encounter for follow-up examination after completed treatment for malignant neoplasm    Pure hypercholesterolemia    Ascending aorta dilatation (HCC)    Coronary artery disease    Sacroiliitis (HCC)    Lumbar spondylosis    Spinal stenosis of lumbar region    DDD (degenerative disc disease), lumbar    Trigger finger, right middle finger    Nasal mass    Cerumen debris on tympanic membrane of left ear     Past Medical History:   Diagnosis Date    Arthritis     BRCA1 negative     BRCA2 negative     Breast cancer ([facility] [address] ) 01/17/2014    radiation    Disease of thyroid gland     History of radiation therapy 2014    breast cancer    Hypothyroid     Knee pain, right     Limb alert care status     no BP's /IV's right side    Mitral valve prolapse     Numbness of anterior thigh     left and tingling    Seasonal allergies     dust,mold,trees    Urinary incontinence     Varicose veins of both lower extremities     Wears glasses      Past Surgical History:   Procedure Laterality Date    BREAST BIOPSY Left 01/16/2002    BREAST BIOPSY Left 08/05/2005    high risk    BREAST BIOPSY Right 12/26/2013    positive    BREAST EXCISIONAL BIOPSY Left 04/06/2006    high risk    BREAST EXCISIONAL BIOPSY Left 10/13/2006    BREAST LUMPECTOMY Right 01/17/2014    malignant    BREAST SURGERY Right     lumpectomy    BUNIONECTOMY Bilateral     COLONOSCOPY      EXPLORATORY LAPAROTOMY      "for fertility"    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    KNEE ARTHROSCOPY Right     CT TOTAL KNEE ARTHROPLASTY Right 1/20/2017    Procedure: ARTHROPLASTY KNEE TOTAL;  Surgeon: Tu Adams MD;  Location: [facility];  Service: Orthopedics    TONSILLECTOMY      WISDOM TOOTH EXTRACTION       Family History   Problem Relation Age of Onset    Uterine cancer Mother 71    Throat cancer Father [de-identified]    Breast cancer additional onset Paternal Aunt 54    Breast cancer additional onset Cousin 28    No Known Problems Daughter     No Known Problems Maternal Grandmother     No Known Problems Maternal Grandfather     No Known Problems Paternal Grandmother     No Known Problems Paternal Grandfather     No Known Problems Daughter     No Known Problems Maternal Aunt     No Known Problems Maternal Aunt     No Known Problems Maternal Aunt     No Known Problems Maternal Aunt     No Known Problems Paternal [de-identified]     Colon cancer Maternal Uncle 79     Social History     Socioeconomic History    Marital status: Single     Spouse name: Not on file    Number of children: Not on file    Years of education: Not on file    Highest education level: Not on file   Occupational History    Not on file   Tobacco Use    Smoking status: Never Smoker    Smokeless tobacco: Never Used   Vaping Use    Vaping Use: Never used   Substance and Sexual Activity    Alcohol use: Yes     Comment: rare    Drug use: No    Sexual activity: Not Currently     Birth control/protection: Abstinence, Post-menopausal   Other Topics Concern    Not on file   Social History Narrative    Not on file     Social Determinants of Health     Financial Resource Strain: Not on file   Food Insecurity: Not on file   Transportation Needs: Not on file   Physical Activity: Not on file   Stress: Not on file   Social Connections: Not on file   Intimate Partner Violence: Not on file   Housing Stability: Not on file       Current Outpatient Medications:     chlorthalidone 25 mg tablet, Take 1 tablet (25 mg total) by mouth daily, Disp: 90 tablet, Rfl: 3    levothyroxine (SYNTHROID) 75 mcg tablet, Take 1 tablet by mouth every other day, Disp: , Rfl:     levothyroxine (SYNTHROID) 88 mcg tablet, Take 1 tablet by mouth every other day, Disp: , Rfl:     loratadine (CLARITIN) 10 mg tablet, Take 10 mg by mouth daily, Disp: , Rfl:     Multiple Vitamins-Minerals (MULTIVITAMIN ADULT PO), multivitamin, Disp: , Rfl:     potassium chloride (K-DUR,KLOR-CON) 10 mEq tablet, Take 1 tablet by mouth daily  , Disp: , Rfl:     solifenacin (VESICARE) 10 MG tablet, Take 1 tablet (10 mg total) by mouth daily, Disp: 90 tablet, Rfl: 3    thiamine (VITAMIN B1) 100 mg tablet, Take 100 mg by mouth daily  , Disp: , Rfl:     VITAMIN D, ERGOCALCIFEROL, PO, Take 2,000 Units by mouth daily, Disp: , Rfl:     zinc gluconate 50 mg tablet, Take 50 mg by mouth daily  , Disp: , Rfl:   Allergies   Allergen Reactions    Actifed Cold-Allergy [Chlorpheniramine-Phenylephrine] Swelling and Rash     Throat and face swelled    Penicillins Rash    Vancomycin Other (See Comments)     redness     Vitals:    01/26/22 1525   BP: 124/72   Pulse: 72   Resp: 16   Temp: (!) 97 1 °F (36 2 °C)   SpO2: 97%       Physical Exam  Vitals reviewed  Constitutional:       General: She is not in acute distress  Appearance: Normal appearance  She is well-developed  She is not diaphoretic  HENT:      Head: Normocephalic and atraumatic  Cardiovascular:      Rate and Rhythm: Normal rate and regular rhythm  Heart sounds: Normal heart sounds  Pulmonary:      Effort: Pulmonary effort is normal       Breath sounds: Normal breath sounds  Chest:   Breasts:      Right: Skin change (surgical scars) present  No swelling, bleeding, inverted nipple, mass, nipple discharge, tenderness, axillary adenopathy or supraclavicular adenopathy  Left: Skin change (surgical scar) present  No swelling, bleeding, inverted nipple, mass, nipple discharge, tenderness, axillary adenopathy or supraclavicular adenopathy  Abdominal:      Palpations: Abdomen is soft  There is no mass  Tenderness: There is no abdominal tenderness  Musculoskeletal:         General: Normal range of motion  Cervical back: Normal range of motion  Lymphadenopathy:      Upper Body:      Right upper body: No supraclavicular or axillary adenopathy  Left upper body: No supraclavicular or axillary adenopathy  Skin:     General: Skin is warm and dry  Findings: No rash  Neurological:      Mental Status: She is alert and oriented to person, place, and time  Psychiatric:         Speech: Speech normal              Advance Care Planning/Advance Directives:  Discussed disease status, cancer treatment plans and/or cancer treatment goals with the patient

## 2022-02-28 ENCOUNTER — OFFICE VISIT (OUTPATIENT)
Dept: FAMILY MEDICINE CLINIC | Facility: CLINIC | Age: 67
End: 2022-02-28
Payer: MEDICARE

## 2022-02-28 VITALS
HEART RATE: 64 BPM | TEMPERATURE: 95.5 F | WEIGHT: 159 LBS | HEIGHT: 69 IN | BODY MASS INDEX: 23.55 KG/M2 | SYSTOLIC BLOOD PRESSURE: 122 MMHG | RESPIRATION RATE: 16 BRPM | DIASTOLIC BLOOD PRESSURE: 76 MMHG

## 2022-02-28 DIAGNOSIS — R73.01 IMPAIRED FASTING GLUCOSE: ICD-10-CM

## 2022-02-28 DIAGNOSIS — I71.2 ASCENDING AORTIC ANEURYSM (HCC): ICD-10-CM

## 2022-02-28 DIAGNOSIS — M47.816 LUMBAR SPONDYLOSIS: ICD-10-CM

## 2022-02-28 DIAGNOSIS — I10 BENIGN ESSENTIAL HTN: Primary | ICD-10-CM

## 2022-02-28 DIAGNOSIS — C50.411 MALIGNANT NEOPLASM OF UPPER-OUTER QUADRANT OF RIGHT BREAST IN FEMALE, ESTROGEN RECEPTOR POSITIVE (HCC): ICD-10-CM

## 2022-02-28 DIAGNOSIS — M85.89 OSTEOPENIA OF MULTIPLE SITES: ICD-10-CM

## 2022-02-28 DIAGNOSIS — E03.9 ACQUIRED HYPOTHYROIDISM: ICD-10-CM

## 2022-02-28 DIAGNOSIS — G57.12 MERALGIA PARAESTHETICA, LEFT: ICD-10-CM

## 2022-02-28 DIAGNOSIS — M16.12 PRIMARY OSTEOARTHRITIS OF LEFT HIP: ICD-10-CM

## 2022-02-28 DIAGNOSIS — Z17.0 MALIGNANT NEOPLASM OF UPPER-OUTER QUADRANT OF RIGHT BREAST IN FEMALE, ESTROGEN RECEPTOR POSITIVE (HCC): ICD-10-CM

## 2022-02-28 PROBLEM — I25.10 CORONARY ARTERY DISEASE: Status: RESOLVED | Noted: 2021-01-25 | Resolved: 2022-02-28

## 2022-02-28 PROBLEM — I71.21 ASCENDING AORTIC ANEURYSM: Status: ACTIVE | Noted: 2020-12-28

## 2022-02-28 PROBLEM — Z08 ENCOUNTER FOR FOLLOW-UP EXAMINATION AFTER COMPLETED TREATMENT FOR MALIGNANT NEOPLASM: Status: RESOLVED | Noted: 2020-01-14 | Resolved: 2022-02-28

## 2022-02-28 PROBLEM — H61.22 CERUMEN DEBRIS ON TYMPANIC MEMBRANE OF LEFT EAR: Status: RESOLVED | Noted: 2021-11-16 | Resolved: 2022-02-28

## 2022-02-28 PROCEDURE — 99214 OFFICE O/P EST MOD 30 MIN: CPT | Performed by: FAMILY MEDICINE

## 2022-03-07 DIAGNOSIS — N32.81 OAB (OVERACTIVE BLADDER): ICD-10-CM

## 2022-03-07 RX ORDER — SOLIFENACIN SUCCINATE 10 MG/1
10 TABLET, FILM COATED ORAL DAILY
Qty: 90 TABLET | Refills: 3 | Status: SHIPPED | OUTPATIENT
Start: 2022-03-07

## 2022-03-10 ENCOUNTER — TELEPHONE (OUTPATIENT)
Dept: HEMATOLOGY ONCOLOGY | Facility: CLINIC | Age: 67
End: 2022-03-10

## 2022-03-10 NOTE — TELEPHONE ENCOUNTER
I called the patient & left a message with the information that her appointment on 4/08 had to be rescheduled a little later that day   I rescheduled the patient for 4/08 @ 10:30 am

## 2022-04-04 ENCOUNTER — TELEPHONE (OUTPATIENT)
Dept: HEMATOLOGY ONCOLOGY | Facility: CLINIC | Age: 67
End: 2022-04-04

## 2022-04-04 ENCOUNTER — APPOINTMENT (OUTPATIENT)
Dept: LAB | Age: 67
End: 2022-04-04
Payer: MEDICARE

## 2022-04-04 DIAGNOSIS — Z85.3 HISTORY OF RIGHT BREAST CANCER: Primary | ICD-10-CM

## 2022-04-04 NOTE — TELEPHONE ENCOUNTER
CALL RETURN FORM   Reason for patient call? Patient asking if any labs needed for appmt with Ms CARLINE TORRES    Patient's primary oncologist? Ms CARLINE KAMARA MELISSA   Name of person the patient was calling for? Ms CARLINE TORRES   Any additional information to add, if applicable? Please call [phone]   Informed patient that the message will be forwarded to the team and someone will get back to them as soon as possible    Did you relay this information to the patient?  Yes

## 2022-04-05 ENCOUNTER — HOSPITAL ENCOUNTER (OUTPATIENT)
Facility: HOSPITAL | Age: 67
Setting detail: OUTPATIENT SURGERY
Discharge: HOME | End: 2022-04-05
Attending: ORTHOPAEDIC SURGERY | Admitting: ORTHOPAEDIC SURGERY
Payer: MEDICARE

## 2022-04-05 VITALS
HEIGHT: 69 IN | OXYGEN SATURATION: 99 % | DIASTOLIC BLOOD PRESSURE: 61 MMHG | SYSTOLIC BLOOD PRESSURE: 117 MMHG | HEART RATE: 60 BPM | RESPIRATION RATE: 16 BRPM | BODY MASS INDEX: 22.36 KG/M2 | WEIGHT: 151 LBS | TEMPERATURE: 95.7 F

## 2022-04-05 DIAGNOSIS — M65.331 TRIGGER MIDDLE FINGER OF RIGHT HAND: ICD-10-CM

## 2022-04-05 PROCEDURE — 26055 INCISE FINGER TENDON SHEATH: CPT | Performed by: PHYSICIAN ASSISTANT

## 2022-04-05 PROCEDURE — NC001 PR NO CHARGE: Performed by: ORTHOPAEDIC SURGERY

## 2022-04-05 PROCEDURE — 26055 INCISE FINGER TENDON SHEATH: CPT | Performed by: ORTHOPAEDIC SURGERY

## 2022-04-05 RX ORDER — NAPROXEN SODIUM 220 MG
220 TABLET ORAL 2 TIMES DAILY WITH MEALS
Qty: 20 TABLET | Refills: 0 | Status: SHIPPED | OUTPATIENT
Start: 2022-04-05 | End: 2022-07-26

## 2022-04-05 RX ORDER — MAGNESIUM HYDROXIDE 1200 MG/15ML
LIQUID ORAL AS NEEDED
Status: DISCONTINUED | OUTPATIENT
Start: 2022-04-05 | End: 2022-04-05 | Stop reason: HOSPADM

## 2022-04-05 RX ORDER — SENNOSIDES 8.6 MG
650 CAPSULE ORAL EVERY 8 HOURS PRN
Qty: 30 TABLET | Refills: 0 | Status: SHIPPED | OUTPATIENT
Start: 2022-04-05

## 2022-04-05 RX ORDER — LIDOCAINE HYDROCHLORIDE AND EPINEPHRINE 10; 10 MG/ML; UG/ML
20 INJECTION, SOLUTION INFILTRATION; PERINEURAL ONCE
Status: DISCONTINUED | OUTPATIENT
Start: 2022-04-05 | End: 2022-04-05 | Stop reason: HOSPADM

## 2022-04-05 RX ORDER — HYDROCODONE BITARTRATE AND ACETAMINOPHEN 5; 325 MG/1; MG/1
1 TABLET ORAL EVERY 6 HOURS PRN
Qty: 5 TABLET | Refills: 0 | Status: SHIPPED | OUTPATIENT
Start: 2022-04-05 | End: 2022-04-10

## 2022-04-05 NOTE — H&P
H&P Exam - Orthopedics   Master Gagnon 74 y o  female MRN: [id number]  Unit/Bed#: P309 A PRE    Assessment/Plan   Assessment:  Right long and ring finger trigger finger  Plan:  Right long and ring finger trigger finger release    History of Present Illness   HPI:  Master Gagnon is a 74 y o  female who presents with popping and locking of the right long and ring fingers  Patient has not responded to non operative modalities  She would like to proceed with surgery today      Historical Information  Review Of Systems:   · Skin: Normal  · Neuro: See HPI  · Musculoskeletal: See HPI  · 14 point review of systems negative except as stated above     Past Medical History:   Past Medical History:   Diagnosis Date    Arthritis     BRCA1 negative     BRCA2 negative     [facility]    radiation    Disease of thyroid gland     History of radiation therapy 2014    breast cancer    Hypothyroid     Knee pain, right     Limb alert care status     no BP's /IV's right side    Mitral valve prolapse     Numbness of anterior thigh     left and tingling    Seasonal allergies     dust,mold,trees    Urinary incontinence     Varicose veins of both lower extremities     Wears glasses        Past Surgical History:   Past Surgical History:   Procedure Laterality Date    BREAST BIOPSY Left 01/16/2002    BREAST BIOPSY Left 08/05/2005    high risk    BREAST BIOPSY Right 12/26/2013    positive    BREAST EXCISIONAL BIOPSY Left 04/06/2006    high risk    BREAST EXCISIONAL BIOPSY Left 10/13/2006    BREAST LUMPECTOMY Right 01/17/2014    malignant    BREAST SURGERY Right     lumpectomy    BUNIONECTOMY Bilateral     COLONOSCOPY      EXPLORATORY LAPAROTOMY      "for fertility"    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    KNEE ARTHROSCOPY Right     WA TOTAL KNEE ARTHROPLASTY Right 1/20/2017    Procedure: ARTHROPLASTY KNEE TOTAL;  Surgeon: Kassidy Robles MD;  Location: [facility];  Service: Orthopedics    TONSILLECTOMY      WISDOM TOOTH EXTRACTION         Family History:  Family history reviewed and non-contributory  Family History   Problem Relation Age of Onset    Uterine cancer Mother 71    Throat cancer Father [de-identified]    Breast cancer additional onset Paternal Aunt Moni Sutherland    Breast cancer additional onset Cousin 28    No Known Problems Daughter     No Known Problems Maternal Grandmother     No Known Problems Maternal Grandfather     No Known Problems Paternal Grandmother     No Known Problems Paternal Grandfather     No Known Problems Daughter     No Known Problems Maternal Aunt     No Known Problems Maternal Aunt     No Known Problems Maternal Aunt     No Known Problems Maternal Aunt     No Known Problems Paternal [de-identified]     Colon cancer Maternal Uncle 79       Social History:  Social History     Socioeconomic History    Marital status: Single     Spouse name: None    Number of children: None    Years of education: None    Highest education level: None   Occupational History    None   Tobacco Use    Smoking status: Never Smoker    Smokeless tobacco: Never Used   Vaping Use    Vaping Use: Never used   Substance and Sexual Activity    Alcohol use: Yes     Comment: rare    Drug use: No    Sexual activity: Not Currently     Birth control/protection: Abstinence, Post-menopausal   Other Topics Concern    None   Social History Narrative    None     Social Determinants of Health     Financial Resource Strain: Not on file   Food Insecurity: Not on file   Transportation Needs: Not on file   Physical Activity: Not on file   Stress: Not on file   Social Connections: Not on file   Intimate Partner Violence: Not on file   Housing Stability: Not on file       Allergies:    Allergies   Allergen Reactions    Actifed Cold-Allergy [Chlorpheniramine-Phenylephrine] Swelling and Rash     Throat and face swelled    Penicillins Rash    Vancomycin Other (See Comments)     redness           Labs:  0 Lab Value Date/Time    HCT 38 9 04/04/2022 1310    HCT 39 5 12/22/2021 0838    HCT 35 6 05/24/2021 0736    HCT 40 5 09/30/2015 0737    HCT 42 4 06/03/2015 0727    HCT 40 7 03/04/2015 1044    HGB 12 9 04/04/2022 1310    HGB 12 4 12/22/2021 0838    HGB 11 6 05/24/2021 0736    HGB 13 5 09/30/2015 0737    HGB 13 7 06/03/2015 0727    HGB 13 6 03/04/2015 1044    INR 1 07 01/20/2017 1124    INR 0 98 01/03/2014 1322    WBC 3 79 (L) 04/04/2022 1310    WBC 4 37 12/22/2021 0838    WBC 3 09 (L) 05/24/2021 0736    WBC 4 55 09/30/2015 0737    WBC 4 19 (L) 06/03/2015 0727    WBC 4 22 (L) 03/04/2015 1044    ESR 14 01/25/2019 0759    ESR 13 05/14/2015 0820    CRP 8 7 (H) 07/21/2016 0757    CRP 7 6 (H) 05/14/2015 0820       Meds:    Current Facility-Administered Medications:     lidocaine-epinephrine (XYLOCAINE/EPINEPHRINE) 1 %-1:100,000 20 mL, sodium bicarbonate 2 mEq infiltration, , Infiltration, Once, Shakira Nevarez MD    lidocaine-epinephrine (XYLOCAINE/EPINEPHRINE) 1 %-1:100,000 injection 20 mL, 20 mL, Infiltration, Once, Shakira Nevarez MD    Blood Culture:   No results found for: BLOODCX    Wound Culture:   No results found for: WOUNDCULT    Ins and Outs:  No intake/output data recorded  Physical Exam  /75   Pulse 71   Temp (!) 95 5 °F (35 3 °C) (Temporal)   Resp 16   Ht 5' 8 5" (1 74 m)   Wt 68 5 kg (151 lb)   LMP  (LMP Unknown)   SpO2 97%   BMI 22 63 kg/m²   /75   Pulse 71   Temp (!) 95 5 °F (35 3 °C) (Temporal)   Resp 16   Ht 5' 8 5" (1 74 m)   Wt 68 5 kg (151 lb)   LMP  (LMP Unknown)   SpO2 97%   BMI 22 63 kg/m²   Gen: Alert and oriented to person, place, time  HEENT: EOMI, eyes clear, moist mucus membranes, hearing intact  Respiratory: Bilateral chest rise   No audible wheezing found  Cardiovascular: Regular Rate and Rhythm  Abdomen: soft nontender/nondistended  Ortho Exam:  Crepitation of tendons at A1 pulley  Neuro Exam:  Sensation intact    Lab Results: Reviewed  Imaging: Reviewed

## 2022-04-05 NOTE — OP NOTE
OPERATIVE REPORT  PATIENT NAME: Jaylin Miller  :  1955  MRN: [id number]  Pt Location: [facility]    SURGERY DATE: 22    Surgeon(s) and Role:     * Miguel Ángel Burgess MD - Primary     * Nayely Parker PA-C - Assisting    Pre-Op Diagnosis:  Trigger middle finger of right hand [M65 331]  Trigger ring finger of right hand    Post-Op Diagnosis:   Trigger middle finger of right hand [M65 331]  Trigger ring finger of right hand    Procedure(s) (LRB):  Right long and ring finger trigger finger release (Right)    Specimen(s):  * No orders in the log *    Estimated Blood Loss:   Minimal      Anesthesia Type:   Local    Operative Indications: The patient has a history of right hand long and ring trigger fingers that were recalcitrant to conservative management  The decision was made to bring the patient to the operating room for trigger finger release right hand long and ring finger  Risks of the procedure were explained which include, but are not limited to bleeding; infection; damage to nerves, arteries,veins, tendons; scar; pain; need for reoperation; failure to give desired result; and risks of anaesthesia  All questions were answered to satisfaction and they were willing to proceed  Operative Findings:  Right long finger trigger finger  Right ring finger trigger finger    Complications:   None    Procedure and Technique:  After the patient, site, and procedure were identified, the patient was brought into the operating room in a supine position  Local anaesthesia was adminstered in the preoperative holding area  A tourniquet was not used  The  right upper extremity was then prepped and drapped in a normal, sterile, orthopedic fashion  After the patient, site, and procedure were once again identified, attention was turned to the right long finger  An incision was made over the flexor tendon sheath at the level of the A1 pulley    Dissection was carried out in-line with the flexor tendon sheath and the radial and ulnar digital artery and nerve were protected  The A1 pulley was identified at the base of the incision  Under direct visualization, the A1 pulley was divided along the midline in its entirety with care taken to avoid injury to the underlying tendon  The tendons were examined to ensure that no further catching, popping, clicking or locking occurred with motion of the finger  After the patient, site, and procedure were once again identified, attention was turned to the right ring finger  An incision was made over the flexor tendon sheath at the level of the A1 pulley  Dissection was carried out in-line with the flexor tendon sheath and the radial and ulnar digital artery and nerve were protected  The A1 pulley was identified at the base of the incision  Under direct visualization, the A1 pulley was divided along the midline in its entirety with care taken to avoid injury to the underlying tendon  The tendons were examined to ensure that no further catching, popping, clicking or locking occurred with motion of the finger  At the completion of the procedure, hemostasis was obtained with cautery and direct pressure  The wounds were copiously irrigated with sterile solution  The wounds were closed with Prolene  Sterile dressings were applied, including Xeroform, gauze, tweeners, webril, ACE  Please note, all sponge, needle, and instrument counts were correct prior to closure  Loupe magnification was utilized  The patient tolerated the procedure well       I was present for all critical portions of the procedure, A qualified resident physician was not available and A physician assistant was required during the procedure for retraction tissue handling,dissection and suturing    Patient Disposition:  APU and hemodynamically stable    SIGNATURE: Greyson Diaz MD  DATE: 04/05/22  TIME: 8:29 AM

## 2022-04-08 ENCOUNTER — OFFICE VISIT (OUTPATIENT)
Dept: HEMATOLOGY ONCOLOGY | Facility: CLINIC | Age: 67
End: 2022-04-08
Payer: MEDICARE

## 2022-04-08 VITALS
DIASTOLIC BLOOD PRESSURE: 82 MMHG | HEART RATE: 63 BPM | SYSTOLIC BLOOD PRESSURE: 120 MMHG | OXYGEN SATURATION: 99 % | HEIGHT: 68 IN | RESPIRATION RATE: 17 BRPM | WEIGHT: 159 LBS | TEMPERATURE: 97 F | BODY MASS INDEX: 24.1 KG/M2

## 2022-04-08 DIAGNOSIS — Z17.0 MALIGNANT NEOPLASM OF UPPER-OUTER QUADRANT OF RIGHT BREAST IN FEMALE, ESTROGEN RECEPTOR POSITIVE (HCC): Primary | ICD-10-CM

## 2022-04-08 DIAGNOSIS — C50.411 MALIGNANT NEOPLASM OF UPPER-OUTER QUADRANT OF RIGHT BREAST IN FEMALE, ESTROGEN RECEPTOR POSITIVE (HCC): Primary | ICD-10-CM

## 2022-04-08 PROCEDURE — 99213 OFFICE O/P EST LOW 20 MIN: CPT | Performed by: PHYSICIAN ASSISTANT

## 2022-04-08 NOTE — PROGRESS NOTES
Hematology/Oncology Outpatient Follow-up  Prachi Higgins 74 y o  female 1955 [id number]    Date:  4/8/2022      Assessment and Plan:  1  History of malignant neoplasm of upper-outer quadrant of right breast in female, estrogen receptor positive [facility])  69-year-old female presents for follow-up regarding remote history of breast cancer  She received adjuvant aromatase inhibitor  She completed this  She remains in observation  Labs are normal   Physical exam is unremarkable  She continues follow-up with Surgical Oncology for imaging studies  She is encouraged to continue to do this  Due to now almost 10 years from diagnosis she can continue to follow-up as needed  She is agreeable  HPI:  Oncology History   History of right breast cancer   12/26/2013 Biopsy    Right breast biopsy, 12:00    Infiltrating mammary carcinoma  %  %  HER-2 negative     1/17/2014 Surgery    Right lumpectomy (Dr Ellyn Kocher)    Stage IA- pT1b, pN0, G1     2/21/2014 - 4/3/2014 Radiation    WBRT (Dr Rossy Turner)     2/25/2014 Genomic Testing    Oncotype 8, low risk     2014 - 2019 Hormone Therapy    Letrozole (Dr Jt Coleman)     2/2020 Genetic Testing    [facility] Cancers Panel (23 genes): GILLIAN, BARD1, BRCA1, BRCA2, BRIP1, CDH1, CHEK2, DICER1, EPCAM, MLH1, MSH2, MSH6, NBN, NF1, PALB2, PMS2, PTEN, RAD50, RAD51C, RAD51D, SMARCA4, STK11, TP48        69-year-old female presents for follow-up regarding history of 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, stage I, T1b, 6 mm, grade 1, breast cancer of the right upper outer quadrant of the right breast       She underwent right breast lumpectomy and sentinel lymph node sampling in January 2014  She had adjuvant radiation followed by adjuvant letrozole which she completed in February 2019  She has history of osteoporosis and follows with an outside provider regarding this    She previously was on Prolia now is on Reclast   She also continues to follow with Surgical Oncology for imaging studies  Interval history: patient had trigger finger surgery on the right hand     ROS: Review of Systems   Constitutional: Negative for appetite change, chills, fatigue, fever and unexpected weight change  Respiratory: Negative for cough and shortness of breath  Cardiovascular: Negative for chest pain, palpitations and leg swelling  Gastrointestinal: Negative for abdominal pain, constipation, diarrhea, nausea and vomiting  Genitourinary: Negative for difficulty urinating, dysuria and hematuria  Musculoskeletal: Negative for arthralgias  Skin: Negative  Neurological: Negative for dizziness, weakness, light-headedness, numbness and headaches  Hematological: Negative  Psychiatric/Behavioral: Negative          Past Medical History:   Diagnosis Date    Arthritis     BRCA1 negative     BRCA2 negative     Breast cancer ([facility] [address] ) 01/17/2014    radiation    Disease of thyroid gland     History of radiation therapy 2014    breast cancer    Hypothyroid     Knee pain, right     Limb alert care status     no BP's /IV's right side    Mitral valve prolapse     Numbness of anterior thigh     left and tingling    Seasonal allergies     dust,mold,trees    Urinary incontinence     Varicose veins of both lower extremities     Wears glasses        Past Surgical History:   Procedure Laterality Date    BREAST BIOPSY Left 01/16/2002    BREAST BIOPSY Left 08/05/2005    high risk    BREAST BIOPSY Right 12/26/2013    positive    BREAST EXCISIONAL BIOPSY Left 04/06/2006    high risk    BREAST EXCISIONAL BIOPSY Left 10/13/2006    BREAST LUMPECTOMY Right 01/17/2014    malignant    BREAST SURGERY Right     lumpectomy    BUNIONECTOMY Bilateral     COLONOSCOPY      EXPLORATORY LAPAROTOMY      "for fertility"    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    KNEE ARTHROSCOPY Right     [place] INCISE FINGER TENDON SHEATH Right 4/5/2022    Procedure: Right long and ring finger trigger finger release; Surgeon: Meredith Mosquera MD;  Location:  MAIN OR;  Service: Orthopedics    MI TOTAL KNEE ARTHROPLASTY Right 1/20/2017    Procedure: ARTHROPLASTY KNEE TOTAL;  Surgeon: Cornel Tiwari MD;  Location: [facility];  Service: Orthopedics    TONSILLECTOMY      WISDOM TOOTH EXTRACTION         Social History     Socioeconomic History    Marital status: Single     Spouse name: None    Number of children: None    Years of education: None    Highest education level: None   Occupational History    None   Tobacco Use    Smoking status: Never Smoker    Smokeless tobacco: Never Used   Vaping Use    Vaping Use: Never used   Substance and Sexual Activity    Alcohol use: Yes     Comment: rare    Drug use: No    Sexual activity: Not Currently     Birth control/protection: Abstinence, Post-menopausal   Other Topics Concern    None   Social History Narrative    None     Social Determinants of Health     Financial Resource Strain: Not on file   Food Insecurity: Not on file   Transportation Needs: Not on file   Physical Activity: Not on file   Stress: Not on file   Social Connections: Not on file   Intimate Partner Violence: Not on file   Housing Stability: Not on file       Family History   Problem Relation Age of Onset    Uterine cancer Mother 71    Throat cancer Father [de-identified]    Breast cancer additional onset Paternal Aunt 54    Breast cancer additional onset Cousin 28    No Known Problems Daughter     No Known Problems Maternal Grandmother     No Known Problems Maternal Grandfather     No Known Problems Paternal Grandmother     No Known Problems Paternal Grandfather     No Known Problems Daughter     No Known Problems Maternal Aunt     No Known Problems Maternal Aunt     No Known Problems Maternal Aunt     No Known Problems Maternal Aunt     No Known Problems Paternal Aunt     Colon cancer Maternal Uncle 79       Allergies   Allergen Reactions    Actifed Cold-Allergy [Chlorpheniramine-Phenylephrine] Swelling and Rash     Throat and face swelled    Penicillins Rash    Vancomycin Other (See Comments)     redness         Current Outpatient Medications:     acetaminophen (TYLENOL) 650 mg CR tablet, Take 1 tablet (650 mg total) by mouth every 8 (eight) hours as needed for mild pain, Disp: 30 tablet, Rfl: 0    chlorthalidone 25 mg tablet, Take 1 tablet (25 mg total) by mouth daily, Disp: 90 tablet, Rfl: 3    HYDROcodone-acetaminophen (NORCO) 5-325 mg per tablet, Take 1 tablet by mouth every 6 (six) hours as needed for pain for up to 5 days Max Daily Amount: 4 tablets, Disp: 5 tablet, Rfl: 0    levothyroxine (SYNTHROID) 75 mcg tablet, Take 1 tablet by mouth every other day, Disp: , Rfl:     levothyroxine (SYNTHROID) 88 mcg tablet, Take 1 tablet by mouth every other day, Disp: , Rfl:     loratadine (CLARITIN) 10 mg tablet, Take 10 mg by mouth daily, Disp: , Rfl:     Multiple Vitamins-Minerals (MULTIVITAMIN ADULT PO), multivitamin, Disp: , Rfl:     naproxen sodium (ALEVE) 220 MG tablet, Take 1 tablet (220 mg total) by mouth 2 (two) times a day with meals, Disp: 20 tablet, Rfl: 0    potassium chloride (K-DUR,KLOR-CON) 10 mEq tablet, Take 1 tablet by mouth daily  , Disp: , Rfl:     solifenacin (VESICARE) 10 MG tablet, Take 1 tablet (10 mg total) by mouth daily, Disp: 90 tablet, Rfl: 3    thiamine (VITAMIN B1) 100 mg tablet, Take 100 mg by mouth daily  , Disp: , Rfl:     VITAMIN D, ERGOCALCIFEROL, PO, Take 2,000 Units by mouth daily, Disp: , Rfl:     zinc gluconate 50 mg tablet, Take 50 mg by mouth daily  , Disp: , Rfl:     Physical Exam:  /82 (BP Location: Left arm, Patient Position: Sitting, Cuff Size: Adult)   Pulse 63   Temp (!) 97 °F (36 1 °C)   Resp 17   Ht 5' 8" (1 727 m)   Wt 72 1 kg (159 lb)   LMP  (LMP Unknown)   SpO2 99%   BMI 24 18 kg/m²     Physical Exam  Vitals reviewed  Constitutional:       General: She is not in acute distress  Appearance: She is well-developed   She is not ill-appearing  HENT:      Head: Normocephalic and atraumatic  Eyes:      General: No scleral icterus  Conjunctiva/sclera: Conjunctivae normal    Cardiovascular:      Rate and Rhythm: Normal rate and regular rhythm  Heart sounds: Normal heart sounds  No murmur heard  Pulmonary:      Effort: Pulmonary effort is normal  No respiratory distress  Breath sounds: Normal breath sounds  Chest:   Breasts:      Right: No mass, nipple discharge, skin change or tenderness  Left: No mass, nipple discharge, skin change or tenderness  Abdominal:      Palpations: Abdomen is soft  Tenderness: There is no abdominal tenderness  Musculoskeletal:         General: No tenderness  Normal range of motion  Cervical back: Normal range of motion and neck supple  Right lower leg: No edema  Left lower leg: No edema  Lymphadenopathy:      Cervical: No cervical adenopathy  Skin:     General: Skin is warm and dry  Neurological:      Mental Status: She is alert and oriented to person, place, and time  Cranial Nerves: No cranial nerve deficit  Psychiatric:         Mood and Affect: Mood normal          Behavior: Behavior normal        Labs:  Lab Results   Component Value Date    WBC 3 79 (L) 04/04/2022    HGB 12 9 04/04/2022    HCT 38 9 04/04/2022    MCV 90 04/04/2022     04/04/2022     Lab Results   Component Value Date     09/30/2015    K 3 4 (L) 04/04/2022     04/04/2022    CO2 29 04/04/2022    ANIONGAP 8 09/30/2015    BUN 21 04/04/2022    CREATININE 1 04 04/04/2022    GLUCOSE 89 09/30/2015    GLUF 83 12/22/2021    CALCIUM 9 1 04/04/2022    CORRECTEDCA 9 3 05/24/2021    AST 21 04/04/2022    ALT 23 04/04/2022    ALKPHOS 46 04/04/2022    PROT 7 0 09/30/2015    BILITOT 0 39 09/30/2015    EGFR 55 04/04/2022     Patient voiced understanding and agreement in the above discussion  Aware to contact our office with questions/symptoms in the interim       This note has been generated by voice recognition software system  Therefore, there may be spelling, grammar, and or syntax errors  Please contact if questions arise

## 2022-04-20 ENCOUNTER — OFFICE VISIT (OUTPATIENT)
Dept: OBGYN CLINIC | Facility: HOSPITAL | Age: 67
End: 2022-04-20

## 2022-04-20 VITALS — BODY MASS INDEX: 23.55 KG/M2 | WEIGHT: 159 LBS | HEIGHT: 69 IN

## 2022-04-20 DIAGNOSIS — M65.331 TRIGGER FINGER, RIGHT MIDDLE FINGER: Primary | ICD-10-CM

## 2022-04-20 PROCEDURE — 99024 POSTOP FOLLOW-UP VISIT: CPT | Performed by: PHYSICIAN ASSISTANT

## 2022-04-20 NOTE — PROGRESS NOTES
S/p right long and ring finger trigger finger release done 4/5/2022 by Dr Briana Bardales  Overall the patient states she is doing well, and her incisions have healed nicely  She does note clicking of the 2-5 fingers when she flexes and extends  It is not painful but is something she noticed postoperatively  She has not been doing much range-of-motion because she did not want to stretch the sutures  On examination, incisions are clean dry intact with no erythema drainage or wound dehiscence  There is no fusiform swelling or erythema of the digits  There is audible clicking of the PIP joints with extension of the fingers  Able to fully flex and extend the digits without locking  Sutures removed and Steri-Strips applied, and the patient tolerated well  Comfort Cool brace provided today  Recommend gentle range of motion exercises, and increase as tolerated    Slowly resume normal daily activities with avoiding heavy lifting at this time   Cleanse the incision with soapy water and pat dry   Do not have to cover at this point  Follow-up in 4 weeks with Dr Briana Bardales for re-evaluation given her symptoms

## 2022-05-02 ENCOUNTER — OFFICE VISIT (OUTPATIENT)
Dept: URGENT CARE | Age: 67
End: 2022-05-02
Payer: MEDICARE

## 2022-05-02 ENCOUNTER — APPOINTMENT (OUTPATIENT)
Dept: LAB | Age: 67
End: 2022-05-02
Payer: MEDICARE

## 2022-05-02 VITALS
RESPIRATION RATE: 16 BRPM | HEART RATE: 73 BPM | TEMPERATURE: 96.9 F | DIASTOLIC BLOOD PRESSURE: 69 MMHG | OXYGEN SATURATION: 99 % | SYSTOLIC BLOOD PRESSURE: 134 MMHG

## 2022-05-02 DIAGNOSIS — E06.3 CHRONIC LYMPHOCYTIC THYROIDITIS: ICD-10-CM

## 2022-05-02 DIAGNOSIS — I10 ESSENTIAL HYPERTENSION, MALIGNANT: ICD-10-CM

## 2022-05-02 DIAGNOSIS — N92.0 SPOTTING: Primary | ICD-10-CM

## 2022-05-02 DIAGNOSIS — R31.9 HEMATURIA, UNSPECIFIED TYPE: ICD-10-CM

## 2022-05-02 DIAGNOSIS — E03.9 MYXEDEMA HEART DISEASE: ICD-10-CM

## 2022-05-02 DIAGNOSIS — M54.50 ACUTE LEFT-SIDED LOW BACK PAIN WITHOUT SCIATICA: ICD-10-CM

## 2022-05-02 DIAGNOSIS — I51.9 MYXEDEMA HEART DISEASE: ICD-10-CM

## 2022-05-02 DIAGNOSIS — E34.9 ENDOCRINE DISORDER RELATED TO PUBERTY: ICD-10-CM

## 2022-05-02 LAB
ALBUMIN SERPL BCP-MCNC: 3.2 G/DL (ref 3.5–5)
ALP SERPL-CCNC: 50 U/L (ref 46–116)
ALT SERPL W P-5'-P-CCNC: 25 U/L (ref 12–78)
AMYLASE SERPL-CCNC: 73 IU/L (ref 25–115)
ANION GAP SERPL CALCULATED.3IONS-SCNC: 3 MMOL/L (ref 4–13)
AST SERPL W P-5'-P-CCNC: 19 U/L (ref 5–45)
BASOPHILS # BLD AUTO: 0.04 THOUSANDS/ΜL (ref 0–0.1)
BASOPHILS NFR BLD AUTO: 1 % (ref 0–1)
BILIRUB SERPL-MCNC: 0.41 MG/DL (ref 0.2–1)
BUN SERPL-MCNC: 25 MG/DL (ref 5–25)
CALCIUM ALBUM COR SERPL-MCNC: 9.8 MG/DL (ref 8.3–10.1)
CALCIUM SERPL-MCNC: 9.2 MG/DL (ref 8.3–10.1)
CHLORIDE SERPL-SCNC: 105 MMOL/L (ref 100–108)
CO2 SERPL-SCNC: 33 MMOL/L (ref 21–32)
CREAT SERPL-MCNC: 0.93 MG/DL (ref 0.6–1.3)
EOSINOPHIL # BLD AUTO: 0.11 THOUSAND/ΜL (ref 0–0.61)
EOSINOPHIL NFR BLD AUTO: 3 % (ref 0–6)
ERYTHROCYTE [DISTWIDTH] IN BLOOD BY AUTOMATED COUNT: 13.8 % (ref 11.6–15.1)
EST. AVERAGE GLUCOSE BLD GHB EST-MCNC: 114 MG/DL
GFR SERPL CREATININE-BSD FRML MDRD: 63 ML/MIN/1.73SQ M
GLUCOSE SERPL-MCNC: 104 MG/DL (ref 65–140)
HBA1C MFR BLD: 5.6 %
HCT VFR BLD AUTO: 39.8 % (ref 34.8–46.1)
HGB BLD-MCNC: 12.8 G/DL (ref 11.5–15.4)
IMM GRANULOCYTES # BLD AUTO: 0.01 THOUSAND/UL (ref 0–0.2)
IMM GRANULOCYTES NFR BLD AUTO: 0 % (ref 0–2)
LYMPHOCYTES # BLD AUTO: 1 THOUSANDS/ΜL (ref 0.6–4.47)
LYMPHOCYTES NFR BLD AUTO: 24 % (ref 14–44)
MAGNESIUM SERPL-MCNC: 2.1 MG/DL (ref 1.6–2.6)
MCH RBC QN AUTO: 30 PG (ref 26.8–34.3)
MCHC RBC AUTO-ENTMCNC: 32.2 G/DL (ref 31.4–37.4)
MCV RBC AUTO: 93 FL (ref 82–98)
MONOCYTES # BLD AUTO: 0.35 THOUSAND/ΜL (ref 0.17–1.22)
MONOCYTES NFR BLD AUTO: 8 % (ref 4–12)
NEUTROPHILS # BLD AUTO: 2.67 THOUSANDS/ΜL (ref 1.85–7.62)
NEUTS SEG NFR BLD AUTO: 64 % (ref 43–75)
NRBC BLD AUTO-RTO: 0 /100 WBCS
PHOSPHATE SERPL-MCNC: 3.6 MG/DL (ref 2.3–4.1)
PLATELET # BLD AUTO: 200 THOUSANDS/UL (ref 149–390)
PMV BLD AUTO: 10.6 FL (ref 8.9–12.7)
POTASSIUM SERPL-SCNC: 3.5 MMOL/L (ref 3.5–5.3)
PROT SERPL-MCNC: 6.5 G/DL (ref 6.4–8.2)
RBC # BLD AUTO: 4.26 MILLION/UL (ref 3.81–5.12)
SL AMB  POCT GLUCOSE, UA: ABNORMAL
SL AMB LEUKOCYTE ESTERASE,UA: ABNORMAL
SL AMB POCT BILIRUBIN,UA: ABNORMAL
SL AMB POCT BLOOD,UA: ABNORMAL
SL AMB POCT CLARITY,UA: CLEAR
SL AMB POCT COLOR,UA: YELLOW
SL AMB POCT KETONES,UA: ABNORMAL
SL AMB POCT NITRITE,UA: ABNORMAL
SL AMB POCT PH,UA: 5
SL AMB POCT SPECIFIC GRAVITY,UA: 1.01
SL AMB POCT URINE PROTEIN: ABNORMAL
SL AMB POCT UROBILINOGEN: ABNORMAL
SODIUM SERPL-SCNC: 141 MMOL/L (ref 136–145)
T4 FREE SERPL-MCNC: 1.19 NG/DL (ref 0.76–1.46)
TSH SERPL DL<=0.05 MIU/L-ACNC: 2.22 UIU/ML (ref 0.45–4.5)
WBC # BLD AUTO: 4.18 THOUSAND/UL (ref 4.31–10.16)

## 2022-05-02 PROCEDURE — 82150 ASSAY OF AMYLASE: CPT

## 2022-05-02 PROCEDURE — 84443 ASSAY THYROID STIM HORMONE: CPT

## 2022-05-02 PROCEDURE — G0463 HOSPITAL OUTPT CLINIC VISIT: HCPCS | Performed by: PHYSICIAN ASSISTANT

## 2022-05-02 PROCEDURE — 83735 ASSAY OF MAGNESIUM: CPT

## 2022-05-02 PROCEDURE — 81002 URINALYSIS NONAUTO W/O SCOPE: CPT | Performed by: PHYSICIAN ASSISTANT

## 2022-05-02 PROCEDURE — 87086 URINE CULTURE/COLONY COUNT: CPT | Performed by: PHYSICIAN ASSISTANT

## 2022-05-02 PROCEDURE — 99213 OFFICE O/P EST LOW 20 MIN: CPT | Performed by: PHYSICIAN ASSISTANT

## 2022-05-02 PROCEDURE — 36415 COLL VENOUS BLD VENIPUNCTURE: CPT

## 2022-05-02 PROCEDURE — 85025 COMPLETE CBC W/AUTO DIFF WBC: CPT

## 2022-05-02 PROCEDURE — 83036 HEMOGLOBIN GLYCOSYLATED A1C: CPT

## 2022-05-02 PROCEDURE — 84100 ASSAY OF PHOSPHORUS: CPT

## 2022-05-02 PROCEDURE — 84439 ASSAY OF FREE THYROXINE: CPT

## 2022-05-02 PROCEDURE — 80053 COMPREHEN METABOLIC PANEL: CPT

## 2022-05-02 NOTE — PROGRESS NOTES
[address]        NAME: Susanna Ashley is a 74 y o  female  : 1955    MRN: [id number]  DATE: May 2, 2022  TIME: 6:17 PM    Assessment and Plan   Spotting [N92 0]  1  Spotting  POCT urine dip    Urine culture   2  Acute left-sided low back pain without sciatica     3  Hematuria, unspecified type  Urine culture         Patient Instructions       Follow up with PCP in 3-5 days  Proceed to  ER if symptoms worsen  Chief Complaint     Chief Complaint   Patient presents with    Back Pain     low back for 1 week, vaginal bleeding/spotting         History of Present Illness       Patient is here for evaluation of persistent left low back pain which started a week ago  Patient states she has some discomfort with certain movements  She is able to sleep through the night without any difficulty  She denies any abdominal pain, nausea, vomiting, diarrhea  She states that she did note some possible spotting or blood from her urine she denies any urinary burning, pressure, frequency  Back Pain  Pertinent negatives include no dysuria or pelvic pain  Review of Systems   Review of Systems   Constitutional: Negative  Gastrointestinal: Negative  Genitourinary: Negative for decreased urine volume, difficulty urinating, dyspareunia, dysuria, flank pain, frequency, pelvic pain, urgency, vaginal discharge and vaginal pain  Vaginal versus urinary bleeding   Musculoskeletal: Positive for back pain           Current Medications       Current Outpatient Medications:     chlorthalidone 25 mg tablet, Take 1 tablet (25 mg total) by mouth daily, Disp: 90 tablet, Rfl: 3    levothyroxine (SYNTHROID) 75 mcg tablet, Take 1 tablet by mouth every other day, Disp: , Rfl:     levothyroxine (SYNTHROID) 88 mcg tablet, Take 1 tablet by mouth every other day, Disp: , Rfl:     loratadine (CLARITIN) 10 mg tablet, Take 10 mg by mouth daily, Disp: , Rfl:     Multiple Vitamins-Minerals (MULTIVITAMIN ADULT PO), multivitamin, Disp: , Rfl:     potassium chloride (K-DUR,KLOR-CON) 10 mEq tablet, Take 1 tablet by mouth daily  , Disp: , Rfl:     solifenacin (VESICARE) 10 MG tablet, Take 1 tablet (10 mg total) by mouth daily, Disp: 90 tablet, Rfl: 3    thiamine (VITAMIN B1) 100 mg tablet, Take 100 mg by mouth daily  , Disp: , Rfl:     VITAMIN D, ERGOCALCIFEROL, PO, Take 2,000 Units by mouth daily, Disp: , Rfl:     zinc gluconate 50 mg tablet, Take 50 mg by mouth daily  , Disp: , Rfl:     acetaminophen (TYLENOL) 650 mg CR tablet, Take 1 tablet (650 mg total) by mouth every 8 (eight) hours as needed for mild pain (Patient not taking: Reported on 5/2/2022 ), Disp: 30 tablet, Rfl: 0    naproxen sodium (ALEVE) 220 MG tablet, Take 1 tablet (220 mg total) by mouth 2 (two) times a day with meals (Patient not taking: Reported on 5/2/2022 ), Disp: 20 tablet, Rfl: 0    Current Allergies     Allergies as of 05/02/2022 - Reviewed 05/02/2022   Allergen Reaction Noted    Actifed cold-allergy [chlorpheniramine-phenylephrine] Swelling and Rash 01/10/2017    Penicillins Rash 01/10/2017    Vancomycin Other (See Comments) 01/10/2017            The following portions of the patient's history were reviewed and updated as appropriate: allergies, current medications, past family history, past medical history, past social history, past surgical history and problem list      Past Medical History:   Diagnosis Date    Arthritis     BRCA1 negative     BRCA2 negative     [facility]    radiation    Disease of thyroid gland     History of radiation therapy 2014    breast cancer    Hypothyroid     Knee pain, right     Limb alert care status     no BP's /IV's right side    Mitral valve prolapse     Numbness of anterior thigh     left and tingling    Seasonal allergies     dust,mold,trees    Urinary incontinence     Varicose veins of both lower extremities     Wears glasses        Past Surgical History:   Procedure Laterality Date    BREAST BIOPSY Left 01/16/2002    BREAST BIOPSY Left 08/05/2005    high risk    BREAST BIOPSY Right 12/26/2013    positive    BREAST EXCISIONAL BIOPSY Left 04/06/2006    high risk    BREAST EXCISIONAL BIOPSY Left 10/13/2006    BREAST LUMPECTOMY Right 01/17/2014    malignant    BREAST SURGERY Right     lumpectomy    BUNIONECTOMY Bilateral     COLONOSCOPY      EXPLORATORY LAPAROTOMY      "for fertility"    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    KNEE ARTHROSCOPY Right     [place] INCISE FINGER TENDON SHEATH Right 4/5/2022    Procedure: Right long and ring finger trigger finger release;  Surgeon: Huey Reagan MD;  [facility];  Service: Orthopedics    [place] TOTAL KNEE ARTHROPLASTY Right 1/20/2017    Procedure: ARTHROPLASTY KNEE TOTAL;  Surgeon: Reginaldo Yousif MD;  Location: [facility];  Service: Orthopedics    TONSILLECTOMY      WISDOM TOOTH EXTRACTION         Family History   Problem Relation Age of Onset    Uterine cancer Mother 71    Throat cancer Father [de-identified]    Breast cancer additional onset Paternal Aunt 54    Breast cancer additional onset Cousin 28    No Known Problems Daughter     No Known Problems Maternal Grandmother     No Known Problems Maternal Grandfather     No Known Problems Paternal Grandmother     No Known Problems Paternal Grandfather     No Known Problems Daughter     No Known Problems Maternal Aunt     No Known Problems Maternal Aunt     No Known Problems Maternal Aunt     No Known Problems Maternal Aunt     No Known Problems Paternal Aunt     Colon cancer Maternal Uncle 70         Medications have been verified  Objective   /69   Pulse 73   Temp (!) 96 9 °F (36 1 °C)   Resp 16   LMP  (LMP Unknown)   SpO2 99%   No LMP recorded (lmp unknown)  Patient is postmenopausal        Physical Exam     Physical Exam  Vitals and nursing note reviewed  Constitutional:       General: She is not in acute distress  Appearance: Normal appearance  She is well-developed  She is not ill-appearing, toxic-appearing or diaphoretic  HENT:      Head: Normocephalic and atraumatic  Eyes:      Extraocular Movements: Extraocular movements intact  Conjunctiva/sclera: Conjunctivae normal       Pupils: Pupils are equal, round, and reactive to light  Pulmonary:      Effort: Pulmonary effort is normal    Abdominal:      General: Abdomen is flat  Bowel sounds are normal  There is no distension  Tenderness: There is no abdominal tenderness  There is no right CVA tenderness, left CVA tenderness, guarding or rebound  Musculoskeletal:      Comments: Tenderness of the left lumbar paravertebral muscles  No spasm  Increased discomfort with flexion and extension  No rash  Skin:     General: Skin is warm and dry  Findings: No rash  Neurological:      General: No focal deficit present  Mental Status: She is alert and oriented to person, place, and time  Psychiatric:         Mood and Affect: Mood normal          Behavior: Behavior normal          Thought Content:  Thought content normal          Judgment: Judgment normal

## 2022-05-04 LAB — BACTERIA UR CULT: NORMAL

## 2022-05-06 ENCOUNTER — TELEPHONE (OUTPATIENT)
Dept: URGENT CARE | Facility: CLINIC | Age: 67
End: 2022-05-06

## 2022-05-17 ENCOUNTER — OFFICE VISIT (OUTPATIENT)
Dept: OBGYN CLINIC | Facility: CLINIC | Age: 67
End: 2022-05-17
Payer: MEDICARE

## 2022-05-17 VITALS
DIASTOLIC BLOOD PRESSURE: 82 MMHG | SYSTOLIC BLOOD PRESSURE: 118 MMHG | WEIGHT: 159.4 LBS | BODY MASS INDEX: 25.02 KG/M2 | HEIGHT: 67 IN

## 2022-05-17 DIAGNOSIS — N36.2 URETHRAL CARUNCLE: ICD-10-CM

## 2022-05-17 DIAGNOSIS — N95.0 PMB (POSTMENOPAUSAL BLEEDING): Primary | ICD-10-CM

## 2022-05-17 PROCEDURE — 99213 OFFICE O/P EST LOW 20 MIN: CPT | Performed by: OBSTETRICS & GYNECOLOGY

## 2022-05-17 PROCEDURE — 58100 BIOPSY OF UTERUS LINING: CPT | Performed by: OBSTETRICS & GYNECOLOGY

## 2022-05-17 PROCEDURE — 88305 TISSUE EXAM BY PATHOLOGIST: CPT | Performed by: PATHOLOGY

## 2022-05-17 NOTE — PROGRESS NOTES
Endometrial biopsy    Date/Time: 5/17/2022 8:29 AM  Performed by: Chase Scott MD  Authorized by: Chase Scott MD   Universal Protocol:  Consent: Verbal consent obtained    Risks and benefits: risks, benefits and alternatives were discussed  Consent given by: patient  Patient understanding: patient states understanding of the procedure being performed  Patient identity confirmed: verbally with patient      Indication:     Indications: Post-menopausal bleeding      Chronicity of post-menopausal bleeding:  Recurrent    Progression of post-menopausal bleeding:  Waxing and waning  Procedure:     Procedure: endometrial biopsy with Pipelle      A bivalve speculum was placed in the vagina: yes      Cervix cleaned and prepped: yes      The cervix was dilated: no      Uterus sounded: yes      Uterus sound depth (cm):  8    Specimen collected: specimen collected and sent to pathology      Patient tolerated procedure well with no complications: yes    Findings:     Uterus size:  Non-gravid    Cervix: normal      Adnexa: normal

## 2022-05-17 NOTE — PATIENT INSTRUCTIONS
The finding on your physical exam was a Urethral Caruncle  We will follow up when I have your ultrasound and endometrial biopsy results!     Pleasure to see you today!  - Dr Adrianne Almonte

## 2022-05-17 NOTE — PROGRESS NOTES
Assessment/Plan:    Reviewed evaluation for PMB  EMB completed today, see procedure note  Will obtain pelvic US  Discussed potential results - benign and malignant  Discussed exam finding of caruncle  Usually could tx with topical estrogen  If gyn workup without other cause will plan referral to urology for complete eval due to family history  Bethany Her was in agreement with plan, and all questions answered  PMB (postmenopausal bleeding)  -     Tissue Exam  -     US pelvis complete w transvaginal; Future  -     Endometrial biopsy    Urethral caruncle        Subjective:      Patient ID: Leonides Navas is a 74 y o  female  Bethany First presents today for a problem visit  She has had ongoing random spotting for a few months  She is certain is not rectal  Sometimes has a tiny spot on a pad  No associated pain  She has a family history of bladder cancer in her mom, and uterine cancer in the family as well so was encouraged to schedule an appointment for evaluation  Her pap/HPV are up to date from 2021  She had a UA in early May without blood  The following portions of the patient's history were reviewed and updated as appropriate: allergies, current medications, past family history, past surgical history and problem list   Review of Systems      Objective:      /82 (BP Location: Right arm, Patient Position: Sitting, Cuff Size: Standard)   Ht 5' 7 25" (1 708 m)   Wt 72 3 kg (159 lb 6 4 oz)   LMP  (LMP Unknown)   BMI 24 78 kg/m²          Physical Exam  Exam conducted with a chaperone present  Genitourinary:     General: Normal vulva  Labia:         Right: No rash or lesion  Left: No rash or lesion  Urethra: Urethral lesion (caruncle) present        Vagina: Normal       Cervix: Normal       Uterus: Normal        Adnexa: Right adnexa normal and left adnexa normal

## 2022-05-18 ENCOUNTER — OFFICE VISIT (OUTPATIENT)
Dept: OBGYN CLINIC | Facility: HOSPITAL | Age: 67
End: 2022-05-18
Payer: MEDICARE

## 2022-05-18 ENCOUNTER — HOSPITAL ENCOUNTER (OUTPATIENT)
Dept: RADIOLOGY | Facility: HOSPITAL | Age: 67
Discharge: HOME | End: 2022-05-18
Attending: OBSTETRICS & GYNECOLOGY
Payer: MEDICARE

## 2022-05-18 VITALS
SYSTOLIC BLOOD PRESSURE: 117 MMHG | DIASTOLIC BLOOD PRESSURE: 72 MMHG | HEART RATE: 64 BPM | HEIGHT: 67 IN | WEIGHT: 159.6 LBS | BODY MASS INDEX: 25.05 KG/M2

## 2022-05-18 DIAGNOSIS — M65.331 TRIGGER FINGER, RIGHT MIDDLE FINGER: Primary | ICD-10-CM

## 2022-05-18 DIAGNOSIS — M65.341 TRIGGER FINGER, RIGHT RING FINGER: ICD-10-CM

## 2022-05-18 DIAGNOSIS — M65.331 TRIGGER MIDDLE FINGER OF RIGHT HAND: ICD-10-CM

## 2022-05-18 DIAGNOSIS — Z47.89 AFTERCARE FOLLOWING SURGERY OF THE MUSCULOSKELETAL SYSTEM: ICD-10-CM

## 2022-05-18 DIAGNOSIS — N95.0 PMB (POSTMENOPAUSAL BLEEDING): ICD-10-CM

## 2022-05-18 DIAGNOSIS — M62.40 INTRINSIC MUSCLE TIGHTNESS: ICD-10-CM

## 2022-05-18 PROCEDURE — 76830 TRANSVAGINAL US NON-OB: CPT

## 2022-05-18 PROCEDURE — 76856 US EXAM PELVIC COMPLETE: CPT

## 2022-05-18 PROCEDURE — 99024 POSTOP FOLLOW-UP VISIT: CPT | Performed by: PHYSICIAN ASSISTANT

## 2022-05-18 PROCEDURE — 20550 NJX 1 TENDON SHEATH/LIGAMENT: CPT | Performed by: PHYSICIAN ASSISTANT

## 2022-05-18 RX ADMIN — LIDOCAINE HYDROCHLORIDE 1 ML: 10 INJECTION, SOLUTION INFILTRATION; PERINEURAL at 09:39

## 2022-05-18 RX ADMIN — BETAMETHASONE SODIUM PHOSPHATE AND BETAMETHASONE ACETATE 6 MG: 3; 3 INJECTION, SUSPENSION INTRA-ARTICULAR; INTRALESIONAL; INTRAMUSCULAR; SOFT TISSUE at 09:39

## 2022-05-18 NOTE — PROGRESS NOTES
Assessment:   S/P Right long and ring finger trigger finger release - Right on 4/5/2022     Intrinsic tightness to the long and ring fingers    Plan:   Corticosteroid injections provided today to help with adhesions     Therapy  - to manage intrinsic tightness and tendon adhesions    Follow Up:  6  week(s)    To Do Next Visit:  Reassess post operative recovery       CHIEF COMPLAINT:  Chief Complaint   Patient presents with    Right Middle Finger - Post-op     TFR- 4/5/22    Right Ring Finger - Post-op     TFR- 4/5/22         SUBJECTIVE:  Little Thompson is a 74 y o  female who presents for follow up after Right long and ring finger trigger finger release - Right on 4/5/2022  Today patient has pain in the right hand with some continued popping of the fingers  She states that they do not lock, but she is having difficulty with gripping activities  PHYSICAL EXAMINATION:  Vital signs: /72   Pulse 64   Ht 5' 7 25" (1 708 m)   Wt 72 4 kg (159 lb 9 6 oz)   LMP  (LMP Unknown)   BMI 24 81 kg/m²   General: well developed and well nourished, alert, oriented times 3 and appears comfortable  Psychiatric: Normal    MUSCULOSKELETAL EXAMINATION:  Incision: healed  Range of Motion: Limited due to stiffness  Patient has crepitation along the tendons (FDS and FDP at the A1 pulley long and ring fingers)  Intrinsic tightness long and ring finger  Neurovascular status: Neuro intact, good cap refill  Activity Restrictions: No restrictions       STUDIES REVIEWED:  No Studies to review      PROCEDURES PERFORMED:  Hand/upper extremity injection: R long A1  Universal Protocol:  Consent: Verbal consent obtained  Consent given by: patient  Time out: Immediately prior to procedure a "time out" was called to verify the correct patient, procedure, equipment, support staff and site/side marked as required    Supporting Documentation  Indications: pain and therapeutic   Procedure Details  Condition:trigger finger Location: long finger - R long A1   Preparation: Patient was prepped and draped in the usual sterile fashion  Needle size: 25 G  Ultrasound guidance: no  Approach: volar  Medications administered: 1 mL lidocaine 1 %; 6 mg betamethasone acetate-betamethasone sodium phosphate 6 (3-3) mg/mL    Patient tolerance: patient tolerated the procedure well with no immediate complications  Dressing:  Sterile dressing applied     Hand/upper extremity injection: R ring A1  Universal Protocol:  Consent: Verbal consent obtained  Consent given by: patient  Time out: Immediately prior to procedure a "time out" was called to verify the correct patient, procedure, equipment, support staff and site/side marked as required    Supporting Documentation  Indications: pain and therapeutic   Procedure Details  Condition:trigger finger Location: ring finger - R ring A1   Preparation: Patient was prepped and draped in the usual sterile fashion  Needle size: 25 G  Ultrasound guidance: no  Approach: volar  Medications administered: 1 mL lidocaine 1 %; 6 mg betamethasone acetate-betamethasone sodium phosphate 6 (3-3) mg/mL    Patient tolerance: patient tolerated the procedure well with no immediate complications  Dressing:  Sterile dressing applied              Scribe Attestation    I,:  Krystle Benson PA-C am acting as a scribe while in the presence of the attending physician :       I,:  Sy Cortés MD personally performed the services described in this documentation    as scribed in my presence :

## 2022-05-19 RX ORDER — BETAMETHASONE SODIUM PHOSPHATE AND BETAMETHASONE ACETATE 3; 3 MG/ML; MG/ML
6 INJECTION, SUSPENSION INTRA-ARTICULAR; INTRALESIONAL; INTRAMUSCULAR; SOFT TISSUE
Status: COMPLETED | OUTPATIENT
Start: 2022-05-18 | End: 2022-05-18

## 2022-05-19 RX ORDER — LIDOCAINE HYDROCHLORIDE 10 MG/ML
1 INJECTION, SOLUTION INFILTRATION; PERINEURAL
Status: COMPLETED | OUTPATIENT
Start: 2022-05-18 | End: 2022-05-18

## 2022-05-20 ENCOUNTER — TELEPHONE (OUTPATIENT)
Dept: OBGYN CLINIC | Facility: CLINIC | Age: 67
End: 2022-05-20

## 2022-05-20 NOTE — TELEPHONE ENCOUNTER
----- Message from Manuel Coleman sent at 5/20/2022  7:34 AM EDT -----  Regarding: Question regarding ENDOMETRIAL BIOPSY  DrTalmage, what is the status of the vaginal US I had on Tuesday? Its not listed in my result folder as even pending   Renetta Eric

## 2022-05-20 NOTE — TELEPHONE ENCOUNTER
Called and spoke with pt informing that US is not finalized yet which is why we didn't call her yet   Pt verbalized understanding

## 2022-05-20 NOTE — TELEPHONE ENCOUNTER
----- Message from Nahomy Baird sent at 5/20/2022  7:34 AM EDT -----  Regarding: Question regarding ENDOMETRIAL BIOPSY  DrTalmage, what is the status of the vaginal US I had on Tuesday? Its not listed in my result folder as even pending   Jaquan Guidry

## 2022-05-23 ENCOUNTER — EVALUATION (OUTPATIENT)
Dept: OCCUPATIONAL THERAPY | Age: 67
End: 2022-05-23
Payer: MEDICARE

## 2022-05-23 DIAGNOSIS — M65.331 TRIGGER FINGER, RIGHT MIDDLE FINGER: ICD-10-CM

## 2022-05-23 DIAGNOSIS — M62.40 INTRINSIC MUSCLE TIGHTNESS: ICD-10-CM

## 2022-05-23 DIAGNOSIS — M65.331 TRIGGER MIDDLE FINGER OF RIGHT HAND: ICD-10-CM

## 2022-05-23 DIAGNOSIS — Z47.89 AFTERCARE FOLLOWING SURGERY OF THE MUSCULOSKELETAL SYSTEM: ICD-10-CM

## 2022-05-23 PROCEDURE — 97165 OT EVAL LOW COMPLEX 30 MIN: CPT

## 2022-05-23 PROCEDURE — 97110 THERAPEUTIC EXERCISES: CPT

## 2022-05-24 NOTE — PROGRESS NOTES
OT Evaluation     Today's date: 2022  Patient name: Jaylin Miller  : 1955  MRN: [id number]  Referring provider: Lily Lyle*  Dx:   Encounter Diagnosis     ICD-10-CM    1  Trigger finger, right middle finger  M65 331 Ambulatory Referral to PT/OT Hand Therapy   2  Trigger middle finger of right hand  M65 331 Ambulatory Referral to PT/OT Hand Therapy   3  Intrinsic muscle tightness  M62 40 Ambulatory Referral to PT/OT Hand Therapy   4  Aftercare following surgery of the musculoskeletal system  Z47 [id number] Ambulatory Referral to PT/OT Hand Therapy                  Assessment  Assessment details: Pt is a 75 y/o female, who under went LF and RF TFR 2* digit noted with clicking and locking, with use and movement on 22  Pt presents to MD for post op appointment with inc pain, tenderness, swelling, and "clicking" of LF and RF with  and hand use  Pt did receive CSI and reports dec pain, dec swelling, and inc mobility in hand; but pt still demo "clicking" of LF and RF with force  and bending  Pt noted with mid intrinsic tightness as well as adhesion on FDS of LF and RF  Pt scars noted healing well  Pt edu on scar mgmt and adhesion massage, as well as intrinsic stretching and proper TGE- as pt noted to make fist and bend at PIP first, causing pain and clicking  Pt edu and per  with bending at MPs first, then PIPs- which pt reported no pain and no clicking with movement  Pt would benefit from continued skilled OT tx for scar/adhesion mgmt, intrinsic stretching, dec clicking and inc functional use of R hand  Impairments: abnormal coordination, abnormal or restricted ROM, abnormal movement, impaired physical strength and lacks appropriate home exercise program    Symptom irritability: lowUnderstanding of Dx/Px/POC: good   Prognosis: good    Goals  STGs;:  1  Pt will report dec clicking by 36%  2  Pt will be independent with HEP  3  Pt will report dec pain by 50%  LTGs:  1   Pt will report no episodes of clicking  2  Pt will inc  and pinch strength to WNLs  3  Pt will inc FOTO score    Plan  Patient would benefit from: skilled occupational therapy  Planned modality interventions: thermotherapy: paraffin bath, thermotherapy: hydrocollator packs and fluidotherapy  Planned therapy interventions: manual therapy, joint mobilization, massage, Marrero taping, neuromuscular re-education, patient education, self care, sensory integrative techniques, strengthening, stretching, coordination, therapeutic activities, therapeutic exercise, functional ROM exercises and home exercise program  Frequency: 1x week  Duration in weeks: 6  Plan of Care beginning date: 2022  Plan of Care expiration date: 2022  Treatment plan discussed with: patient        Subjective Evaluation    History of Present Illness  Date of surgery: 2022  Mechanism of injury: surgery  Mechanism of injury: Pt is a 75 y/o female, who under went LF and RF TFR 2* digit noted with clicking and locking, with use and movement on 22  Pt presents to MD for post op appointment with inc pain, tenderness, swelling, and "clicking" of LF and RF with  and hand use  Pt did receive CSI and edu to attend skilled OT tx for intrinsic stretching and adhesion treatment             Not a recurrent problem   Quality of life: fair    Pain  Current pain ratin  At best pain ratin  At worst pain ratin  Quality: throbbing and tight  Relieving factors: rest, support and heat  Progression: improved    Hand dominance: right    Treatments  Current treatment: occupational therapy  Patient Goals  Patient goals for therapy: increased strength, independence with ADLs/IADLs and decreased pain  Patient goal: To stop the clicking        Objective     Neurological Testing     Sensation     Wrist/Hand     Right   Intact: light touch, pin prick and sharp/dull discrimination    Active Range of Motion     Left Wrist   Normal active range of motion    Right Wrist   Normal active range of motion    Right Digits   Flexion   Middle     MCP: 85    PIP: 90    DIP: 50  Ring     MCP: 85    PIP: 90    DIP: 50  Extension   Middle     MCP: 0    PIP: 0    DIP: 0  Ring     MCP: 0    PIP: 0    DIP: 0    Strength/Myotome Testing     Left Wrist/Hand   Wrist extension: 5  Wrist flexion: 5  Radial deviation: 5  Ulnar deviation: 5    Right Wrist/Hand   Wrist extension: 4+  Wrist flexion: 4+  Radial deviation: 4+  Ulnar deviation: 4+             Precautions: Universal  MD referral- wade tx intratendinous adhesions and intrinsic tightness of LF and RF  HEP- heat, scar/adhesion massage, intrinsic tightness, TGE (extend, table top, flat fist, full fist)         Manuals             STM             Scar mgmt             Intrinsic stretch             KT             Neuro Re-Ed             HEP                                                                                           Ther Ex             TGE             Intrinsic stretch                                                                                           Ther Activity                                       Gait Training                                       Modalities             MP/CP

## 2022-05-25 ENCOUNTER — TELEPHONE (OUTPATIENT)
Dept: OBGYN CLINIC | Facility: CLINIC | Age: 67
End: 2022-05-25

## 2022-05-25 NOTE — TELEPHONE ENCOUNTER
LM cell phone for patient to call back, she needs a pre op appt with Dr Lilibeth Mendoza for a D&C Hysteroscopy

## 2022-05-25 NOTE — TELEPHONE ENCOUNTER
----- Message from Florencio Amaya MD sent at 5/25/2022  7:18 AM EDT -----  Regarding: D&C, Hysteroscopy  Can you please schedule this patient for a D&C, HYsteroscopy and schedule an OV for consents  I think she is on vacation until mid June

## 2022-05-26 ENCOUNTER — OFFICE VISIT (OUTPATIENT)
Dept: PAIN MEDICINE | Facility: CLINIC | Age: 67
End: 2022-05-26
Payer: MEDICARE

## 2022-05-26 VITALS
HEIGHT: 67 IN | SYSTOLIC BLOOD PRESSURE: 124 MMHG | WEIGHT: 157.6 LBS | HEART RATE: 61 BPM | BODY MASS INDEX: 24.74 KG/M2 | DIASTOLIC BLOOD PRESSURE: 75 MMHG

## 2022-05-26 DIAGNOSIS — M48.061 SPINAL STENOSIS OF LUMBAR REGION, UNSPECIFIED WHETHER NEUROGENIC CLAUDICATION PRESENT: ICD-10-CM

## 2022-05-26 DIAGNOSIS — M54.16 LUMBAR RADICULOPATHY: Primary | ICD-10-CM

## 2022-05-26 DIAGNOSIS — M47.816 LUMBAR SPONDYLOSIS: ICD-10-CM

## 2022-05-26 DIAGNOSIS — M51.36 DDD (DEGENERATIVE DISC DISEASE), LUMBAR: ICD-10-CM

## 2022-05-26 PROCEDURE — 99214 OFFICE O/P EST MOD 30 MIN: CPT | Performed by: NURSE PRACTITIONER

## 2022-05-26 NOTE — PROGRESS NOTES
Assessment:  1  Lumbar radiculopathy    2  Lumbar spondylosis    3  DDD (degenerative disc disease), lumbar    4  Spinal stenosis of lumbar region, unspecified whether neurogenic claudication present        Plan:  1  I will schedule the patient for a left L4 and L5 TFESI to address the inflammatory component the patient's pain  Complete risks and benefits including bleeding, infection, tissue reaction, nerve injury and allergic reaction were discussed  The patient was agreeable and verbalized an understanding  2  Patient may continue Tylenol p r n  should not exceed more than 3000 mg 24 hours   3  Can consider a trial of gabapentin in the future  She wishes to hold off on oral medications at this time  4  Patient will continue with her home exercise program as taught to her by physical therapy  5  The patient will follow-up after her procedure sooner if needed  Depending on response to injection, may consider updating EMG of the left lower extremity    M*Modal software was used to dictate this note  It may contain errors with dictating incorrect words or incorrect spelling  Please contact the provider directly with any questions  History of Present Illness: The patient is a 74 y o  female with a history of an ORIF of the left femur last seen on 08/02/2022 who presents for a follow up office visit in regards to chronic left-sided low back pain that radiates into the anterolateral aspect of the left thigh mostly terminating above the knee  She will intermittently have some pain in her foot and ankle associated numbness as well  Patient denies any right-sided symptoms, bowel or bladder incontinence or saddle anesthesia  Patient did have an EMG in the past which was unremarkable  She states she was diagnosed with meralgia paresthetica by a neurologist, however EMG does not show evidence of this   MRI of the lumbar spine from May 2021 does reveal some multilevel lumbar spondylosis with grade 1 anterolisthesis at L4-5 resulting in mild canal narrowing, and a left disc protrusion at L5-S1 resulting in mild left foraminal stenosis at L5-S1  She was prescribed meloxicam in the past but states she never started the medication  She will take Tylenol on a p r n  basis  I have personally reviewed and/or updated the patient's past medical history, past surgical history, family history, social history, current medications, allergies, and vital signs today  Review of Systems:    Review of Systems   Respiratory: Negative for shortness of breath  Cardiovascular: Negative for chest pain  Gastrointestinal: Negative for constipation, diarrhea, nausea and vomiting  Musculoskeletal: Positive for joint swelling  Negative for arthralgias, gait problem and myalgias  Skin: Negative for rash  Neurological: Positive for weakness  Negative for dizziness and seizures  All other systems reviewed and are negative          Past Medical History:   Diagnosis Date    Arthritis     BRCA1 negative     BRCA2 negative     [facility]    radiation    Disease of thyroid gland     History of radiation therapy 2014    breast cancer    Hypothyroid     Knee pain, right     Limb alert care status     no BP's /IV's right side    Mitral valve prolapse     Numbness of anterior thigh     left and tingling    Seasonal allergies     dust,mold,trees    Urinary incontinence     Varicose veins of both lower extremities     Wears glasses        Past Surgical History:   Procedure Laterality Date    BREAST BIOPSY Left 01/16/2002    BREAST BIOPSY Left 08/05/2005    high risk    BREAST BIOPSY Right 12/26/2013    positive    BREAST EXCISIONAL BIOPSY Left 04/06/2006    high risk    BREAST EXCISIONAL BIOPSY Left 10/13/2006    BREAST LUMPECTOMY Right 01/17/2014    malignant    BREAST SURGERY Right     lumpectomy    BUNIONECTOMY Bilateral     COLONOSCOPY      EXPLORATORY LAPAROTOMY      "for fertility"    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    KNEE ARTHROSCOPY Right     VT INCISE FINGER TENDON SHEATH Right 4/5/2022    Procedure: Right long and ring finger trigger finger release;  Surgeon: Tracee Trejo MD;  [facility]: [facility];  Service: Orthopedics    VT TOTAL KNEE ARTHROPLASTY Right 1/20/2017    Procedure: ARTHROPLASTY KNEE TOTAL;  Surgeon: Nisha Haas MD;  Location: [facility];  Service: Orthopedics    TONSILLECTOMY      WISDOM TOOTH EXTRACTION         Family History   Problem Relation Age of Onset    Cancer Mother         Bladder CA    Throat cancer Father [de-identified]    No Known Problems Daughter     No Known Problems Daughter     No Known Problems Maternal Grandmother     No Known Problems Maternal Grandfather     No Known Problems Paternal Grandmother     No Known Problems Paternal Grandfather     No Known Problems Maternal Aunt     No Known Problems Maternal Aunt     No Known Problems Maternal Aunt     No Known Problems Maternal Aunt     Colon cancer Maternal Uncle 79    Breast cancer additional onset Paternal Aunt 54    No Known Problems Paternal Aunt     Breast cancer additional onset Cousin 28    Uterine cancer Cousin        Social History     Occupational History    Not on file   Tobacco Use    Smoking status: Never Smoker    Smokeless tobacco: Never Used   Vaping Use    Vaping Use: Never used   Substance and Sexual Activity    Alcohol use: Yes     Comment: rare    Drug use: No    Sexual activity: Not Currently     Birth control/protection: Abstinence, Post-menopausal         Current Outpatient Medications:     chlorthalidone 25 mg tablet, Take 1 tablet (25 mg total) by mouth daily, Disp: 90 tablet, Rfl: 3    levothyroxine 75 mcg tablet, Take 1 tablet by mouth every other day, Disp: , Rfl:     levothyroxine 88 mcg tablet, Take 1 tablet by mouth every other day, Disp: , Rfl:     loratadine (CLARITIN) 10 mg tablet, Take 10 mg by mouth daily, Disp: , Rfl:     Multiple Vitamins-Minerals (MULTIVITAMIN ADULT PO), multivitamin, Disp: , Rfl:     potassium chloride (K-DUR,KLOR-CON) 10 mEq tablet, Take 1 tablet by mouth daily  , Disp: , Rfl:     solifenacin (VESICARE) 10 MG tablet, Take 1 tablet (10 mg total) by mouth daily, Disp: 90 tablet, Rfl: 3    thiamine (VITAMIN B1) 100 mg tablet, Take 100 mg by mouth daily  , Disp: , Rfl:     VITAMIN D, ERGOCALCIFEROL, PO, Take 2,000 Units by mouth daily, Disp: , Rfl:     zinc gluconate 50 mg tablet, Take 50 mg by mouth daily  , Disp: , Rfl:     acetaminophen (TYLENOL) 650 mg CR tablet, Take 1 tablet (650 mg total) by mouth every 8 (eight) hours as needed for mild pain (Patient not taking: No sig reported), Disp: 30 tablet, Rfl: 0    naproxen sodium (ALEVE) 220 MG tablet, Take 1 tablet (220 mg total) by mouth 2 (two) times a day with meals (Patient not taking: No sig reported), Disp: 20 tablet, Rfl: 0    Allergies   Allergen Reactions    Actifed Cold-Allergy [Chlorpheniramine-Phenylephrine] Swelling and Rash     Throat and face swelled    Penicillins Rash    Vancomycin Other (See Comments)     redness       Physical Exam:    /75   Pulse 61   Ht 5' 7" (1 702 m)   Wt 71 5 kg (157 lb 9 6 oz)   LMP  (LMP Unknown)   BMI 24 68 kg/m²     Constitutional:normal, well developed, well nourished, alert, in no distress and non-toxic and no overt pain behavior  Eyes:anicteric  HEENT:grossly intact  Neck:supple, symmetric, trachea midline and no masses   Pulmonary:even and unlabored  Cardiovascular:No edema or pitting edema present  Skin:Normal without rashes or lesions and well hydrated  Psychiatric:Mood and affect appropriate  Neurologic:Cranial Nerves II-XII grossly intact  Musculoskeletal:normal heat  Bilateral SI joints nontender to palpation  Bilateral lower extremity strength 5/5 in all muscle groups  Equivocal straight leg raise on the left and negative on the right    Negative Jonny's test bilaterally      Imaging  FL spine and pain procedure    (Results Pending)       MRI LUMBAR SPINE WITHOUT CONTRAST   INDICATION: Back pain, numbness anterior left thigh  COMPARISON: MRI lumbar spine 9/16/2014   TECHNIQUE: Sagittal T1, sagittal T2, sagittal inversion recovery, axial T1 and axial T2, coronal T2    IMAGE QUALITY: Diagnostic   FINDINGS:   VERTEBRAL BODIES: There are 5 lumbar type vertebral bodies  Stable mild 1 anterolisthesis of L4 on L5  There is no abnormal bone marrow signal or suspicious discrete marrow lesion  SACRUM: Normal signal within the sacrum  No evidence of insufficiency or stress fracture  DISTAL CORD AND CONUS: Normal size and signal within the distal cord and conus  PARASPINAL SOFT TISSUES: Paraspinal soft tissues are unremarkable  Bilateral hydronephrosis is noted  LOWER THORACIC DISC SPACES: Normal disc height and signal  No disc herniation, canal stenosis or foraminal narrowing  LUMBAR DISC SPACES:   L1-L2: Minimal disc bulge  Mild facet arthropathy  No significant canal or foraminal stenosis  L2-L3: There is disc bulge and superimposed left foraminal disc protrusion  There is mild canal narrowing  No significant foraminal narrowing  L3-L4: There is mild disc bulge and superimposed left foraminal disc protrusion  Mild facet arthropathy  There is mild canal narrowing  No significant foraminal stenosis  L4-L5: Grade 1 anterolisthesis uncovering posterior disc margin  There is severe bilateral facet arthropathy  There is mild canal narrowing  No significant foraminal stenosis  L5-S1: There is left eccentric disc bulge and left foraminal/extraforaminal osteophytes  There is severe left and moderate left facet arthropathy  There is no significant canal stenosis  Mild left foraminal narrowing  IMPRESSION:   1  Degenerative changes without high-grade canal stenosis as detailed  2  Mild left foraminal narrowing at L5-S1    3  Bilateral hydronephrosis   Recommend ultrasound to evaluate for urinary retention and further renal assessment      Orders Placed This Encounter   Procedures    FL spine and pain procedure

## 2022-05-31 ENCOUNTER — TELEPHONE (OUTPATIENT)
Dept: RADIOLOGY | Facility: CLINIC | Age: 67
End: 2022-05-31

## 2022-05-31 ENCOUNTER — OFFICE VISIT (OUTPATIENT)
Dept: OCCUPATIONAL THERAPY | Age: 67
End: 2022-05-31
Payer: MEDICARE

## 2022-05-31 DIAGNOSIS — M65.331 TRIGGER MIDDLE FINGER OF RIGHT HAND: ICD-10-CM

## 2022-05-31 DIAGNOSIS — M65.331 TRIGGER FINGER, RIGHT MIDDLE FINGER: Primary | ICD-10-CM

## 2022-05-31 DIAGNOSIS — M62.40 INTRINSIC MUSCLE TIGHTNESS: ICD-10-CM

## 2022-05-31 DIAGNOSIS — Z47.89 AFTERCARE FOLLOWING SURGERY OF THE MUSCULOSKELETAL SYSTEM: ICD-10-CM

## 2022-05-31 PROCEDURE — 97140 MANUAL THERAPY 1/> REGIONS: CPT

## 2022-05-31 PROCEDURE — 97110 THERAPEUTIC EXERCISES: CPT

## 2022-06-01 NOTE — PROGRESS NOTES
Daily Note     Today's date: 2022  Patient name: Marifer Hall  : 1955  MRN: [id number]  Referring provider: Dany Lewis*  Dx:   Encounter Diagnosis     ICD-10-CM    1  Trigger finger, right middle finger  M65 331    2  Trigger middle finger of right hand  M65 331    3  Intrinsic muscle tightness  M62 40    4  Aftercare following surgery of the musculoskeletal system  Z47 89                   Subjective: "it seems like it is worse now than before surgery "      Objective: See treatment diary below      Assessment: Tolerated treatment well  Pt c/o A1 of RF and LF noted with inc swelling- 19 5cm noted; puffiness at A1 pulley noted  Scar tissue F  Pt provided with scar putty and IASTM to pulleys to breka up adhesion  Pt edu and reviewed intrinsic stretching  "clicking" noted with Active PIP flexion  Patient exhibited good technique with therapeutic exercises and would benefit from continued OT      Plan: Continue per plan of care  Precautions: Universal  MD referral- wade tx intratendinous adhesions and intrinsic tightness of LF and RF  HEP- heat, scar/adhesion massage, intrinsic tightness, TGE (extend, table top, flat fist, full fist)         Manuals             STM 8'            Scar mgmt 2'; scar putty            Intrinsic stretch 5'            KT             Neuro Re-Ed             HEP                                                                                           Ther Ex             TGE 3x10- straight , tale top, flat fist, full fist            Intrinsic stretch Hook rolls 3x10            AROM fluido 8'                                                                             Ther Activity                                       Gait Training                                       Modalities             MP/CP

## 2022-06-01 NOTE — TELEPHONE ENCOUNTER
Patient was contacted and scheduled for Left L4 and L5 TFESI  All pre procedure instructions were given to patient   Nothing to eat or drink for 1 hour prior  Loose fitting clothing   Denies Anti Coag's   NSAIDS okay, ASA okay  Denies Antibx   Needs    Patient instructed to continue all routine medications   Patient instructed to contact our office if becomes sick   Refrain from any vaccines 2 weeks before & 2 weeks after  Insurance auth received but is not a guarantee of payment per your insurance company's authorization disclaimer and it is your responsibility to verify your benefits   COVID -19 screening complete

## 2022-06-09 NOTE — PATIENT INSTRUCTIONS
Follow-up with your primary care physician if symptoms persist    Emergency room if your symptoms are worsening    Drink plenty of fluid    Apply warm compresses to the back as directed
negative - no atopy

## 2022-06-13 ENCOUNTER — OFFICE VISIT (OUTPATIENT)
Dept: OBGYN CLINIC | Facility: CLINIC | Age: 67
End: 2022-06-13
Payer: MEDICARE

## 2022-06-13 VITALS
SYSTOLIC BLOOD PRESSURE: 102 MMHG | HEIGHT: 69 IN | BODY MASS INDEX: 23.52 KG/M2 | DIASTOLIC BLOOD PRESSURE: 62 MMHG | WEIGHT: 158.8 LBS

## 2022-06-13 DIAGNOSIS — R93.5 ABNORMAL ULTRASOUND OF ENDOMETRIUM: ICD-10-CM

## 2022-06-13 DIAGNOSIS — N95.0 POSTMENOPAUSE BLEEDING: Primary | ICD-10-CM

## 2022-06-13 PROCEDURE — 99213 OFFICE O/P EST LOW 20 MIN: CPT | Performed by: OBSTETRICS & GYNECOLOGY

## 2022-06-13 NOTE — PROGRESS NOTES
Lab: [facility]    Pre-Op  D&C Hysteroscopy   Reason: 2mm endometrial lining and small nodule  Questions: Yes, lots of questions regarding the procedure and radiology reports

## 2022-06-13 NOTE — PROGRESS NOTES
A/P: Patient is 74 y o  yo female with postmenopausal bleeding, endometrial nodules  For hysteroscopy, D+C   Surgical coordinator to preauthorize and schedule  S: Pt is 74 y o  yo female with a chief complaint of vaginal spotting  She had an ultrasound, see results below  She had an endometrial biopsy which showed scant atrophic endometrium  She was noted to have a urethral caruncle on exam         US pelvis complete w transvaginal  Narrative: PELVIC ULTRASOUND, COMPLETE    INDICATION:  The patient is 74years old  N95 0: Postmenopausal bleeding  COMPARISON: None    TECHNIQUE:   Transabdominal pelvic ultrasound was performed in sagittal and transverse planes with a curvilinear transducer  Additional transvaginal imaging was performed to better evaluate the endometrium and ovaries  Imaging included volumetric   sweeps as well as traditional still imaging technique  FINDINGS:    UTERUS:  The uterus is anteverted in position, measuring 7 8 x 2 4 x 4 9 cm  The uterus has a normal contour and echotexture  The cervix appears within normal limits  ENDOMETRIUM:    The endometrial echo complex has an AP caliber of 2 mm  Normal appearance of the endometrial lining  Minimal fluid distention of the endometrial cavity  4 x 2 x 4 mm echogenic nodule arising from the right posterior upper body/fundal endometrium  Additional 3 x 3 x 3 mm heterogeneous echogenic nodule at   approximately the same level arising from the left side of the endometrium  OVARIES/ADNEXA:  Right ovary:  1 7 x 0 9 x 1 4 cm  1 2 mL  No suspicious right ovarian abnormality  9 mm unilocular nonvascular cyst   Doppler flow within normal limits  Left ovary:  2 6 x 1 7 x 1 6 cm  3 8 mL  No suspicious left ovarian abnormality  Doppler flow within normal limits  No suspicious adnexal mass or loculated collections  There is no free fluid    Impression:    2 indeterminate echogenic endometrial nodules, the larger of which measures 4 x 2 x 4 mm  Otherwise normal appearance of the endometrial lining with combined AP caliber of 2 mm and minimal fluid distention of the endometrial cavity  This study demonstrates a significant  finding and was documented as such in [facility] for liaison and referring practitioner notification  Workstation performed: [id number]      After discussion of ultrasound results the patient opted to proceed with hysteroscopy, D+C  We discussed the intra operative procedure as well as the postoperative expectations  We discussed the risks of surgery including but not limited to bleeding, transfusion, infection, uterine perforation, injury to surrounding organs, need for laparoscopy  She is aware that we will have the biopsy results reviewed in pathology and that this will be used to guide further management  All of her questions were answered, and consents were signed      Patient Active Problem List   Diagnosis    Primary osteoarthritis of right knee    Meralgia paraesthetica, left    Acquired spondylolisthesis    Acquired hypothyroidism    Leukopenia    Lumbar radiculopathy    History of right breast cancer    Osteopenia of multiple sites    Seasonal allergies    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    Benign essential HTN    Urge incontinence of urine    Glucose intolerance    Pure hypercholesterolemia    Ascending aortic aneurysm (HCC)    Sacroiliitis (HCC)    Lumbar spondylosis    Spinal stenosis of lumbar region    DDD (degenerative disc disease), lumbar    Trigger finger, right middle finger    Nasal mass    Malignant neoplasm of upper-outer quadrant of right breast in female, estrogen receptor positive (HCC)    Primary osteoarthritis of left hip       Current Outpatient Medications:     chlorthalidone 25 mg tablet, Take 1 tablet (25 mg total) by mouth daily, Disp: 90 tablet, Rfl: 3    levothyroxine 75 mcg tablet, Take 1 tablet by mouth every other day, Disp: , Rfl:    levothyroxine 88 mcg tablet, Take 1 tablet by mouth every other day, Disp: , Rfl:     loratadine (CLARITIN) 10 mg tablet, Take 10 mg by mouth daily, Disp: , Rfl:     Multiple Vitamins-Minerals (MULTIVITAMIN ADULT PO), multivitamin, Disp: , Rfl:     potassium chloride (K-DUR,KLOR-CON) 10 mEq tablet, Take 1 tablet by mouth daily  , Disp: , Rfl:     solifenacin (VESICARE) 10 MG tablet, Take 1 tablet (10 mg total) by mouth daily, Disp: 90 tablet, Rfl: 3    thiamine (VITAMIN B1) 100 mg tablet, Take 100 mg by mouth daily  , Disp: , Rfl:     VITAMIN D, ERGOCALCIFEROL, PO, Take 2,000 Units by mouth daily, Disp: , Rfl:     zinc gluconate 50 mg tablet, Take 50 mg by mouth daily  , Disp: , Rfl:     acetaminophen (TYLENOL) 650 mg CR tablet, Take 1 tablet (650 mg total) by mouth every 8 (eight) hours as needed for mild pain (Patient not taking: No sig reported), Disp: 30 tablet, Rfl: 0    naproxen sodium (ALEVE) 220 MG tablet, Take 1 tablet (220 mg total) by mouth 2 (two) times a day with meals (Patient not taking: No sig reported), Disp: 20 tablet, Rfl: 0    Past Surgical History:   Procedure Laterality Date    BREAST BIOPSY Left 01/16/2002    BREAST BIOPSY Left 08/05/2005    high risk    BREAST BIOPSY Right 12/26/2013    positive    BREAST EXCISIONAL BIOPSY Left 04/06/2006    high risk    BREAST EXCISIONAL BIOPSY Left 10/13/2006    BREAST LUMPECTOMY Right 01/17/2014    malignant    BREAST SURGERY Right     lumpectomy    BUNIONECTOMY Bilateral     COLONOSCOPY      EXPLORATORY LAPAROTOMY      "for fertility"    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    KNEE ARTHROSCOPY Right     MI INCISE FINGER TENDON SHEATH Right 4/5/2022    Procedure: Right long and ring finger trigger finger release;  Surgeon: Amelia Coyle MD;  Location: [facility];  Service: Orthopedics    MI TOTAL KNEE ARTHROPLASTY Right 1/20/2017    Procedure: ARTHROPLASTY KNEE TOTAL;  Surgeon: Gabrielle Brown MD;  Location: [facility];  Service: Orthopedics    TONSILLECTOMY      WISDOM TOOTH EXTRACTION           O:     /62 (BP Location: Left arm, Patient Position: Sitting, Cuff Size: Standard)   Ht 5' 8 75" (1 746 m)   Wt 72 kg (158 lb 12 8 oz)   LMP  (LMP Unknown)   BMI 23 62 kg/m²     She appears well and in no distress   exam deferred in office, see complete exam 5/17/22

## 2022-06-14 ENCOUNTER — TELEPHONE (OUTPATIENT)
Dept: OBGYN CLINIC | Facility: CLINIC | Age: 67
End: 2022-06-14

## 2022-06-14 NOTE — TELEPHONE ENCOUNTER
Patient left message voice mail that 7/1/22 surgery date is not going to work for her  I called patient back LM on her cell phone to return my call   I have open 7/29/22 with Dr Ruma Dumont in the [facility]

## 2022-06-16 ENCOUNTER — OFFICE VISIT (OUTPATIENT)
Dept: OCCUPATIONAL THERAPY | Age: 67
End: 2022-06-16
Payer: MEDICARE

## 2022-06-16 DIAGNOSIS — M65.331 TRIGGER MIDDLE FINGER OF RIGHT HAND: ICD-10-CM

## 2022-06-16 DIAGNOSIS — M65.331 TRIGGER FINGER, RIGHT MIDDLE FINGER: Primary | ICD-10-CM

## 2022-06-16 DIAGNOSIS — Z47.89 AFTERCARE FOLLOWING SURGERY OF THE MUSCULOSKELETAL SYSTEM: ICD-10-CM

## 2022-06-16 DIAGNOSIS — M62.40 INTRINSIC MUSCLE TIGHTNESS: ICD-10-CM

## 2022-06-16 PROCEDURE — 97110 THERAPEUTIC EXERCISES: CPT

## 2022-06-16 PROCEDURE — 97140 MANUAL THERAPY 1/> REGIONS: CPT

## 2022-06-16 NOTE — PROGRESS NOTES
Daily Note     Today's date: 2022  Patient name: Marzena Damico  : 1955  MRN: [id number]  Referring provider: Néstor Kenney*  Dx:   Encounter Diagnosis     ICD-10-CM    1  Trigger finger, right middle finger  M65 331    2  Trigger middle finger of right hand  M65 331    3  Intrinsic muscle tightness  M62 40    4  Aftercare following surgery of the musculoskeletal system  Z47 89                   Subjective: "the surgery area is better but my finger is tender "      Objective: See treatment diary below      Assessment: Tolerated treatment well  Pt scars and A1 pulley area noted inc healing, less tender, and less painful  Pt presents with moderate tenderness and pain at proximal phalanx of LF  Tx focused at IASTM and stretching to area  Pt edu on TGE for proper movement to avoid clicking/catching of PIP joint  Patient exhibited good technique with therapeutic exercises and would benefit from continued OT      Plan: Continue per plan of care  Precautions: Universal  MD referral- wade tx intratendinous adhesions and intrinsic tightness of LF and RF  HEP- heat, scar/adhesion massage, intrinsic tightness, TGE (extend, table top, flat fist, full fist)         Manuals            STM 8' 15'           Scar mgmt 2'; scar putty            Intrinsic stretch 5' 5'           KT             Neuro Re-Ed             HEP                                                                                           Ther Ex             TGE 3x10- straight , tale top, flat fist, full fist 3x10           Intrinsic stretch Hook rolls 3x10            AROM fluido 8' fluido 8'                                                                            Ther Activity                                       Gait Training                                       Modalities             MP/CP

## 2022-06-21 ENCOUNTER — OFFICE VISIT (OUTPATIENT)
Dept: OCCUPATIONAL THERAPY | Age: 67
End: 2022-06-21
Payer: MEDICARE

## 2022-06-21 DIAGNOSIS — M65.331 TRIGGER MIDDLE FINGER OF RIGHT HAND: ICD-10-CM

## 2022-06-21 DIAGNOSIS — M62.40 INTRINSIC MUSCLE TIGHTNESS: ICD-10-CM

## 2022-06-21 DIAGNOSIS — Z47.89 AFTERCARE FOLLOWING SURGERY OF THE MUSCULOSKELETAL SYSTEM: ICD-10-CM

## 2022-06-21 DIAGNOSIS — M65.331 TRIGGER FINGER, RIGHT MIDDLE FINGER: Primary | ICD-10-CM

## 2022-06-21 PROCEDURE — 97110 THERAPEUTIC EXERCISES: CPT

## 2022-06-21 PROCEDURE — 97168 OT RE-EVAL EST PLAN CARE: CPT

## 2022-06-21 PROCEDURE — 97140 MANUAL THERAPY 1/> REGIONS: CPT

## 2022-06-21 PROCEDURE — 97760 ORTHOTIC MGMT&TRAING 1ST ENC: CPT

## 2022-06-21 NOTE — PROGRESS NOTES
OT Re-Evaluation     Today's date: 2022  Patient name: Daryle Stairs  : 1955  MRN: [id number]  Referring provider: Kal Robles*  Dx:   Encounter Diagnosis     ICD-10-CM    1  Trigger finger, right middle finger  M65 331    2  Trigger middle finger of right hand  M65 331    3  Intrinsic muscle tightness  M62 40    4  Aftercare following surgery of the musculoskeletal system  Z47 89                   Assessment  Assessment details: 22- Pt has been participating in skilled OT tx and progressing toward goals  Pt presents with nodule of LF at prox phalanx, causing clicking and PIP joint being stuck/ trigger finger; after s/p TFR A1 of LF and RF  Pt prox phalanx has fluctuated in tenderness and edema; today noted dec tenderness but inc edema at A1 pulley; but responded well to MLD during tx  Therapist fabricated PIP restriction orthosis to give tendons a rest as pt still active and may be perf repetitive motions  Pt also edu to trial compression glove for tenderness/edema mgmt  Pt would benefit from continued skilled OT tx to reduce inflammation, pain, and repetitive movement to digit to dec clicking  Pt is a 73 y/o female, who under went LF and RF TFR 2* digit noted with clicking and locking, with use and movement on 22  Pt presents to MD for post op appointment with inc pain, tenderness, swelling, and "clicking" of LF and RF with  and hand use  Pt did receive CSI and reports dec pain, dec swelling, and inc mobility in hand; but pt still demo "clicking" of LF and RF with force  and bending  Pt noted with mid intrinsic tightness as well as adhesion on FDS of LF and RF  Pt scars noted healing well  Pt edu on scar mgmt and adhesion massage, as well as intrinsic stretching and proper TGE- as pt noted to make fist and bend at PIP first, causing pain and clicking  Pt edu and per  with bending at MPs first, then PIPs- which pt reported no pain and no clicking with movement  Pt would benefit from continued skilled OT tx for scar/adhesion mgmt, intrinsic stretching, dec clicking and inc functional use of R hand  Impairments: abnormal coordination, abnormal or restricted ROM, abnormal movement, impaired physical strength and lacks appropriate home exercise program    Symptom irritability: lowUnderstanding of Dx/Px/POC: good   Prognosis: good    Goals  STGs;:  1  Pt will report dec clicking by 47% progressing  2  Pt will be independent with HEP progressing  3  Pt will report dec pain by 50% progressing  LTGs:  1  Pt will report no episodes of clicking  2  Pt will inc  and pinch strength to WNLs  3  Pt will inc FOTO score    Plan  Plan details: Inc to 2x per week as per pt and therapist  Patient would benefit from: skilled occupational therapy  Planned modality interventions: thermotherapy: paraffin bath, thermotherapy: hydrocollator packs and fluidotherapy  Planned therapy interventions: manual therapy, joint mobilization, massage, Marrero taping, neuromuscular re-education, patient education, self care, sensory integrative techniques, strengthening, stretching, coordination, therapeutic activities, therapeutic exercise, functional ROM exercises and home exercise program  Frequency: 2x week  Duration in weeks: 4  Plan of Care beginning date: 6/21/2022  Plan of Care expiration date: 7/19/2022  Treatment plan discussed with: patient        Subjective Evaluation    History of Present Illness  Date of surgery: 4/5/2022  Mechanism of injury: surgery  Mechanism of injury: Pt is a 73 y/o female, who under went LF and RF TFR 2* digit noted with clicking and locking, with use and movement on 4/5/22  Pt presents to MD for post op appointment with inc pain, tenderness, swelling, and "clicking" of LF and RF with  and hand use  Pt did receive CSI and edu to attend skilled OT tx for intrinsic stretching and adhesion treatment             Not a recurrent problem   Quality of life: fair    Pain  Current pain ratin  At best pain ratin  At worst pain ratin  Quality: throbbing and tight  Relieving factors: rest, support and heat  Progression: improved    Hand dominance: right    Treatments  Current treatment: occupational therapy  Patient Goals  Patient goals for therapy: increased strength, independence with ADLs/IADLs and decreased pain  Patient goal: To stop the clicking        Objective     Neurological Testing     Sensation     Wrist/Hand     Right   Intact: light touch, pin prick and sharp/dull discrimination    Active Range of Motion     Left Wrist   Normal active range of motion    Right Wrist   Normal active range of motion    Right Digits   Flexion   Middle     MCP: 85    PIP: 85    DIP: 60  Ring     MCP: 90    PIP: 90    DIP: 50  Extension   Middle     MCP: 0    PIP: 0    DIP: 0  Ring     MCP: 0    PIP: 0    DIP: 0    Additional Active Range of Motion Details  22- -5degrees LF PIP flexion 2* tenderness and swelling    Strength/Myotome Testing     Left Wrist/Hand   Wrist extension: 5  Wrist flexion: 5  Radial deviation: 5  Ulnar deviation: 5    Right Wrist/Hand   Wrist extension: 4+  Wrist flexion: 4+  Radial deviation: 4+  Ulnar deviation: 4+    Swelling     Left Wrist/Hand   Middle     Proximal: 6 3 cm  Circumference MCP: 18 7 cm    Right Wrist/Hand   Middle     Proximal: 6 6 cm  Circumference MCP: 19 cm             Precautions: Universal  MD referral- wade tx intratendinous adhesions and intrinsic tightness of LF and RF  HEP- heat, scar/adhesion massage, intrinsic tightness, TGE (extend, table top, flat fist, full fist)         Manuals             STM 10'            Scar mgmt 2'            Intrinsic stretch 3'            KT             Neuro Re-Ed             HEP PROM            Orthotic mgmt 10'                                                                             Ther Ex             TGE 3x5; fluido 8'            Intrinsic stretch Ther Activity                                       Gait Training                                       Modalities             MP/CP

## 2022-06-23 ENCOUNTER — OFFICE VISIT (OUTPATIENT)
Dept: OCCUPATIONAL THERAPY | Age: 67
End: 2022-06-23
Payer: MEDICARE

## 2022-06-23 DIAGNOSIS — Z47.89 AFTERCARE FOLLOWING SURGERY OF THE MUSCULOSKELETAL SYSTEM: ICD-10-CM

## 2022-06-23 DIAGNOSIS — M62.40 INTRINSIC MUSCLE TIGHTNESS: ICD-10-CM

## 2022-06-23 DIAGNOSIS — M65.331 TRIGGER FINGER, RIGHT MIDDLE FINGER: Primary | ICD-10-CM

## 2022-06-23 DIAGNOSIS — M65.331 TRIGGER MIDDLE FINGER OF RIGHT HAND: ICD-10-CM

## 2022-06-23 PROCEDURE — 97110 THERAPEUTIC EXERCISES: CPT

## 2022-06-23 PROCEDURE — 97140 MANUAL THERAPY 1/> REGIONS: CPT

## 2022-06-23 NOTE — PROGRESS NOTES
Daily Note     Today's date: 2022  Patient name: Sydnee Rachel  : 1955  MRN: [id number]  Referring provider: Lillian Bautista*  Dx:   Encounter Diagnosis     ICD-10-CM    1  Trigger finger, right middle finger  M65 331    2  Trigger middle finger of right hand  M65 331    3  Aftercare following surgery of the musculoskeletal system  Z47 89    4  Intrinsic muscle tightness  M62 40                   Subjective: "I didn't expect this to happen after surgery "      Objective: See treatment diary below      Assessment: Tolerated treatment well  Patient would benefit from continued OT  Pt still noted with clicking in LF; tenderness and edema dec  Trial KT for trigger finger  Plan: Progress note during next visit  Precautions: Universal  MD referral- wade tx intratendinous adhesions and intrinsic tightness of LF and RF  HEP- heat, scar/adhesion massage, intrinsic tightness, TGE (extend, table top, flat fist, full fist)         Manuals            STM 10' 10'           Scar mgmt 2' 3'           Intrinsic stretch 3' 3'           KT  perf           Neuro Re-Ed             HEP PROM PROM-5'           Orthotic mgmt 10'                                                                             Ther Ex             TGE 3x5; fluido 8' 3x5 fluido 8'           Intrinsic stretch  5'                                                                                         Ther Activity                                       Gait Training                                       Modalities             MP/CP

## 2022-06-28 ENCOUNTER — OFFICE VISIT (OUTPATIENT)
Dept: OCCUPATIONAL THERAPY | Age: 67
End: 2022-06-28
Payer: MEDICARE

## 2022-06-28 DIAGNOSIS — M65.331 TRIGGER MIDDLE FINGER OF RIGHT HAND: ICD-10-CM

## 2022-06-28 DIAGNOSIS — Z47.89 AFTERCARE FOLLOWING SURGERY OF THE MUSCULOSKELETAL SYSTEM: ICD-10-CM

## 2022-06-28 DIAGNOSIS — M62.40 INTRINSIC MUSCLE TIGHTNESS: ICD-10-CM

## 2022-06-28 DIAGNOSIS — M65.331 TRIGGER FINGER, RIGHT MIDDLE FINGER: Primary | ICD-10-CM

## 2022-06-28 PROCEDURE — 97140 MANUAL THERAPY 1/> REGIONS: CPT

## 2022-06-28 PROCEDURE — 97110 THERAPEUTIC EXERCISES: CPT

## 2022-06-28 NOTE — PROGRESS NOTES
Daily Note     Today's date: 2022  Patient name: Oanh Corea  : 1955  MRN: [id number]  Referring provider: Bret Marlow*  Dx:   Encounter Diagnosis     ICD-10-CM    1  Trigger finger, right middle finger  M65 331    2  Trigger middle finger of right hand  M65 331    3  Aftercare following surgery of the musculoskeletal system  Z47 89    4  Intrinsic muscle tightness  M62 40                   Subjective: "It still gets stuck at the middle joint "      Objective: See treatment diary below      Assessment: Tolerated treatment well  Patient would benefit from continued OT  Pt reports gardening and inc triggering  Triggering causing inc flexion of DIP joint  Pt edu to use splint when performing activities like that  Pt just purchased compression glove to help reduce edema and tenderness to prox phalanx  Pt reported feeling some relief and dec pain with use of KT  Plan: Progress note during next visit  MD appt      Precautions: Universal  MD referral- wade tx intratendinous adhesions and intrinsic tightness of LF and RF  HEP- heat, scar/adhesion massage, intrinsic tightness, TGE (extend, table top, flat fist, full fist)         Manuals           STM 10' 10' 12'          Scar mgmt 2' 3'           Intrinsic stretch 3' 3' 8'          KT  perf perf          Neuro Re-Ed             HEP PROM PROM-5'           Orthotic mgmt 10'                                                                             Ther Ex             TGE 3x5; fluido 8' 3x5 fluido 8' 3x5, fluido 8'          Intrinsic stretch  5' 10x 10 sec          Digit extensions   10x10 sec                                                                           Ther Activity                                       Gait Training                                       Modalities             MP/CP

## 2022-06-29 ENCOUNTER — HOSPITAL ENCOUNTER (OUTPATIENT)
Dept: RADIOLOGY | Facility: CLINIC | Age: 67
Discharge: HOME | End: 2022-06-29
Admitting: ANESTHESIOLOGY
Payer: MEDICARE

## 2022-06-29 VITALS
OXYGEN SATURATION: 98 % | HEART RATE: 62 BPM | SYSTOLIC BLOOD PRESSURE: 138 MMHG | TEMPERATURE: 97.1 F | RESPIRATION RATE: 18 BRPM | DIASTOLIC BLOOD PRESSURE: 79 MMHG

## 2022-06-29 DIAGNOSIS — M54.16 LUMBAR RADICULOPATHY: ICD-10-CM

## 2022-06-29 PROCEDURE — A9585 GADOBUTROL INJECTION: HCPCS | Performed by: ANESTHESIOLOGY

## 2022-06-29 RX ORDER — PAPAVERINE HCL 150 MG
20 CAPSULE, EXTENDED RELEASE ORAL ONCE
Status: COMPLETED | OUTPATIENT
Start: 2022-06-29 | End: 2022-06-29

## 2022-06-29 RX ADMIN — GADOBUTROL 2 ML: 604.72 INJECTION INTRAVENOUS at 11:17

## 2022-06-29 RX ADMIN — LIDOCAINE HYDROCHLORIDE 2 ML: 20 INJECTION, SOLUTION EPIDURAL; INFILTRATION; INTRACAUDAL; PERINEURAL at 11:17

## 2022-06-29 RX ADMIN — DEXAMETHASONE SODIUM PHOSPHATE 15 MG: 10 INJECTION, SOLUTION INTRAMUSCULAR; INTRAVENOUS at 11:17

## 2022-06-29 NOTE — DISCHARGE INSTRUCTIONS
Epidural Steroid Injection   WHAT YOU NEED TO KNOW:   An epidural steroid injection (BETI) is a procedure to inject steroid medicine into the epidural space  The epidural space is between your spinal cord and vertebrae  Steroids reduce inflammation and fluid buildup in your spine that may be causing pain  You may be given pain medicine along with the steroids  ACTIVITY  Do not drive or operate machinery today  No strenuous activity today - bending, lifting, etc   You may resume normal activites starting tomorrow - start slowly and as tolerated  You may shower today, but no tub baths or hot tubs  You may have numbness for several hours from the local anesthetic  Please use caution and common sense, especially with weight-bearing activities  CARE OF THE INJECTION SITE  If you have soreness or pain, apply ice to the area today (20 minutes on/20 minutes off)  Starting tomorrow, you may use warm, moist heat or ice if needed  You may have an increase or change in your discomfort for 36-48 hours after your treatment  Apply ice and continue with any pain medication you have been prescribed  Notify the [facility] if you have any of the following: redness, drainage, swelling, headache, stiff neck or fever above 100°F     SPECIAL INSTRUCTIONS  Our office will contact you in approximately 7 days for a progress report  MEDICATIONS  Continue to take all routine medications  Our office may have instructed you to hold some medications  As no general anesthesia was used in today's procedure, you should not experience any side effects related to anesthesia  If you have a problem specifically related to your procedure, please call our office at [phone]  Problems not related to your procedure should be directed to your primary care physician

## 2022-06-29 NOTE — H&P
History of Present Illness: The patient is a 74 y o  female who presents with complaints of low back and leg pain      Patient Active Problem List   Diagnosis    Primary osteoarthritis of right knee    Meralgia paraesthetica, left    Acquired spondylolisthesis    Acquired hypothyroidism    Leukopenia    Lumbar radiculopathy    History of right breast cancer    Osteopenia of multiple sites    Seasonal allergies    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    Benign essential HTN    Urge incontinence of urine    Glucose intolerance    Pure hypercholesterolemia    Ascending aortic aneurysm (HCC)    Sacroiliitis (HCC)    Lumbar spondylosis    Spinal stenosis of lumbar region    DDD (degenerative disc disease), lumbar    Trigger finger, right middle finger    Nasal mass    Malignant neoplasm of upper-outer quadrant of right breast in female, estrogen receptor positive ([facility] [address] )    Primary osteoarthritis of left hip       Past Medical History:   Diagnosis Date    Arthritis     BRCA1 negative     BRCA2 negative     Breast cancer ([facility] [address] ) 01/17/2014    radiation    Disease of thyroid gland     History of radiation therapy 2014    breast cancer    Hypothyroid     Knee pain, right     Limb alert care status     no BP's /IV's right side    Mitral valve prolapse     Numbness of anterior thigh     left and tingling    Seasonal allergies     dust,mold,trees    Urinary incontinence     Varicose veins of both lower extremities     Wears glasses        Past Surgical History:   Procedure Laterality Date    BREAST BIOPSY Left 01/16/2002    BREAST BIOPSY Left 08/05/2005    high risk    BREAST BIOPSY Right 12/26/2013    positive    BREAST EXCISIONAL BIOPSY Left 04/06/2006    high risk    BREAST EXCISIONAL BIOPSY Left 10/13/2006    BREAST LUMPECTOMY Right 01/17/2014    malignant    BREAST SURGERY Right     lumpectomy    BUNIONECTOMY Bilateral     COLONOSCOPY      EXPLORATORY LAPAROTOMY      "for fertility"    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    KNEE ARTHROSCOPY Right     GA INCISE FINGER TENDON SHEATH Right 4/5/2022    Procedure: Right long and ring finger trigger finger release;  Surgeon: Karina Rios MD;  [facility]: [facility];  Service: Orthopedics    GA TOTAL KNEE ARTHROPLASTY Right 1/20/2017    Procedure: ARTHROPLASTY KNEE TOTAL;  Surgeon: Erik Strong MD;  Location: [facility];  Service: Orthopedics    TONSILLECTOMY      WISDOM TOOTH EXTRACTION           Current Outpatient Medications:     acetaminophen (TYLENOL) 650 mg CR tablet, Take 1 tablet (650 mg total) by mouth every 8 (eight) hours as needed for mild pain (Patient not taking: No sig reported), Disp: 30 tablet, Rfl: 0    chlorthalidone 25 mg tablet, Take 1 tablet (25 mg total) by mouth daily, Disp: 90 tablet, Rfl: 3    levothyroxine 75 mcg tablet, Take 1 tablet by mouth every other day, Disp: , Rfl:     levothyroxine 88 mcg tablet, Take 1 tablet by mouth every other day, Disp: , Rfl:     loratadine (CLARITIN) 10 mg tablet, Take 10 mg by mouth daily, Disp: , Rfl:     Multiple Vitamins-Minerals (MULTIVITAMIN ADULT PO), multivitamin, Disp: , Rfl:     naproxen sodium (ALEVE) 220 MG tablet, Take 1 tablet (220 mg total) by mouth 2 (two) times a day with meals (Patient not taking: No sig reported), Disp: 20 tablet, Rfl: 0    potassium chloride (K-DUR,KLOR-CON) 10 mEq tablet, Take 1 tablet by mouth daily  , Disp: , Rfl:     solifenacin (VESICARE) 10 MG tablet, Take 1 tablet (10 mg total) by mouth daily, Disp: 90 tablet, Rfl: 3    thiamine (VITAMIN B1) 100 mg tablet, Take 100 mg by mouth daily  , Disp: , Rfl:     VITAMIN D, ERGOCALCIFEROL, PO, Take 2,000 Units by mouth daily, Disp: , Rfl:     zinc gluconate 50 mg tablet, Take 50 mg by mouth daily  , Disp: , Rfl:     Current Facility-Administered Medications:     dexamethasone (PF) (DECADRON) injection 20 mg, 20 mg, Epidural, Once, Nico Loving DO   Gadobutrol injection (SINGLE-DOSE) SOLN 2 mL, 2 mL, Intra-articular, Once, Jerad Amado,     lidocaine (PF) (XYLOCAINE-MPF) 2 % injection 2 mL, 2 mL, Epidural, Once, Jerad Amado, DO    Allergies   Allergen Reactions    Actifed Cold-Allergy [Chlorpheniramine-Phenylephrine] Swelling and Rash     Throat and face swelled    Penicillins Rash    Vancomycin Other (See Comments)     redness       Physical Exam:   Vitals:    06/29/22 1047   BP: 133/66   Pulse:    Resp:    Temp:    SpO2:      General: Awake, Alert, Oriented x 3, Mood and affect appropriate  Respiratory: Respirations even and unlabored  Cardiovascular: Peripheral pulses intact; no edema  Musculoskeletal Exam:  Left lumbar paraspinals tender to palpation    ASA Score: 3         Assessment:   1   Lumbar radiculopathy        Plan: Left L4 and L5 TFESI

## 2022-06-30 ENCOUNTER — OFFICE VISIT (OUTPATIENT)
Dept: OCCUPATIONAL THERAPY | Age: 67
End: 2022-06-30
Payer: MEDICARE

## 2022-06-30 DIAGNOSIS — M65.331 TRIGGER MIDDLE FINGER OF RIGHT HAND: ICD-10-CM

## 2022-06-30 DIAGNOSIS — M62.40 INTRINSIC MUSCLE TIGHTNESS: ICD-10-CM

## 2022-06-30 DIAGNOSIS — M65.331 TRIGGER FINGER, RIGHT MIDDLE FINGER: Primary | ICD-10-CM

## 2022-06-30 DIAGNOSIS — Z47.89 AFTERCARE FOLLOWING SURGERY OF THE MUSCULOSKELETAL SYSTEM: ICD-10-CM

## 2022-06-30 PROCEDURE — 97140 MANUAL THERAPY 1/> REGIONS: CPT

## 2022-06-30 PROCEDURE — 97110 THERAPEUTIC EXERCISES: CPT

## 2022-06-30 NOTE — PROGRESS NOTES
Daily Note     Today's date: 2022  Patient name: Susanna Ashlye  : 1955  MRN: [id number]  Referring provider: Pascual Awad*  Dx:   Encounter Diagnosis     ICD-10-CM    1  Trigger finger, right middle finger  M65 331    2  Trigger middle finger of right hand  M65 331    3  Aftercare following surgery of the musculoskeletal system  Z47 89    4  Intrinsic muscle tightness  M62 40                   Subjective: "It's good for a little bit after the splint, taping and glove, but then goes right back to clicking and getting stuck "      Objective: See treatment diary below      Assessment: Tolerated treatment well  Patient noted with frustration about having issues after sx  Therapist has focused on dec intrinsic tightness, STM/IASTM to nodule, TGE stretching for approp movement of tendon, immobilization for rest, and compression and KT to decompress area  Pt c/o pain at PIP joint; pt demo mild edema to LF, A1 pulley and along thenar eminence (compression glove still new to pt, hoping it will help with continued use)  Pt did report G relief with IASTM at A! Pulley between LF and RF on diagonal pattern  Plan: Appt for MD for further advisement/ diagnostic testing and tx  May return for [facility] for relief  Precautions: Universal  MD referral- evaland tx intratendinous adhesions and intrinsic tightness of LF and RF  HEP- heat, scar/adhesion massage, intrinsic tightness, TGE (extend, table top, flat fist, full fist)         Manuals          STM 10' 10' 12' 12'         Scar mgmt 2' 3'           Intrinsic stretch 3' 3' 8' 8'         KT  perf perf          Neuro Re-Ed             HEP PROM PROM-5'  PROM 5'         Orthotic mgmt 10'                                                                             Ther Ex             TGE 3x5; fluido 8' 3x5 fluido 8' 3x5 fluido 8' 3x5 fluido 8'         Intrinsic stretch  5' 10x 10 sec 10x 10 sec 3 sets         Digit extensions 10x10 sec 10x10 sec 3 sets + power web                                                                          Ther Activity                                       Gait Training                                       Modalities             MP/CP

## 2022-07-01 ENCOUNTER — OFFICE VISIT (OUTPATIENT)
Dept: OBGYN CLINIC | Facility: HOSPITAL | Age: 67
End: 2022-07-01

## 2022-07-01 ENCOUNTER — TELEPHONE (OUTPATIENT)
Dept: OBGYN CLINIC | Facility: HOSPITAL | Age: 67
End: 2022-07-01

## 2022-07-01 VITALS
HEART RATE: 69 BPM | BODY MASS INDEX: 23.67 KG/M2 | SYSTOLIC BLOOD PRESSURE: 120 MMHG | DIASTOLIC BLOOD PRESSURE: 77 MMHG | WEIGHT: 159.8 LBS | HEIGHT: 69 IN

## 2022-07-01 DIAGNOSIS — M65.341 TRIGGER FINGER, RIGHT RING FINGER: ICD-10-CM

## 2022-07-01 DIAGNOSIS — S66.392S: ICD-10-CM

## 2022-07-01 DIAGNOSIS — M65.331 TRIGGER FINGER, RIGHT MIDDLE FINGER: Primary | ICD-10-CM

## 2022-07-01 DIAGNOSIS — Z47.89 AFTERCARE FOLLOWING SURGERY OF THE MUSCULOSKELETAL SYSTEM: ICD-10-CM

## 2022-07-01 DIAGNOSIS — L90.5 SCAR TISSUE: ICD-10-CM

## 2022-07-01 PROCEDURE — 99024 POSTOP FOLLOW-UP VISIT: CPT | Performed by: PHYSICIAN ASSISTANT

## 2022-07-01 NOTE — PROGRESS NOTES
Assessment:   S/P Right long and ring finger trigger finger release - Right on 4/5/2022    Plan:   US right hand evaluate for completeness of release vs scar tissue    Follow Up: After Testing    To Do Next Visit:  Discuss US      CHIEF COMPLAINT:  Chief Complaint   Patient presents with    Right Ring Finger - Post-op, Clicking, Swelling     TFR- 4/5/22  Swelling in palm    Right Middle Finger - Post-op, Clicking, Locking, Swelling     TFR- 4/5/22  Swelling in palm         SUBJECTIVE:  Jacey Solis is a 74 y o  female who presents for follow up after Right long and ring finger trigger finger release - Right on 4/5/2022  Patient was provided with CS injections to help with adhesions and referred to therapy at the last visit  She feels a popping sensation still in the fingers, more so the ring  She notes that it is very bothersome for her, but she is not having any pain at this time  PHYSICAL EXAMINATION:  Vital signs: /77   Pulse 69   Ht 5' 8 75" (1 746 m)   Wt 72 5 kg (159 lb 12 8 oz)   LMP  (LMP Unknown)   BMI 23 77 kg/m²   General: well developed and well nourished, alert, oriented times 3 and appears comfortable  Psychiatric: Normal    MUSCULOSKELETAL EXAMINATION:  Incision: healed  Range of Motion: Limited due to stiffness  Patient has crepitation along the tendons (FDS and FDP at the A1 pulley long and ring fingers)  Intrinsic tightness long and ring finger     Neurovascular status: Neuro intact, good cap refill  Activity Restrictions: No restrictions    STUDIES REVIEWED:  No Studies to review      PROCEDURES PERFORMED:  Procedures  No Procedures performed today

## 2022-07-01 NOTE — TELEPHONE ENCOUNTER
Dr José Luis De Leon  RE: PT orders    Patient asked for Dr José Luis De Leon to place updated PT orders in system

## 2022-07-03 DIAGNOSIS — M65.331 TRIGGER FINGER, RIGHT MIDDLE FINGER: Primary | ICD-10-CM

## 2022-07-05 NOTE — TELEPHONE ENCOUNTER
Left a detailed message the PT order was placed by Dr Michoacano Chavez and if PT order needs to be faxed to an outside facility to call back with the fax #  Otherwise, [facility] will be able to see the order

## 2022-07-06 ENCOUNTER — TELEPHONE (OUTPATIENT)
Dept: PAIN MEDICINE | Facility: CLINIC | Age: 67
End: 2022-07-06

## 2022-07-07 ENCOUNTER — APPOINTMENT (OUTPATIENT)
Dept: OCCUPATIONAL THERAPY | Age: 67
End: 2022-07-07
Payer: MEDICARE

## 2022-07-12 ENCOUNTER — APPOINTMENT (OUTPATIENT)
Dept: OCCUPATIONAL THERAPY | Age: 67
End: 2022-07-12
Payer: MEDICARE

## 2022-07-19 ENCOUNTER — OFFICE VISIT (OUTPATIENT)
Dept: LAB | Age: 67
End: 2022-07-19
Payer: MEDICARE

## 2022-07-19 ENCOUNTER — OFFICE VISIT (OUTPATIENT)
Dept: OCCUPATIONAL THERAPY | Age: 67
End: 2022-07-19
Payer: MEDICARE

## 2022-07-19 ENCOUNTER — APPOINTMENT (OUTPATIENT)
Dept: LAB | Age: 67
End: 2022-07-19
Payer: MEDICARE

## 2022-07-19 DIAGNOSIS — M65.331 TRIGGER FINGER, RIGHT MIDDLE FINGER: Primary | ICD-10-CM

## 2022-07-19 DIAGNOSIS — M65.331 TRIGGER MIDDLE FINGER OF RIGHT HAND: ICD-10-CM

## 2022-07-19 DIAGNOSIS — Z01.818 PRE-OP TESTING: ICD-10-CM

## 2022-07-19 DIAGNOSIS — M62.40 INTRINSIC MUSCLE TIGHTNESS: ICD-10-CM

## 2022-07-19 DIAGNOSIS — Z47.89 AFTERCARE FOLLOWING SURGERY OF THE MUSCULOSKELETAL SYSTEM: ICD-10-CM

## 2022-07-19 LAB
ANION GAP SERPL CALCULATED.3IONS-SCNC: 3 MMOL/L (ref 4–13)
BUN SERPL-MCNC: 18 MG/DL (ref 5–25)
CALCIUM SERPL-MCNC: 9.3 MG/DL (ref 8.3–10.1)
CHLORIDE SERPL-SCNC: 106 MMOL/L (ref 96–108)
CO2 SERPL-SCNC: 32 MMOL/L (ref 21–32)
CREAT SERPL-MCNC: 0.9 MG/DL (ref 0.6–1.3)
ERYTHROCYTE [DISTWIDTH] IN BLOOD BY AUTOMATED COUNT: 12.7 % (ref 11.6–15.1)
GFR SERPL CREATININE-BSD FRML MDRD: 65 ML/MIN/1.73SQ M
GLUCOSE SERPL-MCNC: 69 MG/DL (ref 65–140)
HCT VFR BLD AUTO: 37.6 % (ref 34.8–46.1)
HGB BLD-MCNC: 12 G/DL (ref 11.5–15.4)
MCH RBC QN AUTO: 30.5 PG (ref 26.8–34.3)
MCHC RBC AUTO-ENTMCNC: 31.9 G/DL (ref 31.4–37.4)
MCV RBC AUTO: 95 FL (ref 82–98)
PLATELET # BLD AUTO: 241 THOUSANDS/UL (ref 149–390)
PMV BLD AUTO: 10.8 FL (ref 8.9–12.7)
POTASSIUM SERPL-SCNC: 3.9 MMOL/L (ref 3.5–5.3)
RBC # BLD AUTO: 3.94 MILLION/UL (ref 3.81–5.12)
SODIUM SERPL-SCNC: 141 MMOL/L (ref 135–147)
WBC # BLD AUTO: 4.05 THOUSAND/UL (ref 4.31–10.16)

## 2022-07-19 PROCEDURE — 97140 MANUAL THERAPY 1/> REGIONS: CPT

## 2022-07-19 PROCEDURE — 36415 COLL VENOUS BLD VENIPUNCTURE: CPT

## 2022-07-19 PROCEDURE — 93005 ELECTROCARDIOGRAM TRACING: CPT

## 2022-07-19 PROCEDURE — 97168 OT RE-EVAL EST PLAN CARE: CPT

## 2022-07-19 PROCEDURE — 80048 BASIC METABOLIC PNL TOTAL CA: CPT

## 2022-07-19 PROCEDURE — 85027 COMPLETE CBC AUTOMATED: CPT

## 2022-07-19 PROCEDURE — 97110 THERAPEUTIC EXERCISES: CPT

## 2022-07-19 NOTE — PROGRESS NOTES
OT Re-Evaluation     Today's date: 2022  Patient name: Alina Carson  : 1955  MRN: [id number]  Referring provider: Sharif Smith*  Dx:   Encounter Diagnosis     ICD-10-CM    1  Trigger finger, right middle finger  M65 331    2  Trigger middle finger of right hand  M65 331    3  Aftercare following surgery of the musculoskeletal system  Z47 89    4  Intrinsic muscle tightness  M62 40                   Assessment  Assessment details: 22- Pt returned to MD regarding TFR on 22; as pt still experiencing popping and clicking of LF  Pt with Range of Motion: Limited due to stiffness  Patient has crepitation along the tendons (FDS and FDP at the A1 pulley long and ring fingers)  Intrinsic tightness long and ring finger  Pt has an US scheduled to check scar tissue vs completeness of release  Pt c/o worsening pain and popping and would benefit from continued skilled OT tx for pain relief, dec intrinsic tightness and dec scar tissue  22- Pt has been participating in skilled OT tx and progressing toward goals  Pt presents with nodule of LF at prox phalanx, causing clicking and PIP joint being stuck/ trigger finger; after s/p TFR A1 of LF and RF  Pt prox phalanx has fluctuated in tenderness and edema; today noted dec tenderness but inc edema at A1 pulley; but responded well to MLD during tx  Therapist fabricated PIP restriction orthosis to give tendons a rest as pt still active and may be perf repetitive motions  Pt also edu to trial compression glove for tenderness/edema mgmt  Pt would benefit from continued skilled OT tx to reduce inflammation, pain, and repetitive movement to digit to dec clicking  Pt is a 73 y/o female, who under went LF and RF TFR 2* digit noted with clicking and locking, with use and movement on 22  Pt presents to MD for post op appointment with inc pain, tenderness, swelling, and "clicking" of LF and RF with  and hand use   Pt did receive CSI and reports dec pain, dec swelling, and inc mobility in hand; but pt still demo "clicking" of LF and RF with force  and bending  Pt noted with mid intrinsic tightness as well as adhesion on FDS of LF and RF  Pt scars noted healing well  Pt edu on scar mgmt and adhesion massage, as well as intrinsic stretching and proper TGE- as pt noted to make fist and bend at PIP first, causing pain and clicking  Pt edu and per  with bending at MPs first, then PIPs- which pt reported no pain and no clicking with movement  Pt would benefit from continued skilled OT tx for scar/adhesion mgmt, intrinsic stretching, dec clicking and inc functional use of R hand  Impairments: abnormal coordination, abnormal or restricted ROM, abnormal movement, impaired physical strength and lacks appropriate home exercise program    Symptom irritability: lowUnderstanding of Dx/Px/POC: good   Prognosis: good    Goals  STGs;:  1  Pt will report dec clicking by 23% progressing  2  Pt will be independent with HEP progressing  3  Pt will report dec pain by 50% progressing  LTGs:  1  Pt will report no episodes of clicking not met  2  Pt will inc  and pinch strength to WNLs progressing  3  Pt will inc FOTO score not met    Plan  Patient would benefit from: skilled occupational therapy  Planned modality interventions: thermotherapy: paraffin bath, thermotherapy: hydrocollator packs and fluidotherapy  Planned therapy interventions: manual therapy, joint mobilization, massage, Marrero taping, neuromuscular re-education, patient education, self care, sensory integrative techniques, strengthening, stretching, coordination, therapeutic activities, therapeutic exercise, functional ROM exercises and home exercise program  Frequency: 1-2x per week    Duration in weeks: 4  Plan of Care beginning date: 7/19/2022  Plan of Care expiration date: 8/16/2022  Treatment plan discussed with: patient        Subjective Evaluation    History of Present Illness  Date of surgery: 2022  Mechanism of injury: surgery  Mechanism of injury: Pt is a 75 y/o female, who under went LF and RF TFR 2* digit noted with clicking and locking, with use and movement on 22  Pt presents to MD for post op appointment with inc pain, tenderness, swelling, and "clicking" of LF and RF with  and hand use  Pt did receive CSI and edu to attend skilled OT tx for intrinsic stretching and adhesion treatment             Not a recurrent problem   Quality of life: fair    Pain  Current pain ratin  At best pain ratin  At worst pain ratin  Quality: throbbing and tight  Relieving factors: rest, support and heat  Progression: worsening    Hand dominance: right    Treatments  Current treatment: occupational therapy  Patient Goals  Patient goals for therapy: increased strength, independence with ADLs/IADLs and decreased pain  Patient goal: To stop the clicking        Objective     Neurological Testing     Sensation     Wrist/Hand     Right   Intact: light touch, pin prick and sharp/dull discrimination    Active Range of Motion     Left Wrist   Normal active range of motion    Right Wrist   Normal active range of motion    Right Digits   Flexion   Middle     MCP: 85    PIP: 85    DIP: 60  Ring     MCP: 90    PIP: 90    DIP: 50  Extension   Middle     MCP: 0    PIP: 0    DIP: 0  Ring     MCP: 0    PIP: 0    DIP: 0    Additional Active Range of Motion Details  22- Pt c/o pain and discomfort with movement of LF  22- -5degrees LF PIP flexion 2* tenderness and swelling    Strength/Myotome Testing     Left Wrist/Hand   Wrist extension: 5  Wrist flexion: 5  Radial deviation: 5  Ulnar deviation: 5    Thumb Strength  Palmar/Three-Point Pinch     Trial 1: 12    Right Wrist/Hand   Wrist extension: 4+  Wrist flexion: 4+  Radial deviation: 4+  Ulnar deviation: 4+     (2nd hand position)     Trial 1: 49 1    Thumb Strength   Palmar/Three-Point Pinch     Trial 1: 12    Swelling     Left Wrist/Hand Middle     Proximal: 6 3 cm  Circumference MCP: 18 7 cm    Right Wrist/Hand   Middle     Proximal: 6 6 cm  Circumference MCP: 19 cm             Precautions: Universal  MD referral- evget tx intratendinous adhesions and intrinsic tightness of LF and RF  HEP- heat, scar/adhesion massage, intrinsic tightness, TGE (extend, table top, flat fist, full fist)         Manuals 6/21            STM 10'            Scar mgmt 2'            Intrinsic stretch 3'            KT             Neuro Re-Ed             HEP PROM            Orthotic mgmt 10'                                                                             Ther Ex             TGE 3x5; fluido 8'            Intrinsic stretch                                                                                           Ther Activity                                       Gait Training                                       Modalities             MP/CP

## 2022-07-26 ENCOUNTER — OFFICE VISIT (OUTPATIENT)
Dept: OCCUPATIONAL THERAPY | Age: 67
End: 2022-07-26
Payer: MEDICARE

## 2022-07-26 DIAGNOSIS — Z47.89 AFTERCARE FOLLOWING SURGERY OF THE MUSCULOSKELETAL SYSTEM: ICD-10-CM

## 2022-07-26 DIAGNOSIS — M65.331 TRIGGER MIDDLE FINGER OF RIGHT HAND: ICD-10-CM

## 2022-07-26 DIAGNOSIS — M65.331 TRIGGER FINGER, RIGHT MIDDLE FINGER: Primary | ICD-10-CM

## 2022-07-26 DIAGNOSIS — M62.40 INTRINSIC MUSCLE TIGHTNESS: ICD-10-CM

## 2022-07-26 LAB
ATRIAL RATE: 54 BPM
P AXIS: 44 DEGREES
PR INTERVAL: 174 MS
QRS AXIS: -16 DEGREES
QRSD INTERVAL: 100 MS
QT INTERVAL: 442 MS
QTC INTERVAL: 419 MS
T WAVE AXIS: 28 DEGREES
VENTRICULAR RATE: 54 BPM

## 2022-07-26 PROCEDURE — 93010 ELECTROCARDIOGRAM REPORT: CPT | Performed by: INTERNAL MEDICINE

## 2022-07-26 PROCEDURE — 97110 THERAPEUTIC EXERCISES: CPT

## 2022-07-26 PROCEDURE — 97140 MANUAL THERAPY 1/> REGIONS: CPT

## 2022-07-26 NOTE — PROGRESS NOTES
Daily Note     Today's date: 2022  Patient name: Naveed Mathis  : 1955  MRN: [id number]  Referring provider: Indra Peterson*  Dx:   Encounter Diagnosis     ICD-10-CM    1  Trigger finger, right middle finger  M65 331    2  Trigger middle finger of right hand  M65 331    3  Aftercare following surgery of the musculoskeletal system  Z47 89    4  Intrinsic muscle tightness  M62 40                   Subjective: 'it's the same, I am used to it "      Objective: See treatment diary below      Assessment: Tolerated treatment well  Inc tenderness and swelling LF  And RF A1 pulley  KT applied  STM and stretching completed  Clicking/locking LF occur, mild clicking RF  Patient would benefit from continued OT      Plan: Continue per plan of care  Precautions: Universal  MD referral- wade tx intratendinous adhesions and intrinsic tightness of LF and RF  HEP- heat, scar/adhesion massage, intrinsic tightness, TGE (extend, table top, flat fist, full fist)         Manuals             STM 10'            Scar mgmt 2'            Intrinsic stretch 3'            KT             Neuro Re-Ed             HEP PROM            Orthotic mgmt                                                                              Ther Ex             TGE 3x5; fluido 8'            Intrinsic stretch             Flat fist 15x 2 sets                                                                             Ther Activity                                       Gait Training                                       Modalities             MP/CP             U/S 8'

## 2022-07-26 NOTE — PRE-PROCEDURE INSTRUCTIONS
Pre-Surgery Instructions:   Medication Instructions    acetaminophen (TYLENOL) 650 mg CR tablet Uses PRN- OK to take day of surgery    chlorthalidone 25 mg tablet Hold day of surgery   levothyroxine 75 mcg tablet Take day of surgery   levothyroxine 88 mcg tablet Take day of surgery   loratadine (CLARITIN) 10 mg tablet Hold day of surgery   Multiple Vitamins-Minerals (MULTIVITAMIN ADULT PO) Stop taking 7 days prior to surgery   potassium chloride (K-DUR,KLOR-CON) 10 mEq tablet Hold day of surgery   solifenacin (VESICARE) 10 MG tablet Hold day of surgery   thiamine (VITAMIN B1) 100 mg tablet Stop taking 7 days prior to surgery   VITAMIN D, ERGOCALCIFEROL, PO Stop taking 7 days prior to surgery   zinc gluconate 50 mg tablet Stop taking 7 days prior to surgery  Have you had / have a sore throat? No  Have you had / have a cough less than 1 week? No  Have you had / have a fever greater than 100 0 - 100  4? No  Are you experiencing any shortness of breath? No    Review with patient via phone medications and showering instructions  Instructed to avoid all ASA and OTC Vit/Supp 1 week prior to surgery and to avoid NSAIDs 3 days prior to surgery per anesthesia instructions  Tylenol ok to take prn  [facility] call with surgery schedule time, nothing eat or drink after midnight  Verbalized understanding

## 2022-07-28 ENCOUNTER — ANESTHESIA EVENT (OUTPATIENT)
Dept: PERIOP | Facility: HOSPITAL | Age: 67
End: 2022-07-28
Payer: MEDICARE

## 2022-07-29 ENCOUNTER — ANESTHESIA (OUTPATIENT)
Dept: PERIOP | Facility: HOSPITAL | Age: 67
End: 2022-07-29
Payer: MEDICARE

## 2022-07-29 ENCOUNTER — HOSPITAL ENCOUNTER (OUTPATIENT)
Facility: HOSPITAL | Age: 67
Setting detail: OUTPATIENT SURGERY
Discharge: HOME | End: 2022-07-29
Attending: OBSTETRICS & GYNECOLOGY | Admitting: OBSTETRICS & GYNECOLOGY
Payer: MEDICARE

## 2022-07-29 VITALS
RESPIRATION RATE: 18 BRPM | TEMPERATURE: 97 F | SYSTOLIC BLOOD PRESSURE: 118 MMHG | DIASTOLIC BLOOD PRESSURE: 73 MMHG | HEART RATE: 54 BPM | HEIGHT: 68 IN | WEIGHT: 155 LBS | BODY MASS INDEX: 23.49 KG/M2 | OXYGEN SATURATION: 97 %

## 2022-07-29 DIAGNOSIS — D26.1 ENDOMETRIAL STROMAL NODULE: ICD-10-CM

## 2022-07-29 DIAGNOSIS — N95.0 PMB (POSTMENOPAUSAL BLEEDING): ICD-10-CM

## 2022-07-29 PROCEDURE — NC001 PR NO CHARGE: Performed by: OBSTETRICS & GYNECOLOGY

## 2022-07-29 PROCEDURE — 88305 TISSUE EXAM BY PATHOLOGIST: CPT | Performed by: PATHOLOGY

## 2022-07-29 PROCEDURE — 58558 HYSTEROSCOPY BIOPSY: CPT | Performed by: OBSTETRICS & GYNECOLOGY

## 2022-07-29 RX ORDER — HYDROMORPHONE HCL IN WATER/PF 6 MG/30 ML
0.2 PATIENT CONTROLLED ANALGESIA SYRINGE INTRAVENOUS
Status: DISCONTINUED | OUTPATIENT
Start: 2022-07-29 | End: 2022-07-29 | Stop reason: HOSPADM

## 2022-07-29 RX ORDER — PROPOFOL 10 MG/ML
INJECTION, EMULSION INTRAVENOUS AS NEEDED
Status: DISCONTINUED | OUTPATIENT
Start: 2022-07-29 | End: 2022-07-29

## 2022-07-29 RX ORDER — IBUPROFEN 600 MG/1
600 TABLET ORAL EVERY 6 HOURS PRN
Status: DISCONTINUED | OUTPATIENT
Start: 2022-07-29 | End: 2022-07-29 | Stop reason: HOSPADM

## 2022-07-29 RX ORDER — GLYCOPYRROLATE 0.2 MG/ML
INJECTION INTRAMUSCULAR; INTRAVENOUS AS NEEDED
Status: DISCONTINUED | OUTPATIENT
Start: 2022-07-29 | End: 2022-07-29

## 2022-07-29 RX ORDER — FENTANYL CITRATE 50 UG/ML
INJECTION, SOLUTION INTRAMUSCULAR; INTRAVENOUS AS NEEDED
Status: DISCONTINUED | OUTPATIENT
Start: 2022-07-29 | End: 2022-07-29

## 2022-07-29 RX ORDER — DEXAMETHASONE SODIUM PHOSPHATE 10 MG/ML
INJECTION, SOLUTION INTRAMUSCULAR; INTRAVENOUS AS NEEDED
Status: DISCONTINUED | OUTPATIENT
Start: 2022-07-29 | End: 2022-07-29

## 2022-07-29 RX ORDER — SODIUM CHLORIDE, SODIUM LACTATE, POTASSIUM CHLORIDE, CALCIUM CHLORIDE 600; 310; 30; 20 MG/100ML; MG/100ML; MG/100ML; MG/100ML
20 INJECTION, SOLUTION INTRAVENOUS CONTINUOUS
Status: DISCONTINUED | OUTPATIENT
Start: 2022-07-29 | End: 2022-07-29 | Stop reason: HOSPADM

## 2022-07-29 RX ORDER — KETOROLAC TROMETHAMINE 30 MG/ML
INJECTION, SOLUTION INTRAMUSCULAR; INTRAVENOUS AS NEEDED
Status: DISCONTINUED | OUTPATIENT
Start: 2022-07-29 | End: 2022-07-29

## 2022-07-29 RX ORDER — ONDANSETRON 2 MG/ML
INJECTION INTRAMUSCULAR; INTRAVENOUS AS NEEDED
Status: DISCONTINUED | OUTPATIENT
Start: 2022-07-29 | End: 2022-07-29

## 2022-07-29 RX ORDER — FENTANYL CITRATE/PF 50 MCG/ML
25 SYRINGE (ML) INJECTION
Status: DISCONTINUED | OUTPATIENT
Start: 2022-07-29 | End: 2022-07-29 | Stop reason: HOSPADM

## 2022-07-29 RX ORDER — SODIUM CHLORIDE, SODIUM LACTATE, POTASSIUM CHLORIDE, CALCIUM CHLORIDE 600; 310; 30; 20 MG/100ML; MG/100ML; MG/100ML; MG/100ML
INJECTION, SOLUTION INTRAVENOUS CONTINUOUS PRN
Status: DISCONTINUED | OUTPATIENT
Start: 2022-07-29 | End: 2022-07-29

## 2022-07-29 RX ORDER — ONDANSETRON 2 MG/ML
4 INJECTION INTRAMUSCULAR; INTRAVENOUS EVERY 6 HOURS PRN
Status: DISCONTINUED | OUTPATIENT
Start: 2022-07-29 | End: 2022-07-29 | Stop reason: HOSPADM

## 2022-07-29 RX ORDER — ONDANSETRON 2 MG/ML
4 INJECTION INTRAMUSCULAR; INTRAVENOUS ONCE AS NEEDED
Status: DISCONTINUED | OUTPATIENT
Start: 2022-07-29 | End: 2022-07-29 | Stop reason: HOSPADM

## 2022-07-29 RX ORDER — LIDOCAINE HYDROCHLORIDE 20 MG/ML
INJECTION, SOLUTION EPIDURAL; INFILTRATION; INTRACAUDAL; PERINEURAL AS NEEDED
Status: DISCONTINUED | OUTPATIENT
Start: 2022-07-29 | End: 2022-07-29

## 2022-07-29 RX ADMIN — GLYCOPYRROLATE 0.1 MG: 0.2 INJECTION, SOLUTION INTRAMUSCULAR; INTRAVENOUS at 09:19

## 2022-07-29 RX ADMIN — KETOROLAC TROMETHAMINE 15 MG: 30 INJECTION, SOLUTION INTRAMUSCULAR; INTRAVENOUS at 09:41

## 2022-07-29 RX ADMIN — SODIUM CHLORIDE, SODIUM LACTATE, POTASSIUM CHLORIDE, AND CALCIUM CHLORIDE 20 ML/HR: .6; .31; .03; .02 INJECTION, SOLUTION INTRAVENOUS at 07:40

## 2022-07-29 RX ADMIN — ONDANSETRON HYDROCHLORIDE 4 MG: 2 INJECTION, SOLUTION INTRAMUSCULAR; INTRAVENOUS at 09:15

## 2022-07-29 RX ADMIN — PROPOFOL 100 MG: 10 INJECTION, EMULSION INTRAVENOUS at 09:09

## 2022-07-29 RX ADMIN — LIDOCAINE HYDROCHLORIDE 50 MG: 20 INJECTION, SOLUTION EPIDURAL; INFILTRATION; INTRACAUDAL; PERINEURAL at 09:09

## 2022-07-29 RX ADMIN — FENTANYL CITRATE 100 MCG: 50 INJECTION INTRAMUSCULAR; INTRAVENOUS at 09:09

## 2022-07-29 RX ADMIN — IBUPROFEN 600 MG: 600 TABLET ORAL at 10:52

## 2022-07-29 RX ADMIN — DEXAMETHASONE SODIUM PHOSPHATE 4 MG: 10 INJECTION, SOLUTION INTRAMUSCULAR; INTRAVENOUS at 09:15

## 2022-07-29 RX ADMIN — SODIUM CHLORIDE, SODIUM LACTATE, POTASSIUM CHLORIDE, AND CALCIUM CHLORIDE: .6; .31; .03; .02 INJECTION, SOLUTION INTRAVENOUS at 09:03

## 2022-07-29 RX ADMIN — PROPOFOL 50 MG: 10 INJECTION, EMULSION INTRAVENOUS at 09:10

## 2022-07-29 NOTE — ANESTHESIA PREPROCEDURE EVALUATION
Procedure:  DILATATION AND CURETTAGE (D&C) WITH HYSTEROSCOPY (N/A Uterus)    Relevant Problems   CARDIO   (+) Ascending aortic aneurysm (HCC)   (+) Benign essential HTN   (+) Pure hypercholesterolemia      ENDO   (+) Acquired hypothyroidism      GYN   (+) Malignant neoplasm of upper-outer quadrant of right breast in female, estrogen receptor positive ([facility] [address] )      MUSCULOSKELETAL   (+) DDD (degenerative disc disease), lumbar   (+) Lumbar spondylosis   (+) Primary osteoarthritis of left hip   (+) Primary osteoarthritis of right knee   (+) Sacroiliitis (HCC)      NEURO/PSYCH   (+) History of right breast cancer        Physical Exam    Airway    Mallampati score: II  TM Distance: >3 FB  Neck ROM: full     Dental   No notable dental hx     Cardiovascular  Cardiovascular exam normal    Pulmonary  Pulmonary exam normal     Other Findings        Anesthesia Plan  ASA Score- 2     Anesthesia Type- general with ASA Monitors  Additional Monitors:   Airway Plan: LMA  Plan Factors-Exercise tolerance (METS): >4 METS  Chart reviewed  EKG reviewed  Existing labs reviewed  Patient summary reviewed  Patient is not a current smoker  Induction- intravenous  Postoperative Plan- Plan for postoperative opioid use  Planned trial extubation    Informed Consent- Anesthetic plan and risks discussed with patient  I personally reviewed this patient with the CRNA  Discussed and agreed on the Anesthesia Plan with the CRNA  Ralph Kuo

## 2022-07-29 NOTE — H&P
H&P Exam - Gynecology   Harry Falk 69 y o  female MRN: [id number]  Unit/Bed#: OR POOL Encounter: [id number]    Assessment/Plan     Assessment:  27PQ with postmenopausal bleeding, endometrial nodules     Plan:  Hysteroscopy, Dilation & Curettage  No antibiotics indicated  SCDs for DVT ppx    History of Present Illness   HX and PE limited by: n/a  HPI:  Harry Falk is a 69 y o  female who presents for scheduled procedure  Please see office note from 6/13/22 for full counseling and details        Review of Systems    Historical Information   Past Medical History:   Diagnosis Date    Arthritis     BRCA1 negative     BRCA2 negative     Breast cancer ([facility] [address] ) 01/17/2014    radiation    Disease of thyroid gland     History of radiation therapy 2014    breast cancer    Hypertension     Hypothyroid     Knee pain, right     Limb alert care status     no BP's /IV's right side    Mitral valve prolapse     Numbness of anterior thigh     left and tingling    Seasonal allergies     dust,mold,trees    Urinary incontinence     Varicose veins of both lower extremities     Wears glasses      Past Surgical History:   Procedure Laterality Date    BREAST BIOPSY Left 01/16/2002    BREAST BIOPSY Left 08/05/2005    high risk    BREAST BIOPSY Right 12/26/2013    positive    BREAST EXCISIONAL BIOPSY Left 04/06/2006    high risk    BREAST EXCISIONAL BIOPSY Left 10/13/2006    BREAST LUMPECTOMY Right 01/17/2014    malignant    BREAST SURGERY Right     lumpectomy    BUNIONECTOMY Bilateral     COLONOSCOPY      EXPLORATORY LAPAROTOMY      "for fertility"    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    KNEE ARTHROSCOPY Right     KS INCISE FINGER TENDON SHEATH Right 4/5/2022    Procedure: Right long and ring finger trigger finger release;  Surgeon: Darlene Vitale MD;  [facility];  Service: Orthopedics    KS TOTAL KNEE ARTHROPLASTY Right 1/20/2017    Procedure: ARTHROPLASTY KNEE TOTAL; Surgeon: Colletta Colt, MD;  Location: [facility] OR;  Service: Orthopedics    TONSILLECTOMY      WISDOM TOOTH EXTRACTION       OB/GYN History:   Family History   Problem Relation Age of Onset    Cancer Mother         Bladder CA    Throat cancer Father [de-identified]    No Known Problems Daughter     No Known Problems Daughter     No Known Problems Maternal Grandmother     No Known Problems Maternal Grandfather     No Known Problems Paternal Grandmother     No Known Problems Paternal Grandfather     No Known Problems Maternal Aunt     No Known Problems Maternal Aunt     No Known Problems Maternal Aunt     No Known Problems Maternal Aunt     Colon cancer Maternal Uncle 79    Breast cancer additional onset Paternal Aunt 54    No Known Problems Paternal Aunt     Breast cancer additional onset Cousin 28    Uterine cancer Cousin      Social History   Social History     Substance and Sexual Activity   Alcohol Use Yes    Comment: rare     Social History     Substance and Sexual Activity   Drug Use Never     Social History     Tobacco Use   Smoking Status Never Smoker   Smokeless Tobacco Never Used     E-Cigarette/Vaping    E-Cigarette Use Never User      E-Cigarette/Vaping Substances    Nicotine No     THC No     CBD No     Flavoring No     Other No     Unknown No        Meds/Allergies   all current active meds have been reviewed  Allergies   Allergen Reactions    Actifed Cold-Allergy [Chlorpheniramine-Phenylephrine] Swelling and Rash     Throat and face swelled    Penicillins Rash    Vancomycin Other (See Comments)     redness       Objective   Vitals: Blood pressure 140/80, pulse 58, temperature (!) 96 6 °F (35 9 °C), temperature source Temporal, resp  rate 20, height 5' 8" (1 727 m), weight 70 3 kg (155 lb), SpO2 100 %, not currently breastfeeding  No intake or output data in the 24 hours ending 07/29/22 0750    Invasive Devices:    Invasive Devices  Report    Peripheral Intravenous Line  Duration Peripheral IV 07/29/22 Left Hand <1 day                Physical Exam  Vitals reviewed  Constitutional:       Appearance: Normal appearance  HENT:      Head: Normocephalic and atraumatic  Cardiovascular:      Rate and Rhythm: Normal rate  Pulmonary:      Effort: Pulmonary effort is normal  No respiratory distress  Abdominal:      Palpations: Abdomen is soft  Tenderness: There is no abdominal tenderness  Musculoskeletal:      Right lower leg: No edema  Left lower leg: No edema  Neurological:      Mental Status: She is alert  Psychiatric:         Mood and Affect: Mood normal          Behavior: Behavior normal          Lab Results: I have personally reviewed pertinent reports  Imaging: I have personally reviewed pertinent reports  EKG, Pathology, and Other Studies: I have personally reviewed pertinent reports        Code Status: Prior  Advance Directive and Living Will:      Power of :    POLST:

## 2022-07-29 NOTE — OP NOTE
OPERATIVE REPORT  PATIENT NAME: Sonia Dove    :  1955  MRN: [id number]  Pt Location:  OR ROOM 07    SURGERY DATE: 2022    Surgeon(s) and Role:     * Verma Spurling, MD - Primary     * Gela Zambrano MD - Assisting    Preop Diagnosis:  PMB (postmenopausal bleeding) [N95 0]  Endometrial stromal nodule [D26 1]    Post-Op Diagnosis Codes:     * PMB (postmenopausal bleeding) [N95 0]     * Endometrial stromal nodule [D26 1]    Procedure(s) (LRB):  DILATATION AND CURETTAGE (D&C) WITH HYSTEROSCOPY (N/A)    Specimen(s):  ID Type Source Tests Collected by Time Destination   1 : [facility] Tissue Endometrium TISSUE EXAM Verma Spurling, MD 2022 [id number]        Estimated Blood Loss:   Minimal    Drains:  * No LDAs found *    Anesthesia Type:   General LMA    Operative Indications:  PMB (postmenopausal bleeding) [N95 0]  Endometrial stromal nodule [D26 1]      Operative Findings:  1  Vagina and labia normal in appearance, without lesions or discharge  2  Posterior urethral caruncle noted, patient informed in the office  Slightly red in appearance  3  Cervix normal in appearance without lesions or discharge  4  Hysteroscopy showed atrophic uterus with benign-appearing polyp on left side  Complications:   None apparent    Procedure and Technique:  Patient was taken to the operating room where a time out was performed to confirm correct patient and correct procedure  General LMA anesthesia (LMA) was administered and the patient was positioned on the OR table in the dorsal lithotomy position  All pressure points were padded and a jeremy hugger was placed to maintain control of core body temperature  A bimanual exam was performed and the uterus was noted to be anteverted, normal in size and consistency with no palpable adnexal masses or fullness  The patient was prepped and draped in the usual sterile fashion      A straight catheter was introduced into the bladder, which was drained of 300cc of clear yellow urine  A right-angle speculum was inserted into the vagina and a Silva retractor was used to visualize the anterior lip of the cervix, which was then grasped with a single toothed tenaculum  The uterus was sounded to 8cm  The cervix was serially dilated to 23 [place] using Byrne dilators for introduction of the hysteroscope  Hysteroscope was introduced under direct visualization using normal saline solution as the distention media  Hysteroscope was advanced to the uterine fundus and the entire uterine cavity was inspected in a systematic manner  There was noted to be the above findings  Hysteroscope was withdrawn and polyp forceps were inserted into the cervix  Polyp tissue was unable to be grasped  Polyp forceps were removed  The cervix was then serially dilated to 27 [place] Stephanie using Byrne dilators for introduction of curette  Sharp curetting was performed, starting at the 12'oclock position and rotating a total of 360 degrees to cover all surfaces  Endometrial tissue was obtained and sent for pathology  The single toothed tenaculum was removed from the anterior lip of the cervix  Good hemostasis was confirmed at the tenaculum puncture sites  Weighted speculum was then removed from the vagina  At the conclusion of the procedure, all needle, sponge, and instrument counts were noted to be correct x2  Dr Susan Morel was present and participated in all key portions of the case            Patient Disposition:  PACU       SIGNATURE: Manjit Morataya MD  DATE: July 29, 2022  TIME: 9:46 AM

## 2022-08-02 ENCOUNTER — OFFICE VISIT (OUTPATIENT)
Dept: OCCUPATIONAL THERAPY | Age: 67
End: 2022-08-02
Payer: MEDICARE

## 2022-08-02 DIAGNOSIS — M62.40 INTRINSIC MUSCLE TIGHTNESS: ICD-10-CM

## 2022-08-02 DIAGNOSIS — M65.331 TRIGGER MIDDLE FINGER OF RIGHT HAND: ICD-10-CM

## 2022-08-02 DIAGNOSIS — M65.331 TRIGGER FINGER, RIGHT MIDDLE FINGER: Primary | ICD-10-CM

## 2022-08-02 DIAGNOSIS — Z47.89 AFTERCARE FOLLOWING SURGERY OF THE MUSCULOSKELETAL SYSTEM: ICD-10-CM

## 2022-08-02 PROCEDURE — 97110 THERAPEUTIC EXERCISES: CPT

## 2022-08-02 PROCEDURE — 97140 MANUAL THERAPY 1/> REGIONS: CPT

## 2022-08-02 PROCEDURE — 97035 APP MDLTY 1+ULTRASOUND EA 15: CPT

## 2022-08-02 NOTE — PROGRESS NOTES
Daily Note     Today's date: 2022  Patient name: Hanny Perry  : 1955  MRN: [id number]  Referring provider: Tania Guzmán*  Dx:   Encounter Diagnosis     ICD-10-CM    1  Trigger finger, right middle finger  M65 331    2  Trigger middle finger of right hand  M65 331    3  Aftercare following surgery of the musculoskeletal system  Z47 89    4  Intrinsic muscle tightness  M62 40                   Subjective: 'it still clicks but less "      Objective: See treatment diary below      Assessment: Tolerated treatment well  Inc tenderness and swelling LF and RF at A1 pulley- STM, cupping and U/S used to dec inflammation- by end of tx noted dec swelling and tenderness  Patient would benefit from continued OT      Plan: Continue per plan of care  Precautions: Universal  MD referral- evaland tx intratendinous adhesions and intrinsic tightness of LF and RF  HEP- heat, scar/adhesion massage, intrinsic tightness, TGE (extend, table top, flat fist, full fist)         Manuals            STM 10' 5' 5" cupping           Scar mgmt 2' 2'           Intrinsic stretch 3' 3'           KT  perf           Neuro Re-Ed             HEP PROM            Orthotic mgmt                                                                              Ther Ex             TGE 3x5; fluido 8' 3x5; fluido 8'           Intrinsic stretch  3x5           Flat fist 15x 2 sets 15s 2 sets                                                                            Ther Activity                                       Gait Training                                       Modalities             MP/CP             U/S 8' 8'

## 2022-08-09 ENCOUNTER — OFFICE VISIT (OUTPATIENT)
Dept: OCCUPATIONAL THERAPY | Age: 67
End: 2022-08-09
Payer: MEDICARE

## 2022-08-09 ENCOUNTER — TELEPHONE (OUTPATIENT)
Dept: OBGYN CLINIC | Facility: CLINIC | Age: 67
End: 2022-08-09

## 2022-08-09 ENCOUNTER — HOSPITAL ENCOUNTER (OUTPATIENT)
Dept: RADIOLOGY | Facility: HOSPITAL | Age: 67
Discharge: HOME | End: 2022-08-09
Payer: MEDICARE

## 2022-08-09 DIAGNOSIS — M62.40 INTRINSIC MUSCLE TIGHTNESS: ICD-10-CM

## 2022-08-09 DIAGNOSIS — M65.331 TRIGGER FINGER, RIGHT MIDDLE FINGER: ICD-10-CM

## 2022-08-09 DIAGNOSIS — Z47.89 AFTERCARE FOLLOWING SURGERY OF THE MUSCULOSKELETAL SYSTEM: ICD-10-CM

## 2022-08-09 DIAGNOSIS — M65.331 TRIGGER FINGER, RIGHT MIDDLE FINGER: Primary | ICD-10-CM

## 2022-08-09 DIAGNOSIS — L90.5 SCAR TISSUE: ICD-10-CM

## 2022-08-09 DIAGNOSIS — M65.331 TRIGGER MIDDLE FINGER OF RIGHT HAND: ICD-10-CM

## 2022-08-09 DIAGNOSIS — M65.341 TRIGGER FINGER, RIGHT RING FINGER: ICD-10-CM

## 2022-08-09 DIAGNOSIS — S66.392S: ICD-10-CM

## 2022-08-09 PROCEDURE — 97140 MANUAL THERAPY 1/> REGIONS: CPT

## 2022-08-09 PROCEDURE — 97110 THERAPEUTIC EXERCISES: CPT

## 2022-08-09 PROCEDURE — 76882 US LMTD JT/FCL EVL NVASC XTR: CPT

## 2022-08-09 NOTE — PROGRESS NOTES
Daily Note     Today's date: 2022  Patient name: Socorro Iqbal  : 1955  MRN: [id number]  Referring provider: Jessica Payan*  Dx:   Encounter Diagnosis     ICD-10-CM    1  Trigger finger, right middle finger  M65 331    2  Trigger middle finger of right hand  M65 331    3  Aftercare following surgery of the musculoskeletal system  Z47 89    4  Intrinsic muscle tightness  M62 40                   Subjective: 'id I move relaxed it doesn't hurt"      Objective: See treatment diary below      Assessment: Tolerated treatment well  Dec tenderness and inflammation noted  Pt noted only for clicking with tight  or over extension  Edu on intrinsic stretch of LF to continue with HEP  Patient would benefit from continued OT      Plan: Continue per plan of care  U/S scheduled today      Precautions: Universal  MD referral- wade tx intratendinous adhesions and intrinsic tightness of LF and RF  HEP- heat, scar/adhesion massage, intrinsic tightness, TGE (extend, table top, flat fist, full fist)         Manuals           STM 10' 5' 5" cupping 5' 5" cupping          Scar mgmt 2' 2'           Intrinsic stretch 3' 3'           KT  perf           Neuro Re-Ed             HEP PROM            Orthotic mgmt                                                                              Ther Ex             TGE 3x5; fluido 8' 3x5; fluido 8' fluido 8'          Intrinsic stretch  3x5 5' A/P 2 sets          Flat fist 15x 2 sets 15s 2 sets 15x 2 sets                                                                           Ther Activity                                       Gait Training                                       Modalities             MP/CP             U/S 8' 8' 8'

## 2022-08-09 NOTE — TELEPHONE ENCOUNTER
----- Message from Florencio Hayes sent at 8/9/2022 12:39 PM EDT -----  Regarding: Trace Beckham, I continue to have spotting  My mother had bladder ca  I think I will reach out to a urologist for additional follow up  I am concerned and need to identify what is causing this   Would you have a recommendation?  Rahat Saldivar

## 2022-08-16 ENCOUNTER — OFFICE VISIT (OUTPATIENT)
Dept: OCCUPATIONAL THERAPY | Age: 67
End: 2022-08-16
Payer: MEDICARE

## 2022-08-16 DIAGNOSIS — Z47.89 AFTERCARE FOLLOWING SURGERY OF THE MUSCULOSKELETAL SYSTEM: ICD-10-CM

## 2022-08-16 DIAGNOSIS — M62.40 INTRINSIC MUSCLE TIGHTNESS: ICD-10-CM

## 2022-08-16 DIAGNOSIS — M65.331 TRIGGER FINGER, RIGHT MIDDLE FINGER: Primary | ICD-10-CM

## 2022-08-16 DIAGNOSIS — M65.331 TRIGGER MIDDLE FINGER OF RIGHT HAND: ICD-10-CM

## 2022-08-16 PROCEDURE — 97110 THERAPEUTIC EXERCISES: CPT

## 2022-08-16 PROCEDURE — 97035 APP MDLTY 1+ULTRASOUND EA 15: CPT

## 2022-08-16 PROCEDURE — 97140 MANUAL THERAPY 1/> REGIONS: CPT

## 2022-08-16 NOTE — PROGRESS NOTES
Daily Note     Today's date: 2022  Patient name: Kal Bethea  : 1955  MRN: [id number]  Referring provider: Jeananne Mohs*  Dx:   Encounter Diagnosis     ICD-10-CM    1  Trigger finger, right middle finger  M65 331    2  Trigger middle finger of right hand  M65 331    3  Intrinsic muscle tightness  M62 40    4  Aftercare following surgery of the musculoskeletal system  Z47 89                   Subjective: 'it's not painful painful but it wasn't like this before"      Objective: See treatment diary below  ROM_ LF 90/90/50   strength 52 5#  R MCP- 20cm/ L 19cm  R Prox phalanx6 3 cm and PIP- 6 8 cm  L Prox phalanx 6cm and PIP 5 7cm      Assessment: Tolerated treatment well  Pt U/S showed 3rd and 4th flexor tendinosis and mild tenosynovitis as described above  There appears to be a longitudinal split tear of the ulnar slip of the 3rd flexor digitorum superficialis tendon extending from the level of the distal metacarpal into the base of the 3rd proximal phalanx  Pt to return to MD for further advisement of care  Plan: MD appt 22 for review of U/S- await further advisement      Precautions: Universal  MD referral- evaland tx intratendinous adhesions and intrinsic tightness of LF and RF  HEP- heat, scar/adhesion massage, intrinsic tightness, TGE (extend, table top, flat fist, full fist)         Manuals          STM/edema mgmt 10' 5' 5" cupping 5' 5" cupping 10'         Scar mgmt 2' 2'           Intrinsic stretch 3' 3'  5'         KT  perf           Neuro Re-Ed             HEP PROM            Orthotic mgmt                                                                              Ther Ex             TGE 3x5; fluido 8' 3x5; fluido 8' fluido 8' fluido 8'         Intrinsic stretch  3x5 5' A/P 2 sets 5' A/P 2 sets         Flat fist 15x 2 sets 15s 2 sets 15x 2 sets                                                                           Ther Activity Gait Training                                       Modalities             MP/CP             U/S 8' 8' 8' 8'

## 2022-08-17 ENCOUNTER — OFFICE VISIT (OUTPATIENT)
Dept: OBGYN CLINIC | Facility: HOSPITAL | Age: 67
End: 2022-08-17
Payer: MEDICARE

## 2022-08-17 VITALS
BODY MASS INDEX: 23.34 KG/M2 | SYSTOLIC BLOOD PRESSURE: 123 MMHG | OXYGEN SATURATION: 99 % | HEART RATE: 66 BPM | DIASTOLIC BLOOD PRESSURE: 88 MMHG | WEIGHT: 154 LBS | HEIGHT: 68 IN

## 2022-08-17 DIAGNOSIS — M79.89 SWELLING OF RIGHT HAND: ICD-10-CM

## 2022-08-17 DIAGNOSIS — M79.641 HAND PAIN, RIGHT: ICD-10-CM

## 2022-08-17 DIAGNOSIS — M20.031 SWAN-NECK DEFORMITY OF FINGER, RIGHT: Primary | ICD-10-CM

## 2022-08-17 PROCEDURE — 99213 OFFICE O/P EST LOW 20 MIN: CPT | Performed by: ORTHOPAEDIC SURGERY

## 2022-08-17 NOTE — PROGRESS NOTES
ASSESSMENT/PLAN:    Assessment:   Subluxation of lateral band/swan neck deformity of right long finger  The pathoanatomy and natural history of this diagnosis was explained to the patient in detail  She understood and all questions were answered    S/P Right long and ring trigger finger release performed on 4/5/2022    Plan:  Patient was instructed to obtain OTC figure 8 splints for the above mentioned diagnosis  We will also order a rheumatology panel and she was instructed to follow up with Dr Dannis Siemens, her rheumatologist, in October for further treatment and evaluation  Follow Up:  PRN    _____________________________________________________  CHIEF COMPLAINT:  Chief Complaint   Patient presents with    Right Hand - Follow-up, Results, Clicking     S/P Right long and ring finger trigger finger release - Right on 4/5/2022  US- 8/9/22         SUBJECTIVE:  Angel Young is a 69y o  year old female who presents for follow up regarding long and ring finger pain  Since last visit, Angel Young has tried Resume activities as tolerated, therapy and ice without relief  Today there is Pain  Mild  Intermittant  Dull and Aching, Catching and Locking to the right long finger and ring finger      Radiation: None  Associated symptoms: None    PAST MEDICAL HISTORY:  Past Medical History:   Diagnosis Date    Arthritis     BRCA1 negative     BRCA2 negative     Breast cancer ([facility] [address] ) 01/17/2014    radiation    Disease of thyroid gland     History of radiation therapy 2014    breast cancer    Hypertension     Hypothyroid     Knee pain, right     Limb alert care status     no BP's /IV's right side    Mitral valve prolapse     Numbness of anterior thigh     left and tingling    Seasonal allergies     dust,mold,trees    Urinary incontinence     Varicose veins of both lower extremities     Wears glasses        PAST SURGICAL HISTORY:  Past Surgical History:   Procedure Laterality Date    BREAST BIOPSY Left 01/16/2002    BREAST BIOPSY Left 08/05/2005    high risk    BREAST BIOPSY Right 12/26/2013    positive    BREAST EXCISIONAL BIOPSY Left 04/06/2006    high risk    BREAST EXCISIONAL BIOPSY Left 10/13/2006    BREAST LUMPECTOMY Right 01/17/2014    malignant    BREAST SURGERY Right     lumpectomy    BUNIONECTOMY Bilateral     COLONOSCOPY      EXPLORATORY LAPAROTOMY      "for fertility"    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    KNEE ARTHROSCOPY Right     NC HYSTEROSCOPY,W/ENDO BX N/A 7/29/2022    Procedure: DILATATION AND CURETTAGE (D&C) WITH HYSTEROSCOPY;  Surgeon: Monserrat Huang MD;  [facility];  Service: Gynecology    NC INCISE FINGER TENDON SHEATH Right 4/5/2022    Procedure: Right long and ring finger trigger finger release;  Surgeon: Shelia Escudero MD;  [facility];  Service: Orthopedics    NC TOTAL KNEE ARTHROPLASTY Right 1/20/2017    Procedure: ARTHROPLASTY KNEE TOTAL;  Surgeon: Justin Garcia MD;  [facility];  Service: Orthopedics    TONSILLECTOMY      WISDOM TOOTH EXTRACTION         FAMILY HISTORY:  Family History   Problem Relation Age of Onset    Cancer Mother         Bladder CA    Throat cancer Father [de-identified]    No Known Problems Daughter     No Known Problems Daughter     No Known Problems Maternal Grandmother     No Known Problems Maternal Grandfather     No Known Problems Paternal Grandmother     No Known Problems Paternal Grandfather     No Known Problems Maternal Aunt     No Known Problems Maternal Aunt     No Known Problems Maternal Aunt     No Known Problems Maternal Aunt     Colon cancer Maternal Uncle 79    Breast cancer additional onset Paternal Aunt 54    No Known Problems Paternal Aunt     Breast cancer additional onset Cousin 28    Uterine cancer Cousin        SOCIAL HISTORY:  Social History     Tobacco Use    Smoking status: Never Smoker    Smokeless tobacco: Never Used   Vaping Use    Vaping Use: Never used Substance Use Topics    Alcohol use: Yes     Comment: rare    Drug use: Never       MEDICATIONS:    Current Outpatient Medications:     chlorthalidone 25 mg tablet, Take 1 tablet (25 mg total) by mouth daily, Disp: 90 tablet, Rfl: 3    levothyroxine 75 mcg tablet, Take 1 tablet by mouth every other day, Disp: , Rfl:     levothyroxine 88 mcg tablet, Take 1 tablet by mouth every other day, Disp: , Rfl:     loratadine (CLARITIN) 10 mg tablet, Take 10 mg by mouth daily, Disp: , Rfl:     Multiple Vitamins-Minerals (MULTIVITAMIN ADULT PO), daily after breakfast, Disp: , Rfl:     potassium chloride (K-DUR,KLOR-CON) 10 mEq tablet, Take 1 tablet by mouth daily  , Disp: , Rfl:     solifenacin (VESICARE) 10 MG tablet, Take 1 tablet (10 mg total) by mouth daily, Disp: 90 tablet, Rfl: 3    thiamine (VITAMIN B1) 100 mg tablet, Take 100 mg by mouth daily  , Disp: , Rfl:     VITAMIN D, ERGOCALCIFEROL, PO, Take 2,000 Units by mouth daily, Disp: , Rfl:     zinc gluconate 50 mg tablet, Take 50 mg by mouth daily  , Disp: , Rfl:     ALLERGIES:  Allergies   Allergen Reactions    Actifed Cold-Allergy [Chlorpheniramine-Phenylephrine] Swelling and Rash     Throat and face swelled    Penicillins Rash    Vancomycin Other (See Comments)     redness       REVIEW OF SYSTEMS:  Pertinent items are noted in HPI  A comprehensive review of systems was negative      LABS:  HgA1c:   Lab Results   Component Value Date    HGBA1C 5 6 05/02/2022     BMP:   Lab Results   Component Value Date    GLUCOSE 89 09/30/2015    CALCIUM 9 3 07/19/2022     09/30/2015    K 3 9 07/19/2022    CO2 32 07/19/2022     07/19/2022    BUN 18 07/19/2022    CREATININE 0 90 07/19/2022           _____________________________________________________  PHYSICAL EXAMINATION:  General: well developed and well nourished, alert, oriented times 3 and appears comfortable  Psychiatric: Normal  HEENT: Trachea Midline, No torticollis  Cardiovascular: No discernable arrhythmia  Pulmonary: No wheezing or stridor  Skin: No masses, erythema, lacerations, fluctation, ulcerations  Neurovascular: Sensation Intact to the Median, Ulnar, Radial Nerve, Motor Intact to the Median, Ulnar, Radial Nerve and Pulses Intact    MUSCULOSKELETAL EXAMINATION:  Right hand:  Skin is intact  No erythema or ecchymosis  Subluxation of the dorsal lateral band on the long finger  That locks in extension intermittently  Brisk capillary refil      _____________________________________________________  STUDIES REVIEWED:  Images were reviewed in PACS by Dr Elisa Lora and demonstrate: US Right upper extremity demonstrates 3rd and 4th flexor tendonitis and mild tenosynovitis   Suspected small scarring near the 3rd flexor tendon at the level of the distal aspect of proximal phalanx      PROCEDURES PERFORMED:  Procedures  No Procedures performed today     Scribe Attestation    I,:  Fanny Quinones am acting as a scribe while in the presence of the attending physician :       I,:  Valentín Busby MD personally performed the services described in this documentation    as scribed in my presence :           Scribe Attestation    I,:  Fanny Quinones am acting as a scribe while in the presence of the attending physician :       I,:  Valentín Busby MD personally performed the services described in this documentation    as scribed in my presence :

## 2022-08-23 ENCOUNTER — OFFICE VISIT (OUTPATIENT)
Dept: UROLOGY | Facility: AMBULATORY SURGERY CENTER | Age: 67
End: 2022-08-23
Payer: MEDICARE

## 2022-08-23 VITALS
SYSTOLIC BLOOD PRESSURE: 120 MMHG | DIASTOLIC BLOOD PRESSURE: 80 MMHG | OXYGEN SATURATION: 98 % | HEIGHT: 68 IN | BODY MASS INDEX: 23.42 KG/M2 | HEART RATE: 73 BPM

## 2022-08-23 DIAGNOSIS — R31.0 GROSS HEMATURIA: Primary | ICD-10-CM

## 2022-08-23 PROCEDURE — 99204 OFFICE O/P NEW MOD 45 MIN: CPT | Performed by: UROLOGY

## 2022-08-23 NOTE — PROGRESS NOTES
8/23/2022    Serena Brannon  1955  [id number]        Assessment  Hematuria    Plan  We discussed her recent symptoms and findings  I think is reasonable to pursue urinary tract workup in light of personal and family history  Will check another ultrasound as she did have imaging last year  I would also strongly recommend cystoscopy to evaluate her bladder mucosa  She is in agreement  We discussed indications, risks, benefits  All questions answered to her satisfaction and she agrees with the plan  History of Present Illness  Osmel Keith is a 69 y o  female who reported blood spotting for several months  She has had full gynecologic workup including examination as well as D&C with biopsy  She reports pelvic organ prolapse as well  She has seen pelvic Medicine Service  She has also been found to have microscopic hematuria  She is concerned additionally because her mother had bladder cancer  Her mother was a heavy smoker  Patient herself has not been a smoker but was of course exposed to secondhand smoke throughout her life  Imaging reviewed from 2021 with apparently normal kidneys with parapelvic cysts on MRI and ultrasound  She denies history of kidney stones  Review of Systems  Review of Systems   Constitutional: Negative  HENT: Negative  Respiratory: Negative  Cardiovascular: Negative  Gastrointestinal: Negative  Genitourinary:        As per HPI   Musculoskeletal: Negative  Skin: Negative  Neurological: Negative  Hematological: Negative            Past Medical History  Past Medical History:   Diagnosis Date    Arthritis     BRCA1 negative     BRCA2 negative     Breast cancer ([facility] [address] ) 01/17/2014    radiation    Disease of thyroid gland     History of radiation therapy 2014    breast cancer    Hypertension     Hypothyroid     Knee pain, right     Limb alert care status     no BP's /IV's right side    Mitral valve prolapse     Numbness of anterior thigh     left and tingling    Seasonal allergies     dust,mold,trees    Urinary incontinence     Varicose veins of both lower extremities     Wears glasses        Past Social History  Past Surgical History:   Procedure Laterality Date    BREAST BIOPSY Left 01/16/2002    BREAST BIOPSY Left 08/05/2005    high risk    BREAST BIOPSY Right 12/26/2013    positive    BREAST EXCISIONAL BIOPSY Left 04/06/2006    high risk    BREAST EXCISIONAL BIOPSY Left 10/13/2006    BREAST LUMPECTOMY Right 01/17/2014    malignant    BREAST SURGERY Right     lumpectomy    BUNIONECTOMY Bilateral     COLONOSCOPY      EXPLORATORY LAPAROTOMY      "for fertility"    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    KNEE ARTHROSCOPY Right     W[place] HYSTEROSCOPY,W/ENDO BX N/A 7/29/2022    Procedure: DILATATION AND CURETTAGE (D&C) WITH HYSTEROSCOPY;  Surgeon: Yuko Craig MD;  [facility];  Service: Gynecology    WV INCISE FINGER TENDON SHEATH Right 4/5/2022    Procedure: Right long and ring finger trigger finger release;  Surgeon: Sumeet Anne MD;  [facility];  Service: Orthopedics    W[place] TOTAL KNEE ARTHROPLASTY Right 1/20/2017    Procedure: ARTHROPLASTY KNEE TOTAL;  Surgeon: Lake Alcaraz MD;  Location: [facility];  Service: Orthopedics    TONSILLECTOMY      WISDOM TOOTH EXTRACTION         Past Family History  Family History   Problem Relation Age of Onset    Cancer Mother         Bladder CA    Throat cancer Father [de-identified]    No Known Problems Daughter     No Known Problems Daughter     No Known Problems Maternal Grandmother     No Known Problems Maternal Grandfather     No Known Problems Paternal Grandmother     No Known Problems Paternal Grandfather     No Known Problems Maternal Aunt     No Known Problems Maternal Aunt     No Known Problems Maternal Aunt     No Known Problems Maternal Aunt     Colon cancer Maternal Uncle 79    Breast cancer additional onset Paternal Aunt 54    No Known Problems Paternal Aunt     Breast cancer additional onset Cousin 28    Uterine cancer Cousin        Past Social history  Social History     Socioeconomic History    Marital status: Single     Spouse name: Not on file    Number of children: Not on file    Years of education: Not on file    Highest education level: Not on file   Occupational History    Not on file   Tobacco Use    Smoking status: Never Smoker    Smokeless tobacco: Never Used   Vaping Use    Vaping Use: Never used   Substance and Sexual Activity    Alcohol use: Yes     Comment: rare    Drug use: Never    Sexual activity: Not Currently     Birth control/protection: Abstinence, Post-menopausal   Other Topics Concern    Not on file   Social History Narrative    Not on file     Social Determinants of Health     Financial Resource Strain: Not on file   Food Insecurity: Not on file   Transportation Needs: Not on file   Physical Activity: Not on file   Stress: Not on file   Social Connections: Not on file   Intimate Partner Violence: Not on file   Housing Stability: Not on file     Social History     Tobacco Use   Smoking Status Never Smoker   Smokeless Tobacco Never Used       Current Medications  Current Outpatient Medications   Medication Sig Dispense Refill    chlorthalidone 25 mg tablet Take 1 tablet (25 mg total) by mouth daily 90 tablet 3    levothyroxine 75 mcg tablet Take 1 tablet by mouth every other day      levothyroxine 88 mcg tablet Take 1 tablet by mouth every other day      loratadine (CLARITIN) 10 mg tablet Take 10 mg by mouth daily      Multiple Vitamins-Minerals (MULTIVITAMIN ADULT PO) daily after breakfast      potassium chloride (K-DUR,KLOR-CON) 10 mEq tablet Take 1 tablet by mouth daily        solifenacin (VESICARE) 10 MG tablet Take 1 tablet (10 mg total) by mouth daily 90 tablet 3    thiamine (VITAMIN B1) 100 mg tablet Take 100 mg by mouth daily        VITAMIN D, ERGOCALCIFEROL, PO Take 2,000 Units by mouth daily      zinc gluconate 50 mg tablet Take 50 mg by mouth daily         No current facility-administered medications for this visit  Allergies  Allergies   Allergen Reactions    Actifed Cold-Allergy [Chlorpheniramine-Phenylephrine] Swelling and Rash     Throat and face swelled    Penicillins Rash    Vancomycin Other (See Comments)     redness       Past Medical History, Social History, Family History, medications and allergies were reviewed  Vitals  Vitals:    08/23/22 0902   BP: 120/80   BP Location: Left arm   Patient Position: Sitting   Cuff Size: Standard   Pulse: 73   SpO2: 98%   Height: 5' 8" (1 727 m)       Physical Exam  Physical Exam  Vitals reviewed  Constitutional:       Appearance: She is well-developed  HENT:      Head: Normocephalic and atraumatic  Eyes:      Conjunctiva/sclera: Conjunctivae normal    Cardiovascular:      Rate and Rhythm: Normal rate  Pulmonary:      Effort: Pulmonary effort is normal    Abdominal:      Palpations: Abdomen is soft  Genitourinary:     Comments: No CVA tenderness  Musculoskeletal:         General: Normal range of motion  Skin:     General: Skin is warm and dry  Neurological:      Mental Status: She is alert and oriented to person, place, and time     Psychiatric:         Mood and Affect: Mood normal            Results  No results found for: PSA  Lab Results   Component Value Date    GLUCOSE 89 09/30/2015    CALCIUM 9 3 07/19/2022     09/30/2015    K 3 9 07/19/2022    CO2 32 07/19/2022     07/19/2022    BUN 18 07/19/2022    CREATININE 0 90 07/19/2022     Lab Results   Component Value Date    WBC 4 05 (L) 07/19/2022    HGB 12 0 07/19/2022    HCT 37 6 07/19/2022    MCV 95 07/19/2022     07/19/2022

## 2022-09-20 ENCOUNTER — APPOINTMENT (OUTPATIENT)
Dept: LAB | Age: 67
End: 2022-09-20
Payer: MEDICARE

## 2022-09-20 DIAGNOSIS — M79.641 HAND PAIN, RIGHT: ICD-10-CM

## 2022-09-20 DIAGNOSIS — E55.9 VITAMIN D DEFICIENCY: ICD-10-CM

## 2022-09-20 DIAGNOSIS — M85.89 OSTEOPENIA OF MULTIPLE SITES: ICD-10-CM

## 2022-09-20 DIAGNOSIS — M79.89 SWELLING OF RIGHT HAND: ICD-10-CM

## 2022-09-20 LAB
25(OH)D3 SERPL-MCNC: 37.8 NG/ML (ref 30–100)
ANION GAP SERPL CALCULATED.3IONS-SCNC: 3 MMOL/L (ref 4–13)
B BURGDOR IGG+IGM SER-ACNC: <0.2 AI
BASOPHILS # BLD AUTO: 0.02 THOUSANDS/ΜL (ref 0–0.1)
BASOPHILS NFR BLD AUTO: 1 % (ref 0–1)
BUN SERPL-MCNC: 20 MG/DL (ref 5–25)
CALCIUM SERPL-MCNC: 8.8 MG/DL (ref 8.3–10.1)
CHLORIDE SERPL-SCNC: 104 MMOL/L (ref 96–108)
CO2 SERPL-SCNC: 31 MMOL/L (ref 21–32)
CREAT SERPL-MCNC: 1.02 MG/DL (ref 0.6–1.3)
CRP SERPL QL: <3 MG/L
EOSINOPHIL # BLD AUTO: 0.08 THOUSAND/ΜL (ref 0–0.61)
EOSINOPHIL NFR BLD AUTO: 2 % (ref 0–6)
ERYTHROCYTE [DISTWIDTH] IN BLOOD BY AUTOMATED COUNT: 13 % (ref 11.6–15.1)
ERYTHROCYTE [SEDIMENTATION RATE] IN BLOOD: 15 MM/HOUR (ref 0–29)
GFR SERPL CREATININE-BSD FRML MDRD: 56 ML/MIN/1.73SQ M
GLUCOSE P FAST SERPL-MCNC: 91 MG/DL (ref 65–99)
HCT VFR BLD AUTO: 40.5 % (ref 34.8–46.1)
HGB BLD-MCNC: 13.1 G/DL (ref 11.5–15.4)
IMM GRANULOCYTES # BLD AUTO: 0.01 THOUSAND/UL (ref 0–0.2)
IMM GRANULOCYTES NFR BLD AUTO: 0 % (ref 0–2)
LYMPHOCYTES # BLD AUTO: 0.86 THOUSANDS/ΜL (ref 0.6–4.47)
LYMPHOCYTES NFR BLD AUTO: 23 % (ref 14–44)
MCH RBC QN AUTO: 29.9 PG (ref 26.8–34.3)
MCHC RBC AUTO-ENTMCNC: 32.3 G/DL (ref 31.4–37.4)
MCV RBC AUTO: 93 FL (ref 82–98)
MONOCYTES # BLD AUTO: 0.24 THOUSAND/ΜL (ref 0.17–1.22)
MONOCYTES NFR BLD AUTO: 6 % (ref 4–12)
NEUTROPHILS # BLD AUTO: 2.53 THOUSANDS/ΜL (ref 1.85–7.62)
NEUTS SEG NFR BLD AUTO: 68 % (ref 43–75)
NRBC BLD AUTO-RTO: 0 /100 WBCS
PLATELET # BLD AUTO: 199 THOUSANDS/UL (ref 149–390)
PMV BLD AUTO: 11 FL (ref 8.9–12.7)
POTASSIUM SERPL-SCNC: 3.6 MMOL/L (ref 3.5–5.3)
RBC # BLD AUTO: 4.38 MILLION/UL (ref 3.81–5.12)
SODIUM SERPL-SCNC: 138 MMOL/L (ref 135–147)
URATE SERPL-MCNC: 7.1 MG/DL (ref 2–7.5)
WBC # BLD AUTO: 3.74 THOUSAND/UL (ref 4.31–10.16)

## 2022-09-20 PROCEDURE — 86200 CCP ANTIBODY: CPT

## 2022-09-20 PROCEDURE — 86618 LYME DISEASE ANTIBODY: CPT

## 2022-09-20 PROCEDURE — 86140 C-REACTIVE PROTEIN: CPT

## 2022-09-20 PROCEDURE — 86235 NUCLEAR ANTIGEN ANTIBODY: CPT

## 2022-09-20 PROCEDURE — 86255 FLUORESCENT ANTIBODY SCREEN: CPT

## 2022-09-20 PROCEDURE — 85652 RBC SED RATE AUTOMATED: CPT

## 2022-09-20 PROCEDURE — 84550 ASSAY OF BLOOD/URIC ACID: CPT

## 2022-09-20 PROCEDURE — 36415 COLL VENOUS BLD VENIPUNCTURE: CPT

## 2022-09-20 PROCEDURE — 82306 VITAMIN D 25 HYDROXY: CPT

## 2022-09-20 PROCEDURE — 86038 ANTINUCLEAR ANTIBODIES: CPT

## 2022-09-20 PROCEDURE — 86430 RHEUMATOID FACTOR TEST QUAL: CPT

## 2022-09-20 PROCEDURE — 86256 FLUORESCENT ANTIBODY TITER: CPT

## 2022-09-20 PROCEDURE — 85025 COMPLETE CBC W/AUTO DIFF WBC: CPT

## 2022-09-20 PROCEDURE — 80048 BASIC METABOLIC PNL TOTAL CA: CPT

## 2022-09-21 LAB
ENA SCL70 AB SER-ACNC: <0.2 AI (ref 0–0.9)
RHEUMATOID FACT SER QL LA: NEGATIVE
RYE IGE QN: NEGATIVE

## 2022-09-23 LAB — CCP AB SER IA-ACNC: 1.2

## 2022-09-26 ENCOUNTER — HOSPITAL ENCOUNTER (OUTPATIENT)
Dept: RADIOLOGY | Age: 67
Discharge: HOME | End: 2022-09-26
Payer: MEDICARE

## 2022-09-26 DIAGNOSIS — R31.0 GROSS HEMATURIA: ICD-10-CM

## 2022-09-26 PROCEDURE — 76770 US EXAM ABDO BACK WALL COMP: CPT

## 2022-09-28 LAB — MISCELLANEOUS LAB TEST RESULT: NORMAL

## 2022-09-29 ENCOUNTER — EVALUATION (OUTPATIENT)
Dept: PHYSICAL THERAPY | Age: 67
End: 2022-09-29
Payer: MEDICARE

## 2022-09-29 DIAGNOSIS — M75.81 ROTATOR CUFF TENDINITIS, RIGHT: Primary | ICD-10-CM

## 2022-09-29 PROCEDURE — 97110 THERAPEUTIC EXERCISES: CPT | Performed by: PHYSICAL THERAPIST

## 2022-09-29 PROCEDURE — 97161 PT EVAL LOW COMPLEX 20 MIN: CPT | Performed by: PHYSICAL THERAPIST

## 2022-09-29 NOTE — PROGRESS NOTES
PT Evaluation     Today's date: 2022  Patient name: Mercedes Boyle  : 1955  MRN: [id number]  Referring provider: Jeannette Sultana DO  Dx:   Encounter Diagnosis     ICD-10-CM    1  Rotator cuff tendinitis, right  M75 81                   Assessment  Assessment details: Pt reports to PT with cc of R shoulder pain that began roughly 1 month ago  Pt has difficulty with performing exercise routine due to pain/weakness  Pt has poor GH movement and would benefit from skilled PT in order to improve these deficits and return to PLOF  Impairments: abnormal muscle tone, abnormal or restricted ROM, activity intolerance, impaired physical strength, lacks appropriate home exercise program, pain with function and poor body mechanics    Goals  In 4 weeks pt will:  -Be independent with phase I of HEP  -Increase UE strength by 1/2 grade  -Increase UE ROM by 10 degrees    By discharge pt will:  -Be independent with Phase II of HEP  -Demonstrate full UE strength  -Demonstrate full UE ROM  -Report minimal pain with ADLs    Plan  Planned therapy interventions: joint mobilization, manual therapy, neuromuscular re-education, patient education, strengthening, stretching, therapeutic activities, therapeutic exercise, functional ROM exercises and home exercise program  Frequency: 2x week  Duration in weeks: 6  Plan of Care beginning date: 2022  Plan of Care expiration date: 11/10/2022  Treatment plan discussed with: patient and PTA        Subjective    Objective     Passive Range of Motion     Right Shoulder   Flexion: 165 degrees   Abduction: 105 degrees with pain  External rotation 90°: 75 degrees     Strength/Myotome Testing     Left Shoulder   Normal muscle strength    Right Shoulder     Planes of Motion   Flexion: 4   Abduction: 3+   External rotation at 90°: 3+   Internal rotation at 90°: 3+     Tests     Right Shoulder   Positive full can, Hawkin's, horn blower, lift-off and painful arc  Negative belly press  Precautions: hx of breast cancer, radiation, lymph node removal on R      Manuals 9/29            GH mobs-inf/post MORENO                                                   Neuro Re-Ed                                                                                                        Ther Ex             HEP-pec stretch, rows, S/L ER 15'                                                                                                       Ther Activity                                       Gait Training                                       Modalities

## 2022-09-29 NOTE — LETTER
2022    Stephanie Card DO  [address] [facility] [address]    Patient: Jose Watkins   YOB: 1955   Date of Visit: 2022     Encounter Diagnosis     ICD-10-CM    1  Rotator cuff tendinitis, right  M75 81        Dear Dr Emily Vargas: Thank you for your recent referral of Jose Watkins  Please review the attached evaluation summary from Brandy's recent visit  Please verify that you agree with the plan of care by signing the attached order  If you have any questions or concerns, please do not hesitate to call  I sincerely appreciate the opportunity to share in the care of one of your patients and hope to have another opportunity to work with you in the near future  Sincerely,    Gracia Decker, PT      Referring Provider:      I certify that I have read the below Plan of Care and certify the need for these services furnished under this plan of treatment while under my care  Stephanie Card DO  [address] [facility] [address]  Via Fax: [phone]          PT Evaluation     Today's date: 2022  Patient name: Jose Watkins  : 1955  MRN: [id number]  Referring provider: Matt Bustamante DO  Dx:   Encounter Diagnosis     ICD-10-CM    1  Rotator cuff tendinitis, right  M75 81                   Assessment  Assessment details: Pt reports to PT with cc of R shoulder pain that began roughly 1 month ago  Pt has difficulty with performing exercise routine due to pain/weakness  Pt has poor GH movement and would benefit from skilled PT in order to improve these deficits and return to PLOF    Impairments: abnormal muscle tone, abnormal or restricted ROM, activity intolerance, impaired physical strength, lacks appropriate home exercise program, pain with function and poor body mechanics    Goals  In 4 weeks pt will:  -Be independent with phase I of HEP  -Increase UE strength by 1/2 grade  -Increase UE ROM by 10 degrees    By discharge pt will:  -Be independent with Phase II of HEP  -Demonstrate full UE strength  -Demonstrate full UE ROM  -Report minimal pain with ADLs    Plan  Planned therapy interventions: joint mobilization, manual therapy, neuromuscular re-education, patient education, strengthening, stretching, therapeutic activities, therapeutic exercise, functional ROM exercises and home exercise program  Frequency: 2x week  Duration in weeks: 6  Plan of Care beginning date: 9/29/2022  Plan of Care expiration date: 11/10/2022  Treatment plan discussed with: patient and PTA        Subjective    Objective     Passive Range of Motion     Right Shoulder   Flexion: 165 degrees   Abduction: 105 degrees with pain  External rotation 90°: 75 degrees     Strength/Myotome Testing     Left Shoulder   Normal muscle strength    Right Shoulder     Planes of Motion   Flexion: 4   Abduction: 3+   External rotation at 90°: 3+   Internal rotation at 90°: 3+     Tests     Right Shoulder   Positive full can, Hawkin's, horn blower, lift-off and painful arc  Negative belly press                Precautions: hx of breast cancer, radiation, lymph node removal on R      Manuals 9/29            GH mobs-inf/post MORENO                                                   Neuro Re-Ed                                                                                                        Ther Ex             HEP-pec stretch, rows, S/L ER 15'                                                                                                       Ther Activity                                       Gait Training                                       Modalities

## 2022-10-05 ENCOUNTER — OFFICE VISIT (OUTPATIENT)
Dept: PHYSICAL THERAPY | Age: 67
End: 2022-10-05
Payer: MEDICARE

## 2022-10-05 DIAGNOSIS — M75.81 ROTATOR CUFF TENDINITIS, RIGHT: Primary | ICD-10-CM

## 2022-10-05 PROCEDURE — 97140 MANUAL THERAPY 1/> REGIONS: CPT | Performed by: PHYSICAL THERAPIST

## 2022-10-05 PROCEDURE — 97110 THERAPEUTIC EXERCISES: CPT | Performed by: PHYSICAL THERAPIST

## 2022-10-05 NOTE — PROGRESS NOTES
Daily Note     Today's date: 10/5/2022  Patient name: Kal Bethea  : 1955  MRN: [id number]  Referring provider: Carlos Andre DO  Dx:   Encounter Diagnosis     ICD-10-CM    1  Rotator cuff tendinitis, right  M75 81                   Subjective: pt reports no major changes       Objective: See treatment diary below      Assessment: Tolerated treatment well  Patient demonstrated fatigue post treatment, would benefit from continued PT and pt remains limited in posterior capsule mobility      Plan: Continue per plan of care  Progress treatment as tolerated         Precautions: hx of breast cancer, radiation, lymph node removal on R      Manuals 10/5            GH mobs-inf/post MORENO            PROM MORENO                                      Neuro Re-Ed                                                                                                        Ther Ex             Pullies 5' DC           Arm bike             S/L ER 3x10 1#            ext 11# 3x10            IR-sathish 5# 3x10                                                    Ther Activity                                       Gait Training                                       Modalities

## 2022-10-06 ENCOUNTER — PROCEDURE VISIT (OUTPATIENT)
Dept: UROLOGY | Facility: AMBULATORY SURGERY CENTER | Age: 67
End: 2022-10-06
Payer: MEDICARE

## 2022-10-06 VITALS
DIASTOLIC BLOOD PRESSURE: 80 MMHG | HEART RATE: 70 BPM | OXYGEN SATURATION: 100 % | HEIGHT: 68 IN | BODY MASS INDEX: 23.34 KG/M2 | WEIGHT: 154 LBS | SYSTOLIC BLOOD PRESSURE: 148 MMHG

## 2022-10-06 DIAGNOSIS — R31.0 GROSS HEMATURIA: Primary | ICD-10-CM

## 2022-10-06 PROCEDURE — 52000 CYSTOURETHROSCOPY: CPT | Performed by: UROLOGY

## 2022-10-06 RX ORDER — BIOTIN 2500 MCG
CAPSULE ORAL
COMMUNITY
Start: 2022-08-01

## 2022-10-06 RX ORDER — MULTIVITAMIN WITH IRON
TABLET ORAL
COMMUNITY

## 2022-10-06 NOTE — PROGRESS NOTES
Patient returns today as part of hematuria workup  She has no smoking history, but there is family history of bladder cancer and she is concerned  She has had D and C and biopsy as well as pelvic organ prolapse  She also has history of neurologic disorder that causes lower extremity numbness, and likely promote some urinary retention  Ultrasound revealed about 100 mL PVR  Cystoscopy     Date/Time 10/6/2022 8:59 AM     Performed by  Saima Gomez MD     Authorized by Saima Gomez MD          Procedure Details:  Procedure type: cystoscopy    Additional Procedure Details: Patient was prepped with chlorhexidine  She does seem to have a small urethral caruncle that is slightly erythematous  It is nontender  [de-identified] [place] flexible cystoscope was placed  Careful evaluation of bladder mucosa was performed  There is no suspicious mass or lesion noted  Both ureteral orifices are normal   Retroflexion confirms no abnormality  Plan  Discussed findings  Patient likely sought blood on tissue from the caruncle  She will continue to observe  No further urologic management necessary  We did discuss the importance of double voiding for her mild urine retention  She is not symptomatic from this  She does understand that if this becomes a problem she needs to notify us

## 2022-10-10 ENCOUNTER — APPOINTMENT (OUTPATIENT)
Dept: PHYSICAL THERAPY | Age: 67
End: 2022-10-10

## 2022-10-11 ENCOUNTER — APPOINTMENT (OUTPATIENT)
Dept: LAB | Facility: CLINIC | Age: 67
End: 2022-10-11
Payer: MEDICARE

## 2022-10-11 DIAGNOSIS — E79.0 ASYMPTOMATIC HYPERURICEMIA: ICD-10-CM

## 2022-10-11 DIAGNOSIS — N18.31 STAGE 3A CHRONIC KIDNEY DISEASE (HCC): ICD-10-CM

## 2022-10-11 DIAGNOSIS — M35.9 UNDIFFERENTIATED CONNECTIVE TISSUE DISEASE (HCC): ICD-10-CM

## 2022-10-11 LAB
ANION GAP SERPL CALCULATED.3IONS-SCNC: 8 MMOL/L (ref 4–13)
BUN SERPL-MCNC: 24 MG/DL (ref 5–25)
CALCIUM SERPL-MCNC: 9.5 MG/DL (ref 8.3–10.1)
CHLORIDE SERPL-SCNC: 104 MMOL/L (ref 96–108)
CO2 SERPL-SCNC: 26 MMOL/L (ref 21–32)
CREAT SERPL-MCNC: 0.98 MG/DL (ref 0.6–1.3)
GFR SERPL CREATININE-BSD FRML MDRD: 59 ML/MIN/1.73SQ M
GLUCOSE P FAST SERPL-MCNC: 87 MG/DL (ref 65–99)
POTASSIUM SERPL-SCNC: 3.5 MMOL/L (ref 3.5–5.3)
SODIUM SERPL-SCNC: 138 MMOL/L (ref 135–147)
URATE SERPL-MCNC: 5.4 MG/DL (ref 2–7.5)

## 2022-10-11 PROCEDURE — 36415 COLL VENOUS BLD VENIPUNCTURE: CPT

## 2022-10-11 PROCEDURE — 84550 ASSAY OF BLOOD/URIC ACID: CPT

## 2022-10-11 PROCEDURE — 80048 BASIC METABOLIC PNL TOTAL CA: CPT

## 2022-10-12 ENCOUNTER — TELEPHONE (OUTPATIENT)
Dept: PAIN MEDICINE | Facility: MEDICAL CENTER | Age: 67
End: 2022-10-12

## 2022-10-12 NOTE — TELEPHONE ENCOUNTER
Caller: Oleg Hill     Doctor: Dr Truman Mac    Reason for call: Schedling procedure     Call back#: [phone]

## 2022-10-13 ENCOUNTER — OFFICE VISIT (OUTPATIENT)
Dept: PHYSICAL THERAPY | Age: 67
End: 2022-10-13
Payer: MEDICARE

## 2022-10-13 DIAGNOSIS — M75.81 ROTATOR CUFF TENDINITIS, RIGHT: Primary | ICD-10-CM

## 2022-10-13 PROCEDURE — 97110 THERAPEUTIC EXERCISES: CPT | Performed by: SPECIALIST/TECHNOLOGIST

## 2022-10-13 PROCEDURE — 97140 MANUAL THERAPY 1/> REGIONS: CPT | Performed by: SPECIALIST/TECHNOLOGIST

## 2022-10-13 NOTE — TELEPHONE ENCOUNTER
Patient had Left L4 and L5 TFESI back on 6/29/2022 and received 95%  relief up until a couple of weeks ago, she did a 5k walk and wore the wrong shoes and now she has pain again  Patient reports that her pain is in the same area as it was before just irritated again       Please advise if okay to schedule a repeat Left L4 and L5 TFESI or OV for re eval?

## 2022-10-13 NOTE — PROGRESS NOTES
Daily Note     Today's date: 10/13/2022  Patient name: Jacey Solis  : 1955  MRN: [id number]  Referring provider: Crow Smith DO  Dx:   Encounter Diagnosis     ICD-10-CM    1  Rotator cuff tendinitis, right  M75 81                   Subjective: Pt reports symptoms are improving but remain present with certain motions ie taking off covers, holding children  Objective: See treatment diary below    Assessment: Pt continues to present with pain at end range flexion and abduction, would benefit from continued manual interventions to improve pain and function  Tolerated treatment well  Plan: Continue per plan of care  Progress treatment as tolerated  Precautions: hx of breast cancer, radiation, lymph node removal on R    Manuals 10/5 10/13           [facility] mobs-inf/post JOSH MORENO           PROM JOSH KM                                     Neuro Re-Ed                                                                                                        Ther Ex             Pullies 5' 5'           Arm bike  5/5           S/L ER 3x10 1# HEP           ext 11# 3x10 p!            IR-sathish 5# 3x10             Prone Row, Ext  5#, 3# 3x10           Y,T,I  1# 3x10                        Ther Activity                                       Gait Training                                       Modalities

## 2022-10-14 DIAGNOSIS — M54.16 LUMBAR RADICULOPATHY: Primary | ICD-10-CM

## 2022-10-14 NOTE — TELEPHONE ENCOUNTER
Patient contacted the office and was scheduled for repeat left L4 and L5 TFESI  All pre procedure instructions were given to patient   Nothing to eat or drink for 1 hour prior  Loose fitting clothing   Denies Anti Coag's   NSAIDS okay, ASA okay  Denies Antibx   Needs    Patient instructed to continue all routine medications   Patient instructed to contact our office if becomes sick   Refrain from any vaccines 2 weeks before & 2 weeks after  Insurance auth received but is not a guarantee of payment per your insurance company's authorization disclaimer and it is your responsibility to verify your benefits   COVID -19 screening complete

## 2022-10-14 NOTE — TELEPHONE ENCOUNTER
Caller: Patient    Doctor/Office: Dr Arian Matos asked to speak to:       Call was transferred to: DR SANTOS ELIAS [facility]

## 2022-10-17 ENCOUNTER — OFFICE VISIT (OUTPATIENT)
Dept: PHYSICAL THERAPY | Age: 67
End: 2022-10-17
Payer: MEDICARE

## 2022-10-17 DIAGNOSIS — M75.81 ROTATOR CUFF TENDINITIS, RIGHT: Primary | ICD-10-CM

## 2022-10-17 PROCEDURE — 97110 THERAPEUTIC EXERCISES: CPT | Performed by: SPECIALIST/TECHNOLOGIST

## 2022-10-17 PROCEDURE — 97140 MANUAL THERAPY 1/> REGIONS: CPT | Performed by: SPECIALIST/TECHNOLOGIST

## 2022-10-17 NOTE — PROGRESS NOTES
Daily Note     Today's date: 10/17/2022  Patient name: Maylin Murillo  : 1955  MRN: [id number]  Referring provider: Tona Zuniga DO  Dx:   Encounter Diagnosis     ICD-10-CM    1  Rotator cuff tendinitis, right  M75 81                   Subjective: Pt reports pain persists with end range flexion/abduction which limits ability to perform Y's at home, sharp pain with added resistance ie carrying child  Objective: See treatment diary below    Assessment: Pain at end range flexion and abduction AROM/PROM persists, pt would benefit from continued [facility] mobs to improve capsular restrictions and improve ROM  Tolerated treatment well  Patient demonstrated fatigue post treatment, exhibited good technique with therapeutic exercises and would benefit from continued PT    Plan: Continue per plan of care  Progress treatment as tolerated  Precautions: hx of breast cancer, radiation, lymph node removal on R    Manuals 10/5 10/13 10/17          GH mobs-inf/post MORENO MORENO MORENO          PROM MORENO KM KM                                    Neuro Re-Ed                                                                                                        Ther Ex             Pullies 5' 5' 5'          Arm bike   5/5          S/L ER 3x10 1# HEP Evelio ER 3# 3x10          ext 11# 3x10 p!            IR-evelio 5# 3x10   5# 3x10          Prone Row, Ext  5#, 3# 3x10 5#, 3# 3x10 ea          Y,T,I  1# 3x10 1# 3x10          Evelio Row   11# 2x10          Ther Activity                                       Gait Training                                       Modalities

## 2022-10-18 DIAGNOSIS — I10 BENIGN ESSENTIAL HTN: ICD-10-CM

## 2022-10-18 RX ORDER — CHLORTHALIDONE 25 MG/1
TABLET ORAL
Qty: 90 TABLET | Refills: 3 | Status: SHIPPED | OUTPATIENT
Start: 2022-10-18

## 2022-10-20 ENCOUNTER — OFFICE VISIT (OUTPATIENT)
Dept: PHYSICAL THERAPY | Age: 67
End: 2022-10-20
Payer: MEDICARE

## 2022-10-20 DIAGNOSIS — M75.81 ROTATOR CUFF TENDINITIS, RIGHT: Primary | ICD-10-CM

## 2022-10-20 PROCEDURE — 97140 MANUAL THERAPY 1/> REGIONS: CPT | Performed by: SPECIALIST/TECHNOLOGIST

## 2022-10-20 PROCEDURE — 97110 THERAPEUTIC EXERCISES: CPT | Performed by: SPECIALIST/TECHNOLOGIST

## 2022-10-20 NOTE — PROGRESS NOTES
Daily Note     Today's date: 10/20/2022  Patient name: Maylin Murillo  : 1955  MRN: [id number]  Referring provider: Tona Zuniga DO  Dx:   Encounter Diagnosis     ICD-10-CM    1  Rotator cuff tendinitis, right  M75 81                   Subjective: No changes in painful arc with resistance  Objective: See treatment diary below    Assessment: Pt continues to experience pain throughout arc or motion, pt would benefit from continued manual intervntions to improve  Decreased ER strength and ROM >90* with exercise progressions introduced this visit  Tolerated treatment well  Patient demonstrated fatigue post treatment, exhibited good technique with therapeutic exercises and would benefit from continued PT    Plan: Continue per plan of care  Progress treatment as tolerated  Precautions: hx of breast cancer, radiation, lymph node removal on R    Manuals 10/5 10/13 10/17 10/20         GH mobs-inf/post MORENO MORENO MORENO MORENO         PROM MORENO KM KM MORENO                                   Neuro Re-Ed                                                                                                        Ther Ex             Pullies 5' 5' 5' 5'          Arm bike  / 5/5  5/5         S/L ER 3x10 1# HEP Evelio ER 3# 3x10          ext 11# 3x10 p!            IR-evelio 5# 3x10   5# 3x10          Prone Row, Ext  5#, 3# 3x10 5#, 3# 3x10 ea 7#, 3# 3x10 ea         Y,T,I  1# 3x10 1# 3x10 1# 3x10         [place] Row   11# 2x10          Corner Pec Stretch    5x30s         Wall [place]    10x         ER 90/90    20x5s         Wall Slides    20x                      Ther Activity                                       Gait Training                                       Modalities

## 2022-10-26 ENCOUNTER — RA CDI HCC (OUTPATIENT)
Dept: OTHER | Facility: HOSPITAL | Age: 67
End: 2022-10-26

## 2022-10-26 NOTE — PROGRESS NOTES
[address]  coding opportunities       Chart reviewed, no opportunity found: CHART REVIEWED, NO OPPORTUNITY FOUND        Patients Insurance     Medicare Insurance: Medicare

## 2022-10-31 ENCOUNTER — OFFICE VISIT (OUTPATIENT)
Dept: PHYSICAL THERAPY | Age: 67
End: 2022-10-31

## 2022-10-31 DIAGNOSIS — M75.81 ROTATOR CUFF TENDINITIS, RIGHT: Primary | ICD-10-CM

## 2022-10-31 NOTE — PROGRESS NOTES
Daily Note     Today's date: 10/31/2022  Patient name: Albert Matta  : 1955  MRN: [id number]  Referring provider: Tono Castillo DO  Dx:   Encounter Diagnosis     ICD-10-CM    1  Rotator cuff tendinitis, right  M75 [id number]                   Subjective: Pt continues to experience difficulty with achieving 90/90 abduction and ER due to pain  Objective: See treatment diary below    Assessment: Pain persists at end range flexion and abduction, would benefit from continued [facility] mobs to improve pain free ROM  Pt progressing well with ER strength >90* flx  Tolerated treatment well  Patient demonstrated fatigue post treatment, exhibited good technique with therapeutic exercises and would benefit from continued PT    Plan: Continue per plan of care  Progress treatment as tolerated  Precautions: hx of breast cancer, radiation, lymph node removal on R    Manuals 10/5 10/13 10/17 10/20 10/31        GH mobs-inf/post MORENO MORENO MORENO MORENO MORENO        PROM MORENO KM KM MORENO KM                                  Neuro Re-Ed                                                                                                        Ther Ex             Pullies 5' 5' 5' 5'  5'        Arm bike  5 5/5  /5 5/5        S/L ER 3x10 1# HEP [place] ER 3# 3x10          ext 11# 3x10 p!            IR-sathish 5# 3x10   5# 3x10          Prone Row, Ext  5#, 3# 3x10 5#, 3# 3x10 ea 7#, 3# 3x10 ea 8#        Y,T,I  1# 3x10 1# 3x10 1# 3x10 T, I 3x10        [place] Row   11# 2x10          Corner Pec Stretch    5x30s 3x30s        Wall Parral    10x         ER 90/90    20x5s 30x5s        Wall Slides    20x 20x flx, abd                     Ther Activity                                       Gait Training                                       Modalities

## 2022-11-01 ENCOUNTER — OFFICE VISIT (OUTPATIENT)
Dept: FAMILY MEDICINE CLINIC | Facility: CLINIC | Age: 67
End: 2022-11-01

## 2022-11-01 VITALS
HEIGHT: 68 IN | DIASTOLIC BLOOD PRESSURE: 78 MMHG | SYSTOLIC BLOOD PRESSURE: 122 MMHG | BODY MASS INDEX: 23.79 KG/M2 | WEIGHT: 157 LBS | TEMPERATURE: 97 F | HEART RATE: 72 BPM | RESPIRATION RATE: 17 BRPM

## 2022-11-01 DIAGNOSIS — Z13.89 ENCOUNTER FOR SCREENING FOR OTHER DISORDER: ICD-10-CM

## 2022-11-01 DIAGNOSIS — E03.9 ACQUIRED HYPOTHYROIDISM: ICD-10-CM

## 2022-11-01 DIAGNOSIS — I71.21 ANEURYSM OF ASCENDING AORTA WITHOUT RUPTURE: ICD-10-CM

## 2022-11-01 DIAGNOSIS — I10 BENIGN ESSENTIAL HTN: Primary | ICD-10-CM

## 2022-11-01 DIAGNOSIS — D72.819 LEUKOPENIA, UNSPECIFIED TYPE: ICD-10-CM

## 2022-11-01 DIAGNOSIS — M54.16 LUMBAR RADICULOPATHY: ICD-10-CM

## 2022-11-01 DIAGNOSIS — E78.00 PURE HYPERCHOLESTEROLEMIA: ICD-10-CM

## 2022-11-01 DIAGNOSIS — M48.062 SPINAL STENOSIS OF LUMBAR REGION WITH NEUROGENIC CLAUDICATION: ICD-10-CM

## 2022-11-01 DIAGNOSIS — Z00.00 MEDICARE ANNUAL WELLNESS VISIT, SUBSEQUENT: ICD-10-CM

## 2022-11-01 NOTE — PATIENT INSTRUCTIONS
Medicare Preventive Visit Patient Instructions  Thank you for completing your Welcome to Medicare Visit or Medicare Annual Wellness Visit today  Your next wellness visit will be due in one year (11/2/2023)  The screening/preventive services that you may require over the next 5-10 years are detailed below  Some tests may not apply to you based off risk factors and/or age  Screening tests ordered at today's visit but not completed yet may show as past due  Also, please note that scanned in results may not display below  Preventive Screenings:  Service Recommendations Previous Testing/Comments   Colorectal Cancer Screening  * Colonoscopy    * Fecal Occult Blood Test (FOBT)/Fecal Immunochemical Test (FIT)  * Fecal DNA/Cologuard Test  * Flexible Sigmoidoscopy Age: 39-70 years old   Colonoscopy: every 10 years (may be performed more frequently if at higher risk)  OR  FOBT/FIT: every 1 year  OR  Cologuard: every 3 years  OR  Sigmoidoscopy: every 5 years  Screening may be recommended earlier than age 39 if at higher risk for colorectal cancer  Also, an individualized decision between you and your healthcare provider will decide whether screening between the ages of 74-80 would be appropriate  Colonoscopy: 08/12/2019  FOBT/FIT: Not on file  Cologuard: Not on file  Sigmoidoscopy: Not on file    Screening Current     Breast Cancer Screening Age: 36 years old  Frequency: every 1-2 years  Not required if history of left and right mastectomy Mammogram: 12/06/2021    History Breast Cancer   Cervical Cancer Screening Between the ages of 21-29, pap smear recommended once every 3 years  Between the ages of 33-67, can perform pap smear with HPV co-testing every 5 years     Recommendations may differ for women with a history of total hysterectomy, cervical cancer, or abnormal pap smears in past  Pap Smear: 04/05/2021    Screening Not Indicated   Hepatitis C Screening Once for adults born between 1945 and 1965  More frequently in patients at high risk for Hepatitis C Hep C Antibody: 01/17/2020    Screening Current   Diabetes Screening 1-2 times per year if you're at risk for diabetes or have pre-diabetes Fasting glucose: 87 mg/dL (10/11/2022)  A1C: 5 6 % (5/2/2022)  Screening Current   Cholesterol Screening Once every 5 years if you don't have a lipid disorder  May order more often based on risk factors  Lipid panel: 04/29/2021    Screening Not Indicated  History Lipid Disorder     Other Preventive Screenings Covered by Medicare:  1  Abdominal Aortic Aneurysm (AAA) Screening: covered once if your at risk  You're considered to be at risk if you have a family history of AAA  2  Lung Cancer Screening: covers low dose CT scan once per year if you meet all of the following conditions: (1) Age 50-69; (2) No signs or symptoms of lung cancer; (3) Current smoker or have quit smoking within the last 15 years; (4) You have a tobacco smoking history of at least 20 pack years (packs per day multiplied by number of years you smoked); (5) You get a written order from a healthcare provider  3  Glaucoma Screening: covered annually if you're considered high risk: (1) You have diabetes OR (2) Family history of glaucoma OR (3)  aged 48 and older OR (3)  American aged 72 and older  3  Osteoporosis Screening: covered every 2 years if you meet one of the following conditions: (1) You're estrogen deficient and at risk for osteoporosis based off medical history and other findings; (2) Have a vertebral abnormality; (3) On glucocorticoid therapy for more than 3 months; (4) Have primary hyperparathyroidism; (5) On osteoporosis medications and need to assess response to drug therapy  · Last bone density test (DXA Scan): 09/10/2021   5  HIV Screening: covered annually if you're between the age of 15-65  Also covered annually if you are younger than 13 and older than 72 with risk factors for HIV infection   For pregnant patients, it is covered up to 3 times per pregnancy  Immunizations:  Immunization Recommendations   Influenza Vaccine Annual influenza vaccination during flu season is recommended for all persons aged >= 6 months who do not have contraindications   Pneumococcal Vaccine   * Pneumococcal conjugate vaccine = PCV13 (Prevnar 13), PCV15 (Vaxneuvance), PCV20 (Prevnar 20)  * Pneumococcal polysaccharide vaccine = PPSV23 (Pneumovax) Adults 25-60 years old: 1-3 doses may be recommended based on certain risk factors  Adults 72 years old: 1-2 doses may be recommended based off what pneumonia vaccine you previously received   Hepatitis B Vaccine 3 dose series if at intermediate or high risk (ex: diabetes, end stage renal disease, liver disease)   Tetanus (Td) Vaccine - COST NOT COVERED BY MEDICARE PART B Following completion of primary series, a booster dose should be given every 10 years to maintain immunity against tetanus  Td may also be given as tetanus wound prophylaxis  Tdap Vaccine - COST NOT COVERED BY MEDICARE PART B Recommended at least once for all adults  For pregnant patients, recommended with each pregnancy  Shingles Vaccine (Shingrix) - COST NOT COVERED BY MEDICARE PART B  2 shot series recommended in those aged 48 and above     Health Maintenance Due:      Topic Date Due   • Breast Cancer Screening: Mammogram  12/06/2022   • Colorectal Cancer Screening  08/12/2024   • Hepatitis C Screening  Completed     Immunizations Due:      Topic Date Due   • COVID-19 Vaccine (4 - Booster for Prateek Renetta series) 03/09/2022     Advance Directives   What are advance directives? Advance directives are legal documents that state your wishes and plans for medical care  These plans are made ahead of time in case you lose your ability to make decisions for yourself  Advance directives can apply to any medical decision, such as the treatments you want, and if you want to donate organs  What are the types of advance directives?   There are many types of advance directives, and each state has rules about how to use them  You may choose a combination of any of the following:  · Living will: This is a written record of the treatment you want  You can also choose which treatments you do not want, which to limit, and which to stop at a certain time  This includes surgery, medicine, IV fluid, and tube feedings  · Durable power of  for healthcare [place] [facility]): This is a written record that states who you want to make healthcare choices for you when you are unable to make them for yourself  This person, called a proxy, is usually a family member or a friend  You may choose more than 1 proxy  · Do not resuscitate (DNR) order:  A DNR order is used in case your heart stops beating or you stop breathing  It is a request not to have certain forms of treatment, such as CPR  A DNR order may be included in other types of advance directives  · Medical directive: This covers the care that you want if you are in a coma, near death, or unable to make decisions for yourself  You can list the treatments you want for each condition  Treatment may include pain medicine, surgery, blood transfusions, dialysis, IV or tube feedings, and a ventilator (breathing machine)  · Values history: This document has questions about your views, beliefs, and how you feel and think about life  This information can help others choose the care that you would choose  Why are advance directives important? An advance directive helps you control your care  Although spoken wishes may be used, it is better to have your wishes written down  Spoken wishes can be misunderstood, or not followed  Treatments may be given even if you do not want them  An advance directive may make it easier for your family to make difficult choices about your care  Urinary Incontinence   Urinary incontinence (UI)  is when you lose control of your bladder   UI develops because your bladder cannot store or empty urine properly  The 3 most common types of UI are stress incontinence, urge incontinence, or both  Medicines:   · May be given to help strengthen your bladder control  Report any side effects of medication to your healthcare provider  Do pelvic muscle exercises often:  Your pelvic muscles help you stop urinating  Squeeze these muscles tight for 5 seconds, then relax for 5 seconds  Gradually work up to squeezing for 10 seconds  Do 3 sets of 15 repetitions a day, or as directed  This will help strengthen your pelvic muscles and improve bladder control  Train your bladder:  Go to the bathroom at set times, such as every 2 hours, even if you do not feel the urge to go  You can also try to hold your urine when you feel the urge to go  For example, hold your urine for 5 minutes when you feel the urge to go  As that becomes easier, hold your urine for 10 minutes  Self-care:   · Keep a UI record  Write down how often you leak urine and how much you leak  Make a note of what you were doing when you leaked urine  · Drink liquids as directed  You may need to limit the amount of liquid you drink to help control your urine leakage  Do not drink any liquid right before you go to bed  Limit or do not have drinks that contain caffeine or alcohol  · Prevent constipation  Eat a variety of high-fiber foods  Good examples are high-fiber cereals, beans, vegetables, and whole-grain breads  Walking is the best way to trigger your intestines to have a bowel movement  · Exercise regularly and maintain a healthy weight  Weight loss and exercise will decrease pressure on your bladder and help you control your leakage  · Use a catheter as directed  to help empty your bladder  A catheter is a tiny, plastic tube that is put into your bladder to drain your urine  · Go to behavior therapy as directed  Behavior therapy may be used to help you learn to control your urge to urinate         © Copyright [employer] 2018 Information is for End User's use only and may not be sold, redistributed or otherwise used for commercial purposes   All illustrations and images included in CareNotes® are the copyrighted property of A D A M , Inc  or Maria Elena Paige

## 2022-11-01 NOTE — PROGRESS NOTES
Assessment and Plan:     Problem List Items Addressed This Visit        Endocrine    Acquired hypothyroidism       Cardiovascular and Mediastinum    Benign essential HTN - Primary    Ascending aortic aneurysm       Nervous and Auditory    Lumbar radiculopathy       Other    Leukopenia    Pure hypercholesterolemia    Spinal stenosis of lumbar region      Other Visit Diagnoses     Medicare annual wellness visit, subsequent        Encounter for screening for other disorder              Depression Screening and Follow-up Plan: Patient was screened for depression during today's encounter  They screened negative with a PHQ-2 score of 2  Urinary Incontinence Plan of Care: counseling topics discussed: limiting fluid intake 3-4 hours before bed  Preventive health issues were discussed with patient, and age appropriate screening tests were ordered as noted in patient's After Visit Summary  Personalized health advice and appropriate referrals for health education or preventive services given if needed, as noted in patient's After Visit Summary       History of Present Illness:     Patient presents for a Medicare Wellness Visit    HPI   Patient Care Team:  Romeo Fregoso MD as PCP - General (Family Medicine)  MD Olivia Mesa MD Ezra Blackbird, MD Doyce Cordon, MD Alana Jones, DO (Pain Medicine)     Review of Systems:     Review of Systems     Problem List:     Patient Active Problem List   Diagnosis   • Primary osteoarthritis of right knee   • Meralgia paraesthetica, left   • Acquired spondylolisthesis   • Acquired hypothyroidism   • Leukopenia   • Lumbar radiculopathy   • History of right breast cancer   • Osteopenia of multiple sites   • Seasonal allergies   • 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   • Benign essential HTN   • Urge incontinence of urine   • Glucose intolerance   • Pure hypercholesterolemia   • Ascending aortic aneurysm   • Sacroiliitis (HCC)   • Lumbar spondylosis • Spinal stenosis of lumbar region   • DDD (degenerative disc disease), lumbar   • Trigger finger, right middle finger   • Malignant neoplasm of upper-outer quadrant of right breast in female, estrogen receptor [facility]   • Primary osteoarthritis of left hip      Past Medical and Surgical History:     Past Medical History:   Diagnosis Date   • Arthritis    • Asymptomatic hyperuricemia    • BRCA1 negative    • BRCA2 negative    • Breast cancer [facility] ) 01/17/2014    radiation   • Disease of thyroid gland    • History of radiation therapy 2014    breast cancer   • Hypertension    • Hypothyroid    • Knee pain, right    • Limb alert care status     no BP's /IV's right side   • Mitral valve prolapse    • Numbness of anterior thigh     left and tingling   • Osteoarthritis    • Seasonal allergies     dust,mold,trees   • Urinary incontinence    • Varicose veins of both lower extremities    • Wears glasses      Past Surgical History:   Procedure Laterality Date   • BREAST BIOPSY Left 01/16/2002   • BREAST BIOPSY Left 08/05/2005    high risk   • BREAST BIOPSY Right 12/26/2013    positive   • BREAST EXCISIONAL BIOPSY Left 04/06/2006    high risk   • BREAST EXCISIONAL BIOPSY Left 10/13/2006   • BREAST LUMPECTOMY Right 01/17/2014    malignant   • BREAST SURGERY Right     lumpectomy   • BUNIONECTOMY Bilateral    • COLONOSCOPY     • EXPLORATORY LAPAROTOMY      "for fertility"   • 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   • KNEE ARTHROSCOPY Right    • SC HYSTEROSCOPY,W/ENDO BX N/A 7/29/2022    Procedure: DILATATION AND CURETTAGE (D&C) WITH HYSTEROSCOPY;  Surgeon: Waqar Whitfield MD;  Location: [facility];  Service: Gynecology   • SC INCISE FINGER TENDON SHEATH Right 4/5/2022    Procedure: Right long and ring finger trigger finger release;  Surgeon: Julia Crockett MD;  Location: [facility];  Service: Orthopedics   • SC TOTAL KNEE ARTHROPLASTY Right 1/20/2017    Procedure: ARTHROPLASTY KNEE TOTAL;  Surgeon: Ezequiel Galan MD;  Location: [facility];  Service: Orthopedics   • TONSILLECTOMY     • WISDOM TOOTH EXTRACTION        Family History:     Family History   Problem Relation Age of Onset   • Cancer Mother         Bladder CA   • Throat cancer Father [de-identified]   • No Known Problems Daughter    • No Known Problems Daughter    • No Known Problems Maternal Grandmother    • No Known Problems Maternal Grandfather    • No Known Problems Paternal Grandmother    • No Known Problems Paternal Grandfather    • No Known Problems Maternal Aunt    • No Known Problems Maternal Aunt    • No Known Problems Maternal Aunt    • No Known Problems Maternal Aunt    • Colon cancer Maternal Uncle 79   • Breast cancer additional onset Paternal Aunt 54   • No Known Problems Paternal Aunt    • Breast cancer additional onset Cousin 28   • Uterine cancer Cousin       Social History:     Social History     Socioeconomic History   • Marital status: Single     Spouse name: None   • Number of children: None   • Years of education: None   • Highest education level: None   Occupational History   • None   Tobacco Use   • Smoking status: Never Smoker   • Smokeless tobacco: Never Used   Vaping Use   • Vaping Use: Never used   Substance and Sexual Activity   • Alcohol use: Yes     Comment: rare   • Drug use: Never   • Sexual activity: Not Currently     Birth control/protection: Abstinence, Post-menopausal   Other Topics Concern   • None   Social History Narrative   • None     Social Determinants of Health     Financial Resource Strain: Low Risk    • Difficulty of Paying Living Expenses: Not very hard   Food Insecurity: Not on file   Transportation Needs: No Transportation Needs   • Lack of Transportation (Medical): No   • Lack of Transportation (Non-Medical):  No   Physical Activity: Not on file   Stress: Not on file   Social Connections: Not on file   Intimate Partner Violence: Not on file   Housing Stability: Not on file      Medications and Allergies:     Current Outpatient Medications   Medication Sig Dispense Refill   • Biotin 2500 MCG CAPS      • Calcium Carbonate-Vit D-Min (CALTRATE 600+D PLUS MINERALS PO)      • chlorthalidone 25 mg tablet TAKE ONE TABLET BY MOUTH EVERY DAY 90 tablet 3   • levothyroxine 75 mcg tablet Take 1 tablet by mouth every other day     • levothyroxine 88 mcg tablet Take 1 tablet by mouth every other day     • loratadine (CLARITIN) 10 mg tablet Take 10 mg by mouth daily     • Magnesium 250 MG TABS      • Multiple Vitamins-Minerals (MULTIVITAMIN ADULT PO) daily after breakfast     • potassium chloride (K-DUR,KLOR-CON) 10 mEq tablet Take 1 tablet by mouth daily       • solifenacin (VESICARE) 10 MG tablet Take 1 tablet (10 mg total) by mouth daily 90 tablet 3   • thiamine (VITAMIN B1) 100 mg tablet Take 100 mg by mouth daily       • VITAMIN D, ERGOCALCIFEROL, PO Take 2,000 Units by mouth daily     • zinc gluconate 50 mg tablet Take 50 mg by mouth daily         No current facility-administered medications for this visit       Allergies   Allergen Reactions   • Actifed Cold-Allergy [Chlorpheniramine-Phenylephrine] Swelling and Rash     Throat and face swelled   • Penicillins Rash   • Vancomycin Other (See Comments)     redness      Immunizations:     Immunization History   Administered Date(s) Administered   • COVID-19 MODERNA VACC 0 25 ML IM BOOSTER 11/09/2021   • COVID-19 MODERNA VACC 0 5 ML IM 12/30/2020, 01/25/2021   • H1N1, All Formulations 10/26/2009   • INFLUENZA 10/01/2016, 10/26/2018, 10/29/2020   • Influenza, high dose seasonal 0 7 mL 10/11/2021   • Influenza, seasonal, injectable 10/01/2016   • Pneumococcal Conjugate 13-Valent 03/20/2019   • Pneumococcal Polysaccharide PPV23 06/23/2020   • Tdap 01/03/2018, 02/21/2020   • Zoster Vaccine Recombinant 05/13/2019, 09/03/2019      Health Maintenance:         Topic Date Due   • Breast Cancer Screening: Mammogram  12/06/2022   • Colorectal Cancer Screening  08/12/2024   • Hepatitis C Screening Completed         Topic Date Due   • COVID-19 Vaccine (4 - Booster for Moderna series) 03/09/2022      Medicare Screening Tests and Risk Assessments:     Kody White is here for her Subsequent Wellness visit  Last Medicare Wellness visit information reviewed, patient interviewed and updates made to the record today  Health Risk Assessment:   Patient rates overall health as good  Patient feels that their physical health rating is slightly worse  Patient is satisfied with their life  Eyesight was rated as same  Hearing was rated as same  Patient feels that their emotional and mental health rating is same  Patients states they are never, rarely angry  Patient states they are sometimes unusually tired/fatigued  Pain experienced in the last 7 days has been a lot  Patient's pain rating has been 7/10  Patient states that she has experienced no weight loss or gain in last 6 months  Depression Screening:   PHQ-2 Score: 2      Fall Risk Screening: In the past year, patient has experienced: no history of falling in past year      Urinary Incontinence Screening:   Patient has leaked urine accidently in the last six months  Home Safety:  Patient does not have trouble with stairs inside or outside of their home  Patient has working smoke alarms and has working carbon monoxide detector  Home safety hazards include: none  Nutrition:   Current diet is Regular  Medications:   Patient is currently taking over-the-counter supplements  OTC medications include: Vitamins  Patient is able to manage medications  Activities of Daily Living (ADLs)/Instrumental Activities of Daily Living (IADLs):   Walk and transfer into and out of bed and chair?: Yes  Dress and groom yourself?: Yes    Bathe or shower yourself?: Yes    Feed yourself?  Yes  Do your laundry/housekeeping?: Yes  Manage your money, pay your bills and track your expenses?: Yes  Make your own meals?: Yes    Do your own shopping?: Yes    Previous Hospitalizations:   Any hospitalizations or ED visits within the last 12 months?: No      Advance Care Planning:   Living will: Yes    Durable POA for healthcare: Yes    Advanced directive: Yes      Cognitive Screening:   Provider or family/friend/caregiver concerned regarding cognition?: No    PREVENTIVE SCREENINGS      Cardiovascular Screening:    General: History Lipid Disorder and Screening Current      Diabetes Screening:     General: Screening Current      Colorectal Cancer Screening:     General: Screening Current      Breast Cancer Screening:     General: History Breast Cancer      Cervical Cancer Screening:    General: Screening Current      Osteoporosis Screening:    General: Screening Current      Abdominal Aortic Aneurysm (AAA) Screening:        General: Screening Not Indicated      Lung Cancer Screening:     General: Screening Not Indicated      Hepatitis C Screening:    General: Screening Current    Screening, Brief Intervention, and Referral to Treatment (SBIRT)    Screening  Typical number of drinks in a day: 0  Typical number of drinks in a week: 1  Interpretation: Low risk drinking behavior  AUDIT-C Screenin) How often did you have a drink containing alcohol in the past year? monthly or less  2) How many drinks did you have on a typical day when you were drinking in the past year? 0  3) How often did you have 6 or more drinks on one occasion in the past year? never    AUDIT-C Score: 1  Interpretation: Score 0-2 (female): Negative screen for alcohol misuse    Single Item Drug Screening:  How often have you used an illegal drug (including marijuana) or a prescription medication for non-medical reasons in the past year? never    Single Item Drug Screen Score: 0  Interpretation: Negative screen for possible drug use disorder    Brief Intervention  Alcohol & drug use screenings were reviewed  No concerns regarding substance use disorder identified       Other Counseling Topics:   Calcium and vitamin D intake and regular weightbearing exercise       No exam data present     Physical Exam:     /78   Pulse 72   Temp (!) 97 °F (36 1 °C)   Resp 17   Ht 5' 8" (1 727 m)   Wt 71 2 kg (157 lb)   LMP  (LMP Unknown)   BMI 23 87 kg/m²     Physical Exam     Maisha Burgess MD

## 2022-11-01 NOTE — PROGRESS NOTES
Name: Michael Rosenbaum      : 1955      MRN: [id number]  Encounter Provider: Romeo Fregoso MD  Encounter Date: 2022   Encounter department: [facility]     1  Benign essential HTN    2  Pure hypercholesterolemia    3  Aneurysm of ascending aorta without rupture    4  Acquired hypothyroidism    5  Leukopenia, unspecified type    6  Spinal stenosis of lumbar region with neurogenic claudication    7  Lumbar radiculopathy    8  Medicare annual wellness visit, subsequent    9  Encounter for screening for other disorder    Continue with current medications  She is scheduled for lumbar BETI  I discussed trial of Lyrica-declined  Information provided on nutritional supplement "Sciaticare" for neuropathic pain  She is going schedule a f/u OV for acupuncture        Subjective     Follow up visit  Medications reviewed  Hypertension blood pressures have been stable on Chlorthalidone 25 mg/day and K+ supplement  10/2022 creatinine 0 98  Electrolytes normal  K+ 3 5  2022  Hgb 13 1  Prediabetes 2022 A1c 5 6 decreased from 5 8       Lab Results   Component Value Date    SODIUM 138 10/11/2022    K 3 5 10/11/2022     10/11/2022    CO2 26 10/11/2022    BUN 24 10/11/2022    CREATININE 0 98 10/11/2022    GLUC 69 2022    CALCIUM 9 5 10/11/2022     Lab Results   Component Value Date    URICACID 5 4 10/11/2022     Lab Results   Component Value Date    WBC 3 74 (L) 2022    HGB 13 1 2022    HCT 40 5 2022    MCV 93 2022     2022     Lab Results   Component Value Date    HGBA1C 5 6 2022     Lab Results   Component Value Date    CHOLESTEROL 199 2021    CHOLESTEROL 194 2020    CHOLESTEROL 183 2020     Lab Results   Component Value Date    HDL 71 2021    HDL 63 2020    HDL 52 2020     Lab Results   Component Value Date    TRIG 68 2021    TRIG 77 2020    TRIG 84 2020     Lab Results Component Value Date    LDLCALC 114 (H) 04/29/2021     Lab Results   Component Value Date    HGBA1C 5 6 05/02/2022           Review of Systems   Constitutional: Negative for appetite change, chills, fatigue, fever and unexpected weight change  HENT: Negative for congestion, ear pain, rhinorrhea, sore throat and trouble swallowing  Eyes: Negative for visual disturbance  Respiratory: Negative for cough, shortness of breath and wheezing  Cardiovascular: Negative for chest pain, palpitations and leg swelling         06/2021 CTA Stable ascending aortic aneurysm measuring 41 mm   12/2020 CT coronary Ca++ score=0   Gastrointestinal: Negative for abdominal pain, blood in stool, constipation, diarrhea, nausea and vomiting  Endocrine:        Hypothyroidism on Levothyroxine 88 mcg alternating with 75 mcg daily  Osteopenia with prior Rx with Prolia/Reclast  09/2021 DEXA scan low BMD  T score -1 6 right femoral neck  Lab Results       Component                Value               Date                       [id number]             2 220               05/02/2022                           Genitourinary: Negative for difficulty urinating  05/2021 Bilateral parapelvic cysts, left greater than right,   Musculoskeletal: Positive for back pain  Negative for arthralgias and myalgias  [place] s/p varus rotational osteotomy age 6  Left leg shorter than right leg  She wears a left shoe lift  OA left hip  X rays [id number] moderate OA  Chronic issue with numbness left anterior thigh felt to be secondary to meralgia paresthetica/L lumbar radiculopathy  Numbness/burning in feet  Chronic lower back pain  05/2022 s/p left L 4/ L5 lumbar ESIs  Prior treatment with Gabapentin  05/2021 MRI lumbar spine Degenerative changes without high-grade canal stenosis Mild left foraminal narrowing at L5-S1  05/2018 EMG preserved sensory and late responses argue against any significant large fiber polyneuropathy    Absent left peroneal response measure to the left foot of unclear origin  No evidence of lumbar radiculopathy or plexopathy  No myopathic features  No evidence of motor neuron disease   Skin: Negative for rash  Neurological: Negative for dizziness and headaches  Hematological: Negative for adenopathy  Does not bruise/bleed easily  History of right breast CA s/p right breast lumpectomy 01/2014  BRCA 1/BRCA 2 negative  Patient completed XRT and 5 years of Femara  Lab Results       Component                Value               Date                       WBC                      3 74 (L)            09/20/2022                 HGB                      13 1                09/20/2022                 HCT                      40 5                09/20/2022                 MCV                      93                  09/20/2022                 PLT                      199                 09/20/2022              WBC       Date                     Value               Ref Range           Status                09/20/2022               3 74 (L)            4 31 - 10 16 T*     Final                 07/19/2022               4 05 (L)            4 31 - 10 16 T*     Final                 05/02/2022               4 18 (L)            4 31 - 10 16 T*     Final                 09/30/2015               4 55                4 31 - 10 16 T*     Final                 06/03/2015               4 19 (L)            4 31 - 10 16 T*     Final                 03/04/2015               4 22 (L)            4 31 - 10 16 T*     Final                 Psychiatric/Behavioral: Negative for dysphoric mood and sleep disturbance         Past Medical History:   Diagnosis Date   • Arthritis    • Asymptomatic hyperuricemia    • BRCA1 negative    • BRCA2 negative    • Breast cancer ([facility] [address] ) 01/17/2014    radiation   • Disease of thyroid gland    • History of radiation therapy 2014    breast cancer   • Hypertension    • Hypothyroid    • Knee pain, right    • Limb alert care status     no BP's /IV's right side   • Mitral valve prolapse    • Numbness of anterior thigh     left and tingling   • Osteoarthritis    • Seasonal allergies     dust,mold,trees   • Urinary incontinence    • Varicose veins of both lower extremities    • Wears glasses      Past Surgical History:   Procedure Laterality Date   • BREAST BIOPSY Left 01/16/2002   • BREAST BIOPSY Left 08/05/2005    high risk   • BREAST BIOPSY Right 12/26/2013    positive   • BREAST EXCISIONAL BIOPSY Left 04/06/2006    high risk   • BREAST EXCISIONAL BIOPSY Left 10/13/2006   • BREAST LUMPECTOMY Right 01/17/2014    malignant   • BREAST SURGERY Right     lumpectomy   • BUNIONECTOMY Bilateral    • COLONOSCOPY     • EXPLORATORY LAPAROTOMY      "for fertility"   • 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   • KNEE ARTHROSCOPY Right    • NM HYSTEROSCOPY,W/ENDO BX N/A 7/29/2022    Procedure: DILATATION AND CURETTAGE (D&C) WITH HYSTEROSCOPY;  Surgeon: José Antonio Curtis MD;  [facility];  Service: Gynecology   • NM INCISE FINGER TENDON SHEATH Right 4/5/2022    Procedure: Right long and ring finger trigger finger release;  Surgeon: Farzaneh Fernandes MD;  [facility];  Service: Orthopedics   • NM TOTAL KNEE ARTHROPLASTY Right 1/20/2017    Procedure: ARTHROPLASTY KNEE TOTAL;  Surgeon: Mg Rose MD;  [facility];  Service: Orthopedics   • TONSILLECTOMY     • WISDOM TOOTH EXTRACTION       Family History   Problem Relation Age of Onset   • Cancer Mother         Bladder CA   • Throat cancer Father [de-identified]   • No Known Problems Daughter    • No Known Problems Daughter    • No Known Problems Maternal Grandmother    • No Known Problems Maternal Grandfather    • No Known Problems Paternal Grandmother    • No Known Problems Paternal Grandfather    • No Known Problems Maternal Aunt    • No Known Problems Maternal Aunt    • No Known Problems Maternal Aunt    • No Known Problems Maternal Aunt    • Colon cancer Maternal Uncle 79   • Breast cancer additional onset Paternal Aunt 53   • No Known Problems Paternal Aunt    • Breast cancer additional onset Cousin 28   • Uterine cancer Cousin      Social History     Socioeconomic History   • Marital status: Single     Spouse name: None   • Number of children: None   • Years of education: None   • Highest education level: None   Occupational History   • None   Tobacco Use   • Smoking status: Never Smoker   • Smokeless tobacco: Never Used   Vaping Use   • Vaping Use: Never used   Substance and Sexual Activity   • Alcohol use: Yes     Comment: rare   • Drug use: Never   • Sexual activity: Not Currently     Birth control/protection: Abstinence, Post-menopausal   Other Topics Concern   • None   Social History Narrative   • None     Social Determinants of Health     Financial Resource Strain: Low Risk    • Difficulty of Paying Living Expenses: Not very hard   Food Insecurity: Not on file   Transportation Needs: No Transportation Needs   • Lack of Transportation (Medical): No   • Lack of Transportation (Non-Medical):  No   Physical Activity: Not on file   Stress: Not on file   Social Connections: Not on file   Intimate Partner Violence: Not on file   Housing Stability: Not on file     Current Outpatient Medications on File Prior to Visit   Medication Sig   • Biotin 2500 MCG CAPS    • Calcium Carbonate-Vit D-Min (CALTRATE 600+D PLUS MINERALS PO)    • chlorthalidone 25 mg tablet TAKE ONE TABLET BY MOUTH EVERY DAY   • levothyroxine 75 mcg tablet Take 1 tablet by mouth every other day   • levothyroxine 88 mcg tablet Take 1 tablet by mouth every other day   • loratadine (CLARITIN) 10 mg tablet Take 10 mg by mouth daily   • Magnesium 250 MG TABS    • Multiple Vitamins-Minerals (MULTIVITAMIN ADULT PO) daily after breakfast   • potassium chloride (K-DUR,KLOR-CON) 10 mEq tablet Take 1 tablet by mouth daily     • solifenacin (VESICARE) 10 MG tablet Take 1 tablet (10 mg total) by mouth daily   • thiamine (VITAMIN B1) 100 mg tablet Take 100 mg by mouth daily     • VITAMIN D, ERGOCALCIFEROL, PO Take 2,000 Units by mouth daily   • zinc gluconate 50 mg tablet Take 50 mg by mouth daily       Allergies   Allergen Reactions   • Actifed Cold-Allergy [Chlorpheniramine-Phenylephrine] Swelling and Rash     Throat and face swelled   • Penicillins Rash   • Vancomycin Other (See Comments)     redness     Immunization History   Administered Date(s) Administered   • COVID-19 MODERNA VACC 0 25 ML IM BOOSTER 11/09/2021   • COVID-19 MODERNA VACC 0 5 ML IM 12/30/2020, 01/25/2021   • H1N1, All Formulations 10/26/2009   • INFLUENZA 10/01/2016, 10/26/2018, 10/29/2020   • Influenza, high dose seasonal 0 7 mL 10/11/2021   • Influenza, seasonal, injectable 10/01/2016   • Pneumococcal Conjugate 13-Valent 03/20/2019   • Pneumococcal Polysaccharide PPV23 06/23/2020   • Tdap 01/03/2018, 02/21/2020   • Zoster Vaccine Recombinant 05/13/2019, 09/03/2019       Objective     /78   Pulse 72   Temp (!) 97 °F (36 1 °C)   Resp 17   Ht 5' 8" (1 727 m)   Wt 71 2 kg (157 lb)   LMP  (LMP Unknown)   BMI 23 87 kg/m²     BP Readings from Last 3 Encounters:   11/01/22 122/78   10/11/22 138/82   10/06/22 148/80     Wt Readings from Last 3 Encounters:   11/01/22 71 2 kg (157 lb)   10/11/22 70 8 kg (156 lb)   10/06/22 69 9 kg (154 lb)           Physical Exam  Vitals and nursing note reviewed  Constitutional:       General: She is not in acute distress  Appearance: She is well-developed  HENT:      Right Ear: Tympanic membrane and ear canal normal       Left Ear: Tympanic membrane and ear canal normal    Eyes:      General: No scleral icterus  Extraocular Movements: Extraocular movements intact  Conjunctiva/sclera: Conjunctivae normal       Pupils: Pupils are equal, round, and reactive to light  Neck:      Thyroid: No thyroid mass or thyromegaly  Vascular: No carotid bruit or JVD  Trachea: No tracheal deviation  Cardiovascular:      Rate and Rhythm: Normal rate and regular rhythm  Heart sounds: Normal heart sounds  No murmur heard  No gallop  Pulmonary:      Effort: Pulmonary effort is normal  No respiratory distress  Breath sounds: Normal breath sounds  No wheezing or rales  Chest:   Breasts:      Right: No supraclavicular adenopathy  Left: No supraclavicular adenopathy  Abdominal:      General: There is no distension or abdominal bruit  Palpations: Abdomen is soft  There is no hepatomegaly, splenomegaly or mass  Tenderness: There is no abdominal tenderness  There is no guarding or rebound  Musculoskeletal:      Right lower leg: No edema  Left lower leg: No edema  Comments: No tenderness palpating lumbar spine or SI areas  No lumbar paraspinal tenderness  Lymphadenopathy:      Cervical: No cervical adenopathy  Upper Body:      Right upper body: No supraclavicular adenopathy  Left upper body: No supraclavicular adenopathy  Skin:     Findings: No rash  Nails: There is no clubbing  Neurological:      General: No focal deficit present  Mental Status: She is alert and oriented to person, place, and time  Motor: No weakness  Deep Tendon Reflexes: Reflexes normal       Comments: No weakness in LEs   Dorsiflexion and plantar flexion normal  Negative SLR  + leg length discrepancy    Psychiatric:         Mood and Affect: Mood normal          Behavior: Behavior normal          Cognition and Memory: Cognition normal        Harry Blanchard MD

## 2022-11-02 ENCOUNTER — HOSPITAL ENCOUNTER (OUTPATIENT)
Dept: RADIOLOGY | Facility: CLINIC | Age: 67
Discharge: HOME | End: 2022-11-02

## 2022-11-02 VITALS
OXYGEN SATURATION: 99 % | RESPIRATION RATE: 20 BRPM | DIASTOLIC BLOOD PRESSURE: 78 MMHG | TEMPERATURE: 97.8 F | SYSTOLIC BLOOD PRESSURE: 130 MMHG | HEART RATE: 63 BPM

## 2022-11-02 DIAGNOSIS — M54.16 LUMBAR RADICULOPATHY: ICD-10-CM

## 2022-11-02 PROBLEM — J34.89 NASAL MASS: Status: RESOLVED | Noted: 2021-11-16 | Resolved: 2022-11-02

## 2022-11-02 RX ORDER — PAPAVERINE HCL 150 MG
20 CAPSULE, EXTENDED RELEASE ORAL ONCE
Status: COMPLETED | OUTPATIENT
Start: 2022-11-02 | End: 2022-11-02

## 2022-11-02 RX ADMIN — DEXAMETHASONE SODIUM PHOSPHATE 15 MG: 10 INJECTION, SOLUTION INTRAMUSCULAR; INTRAVENOUS at 09:55

## 2022-11-02 RX ADMIN — IOHEXOL 2 ML: 300 INJECTION, SOLUTION INTRAVENOUS at 09:55

## 2022-11-02 RX ADMIN — LIDOCAINE HYDROCHLORIDE 2 ML: 20 INJECTION, SOLUTION EPIDURAL; INFILTRATION; INTRACAUDAL; PERINEURAL at 09:56

## 2022-11-02 NOTE — DISCHARGE INSTRUCTIONS
Epidural Steroid Injection   WHAT YOU NEED TO KNOW:   An epidural steroid injection (BETI) is a procedure to inject steroid medicine into the epidural space  The epidural space is between your spinal cord and vertebrae  Steroids reduce inflammation and fluid buildup in your spine that may be causing pain  You may be given pain medicine along with the steroids  ACTIVITY  Do not drive or operate machinery today  No strenuous activity today - bending, lifting, etc   You may resume normal activites starting tomorrow - start slowly and as tolerated  You may shower today, but no tub baths or hot tubs  You may have numbness for several hours from the local anesthetic  Please use caution and common sense, especially with weight-bearing activities  CARE OF THE INJECTION SITE  If you have soreness or pain, apply ice to the area today (20 minutes on/20 minutes off)  Starting tomorrow, you may use warm, moist heat or ice if needed  You may have an increase or change in your discomfort for 36-48 hours after your treatment  Apply ice and continue with any pain medication you have been prescribed  Notify the [facility] if you have any of the following: redness, drainage, swelling, headache, stiff neck or fever above 100°F     SPECIAL INSTRUCTIONS  Our office will contact you in approximately 7 days for a progress report  MEDICATIONS  Continue to take all routine medications  Our office may have instructed you to hold some medications  As no general anesthesia was used in today's procedure, you should not experience any side effects related to anesthesia  If you are diabetic, the steroids used in today's injection may temporarily increase your blood sugar levels after the first few days after your injection  Please keep a close eye on your sugars and alert the doctor who manages your diabetes if your sugars are significantly high from your baseline or you are symptomatic       If you have a problem specifically related to your procedure, please call our office at [phone]  Problems not related to your procedure should be directed to your primary care physician

## 2022-11-02 NOTE — H&P
History of Present Illness: The patient is a 69 y o  female who presents with complaints of low back and leg pain      Past Medical History:   Diagnosis Date   • Arthritis    • Asymptomatic hyperuricemia    • BRCA1 negative    • BRCA2 negative    • Breast cancer ([facility] [address] ) 01/17/2014    radiation   • Disease of thyroid gland    • History of radiation therapy 2014    breast cancer   • Hypertension    • Hypothyroid    • Knee pain, right    • Limb alert care status     no BP's /IV's right side   • Mitral valve prolapse    • Numbness of anterior thigh     left and tingling   • Osteoarthritis    • Seasonal allergies     dust,mold,trees   • Urinary incontinence    • Varicose veins of both lower extremities    • Wears glasses        Past Surgical History:   Procedure Laterality Date   • BREAST BIOPSY Left 01/16/2002   • BREAST BIOPSY Left 08/05/2005    high risk   • BREAST BIOPSY Right 12/26/2013    positive   • BREAST EXCISIONAL BIOPSY Left 04/06/2006    high risk   • BREAST EXCISIONAL BIOPSY Left 10/13/2006   • BREAST LUMPECTOMY Right 01/17/2014    malignant   • BREAST SURGERY Right     lumpectomy   • BUNIONECTOMY Bilateral    • COLONOSCOPY     • EXPLORATORY LAPAROTOMY      "for fertility"   • 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   • KNEE ARTHROSCOPY Right    • CO HYSTEROSCOPY,W/ENDO BX N/A 7/29/2022    Procedure: DILATATION AND CURETTAGE (D&C) WITH HYSTEROSCOPY;  Surgeon: Evelyn Houston MD;  [facility];  Service: Gynecology   • CO INCISE FINGER TENDON SHEATH Right 4/5/2022    Procedure: Right long and ring finger trigger finger release;  Surgeon: Trinity Rosales MD;  [facility];  Service: Orthopedics   • CO TOTAL KNEE ARTHROPLASTY Right 1/20/2017    Procedure: ARTHROPLASTY KNEE TOTAL;  Surgeon: Luca Hernández MD;  [facility];  Service: Orthopedics   • TONSILLECTOMY     • WISDOM TOOTH EXTRACTION           Current Outpatient Medications:   •  Biotin 2500 MCG CAPS, , Disp: , Rfl:   •  Calcium Carbonate-Vit D-Min (CALTRATE 600+D PLUS MINERALS PO), , Disp: , Rfl:   •  chlorthalidone 25 mg tablet, TAKE ONE TABLET BY MOUTH EVERY DAY, Disp: 90 tablet, Rfl: 3  •  levothyroxine 75 mcg tablet, Take 1 tablet by mouth every other day, Disp: , Rfl:   •  levothyroxine 88 mcg tablet, Take 1 tablet by mouth every other day, Disp: , Rfl:   •  loratadine (CLARITIN) 10 mg tablet, Take 10 mg by mouth daily, Disp: , Rfl:   •  Magnesium 250 MG TABS, , Disp: , Rfl:   •  Multiple Vitamins-Minerals (MULTIVITAMIN ADULT PO), daily after breakfast, Disp: , Rfl:   •  potassium chloride (K-DUR,KLOR-CON) 10 mEq tablet, Take 1 tablet by mouth daily  , Disp: , Rfl:   •  solifenacin (VESICARE) 10 MG tablet, Take 1 tablet (10 mg total) by mouth daily, Disp: 90 tablet, Rfl: 3  •  thiamine (VITAMIN B1) 100 mg tablet, Take 100 mg by mouth daily  , Disp: , Rfl:   •  VITAMIN D, ERGOCALCIFEROL, PO, Take 2,000 Units by mouth daily, Disp: , Rfl:   •  zinc gluconate 50 mg tablet, Take 50 mg by mouth daily  , Disp: , Rfl:     Current Facility-Administered Medications:   •  dexamethasone (PF) (DECADRON) injection 20 mg, 20 mg, Epidural, Once, Jerad Escuderot, DO  •  iohexol (OMNIPAQUE) 300 mg/mL injection 50 mL, 50 mL, Epidural, Once, Jerad Escuderot, DO  •  lidocaine (PF) (XYLOCAINE-MPF) 2 % injection 2 mL, 2 mL, Epidural, Once, Jerad Escuderot, DO    Allergies   Allergen Reactions   • Actifed Cold-Allergy [Chlorpheniramine-Phenylephrine] Swelling and Rash     Throat and face swelled   • Penicillins Rash   • Vancomycin Other (See Comments)     redness       Physical Exam:   Vitals:    11/02/22 0929   BP: 118/75   Pulse: 85   Resp: 20   Temp: 97 8 °F (36 6 °C)   SpO2: 99%     General: Awake, Alert, Oriented x 3, Mood and affect appropriate  Respiratory: Respirations even and unlabored  Cardiovascular: Peripheral pulses intact; no edema  Musculoskeletal Exam:  Left lumbar paraspinals tender to palpation    ASA Score: 3 Assessment:   1   Lumbar radiculopathy        Plan: repeat left L4 and L5 TFESI

## 2022-11-03 ENCOUNTER — OFFICE VISIT (OUTPATIENT)
Dept: PHYSICAL THERAPY | Age: 67
End: 2022-11-03

## 2022-11-03 DIAGNOSIS — M75.81 ROTATOR CUFF TENDINITIS, RIGHT: Primary | ICD-10-CM

## 2022-11-03 NOTE — PROGRESS NOTES
Daily Note     Today's date: 11/3/2022  Patient name: Roger Gaviria  : 1955  MRN: [id number]  Referring provider: Gaye Palacios DO  Dx:   Encounter Diagnosis     ICD-10-CM    1  Rotator cuff tendinitis, right  M75 81                   Subjective: Pt reports compliance with HEP  Objective: See treatment diary below    Assessment: End range flexion and abduction remain painful  Tolerated treatment well  Patient demonstrated fatigue post treatment, exhibited good technique with therapeutic exercises and would benefit from continued PT    Plan: Continue per plan of care  Progress treatment as tolerated  Precautions: hx of breast cancer, radiation, lymph node removal on R    Manuals 10/5 10/13 10/17 10/20 10/31 11/3       GH mobs-inf/post MORENO MORENO MORENO MORENO MORENO MORENO       PROM MORENO KM KM MORENO [facility]                                 Neuro Re-Ed                                                                                                        Ther Ex             Pullies 5' 5' 5' 5'  5' 5'       Arm bike   5/5  5/5 5/5 5/5'        S/L ER 3x10 1# HEP [place] ER 3# 3x10          ext 11# 3x10 p!            IR-sathish 5# 3x10   5# 3x10          Prone Row, Ext  5#, 3# 3x10 5#, 3# 3x10 ea 7#, 3# 3x10 ea 8#        Y,T,I  1# 3x10 1# 3x10 1# 3x10 T, I 3x10 1# 3x10 ea       [place] Row   11# 2x10          Corner Pec Stretch    5x30s 3x30s 3x30s       Wall [place]    10x         ER 90/90    20x5s 30x5s 30x5s       Wall Slides    20x 20x flx, abd 20x flx,abd                    Ther Activity                                       Gait Training                                       Modalities

## 2022-11-07 ENCOUNTER — OFFICE VISIT (OUTPATIENT)
Dept: PHYSICAL THERAPY | Age: 67
End: 2022-11-07

## 2022-11-07 DIAGNOSIS — M75.81 ROTATOR CUFF TENDINITIS, RIGHT: Primary | ICD-10-CM

## 2022-11-07 NOTE — PROGRESS NOTES
Daily Note     Today's date: 2022  Patient name: Jose Luis Gaming  : 1955  MRN: [id number]  Referring provider: Emmett Ventura DO  Dx:   Encounter Diagnosis     ICD-10-CM    1  Rotator cuff tendinitis, right  M75 81                   Subjective: Pt denies major changes       Objective: See treatment diary below      Assessment: Tolerated treatment well  Patient demonstrated fatigue post treatment, would benefit from continued PT and pt remains limited in inferior capsule mobility      Plan: Continue per plan of care  Progress treatment as tolerated  Precautions: hx of breast cancer, radiation, lymph node removal on R    Manuals 10/5 10/13 10/17 10/20 10/31 11/3 11/7      GH mobs-inf/post MORENO MORENO MORENO MORENO [facility] MORENO      PROM MORENO KM KM MORENO [facility]                                Neuro Re-Ed                                                                                                        Ther Ex             Pullies 5' 5' 5' 5'  5' 5' 5'      Arm bike  /5 5/5  5/5 5/5 5/5'  5/5'       S/L ER 3x10 1# HEP [place] ER 3# 3x10          ext 11# 3x10 p!            IR-evelio 5# 3x10   5# 3x10          Prone Row, Ext  5#, 3# 3x10 5#, 3# 3x10 ea 7#, 3# 3x10 ea 8#        Y,T,I  1# 3x10 1# 3x10 1# 3x10 T, I 3x10 1# 3x10 ea       [place] Row   11# 2x10          Corner Pec Stretch    5x30s 3x30s 3x30s       Wall [place]    10x         ER 90/90    20x5s 30x5s 30x5s       Wall Slides    20x 20x flx, abd 20x flx,abd       Prone hor abd w/ ER       3x10      Prone ext       2# 3x10                                Ther Activity                                       Gait Training                                       Modalities

## 2022-11-09 ENCOUNTER — TELEPHONE (OUTPATIENT)
Dept: PAIN MEDICINE | Facility: CLINIC | Age: 67
End: 2022-11-09

## 2022-11-10 ENCOUNTER — OFFICE VISIT (OUTPATIENT)
Dept: PHYSICAL THERAPY | Age: 67
End: 2022-11-10

## 2022-11-10 DIAGNOSIS — M75.81 ROTATOR CUFF TENDINITIS, RIGHT: Primary | ICD-10-CM

## 2022-11-10 NOTE — PROGRESS NOTES
Daily Note     Today's date: 11/10/2022  Patient name: Alonso Sánchez  : 1955  MRN: [id number]  Referring provider: Angel Sumner DO  Dx:   Encounter Diagnosis     ICD-10-CM    1  Rotator cuff tendinitis, right  M75 81                   Subjective: No changes between tx sessions  Objective: See treatment diary below    Assessment: End range flexion/abduction PROM and AROM remains painful- pt would benefit from continued manual interventions to improve  Good target muscle activation noted with assigned therex  Tolerated treatment well  Patient demonstrated fatigue post treatment, exhibited good technique with therapeutic exercises and would benefit from continued PT    Plan: Continue per plan of care  Progress treatment as tolerated  Precautions: hx of breast cancer, radiation, lymph node removal on R    Manuals 10/5 10/13 10/17 10/20 10/31 11/3 11/7 11/10     GH mobs-inf/post MORENO [facility] MORENO     PROM MORENO KM KM MORENO KM KM [facility]                               Neuro Re-Ed                                                                                                        Ther Ex             Pullies 5' 5' 5' 5'  5' 5' 5' 5'     Arm bike  5/5 5/5  5/5 5/5 5/5'  5/5'  5/5     S/L ER 3x10 1# HEP Evelio ER 3# 3x10          ext 11# 3x10 p!            IR-evelio 5# 3x10   5# 3x10    4# 30x 4# 30x     evelio Row, Ext  5#, 3# 3x10 5#, 3# 3x10 ea 7#, 3# 3x10 ea 8#   Single 10#, 6# 30x ea     Y,T,I  1# 3x10 1# 3x10 1# 3x10 T, I 3x10 1# 3x10 ea                    Corner Pec Stretch    5x30s 3x30s 3x30s 3x30s 3x30s     Wall [place]    10x         ER 90/90    20x5s 30x5s 30x5s 30x5s 30x5s     Wall Slides    20x 20x flx, abd 20x flx,abd 20x flx,abd 20x flx, abd     Prone hor abd w/ ER       3x10 3x10     Prone ext       2# 3x10 2# 3x10                               Ther Activity                                       Gait Training                                       Modalities

## 2022-11-14 ENCOUNTER — OFFICE VISIT (OUTPATIENT)
Dept: PHYSICAL THERAPY | Age: 67
End: 2022-11-14

## 2022-11-14 DIAGNOSIS — M75.81 ROTATOR CUFF TENDINITIS, RIGHT: Primary | ICD-10-CM

## 2022-11-14 NOTE — PROGRESS NOTES
Daily Note     Today's date: 2022  Patient name: Sergio Carlton  : 1955  MRN: [id number]  Referring provider: Norm Dumont DO  Dx:   Encounter Diagnosis     ICD-10-CM    1  Rotator cuff tendinitis, right  M75 81                   Subjective: Patient noted no new complaints  Objective: See treatment diary below      Assessment: Tolerated treatment fair  Patient exhibited good technique with therapeutic exercises  Patient would benefit from continued physical therapy  Plan: Continue per plan of care  Precautions: hx of breast cancer, radiation, lymph node removal on R    Manuals 10/5 10/13 10/17 10/20 10/31 11/3 11/7 11/10 11/14    GH mobs-inf/post MORENO [facility]    PROM MORENO KM KM [place] [facility]                              Neuro Re-Ed                                                                                                        Ther Ex             Pullies 5' 5' 5' 5'  5' 5' 5' 5' 5'    Arm bike  5/5 5/5  5/5 5/5 5/5'  5/5'  5/5 5/5    S/L ER 3x10 1# HEP Evelio ER 3# 3x10          ext 11# 3x10 p!            IR-evelio 5# 3x10   5# 3x10    4# 30x 4# 30x 4# 30x    evelio Row, Ext  5#, 3# 3x10 5#, 3# 3x10 ea 7#, 3# 3x10 ea 8#   Single 10#, 6# 30x ea Single 10#, 6# 30x ea    Y,T,I  1# 3x10 1# 3x10 1# 3x10 T, I 3x10 1# 3x10 ea                    Corner Pec Stretch    5x30s 3x30s 3x30s 3x30s 3x30s 5 x 30s    Wall [place]    10x         ER 90/90    20x5s 30x5s 30x5s 30x5s 30x5s 30 x 5s    Wall Slides    20x 20x flx, abd 20x flx,abd 20x flx,abd 20x flx, abd 20x flex, abd      Prone hor abd w/ ER       3x10 3x10 3 x 10     Prone ext       2# 3x10 2# 3x10 2# 3 x 10                               Ther Activity                                       Gait Training                                       Modalities

## 2022-11-17 ENCOUNTER — OFFICE VISIT (OUTPATIENT)
Dept: PHYSICAL THERAPY | Age: 67
End: 2022-11-17

## 2022-11-17 DIAGNOSIS — M75.81 ROTATOR CUFF TENDINITIS, RIGHT: Primary | ICD-10-CM

## 2022-11-17 NOTE — PROGRESS NOTES
Daily Note     Today's date: 2022  Patient name: Neel De Los Santos  : 1955  MRN: [id number]  Referring provider: Ingrid Luke DO  Dx:   Encounter Diagnosis     ICD-10-CM    1  Rotator cuff tendinitis, right  M75 81                      Subjective: Pt reports feeling muscularly achy- attributes possibly to lots of yard work  Objective: See treatment diary below    Assessment: Pain at end range flexion and abduction persists despite consistent skilled PT interventions- pt scheduled for follow up with ortho on   Plan: 1 week rest and follow up with ortho- will determine POC going forward at that time  Precautions: hx of breast cancer, radiation, lymph node removal on R    Manuals 10/5 10/13 10/17 10/20 10/31 11/3 11/7 11/10 11/14 11/17   GH mobs-inf/post MORENO MORENO MORENO MORENO MORENO MORENO MORENO MORENO MORENO MORENO   PROM MORENO KM KM MORENO KM KM MORENO KM AC KM                             Neuro Re-Ed                                                                                                        Ther Ex             Pullies 5' 5' 5' 5'  5' 5' 5' 5' 5' 5'   Arm bike  5/5 5/5  5/5 5/5 5/5'  5/5'  5/5 5/5 5/5'   S/L ER 3x10 1# HEP [place] ER 3# 3x10          ext 11# 3x10 p!            IR-sathish 5# 3x10   5# 3x10    4# 30x 4# 30x 4# 30x 4# 30x   sathish Row, Ext  5#, 3# 3x10 5#, 3# 3x10 ea 7#, 3# 3x10 ea 8#   Single 10#, 6# 30x ea Single 10#, 6# 30x ea Single 15#, 7# 30x ea   Y,T,I  1# 3x10 1# 3x10 1# 3x10 T, I 3x10 1# 3x10 ea                    Corner Pec Stretch    5x30s 3x30s 3x30s 3x30s 3x30s 5 x 30s  5x30s   Wall [place]    10x         ER 90/90    20x5s 30x5s 30x5s 30x5s 30x5s 30 x 5s 30x 5s   Wall Slides    20x 20x flx, abd 20x flx,abd 20x flx,abd 20x flx, abd 20x flex, abd   20x flx, abd   Prone hor abd w/ ER       3x10 3x10 3 x 10  3x10   Prone ext       2# 3x10 2# 3x10 2# 3 x 10  2# 30x                             Ther Activity                                       Gait Training Modalities

## 2022-11-23 ENCOUNTER — HOSPITAL ENCOUNTER (OUTPATIENT)
Dept: RADIOLOGY | Facility: HOSPITAL | Age: 67
Discharge: HOME | End: 2022-11-23

## 2022-11-23 DIAGNOSIS — Z85.3 HISTORY OF RIGHT BREAST CANCER: ICD-10-CM

## 2022-11-23 RX ADMIN — GADOBUTROL 7 ML: 604.72 INJECTION INTRAVENOUS at 17:09

## 2022-12-01 ENCOUNTER — OFFICE VISIT (OUTPATIENT)
Dept: VASCULAR SURGERY | Facility: CLINIC | Age: 67
End: 2022-12-01

## 2022-12-01 ENCOUNTER — HOSPITAL ENCOUNTER (OUTPATIENT)
Dept: NON INVASIVE DIAGNOSTICS | Facility: CLINIC | Age: 67
Discharge: HOME | End: 2022-12-01

## 2022-12-01 VITALS
WEIGHT: 158.2 LBS | SYSTOLIC BLOOD PRESSURE: 110 MMHG | HEART RATE: 100 BPM | HEIGHT: 68 IN | DIASTOLIC BLOOD PRESSURE: 80 MMHG | BODY MASS INDEX: 23.98 KG/M2

## 2022-12-01 DIAGNOSIS — I83.811 VARICOSE VEINS OF RIGHT LOWER EXTREMITY WITH PAIN: ICD-10-CM

## 2022-12-01 DIAGNOSIS — M79.661 RIGHT CALF PAIN: ICD-10-CM

## 2022-12-01 DIAGNOSIS — M79.661 RIGHT CALF PAIN: Primary | ICD-10-CM

## 2022-12-01 NOTE — PROGRESS NOTES
Assessment/Plan:    Right calf pain  69year old female HTN, HLD ,hypothroid, OA s/p R TKR, BLE varicose veins s/p R EVLT '12 (JDB) and remote hx of RLE venous ligation by Dr Juan German, recurrent varicosities, hx of RLE SVT/DVT '14  Patient presents to the office for evaluation of RLE medial calf tenderness x 1 week  -Recurrent right lower extremity varicosities are soft, mildly tender, mild swelling   -Will evaluate with STAT venous duplex to rule out DVT/SVT  -I will call her with the result of the study     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  -RLE vein surgery x2   -Recurrent varicosities  -Right medial calf tenderness and swelling x 1 week   -Ordered for stat venous duplex to r/o DVT   -Continue 20-30mmHg compression       Diagnoses and all orders for this visit:    Right calf pain  -     VAS lower limb venous duplex study, unilateral/limited; Future    Varicose veins of right lower extremity with pain  -     VAS lower limb venous duplex study, unilateral/limited; Future          Subjective:      Patient ID: Elvira Gutierrez is a 69 y o  female  Patient present to address pain/tenderness and swelling of the right lower leg  Patient wears compression at home  No recent testing  HPI  69year old female HTN, HLD ,hypothroid, OA s/p R TKR, BLE varicose veins s/p R EVLT '12 (JDB) and remote hx of RLE venous ligation by Dr Juan German, recurrent varicosities, hx of RLE SVT/DVT '14  Patient presents to the office for evaluation of RLE medial calf tenderness x 1 week  Patient last seen in 2020  She has history of SVT and DVT in 2014  She is having right medial calf tenderness and swelling  She is having swelling above her compression  Veins feel soft, not phlebitic  She denies any SOB or palpitations  She is compliant with 20-30mmHg compression  She denies any injury or trauma, no sedentary behavior     The following portions of the patient's history were reviewed and updated as appropriate: allergies, current medications, past family history, past medical history, past social history, past surgical history and problem list   ROS reviewed    Review of Systems   Constitutional: Negative  HENT: Negative  Eyes: Negative  Respiratory: Negative  Cardiovascular: Positive for leg swelling (R ankle)  Gastrointestinal: Negative  Endocrine: Negative  Genitourinary: Negative  Musculoskeletal: Negative  Skin: Negative  Allergic/Immunologic: Negative  Neurological: Negative  Hematological: Negative  Psychiatric/Behavioral: Negative  Objective:    I have reviewed and made appropriate changes to the review of systems input by the medical assistant      Vitals:    12/01/22 0842   BP: 110/80   BP Location: Left arm   Patient Position: Sitting   Cuff Size: Adult   Pulse: 100   Weight: 71 8 kg (158 lb 3 2 oz)   Height: 5' 8" (1 727 m)       Patient Active Problem List   Diagnosis   • Primary osteoarthritis of right knee   • Meralgia paraesthetica, left   • Acquired spondylolisthesis   • Acquired hypothyroidism   • Leukopenia   • Lumbar radiculopathy   • History of right breast cancer   • Osteopenia of multiple sites   • Seasonal allergies   • 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   • Benign essential HTN   • Urge incontinence of urine   • Glucose intolerance   • Pure hypercholesterolemia   • Ascending aortic aneurysm   • Sacroiliitis (HCC)   • Lumbar spondylosis   • Spinal stenosis of lumbar region   • DDD (degenerative disc disease), lumbar   • Trigger finger, right middle finger   • Malignant neoplasm of upper-outer quadrant of right breast in female, estrogen receptor positive ([facility] [address] )   • Primary osteoarthritis of left hip   • Right calf pain       Past Surgical History:   Procedure Laterality Date   • BREAST BIOPSY Left 01/16/2002   • BREAST BIOPSY Left 08/05/2005    high risk   • BREAST BIOPSY Right 12/26/2013    positive   • BREAST EXCISIONAL BIOPSY Left 04/06/2006    high risk   • BREAST EXCISIONAL BIOPSY Left 10/13/2006   • BREAST LUMPECTOMY Right 01/17/2014    malignant   • BREAST SURGERY Right     lumpectomy   • BUNIONECTOMY Bilateral    • COLONOSCOPY     • EXPLORATORY LAPAROTOMY      "for fertility"   • 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   • KNEE ARTHROSCOPY Right    • RI HYSTEROSCOPY,W/ENDO BX N/A 7/29/2022    Procedure: DILATATION AND CURETTAGE (D&C) WITH HYSTEROSCOPY;  Surgeon: Bettyjane Seip, MD;  [facility];  Service: Gynecology   • RI INCISE FINGER TENDON SHEATH Right 4/5/2022    Procedure: Right long and ring finger trigger finger release;  Surgeon: Felicity Cranker, MD;  [facility];  Service: Orthopedics   • RI TOTAL KNEE ARTHROPLASTY Right 1/20/2017    Procedure: ARTHROPLASTY KNEE TOTAL;  Surgeon: Doug Ding MD;  [facility];  Service: Orthopedics   • TONSILLECTOMY     • WISDOM TOOTH EXTRACTION         Family History   Problem Relation Age of Onset   • Cancer Mother         Bladder CA   • Throat cancer Father [de-identified]   • No Known Problems Daughter    • No Known Problems Daughter    • No Known Problems Maternal Grandmother    • No Known Problems Maternal Grandfather    • No Known Problems Paternal Grandmother    • No Known Problems Paternal Grandfather    • No Known Problems Maternal Aunt    • No Known Problems Maternal Aunt    • No Known Problems Maternal Aunt    • No Known Problems Maternal Aunt    • Colon cancer Maternal Uncle 79   • Breast cancer additional onset Paternal Aunt 54   • No Known Problems Paternal Aunt    • Breast cancer additional onset Cousin 28   • Uterine cancer Cousin        Social History     Socioeconomic History   • Marital status: Single     Spouse name: Not on file   • Number of children: Not on file   • Years of education: Not on file   • Highest education level: Not on file   Occupational History   • Not on file   Tobacco Use   • Smoking status: Never   • Smokeless tobacco: Never   Vaping Use   • Vaping Use: Never used   Substance and Sexual Activity   • Alcohol use: Yes     Comment: rare   • Drug use: Never   • Sexual activity: Not Currently     Birth control/protection: Abstinence, Post-menopausal   Other Topics Concern   • Not on file   Social History Narrative   • Not on file     Social Determinants of Health     Financial Resource Strain: Low Risk    • Difficulty of Paying Living Expenses: Not very hard   Food Insecurity: Not on file   Transportation Needs: No Transportation Needs   • Lack of Transportation (Medical): No   • Lack of Transportation (Non-Medical):  No   Physical Activity: Not on file   Stress: Not on file   Social Connections: Not on file   Intimate Partner Violence: Not on file   Housing Stability: Not on file       Allergies   Allergen Reactions   • Actifed Cold-Allergy [Chlorpheniramine-Phenylephrine] Swelling and Rash     Throat and face swelled   • Penicillins Rash   • Vancomycin Other (See Comments)     redness         Current Outpatient Medications:   •  Biotin 2500 MCG CAPS, , Disp: , Rfl:   •  Calcium Carbonate-Vit D-Min (CALTRATE 600+D PLUS MINERALS PO), , Disp: , Rfl:   •  chlorthalidone 25 mg tablet, TAKE ONE TABLET BY MOUTH EVERY DAY, Disp: 90 tablet, Rfl: 3  •  levothyroxine 75 mcg tablet, Take 1 tablet by mouth every other day, Disp: , Rfl:   •  levothyroxine 88 mcg tablet, Take 1 tablet by mouth every other day, Disp: , Rfl:   •  loratadine (CLARITIN) 10 mg tablet, Take 10 mg by mouth daily, Disp: , Rfl:   •  Magnesium 250 MG TABS, , Disp: , Rfl:   •  Multiple Vitamins-Minerals (MULTIVITAMIN ADULT PO), daily after breakfast, Disp: , Rfl:   •  potassium chloride (K-DUR,KLOR-CON) 10 mEq tablet, Take 1 tablet by mouth daily  , Disp: , Rfl:   •  solifenacin (VESICARE) 10 MG tablet, Take 1 tablet (10 mg total) by mouth daily, Disp: 90 tablet, Rfl: 3  •  thiamine (VITAMIN B1) 100 mg tablet, Take 100 mg by mouth daily  , Disp: , Rfl:   •  VITAMIN D, ERGOCALCIFEROL, PO, Take 2,000 Units by mouth daily, Disp: , Rfl:   •  zinc gluconate 50 mg tablet, Take 50 mg by mouth daily  , Disp: , Rfl:   /80 (BP Location: Left arm, Patient Position: Sitting, Cuff Size: Adult)   Pulse 100   Ht 5' 8" (1 727 m)   Wt 71 8 kg (158 lb 3 2 oz)   LMP  (LMP Unknown)   BMI 24 05 kg/m²          Physical Exam  Vitals and nursing note reviewed  Constitutional:       Appearance: Normal appearance  HENT:      Head: Normocephalic and atraumatic  Eyes:      Extraocular Movements: Extraocular movements intact  Cardiovascular:      Pulses: Normal pulses  Heart sounds: Normal heart sounds  Pulmonary:      Effort: Pulmonary effort is normal       Breath sounds: Normal breath sounds  Musculoskeletal:         General: Normal range of motion  Comments: Mild RLE swelling, right medial calf varicosities soft, mild venous stasis changed right distal lower leg    Skin:     General: Skin is warm  Neurological:      General: No focal deficit present  Mental Status: She is alert and oriented to person, place, and time     Psychiatric:         Mood and Affect: Mood normal          Behavior: Behavior normal

## 2022-12-01 NOTE — ASSESSMENT & PLAN NOTE
69year old female HTN, HLD ,hypothroid, OA s/p R TKR, BLE varicose veins s/p R EVLT '12 (JEZEQUIEL) and remote hx of RLE venous ligation by Dr Wally Litten, recurrent varicosities, hx of RLE SVT/DVT '14   Patient presents to the office for evaluation of RLE medial calf tenderness x 1 week  -Recurrent right lower extremity varicosities are soft, mildly tender, mild swelling   -Will evaluate with STAT venous duplex to rule out DVT/SVT  -I will call her with the result of the study

## 2022-12-01 NOTE — ASSESSMENT & PLAN NOTE
-RLE vein surgery x2   -Recurrent varicosities  -Right medial calf tenderness and swelling x 1 week   -Ordered for stat venous duplex to r/o DVT   -Continue 20-30mmHg compression

## 2022-12-05 ENCOUNTER — RA CDI HCC (OUTPATIENT)
Dept: OTHER | Facility: HOSPITAL | Age: 67
End: 2022-12-05

## 2022-12-07 ENCOUNTER — HOSPITAL ENCOUNTER (OUTPATIENT)
Dept: RADIOLOGY | Age: 67
Discharge: HOME | End: 2022-12-07

## 2022-12-07 VITALS — BODY MASS INDEX: 23.95 KG/M2 | WEIGHT: 158 LBS | HEIGHT: 68 IN

## 2022-12-07 DIAGNOSIS — Z12.31 VISIT FOR SCREENING MAMMOGRAM: ICD-10-CM

## 2022-12-09 ENCOUNTER — PROCEDURE VISIT (OUTPATIENT)
Dept: FAMILY MEDICINE CLINIC | Facility: CLINIC | Age: 67
End: 2022-12-09

## 2022-12-09 DIAGNOSIS — M54.50 CHRONIC BILATERAL LOW BACK PAIN WITHOUT SCIATICA: Primary | ICD-10-CM

## 2022-12-09 DIAGNOSIS — G89.29 CHRONIC BILATERAL LOW BACK PAIN WITHOUT SCIATICA: Primary | ICD-10-CM

## 2022-12-09 NOTE — PROGRESS NOTES
Initial OV for acupuncture  Dx chronic lower back pain  S/p L 4 and L5 ESIs 11/2022 and 06/2022 for left lumbar radiculopathy with symptom relief  MRI lumbar spine 05/2021 reviewed  Lumbar PENS 30 minutes including face to face encounter/adjustment of electrical stimulation  L2- L4  S2- S4  @ 4 HZ x 20 minutes  PSIS-BL 54  @ 100 HZ x 20 minutes  Heat lamp      GV 4    BL 52 5      Left ear [place] Breath Men, Point Zero and Cingulate gyrus      Diagnoses and all orders for this visit:    Chronic bilateral low back pain without sciatica    Follow up OV for acupuncture in 1 week

## 2022-12-09 NOTE — PROGRESS NOTES
Assessment:  1  Lumbar radiculopathy    2  Meralgia paraesthetica, left    3  Acquired spondylolisthesis    4  Sacroiliitis ([address] )    5  Lumbar spondylosis    6  DDD (degenerative disc disease), lumbar    7  Spinal stenosis of lumbar region with neurogenic claudication        Plan:  1  We can repeat left L4 and L5 TFESI as needed  As it of procedure  2  Patient may continue Tylenol as needed and should not exceed more than 3000 mg in 24 hours  3  He with home exercise program and acupuncture therapy with PCP  4  Follow-up in 8 weeks or sooner if needed    History of Present Illness: The patient is a 69 y o  female with a history of ORIF of the left femur last seen on 5/26/22 who presents for a follow up office visit in regards to chronic left-sided low back pain that radiates into the anterolateral aspect of the left thigh terminating above the knee  Patient denies right lower extremity symptoms, bowel or bladder incontinence or saddle anesthesia  EMG has been unremarkable in the past   She currently rates her pain a 1 out of 10 on the numeric pain rating scale  She constantly has pain that follows no particular pattern throughout the day which is described as dull aching and shooting    I have personally reviewed and/or updated the patient's past medical history, past surgical history, family history, social history, current medications, allergies, and vital signs today  Review of Systems:    Review of Systems   Respiratory: Negative for shortness of breath  Cardiovascular: Negative for chest pain  Gastrointestinal: Negative for constipation, diarrhea, nausea and vomiting  Musculoskeletal: Positive for gait problem  Negative for arthralgias, joint swelling and myalgias  Skin: Negative for rash  Neurological: Negative for dizziness, seizures and weakness  All other systems reviewed and are negative          Past Medical History:   Diagnosis Date   • Arthritis    • Asymptomatic hyperuricemia    • BRCA1 negative    • BRCA2 negative    • Breast cancer ([facility] [address] ) 01/17/2014    radiation   • Disease of thyroid gland    • History of radiation therapy 2014    breast cancer   • Hypertension    • Hypothyroid    • Knee pain, right    • Limb alert care status     no BP's /IV's right side   • Mitral valve prolapse    • Numbness of anterior thigh     left and tingling   • Osteoarthritis    • Seasonal allergies     dust,mold,trees   • Urinary incontinence    • Varicose veins of both lower extremities    • Wears glasses        Past Surgical History:   Procedure Laterality Date   • BREAST BIOPSY Left 01/16/2002   • BREAST BIOPSY Left 08/05/2005    high risk   • BREAST BIOPSY Right 12/26/2013    positive   • BREAST EXCISIONAL BIOPSY Left 04/06/2006    high risk   • BREAST EXCISIONAL BIOPSY Left 10/13/2006   • BREAST LUMPECTOMY Right 01/17/2014    malignant   • BREAST SURGERY Right     lumpectomy   • BUNIONECTOMY Bilateral    • COLONOSCOPY     • EXPLORATORY LAPAROTOMY      "for fertility"   • 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   • KNEE ARTHROSCOPY Right    • WA ARTHRP KNE CONDYLE&PLATU MEDIAL&LAT COMPARTMENTS Right 1/20/2017    Procedure: ARTHROPLASTY KNEE TOTAL;  Surgeon: Salina Hurd MD;  [facility];  Service: Orthopedics   • WA HYSTEROSCOPY BX ENDOMETRIUM&/POLYPC W/WO D&C N/A 7/29/2022    Procedure: DILATATION AND CURETTAGE (D&C) WITH HYSTEROSCOPY;  Surgeon: Gwen Vela MD;  [facility];  Service: Gynecology   • WA TENDON SHEATH INCISION Right 4/5/2022    Procedure: Right long and ring finger trigger finger release;  Surgeon: Aime Collado MD;  [facility];  Service: Orthopedics   • TONSILLECTOMY     • WISDOM TOOTH EXTRACTION         Family History   Problem Relation Age of Onset   • Cancer Mother         Bladder CA   • Throat cancer Father [de-identified]   • No Known Problems Daughter    • No Known Problems Daughter    • No Known Problems Maternal Grandmother • No Known Problems Maternal Grandfather    • No Known Problems Paternal Grandmother    • No Known Problems Paternal Grandfather    • No Known Problems Maternal Aunt    • No Known Problems Maternal Aunt    • No Known Problems Maternal Aunt    • No Known Problems Maternal Aunt    • Colon cancer Maternal Uncle 79   • Breast cancer additional onset Paternal Aunt 53   • No Known Problems Paternal Aunt    • Breast cancer additional onset Cousin 28   • Uterine cancer Cousin        Social History     Occupational History   • Not on file   Tobacco Use   • Smoking status: Never   • Smokeless tobacco: Never   Vaping Use   • Vaping Use: Never used   Substance and Sexual Activity   • Alcohol use: Yes     Comment: rare   • Drug use: Never   • Sexual activity: Not Currently     Birth control/protection: Abstinence, Post-menopausal         Current Outpatient Medications:   •  Biotin 2500 MCG CAPS, , Disp: , Rfl:   •  Calcium Carbonate-Vit D-Min (CALTRATE 600+D PLUS MINERALS PO), , Disp: , Rfl:   •  chlorthalidone 25 mg tablet, TAKE ONE TABLET BY MOUTH EVERY DAY, Disp: 90 tablet, Rfl: 3  •  levothyroxine 75 mcg tablet, Take 1 tablet by mouth every other day, Disp: , Rfl:   •  levothyroxine 88 mcg tablet, Take 1 tablet by mouth every other day, Disp: , Rfl:   •  loratadine (CLARITIN) 10 mg tablet, Take 10 mg by mouth daily, Disp: , Rfl:   •  Magnesium 250 MG TABS, , Disp: , Rfl:   •  Multiple Vitamins-Minerals (MULTIVITAMIN ADULT PO), daily after breakfast, Disp: , Rfl:   •  potassium chloride (K-DUR,KLOR-CON) 10 mEq tablet, Take 1 tablet by mouth daily  , Disp: , Rfl:   •  solifenacin (VESICARE) 10 MG tablet, Take 1 tablet (10 mg total) by mouth daily, Disp: 90 tablet, Rfl: 3  •  thiamine (VITAMIN B1) 100 mg tablet, Take 100 mg by mouth daily  , Disp: , Rfl:   •  VITAMIN D, ERGOCALCIFEROL, PO, Take 2,000 Units by mouth daily, Disp: , Rfl:   •  zinc gluconate 50 mg tablet, Take 50 mg by mouth daily  , Disp: , Rfl:     Allergies Allergen Reactions   • Actifed Cold-Allergy [Chlorpheniramine-Phenylephrine] Swelling and Rash     Throat and face swelled   • Penicillins Rash   • Vancomycin Other (See Comments)     redness       Physical Exam:    /64   Pulse 79   Ht 5' 8" (1 727 m)   Wt 71 7 kg (158 lb)   LMP  (LMP Unknown)   BMI 24 02 kg/m²     Constitutional:normal, well developed, well nourished, alert, in no distress and non-toxic and no overt pain behavior  Eyes:anicteric  HEENT:grossly intact  Neck:supple, symmetric, trachea midline and no masses   Pulmonary:even and unlabored  Cardiovascular:No edema or pitting edema present  Skin:Normal without rashes or lesions and well hydrated  Psychiatric:Mood and affect appropriate  Neurologic:Cranial Nerves II-XII grossly intact  Musculoskeletal:normal gait      Imaging  No orders to display         No orders of the defined types were placed in this encounter

## 2022-12-12 ENCOUNTER — OFFICE VISIT (OUTPATIENT)
Dept: PAIN MEDICINE | Facility: CLINIC | Age: 67
End: 2022-12-12

## 2022-12-12 VITALS
SYSTOLIC BLOOD PRESSURE: 109 MMHG | BODY MASS INDEX: 23.95 KG/M2 | DIASTOLIC BLOOD PRESSURE: 64 MMHG | HEART RATE: 79 BPM | HEIGHT: 68 IN | WEIGHT: 158 LBS

## 2022-12-12 DIAGNOSIS — M47.816 LUMBAR SPONDYLOSIS: ICD-10-CM

## 2022-12-12 DIAGNOSIS — M54.16 LUMBAR RADICULOPATHY: Primary | ICD-10-CM

## 2022-12-12 DIAGNOSIS — G57.12 MERALGIA PARAESTHETICA, LEFT: ICD-10-CM

## 2022-12-12 DIAGNOSIS — M43.10 ACQUIRED SPONDYLOLISTHESIS: ICD-10-CM

## 2022-12-12 DIAGNOSIS — M51.36 DDD (DEGENERATIVE DISC DISEASE), LUMBAR: ICD-10-CM

## 2022-12-12 DIAGNOSIS — M48.062 SPINAL STENOSIS OF LUMBAR REGION WITH NEUROGENIC CLAUDICATION: ICD-10-CM

## 2022-12-12 DIAGNOSIS — M46.1 SACROILIITIS (HCC): ICD-10-CM

## 2022-12-19 ENCOUNTER — PROCEDURE VISIT (OUTPATIENT)
Dept: FAMILY MEDICINE CLINIC | Facility: CLINIC | Age: 67
End: 2022-12-19

## 2022-12-19 DIAGNOSIS — M54.50 CHRONIC BILATERAL LOW BACK PAIN WITHOUT SCIATICA: Primary | ICD-10-CM

## 2022-12-19 DIAGNOSIS — G89.29 CHRONIC BILATERAL LOW BACK PAIN WITHOUT SCIATICA: Primary | ICD-10-CM

## 2022-12-19 NOTE — PROGRESS NOTES
Initial OV for acupuncture  Dx chronic lower back pain  No lower back at present  S/p L 4 and L5 ESIs 11/2022 and 06/2022 for left lumbar radiculopathy with symptom relief  MRI lumbar spine 05/2021 reviewed  Neuropathy symptoms in feet no changes                Lumbar PENS 30 minutes including face to face encounter/adjustment of electrical stimulation       L2- L4  S2- S4  @ 4 HZ x 20 minutes       PSIS-BL 54  @ 100 HZ x 20 minutes  Heat lamp        GV 4     BL 52  5      Left ear [place] Men, Point Zero and Cingulate gyrus      Diagnoses and all orders for this visit:    Chronic bilateral low back pain without sciatica    OV for acupuncture in several weeks

## 2023-01-04 PROBLEM — M75.81 ROTATOR CUFF TENDONITIS, RIGHT: Status: ACTIVE | Noted: 2023-01-04

## 2023-01-04 PROBLEM — M35.9 UNDIFFERENTIATED CONNECTIVE TISSUE DISEASE (HCC): Status: ACTIVE | Noted: 2023-01-04

## 2023-01-04 PROBLEM — N18.31 STAGE 3A CHRONIC KIDNEY DISEASE (HCC): Status: ACTIVE | Noted: 2023-01-04

## 2023-01-04 PROBLEM — M15.0 PRIMARY GENERALIZED (OSTEO)ARTHRITIS: Status: ACTIVE | Noted: 2023-01-04

## 2023-01-04 PROBLEM — E79.0 ASYMPTOMATIC HYPERURICEMIA: Status: ACTIVE | Noted: 2023-01-04

## 2023-01-09 ENCOUNTER — OFFICE VISIT (OUTPATIENT)
Dept: FAMILY MEDICINE CLINIC | Facility: CLINIC | Age: 68
End: 2023-01-09

## 2023-01-09 VITALS
HEART RATE: 84 BPM | RESPIRATION RATE: 18 BRPM | BODY MASS INDEX: 23.7 KG/M2 | OXYGEN SATURATION: 99 % | HEIGHT: 69 IN | WEIGHT: 160 LBS | TEMPERATURE: 96.2 F

## 2023-01-09 DIAGNOSIS — G89.29 CHRONIC BILATERAL LOW BACK PAIN WITHOUT SCIATICA: Primary | ICD-10-CM

## 2023-01-09 DIAGNOSIS — M54.50 CHRONIC BILATERAL LOW BACK PAIN WITHOUT SCIATICA: Primary | ICD-10-CM

## 2023-01-09 NOTE — PROGRESS NOTES
Name: Tej Dutta      : 1955      MRN: [id number]  Encounter Provider: Janett Naidu MD  Encounter Date: 2023   Encounter department: [address] [place] [address]     {There are no diagnoses linked to this encounter  (Refresh or delete this SmartLink)}       Subjective     Follow up visit  Medications reviewed  Hypertension blood pressures have been stable on Chlorthalidone 25 mg/day and K+ supplement  10/2022 creatinine 0 98  Electrolytes normal  K+ 3 5  2022  Hgb 13 1  Prediabetes 2022 A1c 5 6 decreased from 5 8  Review of Systems   Constitutional: Negative for appetite change, chills, fatigue, fever and unexpected weight change  HENT: Negative for congestion, ear pain, rhinorrhea, sore throat and trouble swallowing  Eyes: Negative for visual disturbance  Respiratory: Negative for cough, shortness of breath and wheezing  Cardiovascular: Negative for chest pain, palpitations and leg swelling         2021 CTA Stable ascending aortic aneurysm measuring 41 mm   2020 CT coronary Ca++ score=0   Gastrointestinal: Negative for abdominal pain, blood in stool, constipation, diarrhea, nausea and vomiting  Endocrine:        Hypothyroidism on Levothyroxine 88 mcg alternating with 75 mcg daily  Osteopenia with prior Rx with Prolia/Reclast  2021 DEXA scan low BMD  T score -1 6 right femoral neck  Lab Results       Component                Value               Date                       [id number]             2 220               2022                           Genitourinary: Negative for difficulty urinating  2021 Bilateral parapelvic cysts, left greater than right,   Musculoskeletal: Positive for back pain  Negative for arthralgias and myalgias  [place] s/p varus rotational osteotomy age 6  Left leg shorter than right leg  She wears a left shoe lift  OA left hip  X rays [id number] moderate OA   Chronic issue with numbness left anterior thigh felt to be secondary to meralgia paresthetica/L lumbar radiculopathy  Numbness/burning in feet  Chronic lower back pain  05/2022 s/p left L 4/ L5 lumbar ESIs  Prior treatment with Gabapentin  05/2021 MRI lumbar spine Degenerative changes without high-grade canal stenosis Mild left foraminal narrowing at L5-S1  05/2018 EMG preserved sensory and late responses argue against any significant large fiber polyneuropathy  Absent left peroneal response measure to the left foot of unclear origin  No evidence of lumbar radiculopathy or plexopathy  No myopathic features  No evidence of motor neuron disease   Skin: Negative for rash  Neurological: Negative for dizziness and headaches  Hematological: Negative for adenopathy  Does not bruise/bleed easily  History of right breast CA s/p right breast lumpectomy 01/2014  BRCA 1/BRCA 2 negative  Patient completed XRT and 5 years of Femara       Lab Results       Component                Value               Date                       WBC                      3 74 (L)            09/20/2022                 HGB                      13 1                09/20/2022                 HCT                      40 5                09/20/2022                 MCV                      93                  09/20/2022                 PLT                      199                 09/20/2022              WBC       Date                     Value               Ref Range           Status                09/20/2022               3 74 (L)            4 31 - 10 16 T*     Final                 07/19/2022               4 05 (L)            4 31 - 10 16 T*     Final                 05/02/2022               4 18 (L)            4 31 - 10 16 T*     Final                 09/30/2015               4 55                4 31 - 10 16 T*     Final                 06/03/2015               4 19 (L)            4 31 - 10 16 T*     Final                 03/04/2015               4 22 (L)            4 31 - 10 16 T*     Final                 Psychiatric/Behavioral: Negative for dysphoric mood and sleep disturbance         Past Medical History:   Diagnosis Date   • Arthritis    • Asymptomatic hyperuricemia    • BRCA1 negative    • BRCA2 negative    • Breast cancer ([facility] [address] ) 01/17/2014    radiation   • Disease of thyroid gland    • History of radiation therapy 2014    breast cancer   • Hypertension    • Hypothyroid    • Knee pain, right    • Limb alert care status     no BP's /IV's right side   • Mitral valve prolapse    • Numbness of anterior thigh     left and tingling   • Osteoarthritis    • Seasonal allergies     dust,mold,trees   • Urinary incontinence    • Varicose veins of both lower extremities    • Wears glasses      Past Surgical History:   Procedure Laterality Date   • BREAST BIOPSY Left 01/16/2002   • BREAST BIOPSY Left 08/05/2005    high risk   • BREAST BIOPSY Right 12/26/2013    positive   • BREAST EXCISIONAL BIOPSY Left 04/06/2006    high risk   • BREAST EXCISIONAL BIOPSY Left 10/13/2006   • BREAST LUMPECTOMY Right 01/17/2014    malignant   • BREAST SURGERY Right     lumpectomy   • BUNIONECTOMY Bilateral    • COLONOSCOPY     • EXPLORATORY LAPAROTOMY      "for fertility"   • 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   • KNEE ARTHROSCOPY Right    • SD ARTHRP KNE CONDYLE&PLATU MEDIAL&LAT COMPARTMENTS Right 1/20/2017    Procedure: ARTHROPLASTY KNEE TOTAL;  Surgeon: Mae Lopez MD;  Location: [facility];  Service: Orthopedics   • SD HYSTEROSCOPY BX ENDOMETRIUM&/POLYPC W/WO D&C N/A 7/29/2022    Procedure: DILATATION AND CURETTAGE (D&C) WITH HYSTEROSCOPY;  Surgeon: Shannan Snow MD;  Location: [facility];  Service: Gynecology   • SD TENDON SHEATH INCISION Right 4/5/2022    Procedure: Right long and ring finger trigger finger release;  Surgeon: Melany Freeman MD;  Location: [facility];  Service: Orthopedics   • TONSILLECTOMY     • WISDOM TOOTH EXTRACTION       Family History   Problem Relation Age of Onset   • Cancer Mother         Bladder CA   • Throat cancer Father [de-identified]   • No Known Problems Daughter    • No Known Problems Daughter    • No Known Problems Maternal Grandmother    • No Known Problems Maternal Grandfather    • No Known Problems Paternal Grandmother    • No Known Problems Paternal Grandfather    • No Known Problems Maternal Aunt    • No Known Problems Maternal Aunt    • No Known Problems Maternal Aunt    • No Known Problems Maternal Aunt    • Colon cancer Maternal Uncle 79   • Breast cancer additional onset Paternal Aunt 53   • No Known Problems Paternal Aunt    • Breast cancer additional onset Cousin 28   • Uterine cancer Cousin      Social History     Socioeconomic History   • Marital status: Single     Spouse name: Not on file   • Number of children: Not on file   • Years of education: Not on file   • Highest education level: Not on file   Occupational History   • Not on file   Tobacco Use   • Smoking status: Never   • Smokeless tobacco: Never   Vaping Use   • Vaping Use: Never used   Substance and Sexual Activity   • Alcohol use: Yes     Comment: rare   • Drug use: Never   • Sexual activity: Not Currently     Birth control/protection: Abstinence, Post-menopausal   Other Topics Concern   • Not on file   Social History Narrative   • Not on file     Social Determinants of Health     Financial Resource Strain: Low Risk    • Difficulty of Paying Living Expenses: Not very hard   Food Insecurity: Not on file   Transportation Needs: No Transportation Needs   • Lack of Transportation (Medical): No   • Lack of Transportation (Non-Medical):  No   Physical Activity: Not on file   Stress: Not on file   Social Connections: Not on file   Intimate Partner Violence: Not on file   Housing Stability: Not on file     Current Outpatient Medications on File Prior to Visit   Medication Sig   • Biotin 2500 MCG CAPS    • Calcium Carbonate-Vit D-Min (CALTRATE 600+D PLUS MINERALS PO)    • chlorthalidone 25 mg tablet TAKE ONE TABLET BY MOUTH EVERY DAY   • levothyroxine 75 mcg tablet Take 1 tablet by mouth every other day   • levothyroxine 88 mcg tablet Take 1 tablet by mouth every other day   • loratadine (CLARITIN) 10 mg tablet Take 10 mg by mouth daily   • Magnesium 250 MG TABS    • Multiple Vitamins-Minerals (MULTIVITAMIN ADULT PO) daily after breakfast   • potassium chloride (K-DUR,KLOR-CON) 10 mEq tablet Take 1 tablet by mouth daily     • solifenacin (VESICARE) 10 MG tablet Take 1 tablet (10 mg total) by mouth daily   • thiamine (VITAMIN B1) 100 mg tablet Take 100 mg by mouth daily     • VITAMIN D, ERGOCALCIFEROL, PO Take 2,000 Units by mouth daily   • zinc gluconate 50 mg tablet Take 50 mg by mouth daily       Allergies   Allergen Reactions   • Actifed Cold-Allergy [Chlorpheniramine-Phenylephrine] Swelling and Rash     Throat and face swelled   • Penicillins Rash   • Vancomycin Other (See Comments)     redness     Immunization History   Administered Date(s) Administered   • COVID-19 MODERNA VACC 0 25 ML IM BOOSTER 11/09/2021   • COVID-19 MODERNA VACC 0 5 ML IM 12/30/2020, 01/25/2021   • H1N1, All Formulations 10/26/2009   • INFLUENZA 10/01/2016, 10/26/2018, 10/29/2020   • Influenza, high dose seasonal 0 7 mL 10/11/2021   • Influenza, seasonal, injectable 10/01/2016   • Pneumococcal Conjugate 13-Valent 03/20/2019   • Pneumococcal Polysaccharide PPV23 06/23/2020   • Tdap 01/03/2018, 02/21/2020   • Zoster Vaccine Recombinant 05/13/2019, 09/03/2019       Objective     LMP  (LMP Unknown)     Physical Exam  Vitals and nursing note reviewed  Constitutional:       General: She is not in acute distress  Appearance: She is well-developed  HENT:      Right Ear: Tympanic membrane and ear canal normal       Left Ear: Tympanic membrane and ear canal normal    Eyes:      General: No scleral icterus  Extraocular Movements: Extraocular movements intact        Conjunctiva/sclera: Conjunctivae normal       Pupils: Pupils are equal, round, and reactive to light  Neck:      Thyroid: No thyroid mass or thyromegaly  Vascular: No carotid bruit or JVD  Trachea: No tracheal deviation  Cardiovascular:      Rate and Rhythm: Normal rate and regular rhythm  Heart sounds: Normal heart sounds  No murmur heard  No gallop  Pulmonary:      Effort: Pulmonary effort is normal  No respiratory distress  Breath sounds: Normal breath sounds  No wheezing or rales  Abdominal:      General: Bowel sounds are normal  There is no distension or abdominal bruit  Palpations: Abdomen is soft  There is no hepatomegaly, splenomegaly or mass  Tenderness: There is no abdominal tenderness  There is no guarding or rebound  Musculoskeletal:      Right lower leg: No edema  Left lower leg: No edema  Lymphadenopathy:      Cervical: No cervical adenopathy  Upper Body:      Right upper body: No supraclavicular adenopathy  Left upper body: No supraclavicular adenopathy  Skin:     Findings: No rash  Nails: There is no clubbing  Neurological:      General: No focal deficit present  Mental Status: She is alert and oriented to person, place, and time     Psychiatric:         Mood and Affect: Mood normal        Odalis Nguyen MD

## 2023-01-09 NOTE — PROGRESS NOTES
Follow up OV for acupuncture  Dx chronic lower back pain  Pain relief with acupuncture treatments to date  No radicular pain S/p L 4 and L5 ESIs 11/2022 and 06/2022 for left lumbar radiculopathy with symptom relief  MRI lumbar spine 05/2021 reviewed  Neuropathy symptoms in feet no changes                Lumbar PENS 30 minutes including face to face encounter/adjustment of electrical stimulation       L2- L4  S2- S4  @ 4 HZ x 20 minutes       PSIS-BL 54  @ 100 HZ x 20 minutes  Heat lamp        GV 4     BL 52  5      Left ear Connors Men, Point Zero and Cingulate gyrus      Nicolette Miller was seen today for pre-op exam     Diagnoses and all orders for this visit:    Chronic bilateral low back pain without sciatica    Follow-up visit for acupuncture in 4 weeks patient is scheduled for right rotator cuff surgery this week

## 2023-01-13 ENCOUNTER — TELEPHONE (OUTPATIENT)
Dept: HEMATOLOGY ONCOLOGY | Facility: CLINIC | Age: 68
End: 2023-01-13

## 2023-01-13 NOTE — TELEPHONE ENCOUNTER
Appointment Cancellation Or Reschedule     Person calling in Patient    If other than patient calling, are they listed on the communication consent form? Provider Ramona Rivas, [facility]   Office Visit Date and Time 01/27 at 10:00am   Office Visit Location [place]   Did patient want to reschedule their office appointment? If so, when was it scheduled to? Yes, 04/18 at 2:00pm   Did you have STAR scheduled for this appointment? no   Do you need STAR set up for your new appointment? If yes, please send to "PATIENT RIDESHARE" pool for STAR rescheduling no   If you are cancelling appointment, can we notify STAR to cancel ride? If yes, please send to "PATIENT RIDESHARE" pool for STAR to cancel service no   Is this patient calling to reschedule an infusion appointment? no   When is their next infusion appointment? n/a   Is this patient a Chemo patient? no   Reason for Cancellation or Reschedule Patient has surgery and needs time to recover      If the patient is a treatment patient, please route this to the office nurse  If the patient is not on treatment, please route to the Clerical pool based on location  If the patient is a surgical oncology patient, please route to surg/onc clinical pool  Route message as high priority if same day cancellation

## 2023-01-23 DIAGNOSIS — E03.9 ACQUIRED HYPOTHYROIDISM: Primary | ICD-10-CM

## 2023-01-23 RX ORDER — LEVOTHYROXINE SODIUM 88 UG/1
88 TABLET ORAL EVERY OTHER DAY
Qty: 45 TABLET | Refills: 3 | Status: SHIPPED | OUTPATIENT
Start: 2023-01-23

## 2023-02-03 ENCOUNTER — PROCEDURE VISIT (OUTPATIENT)
Dept: FAMILY MEDICINE CLINIC | Facility: CLINIC | Age: 68
End: 2023-02-03

## 2023-02-03 DIAGNOSIS — M54.59 ARTHRALGIA, LUMBAR SPINE: Primary | ICD-10-CM

## 2023-02-03 NOTE — PROGRESS NOTES
Follow up OV for acupuncture  Dx chronic lower back pain  Symptom relief with acupuncture treatments to date  Recent intermittent pain L upper leg   S/p L 4 and L5 ESIs 11/2022 and 06/2022 for left lumbar radiculopathy with symptom relief  MRI lumbar spine 05/2021 reviewed  Neuropathy symptoms in feet no changes  01/11/2023 R shoulder rotator cuff surgery-in brace              Lumbar PENS in sitting position 30 minutes including face to face encounter/adjustment of electrical stimulation       L2- L4  S2- S4  @ 4 HZ x 20 minutes       PSIS-BL 54  @ 100 HZ x 20 minutes         GV 4     BL 52  5      Left ear Waylon Men, Point Zero      Diagnoses and all orders for this visit:    Arthralgia, lumbar spine      Patient will be away for 1 month  Follow up OV when she returns

## 2023-04-14 PROBLEM — M79.661 RIGHT CALF PAIN: Status: RESOLVED | Noted: 2022-12-01 | Resolved: 2023-04-14

## 2023-04-14 PROBLEM — E74.39 GLUCOSE INTOLERANCE: Status: RESOLVED | Noted: 2019-10-17 | Resolved: 2023-04-14

## 2023-04-18 PROBLEM — Z08 ENCOUNTER FOR FOLLOW-UP SURVEILLANCE OF BREAST CANCER: Status: ACTIVE | Noted: 2023-04-18

## 2023-04-18 PROBLEM — Z85.3 ENCOUNTER FOR FOLLOW-UP SURVEILLANCE OF BREAST CANCER: Status: ACTIVE | Noted: 2023-04-18

## 2023-05-01 ENCOUNTER — PATIENT OUTREACH (OUTPATIENT)
Dept: CASE MANAGEMENT | Facility: OTHER | Age: 68
End: 2023-05-01

## 2023-05-01 NOTE — PROGRESS NOTES
OSW placed outreach TC to pt this morning  OSW received pts VM  Detailed VM was left requesting a return TC  Contact information was provided  Pt returned the TC and a distress thermometer and psychosocial assessment was completed  Biopsychosocial and Barriers Assessment Survivorship     Home/Cell Phone: [phone]  Emergency Contact:Mary Kate SarkarWsew-[phone]  Marital Status: Single  Interpretation concerns, speaks another language, preferred language: English  Cultural concerns: none  Ability to read or write: yes    Housing: [place]: 2 steps to enter  Lives With: Alone  Daily Living Activities: independent  Durable Medical Equipment: none  Ambulation: independent    Preferred Pharmacy: [facility]  Medication coverage: yes    Employment: retired  [place] Status/Location: none  Ability to pay bills: yes  POA/LW/AD: Yes     Additional Comments:  OSW received a return TC from pt this afternoon  OSW introduced self and role  Pt completed a Dt, where she scored a 1/10  She states that she does not have any concerns at this time  She just returned from a vacation in [place]  She was appreciative of the outreach

## 2023-05-15 ENCOUNTER — RA CDI HCC (OUTPATIENT)
Dept: OTHER | Facility: HOSPITAL | Age: 68
End: 2023-05-15

## 2023-05-16 DIAGNOSIS — N32.81 OAB (OVERACTIVE BLADDER): ICD-10-CM

## 2023-05-16 RX ORDER — SOLIFENACIN SUCCINATE 10 MG/1
10 TABLET, FILM COATED ORAL DAILY
Qty: 90 TABLET | Refills: 3 | Status: SHIPPED | OUTPATIENT
Start: 2023-05-16

## 2023-05-19 ENCOUNTER — PROCEDURE VISIT (OUTPATIENT)
Dept: FAMILY MEDICINE CLINIC | Facility: CLINIC | Age: 68
End: 2023-05-19

## 2023-05-19 DIAGNOSIS — M54.59 ARTHRALGIA, LUMBAR SPINE: Primary | ICD-10-CM

## 2023-05-19 NOTE — PROGRESS NOTES
Follow up OV for acupuncture  Dx Arthralgia lumbar spine with chronic lower back pain  Pain relief with acupuncture treatments to date  No radicular pain   S/p L 4 and L5 ESIs 11/2022 and 06/2022 for left lumbar radiculopathy with symptom relief  MRI lumbar spine 05/2021 reviewed  Neuropathy symptoms in feet no changes  01/11/2023 R shoulder rotator cuff surgery-she is finishing   PT                Lumbar PENS in sitting position 30 minutes including face to face encounter/adjustment of electrical stimulation       L2- L4 @ 4 HZ x 20 minutes       PSIS-BL 54  @ 100 HZ x 20 minutes         GV 4     BL 52 5        Diagnoses and all orders for this visit:    Arthralgia, lumbar spine      Follow up OV for acupuncture in 4 weeks

## 2023-05-23 NOTE — PROGRESS NOTES
Assessment/Plan:     Diagnoses and all orders for this visit:    Benign essential HTN    Impaired fasting glucose    Ascending aortic aneurysm (HCC)    Acquired hypothyroidism    Meralgia paraesthetica, left    Osteopenia of multiple sites    Primary osteoarthritis of left hip    Lumbar spondylosis    Malignant neoplasm of upper-outer quadrant of right breast in female, estrogen receptor positive ([address] )          Continue with current medications  Follow up with multiple specialists  I discussed the use of acupuncture treat her current symptoms office visit in 6-12 months      Patient ID: Harry Falk is a 74 y o  female  Follow up visit  Medications reviewed  Hypertension blood pressures have been stable on Chlorthalidone 25 mg/day and K+ supplement  12/2021 creatinine 1 01  Electrolytes normal  Hgb 12 4  Impaired fasting glucose 12/2021 FBS 83  A1c 5 8  Recent Results (from the past 2016 hour(s))   Urinalysis with microscopic    Collection Time: 12/22/21  8:38 AM   Result Value Ref Range    Clarity, UA Clear     Color, UA Yellow     Specific [place], UA 1 010 1 003 - 1 030    pH, UA 6 0 4 5, 5 0, 5 5, 6 0, 6 5, 7 0, 7 5, 8 0    Glucose, UA Negative Negative mg/dl    Ketones, UA Negative Negative mg/dl    Blood, UA Negative Negative    Protein, UA Negative Negative mg/dl    Nitrite, UA Negative Negative    Bilirubin, UA Negative Negative    Urobilinogen, UA 0 2 0 2, 1 0 E U /dl E U /dl    Leukocytes, UA Moderate (A) Negative    WBC, UA 4-10 (A) None Seen, 2-4, 5-60 /hpf    RBC, UA 2-4 None Seen, 2-4 /hpf    Hyaline Casts, UA None Seen None Seen /lpf    Bacteria, UA None Seen None Seen, Occasional /hpf    Epithelial Cells None Seen None Seen, Occasional /hpf   Microalbumin / creatinine urine ratio    Collection Time: 12/22/21  8:38 AM   Result Value Ref Range    Creatinine, Ur 62 7 mg/dL    Microalbum  ,U,Random 9 9 0 0 - 20 0 mg/L    Microalb Creat Ratio 16 0 - 30 mg/g creatinine   Comprehensive metabolic panel    Collection Time: 12/22/21  8:38 AM   Result Value Ref Range    Sodium 139 136 - 145 mmol/L    Potassium 3 8 3 5 - 5 3 mmol/L    Chloride 104 100 - 108 mmol/L    CO2 32 21 - 32 mmol/L    ANION GAP 3 (L) 4 - 13 mmol/L    BUN 25 5 - 25 mg/dL    Creatinine 1 01 0 60 - 1 30 mg/dL    Glucose, Fasting 83 65 - 99 mg/dL    Calcium 9 2 8 3 - 10 1 mg/dL    AST 17 5 - 45 U/L    ALT 23 12 - 78 U/L    Alkaline Phosphatase 50 46 - 116 U/L    Total Protein 7 0 6 4 - 8 2 g/dL    Albumin 3 7 3 5 - 5 0 g/dL    Total Bilirubin 0 50 0 20 - 1 00 mg/dL    eGFR 57 ml/min/1 73sq m   CBC and differential    Collection Time: 12/22/21  8:38 AM   Result Value Ref Range    WBC 4 37 4 31 - 10 16 Thousand/uL    RBC 4 37 3 81 - 5 12 Million/uL    Hemoglobin 12 4 11 5 - 15 4 g/dL    Hematocrit 39 5 34 8 - 46 1 %    MCV 90 82 - 98 fL    MCH 28 4 26 8 - 34 3 pg    MCHC 31 4 31 4 - 37 4 g/dL    RDW 15 1 11 6 - 15 1 %    MPV 10 7 8 9 - 12 7 fL    Platelets 459 589 - 624 Thousands/uL    nRBC 0 /100 WBCs    Neutrophils Relative 65 43 - 75 %    Immat GRANS % 0 0 - 2 %    Lymphocytes Relative 24 14 - 44 %    Monocytes Relative 8 4 - 12 %    Eosinophils Relative 2 0 - 6 %    Basophils Relative 1 0 - 1 %    Neutrophils Absolute 2 83 1 85 - 7 62 Thousands/µL    Immature Grans Absolute 0 01 0 00 - 0 20 Thousand/uL    Lymphocytes Absolute 1 05 0 60 - 4 47 Thousands/µL    Monocytes Absolute 0 36 0 17 - 1 22 Thousand/µL    Eosinophils Absolute 0 10 0 00 - 0 61 Thousand/µL    Basophils Absolute 0 02 0 00 - 0 10 Thousands/µL   TSH, 3rd generation    Collection Time: 12/22/21  8:38 AM   Result Value Ref Range    TSH 3RD GENERATON 3 070 0 358 - 3 740 uIU/mL   T4, free    Collection Time: 12/22/21  8:38 AM   Result Value Ref Range    Free T4 1 26 0 76 - 1 46 ng/dL   Hemoglobin A1C    Collection Time: 12/22/21  8:38 AM   Result Value Ref Range    Hemoglobin A1C 5 8 (H) Normal 3 8-5 6%; PreDiabetic 5 7-6 4%;  Diabetic >=6 5%; Glycemic control for adults with diabetes <7 0% %     mg/dl   Magnesium    Collection Time: 12/22/21  8:38 AM   Result Value Ref Range    Magnesium 2 2 1 6 - 2 6 mg/dL   Phosphorus    Collection Time: 12/22/21  8:38 AM   Result Value Ref Range    Phosphorus 3 8 2 3 - 4 1 mg/dL     Lab Results   Component Value Date    CHOLESTEROL 199 04/29/2021    CHOLESTEROL 194 11/25/2020    CHOLESTEROL 183 06/20/2020     Lab Results   Component Value Date    HDL 71 04/29/2021    HDL 63 11/25/2020    HDL 52 06/20/2020     Lab Results   Component Value Date    TRIG 68 04/29/2021    TRIG 77 11/25/2020    TRIG 84 06/20/2020     Lab Results   Component Value Date    LDLCALC 114 (H) 04/29/2021               The following portions of the patient's history were reviewed and updated as appropriate: allergies, current medications, past family history, past medical history, past social history, past surgical history and problem list     Review of Systems   Constitutional: Positive for unexpected weight change (9 lb weight gain from 12/2021)  Negative for appetite change, chills, fatigue and fever  HENT: Negative for congestion, ear pain, rhinorrhea, sore throat and trouble swallowing  Eyes: Negative for visual disturbance  Respiratory: Negative for cough, shortness of breath and wheezing  Cardiovascular: Negative for chest pain, palpitations and leg swelling         06/2021 CTA Stable ascending aortic aneurysm measuring 41 mm   12/2020 CT coronary Ca++ score=0   Gastrointestinal: Negative for abdominal pain, blood in stool, constipation, diarrhea, nausea and vomiting  Endocrine:        Hypothyroidism on Levothyroxine 88 mcg alternating with 75 mcg daily  Osteopenia with prior Rx with Prolia/Reclast  09/2021 DEXA scan low BMD  T score -1 6 right femoral neck       Lab Results       Component                Value               Date                       [id number]             3 070               12/22/2021               Genitourinary: Negative for difficulty urinating  05/2021 Bilateral parapelvic cysts, left greater than right,   Musculoskeletal: Positive for back pain  Negative for arthralgias and myalgias  [place] s/p varus rotational osteotomy age 6  Left leg shorter than right leg  She wears a left shoe lift  OA left hip  X rays [id number] moderate OA  Chronic issue with numbness left anterior thigh felt to be secondary to meralgia paresthetica  Numbness/burning in feet  Chronic lower back pain  05/2021 MRI lumbar spine Degenerative changes without high-grade canal stenosis Mild left foraminal narrowing at L5-S1  05/2018 EMG preserved sensory and late responses argue against any significant large fiber polyneuropathy  Absent left peroneal response measure to the left foot of unclear origin  No evidence of lumbar radiculopathy or plexopathy  No myopathic features  No evidence of motor neuron disease   Skin: Negative for rash  Neurological: Negative for dizziness and headaches  Hematological: Negative for adenopathy  Does not bruise/bleed easily  History of right breast CA s/p right breast lumpectomy 01/2014  BRCA 1/BRCA 2 negative  Patient completed XRT and 5 years of Femara  Psychiatric/Behavioral: Negative for dysphoric mood and sleep disturbance  Objective:    /76   Pulse 64   Temp (!) 95 5 °F (35 3 °C)   Resp 16   Ht 5' 8 5" (1 74 m)   Wt 72 1 kg (159 lb)   LMP  (LMP Unknown)   BMI 23 82 kg/m²     BP Readings from Last 3 Encounters:   02/28/22 122/76   01/26/22 124/72   11/10/21 109/69     Wt Readings from Last 3 Encounters:   02/28/22 72 1 kg (159 lb)   01/26/22 69 9 kg (154 lb)   12/06/21 68 kg (150 lb)          Physical Exam  Vitals and nursing note reviewed  Constitutional:       General: She is not in acute distress  Appearance: She is well-developed     HENT:      Right Ear: Tympanic membrane and ear canal normal       Left Ear: Tympanic membrane and ear canal normal    Eyes: General: No scleral icterus  Extraocular Movements: Extraocular movements intact  Conjunctiva/sclera: Conjunctivae normal       Pupils: Pupils are equal, round, and reactive to light  Neck:      Thyroid: No thyroid mass or thyromegaly  Vascular: No carotid bruit or JVD  Trachea: No tracheal deviation  Cardiovascular:      Rate and Rhythm: Normal rate and regular rhythm  Heart sounds: Normal heart sounds  No murmur heard  No gallop  Pulmonary:      Effort: Pulmonary effort is normal  No respiratory distress  Breath sounds: Normal breath sounds  No wheezing or rales  Chest:   Breasts:      Right: No supraclavicular adenopathy  Left: No supraclavicular adenopathy  Musculoskeletal:      Right lower leg: No edema  Left lower leg: No edema  Lymphadenopathy:      Cervical: No cervical adenopathy  Upper Body:      Right upper body: No supraclavicular adenopathy  Left upper body: No supraclavicular adenopathy  Skin:     Findings: No rash  Nails: There is no clubbing  Neurological:      General: No focal deficit present  Mental Status: She is alert and oriented to person, place, and time     Psychiatric:         Mood and Affect: Mood normal          Behavior: Behavior normal  Sski Pregnancy And Lactation Text: This medication is Pregnancy Category D and isn't considered safe during pregnancy. It is excreted in breast milk.

## 2023-06-07 DIAGNOSIS — E87.6 HYPOKALEMIA: Primary | ICD-10-CM

## 2023-06-07 RX ORDER — POTASSIUM CHLORIDE 750 MG/1
10 TABLET, EXTENDED RELEASE ORAL DAILY
Qty: 90 TABLET | Refills: 3 | Status: SHIPPED | OUTPATIENT
Start: 2023-06-07

## 2023-06-14 NOTE — PROGRESS NOTES
Assessment:  1  Lumbar radiculopathy    2  Sacroiliitis ([address] )    3  Lumbar spondylosis    4  DDD (degenerative disc disease), lumbar    5  Spinal stenosis of lumbar region with neurogenic claudication        Plan:  1  Patient will be scheduled for repeat left L4 and L5 TFESI to address the inflammatory component of the patient's pain  Complete risks and benefits including bleeding, infection, tissue reaction, nerve injury and allergic reaction were discussed  The patient was agreeable and verbalized an understanding  2  Patient declines gabapentin  3  Continue with home exercise program  4  Follow-up after procedure or sooner if needed    History of Present Illness: The patient is a 69 y o  female with a history of an ORIF of the left femur last seen on 12/12/2022 who presents for a follow up office visit in regards to chronic left-sided low back pain that radiates into the anterolateral aspect of the left thigh she denies right-sided symptoms, bowel or bladder incontinence or saddle anesthesia  Patient did have a left L4 and L5 TFESI on November 2, 2022 which essentially resolved her symptoms up until about 4 days ago  She would like to repeat this injection at this time  She rates her pain a 9 out of 10 on the numeric pain rating scale  She intermittently has pain throughout the day which is described as burning, sharp, shooting and numbness  She is not using medication for pain as she wishes to avoid medications if possible  I have personally reviewed and/or updated the patient's past medical history, past surgical history, family history, social history, current medications, allergies, and vital signs today  Review of Systems:    Review of Systems   Respiratory: Negative for shortness of breath  Cardiovascular: Negative for chest pain  Gastrointestinal: Negative for constipation, diarrhea, nausea and vomiting     Musculoskeletal: Negative for arthralgias, gait problem, joint swelling and "myalgias  Skin: Negative for rash  Neurological: Negative for dizziness, seizures and weakness  All other systems reviewed and are negative          Past Medical History:   Diagnosis Date   • Arthritis    • Asymptomatic hyperuricemia    • BRCA1 negative    • BRCA2 negative    • Breast cancer ([facility] [address] ) 01/17/2014    radiation   • Disease of thyroid gland    • History of radiation therapy 2014    breast cancer   • Hypertension    • Hypothyroid    • Knee pain, right    • Limb alert care status     no BP's /IV's right side   • Mitral valve prolapse    • Numbness of anterior thigh     left and tingling   • Osteoarthritis    • Seasonal allergies     dust,mold,trees   • Urinary incontinence    • Varicose veins of both lower extremities    • Wears glasses        Past Surgical History:   Procedure Laterality Date   • BREAST BIOPSY Left 01/16/2002   • BREAST BIOPSY Left 08/05/2005    high risk   • BREAST BIOPSY Right 12/26/2013    positive   • BREAST EXCISIONAL BIOPSY Left 04/06/2006    high risk   • BREAST EXCISIONAL BIOPSY Left 10/13/2006   • BREAST LUMPECTOMY Right 01/17/2014    malignant   • BREAST SURGERY Right     lumpectomy   • BUNIONECTOMY Bilateral    • COLONOSCOPY     • EXPLORATORY LAPAROTOMY      \"for fertility\"   • 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   • KNEE ARTHROSCOPY Right    • NH ARTHRP KNE CONDYLE&PLATU MEDIAL&LAT COMPARTMENTS Right 1/20/2017    Procedure: ARTHROPLASTY KNEE TOTAL;  Surgeon: Martín Maza MD;  Location: [facility];  Service: Orthopedics   • NH HYSTEROSCOPY BX ENDOMETRIUM&/POLYPC W/WO D&C N/A 7/29/2022    Procedure: DILATATION AND CURETTAGE (D&C) WITH HYSTEROSCOPY;  Surgeon: Sanjay Marin MD;  Location: [facility];  Service: Gynecology   • NH TENDON SHEATH INCISION Right 4/5/2022    Procedure: Right long and ring finger trigger finger release;  Surgeon: Oniel Gray MD;  Location: [facility];  Service: Orthopedics   • TONSILLECTOMY     • WISDOM TOOTH EXTRACTION   " Family History   Problem Relation Age of Onset   • Cancer Mother         Bladder CA   • Throat cancer Father [de-identified]   • No Known Problems Daughter    • No Known Problems Daughter    • No Known Problems Maternal Grandmother    • No Known Problems Maternal Grandfather    • No Known Problems Paternal Grandmother    • No Known Problems Paternal Grandfather    • No Known Problems Maternal Aunt    • No Known Problems Maternal Aunt    • No Known Problems Maternal Aunt    • No Known Problems Maternal Aunt    • Colon cancer Maternal Uncle 79   • Breast cancer additional onset Paternal Aunt 53   • No Known Problems Paternal Aunt    • Breast cancer additional onset Cousin 28   • Uterine cancer Cousin        Social History     Occupational History   • Not on file   Tobacco Use   • Smoking status: Never   • Smokeless tobacco: Never   Vaping Use   • Vaping Use: Never used   Substance and Sexual Activity   • Alcohol use: Yes     Comment: rare   • Drug use: Never   • Sexual activity: Not Currently     Birth control/protection: Abstinence, Post-menopausal         Current Outpatient Medications:   •  Biotin 2500 MCG CAPS, , Disp: , Rfl:   •  Calcium Carbonate-Vit D-Min (CALTRATE 600+D PLUS MINERALS PO), , Disp: , Rfl:   •  chlorthalidone 25 mg tablet, TAKE ONE TABLET BY MOUTH EVERY DAY, Disp: 90 tablet, Rfl: 3  •  levothyroxine 75 mcg tablet, Take 1 tablet by mouth every other day, Disp: , Rfl:   •  levothyroxine 88 mcg tablet, Take 1 tablet (88 mcg total) by mouth every other day, Disp: 45 tablet, Rfl: 3  •  loratadine (CLARITIN) 10 mg tablet, Take 10 mg by mouth daily, Disp: , Rfl:   •  Magnesium 250 MG TABS, , Disp: , Rfl:   •  Multiple Vitamins-Minerals (MULTIVITAMIN ADULT PO), daily after breakfast, Disp: , Rfl:   •  potassium chloride (K-DUR,KLOR-CON) 10 mEq tablet, Take 1 tablet (10 mEq total) by mouth daily, Disp: 90 tablet, Rfl: 3  •  solifenacin (VESICARE) 10 MG tablet, Take 1 tablet (10 mg total) by mouth daily, Disp: 90 "tablet, Rfl: 3  •  thiamine (VITAMIN B1) 100 mg tablet, Take 100 mg by mouth daily  , Disp: , Rfl:   •  VITAMIN D, ERGOCALCIFEROL, PO, Take 2,000 Units by mouth daily, Disp: , Rfl:   •  zinc gluconate 50 mg tablet, Take 50 mg by mouth daily  , Disp: , Rfl:     Allergies   Allergen Reactions   • Actifed Cold-Allergy [Chlorpheniramine-Phenylephrine] Swelling and Rash     Throat and face swelled   • Penicillins Rash   • Vancomycin Other (See Comments)     redness       Physical Exam:    /65   Pulse 80   Ht 5' 8 25\" (1 734 m)   Wt 75 3 kg (166 lb)   LMP  (LMP Unknown)   BMI 25 06 kg/m²     Constitutional:normal, well developed, well nourished, alert, in no distress and non-toxic and no overt pain behavior  Eyes:anicteric  HEENT:grossly intact  Neck:supple, symmetric, trachea midline and no masses   Pulmonary:even and unlabored  Cardiovascular:No edema or pitting edema present  Skin:Normal without rashes or lesions and well hydrated  Psychiatric:Mood and affect appropriate  Neurologic:Cranial Nerves II-XII grossly intact  Musculoskeletal:antalgic gait    Positive seated straight leg raise on the left      Imaging  FL spine and pain procedure    (Results Pending)         Orders Placed This Encounter   Procedures   • FL spine and pain procedure     "

## 2023-06-15 ENCOUNTER — OFFICE VISIT (OUTPATIENT)
Dept: PAIN MEDICINE | Facility: CLINIC | Age: 68
End: 2023-06-15
Payer: MEDICARE

## 2023-06-15 VITALS
DIASTOLIC BLOOD PRESSURE: 65 MMHG | WEIGHT: 166 LBS | BODY MASS INDEX: 25.16 KG/M2 | HEART RATE: 80 BPM | SYSTOLIC BLOOD PRESSURE: 101 MMHG | HEIGHT: 68 IN

## 2023-06-15 DIAGNOSIS — M48.062 SPINAL STENOSIS OF LUMBAR REGION WITH NEUROGENIC CLAUDICATION: ICD-10-CM

## 2023-06-15 DIAGNOSIS — M46.1 SACROILIITIS (HCC): ICD-10-CM

## 2023-06-15 DIAGNOSIS — M47.816 LUMBAR SPONDYLOSIS: ICD-10-CM

## 2023-06-15 DIAGNOSIS — M51.36 DDD (DEGENERATIVE DISC DISEASE), LUMBAR: ICD-10-CM

## 2023-06-15 DIAGNOSIS — M54.16 LUMBAR RADICULOPATHY: Primary | ICD-10-CM

## 2023-06-15 PROCEDURE — 99214 OFFICE O/P EST MOD 30 MIN: CPT | Performed by: NURSE PRACTITIONER

## 2023-06-15 NOTE — PATIENT INSTRUCTIONS
Epidural Steroid Injection   AMBULATORY CARE:   What you need to know about an epidural steroid injection (BETI):  An BETI is a procedure to inject steroid medicine into the epidural space  The epidural space is between your spinal cord and vertebrae  Steroids reduce inflammation and fluid buildup in your spine that may be causing pain  You may be given pain medicine along with the steroids  How to prepare for an BETI:  Your provider will talk to you about how to prepare for your procedure  He or she will tell you what medicines to take or not take on the day of your procedure  You may need to stop taking blood thinners or other medicines several days before your procedure  You may need to adjust any diabetes medicine you take on the day of your procedure  Steroid medicine can increase your blood sugar level  Arrange for someone to drive you home when you are discharged  What will happen during an BETI:   You will be given medicine to numb the procedure area  You will be awake for the procedure, but you will not feel pain  You may also be given medicine to help you relax  Contrast liquid will be used to help your provider see the area better  Tell the provider if you have ever had an allergic reaction to contrast liquid  Your provider may place the needle into your neck area, middle of your back, or tailbone area  He or she may inject the medicine next to the nerves that are causing your pain  He or she may instead inject the medicine into a larger area of the epidural space  This helps the medicine spread to more nerves  Your provider will use a fluoroscope to help guide the needle to the right place  A fluoroscope is a type of x-ray  After the procedure, a bandage will be placed over the injection site to prevent infection  What will happen after an BETI:  You will have a bandage over the injection site to prevent infection  Your provider will tell you when you can bathe and any activity guidelines   You will be able to go home  Risks of an BETI:  You may have temporary or permanent nerve damage or paralysis  You may have bleeding or develop a serious infection, such as meningitis (swelling of the brain coverings)  An abscess may also develop  An abscess is a pus-filled area under the skin  You may need surgery to fix the abscess  You may have a seizure, anxiety, or trouble sleeping  If you are a man, you may have temporary erectile dysfunction (not able to have an erection)  Call your local emergency number (911 in the [address] [facility]) if:   You have a seizure  You have trouble moving your legs  Seek care immediately if:   Blood soaks through your bandage  You have a fever or chills, severe back pain, and the procedure area is sensitive to the touch  You cannot control when you urinate or have a bowel movement  Call your doctor if:   You have weakness or numbness in your legs  Your wound is red, swollen, or draining pus  You have nausea or are vomiting  Your face or neck is red and you feel warm  You have more pain than you had before the procedure  You have swelling in your hands or feet  You have questions or concerns about your condition or care  Care for your wound as directed: You may remove the bandage before you go to bed the day of your procedure  You may take a shower, but do not take a bath for at least 24 hours  Self-care:   Do not drive,  use machines, or do strenuous activity for 24 hours after your procedure or as directed  Continue other treatments  as directed  Steroid injections alone will not control your pain  The injections are meant to be used with other treatments, such as physical therapy  Follow up with your doctor as directed:  Write down your questions so you remember to ask them during your visits  © Copyright [employer] 2022 Information is for End User's use only and may not be sold, redistributed or otherwise used for commercial purposes    The above information is an  only  It is not intended as medical advice for individual conditions or treatments  Talk to your doctor, nurse or pharmacist before following any medical regimen to see if it is safe and effective for you

## 2023-06-16 ENCOUNTER — PROCEDURE VISIT (OUTPATIENT)
Dept: FAMILY MEDICINE CLINIC | Facility: CLINIC | Age: 68
End: 2023-06-16
Payer: MEDICARE

## 2023-06-16 DIAGNOSIS — M54.59 ARTHRALGIA, LUMBAR SPINE: Primary | ICD-10-CM

## 2023-06-16 PROCEDURE — 97813 ACUP 1/> W/ESTIM 1ST 15 MIN: CPT | Performed by: FAMILY MEDICINE

## 2023-06-16 PROCEDURE — 97814 ACUP 1/> W/ESTIM EA ADDL 15: CPT | Performed by: FAMILY MEDICINE

## 2023-06-16 NOTE — PROGRESS NOTES
Follow up OV for acupuncture  Dx Arthralgia lumbar spine with chronic lower back pain  Pain relief with acupuncture treatments to date  Unfortunately she started having pain in L leg again  She was re evaluated by Pain Management and is scheduled for lumbar ESIs   S/p L 4 and L5 ESIs 11/2022 and 06/2022 for left lumbar radiculopathy with symptom relief  MRI lumbar spine 05/2021 reviewed  Neuropathy symptoms in feet no changes  01/11/2023 R shoulder rotator cuff surgery-she is finishing   PT                Lumbar PENS in sitting position 30 minutes including face to face encounter/adjustment of electrical stimulation       L2- L4 @ 4 HZ x 20 minutes       PSIS-BL 54  @ 100 HZ x 20 minutes         GV 20, GV 4     BL 52 5      Left ear Connors Men, Point Zero and Cingulate gyrus       Diagnoses and all orders for this visit:    Arthralgia, lumbar spine    Follow up OV for acupuncture in 4 weeks MANI

## 2023-06-19 NOTE — PROGRESS NOTES
Colon and Rectal Surgery   Khushi Betancourt 69 y o  female MRN: [id number]   Encounter: [id number]  06/20/23   9:11 AM        ASSESSMENT:    Edgardo Villaseñor is a 69yo female, family hx colon cancer  She has had some change in bowel habits with pellet like stools  Few times in the last year, on wiping with hard BM  Anoscopy exam normal today, no masses palpated on digital exam, we did discuss dietary/lifestyle changes, and followup with updated colonoscopy  PLAN:  High fiber diet/increased hydration, 20-30grams fiber per day, increased fruits/vegetables/psyllium(metamucil or konsyl 1 tbsp 1-2x/day)  Colonoscopy scheduled      HPI  Khushi Betancourt is a 69 y o  female who is here today for evaluation of hard stools and rectal bleeding  The patient notes since a year ago she has had on and off bright red spotting of blood after bowel movements,  upon wiping, as well as well as pallets like stool, with related straining which she attributes to her uterine prolapse  She notes 2 bowel movements per week  Her most recent colonoscopy on 8/12/2019 with Dr Elsi Baker showed: Lipoma measuring 1 to 4 cm in the ascending colon  5 year colonoscopy recall  Personal history of colonic polyps  Family history of colon cancer in maternal uncle       Historical Information   Past Medical History:   Diagnosis Date   • Arthritis    • Asymptomatic hyperuricemia    • BRCA1 negative    • BRCA2 negative    • Breast cancer ([facility] [address] ) 01/17/2014    radiation   • Disease of thyroid gland    • History of radiation therapy 2014    breast cancer   • Hypertension    • Hypothyroid    • Knee pain, right    • Limb alert care status     no BP's /IV's right side   • Mitral valve prolapse    • Numbness of anterior thigh     left and tingling   • Osteoarthritis    • Seasonal allergies     dust,mold,trees   • Urinary incontinence    • Varicose veins of both lower extremities    • Wears glasses      Past Surgical History:   Procedure "Laterality Date   • BREAST BIOPSY Left 01/16/2002   • BREAST BIOPSY Left 08/05/2005    high risk   • BREAST BIOPSY Right 12/26/2013    positive   • BREAST EXCISIONAL BIOPSY Left 04/06/2006    high risk   • BREAST EXCISIONAL BIOPSY Left 10/13/2006   • BREAST LUMPECTOMY Right 01/17/2014    malignant   • BREAST SURGERY Right     lumpectomy   • BUNIONECTOMY Bilateral    • COLONOSCOPY     • EXPLORATORY LAPAROTOMY      \"for fertility\"   • 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   • KNEE ARTHROSCOPY Right    • DC ARTHRP KNE CONDYLE&PLATU MEDIAL&LAT COMPARTMENTS Right 1/20/2017    Procedure: ARTHROPLASTY KNEE TOTAL;  Surgeon: Toma Kim MD;  [facility];  Service: Orthopedics   • DC HYSTEROSCOPY BX ENDOMETRIUM&/POLYPC W/WO D&C N/A 7/29/2022    Procedure: DILATATION AND CURETTAGE (D&C) WITH HYSTEROSCOPY;  Surgeon: Yordy Faye MD;  [facility];  Service: Gynecology   • DC TENDON SHEATH INCISION Right 4/5/2022    Procedure: Right long and ring finger trigger finger release;  Surgeon: Michaela Lujan MD;  [facility];  Service: Orthopedics   • TONSILLECTOMY     • WISDOM TOOTH EXTRACTION         Meds/Allergies       Current Outpatient Medications:   •  Biotin 2500 MCG CAPS, , Disp: , Rfl:   •  Calcium Carbonate-Vit D-Min (CALTRATE 600+D PLUS MINERALS PO), , Disp: , Rfl:   •  chlorthalidone 25 mg tablet, TAKE ONE TABLET BY MOUTH EVERY DAY, Disp: 90 tablet, Rfl: 3  •  levothyroxine 75 mcg tablet, Take 1 tablet by mouth every other day, Disp: , Rfl:   •  levothyroxine 88 mcg tablet, Take 1 tablet (88 mcg total) by mouth every other day, Disp: 45 tablet, Rfl: 3  •  loratadine (CLARITIN) 10 mg tablet, Take 10 mg by mouth daily, Disp: , Rfl:   •  Magnesium 250 MG TABS, , Disp: , Rfl:   •  Multiple Vitamins-Minerals (MULTIVITAMIN ADULT PO), daily after breakfast, Disp: , Rfl:   •  potassium chloride (K-DUR,KLOR-CON) 10 mEq tablet, Take 1 tablet (10 mEq total) by mouth daily, Disp: " 90 tablet, Rfl: 3  •  solifenacin (VESICARE) 10 MG tablet, Take 1 tablet (10 mg total) by mouth daily, Disp: 90 tablet, Rfl: 3  •  thiamine (VITAMIN B1) 100 mg tablet, Take 100 mg by mouth daily  , Disp: , Rfl:   •  VITAMIN D, ERGOCALCIFEROL, PO, Take 2,000 Units by mouth daily, Disp: , Rfl:   •  zinc gluconate 50 mg tablet, Take 50 mg by mouth daily  , Disp: , Rfl:       Allergies   Allergen Reactions   • Actifed Cold-Allergy [Chlorpheniramine-Phenylephrine] Swelling and Rash     Throat and face swelled   • Penicillins Rash   • Vancomycin Other (See Comments)     redness         Social History   Social History     Substance and Sexual Activity   Alcohol Use Yes    Comment: rare     Social History     Substance and Sexual Activity   Drug Use Never     Social History     Tobacco Use   Smoking Status Never   Smokeless Tobacco Never         Family History:   Family History   Problem Relation Age of Onset   • Cancer Mother         Bladder CA   • Throat cancer Father [de-identified]   • No Known Problems Daughter    • No Known Problems Daughter    • No Known Problems Maternal Grandmother    • No Known Problems Maternal Grandfather    • No Known Problems Paternal Grandmother    • No Known Problems Paternal Grandfather    • No Known Problems Maternal Aunt    • No Known Problems Maternal Aunt    • No Known Problems Maternal Aunt    • No Known Problems Maternal Aunt    • Colon cancer Maternal Uncle 79   • Breast cancer additional onset Paternal Aunt 54   • No Known Problems Paternal Aunt    • Breast cancer additional onset Cousin 28   • Uterine cancer Cousin        Review of Systems   Constitutional: Negative  HENT: Negative  Eyes: Negative  Respiratory: Negative  Cardiovascular: Negative  Gastrointestinal: Positive for anal bleeding  Endocrine: Negative  Genitourinary: Negative  Musculoskeletal: Negative  Skin: Negative  Allergic/Immunologic: Negative  Neurological: Negative  Hematological: Negative  "  Psychiatric/Behavioral: Negative  Objective   Current Vitals:   Vitals:    06/20/23 0849   Weight: 74 4 kg (164 lb)   Height: 5' 8\" (1 727 m)     Physical Exam:  General:no distress  ENT:moist mucus membranes  Neck:supple  Pulm:no increased work of breathing, clear bilateral  CV:sinus  Abdomen:soft,nontender  Rectal:normal perianal skin/sphincter tone, no masses palpated  Extremities:no edema    Anoscopy    Date/Time: 6/20/2023 9:00 AM    Performed by: Fernando Desir MD  Authorized by: Fernando Desir MD    Verbal consent obtained?: Yes    Consent given by:  Patient  Patient identity confirmed:  Verbally with patient  Indications: rectal bleeding    Scope type: Anoscope   Normal anorectal mucosa, normal internal/external hemorrhoid complex            "

## 2023-06-20 ENCOUNTER — TELEPHONE (OUTPATIENT)
Age: 68
End: 2023-06-20

## 2023-06-20 ENCOUNTER — OFFICE VISIT (OUTPATIENT)
Age: 68
End: 2023-06-20
Payer: MEDICARE

## 2023-06-20 VITALS — BODY MASS INDEX: 24.86 KG/M2 | WEIGHT: 164 LBS | HEIGHT: 68 IN

## 2023-06-20 DIAGNOSIS — K62.5 RECTAL BLEEDING: Primary | ICD-10-CM

## 2023-06-20 PROCEDURE — 46600 DIAGNOSTIC ANOSCOPY SPX: CPT | Performed by: COLON & RECTAL SURGERY

## 2023-06-20 PROCEDURE — 99203 OFFICE O/P NEW LOW 30 MIN: CPT | Performed by: COLON & RECTAL SURGERY

## 2023-06-20 NOTE — TELEPHONE ENCOUNTER
DE OV 6/20 rectal bleedng, last fc 8/12/19 CE lipoma 1 to 4 cm in ascending colon, hx colon polyps, BMI 24     Scheduled DE 7/19 [address] [facility],  [address]   Mailed PW to patient

## 2023-06-20 NOTE — PATIENT INSTRUCTIONS
ASSESSMENT:    Rasheeda Gibbs is a 69yo female, family hx colon cancer  She has had some change in bowel habits with pellet like stools  Few times in the last year, on wiping with hard BM  Anoscopy exam normal today, no masses palpated on digital exam, we did discuss dietary/lifestyle changes, and followup with updated colonoscopy      PLAN:  High fiber diet/increased hydration, 20-30grams fiber per day, increased fruits/vegetables/psyllium(metamucil or konsyl 1 tbsp 1-2x/day)  Colonoscopy scheduled

## 2023-06-20 NOTE — LETTER
Tristen Bland  [address] [place] [address]  Gustavo LANDERS [id number]        FLEXIBLE COLONOSCOPY INSTRUCTIONS  PLEASE NOTE    AS OF JUNE 1, 2014, OUR OFFICE REQUIRES 72 HOURS NOTICE OF CANCELLATION/RESCHEDULE OF A PROCEDURE TO AVOID INCURRING A MISSED APPOINTMENT FEE  Your Colonoscopy Procedure has been scheduled at:  [address] [facility]  [address] [phone]    The Date of your Procedure is: July 19, 2023     Dr Arron Strauss  will be performing the procedure  Report to the [facility] 45 minutes prior to the procedure  The total time at the facility will be approximately 1 1/2 hours  Please bring to your procedure at [facility]:   1  Insurance Cards and referrals if required by [employer] company   2  Valid Photo ID   3  Power of  Form if required    Use the bowel preparation as directed  Check with your family doctor if you are taking a blood thinner (Coumadin, Plavix, Xarelto, Pradaxa, Gingko biloba, Ginseng, Feverfew, St  Chris's Wort)  We suggest stopping these for 3 days  Special instructions may be needed if you are taking aspirin or any aspirin-containing medication  Check with your family physician  If you are on DIABETIC MEDICATION (tablets or insulin) your doctor may make changes in your preparation  Take all medications usual unless otherwise instructed  Feel free to call the physician's office to answer any questions or address any concerns you have regarding your procedure or preparation  A nurse will be happy to assist you  Because anesthesia is given to you during the procedure, the center requires that you be discharged under supervision of an adult to take you home after the procedure is performed  They will also need to sign as a witness on your discharge instructions  Public Transportation such as VAST, BUS, TAXI is Not Acceptable      It is important you notify our office of any insurance changes prior to your procedure and, if necessary, supply us with referrals from your primary care physician  COLONOSCOPY PREPARATION INSTRUCTIONS    Purchase (prescription not required):  · 238 gram bottle of Miralax® (Glycolax®)  · 4 Dulcolax® (Bisacodyl) Laxative Tablets  · 64 oz  bottle of Gatorade® or your preference of a non-carbonated clear liquid - NOT RED OR PURPLE     One Day Prior to Colonoscopy Procedure  · Nothing to eat the day before your procedure, only clear liquids  · It is important that you drink plenty of clear liquids throughout the day to prevent dehydration  Clear Liquids include:  o Water/Iced Tea/Lemonade/Gatorade®/Black Coffee or tea (no milk or creamers  o Soft drinks: orange, ginger ale, cola, Pepsi®, Sprite®, 7Up®  o Rafy-Aid® (lemonade or orange flavors only)  o Strained fruit juices without pulp such as apple, white grape, white cranberry  o Jell-O®, lemon, lime or orange (no fruit or toppings)  o Popsicles, [place] Ice (No Ice Cream, sherbets, or fruit bars)  o Chicken or beef bouillon/broth  DO NOT EAT OR DRINK ANYTHING RED OR PURPLE  DO NOT DRINK ANY ALCOHOLIC BEVERAGES  DIABETIC PATIENTS: Consult your physician    At 4:00 pm, take (2) Dulcolax® (Bisacodyl) Laxative Tablets  Swallow the tablets whole with an 8 oz  glass of water  At 8:00 pm, take the additional (2) Dulcolax® (Bisacodyl) Laxative Tablets with 8 oz  of water  The package may direct you not to exceed (2) tablets at any time but for the purpose of this examination, you should take (4) total     Mix the 238 gm of Miralax® in 64 oz  of Gatorade® and shake the solution until the Miralax® is dissolved  You will drink half (32 oz) of this solution this evening, beginning between 4 and 6 o'clock  Drink 8 oz glassfuls at your own pace  It may take several hours to drink the solution  Remember to stay close to toilet facilities      DAY OF COLONOSCOPY PROCEDURE    Five (5) hours before your procedure, drink the other half (32 oz) of the Miralax®/Gatorade® mixture within a two (2) hour period  This may require you to get up very early if you are scheduled for an early procedure  NOTHING IS TO BE TAKEN BY MOUTH 3 HOURS PRIOR TO PROCEDURE  If you use an inhaler, please bring it with you to your procedure

## 2023-07-05 ENCOUNTER — HOSPITAL ENCOUNTER (OUTPATIENT)
Dept: RADIOLOGY | Facility: CLINIC | Age: 68
Discharge: HOME | End: 2023-07-05
Payer: MEDICARE

## 2023-07-05 VITALS
RESPIRATION RATE: 18 BRPM | DIASTOLIC BLOOD PRESSURE: 80 MMHG | TEMPERATURE: 97.2 F | SYSTOLIC BLOOD PRESSURE: 135 MMHG | OXYGEN SATURATION: 96 % | HEART RATE: 82 BPM

## 2023-07-05 DIAGNOSIS — M54.16 LUMBAR RADICULOPATHY: ICD-10-CM

## 2023-07-05 PROCEDURE — 64484 NJX AA&/STRD TFRM EPI L/S EA: CPT | Performed by: ANESTHESIOLOGY

## 2023-07-05 PROCEDURE — 64483 NJX AA&/STRD TFRM EPI L/S 1: CPT | Performed by: ANESTHESIOLOGY

## 2023-07-05 RX ORDER — PAPAVERINE HCL 150 MG
20 CAPSULE, EXTENDED RELEASE ORAL ONCE
Status: COMPLETED | OUTPATIENT
Start: 2023-07-05 | End: 2023-07-05

## 2023-07-05 RX ADMIN — IOHEXOL 2 ML: 300 INJECTION, SOLUTION INTRAVENOUS at 15:33

## 2023-07-05 RX ADMIN — LIDOCAINE HYDROCHLORIDE 2 ML: 20 INJECTION, SOLUTION EPIDURAL; INFILTRATION; INTRACAUDAL; PERINEURAL at 15:33

## 2023-07-05 RX ADMIN — DEXAMETHASONE SODIUM PHOSPHATE 15 MG: 10 INJECTION, SOLUTION INTRAMUSCULAR; INTRAVENOUS at 15:33

## 2023-07-05 NOTE — H&P
History of Present Illness: The patient is a 69 y.o. female who presents with complaints of low back and leg pain.       Past Medical History:   Diagnosis Date   • Arthritis    • Asymptomatic hyperuricemia    • BRCA1 negative    • BRCA2 negative    • Breast cancer ([address]) 01/17/2014    radiation   • Disease of thyroid gland    • History of radiation therapy 2014    breast cancer   • Hypertension    • Hypothyroid    • Knee pain, right    • Limb alert care status     no BP's /IV's right side   • Mitral valve prolapse    • Numbness of anterior thigh     left and tingling   • Osteoarthritis    • Seasonal allergies     dust,mold,trees   • Urinary incontinence    • Varicose veins of both lower extremities    • Wears glasses        Past Surgical History:   Procedure Laterality Date   • BREAST BIOPSY Left 01/16/2002   • BREAST BIOPSY Left 08/05/2005    high risk   • BREAST BIOPSY Right 12/26/2013    positive   • BREAST EXCISIONAL BIOPSY Left 04/06/2006    high risk   • BREAST EXCISIONAL BIOPSY Left 10/13/2006   • BREAST LUMPECTOMY Right 01/17/2014    malignant   • BREAST SURGERY Right     lumpectomy   • BUNIONECTOMY Bilateral    • COLONOSCOPY     • EXPLORATORY LAPAROTOMY      "for fertility"   • 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   • KNEE ARTHROSCOPY Right    • [place] ARTHRP KNE CONDYLE&PLATU MEDIAL&LAT COMPARTMENTS Right 1/20/2017    Procedure: ARTHROPLASTY KNEE TOTAL;  Surgeon: Bonne Kanner, MD;  [facility];  Service: Orthopedics   • [place] HYSTEROSCOPY BX ENDOMETRIUM&/POLYPC W/WO D&C N/A 7/29/2022    Procedure: DILATATION AND CURETTAGE (D&C) WITH HYSTEROSCOPY;  Surgeon: Kemal Ramirez MD;  [facility];  Service: Gynecology   • [place] TENDON SHEATH INCISION Right 4/5/2022    Procedure: Right long and ring finger trigger finger release;  Surgeon: Chavo Perez MD;  [facility];  Service: Orthopedics   • TONSILLECTOMY     • WISDOM TOOTH EXTRACTION           Current Outpatient Medications:   •  Biotin 2500 MCG CAPS, , Disp: , Rfl:   •  Calcium Carbonate-Vit D-Min (CALTRATE 600+D PLUS MINERALS PO), , Disp: , Rfl:   •  chlorthalidone 25 mg tablet, TAKE ONE TABLET BY MOUTH EVERY DAY, Disp: 90 tablet, Rfl: 3  •  levothyroxine 75 mcg tablet, Take 1 tablet by mouth every other day, Disp: , Rfl:   •  levothyroxine 88 mcg tablet, Take 1 tablet (88 mcg total) by mouth every other day, Disp: 45 tablet, Rfl: 3  •  loratadine (CLARITIN) 10 mg tablet, Take 10 mg by mouth daily, Disp: , Rfl:   •  Magnesium 250 MG TABS, , Disp: , Rfl:   •  Multiple Vitamins-Minerals (MULTIVITAMIN ADULT PO), daily after breakfast, Disp: , Rfl:   •  potassium chloride (K-DUR,KLOR-CON) 10 mEq tablet, Take 1 tablet (10 mEq total) by mouth daily, Disp: 90 tablet, Rfl: 3  •  solifenacin (VESICARE) 10 MG tablet, Take 1 tablet (10 mg total) by mouth daily, Disp: 90 tablet, Rfl: 3  •  thiamine (VITAMIN B1) 100 mg tablet, Take 100 mg by mouth daily  , Disp: , Rfl:   •  VITAMIN D, ERGOCALCIFEROL, PO, Take 2,000 Units by mouth daily, Disp: , Rfl:   •  zinc gluconate 50 mg tablet, Take 50 mg by mouth daily  , Disp: , Rfl:     Allergies   Allergen Reactions   • Actifed Cold-Allergy [Chlorpheniramine-Phenylephrine] Swelling and Rash     Throat and face swelled   • Penicillins Rash   • Vancomycin Other (See Comments)     redness       Physical Exam:   Vitals:    07/05/23 1517   BP: 122/85   Pulse: 72   Resp: 18   Temp: (!) 97.2 °F (36.2 °C)   SpO2: 100%     General: Awake, Alert, Oriented x 3, Mood and affect appropriate  Respiratory: Respirations even and unlabored  Cardiovascular: Peripheral pulses intact; no edema  Musculoskeletal Exam: Left lumbar paraspinals tender to palpation    ASA Score: 3         Assessment: Lumbar radiculopathy    Plan: Left L4 and L5 TFESI

## 2023-07-05 NOTE — DISCHARGE INSTRUCTIONS
Epidural Steroid Injection   WHAT YOU NEED TO KNOW:   An epidural steroid injection (BETI) is a procedure to inject steroid medicine into the epidural space. The epidural space is between your spinal cord and vertebrae. Steroids reduce inflammation and fluid buildup in your spine that may be causing pain. You may be given pain medicine along with the steroids. ACTIVITY  Do not drive or operate machinery today. No strenuous activity today - bending, lifting, etc.  You may resume normal activites starting tomorrow - start slowly and as tolerated. You may shower today, but no tub baths or hot tubs. You may have numbness for several hours from the local anesthetic. Please use caution and common sense, especially with weight-bearing activities. CARE OF THE INJECTION SITE  If you have soreness or pain, apply ice to the area today (20 minutes on/20 minutes off). Starting tomorrow, you may use warm, moist heat or ice if needed. You may have an increase or change in your discomfort for 36-48 hours after your treatment. Apply ice and continue with any pain medication you have been prescribed. Notify the [facility] if you have any of the following: redness, drainage, swelling, headache, stiff neck or fever above 100°F.    SPECIAL INSTRUCTIONS  Our office will contact you in approximately 7 days for a progress report. MEDICATIONS  Continue to take all routine medications. Our office may have instructed you to hold some medications. As no general anesthesia was used in today's procedure, you should not experience any side effects related to anesthesia. If you are diabetic, the steroids used in today's injection may temporarily increase your blood sugar levels after the first few days after your injection. Please keep a close eye on your sugars and alert the doctor who manages your diabetes if your sugars are significantly high from your baseline or you are symptomatic.      If you have a problem specifically related to your procedure, please call our office at [phone]. Problems not related to your procedure should be directed to your primary care physician.

## 2023-07-06 ENCOUNTER — TELEPHONE (OUTPATIENT)
Dept: PAIN MEDICINE | Facility: MEDICAL CENTER | Age: 68
End: 2023-07-06

## 2023-07-06 NOTE — TELEPHONE ENCOUNTER
Caller: Brandy Stock     Doctor: Dr Amado     Reason for call: Patient calling stating she received 99% improvement with injection and is very satisfied     Call back#: [phone]

## 2023-07-12 ENCOUNTER — TELEPHONE (OUTPATIENT)
Dept: PAIN MEDICINE | Facility: CLINIC | Age: 68
End: 2023-07-12

## 2023-07-14 ENCOUNTER — PROCEDURE VISIT (OUTPATIENT)
Dept: FAMILY MEDICINE CLINIC | Facility: CLINIC | Age: 68
End: 2023-07-14
Payer: MEDICARE

## 2023-07-14 DIAGNOSIS — M54.59 ARTHRALGIA, LUMBAR SPINE: Primary | ICD-10-CM

## 2023-07-14 PROCEDURE — 97813 ACUP 1/> W/ESTIM 1ST 15 MIN: CPT | Performed by: FAMILY MEDICINE

## 2023-07-14 PROCEDURE — 97814 ACUP 1/> W/ESTIM EA ADDL 15: CPT | Performed by: FAMILY MEDICINE

## 2023-07-14 NOTE — PROGRESS NOTES
Follow up OV for acupuncture. Dx Arthralgia lumbar spine with chronic lower back pain. Pain relief with acupuncture treatments to date. S/p left L4 and L5 lumbar ESIs on 07/05/2023. Prior S/p L 4 and L5 ESIs 11/2022 and 06/2022 for left lumbar radiculopathy with symptom relief. MRI lumbar spine 05/2021 reviewed. Neuropathy symptoms in feet no changes. 01/11/2023 R shoulder rotator cuff surgery-she is finishing   PT.                Lumbar PENS in sitting position 30 minutes including face to face encounter/adjustment of electrical stimulation.      L2- L4 @ 4 HZ x 20 minutes.      PSIS-BL 54  @ 100 HZ x 20 minutes.        GV 20, GV 4     BL 52.5 bilateral    Left     GB 30     BL 53       Diagnoses and all orders for this visit:    Arthralgia, lumbar spine    Follow-up visit for acupuncture in 4 to 6 weeks

## 2023-07-20 ENCOUNTER — NURSE TRIAGE (OUTPATIENT)
Age: 68
End: 2023-07-20

## 2023-07-20 NOTE — TELEPHONE ENCOUNTER
the patient called she had colonoscopy yesterday and had not had bowel movement yet today. Denies any abdominal pain, fevers or bleeding. Patient states that she is eating and passing gas. Patient advised that it may take a day or two to get back to her normal bowel movement schedule. If she does not have a bowel movement in the next 2 days she can try miralax to help promote bowel movement.

## 2023-07-26 ENCOUNTER — TELEPHONE (OUTPATIENT)
Dept: FAMILY MEDICINE CLINIC | Facility: CLINIC | Age: 68
End: 2023-07-26

## 2023-07-26 NOTE — TELEPHONE ENCOUNTER
Left message asking patient if she requested hereditary cancer screening to be sent o her home from [facility] or it may be a scam.    Joelle scanned into documents.

## 2023-08-07 ENCOUNTER — RA CDI HCC (OUTPATIENT)
Dept: OTHER | Facility: HOSPITAL | Age: 68
End: 2023-08-07

## 2023-08-11 ENCOUNTER — PROCEDURE VISIT (OUTPATIENT)
Dept: FAMILY MEDICINE CLINIC | Facility: CLINIC | Age: 68
End: 2023-08-11
Payer: MEDICARE

## 2023-08-11 DIAGNOSIS — E87.6 HYPOKALEMIA: ICD-10-CM

## 2023-08-11 DIAGNOSIS — M54.16 LUMBAR RADICULOPATHY: ICD-10-CM

## 2023-08-11 DIAGNOSIS — M54.59 ARTHRALGIA, LUMBAR SPINE: Primary | ICD-10-CM

## 2023-08-11 PROCEDURE — 97813 ACUP 1/> W/ESTIM 1ST 15 MIN: CPT | Performed by: FAMILY MEDICINE

## 2023-08-11 PROCEDURE — 97814 ACUP 1/> W/ESTIM EA ADDL 15: CPT | Performed by: FAMILY MEDICINE

## 2023-08-11 RX ORDER — POTASSIUM CHLORIDE 750 MG/1
10 TABLET, EXTENDED RELEASE ORAL DAILY
Qty: 90 TABLET | Refills: 3 | Status: SHIPPED | OUTPATIENT
Start: 2023-08-11

## 2023-08-11 NOTE — PROGRESS NOTES
Follow up OV for acupuncture. Dx Arthralgia lumbar spine with chronic lower back pain. Symptom relief with acupuncture treatments to date. she has been experiencing increased L leg pain. L leg " feels weak" S/p left L4 and L5 lumbar ESIs on 07/05/2023. Prior S/p L 4 and L5 ESIs 11/2022 and 06/2022 for left lumbar radiculopathy with symptom relief. MRI lumbar spine 05/2021 reviewed. Neuropathy symptoms in feet no changes. 01/11/2023 R shoulder rotator cuff surgery-she completed  PT.     PE strength in LEs normal.  Dorsiflexion/plantarflexion normal.  Reflexes normal.  Negative slump test.    Lumbar PENS in sitting position 30 minutes including face to face encounter/adjustment of electrical stimulation.      L2- L4 @ 4 HZ x 20 minutes.      PSIS-BL 54  @ 100 HZ x 20 minutes.        GV 20, GV 4     BL 52.5 bilateral      Diagnoses and all orders for this visit:    Arthralgia, lumbar spine    Lumbar radiculopathy    Hypokalemia  -     potassium chloride (K-DUR,KLOR-CON) 10 mEq tablet; Take 1 tablet (10 mEq total) by mouth daily    Patient has a prescription for Gabapentin from [facility]. She is going to get a second surgical opinion regarding her back at [facility].   Continue with current plan of care

## 2023-08-17 ENCOUNTER — ANNUAL EXAM (OUTPATIENT)
Age: 68
End: 2023-08-17
Payer: MEDICARE

## 2023-08-17 VITALS
DIASTOLIC BLOOD PRESSURE: 90 MMHG | BODY MASS INDEX: 24.4 KG/M2 | HEIGHT: 68 IN | SYSTOLIC BLOOD PRESSURE: 120 MMHG | WEIGHT: 161 LBS

## 2023-08-17 DIAGNOSIS — Z91.89 GYN EXAM FOR HIGH-RISK MEDICARE PATIENT: Primary | ICD-10-CM

## 2023-08-17 DIAGNOSIS — Z17.0 MALIGNANT NEOPLASM OF UPPER-OUTER QUADRANT OF RIGHT BREAST IN FEMALE, ESTROGEN RECEPTOR POSITIVE (HCC): ICD-10-CM

## 2023-08-17 DIAGNOSIS — Z12.31 ENCOUNTER FOR SCREENING MAMMOGRAM FOR MALIGNANT NEOPLASM OF BREAST: ICD-10-CM

## 2023-08-17 DIAGNOSIS — C50.411 MALIGNANT NEOPLASM OF UPPER-OUTER QUADRANT OF RIGHT BREAST IN FEMALE, ESTROGEN RECEPTOR POSITIVE (HCC): ICD-10-CM

## 2023-08-17 PROCEDURE — G0101 CA SCREEN;PELVIC/BREAST EXAM: HCPCS | Performed by: OBSTETRICS & GYNECOLOGY

## 2023-08-28 ENCOUNTER — ESTABLISHED COMPREHENSIVE EXAM (OUTPATIENT)
Dept: URBAN - METROPOLITAN AREA CLINIC 6 | Facility: CLINIC | Age: 68
End: 2023-08-28

## 2023-08-28 DIAGNOSIS — H57.813: ICD-10-CM

## 2023-08-28 DIAGNOSIS — H04.123: ICD-10-CM

## 2023-08-28 DIAGNOSIS — H25.813: ICD-10-CM

## 2023-08-28 DIAGNOSIS — H02.831: ICD-10-CM

## 2023-08-28 DIAGNOSIS — H02.834: ICD-10-CM

## 2023-08-28 PROCEDURE — 92014 COMPRE OPH EXAM EST PT 1/>: CPT

## 2023-08-28 ASSESSMENT — VISUAL ACUITY
OS_CC: 20/20-2
OD_CC: 20/25
OU_CC: J1+

## 2023-08-28 ASSESSMENT — TONOMETRY
OS_IOP_MMHG: 11
OD_IOP_MMHG: 10

## 2023-08-29 NOTE — PROGRESS NOTES
Leti Ramirez   1955    CC:  Yearly exam    S:  77 y.o. female here for yearly exam. She is postmenopausal and has had no vaginal bleeding. She denies vaginal discharge, itching, odor or dryness. Personal history of breast cancer. She had a D&C on 7/29/22 for PMB - benign pathology. No bleeding since that time. Has been suffering due to prolapse and difficulty with moving bowels - has been passing very large painful stools. Sexual activity: She is not sexually active. Last Pap: 4/5/21 - neg  Last Mammo: 12/7/22 - Sharma Push 2  Last Colonoscopy: 7/19/23 - 5yr recall   Last DEXA: 9/10/21 - osteopenia (scheduled)    We reviewed [facility] guidelines for Pap testing.      Family hx of breast cancer: personal, cousin, aunt   Family hx of ovarian cancer: no  Family hx of colon cancer: M Uncle      Current Outpatient Medications:   •  Biotin 2500 MCG CAPS, , Disp: , Rfl:   •  Calcium Carbonate-Vit D-Min (CALTRATE 600+D PLUS MINERALS PO), , Disp: , Rfl:   •  chlorthalidone 25 mg tablet, TAKE ONE TABLET BY MOUTH EVERY DAY, Disp: 90 tablet, Rfl: 3  •  gabapentin (NEURONTIN) 300 mg capsule, Take 1 PO daily x 5 days, then if no pain relief, may increase to 1 PO BID, Disp: 60 capsule, Rfl: 1  •  levothyroxine 75 mcg tablet, Take 1 tablet by mouth every other day, Disp: , Rfl:   •  levothyroxine 88 mcg tablet, Take 1 tablet (88 mcg total) by mouth every other day, Disp: 45 tablet, Rfl: 3  •  loratadine (CLARITIN) 10 mg tablet, Take 10 mg by mouth daily, Disp: , Rfl:   •  Magnesium 250 MG TABS, , Disp: , Rfl:   •  Multiple Vitamins-Minerals (MULTIVITAMIN ADULT PO), daily after breakfast, Disp: , Rfl:   •  potassium chloride (K-DUR,KLOR-CON) 10 mEq tablet, Take 1 tablet (10 mEq total) by mouth daily, Disp: 90 tablet, Rfl: 3  •  potassium chloride (K-DUR,KLOR-CON) 10 mEq tablet, Take 1 tablet (10 mEq total) by mouth daily, Disp: 90 tablet, Rfl: 3  •  solifenacin (VESICARE) 10 MG tablet, Take 1 tablet (10 mg total) by mouth daily, Disp: 90 tablet, Rfl: 3  •  thiamine (VITAMIN B1) 100 mg tablet, Take 100 mg by mouth daily  , Disp: , Rfl:   •  VITAMIN D, ERGOCALCIFEROL, PO, Take 2,000 Units by mouth daily, Disp: , Rfl:   •  zinc gluconate 50 mg tablet, Take 50 mg by mouth daily  , Disp: , Rfl:   Patient Active Problem List   Diagnosis   • Primary osteoarthritis of right knee   • Meralgia paraesthetica, left   • Acquired spondylolisthesis   • Acquired hypothyroidism   • Leukopenia   • Lumbar radiculopathy   • History of right breast cancer   • Osteopenia of multiple sites   • Seasonal allergies   • 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   • Benign essential HTN   • Urge incontinence of urine   • Pure hypercholesterolemia   • Ascending aortic aneurysm (HCC)   • Sacroiliitis (HCC)   • Lumbar spondylosis   • Spinal stenosis of lumbar region   • DDD (degenerative disc disease), lumbar   • Trigger finger, right middle finger   • Malignant neoplasm of upper-outer quadrant of right breast in female, estrogen receptor positive ([address])   • Primary osteoarthritis of left hip   • Primary generalized (osteo)arthritis   • Undifferentiated connective tissue disease ([address])   • Stage 3a chronic kidney disease ([address])   • Asymptomatic hyperuricemia   • Rotator cuff tendonitis, right   • Encounter for follow-up surveillance of breast cancer     Family History   Problem Relation Age of Onset   • Cancer Mother         Bladder CA   • Throat cancer Father 80   • No Known Problems Daughter    • No Known Problems Daughter    • No Known Problems Maternal Grandmother    • No Known Problems Maternal Grandfather    • No Known Problems Paternal Grandmother    • No Known Problems Paternal Grandfather    • No Known Problems Maternal Aunt    • No Known Problems Maternal Aunt    • No Known Problems Maternal Aunt    • No Known Problems Maternal Aunt    • Colon cancer Maternal Uncle 79   • Breast cancer additional onset Paternal Aunt 54   • No Known Problems Paternal Aunt    • Breast cancer additional onset Cousin 28   • Uterine cancer Cousin      Past Medical History:   Diagnosis Date   • Arthritis    • Asymptomatic hyperuricemia    • BRCA1 negative    • BRCA2 negative    • Breast cancer ([address]) 01/17/2014    radiation   • Disease of thyroid gland    • History of radiation therapy 2014    breast cancer   • Hypertension    • Hypothyroid    • Knee pain, right    • Limb alert care status     no BP's /IV's right side   • Mitral valve prolapse    • Numbness of anterior thigh     left and tingling   • Osteoarthritis    • Seasonal allergies     dust,mold,trees   • Urinary incontinence    • Varicose veins of both lower extremities    • Wears glasses         Review of Systems   Respiratory: Negative. Cardiovascular: Negative. Gastrointestinal: Negative for constipation and diarrhea. Genitourinary: Negative for difficulty urinating, pelvic pain, vaginal bleeding, vaginal discharge, itching or odor. O:  Blood pressure 120/90, height 5' 7.5" (1.715 m), weight 73 kg (161 lb), not currently breastfeeding. Patient appears well and is not in distress  Breasts are symmetrical without mass, tenderness, nipple discharge, skin changes or adenopathy. Abdomen is soft and nontender without masses. External genitals are normal without lesions or rashes. Urethra with caruncle  Bladder is normal to palpation  Vagina is normal without discharge or bleeding, generalized atrophic changes present   Cervix is normal without discharge or lesion. Uterus is normal, mobile, nontender without palpable mass. Adnexa are normal, nontender, without palpable mass. A:  Yearly exam.     P:   Pap & HPV not indicated  Mammo up to date   Colon Cancer Screening up to date   DEXA scheduled     Discussed exam and also management of constipation/larg/hard stools. Encouraged fiber supplementations. Discussed squatty potty.        Reviewed considerations of menopause, to call with any postmenopausal bleeding or other concerns. RTO one year for yearly exam or sooner as needed.

## 2023-09-05 ENCOUNTER — PATIENT MESSAGE (OUTPATIENT)
Dept: PAIN MEDICINE | Facility: CLINIC | Age: 68
End: 2023-09-05

## 2023-09-05 DIAGNOSIS — M54.16 LUMBAR RADICULOPATHY: Primary | ICD-10-CM

## 2023-09-12 ENCOUNTER — HOSPITAL ENCOUNTER (OUTPATIENT)
Dept: RADIOLOGY | Age: 68
Discharge: HOME | End: 2023-09-12
Payer: MEDICARE

## 2023-09-12 DIAGNOSIS — M85.89 OSTEOPENIA OF MULTIPLE SITES: ICD-10-CM

## 2023-09-12 PROCEDURE — 77080 DXA BONE DENSITY AXIAL: CPT

## 2023-09-15 ENCOUNTER — APPOINTMENT (OUTPATIENT)
Dept: LAB | Age: 68
End: 2023-09-15
Payer: MEDICARE

## 2023-09-15 ENCOUNTER — HOSPITAL ENCOUNTER (OUTPATIENT)
Dept: RADIOLOGY | Facility: HOSPITAL | Age: 68
Discharge: HOME | End: 2023-09-15
Attending: ANESTHESIOLOGY
Payer: MEDICARE

## 2023-09-15 DIAGNOSIS — M35.9 UNDIFFERENTIATED CONNECTIVE TISSUE DISEASE (HCC): ICD-10-CM

## 2023-09-15 DIAGNOSIS — E34.9 ENDOCRINE DISORDER RELATED TO PUBERTY: ICD-10-CM

## 2023-09-15 DIAGNOSIS — I51.9 MYXEDEMA HEART DISEASE: ICD-10-CM

## 2023-09-15 DIAGNOSIS — E79.0 ASYMPTOMATIC HYPERURICEMIA: ICD-10-CM

## 2023-09-15 DIAGNOSIS — M54.16 LUMBAR RADICULOPATHY: ICD-10-CM

## 2023-09-15 DIAGNOSIS — E03.9 MYXEDEMA HEART DISEASE: ICD-10-CM

## 2023-09-15 DIAGNOSIS — E06.3 CHRONIC LYMPHOCYTIC THYROIDITIS: ICD-10-CM

## 2023-09-15 LAB
ALBUMIN SERPL BCP-MCNC: 3.9 G/DL (ref 3.5–5)
ALP SERPL-CCNC: 41 U/L (ref 34–104)
ALT SERPL W P-5'-P-CCNC: 19 U/L (ref 7–52)
ANION GAP SERPL CALCULATED.3IONS-SCNC: 7 MMOL/L
AST SERPL W P-5'-P-CCNC: 23 U/L (ref 13–39)
BASOPHILS # BLD AUTO: 0.02 THOUSANDS/ÂΜL (ref 0–0.1)
BASOPHILS NFR BLD AUTO: 1 % (ref 0–1)
BILIRUB SERPL-MCNC: 0.54 MG/DL (ref 0.2–1)
BUN SERPL-MCNC: 21 MG/DL (ref 5–25)
C3 SERPL-MCNC: 135 MG/DL (ref 87–200)
C4 SERPL-MCNC: 30 MG/DL (ref 19–52)
CALCIUM SERPL-MCNC: 9.7 MG/DL (ref 8.4–10.2)
CHLORIDE SERPL-SCNC: 101 MMOL/L (ref 96–108)
CO2 SERPL-SCNC: 34 MMOL/L (ref 21–32)
CREAT SERPL-MCNC: 0.97 MG/DL (ref 0.6–1.3)
CRP SERPL QL: 4.5 MG/L
EOSINOPHIL # BLD AUTO: 0.06 THOUSAND/ÂΜL (ref 0–0.61)
EOSINOPHIL NFR BLD AUTO: 2 % (ref 0–6)
ERYTHROCYTE [DISTWIDTH] IN BLOOD BY AUTOMATED COUNT: 13.2 % (ref 11.6–15.1)
ERYTHROCYTE [SEDIMENTATION RATE] IN BLOOD: 7 MM/HOUR (ref 0–29)
EST. AVERAGE GLUCOSE BLD GHB EST-MCNC: 120 MG/DL
GFR SERPL CREATININE-BSD FRML MDRD: 59 ML/MIN/1.73SQ M
GLUCOSE P FAST SERPL-MCNC: 84 MG/DL (ref 65–99)
HBA1C MFR BLD: 5.8 %
HCT VFR BLD AUTO: 39.3 % (ref 34.8–46.1)
HGB BLD-MCNC: 12.9 G/DL (ref 11.5–15.4)
IMM GRANULOCYTES # BLD AUTO: 0 THOUSAND/UL (ref 0–0.2)
IMM GRANULOCYTES NFR BLD AUTO: 0 % (ref 0–2)
LYMPHOCYTES # BLD AUTO: 0.98 THOUSANDS/ÂΜL (ref 0.6–4.47)
LYMPHOCYTES NFR BLD AUTO: 32 % (ref 14–44)
MAGNESIUM SERPL-MCNC: 1.9 MG/DL (ref 1.9–2.7)
MCH RBC QN AUTO: 31 PG (ref 26.8–34.3)
MCHC RBC AUTO-ENTMCNC: 32.8 G/DL (ref 31.4–37.4)
MCV RBC AUTO: 95 FL (ref 82–98)
MONOCYTES # BLD AUTO: 0.28 THOUSAND/ÂΜL (ref 0.17–1.22)
MONOCYTES NFR BLD AUTO: 9 % (ref 4–12)
NEUTROPHILS # BLD AUTO: 1.69 THOUSANDS/ÂΜL (ref 1.85–7.62)
NEUTS SEG NFR BLD AUTO: 56 % (ref 43–75)
NRBC BLD AUTO-RTO: 0 /100 WBCS
PHOSPHATE SERPL-MCNC: 4.5 MG/DL (ref 2.3–4.1)
PLATELET # BLD AUTO: 180 THOUSANDS/UL (ref 149–390)
PMV BLD AUTO: 10.7 FL (ref 8.9–12.7)
POTASSIUM SERPL-SCNC: 3.9 MMOL/L (ref 3.5–5.3)
PROT SERPL-MCNC: 6.1 G/DL (ref 6.4–8.4)
RBC # BLD AUTO: 4.16 MILLION/UL (ref 3.81–5.12)
SODIUM SERPL-SCNC: 142 MMOL/L (ref 135–147)
T4 FREE SERPL-MCNC: 1.59 NG/DL (ref 0.61–1.12)
TSH SERPL DL<=0.05 MIU/L-ACNC: 2.85 UIU/ML (ref 0.45–4.5)
URATE SERPL-MCNC: 6.2 MG/DL (ref 2–7.5)
WBC # BLD AUTO: 3.03 THOUSAND/UL (ref 4.31–10.16)

## 2023-09-15 PROCEDURE — 86140 C-REACTIVE PROTEIN: CPT

## 2023-09-15 PROCEDURE — 84443 ASSAY THYROID STIM HORMONE: CPT

## 2023-09-15 PROCEDURE — 85652 RBC SED RATE AUTOMATED: CPT

## 2023-09-15 PROCEDURE — 84439 ASSAY OF FREE THYROXINE: CPT

## 2023-09-15 PROCEDURE — G1004 CDSM NDSC: HCPCS

## 2023-09-15 PROCEDURE — 80053 COMPREHEN METABOLIC PANEL: CPT

## 2023-09-15 PROCEDURE — 84100 ASSAY OF PHOSPHORUS: CPT

## 2023-09-15 PROCEDURE — 84550 ASSAY OF BLOOD/URIC ACID: CPT

## 2023-09-15 PROCEDURE — 85025 COMPLETE CBC W/AUTO DIFF WBC: CPT

## 2023-09-15 PROCEDURE — 72148 MRI LUMBAR SPINE W/O DYE: CPT

## 2023-09-15 PROCEDURE — 86160 COMPLEMENT ANTIGEN: CPT

## 2023-09-15 PROCEDURE — 83036 HEMOGLOBIN GLYCOSYLATED A1C: CPT

## 2023-09-15 PROCEDURE — 36415 COLL VENOUS BLD VENIPUNCTURE: CPT

## 2023-09-15 PROCEDURE — 83735 ASSAY OF MAGNESIUM: CPT

## 2023-09-15 PROCEDURE — 86225 DNA ANTIBODY NATIVE: CPT

## 2023-09-19 ENCOUNTER — TELEPHONE (OUTPATIENT)
Dept: PAIN MEDICINE | Facility: CLINIC | Age: 68
End: 2023-09-19

## 2023-09-19 NOTE — TELEPHONE ENCOUNTER
Please notify the patient the MRI of her lumbar spine demonstrates disc bulging and arthritis. Mild central stenosis at L2-3 and L3-4. Moderate left foraminal stenosis at L5-S1.

## 2023-09-20 LAB — DSDNA AB SER QL CLIF: NEGATIVE

## 2023-10-02 ENCOUNTER — TELEPHONE (OUTPATIENT)
Age: 68
End: 2023-10-02

## 2023-10-02 NOTE — TELEPHONE ENCOUNTER
Pt called to say she tested positive yesterday for Covid. Pts only symptoms are allergy type symptoms with a minor cough. I advised pt of isolation and masking protocol. Pt just wants to know what she should do. Please advise.

## 2023-10-02 NOTE — TELEPHONE ENCOUNTER
Called patient gave her message   Patient would like to know if she should take the medication because she dose not have any symptoms at the moment. Patient also stated she was on vacation with her roommate and her roommate tested positive on last Tuesday 9/26/2023. Patient stated that she tested negative on Tuesday 9/26/23 and Friday 9/29/23. Patient tested positive yesterday 10/1/2023. Patient stated she just has a scratchy throat no other symptoms.

## 2023-10-03 DIAGNOSIS — E03.9 ACQUIRED HYPOTHYROIDISM: Primary | ICD-10-CM

## 2023-10-05 ENCOUNTER — APPOINTMENT (OUTPATIENT)
Dept: LAB | Age: 68
End: 2023-10-05
Payer: MEDICARE

## 2023-10-05 DIAGNOSIS — E06.3 CHRONIC LYMPHOCYTIC THYROIDITIS: ICD-10-CM

## 2023-10-05 DIAGNOSIS — I51.9 MYXEDEMA HEART DISEASE: ICD-10-CM

## 2023-10-05 DIAGNOSIS — E03.9 ACQUIRED HYPOTHYROIDISM: ICD-10-CM

## 2023-10-05 DIAGNOSIS — E03.9 MYXEDEMA HEART DISEASE: ICD-10-CM

## 2023-10-05 LAB
MAGNESIUM SERPL-MCNC: 2.1 MG/DL (ref 1.9–2.7)
PHOSPHATE SERPL-MCNC: 3.3 MG/DL (ref 2.3–4.1)
T3FREE SERPL-MCNC: 3.11 PG/ML (ref 2.5–3.9)
T4 FREE SERPL-MCNC: 0.93 NG/DL (ref 0.61–1.12)

## 2023-10-05 PROCEDURE — 83735 ASSAY OF MAGNESIUM: CPT

## 2023-10-05 PROCEDURE — 84481 FREE ASSAY (FT-3): CPT

## 2023-10-05 PROCEDURE — 84100 ASSAY OF PHOSPHORUS: CPT | Performed by: FAMILY MEDICINE

## 2023-10-05 PROCEDURE — 36415 COLL VENOUS BLD VENIPUNCTURE: CPT

## 2023-10-05 PROCEDURE — 84439 ASSAY OF FREE THYROXINE: CPT

## 2023-11-02 ENCOUNTER — OFFICE VISIT (OUTPATIENT)
Dept: FAMILY MEDICINE CLINIC | Facility: CLINIC | Age: 68
End: 2023-11-02
Payer: MEDICARE

## 2023-11-02 VITALS
RESPIRATION RATE: 16 BRPM | WEIGHT: 166 LBS | HEART RATE: 63 BPM | HEIGHT: 68 IN | DIASTOLIC BLOOD PRESSURE: 72 MMHG | SYSTOLIC BLOOD PRESSURE: 118 MMHG | TEMPERATURE: 97.1 F | BODY MASS INDEX: 25.16 KG/M2 | OXYGEN SATURATION: 99 %

## 2023-11-02 DIAGNOSIS — M51.36 DDD (DEGENERATIVE DISC DISEASE), LUMBAR: ICD-10-CM

## 2023-11-02 DIAGNOSIS — I10 BENIGN ESSENTIAL HTN: Primary | ICD-10-CM

## 2023-11-02 DIAGNOSIS — I71.21 ANEURYSM OF ASCENDING AORTA WITHOUT RUPTURE (HCC): ICD-10-CM

## 2023-11-02 DIAGNOSIS — Z17.0 MALIGNANT NEOPLASM OF UPPER-OUTER QUADRANT OF RIGHT BREAST IN FEMALE, ESTROGEN RECEPTOR POSITIVE: ICD-10-CM

## 2023-11-02 DIAGNOSIS — Z00.00 MEDICARE ANNUAL WELLNESS VISIT, SUBSEQUENT: ICD-10-CM

## 2023-11-02 DIAGNOSIS — M54.16 LUMBAR RADICULOPATHY: ICD-10-CM

## 2023-11-02 DIAGNOSIS — M85.89 OSTEOPENIA OF MULTIPLE SITES: ICD-10-CM

## 2023-11-02 DIAGNOSIS — E03.9 ACQUIRED HYPOTHYROIDISM: ICD-10-CM

## 2023-11-02 DIAGNOSIS — C50.411 MALIGNANT NEOPLASM OF UPPER-OUTER QUADRANT OF RIGHT BREAST IN FEMALE, ESTROGEN RECEPTOR POSITIVE: ICD-10-CM

## 2023-11-02 DIAGNOSIS — M35.9 UNDIFFERENTIATED CONNECTIVE TISSUE DISEASE (HCC): ICD-10-CM

## 2023-11-02 DIAGNOSIS — N39.41 URGE INCONTINENCE OF URINE: ICD-10-CM

## 2023-11-02 DIAGNOSIS — D72.819 LEUKOPENIA, UNSPECIFIED TYPE: ICD-10-CM

## 2023-11-02 PROBLEM — E79.0 ASYMPTOMATIC HYPERURICEMIA: Status: RESOLVED | Noted: 2023-01-04 | Resolved: 2023-11-02

## 2023-11-02 PROBLEM — M75.81 ROTATOR CUFF TENDONITIS, RIGHT: Status: RESOLVED | Noted: 2023-01-04 | Resolved: 2023-11-02

## 2023-11-02 PROBLEM — E78.00 PURE HYPERCHOLESTEROLEMIA: Status: RESOLVED | Noted: 2020-01-19 | Resolved: 2023-11-02

## 2023-11-02 PROBLEM — Z08 ENCOUNTER FOR FOLLOW-UP SURVEILLANCE OF BREAST CANCER: Status: RESOLVED | Noted: 2023-04-18 | Resolved: 2023-11-02

## 2023-11-02 PROBLEM — Z85.3 ENCOUNTER FOR FOLLOW-UP SURVEILLANCE OF BREAST CANCER: Status: RESOLVED | Noted: 2023-04-18 | Resolved: 2023-11-02

## 2023-11-02 PROBLEM — M65.331 TRIGGER FINGER, RIGHT MIDDLE FINGER: Status: RESOLVED | Noted: 2021-10-11 | Resolved: 2023-11-02

## 2023-11-02 PROBLEM — N18.31 STAGE 3A CHRONIC KIDNEY DISEASE (HCC): Status: RESOLVED | Noted: 2023-01-04 | Resolved: 2023-11-02

## 2023-11-02 PROCEDURE — G0439 PPPS, SUBSEQ VISIT: HCPCS | Performed by: FAMILY MEDICINE

## 2023-11-02 PROCEDURE — 99214 OFFICE O/P EST MOD 30 MIN: CPT | Performed by: FAMILY MEDICINE

## 2023-11-02 RX ORDER — CHLORTHALIDONE 25 MG/1
25 TABLET ORAL DAILY
Qty: 90 TABLET | Refills: 3 | Status: SHIPPED | OUTPATIENT
Start: 2023-11-02

## 2023-11-02 NOTE — PATIENT INSTRUCTIONS
Medicare Preventive Visit Patient Instructions  Thank you for completing your Welcome to Medicare Visit or Medicare Annual Wellness Visit today. Your next wellness visit will be due in one year (11/2/2024). The screening/preventive services that you may require over the next 5-10 years are detailed below. Some tests may not apply to you based off risk factors and/or age. Screening tests ordered at today's visit but not completed yet may show as past due. Also, please note that scanned in results may not display below. Preventive Screenings:  Service Recommendations Previous Testing/Comments   Colorectal Cancer Screening  * Colonoscopy    * Fecal Occult Blood Test (FOBT)/Fecal Immunochemical Test (FIT)  * Fecal DNA/Cologuard Test  * Flexible Sigmoidoscopy Age: 43-73 years old   Colonoscopy: every 10 years (may be performed more frequently if at higher risk)  OR  FOBT/FIT: every 1 year  OR  Cologuard: every 3 years  OR  Sigmoidoscopy: every 5 years  Screening may be recommended earlier than age 39 if at higher risk for colorectal cancer. Also, an individualized decision between you and your healthcare provider will decide whether screening between the ages of 77-80 would be appropriate. Colonoscopy: 07/19/2023  FOBT/FIT: Not on file  Cologuard: Not on file  Sigmoidoscopy: Not on file    Screening Current  Screening Current     Breast Cancer Screening Age: 34 years old  Frequency: every 1-2 years  Not required if history of left and right mastectomy Mammogram: 12/07/2022    History Breast Cancer  History Breast Cancer   Cervical Cancer Screening Between the ages of 21-29, pap smear recommended once every 3 years. Between the ages of 32-69, can perform pap smear with HPV co-testing every 5 years.    Recommendations may differ for women with a history of total hysterectomy, cervical cancer, or abnormal pap smears in past. Pap Smear: 08/17/2023    Screening Not Indicated  Screening Not Indicated   Hepatitis C Screening Once for adults born between 1945 and 1965  More frequently in patients at high risk for Hepatitis C Hep C Antibody: 01/17/2020    Screening Current  Screening Current   Diabetes Screening 1-2 times per year if you're at risk for diabetes or have pre-diabetes Fasting glucose: 84 mg/dL (9/15/2023)  A1C: 5.8 % (9/15/2023)  Screening Current  Screening Current   Cholesterol Screening Once every 5 years if you don't have a lipid disorder. May order more often based on risk factors. Lipid panel: 04/29/2021    Screening Not Indicated  History Lipid Disorder  Screening Not Indicated  History Lipid Disorder     Other Preventive Screenings Covered by Medicare:  Abdominal Aortic Aneurysm (AAA) Screening: covered once if your at risk. You're considered to be at risk if you have a family history of AAA. Lung Cancer Screening: covers low dose CT scan once per year if you meet all of the following conditions: (1) Age 48-67; (2) No signs or symptoms of lung cancer; (3) Current smoker or have quit smoking within the last 15 years; (4) You have a tobacco smoking history of at least 20 pack years (packs per day multiplied by number of years you smoked); (5) You get a written order from a healthcare provider. Glaucoma Screening: covered annually if you're considered high risk: (1) You have diabetes OR (2) Family history of glaucoma OR (3)  aged 48 and older OR (3)  American aged 72 and older  Osteoporosis Screening: covered every 2 years if you meet one of the following conditions: (1) You're estrogen deficient and at risk for osteoporosis based off medical history and other findings; (2) Have a vertebral abnormality; (3) On glucocorticoid therapy for more than 3 months; (4) Have primary hyperparathyroidism; (5) On osteoporosis medications and need to assess response to drug therapy. Last bone density test (DXA Scan): 09/12/2023. HIV Screening: covered annually if you're between the age of 14-79. Also covered annually if you are younger than 13 and older than 72 with risk factors for HIV infection. For pregnant patients, it is covered up to 3 times per pregnancy. Immunizations:  Immunization Recommendations   Influenza Vaccine Annual influenza vaccination during flu season is recommended for all persons aged >= 6 months who do not have contraindications   Pneumococcal Vaccine   * Pneumococcal conjugate vaccine = PCV13 (Prevnar 13), PCV15 (Vaxneuvance), PCV20 (Prevnar 20)  * Pneumococcal polysaccharide vaccine = PPSV23 (Pneumovax) Adults 75-59 yo with certain risk factors or if 69+ yo  If never received any pneumonia vaccine: recommend Prevnar 20 (PCV20)  Give PCV20 if previously received 1 dose of PCV13 or PPSV23   Hepatitis B Vaccine 3 dose series if at intermediate or high risk (ex: diabetes, end stage renal disease, liver disease)   Respiratory syncytial virus (RSV) Vaccine - COVERED BY MEDICARE PART D  * RSVPreF3 (Arexvy) CDC recommends that adults 61years of age and older may receive a single dose of RSV vaccine using shared clinical decision-making (SCDM)   Tetanus (Td) Vaccine - COST NOT COVERED BY MEDICARE PART B Following completion of primary series, a booster dose should be given every 10 years to maintain immunity against tetanus. Td may also be given as tetanus wound prophylaxis. Tdap Vaccine - COST NOT COVERED BY MEDICARE PART B Recommended at least once for all adults. For pregnant patients, recommended with each pregnancy.    Shingles Vaccine (Shingrix) - COST NOT COVERED BY MEDICARE PART B  2 shot series recommended in those 19 years and older who have or will have weakened immune systems or those 50 years and older     Health Maintenance Due:      Topic Date Due   • Breast Cancer Screening: Mammogram  12/07/2023   • Colorectal Cancer Screening  07/19/2028   • Hepatitis C Screening  Completed     Immunizations Due:      Topic Date Due   • COVID-19 Vaccine (5 - Jerilynn Alu series) 04/10/2023 Advance Directives   What are advance directives? Advance directives are legal documents that state your wishes and plans for medical care. These plans are made ahead of time in case you lose your ability to make decisions for yourself. Advance directives can apply to any medical decision, such as the treatments you want, and if you want to donate organs. What are the types of advance directives? There are many types of advance directives, and each state has rules about how to use them. You may choose a combination of any of the following:  Living will: This is a written record of the treatment you want. You can also choose which treatments you do not want, which to limit, and which to stop at a certain time. This includes surgery, medicine, IV fluid, and tube feedings. Durable power of  for [facility]): This is a written record that states who you want to make healthcare choices for you when you are unable to make them for yourself. This person, called a proxy, is usually a family member or a friend. You may choose more than 1 proxy. Do not resuscitate (DNR) order:  A DNR order is used in case your heart stops beating or you stop breathing. It is a request not to have certain forms of treatment, such as CPR. A DNR order may be included in other types of advance directives. Medical directive: This covers the care that you want if you are in a coma, near death, or unable to make decisions for yourself. You can list the treatments you want for each condition. Treatment may include pain medicine, surgery, blood transfusions, dialysis, IV or tube feedings, and a ventilator (breathing machine). Values history: This document has questions about your views, beliefs, and how you feel and think about life. This information can help others choose the care that you would choose. Why are advance directives important? An advance directive helps you control your care.  Although spoken wishes may be used, it is better to have your wishes written down. Spoken wishes can be misunderstood, or not followed. Treatments may be given even if you do not want them. An advance directive may make it easier for your family to make difficult choices about your care. Urinary Incontinence   Urinary incontinence (UI)  is when you lose control of your bladder. UI develops because your bladder cannot store or empty urine properly. The 3 most common types of UI are stress incontinence, urge incontinence, or both. Medicines:   May be given to help strengthen your bladder control. Report any side effects of medication to your healthcare provider. Do pelvic muscle exercises often:  Your pelvic muscles help you stop urinating. Squeeze these muscles tight for 5 seconds, then relax for 5 seconds. Gradually work up to squeezing for 10 seconds. Do 3 sets of 15 repetitions a day, or as directed. This will help strengthen your pelvic muscles and improve bladder control. Train your bladder:  Go to the bathroom at set times, such as every 2 hours, even if you do not feel the urge to go. You can also try to hold your urine when you feel the urge to go. For example, hold your urine for 5 minutes when you feel the urge to go. As that becomes easier, hold your urine for 10 minutes. Self-care:   Keep a UI record. Write down how often you leak urine and how much you leak. Make a note of what you were doing when you leaked urine. Drink liquids as directed. You may need to limit the amount of liquid you drink to help control your urine leakage. Do not drink any liquid right before you go to bed. Limit or do not have drinks that contain caffeine or alcohol. Prevent constipation. Eat a variety of high-fiber foods. Good examples are high-fiber cereals, beans, vegetables, and whole-grain breads. Walking is the best way to trigger your intestines to have a bowel movement. Exercise regularly and maintain a healthy weight.   Weight loss and exercise will decrease pressure on your bladder and help you control your leakage. Use a catheter as directed  to help empty your bladder. A catheter is a tiny, plastic tube that is put into your bladder to drain your urine. Go to behavior therapy as directed. Behavior therapy may be used to help you learn to control your urge to urinate. Weight Management   Why it is important to manage your weight:  Being overweight increases your risk of health conditions such as heart disease, high blood pressure, type 2 diabetes, and certain types of cancer. It can also increase your risk for osteoarthritis, sleep apnea, and other respiratory problems. Aim for a slow, steady weight loss. Even a small amount of weight loss can lower your risk of health problems. How to lose weight safely:  A safe and healthy way to lose weight is to eat fewer calories and get regular exercise. You can lose up about 1 pound a week by decreasing the number of calories you eat by 500 calories each day. Healthy meal plan for weight management:  A healthy meal plan includes a variety of foods, contains fewer calories, and helps you stay healthy. A healthy meal plan includes the following:  Eat whole-grain foods more often. A healthy meal plan should contain fiber. Fiber is the part of grains, fruits, and vegetables that is not broken down by your body. Whole-grain foods are healthy and provide extra fiber in your diet. Some examples of whole-grain foods are whole-wheat breads and pastas, oatmeal, brown rice, and bulgur. Eat a variety of vegetables every day. Include dark, leafy greens such as spinach, kale, sal greens, and mustard greens. Eat yellow and orange vegetables such as carrots, sweet potatoes, and winter squash. Eat a variety of fruits every day. Choose fresh or canned fruit (canned in its own juice or light syrup) instead of juice. Fruit juice has very little or no fiber. Eat low-fat dairy foods.   Drink fat-free (skim) milk or 1% milk. Eat fat-free yogurt and low-fat cottage cheese. Try low-fat cheeses such as mozzarella and other reduced-fat cheeses. Choose meat and other protein foods that are low in fat. Choose beans or other legumes such as split peas or lentils. Choose fish, skinless poultry (chicken or turkey), or lean cuts of red meat (beef or pork). Before you cook meat or poultry, cut off any visible fat. Use less fat and oil. Try baking foods instead of frying them. Add less fat, such as margarine, sour cream, regular salad dressing and mayonnaise to foods. Eat fewer high-fat foods. Some examples of high-fat foods include french fries, doughnuts, ice cream, and cakes. Eat fewer sweets. Limit foods and drinks that are high in sugar. This includes candy, cookies, regular soda, and sweetened drinks. Exercise:  Exercise at least 30 minutes per day on most days of the week. Some examples of exercise include walking, biking, dancing, and swimming. You can also fit in more physical activity by taking the stairs instead of the elevator or parking farther away from stores. Ask your healthcare provider about the best exercise plan for you. © Copyright [employer] 2018 Information is for End User's use only and may not be sold, redistributed or otherwise used for commercial purposes. All illustrations and images included in CareNotes® are the copyrighted property of [employer] or [employer] [facility] Preventive Visit Patient Instructions  Thank you for completing your Welcome to Medicare Visit or Medicare Annual Wellness Visit today. Your next wellness visit will be due in one year (11/2/2024). The screening/preventive services that you may require over the next 5-10 years are detailed below. Some tests may not apply to you based off risk factors and/or age. Screening tests ordered at today's visit but not completed yet may show as past due.  Also, please note that scanned in results may not display below. Preventive Screenings:  Service Recommendations Previous Testing/Comments   Colorectal Cancer Screening  * Colonoscopy    * Fecal Occult Blood Test (FOBT)/Fecal Immunochemical Test (FIT)  * Fecal DNA/Cologuard Test  * Flexible Sigmoidoscopy Age: 43-73 years old   Colonoscopy: every 10 years (may be performed more frequently if at higher risk)  OR  FOBT/FIT: every 1 year  OR  Cologuard: every 3 years  OR  Sigmoidoscopy: every 5 years  Screening may be recommended earlier than age 39 if at higher risk for colorectal cancer. Also, an individualized decision between you and your healthcare provider will decide whether screening between the ages of 77-80 would be appropriate. Colonoscopy: 07/19/2023  FOBT/FIT: Not on file  Cologuard: Not on file  Sigmoidoscopy: Not on file    Screening Current  Screening Current     Breast Cancer Screening Age: 34 years old  Frequency: every 1-2 years  Not required if history of left and right mastectomy Mammogram: 12/07/2022    History Breast Cancer  History Breast Cancer   Cervical Cancer Screening Between the ages of 21-29, pap smear recommended once every 3 years. Between the ages of 32-69, can perform pap smear with HPV co-testing every 5 years. Recommendations may differ for women with a history of total hysterectomy, cervical cancer, or abnormal pap smears in past. Pap Smear: 08/17/2023    Screening Not Indicated  Screening Not Indicated   Hepatitis C Screening Once for adults born between 1945 and 1965  More frequently in patients at high risk for Hepatitis C Hep C Antibody: 01/17/2020    Screening Current  Screening Current   Diabetes Screening 1-2 times per year if you're at risk for diabetes or have pre-diabetes Fasting glucose: 84 mg/dL (9/15/2023)  A1C: 5.8 % (9/15/2023)  Screening Current  Screening Current   Cholesterol Screening Once every 5 years if you don't have a lipid disorder. May order more often based on risk factors.  Lipid panel: 04/29/2021    Screening Not Indicated  History Lipid Disorder  Screening Not Indicated  History Lipid Disorder     Other Preventive Screenings Covered by Medicare:  Abdominal Aortic Aneurysm (AAA) Screening: covered once if your at risk. You're considered to be at risk if you have a family history of AAA. Lung Cancer Screening: covers low dose CT scan once per year if you meet all of the following conditions: (1) Age 48-67; (2) No signs or symptoms of lung cancer; (3) Current smoker or have quit smoking within the last 15 years; (4) You have a tobacco smoking history of at least 20 pack years (packs per day multiplied by number of years you smoked); (5) You get a written order from a healthcare provider. Glaucoma Screening: covered annually if you're considered high risk: (1) You have diabetes OR (2) Family history of glaucoma OR (3)  aged 48 and older OR (3)  American aged 72 and older  Osteoporosis Screening: covered every 2 years if you meet one of the following conditions: (1) You're estrogen deficient and at risk for osteoporosis based off medical history and other findings; (2) Have a vertebral abnormality; (3) On glucocorticoid therapy for more than 3 months; (4) Have primary hyperparathyroidism; (5) On osteoporosis medications and need to assess response to drug therapy. Last bone density test (DXA Scan): 09/12/2023. HIV Screening: covered annually if you're between the age of 14-79. Also covered annually if you are younger than 13 and older than 72 with risk factors for HIV infection. For pregnant patients, it is covered up to 3 times per pregnancy.     Immunizations:  Immunization Recommendations   Influenza Vaccine Annual influenza vaccination during flu season is recommended for all persons aged >= 6 months who do not have contraindications   Pneumococcal Vaccine   * Pneumococcal conjugate vaccine = PCV13 (Prevnar 13), PCV15 (Vaxneuvance), PCV20 (Prevnar 20)  * Pneumococcal polysaccharide vaccine = PPSV23 (Pneumovax) Adults 21-66 yo with certain risk factors or if 69+ yo  If never received any pneumonia vaccine: recommend Prevnar 20 (PCV20)  Give PCV20 if previously received 1 dose of PCV13 or PPSV23   Hepatitis B Vaccine 3 dose series if at intermediate or high risk (ex: diabetes, end stage renal disease, liver disease)   Respiratory syncytial virus (RSV) Vaccine - COVERED BY MEDICARE PART D  * RSVPreF3 (Arexvy) CDC recommends that adults 61years of age and older may receive a single dose of RSV vaccine using shared clinical decision-making (SCDM)   Tetanus (Td) Vaccine - COST NOT COVERED BY MEDICARE PART B Following completion of primary series, a booster dose should be given every 10 years to maintain immunity against tetanus. Td may also be given as tetanus wound prophylaxis. Tdap Vaccine - COST NOT COVERED BY MEDICARE PART B Recommended at least once for all adults. For pregnant patients, recommended with each pregnancy. Shingles Vaccine (Shingrix) - COST NOT COVERED BY MEDICARE PART B  2 shot series recommended in those 19 years and older who have or will have weakened immune systems or those 50 years and older     Health Maintenance Due:      Topic Date Due   • Breast Cancer Screening: Mammogram  12/07/2023   • Colorectal Cancer Screening  07/19/2028   • Hepatitis C Screening  Completed     Immunizations Due:      Topic Date Due   • COVID-19 Vaccine (5 - Moderna series) 04/10/2023     Advance Directives   What are advance directives? Advance directives are legal documents that state your wishes and plans for medical care. These plans are made ahead of time in case you lose your ability to make decisions for yourself. Advance directives can apply to any medical decision, such as the treatments you want, and if you want to donate organs. What are the types of advance directives? There are many types of advance directives, and each state has rules about how to use them. You may choose a combination of any of the following:  Living will: This is a written record of the treatment you want. You can also choose which treatments you do not want, which to limit, and which to stop at a certain time. This includes surgery, medicine, IV fluid, and tube feedings. Durable power of  for healthcare [facility]): This is a written record that states who you want to make healthcare choices for you when you are unable to make them for yourself. This person, called a proxy, is usually a family member or a friend. You may choose more than 1 proxy. Do not resuscitate (DNR) order:  A DNR order is used in case your heart stops beating or you stop breathing. It is a request not to have certain forms of treatment, such as CPR. A DNR order may be included in other types of advance directives. Medical directive: This covers the care that you want if you are in a coma, near death, or unable to make decisions for yourself. You can list the treatments you want for each condition. Treatment may include pain medicine, surgery, blood transfusions, dialysis, IV or tube feedings, and a ventilator (breathing machine). Values history: This document has questions about your views, beliefs, and how you feel and think about life. This information can help others choose the care that you would choose. Why are advance directives important? An advance directive helps you control your care. Although spoken wishes may be used, it is better to have your wishes written down. Spoken wishes can be misunderstood, or not followed. Treatments may be given even if you do not want them. An advance directive may make it easier for your family to make difficult choices about your care. Urinary Incontinence   Urinary incontinence (UI)  is when you lose control of your bladder. UI develops because your bladder cannot store or empty urine properly.  The 3 most common types of UI are stress incontinence, urge incontinence, or both. Medicines:   May be given to help strengthen your bladder control. Report any side effects of medication to your healthcare provider. Do pelvic muscle exercises often:  Your pelvic muscles help you stop urinating. Squeeze these muscles tight for 5 seconds, then relax for 5 seconds. Gradually work up to squeezing for 10 seconds. Do 3 sets of 15 repetitions a day, or as directed. This will help strengthen your pelvic muscles and improve bladder control. Train your bladder:  Go to the bathroom at set times, such as every 2 hours, even if you do not feel the urge to go. You can also try to hold your urine when you feel the urge to go. For example, hold your urine for 5 minutes when you feel the urge to go. As that becomes easier, hold your urine for 10 minutes. Self-care:   Keep a UI record. Write down how often you leak urine and how much you leak. Make a note of what you were doing when you leaked urine. Drink liquids as directed. You may need to limit the amount of liquid you drink to help control your urine leakage. Do not drink any liquid right before you go to bed. Limit or do not have drinks that contain caffeine or alcohol. Prevent constipation. Eat a variety of high-fiber foods. Good examples are high-fiber cereals, beans, vegetables, and whole-grain breads. Walking is the best way to trigger your intestines to have a bowel movement. Exercise regularly and maintain a healthy weight. Weight loss and exercise will decrease pressure on your bladder and help you control your leakage. Use a catheter as directed  to help empty your bladder. A catheter is a tiny, plastic tube that is put into your bladder to drain your urine. Go to behavior therapy as directed. Behavior therapy may be used to help you learn to control your urge to urinate.     Weight Management   Why it is important to manage your weight:  Being overweight increases your risk of health conditions such as heart disease, high blood pressure, type 2 diabetes, and certain types of cancer. It can also increase your risk for osteoarthritis, sleep apnea, and other respiratory problems. Aim for a slow, steady weight loss. Even a small amount of weight loss can lower your risk of health problems. How to lose weight safely:  A safe and healthy way to lose weight is to eat fewer calories and get regular exercise. You can lose up about 1 pound a week by decreasing the number of calories you eat by 500 calories each day. Healthy meal plan for weight management:  A healthy meal plan includes a variety of foods, contains fewer calories, and helps you stay healthy. A healthy meal plan includes the following:  Eat whole-grain foods more often. A healthy meal plan should contain fiber. Fiber is the part of grains, fruits, and vegetables that is not broken down by your body. Whole-grain foods are healthy and provide extra fiber in your diet. Some examples of whole-grain foods are whole-wheat breads and pastas, oatmeal, brown rice, and bulgur. Eat a variety of vegetables every day. Include dark, leafy greens such as spinach, kale, sal greens, and mustard greens. Eat yellow and orange vegetables such as carrots, sweet potatoes, and winter squash. Eat a variety of fruits every day. Choose fresh or canned fruit (canned in its own juice or light syrup) instead of juice. Fruit juice has very little or no fiber. Eat low-fat dairy foods. Drink fat-free (skim) milk or 1% milk. Eat fat-free yogurt and low-fat cottage cheese. Try low-fat cheeses such as mozzarella and other reduced-fat cheeses. Choose meat and other protein foods that are low in fat. Choose beans or other legumes such as split peas or lentils. Choose fish, skinless poultry (chicken or turkey), or lean cuts of red meat (beef or pork). Before you cook meat or poultry, cut off any visible fat. Use less fat and oil. Try baking foods instead of frying them.  Add less fat, such as margarine, sour cream, regular salad dressing and mayonnaise to foods. Eat fewer high-fat foods. Some examples of high-fat foods include french fries, doughnuts, ice cream, and cakes. Eat fewer sweets. Limit foods and drinks that are high in sugar. This includes candy, cookies, regular soda, and sweetened drinks. Exercise:  Exercise at least 30 minutes per day on most days of the week. Some examples of exercise include walking, biking, dancing, and swimming. You can also fit in more physical activity by taking the stairs instead of the elevator or parking farther away from stores. Ask your healthcare provider about the best exercise plan for you. © Copyright [address] 2018 Information is for End User's use only and may not be sold, redistributed or otherwise used for commercial purposes.  All illustrations and images included in CareNotes® are the copyrighted property of [employer] or [address] [place] [address]

## 2023-11-02 NOTE — PROGRESS NOTES
Assessment and Plan:     Problem List Items Addressed This Visit          Endocrine    Acquired hypothyroidism       Cardiovascular and Mediastinum    Benign essential HTN - Primary    Relevant Medications    chlorthalidone 25 mg tablet    Ascending aortic aneurysm (HCC)    Relevant Orders    CT chest wo contrast       Nervous and Auditory    Lumbar radiculopathy       Musculoskeletal and Integument    Osteopenia of multiple sites    DDD (degenerative disc disease), lumbar       Other    Leukopenia    Urge incontinence of urine    Malignant neoplasm of upper-outer quadrant of right breast in female, estrogen receptor positive     Undifferentiated connective tissue disease ([address])     Other Visit Diagnoses       Medicare annual wellness visit, subsequent              Continue with current medications. CT chest w/out contrast to re assess ascending aortic aneurysm. Up to date with flu vaccine. OV       BMI Counseling: Body mass index is 25.62 kg/m². The BMI is above normal. Nutrition recommendations include decreasing portion sizes, consuming healthier snacks, moderation in carbohydrate intake, reducing intake of saturated and trans fat and reducing intake of cholesterol. Exercise recommendations include exercising 3-5 times per week. No pharmacotherapy was ordered. Rationale for BMI follow-up plan is due to patient being overweight or obese. Depression Screening and Follow-up Plan: Patient was screened for depression during today's encounter. They screened negative with a PHQ-2 score of 0. Preventive health issues were discussed with patient, and age appropriate screening tests were ordered as noted in patient's After Visit Summary. Personalized health advice and appropriate referrals for health education or preventive services given if needed, as noted in patient's After Visit Summary. History of Present Illness:     Patient presents for a Medicare Wellness Visit    Follow up visit.  Medications reviewed. Hypertension blood pressures have been stable on Chlorthalidone 25 mg/day and K+ supplement. 09/2023 creatinine 0.97. GFR 59. Electrolytes normal. K+ 3.9. 09/2022  Hgb 12.9. Prediabetes 09/2023 FBS 84. A1c 5.8. Lab Results   Component Value Date    WBC 3.03 (L) 09/15/2023    HGB 12.9 09/15/2023    HCT 39.3 09/15/2023    MCV 95 09/15/2023     09/15/2023     Lab Results   Component Value Date     09/30/2015    SODIUM 142 09/15/2023    K 3.9 09/15/2023     09/15/2023    CO2 34 (H) 09/15/2023    ANIONGAP 8 09/30/2015    AGAP 7 09/15/2023    BUN 21 09/15/2023    CREATININE 0.97 09/15/2023    GLUC 69 07/19/2022    GLUF 84 09/15/2023    CALCIUM 9.7 09/15/2023    AST 23 09/15/2023    ALT 19 09/15/2023    ALKPHOS 41 09/15/2023    PROT 7.0 09/30/2015    TP 6.1 (L) 09/15/2023    BILITOT 0.39 09/30/2015    TBILI 0.54 09/15/2023    EGFR 59 09/15/2023     Lab Results   Component Value Date    HGBA1C 5.8 (H) 09/15/2023     Lab Results   Component Value Date    CHOLESTEROL 199 04/29/2021    CHOLESTEROL 194 11/25/2020    CHOLESTEROL 183 06/20/2020     Lab Results   Component Value Date    HDL 71 04/29/2021    HDL 63 11/25/2020    HDL 52 06/20/2020     Lab Results   Component Value Date    TRIG 68 04/29/2021    TRIG 77 11/25/2020    TRIG 84 06/20/2020     Lab Results   Component Value Date    LDLCALC 114 (H) 04/29/2021            Patient Care Team:  Rosalia Bey MD as PCP - General (Family Medicine)  MD Mina Ruff MD Michaell Blade, MD Clarise Christmas, MD Sharren Ream, DO (Pain Medicine)  Dino Barajas (Oncology)  Daniel Cisneros MD (Obstetrics and Gynecology)     Review of Systems:     Review of Systems   Constitutional:  Negative for appetite change, chills, fatigue, fever and unexpected weight change. HENT:  Negative for congestion, ear pain, rhinorrhea, sore throat and trouble swallowing. Eyes:  Negative for visual disturbance.    Respiratory: Negative for cough, shortness of breath and wheezing. Cardiovascular:  Negative for chest pain, palpitations and leg swelling.        06/2021 CTA Stable ascending aortic aneurysm measuring 41 mm.  12/2020 CT coronary Ca++ score=0   Gastrointestinal:  Negative for abdominal pain, blood in stool, constipation, diarrhea, nausea and vomiting. History of colon polyps. 07/2023 colonoscopy    Endocrine:        Hypothyroidism on Levothyroxine 88 mcg alternating with 75 mcg daily. Osteopenia with prior Rx with Prolia/Reclast. 09/2023 DEXA scan Low bone mass (osteopenia). Based on the right femoral neck  Since a DXA study from 9/10/2021, there has been:  No statistically significant change in bone mineral density at any of the evaluated skeletal sites. Lab Results       Component                Value               Date                       [id number]             2.850               09/15/2023                                   Genitourinary:  Negative for difficulty urinating. Urge incontinence on VesiCare 05/2021 Bilateral parapelvic cysts, left greater than right,     Musculoskeletal:  Positive for arthralgias (s/p R TSR.) and back pain. Negative for myalgias. Coxa vera s/p varus rotational osteotomy age 6. Left leg shorter than right leg. She wears a left shoe lift. OA left hip. X rays 05/2021 moderate OA. Chronic issue with numbness left anterior thigh felt to be secondary to meralgia paresthetica/L lumbar radiculopathy. Numbness/burning in feet. Chronic lower back pain. 09/2023 MRI lumbar spine  Degenerative changes of the lumbar spine. Multifactorial disease results in moderate left foraminal narrowing at L5-S1 with contact of the exiting left L5 nerve root. 07/2023 Fluoroscopically-guided left L4 and L5 transforaminal epidural steroid injection     05/2022 s/p left L 4/ L5 lumbar ESIs.  Prior treatment with Gabapentin   05/2018 EMG preserved sensory and late responses argue against any significant large fiber polyneuropathy. Absent left peroneal response measure to the left foot of unclear origin. No evidence of lumbar radiculopathy or plexopathy. No myopathic features. No evidence of motor neuron disease   Skin:  Negative for rash. Neurological:  Negative for dizziness and headaches. Hematological:  Negative for adenopathy. Does not bruise/bleed easily. Longstanding leukopenia. 09/2023 LFTs normal. History of right breast CA s/p right breast lumpectomy 01/2014. BRCA 1/BRCA 2 negative. Patient completed XRT and 5 years of Femara.      Lab Results       Component                Value               Date                       WBC                      3.03 (L)            09/15/2023                 HGB                      12.9                09/15/2023                 HCT                      39.3                09/15/2023                 MCV                      95                  09/15/2023                 PLT                      180                 09/15/2023              WBC       Date                     Value               Ref Range           Status                09/15/2023               3.03 (L)            4.31 - 10.16 T*     Final                 09/20/2022               3.74 (L)            4.31 - 10.16 T*     Final                 07/19/2022               4.05 (L)            4.31 - 10.16 T*     Final                 09/30/2015               4.55                4.31 - 10.16 T*     Final                 06/03/2015               4.19 (L)            4.31 - 10.16 T*     Final                 03/04/2015               4.22 (L)            4.31 - 10.16 T*     Final              Lab Results       Component                Value               Date                       [id number]               568                 01/25/2019            Lab Results       Component                Value               Date                       FOLATE                   >20.0 (H)           01/25/2019 Lab Results       Component                Value               Date                       [facility]                                         01/25/2019             The serum total protein, albumin and electrophoresis are within normal limits. No monoclonal bands noted. Psychiatric/Behavioral:  Negative for dysphoric mood and sleep disturbance.          Problem List:     Patient Active Problem List   Diagnosis    Primary osteoarthritis of right knee    Meralgia paraesthetica, left    Acquired spondylolisthesis    Acquired hypothyroidism    Leukopenia    Lumbar radiculopathy    History of right breast cancer    Osteopenia of multiple sites    Seasonal allergies    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    Benign essential HTN    Urge incontinence of urine    Ascending aortic aneurysm (HCC)    Sacroiliitis (HCC)    Lumbar spondylosis    Spinal stenosis of lumbar region    DDD (degenerative disc disease), lumbar    Malignant neoplasm of upper-outer quadrant of right breast in female, estrogen receptor positive     Primary osteoarthritis of left hip    Primary generalized (osteo)arthritis    Undifferentiated connective tissue disease ([address])      Past Medical and Surgical History:     Past Medical History:   Diagnosis Date    Arthritis     Asymptomatic hyperuricemia     BRCA1 negative     BRCA2 negative     Breast cancer ([address]) 01/17/2014    radiation    Disease of thyroid gland     History of radiation therapy 2014    breast cancer    Hypertension     Hypothyroid     Knee pain, right     Limb alert care status     no BP's /IV's right side    Mitral valve prolapse     Numbness of anterior thigh     left and tingling    Osteoarthritis     Seasonal allergies     dust,mold,trees    Urinary incontinence     Varicose veins of both lower extremities     Wears glasses      Past Surgical History:   Procedure Laterality Date    BREAST BIOPSY Left 01/16/2002    BREAST BIOPSY Left 08/05/2005    high risk    BREAST BIOPSY Right 12/26/2013    positive    BREAST EXCISIONAL BIOPSY Left 04/06/2006    high risk    BREAST EXCISIONAL BIOPSY Left 10/13/2006    BREAST LUMPECTOMY Right 01/17/2014    malignant    BREAST SURGERY Right     lumpectomy    BUNIONECTOMY Bilateral     COLONOSCOPY      EXPLORATORY LAPAROTOMY      "for fertility"    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    KNEE ARTHROSCOPY Right     MN ARTHRP KNE CONDYLE&PLATU MEDIAL&LAT COMPARTMENTS Right 1/20/2017    Procedure: ARTHROPLASTY KNEE TOTAL;  Surgeon: Matt Rubio MD;  Location: [facility];  Service: Orthopedics    MN HYSTEROSCOPY BX ENDOMETRIUM&/POLYPC W/WO D&C N/A 7/29/2022    Procedure: DILATATION AND CURETTAGE (D&C) WITH HYSTEROSCOPY;  Surgeon: Luly Russo MD;  Location: [facility];  Service: Gynecology    MN TENDON SHEATH INCISION Right 4/5/2022    Procedure: Right long and ring finger trigger finger release;  Surgeon: Celsa Huang MD;  Location: [facility];  Service: Orthopedics    TONSILLECTOMY      WISDOM TOOTH EXTRACTION        Family History:     Family History   Problem Relation Age of Onset    Cancer Mother         Bladder CA    Throat cancer Father 80    No Known Problems Daughter     No Known Problems Daughter     No Known Problems Maternal Grandmother     No Known Problems Maternal Grandfather     No Known Problems Paternal Grandmother     No Known Problems Paternal Grandfather     No Known Problems Maternal Aunt     No Known Problems Maternal Aunt     No Known Problems Maternal Aunt     No Known Problems Maternal Aunt     Colon cancer Maternal Uncle 79    Breast cancer additional onset Paternal Aunt 54    No Known Problems Paternal Aunt     Breast cancer additional onset Cousin 28    Uterine cancer Cousin       Social History:     Social History     Socioeconomic History    Marital status: Single     Spouse name: None    Number of children: None    Years of education: None    Highest education level: None   Occupational History None   Tobacco Use    Smoking status: Never    Smokeless tobacco: Never   Vaping Use    Vaping Use: Never used   Substance and Sexual Activity    Alcohol use: Yes     Comment: rare    Drug use: Never    Sexual activity: Not Currently     Birth control/protection: Abstinence, Post-menopausal   Other Topics Concern    None   Social History Narrative    None     Social Determinants of Health     Financial Resource Strain: Low Risk  (10/26/2023)    Overall Financial Resource Strain (CARDIA)     Difficulty of Paying Living Expenses: Not hard at all   Food Insecurity: Not on file   Transportation Needs: No Transportation Needs (10/26/2023)    PRAPARE - Transportation     Lack of Transportation (Medical): No     Lack of Transportation (Non-Medical):  No   Physical Activity: Not on file   Stress: Not on file   Social Connections: Not on file   Intimate Partner Violence: Not on file   Housing Stability: Not on file      Medications and Allergies:     Current Outpatient Medications   Medication Sig Dispense Refill    Biotin 2500 MCG CAPS       Calcium Carbonate-Vit D-Min (CALTRATE 600+D PLUS MINERALS PO)       chlorthalidone 25 mg tablet Take 1 tablet (25 mg total) by mouth daily 90 tablet 3    levothyroxine 75 mcg tablet Take 1 tablet by mouth every other day      levothyroxine 88 mcg tablet Take 1 tablet (88 mcg total) by mouth every other day 45 tablet 3    loratadine (CLARITIN) 10 mg tablet Take 10 mg by mouth daily      Multiple Vitamins-Minerals (MULTIVITAMIN ADULT PO) daily after breakfast      potassium chloride (K-DUR,KLOR-CON) 10 mEq tablet Take 1 tablet (10 mEq total) by mouth daily 90 tablet 3    solifenacin (VESICARE) 10 MG tablet Take 1 tablet (10 mg total) by mouth daily 90 tablet 3    thiamine (VITAMIN B1) 100 mg tablet Take 100 mg by mouth daily        zinc gluconate 50 mg tablet Take 50 mg by mouth daily        VITAMIN D, ERGOCALCIFEROL, PO Take 2,000 Units by mouth daily (Patient not taking: Reported on 10/26/2023)       No current facility-administered medications for this visit. Allergies   Allergen Reactions    Actifed Cold-Allergy [Chlorpheniramine-Phenylephrine] Swelling and Rash     Throat and face swelled    Penicillins Rash    Vancomycin Other (See Comments)     redness      Immunizations:     Immunization History   Administered Date(s) Administered    COVID-19 MODERNA VACC 0.25 ML IM BOOSTER 11/09/2021    COVID-19 MODERNA VACC 0.5 ML IM 12/30/2020, 01/25/2021, 02/13/2023    H1N1, All Formulations 10/26/2009    INFLUENZA 10/01/2016, 10/26/2018, 10/29/2020, 10/30/2023    Influenza, high dose seasonal 0.7 mL 10/11/2021    Influenza, seasonal, injectable 10/01/2016    Pneumococcal Conjugate 13-Valent 03/20/2019    Pneumococcal Polysaccharide PPV23 06/23/2020    Tdap 01/03/2018, 02/21/2020    Zoster Vaccine Recombinant 05/13/2019, 09/03/2019      Health Maintenance:         Topic Date Due    Breast Cancer Screening: Mammogram  12/07/2023    Colorectal Cancer Screening  07/19/2028    Hepatitis C Screening  Completed         Topic Date Due    COVID-19 Vaccine (5 - [employer] series) 04/10/2023      Medicare Screening Tests and Risk Assessments:     Imani Horton is here for her Subsequent Wellness visit. Last Medicare Wellness visit information reviewed, patient interviewed and updates made to the record today. Health Risk Assessment:   Patient rates overall health as very good. Patient feels that their physical health rating is slightly better. Patient is satisfied with their life. Eyesight was rated as same. Hearing was rated as same. Patient feels that their emotional and mental health rating is same. Patients states they are never, rarely angry. Patient states they are sometimes unusually tired/fatigued. Pain experienced in the last 7 days has been none. Patient states that she has experienced no weight loss or gain in last 6 months. Depression Screening:   PHQ-2 Score: 0      Fall Risk Screening:    In the past year, patient has experienced: no history of falling in past year      Urinary Incontinence Screening:   Patient has leaked urine accidently in the last six months. UI on VesiCare     Home Safety:  Patient does not have trouble with stairs inside or outside of their home. Patient has working smoke alarms and has working carbon monoxide detector. Home safety hazards include: none. Nutrition:   Current diet is Regular. Medications:   Patient is currently taking over-the-counter supplements. OTC medications include: see medication list. Patient is able to manage medications. Activities of Daily Living (ADLs)/Instrumental Activities of Daily Living (IADLs):   Walk and transfer into and out of bed and chair?: Yes  Dress and groom yourself?: Yes    Bathe or shower yourself?: Yes    Feed yourself? Yes  Do your laundry/housekeeping?: Yes  Manage your money, pay your bills and track your expenses?: Yes  Make your own meals?: Yes    Do your own shopping?: Yes    Durable Medical Equipment Suppliers  ? Previous Hospitalizations:   Any hospitalizations or ED visits within the last 12 months?: No      Hospitalization Comments: Eye infection x 2    Advance Care Planning:   Living will: Yes    Durable POA for healthcare:  Yes    Advanced directive: Yes      Cognitive Screening:   Provider or family/friend/caregiver concerned regarding cognition?: No    PREVENTIVE SCREENINGS      Cardiovascular Screening:    General: History Lipid Disorder and Screening Current      Diabetes Screening:     General: Screening Current      Colorectal Cancer Screening:     General: Screening Current      Breast Cancer Screening:     General: History Breast Cancer    Due for: Mammogram        Cervical Cancer Screening:    General: Screening Not Indicated      Osteoporosis Screening:    General: Screening Current      Abdominal Aortic Aneurysm (AAA) Screening:        General: Screening Not Indicated      Lung Cancer Screening: General: Screening Not Indicated      Hepatitis C Screening:    General: Screening Current    Screening, Brief Intervention, and Referral to Treatment (SBIRT)    Screening  Typical number of drinks in a day: 0  Typical number of drinks in a week: 0  Interpretation: Low risk drinking behavior. AUDIT-C Screenin) How often did you have a drink containing alcohol in the past year? monthly or less  2) How many drinks did you have on a typical day when you were drinking in the past year? 0  3) How often did you have 6 or more drinks on one occasion in the past year? never    AUDIT-C Score: 1  Interpretation: Score 0-2 (female): Negative screen for alcohol misuse    Single Item Drug Screening:  How often have you used an illegal drug (including marijuana) or a prescription medication for non-medical reasons in the past year? never    Single Item Drug Screen Score: 0  Interpretation: Negative screen for possible drug use disorder    Brief Intervention  Alcohol & drug use screenings were reviewed. No concerns regarding substance use disorder identified. Other Counseling Topics:   Regular weightbearing exercise and calcium and vitamin D intake. Physical Exam:     /72 (BP Location: Left arm, Patient Position: Sitting, Cuff Size: Standard)   Pulse 63   Temp (!) 97.1 °F (36.2 °C)   Resp 16   Ht 5' 7.5" (1.715 m)   Wt 75.3 kg (166 lb)   LMP  (LMP Unknown)   SpO2 99%   BMI 25.62 kg/m²     BP Readings from Last 3 Encounters:   23 118/72   10/26/23 112/62   23 120/90      Wt Readings from Last 3 Encounters:   23 75.3 kg (166 lb)   10/26/23 73.8 kg (162 lb 12.8 oz)   23 73 kg (161 lb)          Physical Exam  Constitutional:       General: She is not in acute distress. HENT:      Right Ear: Tympanic membrane and ear canal normal.      Left Ear: Tympanic membrane and ear canal normal.      Mouth/Throat:      Mouth: No oral lesions. Pharynx: Oropharynx is clear.    Eyes: General: No scleral icterus. Extraocular Movements: Extraocular movements intact. Conjunctiva/sclera: Conjunctivae normal.      Pupils: Pupils are equal, round, and reactive to light. Neck:      Vascular: No carotid bruit. Cardiovascular:      Rate and Rhythm: Normal rate and regular rhythm. Heart sounds: No murmur heard. No gallop. Pulmonary:      Effort: Pulmonary effort is normal.      Breath sounds: Normal breath sounds. No wheezing or rales. Abdominal:      General: Bowel sounds are normal. There is no distension. Palpations: Abdomen is soft. There is no hepatomegaly, splenomegaly or mass. Tenderness: There is no abdominal tenderness. There is no guarding or rebound. Musculoskeletal:      Right lower leg: No edema. Left lower leg: No edema. Lymphadenopathy:      Cervical: No cervical adenopathy. Skin:     Findings: No rash. Neurological:      General: No focal deficit present. Mental Status: She is alert and oriented to person, place, and time.    Psychiatric:         Mood and Affect: Mood normal.         Behavior: Behavior normal.          Jatin Hussein MD

## 2023-11-02 NOTE — PROGRESS NOTES
Assessment and Plan:     Problem List Items Addressed This Visit          Cardiovascular and Mediastinum    Ascending aortic aneurysm ([address])       Other    Malignant neoplasm of upper-outer quadrant of right breast in female, estrogen receptor positive      Other Visit Diagnoses       Medicare annual wellness visit, subsequent    -  Primary            Depression Screening and Follow-up Plan: Patient was screened for depression during today's encounter. They screened negative with a PHQ-2 score of 0. Preventive health issues were discussed with patient, and age appropriate screening tests were ordered as noted in patient's After Visit Summary. Personalized health advice and appropriate referrals for health education or preventive services given if needed, as noted in patient's After Visit Summary.      History of Present Illness:     Patient presents for a Medicare Wellness Visit    HPI   Patient Care Team:  Joanne Carter MD as PCP - General (Family Medicine)  MD Milo Lanza MD Rexford Schmitz, MD Dietra Cane, MD Nickey Purl, DO (Pain Medicine)  Skyler Valente (Oncology)  Supriya Pereyra MD (Obstetrics and Gynecology)     Review of Systems:     Review of Systems     Problem List:     Patient Active Problem List   Diagnosis    Primary osteoarthritis of right knee    Meralgia paraesthetica, left    Acquired spondylolisthesis    Acquired hypothyroidism    Leukopenia    Lumbar radiculopathy    History of right breast cancer    Osteopenia of multiple sites    Seasonal allergies    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    Benign essential HTN    Urge incontinence of urine    Pure hypercholesterolemia    Ascending aortic aneurysm (HCC)    Sacroiliitis (HCC)    Lumbar spondylosis    Spinal stenosis of lumbar region    DDD (degenerative disc disease), lumbar    Trigger finger, right middle finger    Malignant neoplasm of upper-outer quadrant of right breast in female, estrogen receptor positive     Primary osteoarthritis of left hip    Primary generalized (osteo)arthritis    Undifferentiated connective tissue disease (HCC)    Stage 3a chronic kidney disease (HCC)    Asymptomatic hyperuricemia    Rotator cuff tendonitis, right    Encounter for follow-up surveillance of breast cancer      Past Medical and Surgical History:     Past Medical History:   Diagnosis Date    Arthritis     Asymptomatic hyperuricemia     BRCA1 negative     BRCA2 negative     Breast cancer ([address] [facility]) 01/17/2014    radiation    Disease of thyroid gland     History of radiation therapy 2014    breast cancer    Hypertension     Hypothyroid     Knee pain, right     Limb alert care status     no BP's /IV's right side    Mitral valve prolapse     Numbness of anterior thigh     left and tingling    Osteoarthritis     Seasonal allergies     dust,mold,trees    Urinary incontinence     Varicose veins of both lower extremities     Wears glasses      Past Surgical History:   Procedure Laterality Date    BREAST BIOPSY Left 01/16/2002    BREAST BIOPSY Left 08/05/2005    high risk    BREAST BIOPSY Right 12/26/2013    positive    BREAST EXCISIONAL BIOPSY Left 04/06/2006    high risk    BREAST EXCISIONAL BIOPSY Left 10/13/2006    BREAST LUMPECTOMY Right 01/17/2014    malignant    BREAST SURGERY Right     lumpectomy    BUNIONECTOMY Bilateral     COLONOSCOPY      EXPLORATORY LAPAROTOMY      "for fertility"    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    KNEE ARTHROSCOPY Right     MN ARTHRP KNE CONDYLE&PLATU MEDIAL&LAT COMPARTMENTS Right 1/20/2017    Procedure: ARTHROPLASTY KNEE TOTAL;  Surgeon: Karissa Puckett MD;  Location: [facility];  Service: Orthopedics    MN HYSTEROSCOPY BX ENDOMETRIUM&/POLYPC W/WO D&C N/A 7/29/2022    Procedure: DILATATION AND CURETTAGE (D&C) WITH HYSTEROSCOPY;  Surgeon: Tati Deluca MD;  Location: [facility];  Service: Gynecology    MN TENDON SHEATH INCISION Right 4/5/2022    Procedure: Right long and ring finger trigger finger release;  Surgeon: Rama Mcneil MD;  Location: [facility];  Service: Orthopedics    TONSILLECTOMY      WISDOM TOOTH EXTRACTION        Family History:     Family History   Problem Relation Age of Onset    Cancer Mother         Bladder CA    Throat cancer Father 80    No Known Problems Daughter     No Known Problems Daughter     No Known Problems Maternal Grandmother     No Known Problems Maternal Grandfather     No Known Problems Paternal Grandmother     No Known Problems Paternal Grandfather     No Known Problems Maternal Aunt     No Known Problems Maternal Aunt     No Known Problems Maternal Aunt     No Known Problems Maternal Aunt     Colon cancer Maternal Uncle 79    Breast cancer additional onset Paternal Aunt 54    No Known Problems Paternal Aunt     Breast cancer additional onset Cousin 28    Uterine cancer Cousin       Social History:     Social History     Socioeconomic History    Marital status: Single     Spouse name: None    Number of children: None    Years of education: None    Highest education level: None   Occupational History    None   Tobacco Use    Smoking status: Never    Smokeless tobacco: Never   Vaping Use    Vaping Use: Never used   Substance and Sexual Activity    Alcohol use: Yes     Comment: rare    Drug use: Never    Sexual activity: Not Currently     Birth control/protection: Abstinence, Post-menopausal   Other Topics Concern    None   Social History Narrative    None     Social Determinants of Health     Financial Resource Strain: Low Risk  (10/26/2023)    Overall Financial Resource Strain (CARDIA)     Difficulty of Paying Living Expenses: Not hard at all   Food Insecurity: Not on file   Transportation Needs: No Transportation Needs (10/26/2023)    PRAPARE - Transportation     Lack of Transportation (Medical): No     Lack of Transportation (Non-Medical):  No   Physical Activity: Not on file   Stress: Not on file   Social Connections: Not on file   Intimate Partner Violence: Not on file   Housing Stability: Not on file      Medications and Allergies:     Current Outpatient Medications   Medication Sig Dispense Refill    Biotin 2500 MCG CAPS       Calcium Carbonate-Vit D-Min (CALTRATE 600+D PLUS MINERALS PO)       chlorthalidone 25 mg tablet TAKE ONE TABLET BY MOUTH EVERY DAY 90 tablet 3    levothyroxine 75 mcg tablet Take 1 tablet by mouth every other day      levothyroxine 88 mcg tablet Take 1 tablet (88 mcg total) by mouth every other day 45 tablet 3    loratadine (CLARITIN) 10 mg tablet Take 10 mg by mouth daily      Multiple Vitamins-Minerals (MULTIVITAMIN ADULT PO) daily after breakfast      potassium chloride (K-DUR,KLOR-CON) 10 mEq tablet Take 1 tablet (10 mEq total) by mouth daily 90 tablet 3    solifenacin (VESICARE) 10 MG tablet Take 1 tablet (10 mg total) by mouth daily 90 tablet 3    thiamine (VITAMIN B1) 100 mg tablet Take 100 mg by mouth daily        zinc gluconate 50 mg tablet Take 50 mg by mouth daily        VITAMIN D, ERGOCALCIFEROL, PO Take 2,000 Units by mouth daily (Patient not taking: Reported on 10/26/2023)       No current facility-administered medications for this visit.      Allergies   Allergen Reactions    Actifed Cold-Allergy [Chlorpheniramine-Phenylephrine] Swelling and Rash     Throat and face swelled    Penicillins Rash    Vancomycin Other (See Comments)     redness      Immunizations:     Immunization History   Administered Date(s) Administered    COVID-19 MODERNA VACC 0.25 ML IM BOOSTER 11/09/2021    COVID-19 MODERNA VACC 0.5 ML IM 12/30/2020, 01/25/2021, 02/13/2023    H1N1, All Formulations 10/26/2009    INFLUENZA 10/01/2016, 10/26/2018, 10/29/2020, 10/30/2023    Influenza, high dose seasonal 0.7 mL 10/11/2021    Influenza, seasonal, injectable 10/01/2016    Pneumococcal Conjugate 13-Valent 03/20/2019    Pneumococcal Polysaccharide PPV23 06/23/2020    Tdap 01/03/2018, 02/21/2020    Zoster Vaccine Recombinant 05/13/2019, 09/03/2019      Health Maintenance:         Topic Date Due    Breast Cancer Screening: Mammogram  12/07/2023    Colorectal Cancer Screening  07/19/2028    Hepatitis C Screening  Completed         Topic Date Due    COVID-19 Vaccine (5 - Moderna series) 04/10/2023      Medicare Screening Tests and Risk Assessments:     Miracle Grigsby is here for her Subsequent Wellness visit. Health Risk Assessment:   Patient rates overall health as very good. Patient feels that their physical health rating is slightly better. Patient is satisfied with their life. Eyesight was rated as same. Hearing was rated as same. Patient feels that their emotional and mental health rating is same. Patients states they are never, rarely angry. Patient states they are sometimes unusually tired/fatigued. Pain experienced in the last 7 days has been none. Patient states that she has experienced no weight loss or gain in last 6 months. Depression Screening:   PHQ-2 Score: 0      Fall Risk Screening: In the past year, patient has experienced: no history of falling in past year      Urinary Incontinence Screening:   Patient has leaked urine accidently in the last six months. Home Safety:  Patient does not have trouble with stairs inside or outside of their home. Patient has working smoke alarms and has working carbon monoxide detector. Home safety hazards include: none. Nutrition:   Current diet is Regular. Medications:   Patient is currently taking over-the-counter supplements. OTC medications include: see medication list. Patient is able to manage medications. Activities of Daily Living (ADLs)/Instrumental Activities of Daily Living (IADLs):   Walk and transfer into and out of bed and chair?: Yes  Dress and groom yourself?: Yes    Bathe or shower yourself?: Yes    Feed yourself?  Yes  Do your laundry/housekeeping?: Yes  Manage your money, pay your bills and track your expenses?: Yes  Make your own meals?: Yes    Do your own shopping?: Yes    Durable Medical Equipment Suppliers  ? Previous Hospitalizations:   Any hospitalizations or ED visits within the last 12 months?: No      Hospitalization Comments: Eye infection x 2    Advance Care Planning:   Living will: Yes    Durable POA for healthcare: Yes    Advanced directive: Yes      PREVENTIVE SCREENINGS      Cardiovascular Screening:    General: Screening Not Indicated and History Lipid Disorder      Diabetes Screening:     General: Screening Current      Colorectal Cancer Screening:     General: Screening Current      Breast Cancer Screening:     General: History Breast Cancer      Cervical Cancer Screening:    General: Screening Not Indicated      Lung Cancer Screening:     General: Screening Not Indicated      Hepatitis C Screening:    General: Screening Current    Screening, Brief Intervention, and Referral to Treatment (SBIRT)    Screening  Typical number of drinks in a day: 0  Typical number of drinks in a week: 0  Interpretation: Low risk drinking behavior. AUDIT-C Screenin) How often did you have a drink containing alcohol in the past year? monthly or less  2) How many drinks did you have on a typical day when you were drinking in the past year? 0  3) How often did you have 6 or more drinks on one occasion in the past year? never    AUDIT-C Score: 1  Interpretation: Score 0-2 (female): Negative screen for alcohol misuse    Single Item Drug Screening:  How often have you used an illegal drug (including marijuana) or a prescription medication for non-medical reasons in the past year? never    Single Item Drug Screen Score: 0  Interpretation: Negative screen for possible drug use disorder    No results found.      Physical Exam:     /72 (BP Location: Left arm, Patient Position: Sitting, Cuff Size: Standard)   Pulse 63   Temp (!) 97.1 °F (36.2 °C)   Resp 16   Ht 5' 7.5" (1.715 m)   Wt 75.3 kg (166 lb)   LMP  (LMP Unknown)   SpO2 99%   BMI 25.62 kg/m² Physical Exam     Miko Melara MD

## 2023-11-09 ENCOUNTER — HOSPITAL ENCOUNTER (OUTPATIENT)
Dept: RADIOLOGY | Age: 68
Discharge: HOME | End: 2023-11-09
Payer: MEDICARE

## 2023-11-09 DIAGNOSIS — I71.21 ANEURYSM OF ASCENDING AORTA WITHOUT RUPTURE (HCC): ICD-10-CM

## 2023-11-09 PROCEDURE — G1004 CDSM NDSC: HCPCS

## 2023-11-09 PROCEDURE — 71250 CT THORAX DX C-: CPT

## 2023-11-15 ENCOUNTER — TELEPHONE (OUTPATIENT)
Age: 68
End: 2023-11-15

## 2023-11-15 NOTE — TELEPHONE ENCOUNTER
Pts result message from Dr Christal Smith given in response to pts [employer] message. ----- Message from Loan Knox sent at 11/15/2023  9:43 AM EST -----  Regarding: CT scan  Contact: [phone]  CT scan results just popped up. Lung dx is concerning since previous one was unremarkable. Never was a smoker but tested positive for COVID in October. Is this a new concern I should have? Sherri Ríos,     Please see  Dr Sanchez Search message below. If you have any further questions or concerns, please let us know. John Chacko MD  11/15/2023  9:59 AM EST        Your CT scan continues to show an ascending thoracic aortic aneurysm with minimal change from 2021. "Mild" emphysema changes. Recommendations repeat CT scan 1 year. No treatment for "mild" emphysema changes.  If you are experiencing any shortness of breath you could consider pulmonary function test ( breathing test) to measure airway function         Regards,  Axel Van CMA     Thank you for choosing [facility]

## 2023-11-16 DIAGNOSIS — J43.9 PULMONARY EMPHYSEMA, UNSPECIFIED EMPHYSEMA TYPE (HCC): Primary | ICD-10-CM

## 2023-11-20 ENCOUNTER — APPOINTMENT (OUTPATIENT)
Dept: LAB | Age: 68
End: 2023-11-20
Payer: MEDICARE

## 2023-11-20 DIAGNOSIS — E79.0 ASYMPTOMATIC HYPERURICEMIA: ICD-10-CM

## 2023-11-20 DIAGNOSIS — J43.9 PULMONARY EMPHYSEMA, UNSPECIFIED EMPHYSEMA TYPE (HCC): ICD-10-CM

## 2023-11-20 DIAGNOSIS — M35.9 UNDIFFERENTIATED CONNECTIVE TISSUE DISEASE (HCC): ICD-10-CM

## 2023-11-20 DIAGNOSIS — D72.819 LEUKOPENIA, UNSPECIFIED TYPE: ICD-10-CM

## 2023-11-20 PROCEDURE — 82103 ALPHA-1-ANTITRYPSIN TOTAL: CPT

## 2023-11-20 PROCEDURE — 36415 COLL VENOUS BLD VENIPUNCTURE: CPT

## 2023-11-21 LAB — A1AT SERPL-MCNC: 130 MG/DL (ref 101–187)

## 2023-11-28 ENCOUNTER — TELEPHONE (OUTPATIENT)
Age: 68
End: 2023-11-28

## 2023-11-28 NOTE — TELEPHONE ENCOUNTER
On 01/10/24 Pablo Desir will be having spinal surgery at [facility], with Dr. Feli Cherry. The office is faxing over pre-op paperwork. Pt is scheduled 12/28/23 with Dr. Rebel Purvis for her pre-op.  She'll need an EKG & lab work

## 2023-12-08 ENCOUNTER — HOSPITAL ENCOUNTER (OUTPATIENT)
Dept: RADIOLOGY | Age: 68
Discharge: HOME | End: 2023-12-08
Payer: MEDICARE

## 2023-12-08 VITALS — HEIGHT: 68 IN | WEIGHT: 166 LBS | BODY MASS INDEX: 25.16 KG/M2

## 2023-12-08 DIAGNOSIS — Z12.31 VISIT FOR SCREENING MAMMOGRAM: ICD-10-CM

## 2023-12-08 PROCEDURE — 77067 SCR MAMMO BI INCL CAD: CPT

## 2023-12-08 PROCEDURE — 77063 BREAST TOMOSYNTHESIS BI: CPT

## 2023-12-14 ENCOUNTER — APPOINTMENT (OUTPATIENT)
Dept: LAB | Age: 68
End: 2023-12-14
Payer: MEDICARE

## 2023-12-14 DIAGNOSIS — Z01.818 OTHER SPECIFIED PRE-OPERATIVE EXAMINATION: ICD-10-CM

## 2023-12-14 LAB
ALBUMIN SERPL BCP-MCNC: 3.9 G/DL (ref 3.5–5)
ALP SERPL-CCNC: 39 U/L (ref 34–104)
ALT SERPL W P-5'-P-CCNC: 19 U/L (ref 7–52)
ANION GAP SERPL CALCULATED.3IONS-SCNC: 7 MMOL/L
AST SERPL W P-5'-P-CCNC: 28 U/L (ref 13–39)
BASOPHILS # BLD AUTO: 0.01 THOUSANDS/ÂΜL (ref 0–0.1)
BASOPHILS NFR BLD AUTO: 0 % (ref 0–1)
BILIRUB SERPL-MCNC: 0.57 MG/DL (ref 0.2–1)
BUN SERPL-MCNC: 22 MG/DL (ref 5–25)
CALCIUM SERPL-MCNC: 9 MG/DL (ref 8.4–10.2)
CHLORIDE SERPL-SCNC: 103 MMOL/L (ref 96–108)
CO2 SERPL-SCNC: 31 MMOL/L (ref 21–32)
CREAT SERPL-MCNC: 0.91 MG/DL (ref 0.6–1.3)
EOSINOPHIL # BLD AUTO: 0.11 THOUSAND/ÂΜL (ref 0–0.61)
EOSINOPHIL NFR BLD AUTO: 3 % (ref 0–6)
ERYTHROCYTE [DISTWIDTH] IN BLOOD BY AUTOMATED COUNT: 13.3 % (ref 11.6–15.1)
GFR SERPL CREATININE-BSD FRML MDRD: 64 ML/MIN/1.73SQ M
GLUCOSE P FAST SERPL-MCNC: 84 MG/DL (ref 65–99)
HCT VFR BLD AUTO: 37 % (ref 34.8–46.1)
HGB BLD-MCNC: 12 G/DL (ref 11.5–15.4)
IMM GRANULOCYTES # BLD AUTO: 0.01 THOUSAND/UL (ref 0–0.2)
IMM GRANULOCYTES NFR BLD AUTO: 0 % (ref 0–2)
LYMPHOCYTES # BLD AUTO: 1.08 THOUSANDS/ÂΜL (ref 0.6–4.47)
LYMPHOCYTES NFR BLD AUTO: 33 % (ref 14–44)
MCH RBC QN AUTO: 32 PG (ref 26.8–34.3)
MCHC RBC AUTO-ENTMCNC: 32.4 G/DL (ref 31.4–37.4)
MCV RBC AUTO: 99 FL (ref 82–98)
MONOCYTES # BLD AUTO: 0.29 THOUSAND/ÂΜL (ref 0.17–1.22)
MONOCYTES NFR BLD AUTO: 9 % (ref 4–12)
NEUTROPHILS # BLD AUTO: 1.74 THOUSANDS/ÂΜL (ref 1.85–7.62)
NEUTS SEG NFR BLD AUTO: 55 % (ref 43–75)
NRBC BLD AUTO-RTO: 0 /100 WBCS
PLATELET # BLD AUTO: 212 THOUSANDS/UL (ref 149–390)
PMV BLD AUTO: 10.4 FL (ref 8.9–12.7)
POTASSIUM SERPL-SCNC: 3.8 MMOL/L (ref 3.5–5.3)
PROT SERPL-MCNC: 6.4 G/DL (ref 6.4–8.4)
RBC # BLD AUTO: 3.75 MILLION/UL (ref 3.81–5.12)
SODIUM SERPL-SCNC: 141 MMOL/L (ref 135–147)
WBC # BLD AUTO: 3.24 THOUSAND/UL (ref 4.31–10.16)

## 2023-12-14 PROCEDURE — 80053 COMPREHEN METABOLIC PANEL: CPT

## 2023-12-14 PROCEDURE — 36415 COLL VENOUS BLD VENIPUNCTURE: CPT

## 2023-12-14 PROCEDURE — 85025 COMPLETE CBC W/AUTO DIFF WBC: CPT

## 2023-12-19 ENCOUNTER — CONSULT (OUTPATIENT)
Dept: FAMILY MEDICINE CLINIC | Facility: CLINIC | Age: 68
End: 2023-12-19
Payer: MEDICARE

## 2023-12-19 VITALS
BODY MASS INDEX: 25.23 KG/M2 | WEIGHT: 166.5 LBS | SYSTOLIC BLOOD PRESSURE: 126 MMHG | HEART RATE: 71 BPM | DIASTOLIC BLOOD PRESSURE: 84 MMHG | RESPIRATION RATE: 16 BRPM | OXYGEN SATURATION: 98 % | TEMPERATURE: 95.5 F | HEIGHT: 68 IN

## 2023-12-19 DIAGNOSIS — R26.89 FUNCTIONAL GAIT ABNORMALITY: ICD-10-CM

## 2023-12-19 DIAGNOSIS — Q72.899 CONGENITAL LEG LENGTH INEQUALITY: ICD-10-CM

## 2023-12-19 DIAGNOSIS — E03.9 ACQUIRED HYPOTHYROIDISM: ICD-10-CM

## 2023-12-19 DIAGNOSIS — G83.32: ICD-10-CM

## 2023-12-19 DIAGNOSIS — M48.061 SPINAL STENOSIS OF LUMBAR REGION, UNSPECIFIED WHETHER NEUROGENIC CLAUDICATION PRESENT: ICD-10-CM

## 2023-12-19 DIAGNOSIS — Z01.818 PREOP EXAMINATION: Primary | ICD-10-CM

## 2023-12-19 DIAGNOSIS — G57.12 MERALGIA PARAESTHETICA, LEFT: ICD-10-CM

## 2023-12-19 DIAGNOSIS — I10 BENIGN ESSENTIAL HTN: ICD-10-CM

## 2023-12-19 PROCEDURE — 93000 ELECTROCARDIOGRAM COMPLETE: CPT | Performed by: FAMILY MEDICINE

## 2023-12-19 PROCEDURE — 99214 OFFICE O/P EST MOD 30 MIN: CPT | Performed by: FAMILY MEDICINE

## 2023-12-19 NOTE — PROGRESS NOTES
PRE-OPERATIVE EVALUATION  [facility]    NAME: Brandy Stock  AGE: 68 y.o. SEX: female  : 1955     DATE: 2023    [facility] Medicine Pre-Operative Evaluation     Surgery Information: had concerns including Pre-op Exam     Lumbar laminectomy L5-S1   Dr. Dalia ARANA  January 10, 2024  History of Present Illness:     Brandy Stock is a 68 y.o. female who presents to the office today for a preoperative consultation at the request of the surgeon for the procedure above. Current anti-platelet/anti-coagulation medications that the patient is prescribed includes:  None .      Assessment of Cardiac Risk:  Denies unstable or severe angina or MI in the last 6 weeks or history of stent placement in the last year   Denies decompensated heart failure (e.g. New onset heart failure, NYHA functional class IV heart failure, or worsening existing heart failure)  Denies significant arrhythmias such as high grade AV block, symptomatic ventricular arrhythmia, newly recognized ventricular tachycardia, supraventricular tachycardia with resting heart rate >100, or symptomatic bradycardia  Denies severe heart valve disease including aortic stenosis or symptomatic mitral stenosis     Exercise Capacity:  Able to walk 4 blocks without symptoms?: Yes  Able to walk 2 flights without symptoms?: Yes    Prior Anesthesia Reactions: No     Personal history of venous thromboembolic disease? No    History of steroid use for >2 weeks within last year? No    Review of Systems:     Review of Systems   Constitutional:  Negative for activity change, fatigue and fever.   Eyes:  Negative for visual disturbance.   Respiratory:  Negative for shortness of breath.    Cardiovascular:  Negative for chest pain.   Gastrointestinal:  Negative for abdominal pain, constipation, diarrhea and nausea.   Endocrine: Negative for cold intolerance and heat intolerance.   Musculoskeletal:  Positive for back pain and gait problem.   Skin:  Negative  for rash.   Neurological:  Negative for headaches.   Psychiatric/Behavioral:  Negative for confusion.      Current Problem List:     Patient Active Problem List   Diagnosis   • Primary osteoarthritis of right knee   • Meralgia paraesthetica, left   • Acquired spondylolisthesis   • Acquired hypothyroidism   • Leukopenia   • Lumbar radiculopathy   • History of right breast cancer   • Osteopenia of multiple sites   • Seasonal allergies   • 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   • Benign essential HTN   • Urge incontinence of urine   • Ascending aortic aneurysm (HCC)   • Sacroiliitis (HCC)   • Lumbar spondylosis   • Spinal stenosis of lumbar region   • DDD (degenerative disc disease), lumbar   • Malignant neoplasm of upper-outer quadrant of right breast in female, estrogen receptor positive    • Primary osteoarthritis of left hip   • Primary generalized (osteo)arthritis   • Undifferentiated connective tissue disease (HCC)     Allergies:     Allergies   Allergen Reactions   • Actifed Cold-Allergy [Chlorpheniramine-Phenylephrine] Swelling and Rash     Throat and face swelled   • Penicillins Rash   • Vancomycin Other (See Comments)     redness     Medications:       Current Outpatient Medications:   •  Biotin 2500 MCG CAPS, , Disp: , Rfl:   •  Calcium Carbonate-Vit D-Min (CALTRATE 600+D PLUS MINERALS PO), , Disp: , Rfl:   •  chlorthalidone 25 mg tablet, Take 1 tablet (25 mg total) by mouth daily, Disp: 90 tablet, Rfl: 3  •  levothyroxine 75 mcg tablet, Take 1 tablet by mouth every other day, Disp: , Rfl:   •  levothyroxine 88 mcg tablet, Take 1 tablet (88 mcg total) by mouth every other day, Disp: 45 tablet, Rfl: 3  •  loratadine (CLARITIN) 10 mg tablet, Take 10 mg by mouth daily, Disp: , Rfl:   •  Multiple Vitamins-Minerals (MULTIVITAMIN ADULT PO), daily after breakfast, Disp: , Rfl:   •  potassium chloride (K-DUR,KLOR-CON) 10 mEq tablet, Take 1 tablet (10 mEq total) by mouth daily, Disp: 90 tablet, Rfl: 3  •   "solifenacin (VESICARE) 10 MG tablet, Take 1 tablet (10 mg total) by mouth daily, Disp: 90 tablet, Rfl: 3  •  thiamine (VITAMIN B1) 100 mg tablet, Take 100 mg by mouth daily  , Disp: , Rfl:   •  zinc gluconate 50 mg tablet, Take 50 mg by mouth daily  , Disp: , Rfl:   •  VITAMIN D, ERGOCALCIFEROL, PO, Take 2,000 Units by mouth daily (Patient not taking: Reported on 10/26/2023), Disp: , Rfl:   Past Medical History:       Past Medical History:   Diagnosis Date   • Allergic 1967   • Arthritis    • Asymptomatic hyperuricemia    • BRCA1 negative    • BRCA2 negative    • Breast cancer (HCC) 01/17/2014    radiation   • Cancer (HCC) 2013   • Disease of thyroid gland    • History of radiation therapy 2014    breast cancer   • Hypertension    • Hypothyroid    • Knee pain, right    • Limb alert care status     no BP's /IV's right side   • Mitral valve prolapse    • Numbness of anterior thigh     left and tingling   • Osteoarthritis    • Scoliosis 2013   • Seasonal allergies     dust,mold,trees   • Urinary incontinence    • Varicose veins of both lower extremities    • Wears glasses         Past Surgical History:   Procedure Laterality Date   • BREAST BIOPSY Left 01/16/2002   • BREAST BIOPSY Left 08/05/2005    high risk   • BREAST BIOPSY Right 12/26/2013    positive   • BREAST EXCISIONAL BIOPSY Left 04/06/2006    high risk   • BREAST EXCISIONAL BIOPSY Left 10/13/2006   • BREAST LUMPECTOMY Right 01/17/2014    malignant   • BREAST SURGERY Right     lumpectomy   • BUNIONECTOMY Bilateral    • COLONOSCOPY     • EXPLORATORY LAPAROTOMY      \"for fertility\"   • 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   • JOINT REPLACEMENT  2017   • KNEE ARTHROSCOPY Right    • LYMPH NODE BIOPSY  2014   • MA ARTHRP KNE CONDYLE&PLATU MEDIAL&LAT COMPARTMENTS Right 01/20/2017    Procedure: ARTHROPLASTY KNEE TOTAL;  Surgeon: Gabriel Razo MD;  Location: [facility];  Service: Orthopedics   • MA HYSTEROSCOPY BX ENDOMETRIUM&/POLYPC W/WO D&C N/A " 07/29/2022    Procedure: DILATATION AND CURETTAGE (D&C) WITH HYSTEROSCOPY;  Surgeon: Ashley Morgan MD;  Location: [facility];  Service: Gynecology   • MO TENDON SHEATH INCISION Right 04/05/2022    Procedure: Right long and ring finger trigger finger release;  Surgeon: Matthew Jarvis MD;  Location: [facility];  Service: Orthopedics   • TONSILLECTOMY     • WISDOM TOOTH EXTRACTION          Family History   Problem Relation Age of Onset   • Cancer Mother         Bladder CA   • Throat cancer Father 80   • Cancer Father    • No Known Problems Daughter    • No Known Problems Daughter    • No Known Problems Maternal Grandmother    • No Known Problems Maternal Grandfather    • No Known Problems Paternal Grandmother    • No Known Problems Paternal Grandfather    • No Known Problems Maternal Aunt    • No Known Problems Maternal Aunt    • No Known Problems Maternal Aunt    • No Known Problems Maternal Aunt    • Colon cancer Maternal Uncle 70   • Breast cancer additional onset Paternal Aunt 55        Also maternal cousin Maricruz   • No Known Problems Paternal Aunt    • Breast cancer additional onset Cousin 35   • Uterine cancer Cousin 61   • Lung cancer Cousin 76        Social History     Socioeconomic History   • Marital status: Single     Spouse name: Not on file   • Number of children: Not on file   • Years of education: Not on file   • Highest education level: Not on file   Occupational History   • Not on file   Tobacco Use   • Smoking status: Never   • Smokeless tobacco: Never   Vaping Use   • Vaping status: Never Used   Substance and Sexual Activity   • Alcohol use: Yes     Comment: social   • Drug use: No   • Sexual activity: Not Currently     Partners: Male     Birth control/protection: None   Other Topics Concern   • Not on file   Social History Narrative   • Not on file     Social Determinants of Health     Financial Resource Strain: Low Risk  (10/26/2023)    Overall Financial Resource Strain (CARDIA)    •  "Difficulty of Paying Living Expenses: Not hard at all   Food Insecurity: Not on file   Transportation Needs: No Transportation Needs (10/26/2023)    PRAPARE - Transportation    • Lack of Transportation (Medical): No    • Lack of Transportation (Non-Medical): No   Physical Activity: Not on file   Stress: Not on file   Social Connections: Not on file   Intimate Partner Violence: Not on file   Housing Stability: Not on file      Physical Exam:     /84 (BP Location: Left arm, Patient Position: Sitting, Cuff Size: Standard)   Pulse 71   Temp (!) 95.5 °F (35.3 °C)   Resp 16   Ht 5' 7.5\" (1.715 m)   Wt 75.5 kg (166 lb 8 oz)   LMP  (LMP Unknown)   SpO2 98%   BMI 25.69 kg/m²     Physical Exam  Vitals and nursing note reviewed.   Constitutional:       Appearance: She is well-developed.   HENT:      Head: Normocephalic and atraumatic.   Cardiovascular:      Rate and Rhythm: Normal rate and regular rhythm.   Pulmonary:      Effort: Pulmonary effort is normal.      Breath sounds: Normal breath sounds.   Musculoskeletal:         General: Tenderness present.      Comments: Left like short   Neurological:      General: No focal deficit present.      Mental Status: She is alert.   Psychiatric:         Mood and Affect: Mood normal.         Behavior: Behavior normal.        Data:     Pre-operative work-up    Laboratory Results: I have personally reviewed the pertinent laboratory results/reports   Lab Results   Component Value Date    CREATININE 0.91 12/14/2023       EKG: I have personally reviewed pertinent reports.        Assessment & Recommendations:     Pre-Op Evaluation Assessment  Cardiac Risk Estimation: per the Revised Cardiac Risk Index (Circ. 100:1043, 1999), the patient's risk factors for cardiac complications include  none , putting her in: RCI RISK CLASS I (0 risk factors, risk of major cardiac compl. appr. 0.5%).    Brandy Stock is undergoing an elective Moderate Risk surgery.  They are at Low Risk for " major adverse cardiac event (MACE).  They may proceed with surgery as planned without further workup.    Pre-Op Evaluation Plan  1. Further preoperative workup as follows:   - None; no further preoperative work-up is required    2. Medication Management/Recommendations:   - Patient has been instructed to avoid herbs or non-directed vitamins the week prior to surgery to ensure no drug interactions with perioperative surgical and anesthetic medications.  - Patient should continue antihypertensive medications up through and including the day of surgery.   - Patient has been instructed to avoid aspirin containing medications or non-steroidal anti-inflammatory drugs for the week preceding surgery.    3. Patient requires further consultation with: None    4. Assessment of Chronic Conditions:  1. Preop examination  POCT ECG      2. Benign essential HTN        3. Acquired hypothyroidism        4. Spinal stenosis of lumbar region, unspecified whether neurogenic claudication present        5. Monoplegia affecting left dominant side (HCC)        6. Meralgia paraesthetica, left        7. Congenital leg length inequality        8. Functional gait abnormality             Pre-op form completed and faxed to referring physician noted above.    Brielle Rose MD  [address] [address]  Phone#  [phone]  Fax#  [phone]

## 2024-01-08 ENCOUNTER — TELEPHONE (OUTPATIENT)
Age: 69
End: 2024-01-08

## 2024-01-08 NOTE — TELEPHONE ENCOUNTER
Sheila from [facility] calling for most recent EKG faxed to office. Patient is scheduled for a lumbar laminectomy on 1/10/24. EKG faxed to  [phone] at this time.

## 2024-01-08 NOTE — TELEPHONE ENCOUNTER
Sheila called from [facility] regarding patients most recent EKG. Faxed EKG to office.

## 2024-01-09 ENCOUNTER — TELEPHONE (OUTPATIENT)
Dept: CARDIOLOGY CLINIC | Facility: CLINIC | Age: 69
End: 2024-01-09

## 2024-01-09 NOTE — TELEPHONE ENCOUNTER
penny Howard at the [facility]. I'm following up on a fax that I sent over yesterday around 2:00 PM on patient Brandy Stock, date of birth 1955. I am looking for her most recent Echo. The fax number here is [phone] and my phone number is [phone], option 2. This is a stat request. She is coming in for procedure      Called office back and advised last ov with us was 2021 verbally understood

## 2024-02-12 ENCOUNTER — EVALUATION (OUTPATIENT)
Dept: PHYSICAL THERAPY | Age: 69
End: 2024-02-12
Payer: MEDICARE

## 2024-02-12 DIAGNOSIS — M54.40 CHRONIC LEFT-SIDED LOW BACK PAIN WITH SCIATICA, SCIATICA LATERALITY UNSPECIFIED: Primary | ICD-10-CM

## 2024-02-12 DIAGNOSIS — G89.29 CHRONIC LEFT-SIDED LOW BACK PAIN WITH SCIATICA, SCIATICA LATERALITY UNSPECIFIED: Primary | ICD-10-CM

## 2024-02-12 PROCEDURE — 97161 PT EVAL LOW COMPLEX 20 MIN: CPT | Performed by: PHYSICAL THERAPIST

## 2024-02-12 PROCEDURE — 97110 THERAPEUTIC EXERCISES: CPT | Performed by: PHYSICAL THERAPIST

## 2024-02-12 NOTE — LETTER
2024    Sylvester Gómez MD  [address] [place] [address]    Patient: Brandy Stock   YOB: 1955   Date of Visit: 2024     Encounter Diagnosis     ICD-10-CM    1. Chronic left-sided low back pain with sciatica, sciatica laterality unspecified  M54.40     G89.29           Dear Dr. Gómez:    Thank you for your recent referral of Brandy Stock. Please review the attached evaluation summary from Brandy's recent visit.     Please verify that you agree with the plan of care by signing the attached order.     If you have any questions or concerns, please do not hesitate to call.     I sincerely appreciate the opportunity to share in the care of one of your patients and hope to have another opportunity to work with you in the near future.       Sincerely,    Mulugeta Hay, PT      Referring Provider:      I certify that I have read the below Plan of Care and certify the need for these services furnished under this plan of treatment while under my care.                    Sylvester Gómez MD  [address] [place] [address]  Via Fax: [phone]          PT Evaluation     Today's date: 2024  Patient name: Brandy Stock  : 1955  MRN: [id number]  Referring provider: Keyla Price PA-C  Dx:   Encounter Diagnosis     ICD-10-CM    1. Chronic left-sided low back pain with sciatica, sciatica laterality unspecified  M54.40     G89.29           Start Time: 1700  Stop Time: 1745  Total time in clinic (min): 45 minutes    Assessment  Assessment details: Pt is a 67 y/o female who presents with lumbar spine pain following lumbar spine laminectomy. No further referral is necessary at this time. Pt is experiencing pain, decreased strength, and decreased ROM. Pt has a positive prognosis. Pt would benefit from PT to address these impairments leading to increased functional capacity and improved quality of life.    Impairments: abnormal or  restricted ROM, impaired physical strength, lacks appropriate home exercise program, pain with function, poor posture  and poor body mechanics  Understanding of Dx/Px/POC: good   Prognosis: good     Goals  Short Term Goals: to be achieved by 4 weeks  1) Patient to be independent with basic HEP.  2) Decrease pain to 2/10 at its worst.  3) Increase lumbar spine ROM to minimal deficits in all deficient planes.   4) Increase LE strength by 1/2 MMT grade in all deficient planes.     Long Term Goals: to be achieved by discharge  1) FOTO equal to or greater than 59.  2) Patient to be independent with comprehensive HEP.  3) Lumbar spine ROM WNL all planes to improve a/iadls.  4) Increase LE strength to 5/5 MMT grade in all planes to improve a/iadls.  5) Patient to report no sleep interruption secondary to pain.  6) Increase ambulatory tolerance to 60 min.      Plan  Patient would benefit from: skilled physical therapy  Planned modality interventions: cryotherapy and thermotherapy: hydrocollator packs  Planned therapy interventions: neuromuscular re-education, patient education, stretching, strengthening, therapeutic activities, therapeutic exercise, therapeutic training, home exercise program and graded activity  Frequency: Twice a week for 10 weeks.  Treatment plan discussed with: patient           Subjective Evaluation     History of Present Illness  Mechanism of injury: Pt underwent lumbar laminectomy performed by Dr. Gómez on 1/10/24. Patient has virtually no LBP but is experiencing weakness primarily in the LLE especially when getting in and out of car by the end of the day.       Quality of life: good     Patient Goals  Patient goals for therapy: decreased pain, independence with ADLs/IADLs, return to sport/leisure activities, increased strength and increased motion     Pain  Current pain ratin  At best pain ratin  At worst pain ratin  Quality: sharp, radiating and dull ache  Aggravating factors:  overhead activity, lifting, stair climbing, walking and standing     Hand dominance: right              Objective      Active Range of Motion      Lumbar   Flexion:  with pain Restriction level: moderate  Extension:  Restriction level: moderate  Left lateral flexion:  Restriction level: moderate  Right lateral flexion:  Restriction level: moderate  Left rotation:  Restriction level: moderate  Right rotation:  Restriction level: moderate     Joint Play      Pain: L3 and L4      Strength/Myotome Testing      Lumbar   Left   Heel walk: normal  Toe walk: normal     Right   Heel walk: normal  Toe walk: normal     Right Hip   Planes of Motion   Flexion: 4  Extension: 4  Abduction: 4     left Hip   Planes of Motion   Flexion: 4-  Extension: 4-  Abduction: 4-     General Comments:   5 STS: 15 secs                 Precautions: Lumbar laminectomy 1/10/24      Manuals                                                                 Neuro Re-Ed                                                                                                        Ther Ex                                                                                                                     Ther Activity                                       Gait Training                                       Modalities

## 2024-02-13 NOTE — PROGRESS NOTES
"Reason For Visit:   Chief Complaint   Patient presents with     RECHECK     Follow-up 6 wk s/p L5-S1 microdiscectomy.  Patient reports mild soreness R lower back, otherwise feeling much improved.  Patient reports he sleeps well.  No problems with ADLs under current guidelines.       Primary MD: CeeCounts include [facility] beds at the [facility] [place]  Ref. MD: est    ?  No  Occupation NA  Currently working? No.  Work status?  recent graduate.  Date of injury: 5/4/21  Type of injury: Acute disc herniation.  Date of surgery: 8/12/22  Type of surgery: L5-S1 microdiscectomy.  Smoker: No  Request smoking cessation information: No    Ht 1.77 m (5' 9.69\")   Wt 96.2 kg (212 lb)   BMI 30.69 kg/m      Pain Assessment  Patient Currently in Pain: Denies      Visual Analog Pain Scale  Back Pain Scale 0-10: 0  Right leg pain: 0  Left leg pain: 0  Neck Pain Scale 0-10: 0  Right arm pain: 0  Left arm pain: 0    Promis 10 Assessment    No flowsheet data found.             Eben Ahn, ATC    " PT Evaluation     Today's date: 2024  Patient name: Brandy Stock  : 1955  MRN: [id number]  Referring provider: Keyla Price PA-C  Dx:   Encounter Diagnosis     ICD-10-CM    1. Chronic left-sided low back pain with sciatica, sciatica laterality unspecified  M54.40     G89.29           Start Time: 1700  Stop Time: 1745  Total time in clinic (min): 45 minutes    Assessment  Assessment details: Pt is a 69 y/o female who presents with lumbar spine pain following lumbar spine laminectomy. No further referral is necessary at this time. Pt is experiencing pain, decreased strength, and decreased ROM. Pt has a positive prognosis. Pt would benefit from PT to address these impairments leading to increased functional capacity and improved quality of life.    Impairments: abnormal or restricted ROM, impaired physical strength, lacks appropriate home exercise program, pain with function, poor posture  and poor body mechanics  Understanding of Dx/Px/POC: good   Prognosis: good     Goals  Short Term Goals: to be achieved by 4 weeks  1) Patient to be independent with basic HEP.  2) Decrease pain to 2/10 at its worst.  3) Increase lumbar spine ROM to minimal deficits in all deficient planes.   4) Increase LE strength by 1/2 MMT grade in all deficient planes.     Long Term Goals: to be achieved by discharge  1) FOTO equal to or greater than 59.  2) Patient to be independent with comprehensive HEP.  3) Lumbar spine ROM WNL all planes to improve a/iadls.  4) Increase LE strength to 5/5 MMT grade in all planes to improve a/iadls.  5) Patient to report no sleep interruption secondary to pain.  6) Increase ambulatory tolerance to 60 min.      Plan  Patient would benefit from: skilled physical therapy  Planned modality interventions: cryotherapy and thermotherapy: hydrocollator packs  Planned therapy interventions: neuromuscular re-education, patient education, stretching, strengthening, therapeutic activities, therapeutic  exercise, therapeutic training, home exercise program and graded activity  Frequency: Twice a week for 10 weeks.  Treatment plan discussed with: patient           Subjective Evaluation     History of Present Illness  Mechanism of injury: Pt underwent lumbar laminectomy performed by Dr. Gómez on 1/10/24. Patient has virtually no LBP but is experiencing weakness primarily in the LLE especially when getting in and out of car by the end of the day.       Quality of life: good     Patient Goals  Patient goals for therapy: decreased pain, independence with ADLs/IADLs, return to sport/leisure activities, increased strength and increased motion     Pain  Current pain ratin  At best pain ratin  At worst pain ratin  Quality: sharp, radiating and dull ache  Aggravating factors: overhead activity, lifting, stair climbing, walking and standing     Hand dominance: right              Objective      Active Range of Motion      Lumbar   Flexion:  with pain Restriction level: moderate  Extension:  Restriction level: moderate  Left lateral flexion:  Restriction level: moderate  Right lateral flexion:  Restriction level: moderate  Left rotation:  Restriction level: moderate  Right rotation:  Restriction level: moderate     Joint Play      Pain: L3 and L4      Strength/Myotome Testing      Lumbar   Left   Heel walk: normal  Toe walk: normal     Right   Heel walk: normal  Toe walk: normal     Right Hip   Planes of Motion   Flexion: 4  Extension: 4  Abduction: 4     left Hip   Planes of Motion   Flexion: 4-  Extension: 4-  Abduction: 4-     General Comments:   5 STS: 15 secs                 Precautions: Lumbar laminectomy 1/10/24      Manuals                                                                 Neuro Re-Ed                                                                                                        Ther Ex                                                                                                                      Ther Activity                                       Gait Training                                       Modalities

## 2024-02-15 ENCOUNTER — OFFICE VISIT (OUTPATIENT)
Dept: PHYSICAL THERAPY | Age: 69
End: 2024-02-15
Payer: MEDICARE

## 2024-02-15 DIAGNOSIS — M54.40 CHRONIC LEFT-SIDED LOW BACK PAIN WITH SCIATICA, SCIATICA LATERALITY UNSPECIFIED: Primary | ICD-10-CM

## 2024-02-15 DIAGNOSIS — G89.29 CHRONIC LEFT-SIDED LOW BACK PAIN WITH SCIATICA, SCIATICA LATERALITY UNSPECIFIED: Primary | ICD-10-CM

## 2024-02-15 PROCEDURE — 97110 THERAPEUTIC EXERCISES: CPT | Performed by: PHYSICAL THERAPIST

## 2024-02-15 PROCEDURE — 97140 MANUAL THERAPY 1/> REGIONS: CPT | Performed by: PHYSICAL THERAPIST

## 2024-02-15 PROCEDURE — 97112 NEUROMUSCULAR REEDUCATION: CPT | Performed by: PHYSICAL THERAPIST

## 2024-02-15 NOTE — PROGRESS NOTES
"Daily Note     Today's date: 2/15/2024  Patient name: Brandy Stock  : 1955  MRN: [id number]  Referring provider: Keyla Price PA-C  Dx:   Encounter Diagnosis     ICD-10-CM    1. Chronic left-sided low back pain with sciatica, sciatica laterality unspecified  M54.40     G89.29           Start Time: 1530  Stop Time: 1615  Total time in clinic (min): 45 minutes    Subjective: Pt reports improvements in symptoms      Objective: See treatment diary below      Assessment: Tolerated treatment well. Pt's POC progressed to include more core strengthening.  Patient demonstrated fatigue post treatment and would benefit from continued PT      Plan: Continue per plan of care.      Precautions: Lumbar laminectomy 1/10/24      Manuals 2.15                                                                Neuro Re-Ed 2.15            TrA BKFO 2x10x5\"            Bridge  3x10            SLR Flex + Abd 3x10            Mini squats 3x10            Leg press  3x10 105#                                      Ther Ex 2.15            Nu-step 10'            Standing hip abd foam 2x10 b/l                                                                                          Ther Activity                                       Gait Training                                       Modalities                                            "

## 2024-02-20 ENCOUNTER — OFFICE VISIT (OUTPATIENT)
Dept: PHYSICAL THERAPY | Age: 69
End: 2024-02-20
Payer: MEDICARE

## 2024-02-20 DIAGNOSIS — M54.40 CHRONIC LEFT-SIDED LOW BACK PAIN WITH SCIATICA, SCIATICA LATERALITY UNSPECIFIED: Primary | ICD-10-CM

## 2024-02-20 DIAGNOSIS — G89.29 CHRONIC LEFT-SIDED LOW BACK PAIN WITH SCIATICA, SCIATICA LATERALITY UNSPECIFIED: Primary | ICD-10-CM

## 2024-02-20 PROCEDURE — 97112 NEUROMUSCULAR REEDUCATION: CPT

## 2024-02-20 PROCEDURE — 97110 THERAPEUTIC EXERCISES: CPT

## 2024-02-20 NOTE — PROGRESS NOTES
"Daily Note     Today's date: 2024  Patient name: Brandy Stock  : 1955  MRN: [id number]  Referring provider: Keyla Price PA-C  Dx:   Encounter Diagnosis     ICD-10-CM    1. Chronic left-sided low back pain with sciatica, sciatica laterality unspecified  M54.40     G89.29                      Subjective: Patient offers no new complaints today.        Objective: See treatment diary below      Assessment: Tolerated treatment well. Pt's POC progressed to include more core strengthening. Patient denies any pain with exercises.  Good technique with exercises.   Patient demonstrated fatigue post treatment and would benefit from continued PT      Plan: Continue per plan of care.      Precautions: Lumbar laminectomy 1/10/24      Manuals 2.15                                                                Neuro Re-Ed 2.15            TrA BKFO 2x10x5\" 2x10x5\"           Bridge  3x10 3x10           SLR Flex + Abd 3x10 3x10           Mini squats 3x10 3x10           Leg press  3x10 105# 3x10  105#                                     Ther Ex 2.15            Nu-step 10' 10'           Standing hip abd foam 2x10 b/l 2x10 b/l           Prone Y  3x10                                                                            Ther Activity                                       Gait Training                                       Modalities                                            "

## 2024-02-22 ENCOUNTER — OFFICE VISIT (OUTPATIENT)
Dept: PHYSICAL THERAPY | Age: 69
End: 2024-02-22
Payer: MEDICARE

## 2024-02-22 DIAGNOSIS — G89.29 CHRONIC LEFT-SIDED LOW BACK PAIN WITH SCIATICA, SCIATICA LATERALITY UNSPECIFIED: Primary | ICD-10-CM

## 2024-02-22 DIAGNOSIS — M54.40 CHRONIC LEFT-SIDED LOW BACK PAIN WITH SCIATICA, SCIATICA LATERALITY UNSPECIFIED: Primary | ICD-10-CM

## 2024-02-22 PROCEDURE — 97112 NEUROMUSCULAR REEDUCATION: CPT | Performed by: PHYSICAL THERAPIST

## 2024-02-22 PROCEDURE — 97110 THERAPEUTIC EXERCISES: CPT | Performed by: PHYSICAL THERAPIST

## 2024-02-22 NOTE — PROGRESS NOTES
"Daily Note     Today's date: 2024  Patient name: Brandy Stock  : 1955  MRN: [id number]  Referring provider: Keyla Price PA-C  Dx:   Encounter Diagnosis     ICD-10-CM    1. Chronic left-sided low back pain with sciatica, sciatica laterality unspecified  M54.40     G89.29           Start Time: 0930  Stop Time: 1015  Total time in clinic (min): 45 minutes    Subjective: Pt reports no new symptoms.       Objective: See treatment diary below      Assessment: Tolerated treatment well. POC progressed to include more closed chain functional strengthening and core activation.  Patient demonstrated fatigue post treatment and exhibited good technique with therapeutic exercises      Plan: Continue per plan of care.      Precautions: Lumbar laminectomy 1/10/24      Manuals 2.15                                                               Neuro Re-Ed 2.15 2/20           TrA BKFO 2x10x5\" 2x10x5\" 2x10x5\"          Bridge  3x10 3x10 3x10          SLR Flex + Abd 3x10 3x10 2x10 b/l          Mini squats 3x10 3x10 3x10          Leg press  3x10 105# 3x10  105# 3x10 115#          STS   3x10                       Ther Ex 2.15            Nu-step 10' 10'           Standing hip abd foam 2x10 b/l 2x10 b/l 3x10 b/l          Prone Y  3x10           Recumbent bike   10'                                                               Ther Activity                                       Gait Training                                       Modalities                                              "

## 2024-02-26 ENCOUNTER — OFFICE VISIT (OUTPATIENT)
Dept: PHYSICAL THERAPY | Age: 69
End: 2024-02-26
Payer: MEDICARE

## 2024-02-26 DIAGNOSIS — G89.29 CHRONIC LEFT-SIDED LOW BACK PAIN WITH SCIATICA, SCIATICA LATERALITY UNSPECIFIED: Primary | ICD-10-CM

## 2024-02-26 DIAGNOSIS — M54.40 CHRONIC LEFT-SIDED LOW BACK PAIN WITH SCIATICA, SCIATICA LATERALITY UNSPECIFIED: Primary | ICD-10-CM

## 2024-02-26 PROCEDURE — 97112 NEUROMUSCULAR REEDUCATION: CPT

## 2024-02-26 PROCEDURE — 97110 THERAPEUTIC EXERCISES: CPT

## 2024-02-26 NOTE — PROGRESS NOTES
"Daily Note     Today's date: 2024  Patient name: Brandy Stock  : 1955  MRN: [id number]  Referring provider: Keyla Price PA-C  Dx:   Encounter Diagnosis     ICD-10-CM    1. Chronic left-sided low back pain with sciatica, sciatica laterality unspecified  M54.40     G89.29                      Subjective: Pt reports that she is doing well today noting that she did a lot of walking. Denies any pain when asked but does note that she is lacking strength.       Objective: See treatment diary below      Assessment: Tolerated treatment well. POC progressed to include more closed chain functional strengthening and core activation.  Pt demonstrated muscular fatigue to end but overall did well with exercises. Patient demonstrated fatigue post treatment and exhibited good technique with therapeutic exercises      Plan: Continue per plan of care.  Pt will be going to [place] following [place] and will benefit from an updated HEP.      Precautions: Lumbar laminectomy 1/10/24      Manuals 2.15                                                              Neuro Re-Ed 2.15            TrA BKFO 2x10x5\" 2x10x5\" 2x10x5\" 2x10x 5\"         Bridge  3x10 3x10 3x10 RTB 3x10         SLR Flex + Abd 3x10 3x10 2x10 b/l 1# 2x10 b/l         Mini squats 3x10 3x10 3x10 3x10         Leg press  3x10 105# 3x10  105# 3x10 115# 3x10 115#         STS   3x10 3x10         Dead bugs    2x5 ea         Multifidus Press    GTB 20x ea         Ther Ex 2.15            Nu-step 10' 10'           Standing hip abd foam 2x10 b/l 2x10 b/l 3x10 b/l 3x10 b/l         Prone Y  3x10           Recumbent bike   10'  10'                                                             Ther Activity                                       Gait Training                                       Modalities                                              "

## 2024-02-29 ENCOUNTER — OFFICE VISIT (OUTPATIENT)
Dept: PHYSICAL THERAPY | Age: 69
End: 2024-02-29
Payer: MEDICARE

## 2024-02-29 DIAGNOSIS — M54.40 CHRONIC LEFT-SIDED LOW BACK PAIN WITH SCIATICA, SCIATICA LATERALITY UNSPECIFIED: Primary | ICD-10-CM

## 2024-02-29 DIAGNOSIS — G89.29 CHRONIC LEFT-SIDED LOW BACK PAIN WITH SCIATICA, SCIATICA LATERALITY UNSPECIFIED: Primary | ICD-10-CM

## 2024-02-29 PROCEDURE — 97110 THERAPEUTIC EXERCISES: CPT | Performed by: PHYSICAL THERAPIST

## 2024-02-29 PROCEDURE — 97112 NEUROMUSCULAR REEDUCATION: CPT | Performed by: PHYSICAL THERAPIST

## 2024-02-29 PROCEDURE — 97140 MANUAL THERAPY 1/> REGIONS: CPT | Performed by: PHYSICAL THERAPIST

## 2024-02-29 NOTE — PROGRESS NOTES
"Daily Note     Today's date: 2024  Patient name: Brandy Stock  : 1955  MRN: [id number]  Referring provider: Keyla Price PA-C  Dx:   Encounter Diagnosis     ICD-10-CM    1. Chronic left-sided low back pain with sciatica, sciatica laterality unspecified  M54.40     G89.29           Start Time: 1745  Stop Time: 1830  Total time in clinic (min): 45 minutes    Subjective: Pt reports improvements in symptoms.       Objective: See treatment diary below      Assessment: Tolerated treatment well. Pt's POC was progressed to include more core activation interventions. Patient will be discharged at this time due to vacation. Patient demonstrated fatigue post treatment and would benefit from continued PT      Plan:  D/C PT     Precautions: Lumbar laminectomy 1/10/24      Manuals 2.15                                                              Neuro Re-Ed 2.15            TrA BKFO 2x10x5\" 2x10x5\" 2x10x5\" 2x10x 5\"         Bridge  3x10 3x10 3x10 RTB 3x10         SLR Flex + Abd 3x10 3x10 2x10 b/l 1# 2x10 b/l         Mini squats 3x10 3x10 3x10 3x10         Leg press  3x10 105# 3x10  105# 3x10 115# 3x10 115#         STS   3x10 3x10         Dead bugs    2x5 ea         Multifidus Press    GTB 20x ea         Ther Ex 2.15            Nu-step 10' 10'           Standing hip abd foam 2x10 b/l 2x10 b/l 3x10 b/l 3x10 b/l         Prone Y  3x10           Recumbent bike   10'  10'                                                             Ther Activity                                       Gait Training                                       Modalities                                                "

## 2024-04-16 ENCOUNTER — ONCOLOGY SURVIVORSHIP (OUTPATIENT)
Dept: SURGICAL ONCOLOGY | Facility: CLINIC | Age: 69
End: 2024-04-16
Payer: MEDICARE

## 2024-04-16 VITALS
DIASTOLIC BLOOD PRESSURE: 80 MMHG | RESPIRATION RATE: 14 BRPM | SYSTOLIC BLOOD PRESSURE: 122 MMHG | HEART RATE: 71 BPM | HEIGHT: 68 IN | TEMPERATURE: 97.8 F | BODY MASS INDEX: 25.07 KG/M2 | WEIGHT: 165.4 LBS | OXYGEN SATURATION: 95 %

## 2024-04-16 DIAGNOSIS — Z85.3 ENCOUNTER FOR FOLLOW-UP SURVEILLANCE OF BREAST CANCER: Primary | ICD-10-CM

## 2024-04-16 DIAGNOSIS — Z08 ENCOUNTER FOR FOLLOW-UP SURVEILLANCE OF BREAST CANCER: Primary | ICD-10-CM

## 2024-04-16 DIAGNOSIS — Z12.31 VISIT FOR SCREENING MAMMOGRAM: ICD-10-CM

## 2024-04-16 DIAGNOSIS — Z85.3 HISTORY OF RIGHT BREAST CANCER: ICD-10-CM

## 2024-04-16 PROCEDURE — 99213 OFFICE O/P EST LOW 20 MIN: CPT | Performed by: NURSE PRACTITIONER

## 2024-04-16 NOTE — PROGRESS NOTES
Assessment/Plan:    Diagnoses and all orders for this visit:    Encounter for follow-up surveillance of breast cancer    History of right breast cancer    Visit for screening mammogram  -     Mammo screening bilateral w 3d & cad; Future    Patient is a 68-year-old female that was diagnosed with a right-sided breast cancer in January 2014. Her pathology revealed invasive ductal carcinoma, grade 1, ER/NE positive, HER-2 negative. She underwent a right breast lumpectomy and sentinel node biopsy by Dr. Glasgow. She completed whole breast radiation therapy and completed 5 years of aromatase inhibitor therapy.  She is now on observation.  She had a bilateral mammogram in December 2023 which was BI-RADS 2, category 2 density.  She offers no new complaints today and there are no worrisome findings on today's clinical exam.  She is now 10 years from her diagnosis.  She prefers to continue to see us on an annual basis.  Prescription given for mammogram due in December.  I will see her back in 1 year or sooner should the need arise.  She was instructed to contact us with any changes or concerns in the interim.  All of her questions were answered today.      REASON FOR VISIT:   Survivorship      Previous therapy:  Oncology History   History of right breast cancer   12/26/2013 Biopsy    Right breast biopsy, 12:00    Infiltrating mammary carcinoma  %  %  HER-2 negative     1/17/2014 Surgery    Right lumpectomy with sentinel lymph node biopsy (Dr. Glasgow)  - Clear margins  - 0/1 lymph nodes    Stage IA- pT1b, pN0, G1     2/21/2014 - 4/3/2014 Radiation    WBRT (Dr. Lockwood)     2/25/2014 Genomic Testing    Oncotype 8, low risk     2014 - 2019 Hormone Therapy    Letrozole (Dr. Tompkins)     2/2020 Genetic Testing    [facility] Cancers Panel (23 genes): GILLIAN, BARD1, BRCA1, BRCA2, BRIP1, CDH1, CHEK2, DICER1, EPCAM, MLH1, MSH2, MSH6, NBN, NF1, PALB2, PMS2, PTEN, RAD50, RAD51C, RAD51D, SMARCA4, STK11, TP53      Negative    "        Patient ID: Brandy Stock is a 68 y.o. female  Presenting today for a breast cancer survivorship visit.  She has not appreciated any changes on self-exam.  She had a bilateral mammogram in December which was benign.  Denies persistent headaches, back pain or bone pain, cough or shortness of breath, abdominal pain.          Review of Systems   Constitutional:  Negative for activity change, appetite change, chills, fatigue, fever and unexpected weight change.   Respiratory:  Negative for cough and shortness of breath.    Cardiovascular:  Negative for chest pain.   Gastrointestinal:  Negative for abdominal pain, constipation, diarrhea, nausea and vomiting.   Musculoskeletal:  Negative for arthralgias, back pain, gait problem and myalgias.   Skin:  Negative for color change and rash.   Neurological:  Negative for dizziness and headaches.   Hematological:  Negative for adenopathy.   Psychiatric/Behavioral:  Negative for agitation and confusion.    All other systems reviewed and are negative.      Objective:    Blood pressure 122/80, pulse 71, temperature 97.8 °F (36.6 °C), temperature source Temporal, resp. rate 14, height 5' 7.5\" (1.715 m), weight 75 kg (165 lb 6.4 oz), SpO2 95%, not currently breastfeeding.  Body mass index is 25.52 kg/m².      Physical Exam  Vitals reviewed.   Constitutional:       General: She is not in acute distress.     Appearance: Normal appearance. She is well-developed. She is not diaphoretic.   HENT:      Head: Normocephalic and atraumatic.   Cardiovascular:      Rate and Rhythm: Normal rate and regular rhythm.      Heart sounds: Normal heart sounds.   Pulmonary:      Effort: Pulmonary effort is normal.      Breath sounds: Normal breath sounds.   Chest:   Breasts:     Right: Skin change (surgical scars) present. No swelling, bleeding, inverted nipple, mass, nipple discharge or tenderness.      Left: Skin change (surgical scar) present. No swelling, bleeding, inverted nipple, mass, " nipple discharge or tenderness.   Abdominal:      Palpations: Abdomen is soft. There is no mass.      Tenderness: There is no abdominal tenderness.   Musculoskeletal:         General: Normal range of motion.      Cervical back: Normal range of motion.   Lymphadenopathy:      Upper Body:      Right upper body: No supraclavicular or axillary adenopathy.      Left upper body: No supraclavicular or axillary adenopathy.   Skin:     General: Skin is warm and dry.      Findings: No rash.   Neurological:      Mental Status: She is alert and oriented to person, place, and time.   Psychiatric:         Speech: Speech normal.             Discussed symptoms related to disease recurrence, Yes    Evaluated for late effects related to cancer treatment, Yes    Screening current for cervical cancer, Yes, describe: n/a    Screening current for colon cancer, Yes, describe: up to date    Cancer rehabilitation services addressed, No    Screening current for osteoporosis, Yes, describe: up to date

## 2024-04-29 ENCOUNTER — OFFICE VISIT (OUTPATIENT)
Dept: FAMILY MEDICINE CLINIC | Facility: CLINIC | Age: 69
End: 2024-04-29
Payer: MEDICARE

## 2024-04-29 VITALS
RESPIRATION RATE: 16 BRPM | OXYGEN SATURATION: 97 % | DIASTOLIC BLOOD PRESSURE: 82 MMHG | HEART RATE: 84 BPM | BODY MASS INDEX: 25.91 KG/M2 | TEMPERATURE: 97.5 F | SYSTOLIC BLOOD PRESSURE: 124 MMHG | HEIGHT: 68 IN | WEIGHT: 171 LBS

## 2024-04-29 DIAGNOSIS — M79.18 MYOFASCIAL PAIN SYNDROME: Primary | ICD-10-CM

## 2024-04-29 PROBLEM — M54.2 NECK PAIN: Status: ACTIVE | Noted: 2024-04-29

## 2024-04-29 PROCEDURE — 20552 NJX 1/MLT TRIGGER POINT 1/2: CPT | Performed by: FAMILY MEDICINE

## 2024-04-29 PROCEDURE — 96372 THER/PROPH/DIAG INJ SC/IM: CPT | Performed by: FAMILY MEDICINE

## 2024-04-29 RX ORDER — METHOCARBAMOL 500 MG/1
500 TABLET, FILM COATED ORAL 4 TIMES DAILY PRN
Qty: 20 TABLET | Refills: 1 | Status: SHIPPED | OUTPATIENT
Start: 2024-04-29

## 2024-04-29 NOTE — ASSESSMENT & PLAN NOTE
Likely msk in origin  Will obtain imaging   Pain control: Naproxen 500 mg BID x 7 days, Tylenol 1000 mg Q 8H PRN, Flexeril 10 mg TID PRN.   Instructed patient to stay adequately hydrated while taking NSAIDs as it can offset the kidneys. Recommended to take Naproxen with food due to adverse effects of GI upset.   Will also order Lidocaine patches to apply over the affected area  Discussed with patient sx may persist for several weeks before they get better.   F/u as needed or sooner if sx worsen   Home PT exercises included in AVS**  Ambulatory referral to PT**    Can consider the following if symptoms persist:   Cymbalta 90 mg QD  Gabapentin 200 mg TID  Tizanidine 2 mg Q8H for spasms  Mobic 15 mg QD

## 2024-04-30 ENCOUNTER — NURSE TRIAGE (OUTPATIENT)
Dept: OTHER | Facility: OTHER | Age: 69
End: 2024-04-30

## 2024-04-30 DIAGNOSIS — M54.2 CHRONIC NECK PAIN: Primary | ICD-10-CM

## 2024-04-30 DIAGNOSIS — G89.29 CHRONIC NECK PAIN: Primary | ICD-10-CM

## 2024-04-30 NOTE — TELEPHONE ENCOUNTER
Called patient to see how patient was going, patient states today neck pain seems worse. She did take tylenol, it did help with her headache but not the neck pain. Neck pain seem to be worse first thing in the morning. Patient is also taking the robaxin, but dose not seem to help. Pain score is an 8 out of 10.    Patient was not sure if should could take tylenol with the robaxin pulse the injection.   Let patient know if we did not call her tonight we would call her tomorrow. Patient understood.

## 2024-04-30 NOTE — TELEPHONE ENCOUNTER
"Reason for Disposition  • MODERATE neck pain (e.g., interferes with normal activities like work or school)    Answer Assessment - Initial Assessment Questions  1. ONSET: \"When did the pain begin?\"       2 weeks ago   2. LOCATION: \"Where does it hurt?\"       Base of [place]   3. PATTERN \"Does the pain come and go, or has it been constant since it started?\"       Constant since yesterday   4. SEVERITY: \"How bad is the pain?\"  (Scale 1-10; or mild, moderate, severe)    - NO PAIN (0): no pain or only slight stiffness     - MILD (1-3): doesn't interfere with normal activities     - MODERATE (4-7): interferes with normal activities or awakens from sleep     - SEVERE (8-10):  excruciating pain, unable to do any normal activities       Severe 8 out of 10.   5. RADIATION: \"Does the pain go anywhere else, shoot into your arms?\"      Head   9. OTHER SYMPTOMS: \"Do you have any other symptoms?\" (e.g., headache, fever, chest pain, difficulty breathing, neck swelling)      Headache in temples   10. PREGNANCY: \"Is there any chance you are pregnant?\" \"When was your last menstrual period?\"        N/A    Protocols used: Neck Pain or Stiffness-ADULT-[place]    " 30-May-2022 11:38

## 2024-04-30 NOTE — TELEPHONE ENCOUNTER
Patient was seen at the office yesterday, stated that neck pain is worse today, developed a headache. Patient stated that she hasn't take Tylenol since she had started taking Robaxin. Patient was advised to take Tylenol, to call back in an hour to update if pain is relieved. Please follow up.

## 2024-05-01 ENCOUNTER — APPOINTMENT (OUTPATIENT)
Dept: RADIOLOGY | Age: 69
End: 2024-05-01
Payer: MEDICARE

## 2024-05-01 DIAGNOSIS — G89.29 CHRONIC NECK PAIN: ICD-10-CM

## 2024-05-01 DIAGNOSIS — M54.2 CHRONIC NECK PAIN: ICD-10-CM

## 2024-05-01 PROCEDURE — 72050 X-RAY EXAM NECK SPINE 4/5VWS: CPT

## 2024-05-02 DIAGNOSIS — M47.812 FACET ARTHRITIS, DEGENERATIVE, CERVICAL SPINE: ICD-10-CM

## 2024-05-02 DIAGNOSIS — G89.29 CHRONIC NECK PAIN: Primary | ICD-10-CM

## 2024-05-02 DIAGNOSIS — M54.2 CHRONIC NECK PAIN: Primary | ICD-10-CM

## 2024-05-07 ENCOUNTER — EVALUATION (OUTPATIENT)
Dept: PHYSICAL THERAPY | Age: 69
End: 2024-05-07
Payer: MEDICARE

## 2024-05-07 DIAGNOSIS — M47.812 FACET ARTHRITIS, DEGENERATIVE, CERVICAL SPINE: ICD-10-CM

## 2024-05-07 DIAGNOSIS — M54.2 CHRONIC NECK PAIN: ICD-10-CM

## 2024-05-07 DIAGNOSIS — G89.29 CHRONIC NECK PAIN: ICD-10-CM

## 2024-05-07 PROCEDURE — 97161 PT EVAL LOW COMPLEX 20 MIN: CPT

## 2024-05-07 PROCEDURE — 97110 THERAPEUTIC EXERCISES: CPT

## 2024-05-07 NOTE — PROGRESS NOTES
PT Evaluation     Today's date: 2024  Patient name: Brandy Stock  : 1955  MRN: [id number]  Referring provider: Levar Bland MD  Dx:   Encounter Diagnosis     ICD-10-CM    1. Chronic neck pain  M54.2 Ambulatory Referral to Physical Therapy    G89.29       2. Facet arthritis, degenerative, cervical spine  M47.812 Ambulatory Referral to Physical Therapy          Start Time: 0800  Stop Time: 0845  Total time in clinic (min): 45 minutes    Assessment  Assessment details: Pt is a 69 y/o female who presents with neck pain.  Screening and examination shows deficits consistent with referring diagnosis of myofascial pain and spasms of suboccipital/paraspinal musculature. Pt deficits limit sitting, turning, prolonged postures.  Pt is experiencing pain, decreased strength, and decreased ROM. Pt has a positive/negative prognosis. Pt would benefit from PT to address these impairments leading to increased functional capacity and improved quality of life.    Impairments: abnormal or restricted ROM, impaired physical strength, lacks appropriate home exercise program, pain with function, poor posture  and poor body mechanics  Understanding of Dx/Px/POC: good   Prognosis: good    Goals  IN 2-4 WEEKS:  Pt will improve pain on VAPS by 1-2 points  Pt will demonstrate understanding, independence with HEP    In 6-8 WEEKS  Pt will demonstrate improved ROM in spine to WFL  Pt will improve pain by >50% overall indicating improved tolerance to activities  Pt will improve sitting tolerance >30 mins  Pt will improve FOTO score at or above prognostic value          Plan  Patient would benefit from: skilled physical therapy  Planned modality interventions: cryotherapy and thermotherapy: hydrocollator packs  Planned therapy interventions: neuromuscular re-education, patient education, stretching, strengthening, therapeutic activities, therapeutic exercise, therapeutic training, home exercise program and graded  activity  Frequency: Twice a week for weeks.  Treatment plan discussed with: patient        Subjective Evaluation    History of Present Illness  Mechanism of injury: Pt reports about 4 week onset  Pt reports was doing a lot of phone games and yard work and noticed she has stiffness with turning her head  HA-tylenol every day--base of skull.    Wants to return to pickleball  Denies night pain, night sweats    Expressed fear that something more insidious is occurring.   Patient Goals  Patient goal: return to pickleball  Pain  Current pain ratin  At best pain ratin  At worst pain ratin          Objective     Concurrent Complaints  Negative for night pain, disturbed sleep, dizziness, faints, headaches, nausea/motion sickness, tinnitus, trouble swallowing, difficulty breathing, shortness of breath, respiratory pain, visual change, cardiac problem, kidney problem, gallbladder problem, stomach problem, ulcer, appendix problem, spleen problem, pancreas problem, history of cancer, history of trauma and infection    Neurological Testing     Reflexes   Left   Biceps (C5/C6): brisk (3+)  Brachioradialis (C6): normal (2+)  Louis's reflex: negative    Right   Biceps (C5/C6): brisk (3+)  Brachioradialis (C6): normal (2+)  Louis's reflex: negative    Active Range of Motion   Cervical/Thoracic Spine       Cervical  Subcranial protraction:   Restriction level: minimal  Subcranial retraction:   Restriction level: maximal  Flexion: 30 degrees  Restriction level: maximal  Extension: 35 degrees     Restriction level: minimal  Left lateral flexion: 10 degrees     Restriction level: maximal  Right lateral flexion: 10 degrees     Restriction level maximal  Left rotation: 40 degrees Restriction level: maximal  Right rotation: 45 degrees    Restriction level: maximal    Strength/Myotome Testing   Cervical Spine   Neck extension: 3  Neck flexion: 3+    General Comments:    Upper quarter screen   Shoulder:  "unremarkable    Cervical/Thoracic Comments  SCM-guarded  TTP suboccipitals    Improved pain with distraction  Neuro Exam:     Headaches   Patient reports headaches: No.              Precautions: Lumbar laminectomy 1/10/24      Manuals                                                                 Neuro Re-Ed             Cervical retraction in sitting    With self OP prn 2x10            Scapular retraction in sitting 5\"  X 30                                                                             Ther Ex             SNAG-distr 10\"x  10            SNAG-rot 10\"x  10                                                                                          Ther Activity                                       Gait Training                                       Modalities                                          Access Code: [email or web address]  URL: [email or web address]  Date: 05/07/2024  Prepared by: Javed Rosa    Exercises  - Supine Cervical Retraction with Towel  - 2 x daily - 7 x weekly - 10 reps - 10 hold  - Upper Cervical Extension SNAG with Strap  - 2 x daily - 7 x weekly - 10 reps - 10 hold  - Seated Scapular Retraction  - 2 x daily - 7 x weekly - 2 sets - 10 reps - 5 hold  - Seated Cervical Retraction  - 2 x daily - 7 x weekly - 2 sets - 10 reps - 2 hold  "

## 2024-05-08 ENCOUNTER — OFFICE VISIT (OUTPATIENT)
Dept: PHYSICAL THERAPY | Age: 69
End: 2024-05-08
Payer: MEDICARE

## 2024-05-08 DIAGNOSIS — G89.29 CHRONIC NECK PAIN: Primary | ICD-10-CM

## 2024-05-08 DIAGNOSIS — M54.2 CHRONIC NECK PAIN: Primary | ICD-10-CM

## 2024-05-08 DIAGNOSIS — M47.812 FACET ARTHRITIS, DEGENERATIVE, CERVICAL SPINE: ICD-10-CM

## 2024-05-08 PROCEDURE — 97110 THERAPEUTIC EXERCISES: CPT

## 2024-05-08 PROCEDURE — 97140 MANUAL THERAPY 1/> REGIONS: CPT

## 2024-05-08 PROCEDURE — 97112 NEUROMUSCULAR REEDUCATION: CPT

## 2024-05-08 NOTE — PROGRESS NOTES
"Daily Note     Today's date: 2024  Patient name: Brandy Stock  : 1955  MRN: [id number]  Referring provider: Levar Bland MD  Dx:   Encounter Diagnosis     ICD-10-CM    1. Chronic neck pain  M54.2     G89.29       2. Facet arthritis, degenerative, cervical spine  M47.812           Start Time: 0800  Stop Time: 0845  Total time in clinic (min): 45 minutes    Subjective: Pt reports pain clinically the same, slight improvement with stretching, was just seen yesterday.       Objective: See treatment diary below      Assessment:   Pt cervical rotation improved with assisted rotation and with thoracic extension exercises.  Pt appears to be responding well to program thus far.   Tolerated treatment well. Patient demonstrated fatigue post treatment, exhibited good technique with therapeutic exercises, and would benefit from continued PT      Plan: Continue per plan of care.  Progress treatment as tolerated.       Precautions: Lumbar laminectomy 1/10/24    Visit/Unit Tracking  AUTH Status:  Date              Medicare Used                Remaining                  1/10 2/10                   Manuals            SOR  Facilitated rotation  Manual traction  KD                                                  Neuro Re-Ed             Cervical retraction in sitting    With self OP prn 2x10            Scapular retraction in sitting 5\"  X 30            CC-row  20#    3x12           Kickball-upper cervical rotation @ wall  3x12           Supine chin tuck/hold  10\"x  10                                     Ther Ex             SNAG-distr 10\"x  10 10\"x  10           SNAG-rot 10\"x  10 10\"x  10           Thor ext sitting  3x10           Wall slide/shrug  3\"    3x12           Mod. Plantigrade  Neck ext.  3x10                                                  Ther Activity                                       Gait Training                                       Modalities                                          "

## 2024-05-14 ENCOUNTER — OFFICE VISIT (OUTPATIENT)
Dept: PHYSICAL THERAPY | Age: 69
End: 2024-05-14
Payer: MEDICARE

## 2024-05-14 DIAGNOSIS — G89.29 CHRONIC NECK PAIN: Primary | ICD-10-CM

## 2024-05-14 DIAGNOSIS — M47.812 FACET ARTHRITIS, DEGENERATIVE, CERVICAL SPINE: ICD-10-CM

## 2024-05-14 DIAGNOSIS — M54.2 CHRONIC NECK PAIN: Primary | ICD-10-CM

## 2024-05-14 PROCEDURE — 97110 THERAPEUTIC EXERCISES: CPT

## 2024-05-14 PROCEDURE — 97140 MANUAL THERAPY 1/> REGIONS: CPT

## 2024-05-14 PROCEDURE — 97112 NEUROMUSCULAR REEDUCATION: CPT

## 2024-05-14 NOTE — PROGRESS NOTES
"Daily Note     Today's date: 2024  Patient name: Brandy Stock  : 1955  MRN: [id number]  Referring provider: Levar Bland MD  Dx:   Encounter Diagnosis     ICD-10-CM    1. Chronic neck pain  M54.2     G89.29       2. Facet arthritis, degenerative, cervical spine  M47.812                      Subjective: Pt reports pain clinically the same, slight improvement with stretching, was just seen yesterday.       Objective: See treatment diary below      Assessment:   Pt cervical motion impaired again today.  Pain and irritability with passive stretching noted.  Will attempt more isometric work to cue voluntary contraction and minimize guarding.  Tolerated treatment well. Patient demonstrated fatigue post treatment, exhibited good technique with therapeutic exercises, and would benefit from continued PT      Plan: Continue per plan of care.  Progress treatment as tolerated.       Precautions: Lumbar laminectomy 1/10/24    Visit/Unit Tracking  AUTH Status:  Date             Medicare Used                Remaining                  1/10 2/10 3/10                  Manuals           SOR  Facilitated rotation  Manual traction  KD KD                                                 Neuro Re-Ed             Cervical retraction in sitting    With self OP prn 2x10  2x10          Scapular retraction in sitting 5\"  X 30  5\"x30          CC-row  20#    3x12 nv          Kickball-upper cervical rotation @ wall  3x12 nv          Supine chin tuck/hold  10\"x  10 20\"x6                                    Ther Ex             SNAG-distr 10\"x  10 10\"x  10 10\"x10          SNAG-rot 10\"x  10 10\"x  10 pain          Thor ext sitting  3x10 3x10          Wall slide/shrug  3\"    3x12 nv          Cervical iso x 4   nv          Mod. Plantigrade  Neck ext.  3x10           UBE   3/3                                    Ther Activity                                       Gait Training                                     "   Modalities

## 2024-05-17 ENCOUNTER — OFFICE VISIT (OUTPATIENT)
Dept: PHYSICAL THERAPY | Age: 69
End: 2024-05-17
Payer: MEDICARE

## 2024-05-17 DIAGNOSIS — M54.2 CHRONIC NECK PAIN: Primary | ICD-10-CM

## 2024-05-17 DIAGNOSIS — M47.812 FACET ARTHRITIS, DEGENERATIVE, CERVICAL SPINE: ICD-10-CM

## 2024-05-17 DIAGNOSIS — G89.29 CHRONIC NECK PAIN: Primary | ICD-10-CM

## 2024-05-17 PROCEDURE — 97112 NEUROMUSCULAR REEDUCATION: CPT | Performed by: PHYSICAL THERAPIST

## 2024-05-17 PROCEDURE — 97140 MANUAL THERAPY 1/> REGIONS: CPT | Performed by: PHYSICAL THERAPIST

## 2024-05-17 PROCEDURE — 97110 THERAPEUTIC EXERCISES: CPT | Performed by: PHYSICAL THERAPIST

## 2024-05-17 NOTE — PROGRESS NOTES
"Daily Note     Today's date: 2024  Patient name: Brandy Stock  : 1955  MRN: [id number]  Referring provider: Levar Bland MD  Dx:   Encounter Diagnosis     ICD-10-CM    1. Chronic neck pain  M54.2     G89.29       2. Facet arthritis, degenerative, cervical spine  M47.812           Start Time: 1430  Stop Time: 1515  Total time in clinic (min): 45 minutes    Subjective: Pt reports improved frequency and decreased intensity of symptoms.       Objective: See treatment diary below      Assessment: Tolerated treatment well. POC progressed to include more cervical retraction interventions to decrease compression on suboccipital nerve. Patient demonstrated fatigue post treatment and would benefit from continued PT      Plan: Continue per plan of care.      Precautions: Lumbar laminectomy 1/10/24    Visit/Unit Tracking  AUTH Status:  Date            Medicare Used                Remaining                  1/10 2/10 3/10 4/10                 Manuals           SOR  Facilitated rotation  Manual traction  KD KD JF focus at EOP         Cervical retraction with PT OP    JF         SNAGS with PT mob    Considdr nv                      Neuro Re-Ed             Cervical retraction in sitting    With self OP prn 2x10  2x10 2x10         Scapular retraction in sitting 5\"  X 30  5\"x30          CC-row  20#    3x12 nv 3x10 blk TB         Kickball-upper cervical rotation @ wall  3x12 nv          Supine chin tuck/hold  10\"x  10 20\"x6 2x10x5\"                                   Ther Ex             SNAG-distr 10\"x  10 10\"x  10 10\"x10          SNAG-rot 10\"x  10 10\"x  10 pain 10\"x10\" improved with cueing, epmhasized towel at  uppper cspine         Thor ext sitting  3x10 3x10 2x10         Wall slide/shrug  3\"    3x12 nv          Cervical iso x 4   nv          Mod. Plantigrade  Neck ext.  3x10           UBE   3/3 3/3'                                   Ther Activity                                   "     Gait Training                                       Modalities

## 2024-05-20 ENCOUNTER — OFFICE VISIT (OUTPATIENT)
Dept: PHYSICAL THERAPY | Age: 69
End: 2024-05-20
Payer: MEDICARE

## 2024-05-20 DIAGNOSIS — G89.29 CHRONIC NECK PAIN: Primary | ICD-10-CM

## 2024-05-20 DIAGNOSIS — M47.812 FACET ARTHRITIS, DEGENERATIVE, CERVICAL SPINE: ICD-10-CM

## 2024-05-20 DIAGNOSIS — M54.2 CHRONIC NECK PAIN: Primary | ICD-10-CM

## 2024-05-20 PROCEDURE — 97140 MANUAL THERAPY 1/> REGIONS: CPT

## 2024-05-20 PROCEDURE — 97110 THERAPEUTIC EXERCISES: CPT

## 2024-05-20 PROCEDURE — 97112 NEUROMUSCULAR REEDUCATION: CPT

## 2024-05-23 ENCOUNTER — OFFICE VISIT (OUTPATIENT)
Dept: PHYSICAL THERAPY | Age: 69
End: 2024-05-23
Payer: MEDICARE

## 2024-05-23 DIAGNOSIS — M47.812 FACET ARTHRITIS, DEGENERATIVE, CERVICAL SPINE: ICD-10-CM

## 2024-05-23 DIAGNOSIS — G89.29 CHRONIC NECK PAIN: Primary | ICD-10-CM

## 2024-05-23 DIAGNOSIS — M54.2 CHRONIC NECK PAIN: Primary | ICD-10-CM

## 2024-05-23 PROCEDURE — 97110 THERAPEUTIC EXERCISES: CPT

## 2024-05-23 PROCEDURE — 97140 MANUAL THERAPY 1/> REGIONS: CPT

## 2024-05-23 PROCEDURE — 97112 NEUROMUSCULAR REEDUCATION: CPT

## 2024-05-23 NOTE — PROGRESS NOTES
"Daily Note     Today's date: 2024  Patient name: Brandy Stock  : 1955  MRN: [id number]  Referring provider: Levar Bland MD  Dx:   Encounter Diagnosis     ICD-10-CM    1. Chronic neck pain  M54.2     G89.29       2. Facet arthritis, degenerative, cervical spine  M47.812                      Subjective: Patient states she is feeling more sore today, \"It feels like arthritis.\"      Objective: See treatment diary below      Assessment: Tolerated treatment well. Noted mild relief from manual work. Incorporated gentle B UT region massage in supine along with gentle occipital release as well. Patient continues to have cervical and postural limitations.  Patient demonstrated fatigue post treatment and would benefit from continued PT      Plan: Continue per plan of care.  Incorporate more postural techniques next session.      Precautions: Lumbar laminectomy 1/10/24    Visit/Unit Tracking  AUTH Status:  Date          Medicare Used                Remaining                  1/10 2/10 3/10 4/10 5/10 6/10               Manuals        SOR  Facilitated rotation  Manual traction  KD KD JF focus at EOP MM GF, gentle B UT massage in supine          Cervical retraction with PT OP    JF MM GF       SNAGS with PT mob    Considdr nv                      Neuro Re-Ed             Cervical retraction in sitting    With self OP prn 2x10  2x10 2x10 2x10 2 x 10        Scapular retraction in sitting 5\"  X 30  5\"x30          CC-row  20#    3x12 nv 3x10 blk TB 3x10 blk TB Blk TB  3 x 10       Kickball-upper cervical rotation @ wall  3x12 nv  3x12 3 x 12   Blue ball       Supine chin tuck/hold  10\"x  10 20\"x6 2x10x5\" 2x10 5\" 5\" 2 x 10                                  Ther Ex             SNAG-distr 10\"x  10 10\"x  10 10\"x10          SNAG-rot 10\"x  10 10\"x  10 pain 10\"x10\" improved with cueing, epmhasized towel at  uppper cspine 10\"x10\" improved with cueing, epmhasized towel " "at  uppper cspine 10\" x 10       Thor ext sitting  3x10 3x10 2x10 2x10 2 x 10        Wall slide/shrug  3\"    3x12 nv  3x10 3 x 10        Cervical iso x 4   nv          Mod. Plantigrade  Neck ext.  3x10           UBE   3/3 3/3' 3'/3' 3'/3'                                 Ther Activity                                       Gait Training                                       Modalities                                              "

## 2024-05-29 ENCOUNTER — OFFICE VISIT (OUTPATIENT)
Dept: PHYSICAL THERAPY | Age: 69
End: 2024-05-29
Payer: MEDICARE

## 2024-05-29 DIAGNOSIS — M47.812 FACET ARTHRITIS, DEGENERATIVE, CERVICAL SPINE: ICD-10-CM

## 2024-05-29 DIAGNOSIS — G89.29 CHRONIC NECK PAIN: Primary | ICD-10-CM

## 2024-05-29 DIAGNOSIS — M54.2 CHRONIC NECK PAIN: Primary | ICD-10-CM

## 2024-05-29 PROCEDURE — 97140 MANUAL THERAPY 1/> REGIONS: CPT

## 2024-05-29 PROCEDURE — 97112 NEUROMUSCULAR REEDUCATION: CPT

## 2024-05-29 PROCEDURE — 97110 THERAPEUTIC EXERCISES: CPT

## 2024-05-29 NOTE — PROGRESS NOTES
"Daily Note     Today's date: 2024  Patient name: Brandy Stock  : 1955  MRN: [id number]  Referring provider: Levar Bland MD  Dx:   Encounter Diagnosis     ICD-10-CM    1. Chronic neck pain  M54.2     G89.29       2. Facet arthritis, degenerative, cervical spine  M47.812                        Subjective: Patient reports worse x 4 days. She was lying in bed most of that time.     6/10 pain upon leaving.    Objective: See treatment diary below      Assessment:   I recommended follow up with PCP today based on unchanging/chronic/worsening pain in neck.      Tolerated treatment well. Incorporated gentle B UT region massage in supine along with gentle occipital release as well. Patient continues to have cervical and postural limitations.  Isometrics for neck flexion improved her forward flexion motion. Patient demonstrated fatigue post treatment and would benefit from continued PT      Plan: Continue per plan of care.  Incorporate more postural techniques next session.      Precautions: Lumbar laminectomy 1/10/24    Visit/Unit Tracking  AUTH Status:  Date         Medicare Used                Remaining                  1/10 2/10 3/10 410 5/10 6/10 10              Manuals       SOR  Facilitated rotation  Manual traction  KD KD JF focus at EOP MM GF, gentle B UT massage in supine    KD      Cervical retraction with PT OP    JF MM GF KD      SNAGS with PT mob    Considdr nv   KD                   Neuro Re-Ed             Cervical retraction in sitting    With self OP prn 2x10  2x10 2x10 2x10 2 x 10  supine    5\"x20      Scapular retraction in sitting 5\"  X 30  5\"x30    nv      CC-row  20#    3x12 nv 3x10 blk TB 3x10 blk TB Blk TB  3 x 10 nv      Kickball-upper cervical rotation @ wall  3x12 nv  3x12 3 x 12   Blue ball 2x10      Supine chin tuck/hold  10\"x  10 20\"x6 2x10x5\" 2x10 5\" 5\" 2 x 10  2x10                                Ther Ex     " "        SNAG-distr 10\"x  10 10\"x  10 10\"x10          SNAG-rot 10\"x  10 10\"x  10 pain 10\"x10\" improved with cueing, epmhasized towel at  uppper cspine 10\"x10\" improved with cueing, epmhasized towel at  uppper cspine 10\" x 10 supine  10x  10\"      Thor ext sitting  3x10 3x10 2x10 2x10 2 x 10  nv      Wall slide/shrug  3\"    3x12 nv  3x10 3 x 10  nv      Cervical iso x 4   nv    Omit retr    5\"x10   -flx  -sb blanca      Mod. Plantigrade  Neck ext.  3x10           UBE   3/3 3/3' 3'/3' 3'/3' 3/3                                             Ther Activity                                       Gait Training                                       Modalities                                              "

## 2024-05-31 ENCOUNTER — OFFICE VISIT (OUTPATIENT)
Dept: PHYSICAL THERAPY | Age: 69
End: 2024-05-31
Payer: MEDICARE

## 2024-05-31 DIAGNOSIS — G89.29 CHRONIC NECK PAIN: Primary | ICD-10-CM

## 2024-05-31 DIAGNOSIS — M47.812 FACET ARTHRITIS, DEGENERATIVE, CERVICAL SPINE: ICD-10-CM

## 2024-05-31 DIAGNOSIS — M54.2 CHRONIC NECK PAIN: Primary | ICD-10-CM

## 2024-05-31 PROCEDURE — 97140 MANUAL THERAPY 1/> REGIONS: CPT

## 2024-05-31 PROCEDURE — 97110 THERAPEUTIC EXERCISES: CPT

## 2024-05-31 PROCEDURE — 97112 NEUROMUSCULAR REEDUCATION: CPT

## 2024-05-31 NOTE — PROGRESS NOTES
"Daily Note     Today's date: 2024  Patient name: Brandy Stock  : 1955  MRN: [id number]  Referring provider: Levar Bland MD  Dx:   Encounter Diagnosis     ICD-10-CM    1. Chronic neck pain  M54.2     G89.29       2. Facet arthritis, degenerative, cervical spine  M47.812           Start Time: 0930  Stop Time: 1015  Total time in clinic (min): 45 minutes    Subjective: Patient reports decreased cervical pain and discomfort since previous session and was able to lift her head off of the pillow this morning. Pain is graded 3/10 pre-treatment.       Objective: See treatment diary below      Assessment: Cuing for posturing throughout session and to avoid compensatory movements. She demonstrates decreased cervical rotation in both directions. Palpable tightness and muscular spasm along R>L cervical parapsinals. Tolerated treatment well. Patient demonstrated fatigue post treatment, exhibited good technique with therapeutic exercises, and would benefit from continued PT      Plan: Continue per plan of care.  Progress treatment as tolerated.       Precautions: Lumbar laminectomy 1/10/24    Visit/Unit Tracking  AUTH Status:  Date        Medicare Used                Remaining                  1/10 2/10 3/10 4/10 5/10 6/10 7/10 8/10             Manuals      SOR  Facilitated rotation  Manual traction  KD KD JF focus at EOP MM GF, gentle B UT massage in supine    KD JL and STM to R>L paraspinals     Cervical retraction with PT OP    JF MM GF KD      SNAGS with PT mob    Considdr nv   KD                   Neuro Re-Ed             Cervical retraction in sitting    With self OP prn 2x10  2x10 2x10 2x10 2 x 10  supine    5\"x20 Supine with towel roll 5\" 2x10     Scapular retraction in sitting 5\"  X 30  5\"x30    nv 5\" x20     CC-row  20#    3x12 nv 3x10 blk TB 3x10 blk TB Blk TB  3 x 10 nv      Kickball-upper cervical rotation @ wall  3x12 nv  " "3x12 3 x 12   Blue ball 2x10 2x10     Supine chin tuck/hold  10\"x  10 20\"x6 2x10x5\" 2x10 5\" 5\" 2 x 10  2x10 5' 2x10                               Ther Ex             SNAG-distr 10\"x  10 10\"x  10 10\"x10          SNAG-rot 10\"x  10 10\"x  10 pain 10\"x10\" improved with cueing, epmhasized towel at  uppper cspine 10\"x10\" improved with cueing, epmhasized towel at  uppper cspine 10\" x 10 supine  10x  10\" Seated 10\" x10     Thor ext sitting  3x10 3x10 2x10 2x10 2 x 10  nv 5\" x10 1/2 foam     Wall slide/shrug  3\"    3x12 nv  3x10 3 x 10  nv      Cervical iso x 4   nv    Omit retr    5\"x10   -flx  -sb blanca Iso Flex and SB B  5\" x10 ea     Mod. Plantigrade  Neck ext.  3x10           UBE   3/3 3/3' 3'/3' 3'/3' 3/3 3'/3'                                            Ther Activity                                       Gait Training                                       Modalities                                                "

## 2024-06-03 NOTE — PROGRESS NOTES
"Daily Note     Today's date: 2024  Patient name: Brandy Stock  : 1955  MRN: [id number]  Referring provider: Levar Bland MD  Dx:   Encounter Diagnosis     ICD-10-CM    1. Chronic neck pain  M54.2     G89.29       2. Facet arthritis, degenerative, cervical spine  M47.812           Start Time: 0930  Stop Time: 1015  Total time in clinic (min): 45 minutes    Subjective: Patient reports decreased cervical pain and discomfort since previous session and was able to lift her head off of the pillow this morning. Pain is graded 3/10 pre-treatment.       Objective: See treatment diary below      Assessment: Cuing for posturing throughout session and to avoid compensatory movements. She demonstrates decreased cervical rotation in both directions. Palpable tightness and muscular spasm along R>L cervical parapsinals. Tolerated treatment well. Patient demonstrated fatigue post treatment, exhibited good technique with therapeutic exercises, and would benefit from continued PT      Plan: Continue per plan of care.  Progress treatment as tolerated.       Precautions: Lumbar laminectomy 1/10/24    Visit/Unit Tracking  AUTH Status:  Date  6/4      Medicare Used                Remaining                  1/10 2/10 3/10 4/10 5/10 6/10 7/10 8/10 9/10       FOTO         compelted            Manuals  6/4     SOR  Facilitated rotation  Manual traction  KD KD JF focus at EOP MM GF, gentle B UT massage in supine    KD JF and STM to R>L paraspinals     Cervical retraction with PT OP    JF MM GF KD JF     SNAGS with PT mob    Considdr nv   KD JF                  Neuro Re-Ed             Cervical retraction in sitting    With self OP prn 2x10  2x10 2x10 2x10 2 x 10  supine    5\"x20 Supine with towel roll 5\" 2x10     Scapular retraction in sitting 5\"  X 30  5\"x30    nv 5\" x20     CC-row  20#    3x12 nv 3x10 blk TB 3x10 blk TB Blk TB  3 x 10 nv    " "  Kickball-upper cervical rotation @ wall  3x12 nv  3x12 3 x 12   Blue ball 2x10 2x10     Supine chin tuck/hold  10\"x  10 20\"x6 2x10x5\" 2x10 5\" 5\" 2 x 10  2x10 5' 2x10                               Ther Ex             SNAG-distr 10\"x  10 10\"x  10 10\"x10          SNAG-rot 10\"x  10 10\"x  10 pain 10\"x10\" improved with cueing, epmhasized towel at  uppper cspine 10\"x10\" improved with cueing, epmhasized towel at  uppper cspine 10\" x 10 supine  10x  10\" Seated 10\" x10     Thor ext sitting  3x10 3x10 2x10 2x10 2 x 10  nv 5\" x10 1/2 foam     Wall slide/shrug  3\"    3x12 nv  3x10 3 x 10  nv      Cervical iso x 4   nv    Omit retr    5\"x10   -flx  -sb blanca Iso Flex and SB B  5\" x10 ea     Mod. Plantigrade  Neck ext.  3x10           UBE   3/3 3/3' 3'/3' 3'/3' 3/3 3'/3'                                            Ther Activity                                       Gait Training                                       Modalities                                                "

## 2024-06-04 ENCOUNTER — OFFICE VISIT (OUTPATIENT)
Dept: PHYSICAL THERAPY | Age: 69
End: 2024-06-04
Payer: MEDICARE

## 2024-06-04 ENCOUNTER — TELEPHONE (OUTPATIENT)
Age: 69
End: 2024-06-04

## 2024-06-04 DIAGNOSIS — M54.2 CHRONIC NECK PAIN: Primary | ICD-10-CM

## 2024-06-04 DIAGNOSIS — G89.29 CHRONIC NECK PAIN: Primary | ICD-10-CM

## 2024-06-04 DIAGNOSIS — M47.812 FACET ARTHRITIS, DEGENERATIVE, CERVICAL SPINE: ICD-10-CM

## 2024-06-04 PROCEDURE — 97110 THERAPEUTIC EXERCISES: CPT | Performed by: PHYSICAL THERAPIST

## 2024-06-04 PROCEDURE — 97140 MANUAL THERAPY 1/> REGIONS: CPT | Performed by: PHYSICAL THERAPIST

## 2024-06-04 PROCEDURE — 97112 NEUROMUSCULAR REEDUCATION: CPT | Performed by: PHYSICAL THERAPIST

## 2024-06-07 ENCOUNTER — OFFICE VISIT (OUTPATIENT)
Dept: PHYSICAL THERAPY | Age: 69
End: 2024-06-07
Payer: MEDICARE

## 2024-06-07 DIAGNOSIS — M54.2 CHRONIC NECK PAIN: Primary | ICD-10-CM

## 2024-06-07 DIAGNOSIS — M47.812 FACET ARTHRITIS, DEGENERATIVE, CERVICAL SPINE: ICD-10-CM

## 2024-06-07 DIAGNOSIS — G89.29 CHRONIC NECK PAIN: Primary | ICD-10-CM

## 2024-06-07 PROCEDURE — 97112 NEUROMUSCULAR REEDUCATION: CPT

## 2024-06-07 PROCEDURE — 97110 THERAPEUTIC EXERCISES: CPT

## 2024-06-07 PROCEDURE — 97140 MANUAL THERAPY 1/> REGIONS: CPT

## 2024-06-07 NOTE — PROGRESS NOTES
Daily Note     Today's date: 2024  Patient name: Brandy Stock  : 1955  MRN: [id number]  Referring provider: Levar Bland MD  Dx:   Encounter Diagnosis     ICD-10-CM    1. Chronic neck pain  M54.2     G89.29       2. Facet arthritis, degenerative, cervical spine  M47.812           Start Time: 1258  Stop Time: 1345  Total time in clinic (min): 47 minutes    Subjective: Patient reports that she was cleaning her attic out yesterday and fell in the attic area and fell into a wall stud and has a gash in her left shin that required stitches. She also reports left shoulder soreness and discomfort, but no loss of movement. She states that her cervical area feels good and she denies complaints there today or after previous session.       Objective: See treatment diary below      Assessment: Increased reps where noted and added TB Rows and Shoulder Ext as indicated below. Patient was challenged by shoulder Ext and muscular fatigue noted afterwards. Continued palpable tightness along right UT, scalenes and cervical paraspinals. Tolerated treatment well. Patient demonstrated fatigue post treatment, exhibited good technique with therapeutic exercises, and would benefit from continued PT      Plan: Continue per plan of care.  Progress treatment as tolerated.       Precautions: Lumbar laminectomy 1/10/24    Visit/Unit Tracking  AUTH Status:  Date      Medicare Used                Remaining                  1/10 2/10 3/10 4/10 5/10 6/10 7/10 8/10 9/10 10/10      FOTO         compelted            Manuals  6/    SOR  Facilitated rotation  Manual traction  KD KD JF focus at EOP MM GF, gentle B UT massage in supine    KD JL  and STM to R>L paraspinals JF and STM to R>L paraspinals JL and STM    Cervical retraction with PT OP    JF MM GF KD  JF     SNAGS with PT mob    Considdr nv   KD  JF                   Neuro Re-Ed             "  Cervical retraction in sitting    With self OP prn 2x10  2x10 2x10 2x10 2 x 10  supine    5\"x20 Supine with towel roll 5\" 2x10 Supine with towel roll 5\" 2x10 Seated  5\" x20 with OP    Scapular retraction in sitting 5\"  X 30  5\"x30    nv 5\"x20 5\" x20     CC-row  20#    3x12 nv 3x10 blk TB 3x10 blk TB Blk TB  3 x 10 nv   TB Row/Ext:   Blue 3\" 3x10 ea    Kickball-upper cervical rotation @ wall  3x12 nv  3x12 3 x 12   Blue ball 2x10 2x10 2x10 3x10    Supine chin tuck/hold  10\"x  10 20\"x6 2x10x5\" 2x10 5\" 5\" 2 x 10  2x10 5\" 2x10 5' 2x10 5\" 2x10                                Ther Ex              SNAG-distr 10\"x  10 10\"x  10 10\"x10           SNAG-rot 10\"x  10 10\"x  10 pain 10\"x10\" improved with cueing, epmhasized towel at  uppper cspine 10\"x10\" improved with cueing, epmhasized towel at  uppper cspine 10\" x 10 supine  10x  10\" Seated 10\" x10 Seated 10\" x10 Seated 10\"x10    Thor ext sitting  3x10 3x10 2x10 2x10 2 x 10  nv 5\" x10 1/2 foam 5\" x10 1/2 foam 1/2 foam 10\"x10    Wall slide/shrug  3\"    3x12 nv  3x10 3 x 10  nv       Cervical iso x 4   nv    Omit retr    5\"x10   -flx  -sb blanca Iso Flex and SB B  5\" x10 ea Iso Flex and SB B  5\" x10 ea Iso Flex and SB B  5\" x15 ea    Mod. Plantigrade  Neck ext.  3x10            UBE   3/3 3/3' 3'/3' 3'/3' 3/3 3'/3' 3'/3' 3'/3'                                              Ther Activity                                          Gait Training                                          Modalities                                                     "

## 2024-06-07 NOTE — PROGRESS NOTES
PT Re-Evaluation     Today's date: 2024  Patient name: Brandy Stock  : 1955  MRN: [id number]  Referring provider: Levar Bland MD  Dx:   Encounter Diagnosis     ICD-10-CM    1. Chronic neck pain  M54.2     G89.29       2. Facet arthritis, degenerative, cervical spine  M47.812           Start Time: 1258          Assessment  Impairments: abnormal or restricted ROM, impaired physical strength, lacks appropriate home exercise program, pain with function, poor posture  and poor body mechanics  Symptom irritability: moderate    Assessment details: Pt is a 67 y/o female who presents with neck pain.  Screening and examination shows deficits consistent with referring diagnosis of myofascial pain and spasms of suboccipital/paraspinal musculature.   Since initially starting therapy, she has shown improvements in her pain and dysfunction.  She does have HA, pain, limited ROM in neck. Pt deficits limit sitting, turning, prolonged postures.  Pt is experiencing pain, decreased strength, and decreased ROM. Pt has a positive prognosis. Pt would benefit from PT to address these impairments leading to increased functional capacity and improved quality of life.    Understanding of Dx/Px/POC: good     Prognosis: good    Goals  IN 2-4 WEEKS: ALL GOALS ONGOING, GOOD PROGRESS  Pt will improve pain on VAPS by 1-2 points  Pt will demonstrate understanding, independence with HEP    In 6-8 WEEKS  Pt will demonstrate improved ROM in spine to WFL  Pt will improve pain by >50% overall indicating improved tolerance to activities  Pt will improve sitting tolerance >30 mins  Pt will improve FOTO score at or above prognostic value          Plan  Patient would benefit from: skilled physical therapy  Planned modality interventions: cryotherapy and thermotherapy: hydrocollator packs    Planned therapy interventions: neuromuscular re-education, patient education, stretching, strengthening, therapeutic activities, therapeutic exercise,  therapeutic training, home exercise program and graded activity    Frequency: 2x week  Duration in weeks: 4  Treatment plan discussed with: patient    Subjective Evaluation    History of Present Illness  Mechanism of injury: Pt reports she feels like she is finally getting improvement in her neck symptoms.  She is doing pretty well.  She still has tightness.  She also notes a fall while doing some tasks but is feeling alright since.       Objective     Concurrent Complaints  Negative for night pain, disturbed sleep, dizziness, faints, headaches, nausea/motion sickness, tinnitus, trouble swallowing, difficulty breathing, shortness of breath, respiratory pain, visual change, cardiac problem, kidney problem, gallbladder problem, stomach problem, ulcer, appendix problem, spleen problem, pancreas problem, history of cancer, history of trauma and infection    Neurological Testing     Reflexes   Left   Biceps (C5/C6): brisk (3+)  Brachioradialis (C6): normal (2+)  Louis's reflex: negative    Right   Biceps (C5/C6): brisk (3+)  Brachioradialis (C6): normal (2+)  Louis's reflex: negative    Active Range of Motion   Cervical/Thoracic Spine       Cervical  Subcranial protraction:   Restriction level: minimal  Subcranial retraction:   Restriction level: maximal  Flexion: 30 degrees  Restriction level: maximal  Extension: 35 degrees     Restriction level: minimal  Left lateral flexion: 10 degrees     Restriction level: maximal  Right lateral flexion: 10 degrees     Restriction level maximal  Left rotation: 50 degrees Restriction level: maximal  Right rotation: 55 degrees    Restriction level: maximal    Strength/Myotome Testing   Cervical Spine   Neck extension: 3  Neck flexion: 3+    General Comments:    Upper quarter screen   Shoulder: unremarkable    Cervical/Thoracic Comments  SCM-guarded  TTP suboccipitals    Improved pain with distraction  Neuro Exam:     Headaches   Patient reports headaches: No.

## 2024-06-11 ENCOUNTER — OFFICE VISIT (OUTPATIENT)
Dept: PHYSICAL THERAPY | Age: 69
End: 2024-06-11
Payer: MEDICARE

## 2024-06-11 DIAGNOSIS — M47.812 FACET ARTHRITIS, DEGENERATIVE, CERVICAL SPINE: ICD-10-CM

## 2024-06-11 DIAGNOSIS — M54.2 CHRONIC NECK PAIN: Primary | ICD-10-CM

## 2024-06-11 DIAGNOSIS — G89.29 CHRONIC NECK PAIN: Primary | ICD-10-CM

## 2024-06-11 PROCEDURE — 97112 NEUROMUSCULAR REEDUCATION: CPT

## 2024-06-11 PROCEDURE — 97140 MANUAL THERAPY 1/> REGIONS: CPT

## 2024-06-11 PROCEDURE — 97110 THERAPEUTIC EXERCISES: CPT

## 2024-06-11 NOTE — PROGRESS NOTES
"Daily Note     Today's date: 2024  Patient name: Brandy Stock  : 1955  MRN: [id number]  Referring provider: Levar Bland MD  Dx:   Encounter Diagnosis     ICD-10-CM    1. Chronic neck pain  M54.2     G89.29       2. Facet arthritis, degenerative, cervical spine  M47.812           Start Time: 1145  Stop Time: 1235  Total time in clinic (min): 50 minutes    Subjective: Patient reports that she has continued cervical tightness and soreness and feels that she has plateaued with progress.       Objective: See treatment diary below      Assessment: No changes to program this session. However, did add manual UT/Levator and Cervical Rotation stretches with good stretch response. May benefit from addition of self stretching to HEP. Tolerated treatment well. Patient demonstrated fatigue post treatment, exhibited good technique with therapeutic exercises, and would benefit from continued PT Decreased tightness noted after manuals.       Plan: Continue per plan of care.  Progress treatment as tolerated.       Precautions: Lumbar laminectomy 1/10/24    Visit/Unit Tracking  AUTH Status:  Date     Medicare Used                Remaining                  1/10 2/10 3/10 4/10 5/10 6/10 7/10 8/10 9/10 10/10 1/10     FOTO         compelted            Manuals    SOR  Facilitated rotation  Manual traction  KD KD JF focus at EOP MM GF, gentle B UT massage in supine    KD JL  and STM to R>L paraspinals JF and STM to R>L paraspinals JL and STM Distraction, SOR, Manual UT/LS/Rot STM JL   Cervical retraction with PT OP    JF MM GF KD  JF     SNAGS with PT mob    Considdr nv   KD  JF                   Neuro Re-Ed              Cervical retraction in sitting    With self OP prn 2x10  2x10 2x10 2x10 2 x 10  supine    5\"x20 Supine with towel roll 5\" 2x10 Supine with towel roll 5\" 2x10 Seated  5\" x20 with OP Seated  5\" x20 with " "OP   Scapular retraction in sitting 5\"  X 30  5\"x30    nv 5\"x20 5\" x20     CC-row  20#    3x12 nv 3x10 blk TB 3x10 blk TB Blk TB  3 x 10 nv   TB Row/Ext:   Blue 3\" 3x10 ea TB Row/Ext:   Blue 3\" 3x10 ea   Kickball-upper cervical rotation @ wall  3x12 nv  3x12 3 x 12   Blue ball 2x10 2x10 2x10 3x10    Supine chin tuck/hold  10\"x  10 20\"x6 2x10x5\" 2x10 5\" 5\" 2 x 10  2x10 5\" 2x10 5' 2x10 5\" 2x10                                Ther Ex              SNAG-distr 10\"x  10 10\"x  10 10\"x10           SNAG-rot 10\"x  10 10\"x  10 pain 10\"x10\" improved with cueing, epmhasized towel at  uppper cspine 10\"x10\" improved with cueing, epmhasized towel at  uppper cspine 10\" x 10 supine  10x  10\" Seated 10\" x10 Seated 10\" x10 Seated 10\"x10 Seated 10\"x10   Thor ext sitting  3x10 3x10 2x10 2x10 2 x 10  nv 5\" x10 1/2 foam 5\" x10 1/2 foam 1/2 foam 10\"x10 1/2 foam 10\"x10   Wall slide/shrug  3\"    3x12 nv  3x10 3 x 10  nv       Cervical iso x 4   nv    Omit retr    5\"x10   -flx  -sb blanca Iso Flex and SB B  5\" x10 ea Iso Flex and SB B  5\" x10 ea Iso Flex and SB B  5\" x15 ea Iso Flex and SB B  5\" x15 ea   Mod. Plantigrade  Neck ext.  3x10            UBE   3/3 3/3' 3'/3' 3'/3' 3/3 3'/3' 3'/3' 3'/3' 3'/3'                                             Ther Activity                                          Gait Training                                          Modalities                                               "

## 2024-06-13 ENCOUNTER — OFFICE VISIT (OUTPATIENT)
Dept: PHYSICAL THERAPY | Age: 69
End: 2024-06-13
Payer: MEDICARE

## 2024-06-13 DIAGNOSIS — M54.2 CHRONIC NECK PAIN: Primary | ICD-10-CM

## 2024-06-13 DIAGNOSIS — G89.29 CHRONIC NECK PAIN: Primary | ICD-10-CM

## 2024-06-13 PROCEDURE — 97110 THERAPEUTIC EXERCISES: CPT

## 2024-06-13 PROCEDURE — 97112 NEUROMUSCULAR REEDUCATION: CPT

## 2024-06-13 PROCEDURE — 97140 MANUAL THERAPY 1/> REGIONS: CPT

## 2024-06-13 NOTE — PROGRESS NOTES
"Daily Note     Today's date: 2024  Patient name: Brandy Stock  : 1955  MRN: [id number]  Referring provider: Levar Bland MD  Dx:   Encounter Diagnosis     ICD-10-CM    1. Chronic neck pain  M54.2     G89.29                      Subjective: Patient reports that she feels as if she continues to get better but still has some tightness present in her neck. She is able to drive with improved motion.       Objective: See treatment diary below      Assessment: Tightness was present again today in both sides but did loosen up as manuals and self stretches were performed. Tolerated treatment well. Patient demonstrated fatigue post treatment, exhibited good technique with therapeutic exercises, and would benefit from continued PT.       Plan: Continue per plan of care.  Progress treatment as tolerated.       Precautions: Lumbar laminectomy 1/10/24    Visit/Unit Tracking  AUTH Status:  Date    Medicare Used                Remaining                  1/10 2/10 3/10 4/10 5/10 6/10 7/10 8/10 9/10 10/10 1/10 2/10    FOTO         compelted            Manuals    SOR  Facilitated rotation  Manual traction MM GF, gentle B UT massage in supine    KD JL  and STM to R>L paraspinals JF and STM to R>L paraspinals JL and STM Distraction, SOR, Manual UT/LS/Rot STM JL Distraction, SOR, Manual UT/LS/Rot STM MM   Cervical retraction with PT OP MM GF KD  JF      SNAGS with PT mob   KD  JF                 Neuro Re-Ed           Cervical retraction in sitting    With self OP prn 2x10 2 x 10  supine    5\"x20 Supine with towel roll 5\" 2x10 Supine with towel roll 5\" 2x10 Seated  5\" x20 with OP Seated  5\" x20 with OP Seated  5\" x20 with OP   Scapular retraction in sitting   nv 5\"x20 5\" x20      CC-row 3x10 blk TB Blk TB  3 x 10 nv   TB Row/Ext:   Blue 3\" 3x10 ea TB Row/Ext:   Blue 3\" 3x10 ea TB Row/Ext:   Blue 3\" 3x10 ea   Kickball-upper " "cervical rotation @ wall 3x12 3 x 12   Blue ball 2x10 2x10 2x10 3x10  3x10   Supine chin tuck/hold 2x10 5\" 5\" 2 x 10  2x10 5\" 2x10 5' 2x10 5\" 2x10                           Ther Ex           SNAG-distr           SNAG-rot 10\"x10\" improved with cueing, epmhasized towel at  uppper cspine 10\" x 10 supine  10x  10\" Seated 10\" x10 Seated 10\" x10 Seated 10\"x10 Seated 10\"x10 Seated 10\"x10   Thor ext sitting 2x10 2 x 10  nv 5\" x10 1/2 foam 5\" x10 1/2 foam 1/2 foam 10\"x10 1/2 foam 10\"x10 1/2 foam 10\"x10   Wall slide/shrug 3x10 3 x 10  nv        Cervical iso x 4   Omit retr    5\"x10   -flx  -sb blanca Iso Flex and SB B  5\" x10 ea Iso Flex and SB B  5\" x10 ea Iso Flex and SB B  5\" x15 ea Iso Flex and SB B  5\" x15 ea Iso Flex and SB B  5\" x15 ea   Mod. Plantigrade  Neck ext.           UBE 3'/3' 3'/3' 3/3 3'/3' 3'/3' 3'/3' 3'/3' 3'/3'                                    Ther Activity                                 Gait Training                                 Modalities                                      "

## 2024-06-14 ENCOUNTER — OFFICE VISIT (OUTPATIENT)
Dept: FAMILY MEDICINE CLINIC | Facility: CLINIC | Age: 69
End: 2024-06-14
Payer: MEDICARE

## 2024-06-14 VITALS
BODY MASS INDEX: 25.46 KG/M2 | RESPIRATION RATE: 16 BRPM | TEMPERATURE: 97.7 F | WEIGHT: 168 LBS | HEART RATE: 67 BPM | HEIGHT: 68 IN | OXYGEN SATURATION: 99 % | DIASTOLIC BLOOD PRESSURE: 72 MMHG | SYSTOLIC BLOOD PRESSURE: 126 MMHG

## 2024-06-14 DIAGNOSIS — L03.116 CELLULITIS OF LEFT LOWER EXTREMITY: Primary | ICD-10-CM

## 2024-06-14 DIAGNOSIS — S81.812A LACERATION OF SKIN OF LEFT LOWER LEG, INITIAL ENCOUNTER: ICD-10-CM

## 2024-06-14 PROBLEM — M46.1 SACROILIITIS (HCC): Status: RESOLVED | Noted: 2021-05-19 | Resolved: 2024-06-14

## 2024-06-14 PROBLEM — I71.21 ASCENDING AORTIC ANEURYSM (HCC): Status: RESOLVED | Noted: 2020-12-28 | Resolved: 2024-06-14

## 2024-06-14 PROBLEM — M35.9 UNDIFFERENTIATED CONNECTIVE TISSUE DISEASE (HCC): Status: RESOLVED | Noted: 2023-01-04 | Resolved: 2024-06-14

## 2024-06-14 PROCEDURE — G2211 COMPLEX E/M VISIT ADD ON: HCPCS | Performed by: FAMILY MEDICINE

## 2024-06-14 PROCEDURE — 99213 OFFICE O/P EST LOW 20 MIN: CPT | Performed by: FAMILY MEDICINE

## 2024-06-14 RX ORDER — DOXYCYCLINE HYCLATE 100 MG/1
100 CAPSULE ORAL
Qty: 14 CAPSULE | Refills: 0 | Status: SHIPPED | OUTPATIENT
Start: 2024-06-14 | End: 2024-06-20 | Stop reason: SDUPTHER

## 2024-06-14 NOTE — PROGRESS NOTES
Ambulatory Visit  Name: Brandy Stock      : 1955      MRN: [id number]  Encounter Provider: Levar Bland MD  Encounter Date: 2024   Encounter department: [facility]    Assessment & Plan   1. Cellulitis of left lower extremity  -     doxycycline hyclate (VIBRAMYCIN) 100 mg capsule; Take 1 capsule (100 mg total) by mouth 2 (two) times daily after meals for 7 days  2. Laceration of skin of left lower leg, initial encounter      Start oral antibiotics for cellulitis left lower extremity.  Cleanse area daily with soap and water.  I removed a total of 6 sutures today return next week to have remaining sutures removed.  Advised to call if any changes       History of Present Illness     Patient presented for suture removal lacerations left lower leg 8 days ago after a fall.  Up-to-date with tetanus.  She developed redness around one of the lacerations.  No drainage.  No fevers or chills.      Review of Systems   Constitutional:  Negative for chills and fever.   Skin:  Positive for wound.        See HPI      Past Medical History:   Diagnosis Date    Allergic 1967    Arthritis     Asymptomatic hyperuricemia     BRCA1 negative     BRCA2 negative     Breast cancer (HCC) 2014    radiation    Cancer (HCC) 2013    Disease of thyroid gland     History of radiation therapy 2014    breast cancer    Hypertension     Hypothyroid     Knee pain, right     Limb alert care status     no BP's /IV's right side    Mitral valve prolapse     Numbness of anterior thigh     left and tingling    Osteoarthritis     Scoliosis 2013    Seasonal allergies     dust,mold,trees    Urinary incontinence     Varicose veins of both lower extremities     Wears glasses      Past Surgical History:   Procedure Laterality Date    BREAST BIOPSY Left 2002    BREAST BIOPSY Left 2005    high risk    BREAST BIOPSY Right 2013    positive    BREAST EXCISIONAL BIOPSY Left 2006    high risk    BREAST  "EXCISIONAL BIOPSY Left 10/13/2006    BREAST LUMPECTOMY Right 01/17/2014    malignant    BREAST SURGERY Right     lumpectomy    BUNIONECTOMY Bilateral     COLONOSCOPY      EXPLORATORY LAPAROTOMY      \"for fertility\"    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    JOINT REPLACEMENT  2017    KNEE ARTHROSCOPY Right     LYMPH NODE BIOPSY  2014    GA ARTHRP KNE CONDYLE&PLATU MEDIAL&LAT COMPARTMENTS Right 01/20/2017    Procedure: ARTHROPLASTY KNEE TOTAL;  Surgeon: Gabriel Razo MD;  Location: [facility];  Service: Orthopedics    GA HYSTEROSCOPY BX ENDOMETRIUM&/POLYPC W/WO D&C N/A 07/29/2022    Procedure: DILATATION AND CURETTAGE (D&C) WITH HYSTEROSCOPY;  Surgeon: Ashley Morgan MD;  Location: [facility];  Service: Gynecology    GA TENDON SHEATH INCISION Right 04/05/2022    Procedure: Right long and ring finger trigger finger release;  Surgeon: Matthew Jarvis MD;  Location: [facility];  Service: Orthopedics    TONSILLECTOMY      WISDOM TOOTH EXTRACTION       Family History   Problem Relation Age of Onset    Cancer Mother         Bladder CA    Throat cancer Father 80    Cancer Father     No Known Problems Daughter     No Known Problems Daughter     No Known Problems Maternal Grandmother     No Known Problems Maternal Grandfather     No Known Problems Paternal Grandmother     No Known Problems Paternal Grandfather     No Known Problems Maternal Aunt     No Known Problems Maternal Aunt     No Known Problems Maternal Aunt     No Known Problems Maternal Aunt     Colon cancer Maternal Uncle 70    Breast cancer additional onset Paternal Aunt 55        Also maternal cousin Maricruz    No Known Problems Paternal Aunt     Breast cancer additional onset Cousin 35    Uterine cancer Cousin 61    Lung cancer Cousin 76     Social History     Tobacco Use    Smoking status: Never    Smokeless tobacco: Never   Vaping Use    Vaping status: Never Used   Substance and Sexual Activity    Alcohol use: Yes     Comment: social    " Drug use: No    Sexual activity: Not Currently     Partners: Male     Birth control/protection: None     Current Outpatient Medications on File Prior to Visit   Medication Sig    Biotin 2500 MCG CAPS     Calcium Carbonate-Vit D-Min (CALTRATE 600+D PLUS MINERALS PO)     chlorthalidone 25 mg tablet Take 1 tablet (25 mg total) by mouth daily    levothyroxine 75 mcg tablet Take 1 tablet by mouth every other day    levothyroxine 88 mcg tablet Take 1 tablet (88 mcg total) by mouth every other day    loratadine (CLARITIN) 10 mg tablet Take 10 mg by mouth daily    methocarbamol (ROBAXIN) 500 mg tablet Take 1 tablet (500 mg total) by mouth 4 (four) times a day as needed for muscle spasms    Multiple Vitamins-Minerals (MULTIVITAMIN ADULT PO) daily after breakfast    potassium chloride (K-DUR,KLOR-CON) 10 mEq tablet Take 1 tablet (10 mEq total) by mouth daily    solifenacin (VESICARE) 10 MG tablet Take 1 tablet (10 mg total) by mouth daily    thiamine (VITAMIN B1) 100 mg tablet Take 100 mg by mouth daily      zinc gluconate 50 mg tablet Take 50 mg by mouth daily       Allergies   Allergen Reactions    Actifed Cold-Allergy [Chlorpheniramine-Phenylephrine] Swelling and Rash     Throat and face swelled    Penicillins Rash    Vancomycin Other (See Comments)     redness     Immunization History   Administered Date(s) Administered    COVID-19 MODERNA VACC 0.25 ML IM BOOSTER 11/09/2021    COVID-19 MODERNA VACC 0.5 ML IM 12/30/2020, 01/25/2021, 02/13/2023    H1N1, All Formulations 10/26/2009    INFLUENZA 10/01/2016, 10/26/2018, 10/29/2020, 10/30/2023    Influenza, high dose seasonal 0.7 mL 10/11/2021    Influenza, seasonal, injectable 10/01/2016    Pneumococcal Conjugate 13-Valent 03/20/2019    Pneumococcal Polysaccharide PPV23 06/23/2020    Tdap 01/03/2018, 02/21/2020    Zoster Vaccine Recombinant 05/13/2019, 09/03/2019     Objective     /72 (BP Location: Left arm, Patient Position: Sitting, Cuff Size: Large)   Pulse 67    "Temp 97.7 °F (36.5 °C) (Temporal)   Resp 16   Ht 5' 7.5\" (1.715 m)   Wt 76.2 kg (168 lb)   LMP  (LMP Unknown)   SpO2 99%   BMI 25.92 kg/m²     Physical Exam  Cardiovascular:      Rate and Rhythm: Normal rate.   Musculoskeletal:      Left knee: Normal range of motion.      Right lower leg: No edema.      Left lower leg: No edema.   Skin:     Findings: Laceration present.      Comments: See photo            Administrative Statements         "

## 2024-06-14 NOTE — PROGRESS NOTES
Informed patient I will have  come in to look at stitcihes before taken them out, need to make sure its okay to take out patient understood   Patient states she fell while getting something out from the garage and got her self on a pice of wood

## 2024-06-17 ENCOUNTER — CLINICAL SUPPORT (OUTPATIENT)
Dept: FAMILY MEDICINE CLINIC | Facility: CLINIC | Age: 69
End: 2024-06-17

## 2024-06-17 VITALS
HEIGHT: 68 IN | DIASTOLIC BLOOD PRESSURE: 68 MMHG | OXYGEN SATURATION: 99 % | SYSTOLIC BLOOD PRESSURE: 118 MMHG | BODY MASS INDEX: 25.76 KG/M2 | TEMPERATURE: 97 F | WEIGHT: 170 LBS | RESPIRATION RATE: 16 BRPM | HEART RATE: 68 BPM

## 2024-06-17 DIAGNOSIS — Z48.02 VISIT FOR SUTURE REMOVAL: Primary | ICD-10-CM

## 2024-06-17 NOTE — PROGRESS NOTES
Patient came in for stitch removal on her left shin, patient see last week by  06/14/24. Patient stated she was put on antibiotics is on day 4 of medication. Had patient remove bandages from wound area bandages did have some blood on it. Informed patient I was going to have on the providers in the office today look at area before removing stitches to make sure it is okay to remove, patient understood.   went in to see patient and leg after accessing patient,  verbally stated she would have patient come back on Wednesday, since it looked like if stitches were to be removed today bleeding could happened, and to re-dress wound.   Went back in to inform patient and to cover wound with bandages, patient states last time she had a bandage on it she had some blood on it and goo on it asked patient if she would like a different dressing patient states no a bandage was fine since she would take it off later today to clean wound area.Stitches were not removed today 06/17/24. Patient was advised to come back on Wednesday, 06/19/24

## 2024-06-18 ENCOUNTER — OFFICE VISIT (OUTPATIENT)
Dept: PHYSICAL THERAPY | Age: 69
End: 2024-06-18
Payer: MEDICARE

## 2024-06-18 DIAGNOSIS — M47.812 FACET ARTHRITIS, DEGENERATIVE, CERVICAL SPINE: ICD-10-CM

## 2024-06-18 DIAGNOSIS — M54.2 CHRONIC NECK PAIN: Primary | ICD-10-CM

## 2024-06-18 DIAGNOSIS — G89.29 CHRONIC NECK PAIN: Primary | ICD-10-CM

## 2024-06-18 PROCEDURE — 97110 THERAPEUTIC EXERCISES: CPT

## 2024-06-18 PROCEDURE — 97140 MANUAL THERAPY 1/> REGIONS: CPT

## 2024-06-18 NOTE — PROGRESS NOTES
"Daily Note     Today's date: 2024  Patient name: Brandy Stock  : 1955  MRN: [id number]  Referring provider: Levar Bland MD  Dx:   Encounter Diagnosis     ICD-10-CM    1. Chronic neck pain  M54.2     G89.29       2. Facet arthritis, degenerative, cervical spine  M47.812             Start Time: 1100  Stop Time: 1145  Total time in clinic (min): 45 minutes    Subjective: Patient reports that she feels as if she continues to get better but still has some tightness present in her neck. She is able to drive with improved motion.       Objective: See treatment diary below      Assessment: Pt able to progress program from early motion and isometric phase to more resisted training for neck. Tolerated treatment well. Patient demonstrated fatigue post treatment, exhibited good technique with therapeutic exercises, would benefit from continued PT, and reported improved symptoms following treatment today. .       Plan: Continue per plan of care.  Progress treatment as tolerated.       Precautions: Lumbar laminectomy 1/10/24    Visit/Unit Tracking  AUTH Status:  Date               Medicare Used 4/10               Remaining                                 FOTO                     Manuals    SOR  Facilitated rotation  Manual traction KD       Distraction, SOR, Manual UT/LS/Rot STM MM   Cervical retraction with PT OP           SNAGS with PT mob                      Neuro Re-Ed           Cervical retraction in sitting    With self OP prn        Seated  5\" x20 with OP   Scapular retraction in sitting           CC-row 11#    3 x 10       TB Row/Ext:   Blue 3\" 3x10 ea   Kickball-upper cervical rotation @ wall        3x10   Supine chin tuck/hold                                 Ther Ex           SNAG-distr           SNAG-rot        Seated 10\"x10   Thor ext sitting        1/2 foam 10\"x10   Wall slide/shrug Med ball up wall    Ymb    20x          Cervical iso x 4        Iso Flex and SB B  5\" x15 ea " "  Sidelying neck sidebends 2 x 10 ea          Supine chin tuck/lift 2 x 10 ea          Mod. Plantigrade  Neck ext. 3 x 10          Thoracic ext stretch@ counter  (Walkback) 5 x 20\"          UBE 3/3       3'/3'                                    Ther Activity                                 Gait Training                                 Modalities                                      "

## 2024-06-19 ENCOUNTER — CLINICAL SUPPORT (OUTPATIENT)
Dept: FAMILY MEDICINE CLINIC | Facility: CLINIC | Age: 69
End: 2024-06-19

## 2024-06-19 ENCOUNTER — TELEPHONE (OUTPATIENT)
Age: 69
End: 2024-06-19

## 2024-06-19 ENCOUNTER — NURSE TRIAGE (OUTPATIENT)
Age: 69
End: 2024-06-19

## 2024-06-19 VITALS
TEMPERATURE: 97.5 F | HEIGHT: 68 IN | WEIGHT: 167 LBS | BODY MASS INDEX: 25.31 KG/M2 | OXYGEN SATURATION: 99 % | SYSTOLIC BLOOD PRESSURE: 122 MMHG | HEART RATE: 67 BPM | DIASTOLIC BLOOD PRESSURE: 78 MMHG | RESPIRATION RATE: 16 BRPM

## 2024-06-19 DIAGNOSIS — Z48.02 VISIT FOR SUTURE REMOVAL: Primary | ICD-10-CM

## 2024-06-19 NOTE — TELEPHONE ENCOUNTER
Patient called in post having stitches removed this morning at  office and reports  pus looking drainage. Reports having 1 more day of antibiotic therapy. RN advised to keep area clean, Cover with dressing and monitor. Call back Thursday 6/20 morning to update if drainage continues or stops.

## 2024-06-20 DIAGNOSIS — L03.116 CELLULITIS OF LEFT LOWER EXTREMITY: ICD-10-CM

## 2024-06-20 RX ORDER — DOXYCYCLINE HYCLATE 100 MG/1
100 CAPSULE ORAL
Qty: 14 CAPSULE | Refills: 0 | Status: SHIPPED | OUTPATIENT
Start: 2024-06-20 | End: 2024-06-27

## 2024-06-20 NOTE — TELEPHONE ENCOUNTER
Patient called and stated drainage continues (pus).Patients abx.treatment finished today.Please advise.

## 2024-06-21 ENCOUNTER — OFFICE VISIT (OUTPATIENT)
Dept: PHYSICAL THERAPY | Age: 69
End: 2024-06-21
Payer: MEDICARE

## 2024-06-21 DIAGNOSIS — M47.812 FACET ARTHRITIS, DEGENERATIVE, CERVICAL SPINE: ICD-10-CM

## 2024-06-21 DIAGNOSIS — G89.29 CHRONIC NECK PAIN: Primary | ICD-10-CM

## 2024-06-21 DIAGNOSIS — M54.2 CHRONIC NECK PAIN: Primary | ICD-10-CM

## 2024-06-21 PROCEDURE — 97140 MANUAL THERAPY 1/> REGIONS: CPT

## 2024-06-21 PROCEDURE — 97110 THERAPEUTIC EXERCISES: CPT

## 2024-06-25 ENCOUNTER — OFFICE VISIT (OUTPATIENT)
Dept: PHYSICAL THERAPY | Age: 69
End: 2024-06-25
Payer: MEDICARE

## 2024-06-25 DIAGNOSIS — M47.812 FACET ARTHRITIS, DEGENERATIVE, CERVICAL SPINE: ICD-10-CM

## 2024-06-25 DIAGNOSIS — M54.2 CHRONIC NECK PAIN: Primary | ICD-10-CM

## 2024-06-25 DIAGNOSIS — G89.29 CHRONIC NECK PAIN: Primary | ICD-10-CM

## 2024-06-25 PROCEDURE — 97110 THERAPEUTIC EXERCISES: CPT

## 2024-06-25 PROCEDURE — 97140 MANUAL THERAPY 1/> REGIONS: CPT

## 2024-06-25 NOTE — PROGRESS NOTES
"Daily Note     Today's date: 2024  Patient name: Brandy Stock  : 1955  MRN: [id number]  Referring provider: Levar Bland MD  Dx:   Encounter Diagnosis     ICD-10-CM    1. Chronic neck pain  M54.2     G89.29       2. Facet arthritis, degenerative, cervical spine  M47.812                      Subjective: Pt reports that her neck is doing pretty good overall but is not at 100% yet and feels as if she may never get there.      Objective: See treatment diary below      Assessment: Tolerated treatment well. She continues to respond well with her current exercises and manuals. Added back in cervical rotation with the ball. Patient demonstrated fatigue post treatment, exhibited good technique with therapeutic exercises, and would benefit from continued PT      Plan: Continue per plan of care.      Precautions: Lumbar laminectomy 1/10/24    Visit/Unit Tracking  AUTH Status:  Date             Medicare Used 4/10 5/10 6/10             Remaining                                 FOTO                     Manuals    SOR  Facilitated rotation  Manual traction KD  MM     Distraction, SOR, Manual UT/LS/Rot STM MM   Cervical retraction with PT OP           SNAGS with PT mob                      Neuro Re-Ed           Cervical retraction in sitting    With self OP prn        Seated  5\" x20 with OP   Scapular retraction in sitting           CC-row 11#    3 x 10 See TE      TB Row/Ext:   Blue 3\" 3x10 ea   Kickball-upper cervical rotation @ wall   3x10     3x10   Supine chin tuck/hold                                 Ther Ex           SNAG-distr           SNAG-rot        Seated 10\"x10   Thor ext sitting        1/2 foam 10\"x10   Wall slide/shrug Med ball up wall    Ymb    20x Ymb    3 x 10 Ymb    3 x 10        CC row  15#  3 x 10 15# 3x10        Cervical iso x 4        Iso Flex and SB B  5\" x15 ea   Sidelying neck sidebends 2 x 10 ea 3 x 10 3x10        Supine chin tuck/lift 2 x 10 ea 3 " "x 10 3x10        Mod. Plantigrade  Neck ext. 3 x 10 3 x 10 3x10        Banded neck extension  Ytb  3 x 10 Ytb 3x10        Thoracic ext stretch@ counter  (Walkback) 5 x 20\" nv 5 x20\"        UBE 3/3 3/3  L3 3/3 L3     3'/3'                                    Ther Activity                                 Gait Training                                 Modalities                                        "

## 2024-06-27 ENCOUNTER — EVALUATION (OUTPATIENT)
Dept: PHYSICAL THERAPY | Age: 69
End: 2024-06-27
Payer: MEDICARE

## 2024-06-27 DIAGNOSIS — M47.812 FACET ARTHRITIS, DEGENERATIVE, CERVICAL SPINE: ICD-10-CM

## 2024-06-27 DIAGNOSIS — G89.29 CHRONIC NECK PAIN: Primary | ICD-10-CM

## 2024-06-27 DIAGNOSIS — M54.2 CHRONIC NECK PAIN: Primary | ICD-10-CM

## 2024-06-27 PROCEDURE — 97110 THERAPEUTIC EXERCISES: CPT

## 2024-06-27 PROCEDURE — 97140 MANUAL THERAPY 1/> REGIONS: CPT

## 2024-06-27 NOTE — PROGRESS NOTES
PT Discharge    Today's date: 2024  Patient name: Brandy Stock  : 1955  MRN: [id number]  Referring provider: Levar Bland MD  Dx:   Encounter Diagnosis     ICD-10-CM    1. Chronic neck pain  M54.2     G89.29       2. Facet arthritis, degenerative, cervical spine  M47.812             Start Time: 1358  Stop Time: 1438  Total time in clinic (min): 40 minutes    Assessment  Impairments: abnormal or restricted ROM, impaired physical strength, lacks appropriate home exercise program, pain with function, poor posture  and poor body mechanics  Symptom irritability: moderate    Assessment details: Pt is a 67 y/o female who presents with neck pain.  Screening and examination shows deficits consistent with referring diagnosis of myofascial pain and spasms of suboccipital/paraspinal musculature.   Since initially starting therapy, she has shown improvements in her pain and dysfunction.  She does have HA, pain, limited ROM in neck. Pt deficits limit sitting, turning, prolonged postures.  Pt is experiencing pain, decreased strength, and decreased ROM. Pt has a positive prognosis. Pt would benefit from PT to address these impairments leading to increased functional capacity and improved quality of life.      Today, she has made excellent improvements in ROM and strength of cervical musculature.  She has returned to normal activities.  She has minimal pain and is independent in her HEP.  Pt will be discharged from skilled PT at this point.  She verbalized understanding and agreement with POC.   Understanding of Dx/Px/POC: good     Prognosis: good    Goals  ALL GOALS MET--  Pt will improve pain on VAPS by 1-2 points  Pt will demonstrate understanding, independence with HEP    In 6-8 WEEKS  Pt will demonstrate improved ROM in spine to WFL  Pt will improve pain by >50% overall indicating improved tolerance to activities  Pt will improve sitting tolerance >30 mins  Pt will improve FOTO score at or above prognostic  value          Plan  Patient would benefit from: skilled physical therapy  Planned modality interventions: cryotherapy and thermotherapy: hydrocollator packs    Planned therapy interventions: neuromuscular re-education, patient education, stretching, strengthening, therapeutic activities, therapeutic exercise, therapeutic training, home exercise program and graded activity    Treatment plan discussed with: patient  Plan details: DC skilled PT      Subjective Evaluation    History of Present Illness  Mechanism of injury: Pt reports she feels like she is finally getting improvement in her neck symptoms.  She is doing pretty well.  She still has tightness.  She also notes a fall while doing some tasks but is feeling alright since.         Objective     Concurrent Complaints  Negative for night pain, disturbed sleep, dizziness, faints, headaches, nausea/motion sickness, tinnitus, trouble swallowing, difficulty breathing, shortness of breath, respiratory pain, visual change, cardiac problem, kidney problem, gallbladder problem, stomach problem, ulcer, appendix problem, spleen problem, pancreas problem, history of cancer, history of trauma and infection    Neurological Testing     Reflexes   Left   Biceps (C5/C6): normal (2+)  Brachioradialis (C6): normal (2+)  Louis's reflex: negative    Right   Biceps (C5/C6): normal (2+)  Brachioradialis (C6): normal (2+)  Louis's reflex: negative    Active Range of Motion   Cervical/Thoracic Spine       Cervical  Subcranial protraction:   Restriction level: minimal  Subcranial retraction:   Restriction level: maximal  Flexion: 45 degrees  Restriction level: maximal  Extension: 40 degrees     Restriction level: minimal  Left lateral flexion: 15 degrees     Restriction level: maximal  Right lateral flexion: 15 degrees     Restriction level maximal  Left rotation: 65 degrees Restriction level: maximal  Right rotation: 65 degrees    Restriction level: maximal    Strength/Myotome  "Testing   Cervical Spine   Neck extension: 3  Neck flexion: 3+    General Comments:    Upper quarter screen   Shoulder: unremarkable    Cervical/Thoracic Comments  SCM-guarded  TTP suboccipitals    Improved pain with distraction  Neuro Exam:     Headaches   Patient reports headaches: No.         Visit/Unit Tracking  AUTH Status:  Date 6/18 6/21 6/25 6/27           Medicare Used 4/10 5/10 6/10 7/10            Remaining                                 FOTO                     Manuals 6/18 6/21 6/25 6/27 6/13   SOR  Facilitated rotation  Manual traction KD  MM KD    Distraction, SOR, Manual UT/LS/Rot STM MM   Cervical retraction with PT OP    KD       SNAGS with PT mob    KD                   Neuro Re-Ed           Cervical retraction in sitting    With self OP prn        Seated  5\" x20 with OP   Scapular retraction in sitting           CC-row 11#    3 x 10 See TE      TB Row/Ext:   Blue 3\" 3x10 ea   Kickball-upper cervical rotation @ wall   3x10 nHEP    3x10   Supine chin tuck/hold    HEP                             Ther Ex           SNAG-distr           SNAG-rot        Seated 10\"x10   Thor ext sitting        1/2 foam 10\"x10   Wall slide/shrug Med ball up wall    Ymb    20x Ymb    3 x 10 Ymb    3 x 10 HEP       CC row  15#  3 x 10 15# 3x10        Cervical iso x 4        Iso Flex and SB B  5\" x15 ea   Sidelying neck sidebends 2 x 10 ea 3 x 10 3x10 HEP       Supine chin tuck/lift 2 x 10 ea 3 x 10 3x10 HEP       Mod. Plantigrade  Neck ext. 3 x 10 3 x 10 3x10 HEP       Banded neck extension  Ytb  3 x 10 Ytb 3x10 HEP  retraction       Thoracic ext stretch@ counter  (Walkback) 5 x 20\" nv 5 x20\"        UBE 3/3 3/3  L3 3/3 L3     3'/3'                                    Ther Activity                                 Gait Training                                 Modalities                                             Access Code: [email or web address]  URL: [email or web address]  Date: 06/27/2024  Prepared by: Javed" Dyrli    Exercises  - Supine Segmental Cervical Flexion  - 1 x daily - 4 x weekly - 3 sets - 10 reps  - Cervical Retraction with Resistance  - 1 x daily - 4 x weekly - 3 sets - 10 reps  - Sidelying Cervical Sidebending  - 1 x daily - 4 x weekly - 3 sets - 10 reps  - Sidelying Cervical Rotation  - 1 x daily - 4 x weekly - 3 sets - 10 reps  - Quadruped Cervical Flexion and Extension  - 1 x daily - 4 x weekly - 3 sets - 10 reps

## 2024-07-18 DIAGNOSIS — E87.6 HYPOKALEMIA: ICD-10-CM

## 2024-07-18 DIAGNOSIS — N32.81 OAB (OVERACTIVE BLADDER): ICD-10-CM

## 2024-07-18 RX ORDER — POTASSIUM CHLORIDE 750 MG/1
10 TABLET, EXTENDED RELEASE ORAL DAILY
Qty: 100 TABLET | Refills: 1 | Status: SHIPPED | OUTPATIENT
Start: 2024-07-18

## 2024-07-18 RX ORDER — SOLIFENACIN SUCCINATE 10 MG/1
10 TABLET, FILM COATED ORAL DAILY
Qty: 100 TABLET | Refills: 1 | Status: SHIPPED | OUTPATIENT
Start: 2024-07-18

## 2024-07-18 NOTE — TELEPHONE ENCOUNTER
Medication: Potassium Chloride    Dose/Frequency: 10mEq  Quantity: take 1 tablet by mouth daily      Medication: Solifenacin    Dose/Frequency: 10mEq  Quantity: take 1 tablet by mouth daily    Pharmacy: [facility] [id number] - [facility] - [address]      Office:   [x] PCP/Provider - Levar Bland  [] Speciality/Provider -     Does the patient have enough for 3 days?   [x] Yes   [] No - Send as HP to POD

## 2024-07-19 ENCOUNTER — PROCEDURE VISIT (OUTPATIENT)
Dept: FAMILY MEDICINE CLINIC | Facility: CLINIC | Age: 69
End: 2024-07-19

## 2024-07-19 VITALS
DIASTOLIC BLOOD PRESSURE: 76 MMHG | SYSTOLIC BLOOD PRESSURE: 122 MMHG | OXYGEN SATURATION: 96 % | HEIGHT: 68 IN | HEART RATE: 70 BPM | BODY MASS INDEX: 25.31 KG/M2 | WEIGHT: 167 LBS | TEMPERATURE: 98 F | RESPIRATION RATE: 18 BRPM

## 2024-07-19 DIAGNOSIS — S81.812D LACERATION OF SKIN OF LEFT LOWER LEG, SUBSEQUENT ENCOUNTER: Primary | ICD-10-CM

## 2024-07-19 NOTE — PROGRESS NOTES
Suture removal    Date/Time: 7/19/2024 9:20 AM    Performed by: Levar Bland MD  Authorized by: Levar Bland MD  Universal Protocol:  Consent: Verbal consent obtained.  Risks and benefits: risks, benefits and alternatives were discussed  Consent given by: patient      Patient location:  Clinic  Location:     Laterality:  Left    Location:  Lower extremity    Lower extremity location:  Leg    Leg location:  L lower leg  Procedure details:     Tools used:  Scalpel and tweezers    Wound appearance:  No sign(s) of infection and good wound healing    Number of sutures removed:  3  Post-procedure details:     Post-removal:  No dressing applied    Patient tolerance of procedure:  Tolerated well, no immediate complications    Brandy was seen today for follow-up.    Diagnoses and all orders for this visit:    Laceration of skin of left lower leg, subsequent encounter  -     Suture removal     Follow up OV  s/p laceration L lower leg 06/2024. She had a # of sutures removed. She was treated for cellulitis. She noticed additional sutures in place    I removed a total of 3 sutures today. No signs of infection

## 2024-07-24 DIAGNOSIS — Z00.6 ENCOUNTER FOR EXAMINATION FOR NORMAL COMPARISON OR CONTROL IN CLINICAL RESEARCH PROGRAM: ICD-10-CM

## 2024-07-26 ENCOUNTER — APPOINTMENT (OUTPATIENT)
Dept: LAB | Facility: CLINIC | Age: 69
End: 2024-07-26

## 2024-07-26 DIAGNOSIS — Z00.6 ENCOUNTER FOR EXAMINATION FOR NORMAL COMPARISON OR CONTROL IN CLINICAL RESEARCH PROGRAM: ICD-10-CM

## 2024-07-26 PROCEDURE — 36415 COLL VENOUS BLD VENIPUNCTURE: CPT

## 2024-08-06 LAB
APOB+LDLR+PCSK9 GENE MUT ANL BLD/T: NOT DETECTED
BRCA1+BRCA2 DEL+DUP + FULL MUT ANL BLD/T: NOT DETECTED
MLH1+MSH2+MSH6+PMS2 GN DEL+DUP+FUL M: NOT DETECTED

## 2024-09-03 ENCOUNTER — APPOINTMENT (OUTPATIENT)
Dept: LAB | Age: 69
End: 2024-09-03
Payer: MEDICARE

## 2024-09-03 ENCOUNTER — TELEPHONE (OUTPATIENT)
Dept: FAMILY MEDICINE CLINIC | Facility: CLINIC | Age: 69
End: 2024-09-03

## 2024-09-03 DIAGNOSIS — E03.9 MYXEDEMA HEART DISEASE: ICD-10-CM

## 2024-09-03 DIAGNOSIS — I51.9 MYXEDEMA HEART DISEASE: ICD-10-CM

## 2024-09-03 DIAGNOSIS — E06.3 CHRONIC LYMPHOCYTIC THYROIDITIS: ICD-10-CM

## 2024-09-03 DIAGNOSIS — M81.0 SENILE OSTEOPOROSIS: ICD-10-CM

## 2024-09-03 DIAGNOSIS — R73.03 DIABETES MELLITUS, LATENT: ICD-10-CM

## 2024-09-03 LAB
ALBUMIN SERPL BCG-MCNC: 3.9 G/DL (ref 3.5–5)
ALP SERPL-CCNC: 46 U/L (ref 34–104)
ALT SERPL W P-5'-P-CCNC: 15 U/L (ref 7–52)
ANION GAP SERPL CALCULATED.3IONS-SCNC: 11 MMOL/L (ref 4–13)
AST SERPL W P-5'-P-CCNC: 19 U/L (ref 13–39)
BASOPHILS # BLD AUTO: 0.02 THOUSANDS/ÂΜL (ref 0–0.1)
BASOPHILS NFR BLD AUTO: 1 % (ref 0–1)
BILIRUB SERPL-MCNC: 0.6 MG/DL (ref 0.2–1)
BUN SERPL-MCNC: 17 MG/DL (ref 5–25)
C3 SERPL-MCNC: 127 MG/DL (ref 87–200)
C4 SERPL-MCNC: 32 MG/DL (ref 19–52)
CALCIUM SERPL-MCNC: 9.3 MG/DL (ref 8.4–10.2)
CHLORIDE SERPL-SCNC: 103 MMOL/L (ref 96–108)
CO2 SERPL-SCNC: 29 MMOL/L (ref 21–32)
CREAT SERPL-MCNC: 0.94 MG/DL (ref 0.6–1.3)
CREAT UR-MCNC: 59.7 MG/DL
CRP SERPL QL: 5.7 MG/L
EOSINOPHIL # BLD AUTO: 0.14 THOUSAND/ÂΜL (ref 0–0.61)
EOSINOPHIL NFR BLD AUTO: 4 % (ref 0–6)
ERYTHROCYTE [DISTWIDTH] IN BLOOD BY AUTOMATED COUNT: 17 % (ref 11.6–15.1)
EST. AVERAGE GLUCOSE BLD GHB EST-MCNC: 128 MG/DL
GFR SERPL CREATININE-BSD FRML MDRD: 61 ML/MIN/1.73SQ M
GLUCOSE P FAST SERPL-MCNC: 82 MG/DL (ref 65–99)
HBA1C MFR BLD: 6.1 %
HCT VFR BLD AUTO: 35.1 % (ref 34.8–46.1)
HGB BLD-MCNC: 10.7 G/DL (ref 11.5–15.4)
IMM GRANULOCYTES # BLD AUTO: 0.01 THOUSAND/UL (ref 0–0.2)
IMM GRANULOCYTES NFR BLD AUTO: 0 % (ref 0–2)
LYMPHOCYTES # BLD AUTO: 0.99 THOUSANDS/ÂΜL (ref 0.6–4.47)
LYMPHOCYTES NFR BLD AUTO: 28 % (ref 14–44)
MAGNESIUM SERPL-MCNC: 2.1 MG/DL (ref 1.9–2.7)
MCH RBC QN AUTO: 26 PG (ref 26.8–34.3)
MCHC RBC AUTO-ENTMCNC: 30.5 G/DL (ref 31.4–37.4)
MCV RBC AUTO: 85 FL (ref 82–98)
MICROALBUMIN UR-MCNC: 9.9 MG/L
MICROALBUMIN/CREAT 24H UR: 17 MG/G CREATININE (ref 0–30)
MONOCYTES # BLD AUTO: 0.32 THOUSAND/ÂΜL (ref 0.17–1.22)
MONOCYTES NFR BLD AUTO: 9 % (ref 4–12)
NEUTROPHILS # BLD AUTO: 2.04 THOUSANDS/ÂΜL (ref 1.85–7.62)
NEUTS SEG NFR BLD AUTO: 58 % (ref 43–75)
NRBC BLD AUTO-RTO: 0 /100 WBCS
PHOSPHATE SERPL-MCNC: 3.7 MG/DL (ref 2.3–4.1)
PLATELET # BLD AUTO: 210 THOUSANDS/UL (ref 149–390)
PMV BLD AUTO: 11 FL (ref 8.9–12.7)
POTASSIUM SERPL-SCNC: 4 MMOL/L (ref 3.5–5.3)
PROT SERPL-MCNC: 6.5 G/DL (ref 6.4–8.4)
RBC # BLD AUTO: 4.11 MILLION/UL (ref 3.81–5.12)
SODIUM SERPL-SCNC: 143 MMOL/L (ref 135–147)
T4 FREE SERPL-MCNC: 1.26 NG/DL (ref 0.61–1.12)
TSH SERPL DL<=0.05 MIU/L-ACNC: 2.35 UIU/ML (ref 0.45–4.5)
URATE SERPL-MCNC: 6 MG/DL (ref 2–7.5)
WBC # BLD AUTO: 3.52 THOUSAND/UL (ref 4.31–10.16)

## 2024-09-03 PROCEDURE — 83036 HEMOGLOBIN GLYCOSYLATED A1C: CPT

## 2024-09-03 PROCEDURE — 84443 ASSAY THYROID STIM HORMONE: CPT

## 2024-09-03 PROCEDURE — 82043 UR ALBUMIN QUANTITATIVE: CPT

## 2024-09-03 PROCEDURE — 82570 ASSAY OF URINE CREATININE: CPT

## 2024-09-03 PROCEDURE — 84100 ASSAY OF PHOSPHORUS: CPT

## 2024-09-03 PROCEDURE — 83735 ASSAY OF MAGNESIUM: CPT

## 2024-09-03 PROCEDURE — 84439 ASSAY OF FREE THYROXINE: CPT

## 2024-09-03 NOTE — TELEPHONE ENCOUNTER
Called [employer] and they explained medicare is not covering injection for 4/29 due to the injection given at that appointment not being specified. This will need to be corrected in order to be resubmitted and covered. They states for another visit in which she got the injection the code was specific, if it is possible to use that code? Please review.

## 2024-09-03 NOTE — TELEPHONE ENCOUNTER
Patient called regarding a billing letter regarding appointment on 04/29/2024 with Dr. Bland. Not gone through insurance yet and billing statement in epic does not show a pending charge for patient responsibility. Will follow up with insurance to see what needs to be changed.

## 2024-09-04 ENCOUNTER — ESTABLISHED COMPREHENSIVE EXAM (OUTPATIENT)
Dept: URBAN - METROPOLITAN AREA CLINIC 6 | Facility: CLINIC | Age: 69
End: 2024-09-04

## 2024-09-04 DIAGNOSIS — H57.813: ICD-10-CM

## 2024-09-04 DIAGNOSIS — H02.834: ICD-10-CM

## 2024-09-04 DIAGNOSIS — D64.9 ANEMIA, UNSPECIFIED TYPE: Primary | ICD-10-CM

## 2024-09-04 DIAGNOSIS — H04.123: ICD-10-CM

## 2024-09-04 DIAGNOSIS — H02.831: ICD-10-CM

## 2024-09-04 DIAGNOSIS — H25.813: ICD-10-CM

## 2024-09-04 PROCEDURE — 92014 COMPRE OPH EXAM EST PT 1/>: CPT

## 2024-09-04 ASSESSMENT — VISUAL ACUITY
OS_CC: 20/25+1
OD_CC: 20/25+1

## 2024-09-04 ASSESSMENT — TONOMETRY
OD_IOP_MMHG: 9
OS_IOP_MMHG: 10

## 2024-09-05 LAB — DSDNA AB SER QL CLIF: NEGATIVE

## 2024-09-18 ENCOUNTER — LAB (OUTPATIENT)
Dept: LAB | Age: 69
End: 2024-09-18
Payer: MEDICARE

## 2024-09-18 DIAGNOSIS — D64.9 ANEMIA, UNSPECIFIED TYPE: ICD-10-CM

## 2024-09-18 LAB
BASOPHILS # BLD AUTO: 0.02 THOUSANDS/ΜL (ref 0–0.1)
BASOPHILS NFR BLD AUTO: 1 % (ref 0–1)
EOSINOPHIL # BLD AUTO: 0.13 THOUSAND/ΜL (ref 0–0.61)
EOSINOPHIL NFR BLD AUTO: 3 % (ref 0–6)
ERYTHROCYTE [DISTWIDTH] IN BLOOD BY AUTOMATED COUNT: 17.2 % (ref 11.6–15.1)
FERRITIN SERPL-MCNC: 9 NG/ML (ref 11–307)
HCT VFR BLD AUTO: 37 % (ref 34.8–46.1)
HGB BLD-MCNC: 11.2 G/DL (ref 11.5–15.4)
IMM GRANULOCYTES # BLD AUTO: 0.01 THOUSAND/UL (ref 0–0.2)
IMM GRANULOCYTES NFR BLD AUTO: 0 % (ref 0–2)
IRON SATN MFR SERPL: 12 % (ref 15–50)
IRON SERPL-MCNC: 51 UG/DL (ref 50–212)
LYMPHOCYTES # BLD AUTO: 1.37 THOUSANDS/ΜL (ref 0.6–4.47)
LYMPHOCYTES NFR BLD AUTO: 34 % (ref 14–44)
MCH RBC QN AUTO: 26 PG (ref 26.8–34.3)
MCHC RBC AUTO-ENTMCNC: 30.3 G/DL (ref 31.4–37.4)
MCV RBC AUTO: 86 FL (ref 82–98)
MONOCYTES # BLD AUTO: 0.34 THOUSAND/ΜL (ref 0.17–1.22)
MONOCYTES NFR BLD AUTO: 8 % (ref 4–12)
NEUTROPHILS # BLD AUTO: 2.19 THOUSANDS/ΜL (ref 1.85–7.62)
NEUTS SEG NFR BLD AUTO: 54 % (ref 43–75)
NRBC BLD AUTO-RTO: 0 /100 WBCS
PLATELET # BLD AUTO: 212 THOUSANDS/UL (ref 149–390)
PMV BLD AUTO: 10.4 FL (ref 8.9–12.7)
RBC # BLD AUTO: 4.3 MILLION/UL (ref 3.81–5.12)
TIBC SERPL-MCNC: 420 UG/DL (ref 250–450)
UIBC SERPL-MCNC: 369 UG/DL (ref 155–355)
WBC # BLD AUTO: 4.06 THOUSAND/UL (ref 4.31–10.16)

## 2024-09-18 PROCEDURE — 85025 COMPLETE CBC W/AUTO DIFF WBC: CPT

## 2024-09-18 PROCEDURE — 36415 COLL VENOUS BLD VENIPUNCTURE: CPT

## 2024-09-18 PROCEDURE — 82728 ASSAY OF FERRITIN: CPT

## 2024-09-18 PROCEDURE — 83540 ASSAY OF IRON: CPT

## 2024-09-18 PROCEDURE — 83550 IRON BINDING TEST: CPT

## 2024-09-20 ENCOUNTER — TELEPHONE (OUTPATIENT)
Age: 69
End: 2024-09-20

## 2024-09-20 ENCOUNTER — APPOINTMENT (OUTPATIENT)
Dept: LAB | Age: 69
End: 2024-09-20
Payer: MEDICARE

## 2024-09-20 DIAGNOSIS — R82.71 BACTERIURIA: ICD-10-CM

## 2024-09-20 PROCEDURE — 87086 URINE CULTURE/COLONY COUNT: CPT

## 2024-09-20 PROCEDURE — 87077 CULTURE AEROBIC IDENTIFY: CPT

## 2024-09-20 PROCEDURE — 87186 SC STD MICRODIL/AGAR DIL: CPT

## 2024-09-20 PROCEDURE — 87147 CULTURE TYPE IMMUNOLOGIC: CPT

## 2024-09-20 NOTE — TELEPHONE ENCOUNTER
ANGELA Garcia    UA - Completed this AM. Pt states specimen was cloudy and foul smelling. Denied any other S/S at this time.

## 2024-09-22 LAB
BACTERIA UR CULT: ABNORMAL
BACTERIA UR CULT: ABNORMAL

## 2024-09-23 DIAGNOSIS — N30.90 CYSTITIS: Primary | ICD-10-CM

## 2024-09-23 RX ORDER — NITROFURANTOIN 25; 75 MG/1; MG/1
100 CAPSULE ORAL 2 TIMES DAILY
Qty: 14 CAPSULE | Refills: 0 | Status: SHIPPED | OUTPATIENT
Start: 2024-09-23

## 2024-09-23 NOTE — TELEPHONE ENCOUNTER
Pt calling for interpretation of urine culture results. She requests any abx be sent to [facility] in [place].

## 2024-10-03 ENCOUNTER — TELEPHONE (OUTPATIENT)
Age: 69
End: 2024-10-03

## 2024-10-03 ENCOUNTER — HOSPITAL ENCOUNTER (OUTPATIENT)
Dept: RADIOLOGY | Age: 69
Discharge: HOME | End: 2024-10-03
Payer: MEDICARE

## 2024-10-03 DIAGNOSIS — E03.9 MYXEDEMA HEART DISEASE: ICD-10-CM

## 2024-10-03 DIAGNOSIS — E78.5 HYPERLIPIDEMIA, UNSPECIFIED HYPERLIPIDEMIA TYPE: ICD-10-CM

## 2024-10-03 DIAGNOSIS — I51.9 MYXEDEMA HEART DISEASE: ICD-10-CM

## 2024-10-03 DIAGNOSIS — E34.9 MULTIPLE ENDOCRINE DEFICIENCY SYNDROME: ICD-10-CM

## 2024-10-03 PROCEDURE — 76700 US EXAM ABDOM COMPLETE: CPT

## 2024-10-03 NOTE — TELEPHONE ENCOUNTER
Pt called to say she just has an abdominal US today for a possible GI bleed. Her rheumatologist told her hemoglobin is down. She will be due in November thoracic aortic aneurysm screening. Should she have it done now? Before her appt with Dr Bacon on 10/16/24?  Please advise.

## 2024-10-04 DIAGNOSIS — D50.9 IRON DEFICIENCY ANEMIA, UNSPECIFIED IRON DEFICIENCY ANEMIA TYPE: Primary | ICD-10-CM

## 2024-10-04 NOTE — TELEPHONE ENCOUNTER
Please place orders for patient and I will call her back to inform her when orders are in her chart.

## 2024-10-07 ENCOUNTER — CONSULT (OUTPATIENT)
Dept: GASTROENTEROLOGY | Facility: CLINIC | Age: 69
End: 2024-10-07
Payer: MEDICARE

## 2024-10-07 ENCOUNTER — TELEPHONE (OUTPATIENT)
Age: 69
End: 2024-10-07

## 2024-10-07 ENCOUNTER — TELEPHONE (OUTPATIENT)
Dept: GASTROENTEROLOGY | Facility: CLINIC | Age: 69
End: 2024-10-07

## 2024-10-07 VITALS
WEIGHT: 160 LBS | HEIGHT: 69 IN | DIASTOLIC BLOOD PRESSURE: 72 MMHG | HEART RATE: 67 BPM | BODY MASS INDEX: 23.7 KG/M2 | SYSTOLIC BLOOD PRESSURE: 115 MMHG

## 2024-10-07 DIAGNOSIS — D50.9 IRON DEFICIENCY ANEMIA, UNSPECIFIED IRON DEFICIENCY ANEMIA TYPE: ICD-10-CM

## 2024-10-07 PROCEDURE — 99204 OFFICE O/P NEW MOD 45 MIN: CPT | Performed by: PHYSICIAN ASSISTANT

## 2024-10-07 RX ORDER — FERROUS SULFATE 324(65)MG
324 TABLET, DELAYED RELEASE (ENTERIC COATED) ORAL
Qty: 30 TABLET | Refills: 2 | Status: SHIPPED | OUTPATIENT
Start: 2024-10-07

## 2024-10-07 RX ORDER — ASCORBIC ACID 250 MG
TABLET,CHEWABLE ORAL
COMMUNITY
Start: 2024-09-01

## 2024-10-07 RX ORDER — ACETAMINOPHEN 160 MG
TABLET,DISINTEGRATING ORAL
COMMUNITY
Start: 2024-09-01

## 2024-10-07 NOTE — TELEPHONE ENCOUNTER
Scheduled date of EGD(as of today): 10/9/24  Physician performing EGD: Dr. Blanco  Location of EGD: [facility]  Instructions reviewed with patient by: tl given at appt  Clearances: n/a

## 2024-10-07 NOTE — PROGRESS NOTES
[facility] - Outpatient Consultation  Brandy Stock 69 y.o. female MRN: [id number]  Encounter: [id number]          ASSESSMENT AND PLAN:      1. Iron deficiency anemia, unspecified iron deficiency anemia type  Patient with iron deficiency, etiology unclear but she only has mild anemia, did give her iron supplementation to start and then told her that she will need to increase her Colace to twice daily as this can cause increase in constipation.  Will plan for upper endoscopy for further evaluation for any source of blood loss or malabsorption such as celiac.  Patient has no obvious GI bleeding and did have colonoscopy within the past year or so.  Further recommendations will be made after upper endoscopy and can consider capsule endoscopy or repeat colonoscopy evidence of persistent anemia.  - Ambulatory Referral to Gastroenterology    ______________________________________________________________________    HPI:    69-year-old female who presents today for consultation to iron deficiency anemia hemoglobin was noted to be 11.2 on last blood work.  Patient did have colonoscopy last year in July by colorectal which did show diverticulosis of left colon lipoma of transverse colon and sigmoid polyp.  Patient was recommended to have colonoscopy in 5 years. Path was tubular adenoma.  As per chart it does state that she has history of GERD.  Also had colonoscopy in 2019 also noted to have lipoma at that time, no polyps. She never has had upper endoscopy.  She does have history of GERD listed on chart although she denies this and she denies ever having this in the past.  Denies any excessive NSAID use.  Does report that she was donating blood every 6 weeks or so. She does have low WBCs which has been an ongoing issue.  Patient does follow-up with rheumatology and her primary doctor who had recommended to get the blood work done but then was told to follow-up with GI.  Denies any upper GI  symptoms.  Uncle had colon cancer so she has been regularly getting colonoscopies.  Prep was adequate on last colonoscopy.  Weight has been stable . She does have history of constipation and hard stool so she has been taking Colace daily.            REVIEW OF SYSTEMS:    CONSTITUTIONAL: Denies any fever, chills, rigors, and weight loss.  HEENT: No earache or tinnitus. Denies hearing loss or visual disturbances.  CARDIOVASCULAR: No chest pain or palpitations.   RESPIRATORY: Denies any cough, hemoptysis, shortness of breath or dyspnea on exertion.  GASTROINTESTINAL: As noted in the History of Present Illness.   GENITOURINARY: No problems with urination. Denies any hematuria or dysuria.  NEUROLOGIC: No dizziness or vertigo, denies headaches.   MUSCULOSKELETAL: Denies any muscle or joint pain.   SKIN: Denies skin rashes or itching.   ENDOCRINE: Denies excessive thirst. Denies intolerance to heat or cold.  PSYCHOSOCIAL: Denies depression or anxiety. Denies any recent memory loss.       Historical Information   Past Medical History:   Diagnosis Date    Allergic 1967    Anemia 2024    Arthritis     Asymptomatic hyperuricemia     BRCA1 negative     BRCA2 negative     Breast cancer (HCC) 01/17/2014    radiation    Cancer (HCC) 2013    Disease of thyroid gland     History of radiation therapy 2014    breast cancer    Hypertension     Hypothyroid     Knee pain, right     Limb alert care status     no BP's /IV's right side    Mitral valve prolapse     Numbness of anterior thigh     left and tingling    Osteoarthritis     Scoliosis 2013    Seasonal allergies     dust,mold,trees    Urinary incontinence     Varicose veins of both lower extremities     Wears glasses      Past Surgical History:   Procedure Laterality Date    BREAST BIOPSY Left 01/16/2002    BREAST BIOPSY Left 08/05/2005    high risk    BREAST BIOPSY Right 12/26/2013    positive    BREAST EXCISIONAL BIOPSY Left 04/06/2006    high risk    BREAST EXCISIONAL BIOPSY Left  "10/13/2006    BREAST LUMPECTOMY Right 01/17/2014    malignant    BREAST SURGERY Right     lumpectomy    BUNIONECTOMY Bilateral     COLONOSCOPY      EXPLORATORY LAPAROTOMY      \"for fertility\"    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    JOINT REPLACEMENT  2017    KNEE ARTHROSCOPY Right     LYMPH NODE BIOPSY  2014    WA ARTHRP KNE CONDYLE&PLATU MEDIAL&LAT COMPARTMENTS Right 01/20/2017    Procedure: ARTHROPLASTY KNEE TOTAL;  Surgeon: Gabriel Razo MD;  [facility];  Service: Orthopedics    WA HYSTEROSCOPY BX ENDOMETRIUM&/POLYPC W/WO D&C N/A 07/29/2022    Procedure: DILATATION AND CURETTAGE (D&C) WITH HYSTEROSCOPY;  Surgeon: Ashley Morgan MD;  [facility];  Service: Gynecology    WA TENDON SHEATH INCISION Right 04/05/2022    Procedure: Right long and ring finger trigger finger release;  Surgeon: Matthew Jarvis MD;  [facility];  Service: Orthopedics    TONSILLECTOMY      WISDOM TOOTH EXTRACTION       Social History   Social History     Substance and Sexual Activity   Alcohol Use Yes    Comment: social     Social History     Substance and Sexual Activity   Drug Use No     Social History     Tobacco Use   Smoking Status Never   Smokeless Tobacco Never     Family History   Problem Relation Age of Onset    Cancer Mother         Bladder CA    Throat cancer Father 80    Cancer Father     No Known Problems Daughter     No Known Problems Daughter     No Known Problems Maternal Grandmother     No Known Problems Maternal Grandfather     No Known Problems Paternal Grandmother     No Known Problems Paternal Grandfather     Kidney disease Maternal Aunt     Kidney disease Maternal Aunt     No Known Problems Maternal Aunt     No Known Problems Maternal Aunt     Colon cancer Maternal Uncle 70    Breast cancer additional onset Paternal Aunt 55        Also maternal cousin Maricruz    No Known Problems Paternal Aunt     Breast cancer additional onset Cousin 35    Uterine cancer Cousin 61    " "Lung cancer Cousin 76       Meds/Allergies       Current Outpatient Medications:     Ascorbic Acid (Vitamin C) 250 MG CHEW    Biotin 2500 MCG CAPS    Calcium Carbonate-Vit D-Min (CALTRATE 600+D PLUS MINERALS PO)    chlorthalidone 25 mg tablet    Cholecalciferol (Vitamin D3) 50 MCG (2000 UT) capsule    levothyroxine 75 mcg tablet    levothyroxine 88 mcg tablet    loratadine (CLARITIN) 10 mg tablet    Magnesium 80 MG TABS    Multiple Vitamins-Minerals (MULTIVITAMIN ADULT PO)    potassium chloride (Klor-Con M10) 10 mEq tablet    solifenacin (VESICARE) 10 MG tablet    thiamine (VITAMIN B1) 100 mg tablet    zinc gluconate 50 mg tablet    methocarbamol (ROBAXIN) 500 mg tablet    nitrofurantoin (MACROBID) 100 mg capsule    Allergies   Allergen Reactions    Actifed Cold-Allergy [Chlorpheniramine-Phenylephrine] Swelling and Rash     Throat and face swelled    Penicillins Rash    Vancomycin Other (See Comments)     redness           Objective     Blood pressure 115/72, pulse 67, height 5' 8.5\" (1.74 m), weight 72.6 kg (160 lb), not currently breastfeeding. Body mass index is 23.97 kg/m².        PHYSICAL EXAM:      General Appearance:   Alert, cooperative, no distress   HEENT:   Normocephalic, atraumatic, anicteric.     Neck:  Supple, symmetrical, trachea midline   Lungs:   Clear to auscultation bilaterally; no rales, rhonchi or wheezing; respirations unlabored    Heart::   Regular rate and rhythm; no murmur, rub, or gallop.   Abdomen:   Soft, non-tender, non-distended; normal bowel sounds; no masses, no organomegaly    Genitalia:   Deferred    Rectal:   Deferred    Extremities:  No cyanosis, clubbing or edema    Pulses:  2+ and symmetric    Skin:  No jaundice, rashes, or lesions    Lymph nodes:  No palpable cervical lymphadenopathy        Lab Results:   No visits with results within 1 Day(s) from this visit.   Latest known visit with results is:   Appointment on 09/20/2024   Component Date Value    Urine Culture 09/20/2024 " >100,000 cfu/ml Enterococcus faecalis (A)     Urine Culture 09/20/2024 80,000-89,000 cfu/ml Aerococcus urinae (A)          Radiology Results:   US abdomen complete    Result Date: 10/4/2024  Narrative: ABDOMEN ULTRASOUND, COMPLETE INDICATION: E03.9: Hypothyroidism, unspecified I51.9: Heart disease, unspecified E34.9: Endocrine disorder, unspecified E78.5: Hyperlipidemia, unspecified. COMPARISON: Renal bladder ultrasound dated 9/26/2022. Chest CT dated 11/9/2023 TECHNIQUE: Real-time ultrasound of the abdomen was performed with a curvilinear transducer with both volumetric sweeps and still imaging techniques. FINDINGS: PANCREAS: Visualized portions of the pancreas are within normal limits. AORTA AND IVC: Visualized portions are normal for patient age. LIVER: Size: Mildly enlarged with inferior right tip extending below the right renal lower pole. The liver measures 17.2 cm in the midclavicular line. Contour: Contour remains smooth, though. Parenchyma: Echogenicity and echotexture are within normal limits. No liver mass identified. Vasculature: Portal venous flow is antegrade with normal undulating waveform. BILIARY: The gallbladder is normal in caliber. No wall thickening or pericholecystic fluid. Shadowing gallstone(s) identified. No sonographic Landis's sign. No intrahepatic biliary dilatation. CBD measures 6.0 mm. No choledocholithiasis. KIDNEY: Right kidney measures 11.3 x 4.1 x 6.0 cm. Volume 144.0 mL Unchanged tiny right benign peripelvic cysts requiring no imaging follow-up. No concerning renal masses, hydronephrosis, or sonographically evident nephrolithiasis. Left kidney measures 10.5 x 4.7 x 5.9 cm. Volume 153.8 mL Mild pelvocaliectasis though fornices still appear relatively sharp. This is unchanged from priors. No renal masses or perinephric collections. SPLEEN: Measures 11.0 x 11.2 x 4.1 cm. Volume 266.2 mL Within normal limits. ASCITES: None.     Impression: 1.  Left kidney shows unchanged mild  unilateral pelvocaliectasis, probably related to chronic partial UPJ obstruction.. Correlate for flank pain or other features concerning for clinically worsening obstructive uropathy. 2.  No other acute findings. 3.  Mild hepatomegaly. Workstation performed: [id number]

## 2024-10-07 NOTE — TELEPHONE ENCOUNTER
Pt calls in and states that she received a letter in the mail that informs her to call us for blood work results. Informed her this is dated for 9/27 and have since spoke with her she can disregard the letter. Pt verbalizes understanding and gives thanks

## 2024-10-09 ENCOUNTER — HOSPITAL ENCOUNTER (OUTPATIENT)
Dept: GASTROENTEROLOGY | Facility: AMBULARY SURGERY CENTER | Age: 69
Setting detail: OUTPATIENT SURGERY
Discharge: HOME | End: 2024-10-09
Payer: MEDICARE

## 2024-10-09 ENCOUNTER — ANESTHESIA EVENT (OUTPATIENT)
Dept: GASTROENTEROLOGY | Facility: AMBULARY SURGERY CENTER | Age: 69
End: 2024-10-09
Payer: MEDICARE

## 2024-10-09 VITALS
BODY MASS INDEX: 24.71 KG/M2 | RESPIRATION RATE: 16 BRPM | OXYGEN SATURATION: 97 % | SYSTOLIC BLOOD PRESSURE: 121 MMHG | DIASTOLIC BLOOD PRESSURE: 67 MMHG | HEIGHT: 68 IN | WEIGHT: 163 LBS | HEART RATE: 52 BPM | TEMPERATURE: 97 F

## 2024-10-09 DIAGNOSIS — K29.70 GASTRITIS WITHOUT BLEEDING, UNSPECIFIED CHRONICITY, UNSPECIFIED GASTRITIS TYPE: Primary | ICD-10-CM

## 2024-10-09 DIAGNOSIS — D50.9 IRON DEFICIENCY ANEMIA, UNSPECIFIED IRON DEFICIENCY ANEMIA TYPE: ICD-10-CM

## 2024-10-09 PROCEDURE — 88305 TISSUE EXAM BY PATHOLOGIST: CPT | Performed by: STUDENT IN AN ORGANIZED HEALTH CARE EDUCATION/TRAINING PROGRAM

## 2024-10-09 PROCEDURE — 43239 EGD BIOPSY SINGLE/MULTIPLE: CPT | Performed by: INTERNAL MEDICINE

## 2024-10-09 PROCEDURE — 88313 SPECIAL STAINS GROUP 2: CPT | Performed by: STUDENT IN AN ORGANIZED HEALTH CARE EDUCATION/TRAINING PROGRAM

## 2024-10-09 PROCEDURE — 88342 IMHCHEM/IMCYTCHM 1ST ANTB: CPT | Performed by: STUDENT IN AN ORGANIZED HEALTH CARE EDUCATION/TRAINING PROGRAM

## 2024-10-09 RX ORDER — SODIUM CHLORIDE, SODIUM LACTATE, POTASSIUM CHLORIDE, CALCIUM CHLORIDE 600; 310; 30; 20 MG/100ML; MG/100ML; MG/100ML; MG/100ML
INJECTION, SOLUTION INTRAVENOUS CONTINUOUS PRN
Status: DISCONTINUED | OUTPATIENT
Start: 2024-10-09 | End: 2024-10-09

## 2024-10-09 RX ORDER — PROPOFOL 10 MG/ML
INJECTION, EMULSION INTRAVENOUS AS NEEDED
Status: DISCONTINUED | OUTPATIENT
Start: 2024-10-09 | End: 2024-10-09

## 2024-10-09 RX ORDER — LIDOCAINE HYDROCHLORIDE 10 MG/ML
INJECTION, SOLUTION EPIDURAL; INFILTRATION; INTRACAUDAL; PERINEURAL AS NEEDED
Status: DISCONTINUED | OUTPATIENT
Start: 2024-10-09 | End: 2024-10-09

## 2024-10-09 RX ADMIN — PROPOFOL 10 MG: 10 INJECTION, EMULSION INTRAVENOUS at 10:50

## 2024-10-09 RX ADMIN — PROPOFOL 10 MG: 10 INJECTION, EMULSION INTRAVENOUS at 10:51

## 2024-10-09 RX ADMIN — PROPOFOL 10 MG: 10 INJECTION, EMULSION INTRAVENOUS at 10:49

## 2024-10-09 RX ADMIN — PROPOFOL 100 MG: 10 INJECTION, EMULSION INTRAVENOUS at 10:46

## 2024-10-09 RX ADMIN — PROPOFOL 10 MG: 10 INJECTION, EMULSION INTRAVENOUS at 10:48

## 2024-10-09 RX ADMIN — SODIUM CHLORIDE, SODIUM LACTATE, POTASSIUM CHLORIDE, AND CALCIUM CHLORIDE: .6; .31; .03; .02 INJECTION, SOLUTION INTRAVENOUS at 10:43

## 2024-10-09 RX ADMIN — LIDOCAINE HYDROCHLORIDE 50 MG: 10 INJECTION, SOLUTION EPIDURAL; INFILTRATION; INTRACAUDAL at 10:46

## 2024-10-09 NOTE — H&P
"History and Physical -  [facility]  Brandy Stock 69 y.o. female MRN: [id number]    HPI: Brandy Stock is a 69 y.o. year old female who presents for evaluation of iron deficiency anemia.      Review of Systems    Historical Information   Past Medical History:   Diagnosis Date    Allergic 1967    Anemia 2024    Arthritis     Asymptomatic hyperuricemia     BRCA1 negative     BRCA2 negative     Breast cancer (HCC) 01/17/2014    radiation    Cancer (HCC) 2013    Disease of thyroid gland     History of radiation therapy 2014    breast cancer    Hypertension     Hypothyroid     Knee pain, right     Limb alert care status     no BP's /IV's right side    Mitral valve prolapse     Numbness of anterior thigh     left and tingling    Osteoarthritis     Scoliosis 2013    Seasonal allergies     dust,mold,trees    Urinary incontinence     Varicose veins of both lower extremities     Wears glasses      Past Surgical History:   Procedure Laterality Date    BREAST BIOPSY Left 01/16/2002    BREAST BIOPSY Left 08/05/2005    high risk    BREAST BIOPSY Right 12/26/2013    positive    BREAST EXCISIONAL BIOPSY Left 04/06/2006    high risk    BREAST EXCISIONAL BIOPSY Left 10/13/2006    BREAST LUMPECTOMY Right 01/17/2014    malignant    BREAST SURGERY Right     lumpectomy    BUNIONECTOMY Bilateral     COLONOSCOPY      EXPLORATORY LAPAROTOMY      \"for fertility\"    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    JOINT REPLACEMENT  2017    right knee    KNEE ARTHROSCOPY Right     LYMPH NODE BIOPSY  2014    CA ARTHRP KNE CONDYLE&PLATU MEDIAL&LAT COMPARTMENTS Right 01/20/2017    Procedure: ARTHROPLASTY KNEE TOTAL;  Surgeon: Gabriel Razo MD;  [facility];  Service: Orthopedics    CA HYSTEROSCOPY BX ENDOMETRIUM&/POLYPC W/WO D&C N/A 07/29/2022    Procedure: DILATATION AND CURETTAGE (D&C) WITH HYSTEROSCOPY;  Surgeon: Ashley Morgan MD;  [facility];  Service: Gynecology    CA TENDON SHEATH " "INCISION Right 04/05/2022    Procedure: Right long and ring finger trigger finger release;  Surgeon: Matthew Jarvis MD;  [facility];  Service: Orthopedics    TONSILLECTOMY      WISDOM TOOTH EXTRACTION       Social History   Social History     Substance and Sexual Activity   Alcohol Use Yes    Comment: social     Social History     Substance and Sexual Activity   Drug Use No     Social History     Tobacco Use   Smoking Status Never   Smokeless Tobacco Never     Family History   Problem Relation Age of Onset    Cancer Mother         Bladder CA    Throat cancer Father 80    Cancer Father     No Known Problems Daughter     No Known Problems Daughter     No Known Problems Maternal Grandmother     No Known Problems Maternal Grandfather     No Known Problems Paternal Grandmother     No Known Problems Paternal Grandfather     Kidney disease Maternal Aunt     Kidney disease Maternal Aunt     No Known Problems Maternal Aunt     No Known Problems Maternal Aunt     Colon cancer Maternal Uncle 70    Breast cancer additional onset Paternal Aunt 55        Also maternal cousin Maricruz    No Known Problems Paternal Aunt     Breast cancer additional onset Cousin 35    Uterine cancer Cousin 61    Lung cancer Cousin 76       Meds/Allergies     Not in a hospital admission.    Allergies   Allergen Reactions    Actifed Cold-Allergy [Chlorpheniramine-Phenylephrine] Swelling and Rash     Throat and face swelled    Penicillins Rash    Vancomycin Other (See Comments)     redness       Objective     /57   Pulse 57   Temp (!) 97.2 °F (36.2 °C) (Temporal)   Resp 18   Ht 5' 8\" (1.727 m)   Wt 73.9 kg (163 lb)   LMP  (LMP Unknown)   SpO2 99%   BMI 24.78 kg/m²       PHYSICAL EXAM    Gen: NAD  CV: RRR  CHEST: Clear  ABD: soft, NT/ND  EXT: no edema  Neuro: AAO      ASSESSMENT/PLAN:  This is a 69 y.o. year old female here for evaluation of iron deficiency anemia.    PLAN:   Procedure: EGD.      "

## 2024-10-09 NOTE — ANESTHESIA POSTPROCEDURE EVALUATION
Post-Op Assessment Note    CV Status:  Stable  Pain Score: 0    Pain management: adequate       Mental Status:  Arousable and sleepy   Hydration Status:  Stable   PONV Controlled:  None   Airway Patency:  Patent     Post Op Vitals Reviewed: Yes    No anethesia notable event occurred.    Staff: CRNA           Last Filed PACU Vitals:  Vitals Value Taken Time   Temp 97.5 °F (36.4 °C) 10/09/24 1058   Pulse 52 10/09/24 1058   /64    Resp 16 10/09/24 1058   SpO2 99 % 10/09/24 1058       Modified Genet:  Activity: 0 (10/9/2024 10:59 AM)  Respiration: 2 (10/9/2024 10:59 AM)  Circulation: 2 (10/9/2024 10:59 AM)  Consciousness: 0 (10/9/2024 10:59 AM)  Oxygen Saturation: 2 (10/9/2024 10:59 AM)  Modified Genet Score: 6 (10/9/2024 10:59 AM)

## 2024-10-10 ENCOUNTER — ANNUAL EXAM (OUTPATIENT)
Age: 69
End: 2024-10-10
Payer: MEDICARE

## 2024-10-10 VITALS
HEIGHT: 68 IN | WEIGHT: 168 LBS | BODY MASS INDEX: 25.46 KG/M2 | SYSTOLIC BLOOD PRESSURE: 120 MMHG | DIASTOLIC BLOOD PRESSURE: 74 MMHG

## 2024-10-10 DIAGNOSIS — Z91.89 GYN EXAM FOR HIGH-RISK MEDICARE PATIENT: Primary | ICD-10-CM

## 2024-10-10 DIAGNOSIS — N81.9 FEMALE GENITAL PROLAPSE, UNSPECIFIED TYPE: ICD-10-CM

## 2024-10-10 DIAGNOSIS — R32 URINARY INCONTINENCE, UNSPECIFIED TYPE: ICD-10-CM

## 2024-10-10 PROCEDURE — G0101 CA SCREEN;PELVIC/BREAST EXAM: HCPCS | Performed by: OBSTETRICS & GYNECOLOGY

## 2024-10-10 NOTE — PROGRESS NOTES
Brandy Stock   1955    CC:  Yearly exam    S:  69 y.o. female here for yearly exam. She is postmenopausal and has had no vaginal bleeding.  She denies vaginal discharge, itching, odor or dryness.     Personal history of breast cancer.   Now more bothered by her prolapse.     S/P D&C 7/29/22 for PMB.     Sexual activity: She is not sexually active.    She does report urinary incontinence - feels like she always is wearing a wet diaper.    Also struggles moving her bowels.   Saw Ayseha's office about 10yrs ago.     Last Pap: 4/5/21 - Neg  Last Mammo:  12/8/2023 - BIRad 2  Last Colonoscopy:  7/19/23 - polyp, 5yr recall   Last DEXA: 9/12/23  - osteopenia    We reviewed ASCCP guidelines for Pap testing.     Family hx of breast cancer: yes, also personal   Family hx of ovarian cancer: no  Family hx of colon cancer: M Uncle      Current Outpatient Medications:     Ascorbic Acid (Vitamin C) 250 MG CHEW, , Disp: , Rfl:     Biotin 2500 MCG CAPS, , Disp: , Rfl:     Calcium Carbonate-Vit D-Min (CALTRATE 600+D PLUS MINERALS PO), , Disp: , Rfl:     chlorthalidone 25 mg tablet, Take 1 tablet (25 mg total) by mouth daily, Disp: 90 tablet, Rfl: 3    Cholecalciferol (Vitamin D3) 50 MCG (2000 UT) capsule, , Disp: , Rfl:     ferrous sulfate 324 (65 Fe) mg, Take 1 tablet (324 mg total) by mouth daily before breakfast, Disp: 30 tablet, Rfl: 2    levothyroxine 75 mcg tablet, Take 1 tablet by mouth every other day, Disp: , Rfl:     levothyroxine 88 mcg tablet, Take 1 tablet (88 mcg total) by mouth every other day, Disp: 45 tablet, Rfl: 3    loratadine (CLARITIN) 10 mg tablet, Take 10 mg by mouth daily, Disp: , Rfl:     Magnesium 80 MG TABS, , Disp: , Rfl:     Multiple Vitamins-Minerals (MULTIVITAMIN ADULT PO), daily after breakfast, Disp: , Rfl:     omeprazole (PriLOSEC) 20 mg delayed release capsule, Take 1 capsule (20 mg total) by mouth daily, Disp: 30 capsule, Rfl: 2    potassium chloride (Klor-Con M10) 10 mEq tablet, Take 1  tablet (10 mEq total) by mouth daily, Disp: 100 tablet, Rfl: 1    solifenacin (VESICARE) 10 MG tablet, Take 1 tablet (10 mg total) by mouth daily, Disp: 100 tablet, Rfl: 1    thiamine (VITAMIN B1) 100 mg tablet, Take 100 mg by mouth daily  , Disp: , Rfl:     zinc gluconate 50 mg tablet, Take 50 mg by mouth daily  , Disp: , Rfl:   No current facility-administered medications for this visit.  Patient Active Problem List   Diagnosis    Primary osteoarthritis of right knee    Meralgia paraesthetica, left    Acquired spondylolisthesis    Acquired hypothyroidism    Leukopenia    Lumbar radiculopathy    History of right breast cancer    Osteopenia of multiple sites    Seasonal allergies    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    Benign essential HTN    Urge incontinence of urine    Lumbar spondylosis    Spinal stenosis of lumbar region    DDD (degenerative disc disease), lumbar    Primary osteoarthritis of left hip    Primary generalized (osteo)arthritis    Encounter for follow-up surveillance of breast cancer    Neck pain     Family History   Problem Relation Age of Onset    Cancer Mother         Bladder CA    Throat cancer Father 80    Cancer Father     No Known Problems Daughter     No Known Problems Daughter     No Known Problems Maternal Grandmother     No Known Problems Maternal Grandfather     No Known Problems Paternal Grandmother     No Known Problems Paternal Grandfather     Kidney disease Maternal Aunt     Kidney disease Maternal Aunt     No Known Problems Maternal Aunt     No Known Problems Maternal Aunt     Colon cancer Maternal Uncle 70    Breast cancer additional onset Paternal Aunt 55        Also maternal cousin Maricruz    No Known Problems Paternal Aunt     Breast cancer additional onset Cousin 35    Uterine cancer Cousin 61    Lung cancer Cousin 76     Past Medical History:   Diagnosis Date    Allergic 1967    Anemia 2024    Arthritis     Asymptomatic hyperuricemia     BRCA1 negative     BRCA2  "negative     Breast cancer ([facility]) 01/17/2014    radiation    Cancer ([facility]) 2013    Disease of thyroid gland     History of radiation therapy 2014    breast cancer    Hypertension     Hypothyroid     Knee pain, right     Limb alert care status     no BP's /IV's right side    Miscarriage 1990    Mitral valve prolapse     Numbness of anterior thigh     left and tingling    Osteoarthritis     Scoliosis 2013    Seasonal allergies     dust,mold,trees    Urinary incontinence     Varicella     Child    Varicose veins of both lower extremities     Wears glasses         Review of Systems   Respiratory: Negative.    Cardiovascular: Negative.    Gastrointestinal: Negative for constipation and diarrhea.   Genitourinary: Negative for difficulty urinating, pelvic pain, vaginal bleeding, vaginal discharge, itching or odor.    O:  Blood pressure 120/74, height 5' 8\" (1.727 m), weight 76.2 kg (168 lb), not currently breastfeeding.    Patient appears well and is not in distress  Breasts are symmetrical without mass, tenderness, nipple discharge, skin changes or adenopathy.   Right - lumpectomy scar present  Abdomen is soft and nontender without masses.   External genitals are normal without lesions or rashes.  Urethral meatus and urethra are normal  Bladder is normal to palpation  Vagina is normal without discharge or bleeding, generalized atrophic changes present   Cervix is normal without discharge or lesion.   Uterus is normal, mobile, nontender without palpable mass.  Adnexa are normal, nontender, without palpable mass.     A:  Yearly exam.     P:   Pap & HPV up to date/not indicated  Mammo up to date   Colon Cancer Screening up to date    DEXA up to date      Reviewed considerations of menopause, to call with any postmenopausal bleeding or other concerns.      RTO one year for yearly exam or sooner as needed.      "

## 2024-10-13 NOTE — ANESTHESIA POSTPROCEDURE EVALUATION
Post-Op Assessment Note    CV Status:  Stable    Pain management: adequate       Mental Status:  Alert and awake   Hydration Status:  Euvolemic   PONV Controlled:  Controlled   Airway Patency:  Patent     Post Op Vitals Reviewed: Yes    No anethesia notable event occurred.    Staff: Anesthesiologist           Last Filed PACU Vitals:  Vitals Value Taken Time   Temp 97 °F (36.1 °C) 10/09/24 1115   Pulse 52 10/09/24 1115   /67 10/09/24 1115   Resp 16 10/09/24 1115   SpO2 97 % 10/09/24 1115       Modified Genet:  No data recorded

## 2024-10-17 PROCEDURE — 88305 TISSUE EXAM BY PATHOLOGIST: CPT | Performed by: STUDENT IN AN ORGANIZED HEALTH CARE EDUCATION/TRAINING PROGRAM

## 2024-10-17 PROCEDURE — 88342 IMHCHEM/IMCYTCHM 1ST ANTB: CPT | Performed by: STUDENT IN AN ORGANIZED HEALTH CARE EDUCATION/TRAINING PROGRAM

## 2024-10-17 PROCEDURE — 88313 SPECIAL STAINS GROUP 2: CPT | Performed by: STUDENT IN AN ORGANIZED HEALTH CARE EDUCATION/TRAINING PROGRAM

## 2024-10-18 ENCOUNTER — TELEPHONE (OUTPATIENT)
Age: 69
End: 2024-10-18

## 2024-10-18 ENCOUNTER — NURSE TRIAGE (OUTPATIENT)
Age: 69
End: 2024-10-18

## 2024-10-18 ENCOUNTER — PREP FOR PROCEDURE (OUTPATIENT)
Dept: GASTROENTEROLOGY | Facility: AMBULARY SURGERY CENTER | Age: 69
End: 2024-10-18

## 2024-10-18 DIAGNOSIS — D50.9 IRON DEFICIENCY ANEMIA, UNSPECIFIED IRON DEFICIENCY ANEMIA TYPE: Primary | ICD-10-CM

## 2024-10-18 NOTE — TELEPHONE ENCOUNTER
"Sarah Blanco MD  10/18/2024  8:03 AM EDT   Recommend capsule endoscopy for anemia workup.  No need for repeat EGD.    SPOKE WITH PT, INFORMED OF ABOVE RESULTS AND RECOMMENDATIONS. ALL QUESTIONS ANSWERED, PT GIVEN NUMBER FOR CENTRAL SCHEDULING.               Reason for Disposition   Information only question and nurse able to answer    Answer Assessment - Initial Assessment Questions  1. REASON FOR CALL or QUESTION: \"What is your reason for calling today?\" or \"How can I best help you?\" or \"What question do you have that I can help answer?\"        Sarah Blanco MD  10/18/2024  8:03 AM EDT   Recommend capsule endoscopy for anemia workup.  No need for repeat EGD.    SPOKE WITH PT, INFORMED OF ABOVE RESULTS AND RECOMMENDATIONS. ALL QUESTIONS ANSWERED, PT GIVEN NUMBER FOR CENTRAL SCHEDULING.    Protocols used: Information Only Call - No Triage-ADULT-[place]    "

## 2024-10-18 NOTE — TELEPHONE ENCOUNTER
Patient called to have results reviewed, labs and EGD. Would like to know next steps if has anemia.

## 2024-10-25 ENCOUNTER — HOSPITAL ENCOUNTER (OUTPATIENT)
Dept: GASTROENTEROLOGY | Facility: HOSPITAL | Age: 69
End: 2024-10-25
Attending: INTERNAL MEDICINE
Payer: MEDICARE

## 2024-10-25 DIAGNOSIS — D50.9 IRON DEFICIENCY ANEMIA, UNSPECIFIED IRON DEFICIENCY ANEMIA TYPE: ICD-10-CM

## 2024-10-25 PROCEDURE — 91110 GI TRC IMG INTRAL ESOPH-ILE: CPT

## 2024-10-29 ENCOUNTER — OFFICE VISIT (OUTPATIENT)
Age: 69
End: 2024-10-29
Payer: MEDICARE

## 2024-10-29 VITALS
DIASTOLIC BLOOD PRESSURE: 72 MMHG | BODY MASS INDEX: 25.64 KG/M2 | HEIGHT: 68 IN | TEMPERATURE: 97.4 F | HEART RATE: 59 BPM | WEIGHT: 169.2 LBS | OXYGEN SATURATION: 99 % | SYSTOLIC BLOOD PRESSURE: 128 MMHG

## 2024-10-29 DIAGNOSIS — Z79.899 ENCOUNTER FOR LONG-TERM (CURRENT) USE OF MEDICATIONS: ICD-10-CM

## 2024-10-29 DIAGNOSIS — M75.81 ROTATOR CUFF TENDONITIS, RIGHT: ICD-10-CM

## 2024-10-29 DIAGNOSIS — M15.0 PRIMARY GENERALIZED (OSTEO)ARTHRITIS: ICD-10-CM

## 2024-10-29 DIAGNOSIS — N18.31 STAGE 3A CHRONIC KIDNEY DISEASE (HCC): ICD-10-CM

## 2024-10-29 DIAGNOSIS — D72.819 LEUKOPENIA, UNSPECIFIED TYPE: ICD-10-CM

## 2024-10-29 DIAGNOSIS — M47.816 LUMBAR SPONDYLOSIS: ICD-10-CM

## 2024-10-29 DIAGNOSIS — D50.8 OTHER IRON DEFICIENCY ANEMIA: ICD-10-CM

## 2024-10-29 DIAGNOSIS — M85.89 OSTEOPENIA OF MULTIPLE SITES: Primary | ICD-10-CM

## 2024-10-29 DIAGNOSIS — M35.9 UNDIFFERENTIATED CONNECTIVE TISSUE DISEASE (HCC): ICD-10-CM

## 2024-10-29 PROCEDURE — 99214 OFFICE O/P EST MOD 30 MIN: CPT | Performed by: INTERNAL MEDICINE

## 2024-10-29 NOTE — PATIENT INSTRUCTIONS
Continue calcium and vitamin D supplement.  Ask your primary care physician to get a vitamin D with one of the labs that he orders.  I will see you in a year.  Get your lab work before the next office visit.  You will need a bone density in September 2025.  Continue home exercise program as tolerated.  Follow-up with GI.

## 2024-10-29 NOTE — PROGRESS NOTES
Assessment/Plan:    Osteopenia of multiple sites  Osteopenia stable on most recent DEXA dated 9/12/2023.  L1-L3 T-score -0.8.  Right total hip T-score -0.8.  Femoral neck T-score -1.5.  BMD stable with low FRAX risk for fracture.  She took Prolia from October 2017 until April 2018 while she was on aromatase inhibitors for breast cancer.  She had a bridge dose of IV Reclast in December 2019.  Continue to monitor her bone density every 2 years.  She will be due in September 2025.  Continue calcium and vitamin-D. Continue home exercise program as tolerated.  Plan follow-up in 1 year to review results of updated DEXA.    Primary generalized (osteo)arthritis  Primary generalized osteoarthritis with interphalangeal osteoarthritis, and lumbar spondylosis with history of radicular symptoms left lower extremity.  Patient had epidural steroid injections with only temporary benefit.  She ultimately had lumbar laminectomy in January of this year with marked improvement in radicular symptoms.  DJD knees, status post right total knee arthroplasty.  History right rotator cuff repair January 2023 with essentially normal range of motion. She continues with home exercise program with stretching.  Continue to monitor.    Lumbar spondylosis  Lumbar spondylosis with history recurrent radicular symptoms left lower extremity despite epidural steroid injections, ultimately treated with lumbar laminectomy January 2024 with marked improvement in radicular symptoms.  Encourage home exercise and stretching program.  Continue to monitor.    Leukopenia  Chronic leukopenia with white blood cell count dated 9/19/2024 4.06 with absolute neutrophil count 2.19.  No history of recurrent fevers or recurrent infections.  Leukopenia/neutropenia likely clinically insignificant.  Continue to monitor.    Undifferentiated connective tissue disease (HCC)  History of positive SAI with prior borderline positive double-stranded DNA antibody and a low titer.   Lupus Avise, October 2022 had negative index with low likelihood for lupus.  SAI was positive by IgG with all other components negative.  Recent lab work dated 9/3/2024 significant for negative double-stranded DNA antibodies and normal C3 and C4 complements.  No evidence clinically for inflammatory arthropathy or connective tissue disease.  Chronic leukopenia stable.  Continue to monitor for clinical signs and symptoms of connective tissue disease in addition to lab work to monitor for disease activity.  Follow-up 12 months or sooner if needed.    Rotator cuff tendonitis, right  History right rotator cuff repair January 2023 with excellent postop course and essentially full range of motion.  Continue home exercise program as tolerated.  Continue to monitor.    Stage 3a chronic kidney disease (HCC)  Lab Results   Component Value Date    EGFR 61 09/03/2024    EGFR 64 12/14/2023    EGFR 59 09/15/2023    CREATININE 0.94 09/03/2024    CREATININE 0.91 12/14/2023    CREATININE 0.97 09/15/2023   Chronic kidney disease with improvement in GFR, currently stage II with recent GFR estimated at 61.  Have encouraged patient to avoid use of nonsteroidals in view of potential nephrotoxicity and history of iron deficiency anemia with erosion in the body of the stomach currently being treated with omeprazole.  Continue to monitor.    Absolute anemia  Iron deficiency anemia with workup significant for EGD dated 10/9/2024 positive for sliding hiatal hernia and erosion body of the stomach.  She is being treated with omeprazole.  GI has suggested capsule endoscopy to rule out AVM as source of probable GI bleed versus gastric erosion.  Biopsies negative for Helicobacter, metaplasia, or dysplasia.  She is continuing on low-dose iron.  Avoid nonsteroidal anti-inflammatory medications.  Follow-up GI.  Continue to monitor.         Problem List Items Addressed This Visit       Leukopenia     Chronic leukopenia with white blood cell count dated  9/19/2024 4.06 with absolute neutrophil count 2.19.  No history of recurrent fevers or recurrent infections.  Leukopenia/neutropenia likely clinically insignificant.  Continue to monitor.         Osteopenia of multiple sites - Primary     Osteopenia stable on most recent DEXA dated 9/12/2023.  L1-L3 T-score -0.8.  Right total hip T-score -0.8.  Femoral neck T-score -1.5.  BMD stable with low FRAX risk for fracture.  She took Prolia from October 2017 until April 2018 while she was on aromatase inhibitors for breast cancer.  She had a bridge dose of IV Reclast in December 2019.  Continue to monitor her bone density every 2 years.  She will be due in September 2025.  Continue calcium and vitamin-D. Continue home exercise program as tolerated.  Plan follow-up in 1 year to review results of updated DEXA.         Relevant Orders    DXA bone density spine hip and pelvis    Lumbar spondylosis     Lumbar spondylosis with history recurrent radicular symptoms left lower extremity despite epidural steroid injections, ultimately treated with lumbar laminectomy January 2024 with marked improvement in radicular symptoms.  Encourage home exercise and stretching program.  Continue to monitor.         Primary generalized (osteo)arthritis     Primary generalized osteoarthritis with interphalangeal osteoarthritis, and lumbar spondylosis with history of radicular symptoms left lower extremity.  Patient had epidural steroid injections with only temporary benefit.  She ultimately had lumbar laminectomy in January of this year with marked improvement in radicular symptoms.  DJD knees, status post right total knee arthroplasty.  History right rotator cuff repair January 2023 with essentially normal range of motion. She continues with home exercise program with stretching.  Continue to monitor.         Undifferentiated connective tissue disease (HCC)     History of positive SAI with prior borderline positive double-stranded DNA antibody and  a low titer.  Lupus Avise, October 2022 had negative index with low likelihood for lupus.  SAI was positive by IgG with all other components negative.  Recent lab work dated 9/3/2024 significant for negative double-stranded DNA antibodies and normal C3 and C4 complements.  No evidence clinically for inflammatory arthropathy or connective tissue disease.  Chronic leukopenia stable.  Continue to monitor for clinical signs and symptoms of connective tissue disease in addition to lab work to monitor for disease activity.  Follow-up 12 months or sooner if needed.         Relevant Orders    C-reactive protein    CBC and differential    Comprehensive metabolic panel    dsDNA Antibody by IFA, Crithidia luciliae, with Reflex to Titer    C3 complement    C4 complement    Urinalysis with microscopic    Stage 3a chronic kidney disease (HCC)     Lab Results   Component Value Date    EGFR 61 09/03/2024    EGFR 64 12/14/2023    EGFR 59 09/15/2023    CREATININE 0.94 09/03/2024    CREATININE 0.91 12/14/2023    CREATININE 0.97 09/15/2023   Chronic kidney disease with improvement in GFR, currently stage II with recent GFR estimated at 61.  Have encouraged patient to avoid use of nonsteroidals in view of potential nephrotoxicity and history of iron deficiency anemia with erosion in the body of the stomach currently being treated with omeprazole.  Continue to monitor.         Rotator cuff tendonitis, right     History right rotator cuff repair January 2023 with excellent postop course and essentially full range of motion.  Continue home exercise program as tolerated.  Continue to monitor.         Absolute anemia     Iron deficiency anemia with workup significant for EGD dated 10/9/2024 positive for sliding hiatal hernia and erosion body of the stomach.  She is being treated with omeprazole.  GI has suggested capsule endoscopy to rule out AVM as source of probable GI bleed versus gastric erosion.  Biopsies negative for Helicobacter,  metaplasia, or dysplasia.  She is continuing on low-dose iron.  Avoid nonsteroidal anti-inflammatory medications.  Follow-up GI.  Continue to monitor.          Other Visit Diagnoses       Encounter for long-term (current) use of medications        Relevant Orders    C-reactive protein    CBC and differential    Comprehensive metabolic panel    dsDNA Antibody by DIDI, Hui schuster, with Reflex to Titer    C3 complement    C4 complement    Urinalysis with microscopic                Reviewed records, labs, and imaging with the patient in detail.  Counseled patient.  Discussion regarding my findings and recommendations.  Office visit with documentation 35 min.    Subjective:      Patient ID: Brandy Stock is a 69 y.o. female.    Patient with osteopenia.  She was briefly treated with Prolia from October 2017 until April 2018 while she was on aromatase inhibitors for breast cancer.  She had a bridge dose of IV Reclast in December 2019.  She had a DEXA scan done on 09/12/2023.  Lumbar spine L1-L3 T-score-0.8.  Right total hip T-score-0.8.  Femoral neck T-score-1.5.  BMD stable from prior study.  FRAX risk for fracture low risk.    She had been following with Orthopedics for right middle finger trigger finger.  She is status post trigger release right middle and ring fingers in April 2022.  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  Ultrasound right upper extremity done in August was significant for 3rd and 4th flexor tendonosis with mild tenosynovitis and questionable scarring near the 3rd flexor tendon at the level of the distal aspect of the proximal phalanx.  She had lab work ordered which included a double-stranded DNA antibody positive in a dilution of 20.  Follow-up lupus Avise dated 10/18/2022 revealed negative index of -0.4 with SAI positive by IgG.  All  components negative suggesting low likelihood for lupus.  Review of old records remarkable for negative serologies in July 2016 with x-ray bilateral hands significant for osteoarthritis.  She did have chondrocalcinosis bilateral knees.     Patient is status post right rotator cuff repair 1/11/2023 with some clicking postoperatively, however, she has full range of motion.  She has only a few minutes of am stiffness in the morning and no swelling in any of her joints.  She has less complaints of finger sticking sensation.  She has had some ankle edema likely secondary to varicosities lower extremities and has compression stockings.  Symptoms are worse left greater than right lower extremity. She had intermittent left lateral thigh cramping with chronic numbness.  Patient has had left L4 and L5 epidural injections approximately every 6 to 8 months with the most recent July 5,2023.  She is also having acupuncture.  MRI lumbar spine 9/15/2023 significant for levoscoliosis with multilevel bulging annulus and facet disease with moderate left foraminal narrowing at the L5-S1 level.  She tried gabapentin in the remote past without benefit.  She ultimately had lumbar laminectomy January 2024 with marked improvement in radicular symptoms.    Patient had EGD for workup of anemia 10/9/2024 significant for sliding hiatal hernia with erosion in the body of the stomach.  She is currently on omeprazole.  GI suggested capsule endoscopy likely to rule out AVM as source of bleeding.  Biopsies at the time of the EGD were negative for Helicobacter pylori, metaplasia, or dysplasia.  She is to follow-up with GI.    Patient had urology evaluation for microhematuria with cysto negative October 6, 2022.  She also had an endometrial biopsy via D&C on 07/29/2022 which was significant for endometrial polyp.  She had spotting.     Lab work dated 9/18/2024 significant for ferritin 9.  Iron 51.  Hemoglobin 11.2 with hematocrit 37.0.  Hypochromic  microcytic indices noted.  Lab work dated 9/3/2024 significant for hemoglobin A1c 6.1.  C3 and C4 complements normal.  Double-stranded DNA antibodies negative.  CRP 5.7.  Uric acid 6.0.  Free T4 1.26.  TSH 2.354.  Calcium 9.3.  Estimated GFR 61.  Albumin to creatinine ratio and urine negative.  Review of lab work dated 10/5/2023 significant for normal free T3 and free T4.  Magnesium 2.1.  Phosphorus 3.3.  Lab work dated 9/15/2023 significant for white blood cell count 3.03 with absolute neutrophil count 1.69.  Platelets 180.  Sed rate 7.  CRP 4.5.  Double-stranded DNA antibodies negative.  C3 and C4 complements normal.  Creatinine 0.97 with estimated GFR 59.  Calcium 9.7.  Uric acid 6.2.  Hemoglobin A1c 5.8.  Phosphorus elevated at 4.5.  Magnesium 2.1.  Free T4 elevated at 1.59 with TSH 2.850.  Review of lab work dated 12/19/2022 significant for white blood cell count 3.9 with absolute neutrophil count 2.8.  Platelets 213.  Calcium 9.4.  Creatinine 1.04 with estimated GFR 58.  Uric acid dated 10/11/2022 was 5.4.  Review of lab work dated 09/20/2022 significant for double-stranded DNA antibodies in a dilution of 40.  SAI negative.  Rheumatoid factor and CCP negative.  Uric acid elevated at 7.1.  The prior study revealed uric acid at 6.2.  Sed rate 15.  Lyme antibody negative.  CRP less than 3.0.  Blood cell count 3.74 with absolute neutrophil count 2.53.  Scleroderma antibody negative.  Creatinine 1.02 with estimated GFR 56 slightly decreased from prior study.  Vitamin-D 37.8.  Note hemoglobin A1c 5.6 dated 05/02/2022.                  Allergies  Allergies   Allergen Reactions    Actifed Cold-Allergy [Chlorpheniramine-Phenylephrine] Swelling and Rash     Throat and face swelled    Penicillins Rash    Vancomycin Other (See Comments)     redness       Home Medications    Current Outpatient Medications:     Ascorbic Acid (Vitamin C) 250 MG CHEW, , Disp: , Rfl:     Biotin 2500 MCG CAPS, , Disp: , Rfl:     Calcium  Carbonate-Vit D-Min (CALTRATE 600+D PLUS MINERALS PO), , Disp: , Rfl:     chlorthalidone 25 mg tablet, Take 1 tablet (25 mg total) by mouth daily, Disp: 90 tablet, Rfl: 3    Cholecalciferol (Vitamin D3) 50 MCG (2000 UT) capsule, , Disp: , Rfl:     ferrous sulfate 324 (65 Fe) mg, Take 1 tablet (324 mg total) by mouth daily before breakfast, Disp: 30 tablet, Rfl: 2    levothyroxine 75 mcg tablet, Take 1 tablet by mouth every other day, Disp: , Rfl:     levothyroxine 88 mcg tablet, Take 1 tablet (88 mcg total) by mouth every other day, Disp: 45 tablet, Rfl: 3    loratadine (CLARITIN) 10 mg tablet, Take 10 mg by mouth daily, Disp: , Rfl:     Magnesium 80 MG TABS, , Disp: , Rfl:     Multiple Vitamins-Minerals (MULTIVITAMIN ADULT PO), daily after breakfast, Disp: , Rfl:     omeprazole (PriLOSEC) 20 mg delayed release capsule, Take 1 capsule (20 mg total) by mouth daily, Disp: 30 capsule, Rfl: 2    potassium chloride (Klor-Con M10) 10 mEq tablet, Take 1 tablet (10 mEq total) by mouth daily, Disp: 100 tablet, Rfl: 1    solifenacin (VESICARE) 10 MG tablet, Take 1 tablet (10 mg total) by mouth daily, Disp: 100 tablet, Rfl: 1    thiamine (VITAMIN B1) 100 mg tablet, Take 100 mg by mouth daily  , Disp: , Rfl:     Past Medical History  Past Medical History:   Diagnosis Date    Allergic 1967    Anemia 2024    Arthritis     Asymptomatic hyperuricemia     BRCA1 negative     BRCA2 negative     Breast cancer (HCC) 01/17/2014    radiation    Cancer (HCC) 2013    Disease of thyroid gland     History of radiation therapy 2014    breast cancer    Hypertension     Hypothyroid     Knee pain, right     Limb alert care status     no BP's /IV's right side    Miscarriage 1990    Mitral valve prolapse     Numbness of anterior thigh     left and tingling    Osteoarthritis     Scoliosis 2013    Seasonal allergies     dust,mold,trees    Urinary incontinence     Varicella     Child    Varicose veins of both lower extremities     Wears glasses   "      Past Surgical History   Past Surgical History:   Procedure Laterality Date    BREAST BIOPSY Left 01/16/2002    BREAST BIOPSY Left 08/05/2005    high risk    BREAST BIOPSY Right 12/26/2013    positive    BREAST EXCISIONAL BIOPSY Left 04/06/2006    high risk    BREAST EXCISIONAL BIOPSY Left 10/13/2006    BREAST LUMPECTOMY Right 01/17/2014    malignant    BREAST SURGERY Right     lumpectomy    BUNIONECTOMY Bilateral     COLONOSCOPY      EXPLORATORY LAPAROTOMY      \"for fertility\"    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    JOINT REPLACEMENT  2017    right knee    KNEE ARTHROSCOPY Right     LYMPH NODE BIOPSY  2014    DE ARTHRP KNE CONDYLE&PLATU MEDIAL&LAT COMPARTMENTS Right 01/20/2017    Procedure: ARTHROPLASTY KNEE TOTAL;  Surgeon: Gabriel Razo MD;  Location: [facility];  Service: Orthopedics    DE HYSTEROSCOPY BX ENDOMETRIUM&/POLYPC W/WO D&C N/A 07/29/2022    Procedure: DILATATION AND CURETTAGE (D&C) WITH HYSTEROSCOPY;  Surgeon: Ashley Morgan MD;  Location: [facility];  Service: Gynecology    DE TENDON SHEATH INCISION Right 04/05/2022    Procedure: Right long and ring finger trigger finger release;  Surgeon: Matthew Jarvis MD;  Location: [facility];  Service: Orthopedics    TONSILLECTOMY      WISDOM TOOTH EXTRACTION         Family History   Family History   Problem Relation Age of Onset    Cancer Mother         Bladder CA    Throat cancer Father 80    Cancer Father     No Known Problems Daughter     No Known Problems Daughter     No Known Problems Maternal Grandmother     No Known Problems Maternal Grandfather     No Known Problems Paternal Grandmother     No Known Problems Paternal Grandfather     Kidney disease Maternal Aunt     Kidney disease Maternal Aunt     No Known Problems Maternal Aunt     No Known Problems Maternal Aunt     Colon cancer Maternal Uncle 70    Breast cancer additional onset Paternal Aunt 55        Also maternal cousin Maricruz    No Known Problems Paternal Aunt     " "Breast cancer additional onset Cousin 35    Uterine cancer Cousin 61    Lung cancer Cousin 76       The following portions of the patient's history were reviewed and updated as appropriate: allergies, current medications, past family history, past medical history, past social history, past surgical history, and problem list.    Review of Systems   Constitutional:  Negative for chills, fatigue and fever.   HENT:  Negative for hearing loss, sore throat and tinnitus.    Eyes:  Negative for pain and visual disturbance.        Dry eyes   Respiratory:  Negative for cough and shortness of breath.    Cardiovascular:  Positive for leg swelling. Negative for chest pain and palpitations.        VV's LE's   Gastrointestinal:  Negative for abdominal pain, nausea and vomiting.   Genitourinary:  Negative for difficulty urinating.   Musculoskeletal:  Positive for arthralgias, back pain and myalgias. Negative for gait problem, joint swelling, neck pain and neck stiffness.   Skin:  Negative for rash.   Neurological:  Positive for numbness (left leg numbness improved post lumbar laminectomy January 2024). Negative for dizziness, seizures, weakness and headaches.   Psychiatric/Behavioral:  Negative for confusion, decreased concentration and sleep disturbance.          Objective:      /72 (BP Location: Left arm, Patient Position: Sitting, Cuff Size: Adult)   Pulse 59   Temp (!) 97.4 °F (36.3 °C) (Temporal)   Ht 5' 8.25\" (1.734 m)   Wt 76.7 kg (169 lb 3.2 oz)   LMP  (LMP Unknown)   SpO2 99%   BMI 25.54 kg/m²          Physical Exam  Constitutional:       Appearance: Normal appearance.   Cardiovascular:      Rate and Rhythm: Normal rate and regular rhythm.   Pulmonary:      Breath sounds: Normal breath sounds.   Musculoskeletal:      Comments: Decreased lateral flexion neck.  Right shoulder  decreased external rotation and abduction post rotator cuff repair. Left shoulder full range of motion.  Full range of motion elbows and " wrists.  Interphalangeal OA changes, squaring 1st CMC joints bilateral hands.  No synovitis noted.  Straight leg raising negative bilaterally.  Spasm noted lumbosacral paraspinals.  Full range of motion bilateral hips.  Slightly decreased flexion and extension right knee with full range of motion left knee.  Surgical scar noted right knee.  Full range of motion bilateral ankles.  Rearfoot hyperpronation, hallux valgus deformities, cock-up toe deformities bilateral feet.   Skin:     General: Skin is warm and dry.      Comments: ANGELINE'madiha MOYER's   Neurological:      General: No focal deficit present.      Mental Status: She is alert.               This note was written in part using the assistance of the [employer] [id number] microphone system. Those portions using this system have been dictated and not read.

## 2024-10-30 ENCOUNTER — TELEPHONE (OUTPATIENT)
Age: 69
End: 2024-10-30

## 2024-10-30 NOTE — TELEPHONE ENCOUNTER
Pt had capsule endoscopy performed 10/25. She reports she passed the capsule today. Informed pt I would update  and clinical team.

## 2024-10-31 ENCOUNTER — TELEPHONE (OUTPATIENT)
Age: 69
End: 2024-10-31

## 2024-10-31 PROBLEM — D64.9 ABSOLUTE ANEMIA: Status: ACTIVE | Noted: 2024-10-31

## 2024-10-31 NOTE — ASSESSMENT & PLAN NOTE
Primary generalized osteoarthritis with interphalangeal osteoarthritis, and lumbar spondylosis with history of radicular symptoms left lower extremity.  Patient had epidural steroid injections with only temporary benefit.  She ultimately had lumbar laminectomy in January of this year with marked improvement in radicular symptoms.  DJD knees, status post right total knee arthroplasty.  History right rotator cuff repair January 2023 with essentially normal range of motion. She continues with home exercise program with stretching.  Continue to monitor.

## 2024-10-31 NOTE — TELEPHONE ENCOUNTER
LM will detailed instructions for patient as per Dr. Caraballo notation regarding DEXA scan. Advised to call back with any questions/concerns.

## 2024-10-31 NOTE — ASSESSMENT & PLAN NOTE
Chronic leukopenia with white blood cell count dated 9/19/2024 4.06 with absolute neutrophil count 2.19.  No history of recurrent fevers or recurrent infections.  Leukopenia/neutropenia likely clinically insignificant.  Continue to monitor.

## 2024-10-31 NOTE — ASSESSMENT & PLAN NOTE
Iron deficiency anemia with workup significant for EGD dated 10/9/2024 positive for sliding hiatal hernia and erosion body of the stomach.  She is being treated with omeprazole.  GI has suggested capsule endoscopy to rule out AVM as source of probable GI bleed versus gastric erosion.  Biopsies negative for Helicobacter, metaplasia, or dysplasia.  She is continuing on low-dose iron.  Avoid nonsteroidal anti-inflammatory medications.  Follow-up GI.  Continue to monitor.

## 2024-10-31 NOTE — ASSESSMENT & PLAN NOTE
History of positive SAI with prior borderline positive double-stranded DNA antibody and a low titer.  Lupus Avise, October 2022 had negative index with low likelihood for lupus.  SAI was positive by IgG with all other components negative.  Recent lab work dated 9/3/2024 significant for negative double-stranded DNA antibodies and normal C3 and C4 complements.  No evidence clinically for inflammatory arthropathy or connective tissue disease.  Chronic leukopenia stable.  Continue to monitor for clinical signs and symptoms of connective tissue disease in addition to lab work to monitor for disease activity.  Follow-up 12 months or sooner if needed.

## 2024-10-31 NOTE — ASSESSMENT & PLAN NOTE
Osteopenia stable on most recent DEXA dated 9/12/2023.  L1-L3 T-score -0.8.  Right total hip T-score -0.8.  Femoral neck T-score -1.5.  BMD stable with low FRAX risk for fracture.  She took Prolia from October 2017 until April 2018 while she was on aromatase inhibitors for breast cancer.  She had a bridge dose of IV Reclast in December 2019.  Continue to monitor her bone density every 2 years.  She will be due in September 2025.  Continue calcium and vitamin-D. Continue home exercise program as tolerated.  Plan follow-up in 1 year to review results of updated DEXA.

## 2024-10-31 NOTE — ASSESSMENT & PLAN NOTE
Lab Results   Component Value Date    EGFR 61 09/03/2024    EGFR 64 12/14/2023    EGFR 59 09/15/2023    CREATININE 0.94 09/03/2024    CREATININE 0.91 12/14/2023    CREATININE 0.97 09/15/2023   Chronic kidney disease with improvement in GFR, currently stage II with recent GFR estimated at 61.  Have encouraged patient to avoid use of nonsteroidals in view of potential nephrotoxicity and history of iron deficiency anemia with erosion in the body of the stomach currently being treated with omeprazole.  Continue to monitor.

## 2024-10-31 NOTE — ASSESSMENT & PLAN NOTE
Lumbar spondylosis with history recurrent radicular symptoms left lower extremity despite epidural steroid injections, ultimately treated with lumbar laminectomy January 2024 with marked improvement in radicular symptoms.  Encourage home exercise and stretching program.  Continue to monitor.

## 2024-10-31 NOTE — TELEPHONE ENCOUNTER
----- Message from Pamela Caraballo MD sent at 10/31/2024  2:35 AM EDT -----  Please notify the patient that she is actually due for follow-up DEXA in September 2025.  I did put in an order for follow-up DEXA which can be scheduled for 9/13/2025 or later.  She should repeat the DEXA in September if possible so that I can review results with her when she is seen for follow-up in October 2025.  Thank you!

## 2024-10-31 NOTE — ASSESSMENT & PLAN NOTE
History right rotator cuff repair January 2023 with excellent postop course and essentially full range of motion.  Continue home exercise program as tolerated.  Continue to monitor.

## 2024-11-04 ENCOUNTER — RA CDI HCC (OUTPATIENT)
Dept: OTHER | Facility: HOSPITAL | Age: 69
End: 2024-11-04

## 2024-11-06 DIAGNOSIS — I10 BENIGN ESSENTIAL HTN: ICD-10-CM

## 2024-11-06 RX ORDER — CHLORTHALIDONE 25 MG/1
25 TABLET ORAL DAILY
Qty: 90 TABLET | Refills: 1 | Status: SHIPPED | OUTPATIENT
Start: 2024-11-06

## 2024-11-06 NOTE — TELEPHONE ENCOUNTER
Reason for call:   [x] Refill   [] Prior Auth  [] Other:     Office:   [x] PCP/Provider - Dr Bland  [] Specialty/Provider -     Medication: chlorthalidone    Dose/Frequency: 25 mg take daily     Quantity: 90    Pharmacy: [facility]    Does the patient have enough for 3 days?   [x] Yes   [] No - Send as HP to POD

## 2024-11-11 ENCOUNTER — OFFICE VISIT (OUTPATIENT)
Dept: FAMILY MEDICINE CLINIC | Facility: CLINIC | Age: 69
End: 2024-11-11
Payer: MEDICARE

## 2024-11-11 VITALS
HEIGHT: 68 IN | RESPIRATION RATE: 16 BRPM | SYSTOLIC BLOOD PRESSURE: 125 MMHG | TEMPERATURE: 97.5 F | DIASTOLIC BLOOD PRESSURE: 64 MMHG | OXYGEN SATURATION: 99 % | BODY MASS INDEX: 25.61 KG/M2 | WEIGHT: 169 LBS | HEART RATE: 66 BPM

## 2024-11-11 DIAGNOSIS — N39.41 URGE INCONTINENCE OF URINE: ICD-10-CM

## 2024-11-11 DIAGNOSIS — R73.03 PREDIABETES: ICD-10-CM

## 2024-11-11 DIAGNOSIS — D50.9 IRON DEFICIENCY ANEMIA, UNSPECIFIED IRON DEFICIENCY ANEMIA TYPE: ICD-10-CM

## 2024-11-11 DIAGNOSIS — I10 BENIGN ESSENTIAL HTN: Primary | ICD-10-CM

## 2024-11-11 DIAGNOSIS — E03.9 ACQUIRED HYPOTHYROIDISM: ICD-10-CM

## 2024-11-11 DIAGNOSIS — I71.21 ANEURYSM OF ASCENDING AORTA WITHOUT RUPTURE (HCC): ICD-10-CM

## 2024-11-11 DIAGNOSIS — D72.819 LEUKOPENIA, UNSPECIFIED TYPE: ICD-10-CM

## 2024-11-11 DIAGNOSIS — Z00.00 MEDICARE ANNUAL WELLNESS VISIT, SUBSEQUENT: ICD-10-CM

## 2024-11-11 PROBLEM — Z85.3 ENCOUNTER FOR FOLLOW-UP SURVEILLANCE OF BREAST CANCER: Status: RESOLVED | Noted: 2023-04-18 | Resolved: 2024-11-11

## 2024-11-11 PROBLEM — M54.2 NECK PAIN: Status: RESOLVED | Noted: 2024-04-29 | Resolved: 2024-11-11

## 2024-11-11 PROBLEM — Z08 ENCOUNTER FOR FOLLOW-UP SURVEILLANCE OF BREAST CANCER: Status: RESOLVED | Noted: 2023-04-18 | Resolved: 2024-11-11

## 2024-11-11 PROBLEM — M75.81 ROTATOR CUFF TENDONITIS, RIGHT: Status: RESOLVED | Noted: 2023-01-04 | Resolved: 2024-11-11

## 2024-11-11 PROBLEM — D64.9 ABSOLUTE ANEMIA: Status: RESOLVED | Noted: 2024-10-31 | Resolved: 2024-11-11

## 2024-11-11 PROCEDURE — G0439 PPPS, SUBSEQ VISIT: HCPCS | Performed by: FAMILY MEDICINE

## 2024-11-11 PROCEDURE — 99214 OFFICE O/P EST MOD 30 MIN: CPT | Performed by: FAMILY MEDICINE

## 2024-11-11 NOTE — PROGRESS NOTES
Ambulatory Visit  Name: Brandy Stock      : 1955      MRN: [id number]  Encounter Provider: Levar Bland MD  Encounter Date: 2024   Encounter department: [facility]    Assessment & Plan  Benign essential HTN    Hypertension blood pressures have been stable on Chlorthalidone 25 mg/day and K+ supplement.     BP Readings from Last 3 Encounters:   24 125/64   10/29/24 128/72   10/10/24 120/74         Lab Results   Component Value Date     2015    SODIUM 143 2024    K 4.0 2024     2024    CO2 29 2024    ANIONGAP 8 2015    AGAP 11 2024    BUN 17 2024    CREATININE 0.94 2024    GLUC 66 2022    GLUF 82 2024    CALCIUM 9.3 2024    AST 19 2024    ALT 15 2024    ALKPHOS 46 2024    PROT 7.0 2015    TP 6.5 2024    BILITOT 0.39 2015    TBILI 0.60 2024    EGFR 61 2024     Lab Results   Component Value Date    WBC 4.06 (L) 2024    HGB 11.2 (L) 2024    HCT 37.0 2024    MCV 86 2024     2024     Continue with current medications.       Prediabetes    Lab Results   Component Value Date    GLUF 82 2024     Lab Results   Component Value Date    HGBA1C 6.1 (H) 2024        Watch diet         Acquired hypothyroidism    On Levothyroxine 75 mcg alternating with  88 mcg daily     Lab Results   Component Value Date    [id number] 2.354 2024              Leukopenia, unspecified type    Longstanding leukopenia.      Lab Results   Component Value Date    WBC 4.06 (L) 2024    HGB 11.2 (L) 2024    HCT 37.0 2024    MCV 86 2024     2024     WBC   Date Value Ref Range Status   2024 4.06 (L) 4.31 - 10.16 Thousand/uL Final   2024 3.52 (L) 4.31 - 10.16 Thousand/uL Final   2023 3.24 (L) 4.31 - 10.16 Thousand/uL Final   2015 4.55 4.31 - 10.16 Thousand/uL Final    06/03/2015 4.19 (L) 4.31 - 10.16 Thousand/uL Final   03/04/2015 4.22 (L) 4.31 - 10.16 Thousand/uL Final     Lab Results   Component Value Date    [id number] 568 01/25/2019     Lab Results   Component Value Date    FOLATE >20.0 (H) 01/25/2019             Iron deficiency anemia, unspecified iron deficiency anemia type    Constipation from taking iron supplement     10/2024 EGD irregular Z-line.  1 cm sliding hiatal hernia.  No Darrius lesions.  Mild patchy erythematous mucosa with erosion in the body of stomach.  Duodenal bulb, first part of duodenum and second part of duodenum normal.  No Agudelo's.  No evidence of celiac disease.  H. pylori negative.    7/2023 colonoscopy multiple diverticuli.  Small 2 cm lipoma in transverse colon.  Single sessile polyp sigmoid colon.      Lab Results   Component Value Date    WBC 4.06 (L) 09/18/2024    HGB 11.2 (L) 09/18/2024    HCT 37.0 09/18/2024    MCV 86 09/18/2024     09/18/2024         Lab Results   Component Value Date    IRON 51 09/18/2024    TIBC 420 09/18/2024    FERRITIN 9 (L) 09/18/2024     D/C iron. Repeat CBC and iron studies   Awaiting results of capsule endoscopy            Aneurysm of ascending aorta without rupture (HCC)      11/2023 CT scan chest  Fusiform ascending thoracic aortic aneurysm measuring 42 mm at the level of the left main pulmonary artery, previously 41 mm.     Repeat CT scan chest without contrast.    Orders:    CT chest wo contrast; Future    Urge incontinence of urine    10/2024 evaluation at [facility]'s [facility] pelvic medicine.       Medicare annual wellness visit, subsequent    OV 6 months  Up to date with flu vaccine          Depression Screening and Follow-up Plan: Patient was screened for depression during today's encounter. They screened negative with a PHQ-2 score of 1.      Preventive health issues were discussed with patient, and age appropriate screening tests were ordered as noted in patient's After Visit Summary. Personalized  health advice and appropriate referrals for health education or preventive services given if needed, as noted in patient's After Visit Summary.    History of Present Illness         CC Follow up visit for chronic medical problems/AWV Medications reviewed.        Patient Care Team:  Levar Bland MD as PCP - General (Family Medicine)  MD Lex Slater MD Sharvil Umesh Sheth, MD Joseph Merola, MD Joshua M Wert, DO (Pain Medicine)  SRIKANTH Razo (Oncology)  Ashley Morgan MD (Obstetrics and Gynecology)    Review of Systems   Constitutional:  Negative for appetite change, chills, fatigue, fever and unexpected weight change.   HENT:  Negative for congestion, ear pain, rhinorrhea, sore throat and trouble swallowing.    Eyes:  Negative for visual disturbance.   Respiratory:  Negative for cough, shortness of breath and wheezing.    Cardiovascular:  Negative for chest pain, palpitations and leg swelling.        12/2020 CT coronary Ca++ score=0   Gastrointestinal:  Negative for abdominal pain, blood in stool, constipation, diarrhea, nausea and vomiting.        History of colon polyps. 07/2023 colonoscopy    Endocrine:        Osteopenia with prior Rx with Prolia/Reclast-followed by Rheumatology.  09/2023 DEXA scan Low bone mass (osteopenia). Based on the right femoral neck  Since a DXA study from 9/10/2021, there has been: No statistically significant change in bone mineral density at any of the evaluated skeletal sites.      3                                   Genitourinary:  Negative for difficulty urinating.        Urge incontinence on VesiCare 05/2021 Bilateral parapelvic cysts, left greater than right,     Musculoskeletal:  Positive for arthralgias and back pain. Negative for myalgias.        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 She wears a left shoe lift. OA left hip.     X rays 05/2021 moderate OA. Chronic issue with numbness left anterior  thigh felt to be secondary to meralgia paresthetica/L lumbar radiculopathy.  Numbness/burning in feet. Chronic lower back pain. 09/2023 MRI lumbar spine  Degenerative changes of the lumbar spine. Multifactorial disease results in moderate left foraminal narrowing at L5-S1 with contact of the exiting left L5 nerve root. 07/2023 Fluoroscopically-guided left L4 and L5 transforaminal epidural steroid injection     05/2022 s/p left L 4/ L5 lumbar ESIs. Prior treatment with Gabapentin   05/2018 EMG preserved sensory and late responses argue against any significant large fiber polyneuropathy.  Absent left peroneal response measure to the left foot of unclear origin.  No evidence of lumbar radiculopathy or plexopathy.  No myopathic features.  No evidence of motor neuron disease   Skin:  Negative for rash.   Neurological:  Negative for dizziness and headaches.   Hematological:  Negative for adenopathy. Does not bruise/bleed easily.         History of right breast CA s/p right breast lumpectomy 01/2014.  BRCA 1/BRCA 2 negative. Patient completed XRT and 5 years of Femara.          Psychiatric/Behavioral:  Negative for dysphoric mood and sleep disturbance.      Medical History Reviewed by provider this encounter:       Annual Wellness Visit Questionnaire   Brandy is here for her Subsequent Wellness visit. Last Medicare Wellness visit information reviewed, patient interviewed and updates made to the record today.      Health Risk Assessment:   Patient rates overall health as very good. Patient feels that their physical health rating is slightly better. Patient is satisfied with their life. Eyesight was rated as same. Hearing was rated as same. Patient feels that their emotional and mental health rating is same. Patients states they are never, rarely angry. Patient states they are sometimes unusually tired/fatigued. Pain experienced in the last 7 days has been some. Patient's pain rating has been 4/10. Patient states that she  has experienced no weight loss or gain in last 6 months.     Depression Screening:   PHQ-2 Score: 1      Fall Risk Screening:   In the past year, patient has experienced: history of falling in past year      Urinary Incontinence Screening:   Patient has leaked urine accidently in the last six months. UI managed without medication. Gemtasa effective not taking due to cost     Home Safety:  Patient does not have trouble with stairs inside or outside of their home. Patient has working smoke alarms and has working carbon monoxide detector. Home safety hazards include: none.     Nutrition:   Current diet is Regular.     Medications:   Patient is currently taking over-the-counter supplements. OTC medications include: see medication list. Patient is able to manage medications.     Activities of Daily Living (ADLs)/Instrumental Activities of Daily Living (IADLs):   Walk and transfer into and out of bed and chair?: Yes  Dress and groom yourself?: Yes    Bathe or shower yourself?: Yes    Feed yourself? Yes  Do your laundry/housekeeping?: Yes  Manage your money, pay your bills and track your expenses?: Yes  Make your own meals?: Yes    Do your own shopping?: Yes    Previous Hospitalizations:   Any hospitalizations or ED visits within the last 12 months?: Yes    How many hospitalizations have you had in the last year?: 1-2    Advance Care Planning:   Living will: Yes    Durable POA for healthcare: Yes    Advanced directive: Yes      Cognitive Screening:   Provider or family/friend/caregiver concerned regarding cognition?: No    PREVENTIVE SCREENINGS      Cardiovascular Screening:    General: History Lipid Disorder and Screening Current      Diabetes Screening:     General: Screening Current      Colorectal Cancer Screening:     General: Screening Current      Breast Cancer Screening:     General: History Breast Cancer    Due for: Mammogram        Cervical Cancer Screening:    General: Screening Not Indicated      Osteoporosis  Screening:    General: Screening Not Indicated and History Osteoporosis      Abdominal Aortic Aneurysm (AAA) Screening:        General: Screening Not Indicated      Lung Cancer Screening:     General: Screening Not Indicated      Hepatitis C Screening:    General: Screening Current    Screening, Brief Intervention, and Referral to Treatment (SBIRT)    Screening  Typical number of drinks in a day: 0  Typical number of drinks in a week: 1  Interpretation: Low risk drinking behavior.    AUDIT-C Screenin) How often did you have a drink containing alcohol in the past year? monthly or less  2) How many drinks did you have on a typical day when you were drinking in the past year? 0  3) How often did you have 6 or more drinks on one occasion in the past year? never    AUDIT-C Score: 1  Interpretation: Score 0-2 (female): Negative screen for alcohol misuse    Single Item Drug Screening:  How often have you used an illegal drug (including marijuana) or a prescription medication for non-medical reasons in the past year? never    Single Item Drug Screen Score: 0  Interpretation: Negative screen for possible drug use disorder    Brief Intervention  Alcohol & drug use screenings were reviewed. No concerns regarding substance use disorder identified.     Other Counseling Topics:   Calcium and vitamin D intake and regular weightbearing exercise.     Social Determinants of Health     Financial Resource Strain: Low Risk  (10/26/2023)    Overall Financial Resource Strain (CARDIA)     Difficulty of Paying Living Expenses: Not hard at all   Food Insecurity: No Food Insecurity (2024)    Hunger Vital Sign     Worried About Running Out of Food in the Last Year: Never true     Ran Out of Food in the Last Year: Never true   Transportation Needs: No Transportation Needs (2024)    PRAPARE - Transportation     Lack of Transportation (Medical): No     Lack of Transportation (Non-Medical): No   Housing Stability: Unknown  "(11/4/2024)    Housing Stability Vital Sign     Unable to Pay for Housing in the Last Year: No     Homeless in the Last Year: No   Utilities: Not At Risk (11/4/2024)    [facility] Utilities     Threatened with loss of utilities: No         Objective       /64 (BP Location: Left arm, Patient Position: Sitting, Cuff Size: Standard)   Pulse 66   Temp 97.5 °F (36.4 °C) (Temporal)   Resp 16   Ht 5' 8.25\" (1.734 m)   Wt 76.7 kg (169 lb)   LMP  (LMP Unknown)   SpO2 99%   BMI 25.51 kg/m²     Wt Readings from Last 3 Encounters:   11/11/24 76.7 kg (169 lb)   10/29/24 76.7 kg (169 lb 3.2 oz)   10/10/24 76.2 kg (168 lb)        Physical Exam  Constitutional:       General: She is not in acute distress.  Eyes:      General: No scleral icterus.     Extraocular Movements: Extraocular movements intact.      Conjunctiva/sclera: Conjunctivae normal.      Pupils: Pupils are equal, round, and reactive to light.   Neck:      Thyroid: No thyroid mass or thyromegaly.      Vascular: Normal carotid pulses. No carotid bruit.   Cardiovascular:      Rate and Rhythm: Normal rate and regular rhythm.      Heart sounds: No murmur heard.     No gallop.   Pulmonary:      Effort: Pulmonary effort is normal.      Breath sounds: Normal breath sounds. No wheezing or rales.   Musculoskeletal:      Right lower leg: No edema.      Left lower leg: No edema.   Lymphadenopathy:      Cervical: No cervical adenopathy.      Upper Body:      Right upper body: No supraclavicular adenopathy.      Left upper body: No supraclavicular adenopathy.   Skin:     Findings: No rash.   Neurological:      General: No focal deficit present.      Mental Status: She is alert and oriented to person, place, and time.   Psychiatric:         Mood and Affect: Mood normal.         Cognition and Memory: Cognition normal.         "

## 2024-11-11 NOTE — PATIENT INSTRUCTIONS

## 2024-11-11 NOTE — ASSESSMENT & PLAN NOTE
Longstanding leukopenia.      Lab Results   Component Value Date    WBC 4.06 (L) 09/18/2024    HGB 11.2 (L) 09/18/2024    HCT 37.0 09/18/2024    MCV 86 09/18/2024     09/18/2024     WBC   Date Value Ref Range Status   09/18/2024 4.06 (L) 4.31 - 10.16 Thousand/uL Final   09/03/2024 3.52 (L) 4.31 - 10.16 Thousand/uL Final   12/14/2023 3.24 (L) 4.31 - 10.16 Thousand/uL Final   09/30/2015 4.55 4.31 - 10.16 Thousand/uL Final   06/03/2015 4.19 (L) 4.31 - 10.16 Thousand/uL Final   03/04/2015 4.22 (L) 4.31 - 10.16 Thousand/uL Final     Lab Results   Component Value Date    [id number] 568 01/25/2019     Lab Results   Component Value Date    FOLATE >20.0 (H) 01/25/2019

## 2024-11-11 NOTE — ASSESSMENT & PLAN NOTE
Hypertension blood pressures have been stable on Chlorthalidone 25 mg/day and K+ supplement.     BP Readings from Last 3 Encounters:   11/11/24 125/64   10/29/24 128/72   10/10/24 120/74         Lab Results   Component Value Date     09/30/2015    SODIUM 143 09/03/2024    K 4.0 09/03/2024     09/03/2024    CO2 29 09/03/2024    ANIONGAP 8 09/30/2015    AGAP 11 09/03/2024    BUN 17 09/03/2024    CREATININE 0.94 09/03/2024    GLUC 66 12/19/2022    GLUF 82 09/03/2024    CALCIUM 9.3 09/03/2024    AST 19 09/03/2024    ALT 15 09/03/2024    ALKPHOS 46 09/03/2024    PROT 7.0 09/30/2015    TP 6.5 09/03/2024    BILITOT 0.39 09/30/2015    TBILI 0.60 09/03/2024    EGFR 61 09/03/2024     Lab Results   Component Value Date    WBC 4.06 (L) 09/18/2024    HGB 11.2 (L) 09/18/2024    HCT 37.0 09/18/2024    MCV 86 09/18/2024     09/18/2024     Continue with current medications.

## 2024-11-11 NOTE — PROGRESS NOTES
Ambulatory Visit  Name: Brandy Stock      : 1955      MRN: [id number]  Encounter Provider: Levar Bland MD  Encounter Date: 2024   Encounter department: [facility]    Assessment & Plan       Preventive health issues were discussed with patient, and age appropriate screening tests were ordered as noted in patient's After Visit Summary. Personalized health advice and appropriate referrals for health education or preventive services given if needed, as noted in patient's After Visit Summary.    History of Present Illness   {?Quick Links Encounters * My Last Note * Last Note in Specialty * Snapshot * Since Last Visit * History :[id number]}  HPI   Patient Care Team:  Levar Bland MD as PCP - General (Family Medicine)  MD Lex Slater MD Sharvil Umesh Sheth, MD Joseph Merola, MD Joshua M Wert, DO (Pain Medicine)  SRIKANTH Razo (Oncology)  Ashley Morgan MD (Obstetrics and Gynecology)    Review of Systems  Medical History Reviewed by provider this encounter:       Annual Wellness Visit Questionnaire   Brandy is here for her Subsequent Wellness visit. Last Medicare Wellness visit information reviewed, patient interviewed and updates made to the record today.      Health Risk Assessment:   Patient rates overall health as very good. Patient feels that their physical health rating is slightly better. Patient is satisfied with their life. Eyesight was rated as same. Hearing was rated as same. Patient feels that their emotional and mental health rating is same. Patients states they are never, rarely angry. Patient states they are sometimes unusually tired/fatigued. Pain experienced in the last 7 days has been some. Patient's pain rating has been 4/10. Patient states that she has experienced no weight loss or gain in last 6 months.     Depression Screening:   PHQ-2 Score: 1      Fall Risk Screening:   In the past year, patient has experienced: history of  falling in past year      Urinary Incontinence Screening:   Patient has leaked urine accidently in the last six months.     Home Safety:  Patient does not have trouble with stairs inside or outside of their home. Patient has working smoke alarms and has working carbon monoxide detector. Home safety hazards include: none.     Nutrition:   Current diet is Regular.     Medications:   Patient is currently taking over-the-counter supplements. OTC medications include: see medication list. Patient is able to manage medications.     Activities of Daily Living (ADLs)/Instrumental Activities of Daily Living (IADLs):   Walk and transfer into and out of bed and chair?: Yes  Dress and groom yourself?: Yes    Bathe or shower yourself?: Yes    Feed yourself? Yes  Do your laundry/housekeeping?: Yes  Manage your money, pay your bills and track your expenses?: Yes  Make your own meals?: Yes    Do your own shopping?: Yes    Previous Hospitalizations:   Any hospitalizations or ED visits within the last 12 months?: Yes    How many hospitalizations have you had in the last year?: 1-2    Advance Care Planning:   Living will: Yes    Durable POA for healthcare: Yes    Advanced directive: Yes      PREVENTIVE SCREENINGS      Cardiovascular Screening:    General: Screening Not Indicated and History Lipid Disorder      Diabetes Screening:     General: Screening Current      Colorectal Cancer Screening:     General: Screening Current      Breast Cancer Screening:     General: History Breast Cancer      Cervical Cancer Screening:    General: Screening Not Indicated      Osteoporosis Screening:    General: Screening Not Indicated and History Osteoporosis      Lung Cancer Screening:     General: Screening Not Indicated      Hepatitis C Screening:    General: Screening Current    Screening, Brief Intervention, and Referral to Treatment (SBIRT)    Screening  Typical number of drinks in a day: 0  Typical number of drinks in a week: 1  Interpretation:  Low risk drinking behavior.    AUDIT-C Screenin) How often did you have a drink containing alcohol in the past year? monthly or less  2) How many drinks did you have on a typical day when you were drinking in the past year? 0  3) How often did you have 6 or more drinks on one occasion in the past year? never    AUDIT-C Score: 1  Interpretation: Score 0-2 (female): Negative screen for alcohol misuse    Single Item Drug Screening:  How often have you used an illegal drug (including marijuana) or a prescription medication for non-medical reasons in the past year? never    Single Item Drug Screen Score: 0  Interpretation: Negative screen for possible drug use disorder    Social Determinants of Health     Financial Resource Strain: Low Risk  (10/26/2023)    Overall Financial Resource Strain (CARDIA)     Difficulty of Paying Living Expenses: Not hard at all   Food Insecurity: No Food Insecurity (2024)    Hunger Vital Sign     Worried About Running Out of Food in the Last Year: Never true     Ran Out of Food in the Last Year: Never true   Transportation Needs: No Transportation Needs (2024)    PRAPARE - Transportation     Lack of Transportation (Medical): No     Lack of Transportation (Non-Medical): No   Housing Stability: Unknown (2024)    Housing Stability Vital Sign     Unable to Pay for Housing in the Last Year: No     Homeless in the Last Year: No   Utilities: Not At Risk (2024)    [facility] Utilities     Threatened with loss of utilities: No     No results found.    Objective   {?Quick Links Trend Vitals * Enter New Vitals * Results Review * Timeline (Adult) * Labs * Imaging * Cardiology * Procedures * Lung Cancer Screening * Surgical eConsent :34670}  LMP  (LMP Unknown)     Physical Exam  {Administrative / Billing Section (Optional):58163}

## 2024-11-11 NOTE — ASSESSMENT & PLAN NOTE
On Levothyroxine 75 mcg alternating with  88 mcg daily     Lab Results   Component Value Date    [id number] 2.354 09/03/2024

## 2024-11-13 ENCOUNTER — APPOINTMENT (OUTPATIENT)
Dept: LAB | Age: 69
End: 2024-11-13
Payer: MEDICARE

## 2024-11-13 LAB
BASOPHILS # BLD AUTO: 0.01 THOUSANDS/ÂΜL (ref 0–0.1)
BASOPHILS NFR BLD AUTO: 0 % (ref 0–1)
EOSINOPHIL # BLD AUTO: 0.12 THOUSAND/ÂΜL (ref 0–0.61)
EOSINOPHIL NFR BLD AUTO: 4 % (ref 0–6)
ERYTHROCYTE [DISTWIDTH] IN BLOOD BY AUTOMATED COUNT: 17.5 % (ref 11.6–15.1)
FERRITIN SERPL-MCNC: 7 NG/ML (ref 11–307)
HCT VFR BLD AUTO: 39.4 % (ref 34.8–46.1)
HGB BLD-MCNC: 12.5 G/DL (ref 11.5–15.4)
IMM GRANULOCYTES # BLD AUTO: 0.01 THOUSAND/UL (ref 0–0.2)
IMM GRANULOCYTES NFR BLD AUTO: 0 % (ref 0–2)
IRON SATN MFR SERPL: 12 % (ref 15–50)
IRON SERPL-MCNC: 47 UG/DL (ref 50–212)
LYMPHOCYTES # BLD AUTO: 0.94 THOUSANDS/ÂΜL (ref 0.6–4.47)
LYMPHOCYTES NFR BLD AUTO: 27 % (ref 14–44)
MCH RBC QN AUTO: 28.6 PG (ref 26.8–34.3)
MCHC RBC AUTO-ENTMCNC: 31.7 G/DL (ref 31.4–37.4)
MCV RBC AUTO: 90 FL (ref 82–98)
MONOCYTES # BLD AUTO: 0.31 THOUSAND/ÂΜL (ref 0.17–1.22)
MONOCYTES NFR BLD AUTO: 9 % (ref 4–12)
NEUTROPHILS # BLD AUTO: 2.05 THOUSANDS/ÂΜL (ref 1.85–7.62)
NEUTS SEG NFR BLD AUTO: 60 % (ref 43–75)
NRBC BLD AUTO-RTO: 0 /100 WBCS
PLATELET # BLD AUTO: 230 THOUSANDS/UL (ref 149–390)
PMV BLD AUTO: 11 FL (ref 8.9–12.7)
RBC # BLD AUTO: 4.37 MILLION/UL (ref 3.81–5.12)
TIBC SERPL-MCNC: 402 UG/DL (ref 250–450)
UIBC SERPL-MCNC: 355 UG/DL (ref 155–355)
WBC # BLD AUTO: 3.44 THOUSAND/UL (ref 4.31–10.16)

## 2024-11-15 ENCOUNTER — HOSPITAL ENCOUNTER (OUTPATIENT)
Dept: RADIOLOGY | Facility: HOSPITAL | Age: 69
Discharge: HOME | End: 2024-11-15
Payer: MEDICARE

## 2024-11-15 DIAGNOSIS — I71.21 ANEURYSM OF ASCENDING AORTA WITHOUT RUPTURE (HCC): ICD-10-CM

## 2024-11-15 PROCEDURE — 71250 CT THORAX DX C-: CPT

## 2024-11-18 ENCOUNTER — RESULTS FOLLOW-UP (OUTPATIENT)
Dept: FAMILY MEDICINE CLINIC | Facility: CLINIC | Age: 69
End: 2024-11-18

## 2024-11-20 ENCOUNTER — RESULTS FOLLOW-UP (OUTPATIENT)
Dept: FAMILY MEDICINE CLINIC | Facility: CLINIC | Age: 69
End: 2024-11-20

## 2024-11-20 DIAGNOSIS — D50.0 IRON DEFICIENCY ANEMIA DUE TO CHRONIC BLOOD LOSS: Primary | ICD-10-CM

## 2024-11-20 PROBLEM — D50.9 IRON DEFICIENCY ANEMIA: Status: ACTIVE | Noted: 2024-11-20

## 2024-11-20 RX ORDER — SODIUM CHLORIDE 9 MG/ML
20 INJECTION, SOLUTION INTRAVENOUS ONCE
OUTPATIENT
Start: 2024-11-21

## 2024-11-20 RX ORDER — SODIUM CHLORIDE 9 MG/ML
20 INJECTION, SOLUTION INTRAVENOUS ONCE
Status: CANCELLED | OUTPATIENT
Start: 2024-11-21

## 2024-11-25 ENCOUNTER — RESULTS FOLLOW-UP (OUTPATIENT)
Dept: GASTROENTEROLOGY | Facility: AMBULARY SURGERY CENTER | Age: 69
End: 2024-11-25

## 2024-11-25 NOTE — TELEPHONE ENCOUNTER
----- Message from Sarah Blanco MD sent at 11/25/2024  8:00 AM EST -----  Good morning,  I have reviewed the results of the video capsule endoscopy.  There are 2 small red spots within the small intestine, they do not appear to be bleeding, they may be small superficial vessels that can sometimes bleed however they are very small.  At this time I would recommend to continue following PCP for iron infusions if needed.  If you are still anemic, would recommend following up with hematology.  Thank you  Dr. Blanco

## 2024-11-25 NOTE — TELEPHONE ENCOUNTER
Attempted to call patient regarding capsule endoscopy results and provider recommendations, however, I received voicemail. Advised to return call to office to discuss.     Please, relay results if patient returns call. Thank you!

## 2024-12-10 ENCOUNTER — HOSPITAL ENCOUNTER (OUTPATIENT)
Dept: RADIOLOGY | Age: 69
Discharge: HOME | End: 2024-12-10
Payer: MEDICARE

## 2024-12-10 DIAGNOSIS — Z12.31 VISIT FOR SCREENING MAMMOGRAM: ICD-10-CM

## 2024-12-10 PROCEDURE — 77063 BREAST TOMOSYNTHESIS BI: CPT

## 2024-12-10 PROCEDURE — 77067 SCR MAMMO BI INCL CAD: CPT

## 2024-12-16 ENCOUNTER — RESULTS FOLLOW-UP (OUTPATIENT)
Dept: SURGICAL ONCOLOGY | Facility: CLINIC | Age: 69
End: 2024-12-16

## 2024-12-26 ENCOUNTER — TELEPHONE (OUTPATIENT)
Age: 69
End: 2024-12-26

## 2025-01-23 ENCOUNTER — TELEPHONE (OUTPATIENT)
Dept: INFUSION CENTER | Facility: CLINIC | Age: 70
End: 2025-01-23

## 2025-01-23 NOTE — TELEPHONE ENCOUNTER
New patient phone call completed. Verified date, time, and location. Visitor policy discussed. Pt confirms appointment on 1/27 @1400  AN.    -Discuss how long tx will take, 1 support person >14 yrs old, make sure labs are done if needed, snack options. Call back [phone] with ?'s.

## 2025-01-24 NOTE — PROGRESS NOTES
01/24/25 1001   Readmission Assessment   Number of Days since last admission? 8-30 days   Previous Disposition Home with Family   Who is being Interviewed Patient   What was the patient's/caregiver's perception as to why they think they needed to return back to the hospital? Other (Comment)  (started having shortness of air that became worse)   Did you visit your Primary Care Physician after you left the hospital, before you returned this time? Yes   Did you see a specialist, such as Cardiac, Pulmonary, Orthopedic Physician, etc. after you left the hospital? Yes   Who advised the patient to return to the hospital? Physician   Does the patient report anything that got in the way of taking their medications? No   In our efforts to provide the best possible care to you and others like you, can you think of anything that we could have done to help you after you left the hospital the first time, so that you might not have needed to return so soon? Other (Comment)  (nothing)        Daily Note     Today's date: 1/10/2020  Patient name: Victorino Tavarez  : 1955  MRN: [id number]  Referring provider: Elissa Bond, PT  Dx:   Encounter Diagnosis     ICD-10-CM    1  Acute bilateral low back pain without sciatica M54 5    2  Acute pain of right shoulder M25 511                   Subjective: Patient reports increased L hip pain after walking last night, walked an unusually long distance last night  Felt no pain this morning  Wanting to stretch more this morning  Objective: See treatment diary below      Assessment: Added stretches today to program  Patient significantly tighter on L hip related to h/o surgery as a child  Patient also noting/reporting B toe numbness for 6+ years, has had multiple testing, MRIs, EMGs, xrays, etc  No findings to report to support cause for extremity tingling  Added prone press ups and VIPUL to assist with alleviating numbness in toes- no change during treatment  Gave for HEP and educated about stopping exercise if symptoms cause new pain or tingling  Plan: Continue per plan of care  Precautions none    Specialty Daily Treatment Diary       Manual 1/7 1/10                              Exercise Diary                 TM walk  x10'              Piriformis stretch?   5x:20 ea              LTR FOR HEP 5x:10      DBL knee to chest FOR HEP Single 5x:20 ea        Supine hip flexor stretch 5x:20 ea        VIPUL 2x:30        Prone press ups 2x10              Bridges with TB 2x10 BTB       Clam shells with TB 10x BTB       Supine p ball press 5x:05       Prone Is, Ts        Prone Ws        Prone DLS alt arm/leg  39T alt              Quadruped alt arms/legs                Squats to hi/lo        Step ups FW, lateral        Step downs                Modalities

## 2025-01-27 ENCOUNTER — HOSPITAL ENCOUNTER (OUTPATIENT)
Dept: INFUSION CENTER | Facility: CLINIC | Age: 70
Discharge: HOME | End: 2025-01-27
Payer: MEDICARE

## 2025-01-27 VITALS
SYSTOLIC BLOOD PRESSURE: 112 MMHG | TEMPERATURE: 96.5 F | HEART RATE: 78 BPM | DIASTOLIC BLOOD PRESSURE: 72 MMHG | OXYGEN SATURATION: 98 %

## 2025-01-27 DIAGNOSIS — D50.0 IRON DEFICIENCY ANEMIA DUE TO CHRONIC BLOOD LOSS: Primary | ICD-10-CM

## 2025-01-27 PROCEDURE — 96374 THER/PROPH/DIAG INJ IV PUSH: CPT

## 2025-01-27 RX ORDER — SODIUM CHLORIDE 9 MG/ML
20 INJECTION, SOLUTION INTRAVENOUS ONCE
Status: CANCELLED | OUTPATIENT
Start: 2025-02-03

## 2025-01-27 RX ORDER — SODIUM CHLORIDE 9 MG/ML
20 INJECTION, SOLUTION INTRAVENOUS ONCE
Status: COMPLETED | OUTPATIENT
Start: 2025-01-27 | End: 2025-01-27

## 2025-01-27 RX ADMIN — SODIUM CHLORIDE 20 ML/HR: 0.9 INJECTION, SOLUTION INTRAVENOUS at 14:09

## 2025-01-27 RX ADMIN — IRON SUCROSE 200 MG: 20 INJECTION, SOLUTION INTRAVENOUS at 14:09

## 2025-01-27 NOTE — PLAN OF CARE
Problem: Potential for Falls  Goal: Patient will remain free of falls  Description: INTERVENTIONS:  - Educate patient/family on patient safety including physical limitations  - Instruct patient to call for assistance with activity   - Consult OT/PT to assist with strengthening/mobility   - Keep Call bell within reach  - Keep bed low and locked with side rails adjusted as appropriate  - Keep care items and personal belongings within reach  - Initiate and maintain comfort rounds  - Make Fall Risk Sign visible to staff  - Apply yellow socks and bracelet for high fall risk patients  - Consider moving patient to room near nurses station  1/27/2025 1407 by Miroslava Nichols, RN  Outcome: Progressing  1/27/2025 1407 by Miroslava Nichols, RN  Outcome: Progressing     Problem: Knowledge Deficit  Goal: Patient/family/caregiver demonstrates understanding of disease process, treatment plan, medications, and discharge instructions  Description: Complete learning assessment and assess knowledge base.  Interventions:  - Provide teaching at level of understanding  - Provide teaching via preferred learning methods  Outcome: Progressing

## 2025-01-27 NOTE — PROGRESS NOTES
Pt offers no complaints, venofer tolerated without adverse event.  Confirmed next appts, pt declined AVS

## 2025-02-03 ENCOUNTER — HOSPITAL ENCOUNTER (OUTPATIENT)
Dept: INFUSION CENTER | Facility: CLINIC | Age: 70
Discharge: HOME | End: 2025-02-03
Payer: MEDICARE

## 2025-02-03 VITALS
HEART RATE: 72 BPM | RESPIRATION RATE: 16 BRPM | TEMPERATURE: 97.6 F | SYSTOLIC BLOOD PRESSURE: 123 MMHG | DIASTOLIC BLOOD PRESSURE: 70 MMHG

## 2025-02-03 DIAGNOSIS — D50.0 IRON DEFICIENCY ANEMIA DUE TO CHRONIC BLOOD LOSS: Primary | ICD-10-CM

## 2025-02-03 PROCEDURE — 96374 THER/PROPH/DIAG INJ IV PUSH: CPT

## 2025-02-03 RX ORDER — SODIUM CHLORIDE 9 MG/ML
20 INJECTION, SOLUTION INTRAVENOUS ONCE
Status: COMPLETED | OUTPATIENT
Start: 2025-02-03 | End: 2025-02-03

## 2025-02-03 RX ORDER — SODIUM CHLORIDE 9 MG/ML
20 INJECTION, SOLUTION INTRAVENOUS ONCE
Status: CANCELLED | OUTPATIENT
Start: 2025-02-10

## 2025-02-03 RX ADMIN — SODIUM CHLORIDE 20 ML/HR: 0.9 INJECTION, SOLUTION INTRAVENOUS at 14:38

## 2025-02-03 RX ADMIN — IRON SUCROSE 200 MG: 20 INJECTION, SOLUTION INTRAVENOUS at 14:38

## 2025-02-03 NOTE — PLAN OF CARE
Problem: Potential for Falls  Goal: Patient will remain free of falls  Description: INTERVENTIONS:  - Educate patient/family on patient safety including physical limitations  - Instruct patient to call for assistance with activity   - Consult OT/PT to assist with strengthening/mobility   - Keep Call bell within reach  - Keep bed low and locked with side rails adjusted as appropriate  - Keep care items and personal belongings within reach  - Initiate and maintain comfort rounds  2/3/2025 1419 by Radha Leach RN  Outcome: Progressing  2/3/2025 1419 by Radha Leach RN  Outcome: Progressing     Problem: Knowledge Deficit  Goal: Patient/family/caregiver demonstrates understanding of disease process, treatment plan, medications, and discharge instructions  Description: Complete learning assessment and assess knowledge base.  Interventions:  - Provide teaching at level of understanding  - Provide teaching via preferred learning methods  Outcome: Progressing

## 2025-02-03 NOTE — PROGRESS NOTES
Patient presents to the [facility]fusion [facility] for the treatment of Venofer (IVP). She offers no concerns at this time. Patient is resting comfortably in the chair, call bell within reach.

## 2025-02-03 NOTE — PROGRESS NOTES
Patient tolerated venofer IVP without incident. Peripheral IV removed. Next appointment confirmed for 2/10/2025 at 1500. AVS offered and declined.

## 2025-02-10 ENCOUNTER — HOSPITAL ENCOUNTER (OUTPATIENT)
Dept: INFUSION CENTER | Facility: CLINIC | Age: 70
Discharge: HOME | End: 2025-02-10
Payer: MEDICARE

## 2025-02-10 DIAGNOSIS — D50.0 IRON DEFICIENCY ANEMIA DUE TO CHRONIC BLOOD LOSS: Primary | ICD-10-CM

## 2025-02-10 PROCEDURE — 96374 THER/PROPH/DIAG INJ IV PUSH: CPT

## 2025-02-10 RX ORDER — SODIUM CHLORIDE 9 MG/ML
20 INJECTION, SOLUTION INTRAVENOUS ONCE
Status: CANCELLED | OUTPATIENT
Start: 2025-02-10

## 2025-02-10 RX ORDER — SODIUM CHLORIDE 9 MG/ML
20 INJECTION, SOLUTION INTRAVENOUS ONCE
Status: COMPLETED | OUTPATIENT
Start: 2025-02-10 | End: 2025-02-10

## 2025-02-10 RX ADMIN — IRON SUCROSE 200 MG: 20 INJECTION, SOLUTION INTRAVENOUS at 15:02

## 2025-02-10 RX ADMIN — SODIUM CHLORIDE 20 ML/HR: 0.9 INJECTION, SOLUTION INTRAVENOUS at 15:01

## 2025-02-10 NOTE — PROGRESS NOTES
Patient to infusion for venofer.  She offers no complaints.  She tolerated treatment today.  She has no more ordered treatments.  She declined AVS

## 2025-02-14 ENCOUNTER — TELEPHONE (OUTPATIENT)
Age: 70
End: 2025-02-14

## 2025-02-14 NOTE — TELEPHONE ENCOUNTER
The patient called to inform that she finished the iron treatment and wants to know when the doctor wants her to have blood tests done to check her iron.    Please Advice and contact the patient

## 2025-02-16 DIAGNOSIS — D50.0 IRON DEFICIENCY ANEMIA DUE TO CHRONIC BLOOD LOSS: Primary | ICD-10-CM

## 2025-02-18 DIAGNOSIS — E87.6 HYPOKALEMIA: ICD-10-CM

## 2025-02-19 RX ORDER — POTASSIUM CHLORIDE 750 MG/1
10 TABLET, EXTENDED RELEASE ORAL DAILY
Qty: 100 TABLET | Refills: 1 | Status: SHIPPED | OUTPATIENT
Start: 2025-02-19

## 2025-02-25 ENCOUNTER — APPOINTMENT (OUTPATIENT)
Dept: LAB | Age: 70
End: 2025-02-25
Payer: MEDICARE

## 2025-02-25 ENCOUNTER — RESULTS FOLLOW-UP (OUTPATIENT)
Dept: FAMILY MEDICINE CLINIC | Facility: CLINIC | Age: 70
End: 2025-02-25

## 2025-02-25 DIAGNOSIS — R73.03 DIABETES MELLITUS, LATENT: ICD-10-CM

## 2025-02-25 DIAGNOSIS — E03.9 ACQUIRED HYPOTHYROIDISM: ICD-10-CM

## 2025-02-25 DIAGNOSIS — E34.9 MULTIPLE ENDOCRINE DEFICIENCY SYNDROME: ICD-10-CM

## 2025-02-25 DIAGNOSIS — E78.5 HYPERLIPIDEMIA, UNSPECIFIED HYPERLIPIDEMIA TYPE: ICD-10-CM

## 2025-02-25 LAB
BACTERIA UR QL AUTO: NORMAL /HPF
BASOPHILS # BLD AUTO: 0.03 THOUSANDS/ÂΜL (ref 0–0.1)
BASOPHILS NFR BLD AUTO: 1 % (ref 0–1)
BILIRUB UR QL STRIP: NEGATIVE
CLARITY UR: CLEAR
COLOR UR: NORMAL
CORTIS AM PEAK SERPL-MCNC: 13.7 UG/DL (ref 6.7–22.6)
CREAT UR-MCNC: 51.5 MG/DL
EOSINOPHIL # BLD AUTO: 0.14 THOUSAND/ÂΜL (ref 0–0.61)
EOSINOPHIL NFR BLD AUTO: 3 % (ref 0–6)
ERYTHROCYTE [DISTWIDTH] IN BLOOD BY AUTOMATED COUNT: 15.9 % (ref 11.6–15.1)
EST. AVERAGE GLUCOSE BLD GHB EST-MCNC: 126 MG/DL
FERRITIN SERPL-MCNC: 120 NG/ML (ref 11–307)
GLUCOSE UR STRIP-MCNC: NEGATIVE MG/DL
HBA1C MFR BLD: 6 %
HCT VFR BLD AUTO: 40.3 % (ref 34.8–46.1)
HGB BLD-MCNC: 13.1 G/DL (ref 11.5–15.4)
HGB UR QL STRIP.AUTO: NEGATIVE
IMM GRANULOCYTES # BLD AUTO: 0.01 THOUSAND/UL (ref 0–0.2)
IMM GRANULOCYTES NFR BLD AUTO: 0 % (ref 0–2)
KETONES UR STRIP-MCNC: NEGATIVE MG/DL
LEUKOCYTE ESTERASE UR QL STRIP: NEGATIVE
LYMPHOCYTES # BLD AUTO: 1.07 THOUSANDS/ÂΜL (ref 0.6–4.47)
LYMPHOCYTES NFR BLD AUTO: 26 % (ref 14–44)
MCH RBC QN AUTO: 31 PG (ref 26.8–34.3)
MCHC RBC AUTO-ENTMCNC: 32.5 G/DL (ref 31.4–37.4)
MCV RBC AUTO: 96 FL (ref 82–98)
MICROALBUMIN UR-MCNC: <7 MG/L
MONOCYTES # BLD AUTO: 0.3 THOUSAND/ÂΜL (ref 0.17–1.22)
MONOCYTES NFR BLD AUTO: 7 % (ref 4–12)
NEUTROPHILS # BLD AUTO: 2.64 THOUSANDS/ÂΜL (ref 1.85–7.62)
NEUTS SEG NFR BLD AUTO: 63 % (ref 43–75)
NITRITE UR QL STRIP: NEGATIVE
NON-SQ EPI CELLS URNS QL MICRO: NORMAL /HPF
NRBC BLD AUTO-RTO: 0 /100 WBCS
PH UR STRIP.AUTO: 7.5 [PH]
PLATELET # BLD AUTO: 175 THOUSANDS/UL (ref 149–390)
PMV BLD AUTO: 10.4 FL (ref 8.9–12.7)
PROT UR STRIP-MCNC: NEGATIVE MG/DL
RBC # BLD AUTO: 4.22 MILLION/UL (ref 3.81–5.12)
RBC #/AREA URNS AUTO: NORMAL /HPF
SP GR UR STRIP.AUTO: 1.01 (ref 1–1.03)
T4 FREE SERPL-MCNC: 1.18 NG/DL (ref 0.61–1.12)
TSH SERPL DL<=0.05 MIU/L-ACNC: 2.71 UIU/ML (ref 0.45–4.5)
UROBILINOGEN UR STRIP-ACNC: <2 MG/DL
WBC # BLD AUTO: 4.19 THOUSAND/UL (ref 4.31–10.16)
WBC #/AREA URNS AUTO: NORMAL /HPF

## 2025-02-25 PROCEDURE — 82728 ASSAY OF FERRITIN: CPT | Performed by: FAMILY MEDICINE

## 2025-02-25 PROCEDURE — 84443 ASSAY THYROID STIM HORMONE: CPT

## 2025-02-25 PROCEDURE — 82533 TOTAL CORTISOL: CPT

## 2025-02-25 PROCEDURE — 82627 DEHYDROEPIANDROSTERONE: CPT

## 2025-02-25 PROCEDURE — 83550 IRON BINDING TEST: CPT | Performed by: FAMILY MEDICINE

## 2025-02-25 PROCEDURE — 83540 ASSAY OF IRON: CPT | Performed by: FAMILY MEDICINE

## 2025-02-25 PROCEDURE — 80053 COMPREHEN METABOLIC PANEL: CPT

## 2025-02-25 PROCEDURE — 82043 UR ALBUMIN QUANTITATIVE: CPT

## 2025-02-25 PROCEDURE — 83036 HEMOGLOBIN GLYCOSYLATED A1C: CPT

## 2025-02-25 PROCEDURE — 82024 ASSAY OF ACTH: CPT

## 2025-02-25 PROCEDURE — 83690 ASSAY OF LIPASE: CPT

## 2025-02-25 PROCEDURE — 85025 COMPLETE CBC W/AUTO DIFF WBC: CPT | Performed by: FAMILY MEDICINE

## 2025-02-25 PROCEDURE — 36415 COLL VENOUS BLD VENIPUNCTURE: CPT

## 2025-02-25 PROCEDURE — 82570 ASSAY OF URINE CREATININE: CPT

## 2025-02-25 PROCEDURE — 82150 ASSAY OF AMYLASE: CPT

## 2025-02-25 PROCEDURE — 84100 ASSAY OF PHOSPHORUS: CPT

## 2025-02-25 PROCEDURE — 83735 ASSAY OF MAGNESIUM: CPT

## 2025-02-25 PROCEDURE — 84439 ASSAY OF FREE THYROXINE: CPT

## 2025-02-25 PROCEDURE — 81001 URINALYSIS AUTO W/SCOPE: CPT

## 2025-02-26 LAB
ACTH PLAS-MCNC: 29.9 PG/ML (ref 7.2–63.3)
ALBUMIN SERPL BCG-MCNC: 3.9 G/DL (ref 3.5–5)
ALP SERPL-CCNC: 47 U/L (ref 34–104)
ALT SERPL W P-5'-P-CCNC: 21 U/L (ref 7–52)
AMYLASE SERPL-CCNC: 48 IU/L (ref 29–103)
ANION GAP SERPL CALCULATED.3IONS-SCNC: 10 MMOL/L (ref 4–13)
AST SERPL W P-5'-P-CCNC: 24 U/L (ref 13–39)
BILIRUB SERPL-MCNC: 0.72 MG/DL (ref 0.2–1)
BUN SERPL-MCNC: 16 MG/DL (ref 5–25)
CALCIUM SERPL-MCNC: 9.6 MG/DL (ref 8.4–10.2)
CHLORIDE SERPL-SCNC: 102 MMOL/L (ref 96–108)
CO2 SERPL-SCNC: 29 MMOL/L (ref 21–32)
CREAT SERPL-MCNC: 0.92 MG/DL (ref 0.6–1.3)
DHEA-S SERPL-MCNC: 27 UG/DL (ref 20.4–186.6)
GFR SERPL CREATININE-BSD FRML MDRD: 62 ML/MIN/1.73SQ M
GLUCOSE P FAST SERPL-MCNC: 88 MG/DL (ref 65–99)
IRON SATN MFR SERPL: 40 % (ref 15–50)
IRON SERPL-MCNC: 126 UG/DL (ref 50–212)
LIPASE SERPL-CCNC: 23 U/L (ref 11–82)
MAGNESIUM SERPL-MCNC: 2.1 MG/DL (ref 1.9–2.7)
PHOSPHATE SERPL-MCNC: 3.2 MG/DL (ref 2.3–4.1)
POTASSIUM SERPL-SCNC: 4 MMOL/L (ref 3.5–5.3)
PROT SERPL-MCNC: 6.5 G/DL (ref 6.4–8.4)
SODIUM SERPL-SCNC: 141 MMOL/L (ref 135–147)
TIBC SERPL-MCNC: 317.8 UG/DL (ref 250–450)
TRANSFERRIN SERPL-MCNC: 227 MG/DL (ref 203–362)
UIBC SERPL-MCNC: 192 UG/DL (ref 155–355)

## 2025-03-05 ENCOUNTER — HOSPITAL ENCOUNTER (EMERGENCY)
Facility: HOSPITAL | Age: 70
Discharge: HOME | End: 2025-03-05
Attending: EMERGENCY MEDICINE
Payer: MEDICARE

## 2025-03-05 ENCOUNTER — APPOINTMENT (EMERGENCY)
Dept: RADIOLOGY | Facility: HOSPITAL | Age: 70
End: 2025-03-05
Payer: MEDICARE

## 2025-03-05 VITALS
SYSTOLIC BLOOD PRESSURE: 174 MMHG | HEART RATE: 74 BPM | TEMPERATURE: 96.9 F | WEIGHT: 169 LBS | OXYGEN SATURATION: 98 % | DIASTOLIC BLOOD PRESSURE: 77 MMHG | RESPIRATION RATE: 20 BRPM | BODY MASS INDEX: 25.51 KG/M2

## 2025-03-05 DIAGNOSIS — S22.41XA CLOSED FRACTURE OF MULTIPLE RIBS OF RIGHT SIDE, INITIAL ENCOUNTER: ICD-10-CM

## 2025-03-05 DIAGNOSIS — S22.22XA CLOSED FRACTURE OF BODY OF STERNUM, INITIAL ENCOUNTER: Primary | ICD-10-CM

## 2025-03-05 DIAGNOSIS — S22.32XA FRACTURE OF ONE RIB, LEFT SIDE, INITIAL ENCOUNTER FOR CLOSED FRACTURE: ICD-10-CM

## 2025-03-05 DIAGNOSIS — R03.0 ELEVATED BLOOD PRESSURE READING: ICD-10-CM

## 2025-03-05 PROCEDURE — 71250 CT THORAX DX C-: CPT

## 2025-03-05 PROCEDURE — 93005 ELECTROCARDIOGRAM TRACING: CPT

## 2025-03-05 PROCEDURE — 99284 EMERGENCY DEPT VISIT MOD MDM: CPT

## 2025-03-05 PROCEDURE — 99285 EMERGENCY DEPT VISIT HI MDM: CPT | Performed by: EMERGENCY MEDICINE

## 2025-03-05 NOTE — ED PROVIDER NOTES
"Emergency Department Note- Brandy Stock 69 y.o. female MRN: [id number]    Unit/Bed#: ED 02 Encounter: [id number]      Final Diagnoses:     1. Closed fracture of body of sternum, initial encounter    2. Closed fracture of multiple ribs of right side, initial encounter    3. Fracture of one rib, left side, initial encounter for closed fracture    4. Elevated blood pressure reading      ED Course as of 03/05/25 1609   Wed Mar 05, 2025   1336 ECG interpreted by me, sinus rhythm, rate of 68, left axis deviation, incomplete right bundle branch block, poor R wave progression, no acute ischemic or acute infarctive changes, no acute change from December 19, 2023   1523 Informed by radiologist Dr. Oliveira \"she does have a minimally displaced fx of the upper sternal body and R 3rd rib, plus incomplete fractures of R 4th and L 5th ribs, probable R 5th-6th\"   2535 Case d/w Riley Santos from the trauma service, who indicated they do not normally see these patients back for follow-up as long as the pain is well-controlled given the distance of time from the original incident to today.  Indicated the patient could follow-up with the trauma clinic if they wanted to but the patient did not need to be seen and could follow-up with PCP.         HPI:   Patient is a 69-year-old female with a history of hypertension, right breast lumpectomy, says that on Thursday, 6 days ago she was playing pickle ball, went for a shot of the net when she slipped and fell, hitting the upper part of her chest on the very top of the neck.  She said she had a very forceful landing on that, did not hit her head or pass out, afterwards was able to ambulate and continue playing pickle ball.  She says she played pickle ball 2 days later, but over the last couple days has had some mild increased soreness and noticing some bruising on the top part of her chest.  She went to massage therapist yesterday who did some cupping treatments and as result she has some " mild bruising on her upper arms which she says were not there due to the fall.  She has no shortness of breath, no hemoptysis, cough, no fever, no chills.  Pain is mild and she does not need pain medication for it.  She says earlier today she went to [facility] first urgent care, reportedly had a x-ray done which showed a sternal fracture and was sent to the ED.  She says she is otherwise feeling well, denies headache, denies chest pain, no shortness of breath, denies bladder or bowel changes.    MEDICAL DECISION MAKING   Patient has mild sternal fracture and rib fractures but no sign of life-threatening pneumothorax.  Her incident happened 6 days ago, she has her pain well-controlled, not requiring any analgesia, doing well.  Considered hospitalization but I do not believe this is necessary since her injury was 6 days ago.  Her ECG shows no evidence of life-threatening dysrhythmia.  while the patient has been hypertensive in the emergency department, there is no evidence of hypertensive emergency, no evidence of end-organ damage on exam or per history. I do not believe the patient requires emergent lowering of their blood pressure. I considered obtaining laboratory studies however I believe it is reasonable for the patient to be discharged and have outpatient follow up for reassessment of their blood pressure, and if it is still elevated, for additional decisions regarding their management to be made by a primary care physician. I did discuss with the patient their elevated blood pressure reading, the importance of follow up for their elevated blood pressure reading, and the risks associated with untreated hypertension.      COLLECTION AND INTERPRETATION OF DATA  I reviewed prior external notes, including December 19, 2023 ECG    I ordered each unique test  Tests reviewed personally by me:  ECG: See my ED course  Imaging: I independently interpreted the CT chest and found rib fractures and sternal fracture.    The  patient was evaluated for sepsis in the emergency department. After that assessment, at the time of admission, there was no evidence of severe sepsis or septic shock or indication that the patient should be included in the SEP-1 CMS quality measure.    Physical:     Vitals:    03/05/25 1252   BP: (!) 174/77   BP Location: Left arm   Pulse: 74   Resp: 20   Temp: (!) 96.9 °F (36.1 °C)   TempSrc: Temporal   SpO2: 98%   Weight: 76.7 kg (169 lb)       General:  Patient is well-appearing  Head:  Atraumatic  Eyes:  Conjunctiva pink, Extraocular muscle intact, no periorbital ecchymosis, PERRL  ENT:  Mucous membranes are moist, no dental malocclusion, no craniofacial instability, no Bar signs  Neck:  No midline tenderness or step-offs or deformities  Cardiac:  S1-S2, without murmurs  Lungs:  Clear to auscultation bilaterally, no clavicular tenderness on either side, she has some mild tenderness to the upper part of the sternum, no rib tenderness,, no crepitus, no flail segment, no paradoxical motion, slight ecchymosis to her upper sternum and upper chest wall  Abdomen:  Soft, nontender, normal bowel sounds, no CVA tenderness, no tympany, no rigidity, no guarding  Extremities:  No bony tenderness to the bilateral bilateral humeral heads, humerus, elbows, radius, ulna, hands, hips, femurs, knees, tibia, fibula, feet. No pain with passive range of motion at the bilateral shoulders, elbows, wrists, hips, knees, or ankles.  Back: No midline thoracic, lumbar, sacral tenderness, deformities, or step-offs.  Neurologic:  Awake, fluent speech, normal comprehension, AAOx3  Skin:  Pink warm and dry  Psychiatric:  Alert, pleasant, cooperative        Critical Care Time:   Procedures      Past Medical:    has a past medical history of Allergic (1967), Anemia (2024), Arthritis, Asymptomatic hyperuricemia, BRCA1 negative, BRCA2 negative, Breast cancer (HCC) (01/17/2014), Cancer (HCC) (2013), Disease of thyroid gland, History of radiation  therapy (2014), Hypertension, Hypothyroid, Knee pain, right, Limb alert care status, Miscarriage (1990), Mitral valve prolapse, Numbness of anterior thigh, Osteoarthritis, Rotator cuff tendonitis, right (01/04/2023), Scoliosis (2013), Seasonal allergies, Urinary incontinence, Varicella, Varicose veins of both lower extremities, and Wears glasses.    Past Surgical:    has a past surgical history that includes Tonsillectomy; Hip surgery (Left); Breast surgery (Right); Knee arthroscopy (Right); Exploratory laparotomy; [place] tooth extraction; Colonoscopy; pr arthrp kne condyle&platu medial&lat compartments (Right, 01/20/2017); Breast biopsy (Left, 01/16/2002); Breast biopsy (Left, 08/05/2005); Breast biopsy (Right, 12/26/2013); Breast excisional biopsy (Left, 04/06/2006); Breast excisional biopsy (Left, 10/13/2006); Bunionectomy (Bilateral); Breast lumpectomy (Right, 01/17/2014); pr tendon sheath incision (Right, 04/05/2022); pr hysteroscopy bx endometrium&/polypc w/wo d&c (N/A, 07/29/2022); Joint replacement (2017); and Lymph node biopsy (2014).    Social:     Social History     Substance and Sexual Activity   Alcohol Use Yes    Comment: social     Social History     Tobacco Use   Smoking Status Never   Smokeless Tobacco Never     Social History     Substance and Sexual Activity   Drug Use No       Outpatient Medications:     No current facility-administered medications on file prior to encounter.     Current Outpatient Medications on File Prior to Encounter   Medication Sig Dispense Refill    Ascorbic Acid (Vitamin C) 250 MG CHEW       Calcium Carbonate-Vit D-Min (CALTRATE 600+D PLUS MINERALS PO)       chlorthalidone 25 mg tablet Take 1 tablet (25 mg total) by mouth daily 90 tablet 1    Cholecalciferol (Vitamin D3) 50 MCG (2000 UT) capsule       ferrous sulfate 324 (65 Fe) mg Take 1 tablet (324 mg total) by mouth daily before breakfast 30 tablet 2    levothyroxine 75 mcg tablet Take 1 tablet by mouth every other day       levothyroxine 88 mcg tablet Take 1 tablet (88 mcg total) by mouth every other day 45 tablet 3    loratadine (CLARITIN) 10 mg tablet Take 10 mg by mouth daily      Multiple Vitamins-Minerals (MULTIVITAMIN ADULT PO) daily after breakfast      potassium chloride (Klor-Con M10) 10 mEq tablet TAKE 1 TABLET BY MOUTH DAILY 100 tablet 1    solifenacin (VESICARE) 10 MG tablet Take 1 tablet (10 mg total) by mouth daily 100 tablet 1    thiamine (VITAMIN B1) 100 mg tablet Take 100 mg by mouth daily         Prior to Admission Medications   Prescriptions Last Dose Informant Patient Reported? Taking?   Ascorbic Acid (Vitamin C) 250 MG CHEW  Self Yes No   Calcium Carbonate-Vit D-Min (CALTRATE 600+D PLUS MINERALS PO)  Self Yes No   Cholecalciferol (Vitamin D3) 50 MCG (2000 UT) capsule  Self Yes No   Multiple Vitamins-Minerals (MULTIVITAMIN ADULT PO)  Self Yes No   Sig: daily after breakfast   chlorthalidone 25 mg tablet  Self No No   Sig: Take 1 tablet (25 mg total) by mouth daily   ferrous sulfate 324 (65 Fe) mg  Self No No   Sig: Take 1 tablet (324 mg total) by mouth daily before breakfast   levothyroxine 75 mcg tablet  Self Yes No   Sig: Take 1 tablet by mouth every other day   levothyroxine 88 mcg tablet  Self No No   Sig: Take 1 tablet (88 mcg total) by mouth every other day   loratadine (CLARITIN) 10 mg tablet  Self Yes No   Sig: Take 10 mg by mouth daily   potassium chloride (Klor-Con M10) 10 mEq tablet   No No   Sig: TAKE 1 TABLET BY MOUTH DAILY   solifenacin (VESICARE) 10 MG tablet  Self No No   Sig: Take 1 tablet (10 mg total) by mouth daily   thiamine (VITAMIN B1) 100 mg tablet  Self Yes No   Sig: Take 100 mg by mouth daily        Facility-Administered Medications: None           Medications - No data to display  CT chest without contrast   Final Result      1.  Acute nondisplaced fracture of the sternum. No hematoma.   2.  Acute minimally displaced fracture of the right anterior 3rd rib. Incomplete fractures of the  anterior right 4th and left 5th ribs with probable incomplete fractures of the right anterolateral 5th and 6th ribs. No pneumothorax.      I personally discussed this study with VICKEY ASHLEY BOGDAN on 3/5/2025 3:16 PM.               Workstation performed: [id number]           Orders Placed This Encounter   Procedures    CT chest without contrast    Incentive spirometry    ECG 12 lead     Labs Reviewed - No data to display  Time reflects when diagnosis was documented in both MDM as applicable and the Disposition within this note       Time User Action Codes Description Comment    3/5/2025  3:57 PM Vickey Mcfadden Add [S22.22XA] Closed fracture of body of sternum, initial encounter     3/5/2025  3:57 PM Vickey Mcfadden Add [S22.41XA] Closed fracture of multiple ribs of right side, initial encounter     3/5/2025  3:58 PM Vickey Mcfadden Wendy [S22.32XA] Fracture of one rib, left side, initial encounter for closed fracture     3/5/2025  3:59 PM BogdanVickey Wendy [R03.0] Elevated blood pressure reading           ED Disposition       ED Disposition   Discharge    Condition   Stable    Date/Time   Wed Mar 5, 2025  3:56 PM    Comment   Brandy Stock discharge to home/self care.                   Follow-up Information       Follow up With Specialties Details Why Contact Info    Levar Bland MD Family Medicine Schedule an appointment as soon as possible for a visit in 1 week  [address] [place], [place] [id number] [phone]            Discharge Medication List as of 3/5/2025  3:59 PM        CONTINUE these medications which have NOT CHANGED    Details   Ascorbic Acid (Vitamin C) 250 MG CHEW Historical Med      Calcium Carbonate-Vit D-Min (CALTRATE 600+D PLUS MINERALS PO) Starting Mon 8/1/2022, Historical Med      chlorthalidone 25 mg tablet Take 1 tablet (25 mg total) by mouth daily, Starting Wed 11/6/2024, Normal      Cholecalciferol (Vitamin D3) 50 MCG (2000 UT) capsule Historical Med      ferrous sulfate 324 (65 Fe) mg Take 1 tablet (324  mg total) by mouth daily before breakfast, Starting Mon 10/7/2024, Normal      !! levothyroxine 75 mcg tablet Take 1 tablet by mouth every other day, Historical Med      !! levothyroxine 88 mcg tablet Take 1 tablet (88 mcg total) by mouth every other day, Starting Mon 1/23/2023, Normal      loratadine (CLARITIN) 10 mg tablet Take 10 mg by mouth daily, Historical Med      Multiple Vitamins-Minerals (MULTIVITAMIN ADULT PO) daily after breakfast, Historical Med      potassium chloride (Klor-Con M10) 10 mEq tablet TAKE 1 TABLET BY MOUTH DAILY, Starting Wed 2/19/2025, Normal      solifenacin (VESICARE) 10 MG tablet Take 1 tablet (10 mg total) by mouth daily, Starting Thu 7/18/2024, Normal      thiamine (VITAMIN B1) 100 mg tablet Take 100 mg by mouth daily  , Historical Med       !! - Potential duplicate medications found. Please discuss with provider.        No discharge procedures on file.                       Electronically signed by:  MURALI Magallon DO  03/05/25 9847

## 2025-03-05 NOTE — DISCHARGE INSTRUCTIONS
Your blood pressure was elevated in the emergency department today.  You should make an appointment with your doctor in the next 1 week for follow-up and recheck of the blood pressure.    Rib Fracture   WHAT YOU NEED TO KNOW:   A rib fracture is a crack or break in a rib bone. Rib fractures usually heal within 6 weeks. You should be able to return to normal activities before that time. Do not wrap anything around your body to try to splint your ribs. This can prevent you from taking deep breaths and increases your risk for pneumonia.       DISCHARGE INSTRUCTIONS:   Call 911 for any of the following:   You have trouble breathing.    You have new or increased pain.  Return to the emergency department if:   You have a fever or a cough.  You have questions or concerns about your condition or care.    Deep breathing:  Deep breathing will decrease your risk for pneumonia. Hug a pillow on the injured side while doing this exercise, to decrease pain. Take a deep breath and hold it for as long as possible. You should let the air out and then cough strongly. Deep breaths help open your airway. You may be given an incentive spirometer to help take deep breaths. Put the plastic piece in your mouth. Take a slow, deep breath. You should then let the air out and cough. Repeat these steps 10 times every hour.

## 2025-03-05 NOTE — ED NOTES
Pt provided water as requested,pt awaiting CT scan     Sima Wesley RN  03/05/25 6388       Sima Wesley RN  03/05/25 0109

## 2025-03-06 LAB
ATRIAL RATE: 68 BPM
P AXIS: 29 DEGREES
PR INTERVAL: 142 MS
QRS AXIS: -31 DEGREES
QRSD INTERVAL: 98 MS
QT INTERVAL: 406 MS
QTC INTERVAL: 432 MS
T WAVE AXIS: 14 DEGREES
VENTRICULAR RATE: 68 BPM

## 2025-03-06 PROCEDURE — 93010 ELECTROCARDIOGRAM REPORT: CPT | Performed by: STUDENT IN AN ORGANIZED HEALTH CARE EDUCATION/TRAINING PROGRAM

## 2025-03-07 ENCOUNTER — OFFICE VISIT (OUTPATIENT)
Dept: FAMILY MEDICINE CLINIC | Facility: CLINIC | Age: 70
End: 2025-03-07
Payer: MEDICARE

## 2025-03-07 VITALS
SYSTOLIC BLOOD PRESSURE: 126 MMHG | RESPIRATION RATE: 16 BRPM | HEART RATE: 69 BPM | TEMPERATURE: 97.9 F | WEIGHT: 16.5 LBS | DIASTOLIC BLOOD PRESSURE: 84 MMHG | BODY MASS INDEX: 2.49 KG/M2 | OXYGEN SATURATION: 96 %

## 2025-03-07 DIAGNOSIS — E78.5 DYSLIPIDEMIA: ICD-10-CM

## 2025-03-07 DIAGNOSIS — S22.43XS CLOSED FRACTURE OF MULTIPLE RIBS OF BOTH SIDES, SEQUELA: ICD-10-CM

## 2025-03-07 DIAGNOSIS — R94.31 ABNORMAL ECG: ICD-10-CM

## 2025-03-07 DIAGNOSIS — S22.22XS CLOSED FRACTURE OF BODY OF STERNUM, SEQUELA: Primary | ICD-10-CM

## 2025-03-07 PROCEDURE — G2211 COMPLEX E/M VISIT ADD ON: HCPCS

## 2025-03-07 PROCEDURE — 99214 OFFICE O/P EST MOD 30 MIN: CPT

## 2025-03-07 RX ORDER — VIBEGRON 75 MG/1
TABLET, FILM COATED ORAL
COMMUNITY
Start: 2025-02-24

## 2025-03-07 NOTE — PROGRESS NOTES
"Name: Brandy Stock      : 1955      MRN: [id number]  Encounter Provider: Tonya Gudino DO  Encounter Date: 3/7/2025   Encounter department: [facility]  :  Assessment & Plan  Closed fracture of body of sternum, sequela  Doing well. Denies any pain, not using any OTC pain control.  Tylenol as needed       Closed fracture of multiple ribs of both sides, sequela  CT chest 3/2025: \"Acute minimally displaced fracture of the right anterior 3rd rib. Incomplete fractures of the anterior right 4th and left 5th ribs with probable incomplete fractures of the right anterolateral 5th and 6th ribs. No pneumothorax. \"  Continue to monitor, incentive spirometer provided in office but able to tolerate deep breaths without any issues.       Abnormal ECG  Abnormal ECG from 3/5/2025 in comparison to EKG from .  Patient denies any chest pain, shortness of breath, palpitations, weakness.  Denies any cardiac events in the past.  Had a CT coronary calcium score back in  that was 0.  Denies any family history of heart disease.  Reports a personal history of mitral valve prolapse diagnosed in the 80s?  Last lipid panel back in  showed elevated LDL but was otherwise unremarkable.  Will repeat lipid panel.  Patient like to proceed with stress testing and see cardiologist, ordered/referral placed today.  ED precautions extensively reviewed especially since she is traveling to visit her daughter in [place] on Monday.  Orders:    Stress test only, exercise; Future    Ambulatory Referral to Cardiology; Future           History of Present Illness   HPI    68-year-old female presents for ED follow-up.  Seen in the ED 3/5 due to increased soreness and bruising in the left part of her chest.  Reported that on  she has played pickle ball and went for shock at the night when she slipped and fell hitting the upper part of her chest. She had a very forceful landing but did not hit her head or pass out.  " She continue to get up and ambulate and play pickle ball.  Also play 2 days later but noticed increasing soreness over the next couple of days.  She went to massage therapist the day before the ED visit who did some cupping treatments.  No shortness of breath, no hemoptysis, cough or fever or chills were reported in the ED.  Patient had mild pain and did not need any medication for it at the time.  She was seen at the urgent care prior to the ED and had x-ray which showed a sternal fracture and from there she was sent to the ED.  CT chest showed multiple acute fractures.  Trauma team was curb sided and was recommended follow-up with PCP due to well-controlled symptoms.  Of note, patient also had elevated blood pressure reading in the ED.        Review of Systems   Constitutional:  Negative for chills and fever.   HENT:  Negative for congestion and rhinorrhea.    Eyes:  Negative for visual disturbance.   Respiratory:  Negative for cough and shortness of breath.    Cardiovascular:  Negative for chest pain and palpitations.   Gastrointestinal:  Negative for abdominal pain, constipation, diarrhea, nausea and vomiting.   Genitourinary:  Negative for dysuria.   Musculoskeletal:  Negative for arthralgias.   Skin:  Negative for rash.   Neurological:  Negative for dizziness, light-headedness and headaches.       Objective   /84 (BP Location: Left arm, Patient Position: Sitting, Cuff Size: Large)   Pulse 69   Temp 97.9 °F (36.6 °C) (Temporal)   Resp 16   Wt 7.484 kg (16 lb 8 oz)   LMP  (LMP Unknown)   SpO2 96%   BMI 2.49 kg/m²      Physical Exam  Vitals reviewed.   Constitutional:       Appearance: Normal appearance.   HENT:      Head: Normocephalic and atraumatic.      Right Ear: External ear normal.      Left Ear: External ear normal.      Nose: Nose normal.      Mouth/Throat:      Pharynx: Oropharynx is clear.   Eyes:      Extraocular Movements: Extraocular movements intact.      Conjunctiva/sclera:  Conjunctivae normal.   Cardiovascular:      Rate and Rhythm: Normal rate and regular rhythm.      Pulses: Normal pulses.      Heart sounds: Normal heart sounds.   Pulmonary:      Effort: Pulmonary effort is normal. No respiratory distress.      Breath sounds: Normal breath sounds. No wheezing, rhonchi or rales.   Abdominal:      Palpations: Abdomen is soft.   Musculoskeletal:      Cervical back: Neck supple.      Right lower leg: No edema.      Left lower leg: No edema.   Skin:     General: Skin is warm.   Neurological:      Mental Status: She is alert and oriented to person, place, and time.   Psychiatric:         Mood and Affect: Mood normal.         Behavior: Behavior normal.         Thought Content: Thought content normal.         Judgment: Judgment normal.

## 2025-04-03 ENCOUNTER — HOSPITAL ENCOUNTER (OUTPATIENT)
Dept: NON INVASIVE DIAGNOSTICS | Facility: CLINIC | Age: 70
Discharge: HOME | End: 2025-04-03
Payer: MEDICARE

## 2025-04-03 VITALS
HEART RATE: 74 BPM | BODY MASS INDEX: 25.61 KG/M2 | WEIGHT: 169 LBS | OXYGEN SATURATION: 98 % | HEIGHT: 68 IN | DIASTOLIC BLOOD PRESSURE: 84 MMHG | SYSTOLIC BLOOD PRESSURE: 142 MMHG

## 2025-04-03 DIAGNOSIS — R94.31 ABNORMAL ECG: ICD-10-CM

## 2025-04-03 LAB
MAX HR PERCENT: 99 %
MAX HR: 148 BPM
RATE PRESSURE PRODUCT: NORMAL
SL CV STRESS RECOVERY BP: NORMAL MMHG
SL CV STRESS RECOVERY HR: 78 BPM
SL CV STRESS RECOVERY O2 SAT: 99 %
SL CV STRESS STAGE REACHED: 3
STRESS ANGINA INDEX: 0
STRESS BASELINE BP: NORMAL MMHG
STRESS BASELINE HR: 74 BPM
STRESS DUKE TREADMILL SCORE: 7
STRESS O2 SAT REST: 98 %
STRESS PEAK HR: 131 BPM
STRESS POST ESTIMATED WORKLOAD: 10.1 METS
STRESS POST EXERCISE DUR MIN: 7 MIN
STRESS POST EXERCISE DUR SEC: 22 SEC
STRESS POST O2 SAT PEAK: 99 %
STRESS POST PEAK BP: 182 MMHG
STRESS ST DEPRESSION: 0 MM

## 2025-04-03 PROCEDURE — 93016 CV STRESS TEST SUPVJ ONLY: CPT | Performed by: INTERNAL MEDICINE

## 2025-04-03 PROCEDURE — 93017 CV STRESS TEST TRACING ONLY: CPT

## 2025-04-03 PROCEDURE — 93018 CV STRESS TEST I&R ONLY: CPT | Performed by: INTERNAL MEDICINE

## 2025-04-04 LAB
CHEST PAIN STATEMENT: NORMAL
MAX DIASTOLIC BP: 84 MMHG
MAX PREDICTED HEART RATE: 149 BPM
PROTOCOL NAME: NORMAL
REASON FOR TERMINATION: NORMAL
STRESS POST EXERCISE DUR MIN: 7 MIN
STRESS POST EXERCISE DUR SEC: 22 SEC
STRESS POST PEAK HR: 148 BPM
STRESS POST PEAK SYSTOLIC BP: 182 MMHG
TARGET HR FORMULA: NORMAL
TEST INDICATION: NORMAL

## 2025-04-07 ENCOUNTER — RESULTS FOLLOW-UP (OUTPATIENT)
Dept: FAMILY MEDICINE CLINIC | Facility: CLINIC | Age: 70
End: 2025-04-07

## 2025-04-23 ENCOUNTER — ONCOLOGY SURVIVORSHIP (OUTPATIENT)
Dept: SURGICAL ONCOLOGY | Facility: CLINIC | Age: 70
End: 2025-04-23
Payer: MEDICARE

## 2025-04-23 VITALS
TEMPERATURE: 97.4 F | DIASTOLIC BLOOD PRESSURE: 78 MMHG | HEART RATE: 67 BPM | SYSTOLIC BLOOD PRESSURE: 126 MMHG | BODY MASS INDEX: 25.25 KG/M2 | WEIGHT: 166.6 LBS | RESPIRATION RATE: 18 BRPM | OXYGEN SATURATION: 99 % | HEIGHT: 68 IN

## 2025-04-23 DIAGNOSIS — Z85.3 ENCOUNTER FOR FOLLOW-UP SURVEILLANCE OF BREAST CANCER: Primary | ICD-10-CM

## 2025-04-23 DIAGNOSIS — Z08 ENCOUNTER FOR FOLLOW-UP SURVEILLANCE OF BREAST CANCER: Primary | ICD-10-CM

## 2025-04-23 DIAGNOSIS — Z85.3 HISTORY OF RIGHT BREAST CANCER: ICD-10-CM

## 2025-04-23 PROCEDURE — 99213 OFFICE O/P EST LOW 20 MIN: CPT | Performed by: NURSE PRACTITIONER

## 2025-04-23 NOTE — PROGRESS NOTES
Name: Brandy Stock      : 1955      MRN: [id number]  Encounter Provider: SRIKANTH Razo  Encounter Date: 2025   Encounter department: [facility] [place]  :  Assessment & Plan  Encounter for follow-up surveillance of breast cancer         History of right breast cancer         Patient is a 69-year-old female that was diagnosed with a right-sided breast cancer in 2014. Her pathology revealed invasive ductal carcinoma, grade 1, ER/IL positive, HER-2 negative. She underwent a right breast lumpectomy and sentinel node biopsy by Dr. Glasgow. She completed whole breast radiation therapy and completed 5 years of aromatase inhibitor therapy.  She is now on observation. Follow up breast cancer survivorship visit performed today.    She had a bilateral mammogram in 2024 which was BI-RADS 2, category 1 density.  She offers no new complaints today and there are no worrisome findings on today's clinical exam.  She is up-to-date on colorectal cancer screening as well as osteoporosis screening.  She is already scheduled for her next mammogram.  I will see her back in 1 year or sooner should the need arise.  She was instructed to contact us with any changes or concerns in the interim.  All of her questions were answered today.     Survivorship  Discussed symptoms related to disease recurrence, Yes     Evaluated for late effects related to cancer treatment, Yes     Screening current for cervical cancer, Yes n/a     Screening current for colon cancer, Yes     Cancer rehabilitation services addressed, No     Screening current for osteoporosis, Yes     History of Present Illness   Brandy Stock is a 69 y.o. year old female who presents today for a follow-up survivorship visit.  She has not appreciated any changes on self breast exam.  She had a bilateral mammogram in December which was BI-RADS 2.  She denies persistent headaches, back pain or bone pain, cough  "or shortness of breath, abdominal pain.  She is recovering from rib and sternal fracture secondary to a pickleball accident.     Oncology History   Cancer Staging   No matching staging information was found for the patient.  Oncology History   History of right breast cancer   12/26/2013 Biopsy    Right breast biopsy, 12:00    Infiltrating mammary carcinoma  %  %  HER-2 negative     1/17/2014 Surgery    Right lumpectomy with sentinel lymph node biopsy (Dr. Glasgow)  - Clear margins  - 0/1 lymph nodes    Stage IA- pT1b, pN0, G1     2/21/2014 - 4/3/2014 Radiation    WBRT (Dr. Lockwood)     2/25/2014 Genomic Testing    Oncotype 8, low risk     2014 - 2019 Hormone Therapy    Letrozole (Dr. Tompkins)     2/2020 Genetic Testing    [facility] Cancers Panel (23 genes): GILLIAN, BARD1, BRCA1, BRCA2, BRIP1, CDH1, CHEK2, DICER1, EPCAM, MLH1, MSH2, MSH6, NBN, NF1, PALB2, PMS2, PTEN, RAD50, RAD51C, RAD51D, SMARCA4, STK11, TP53      Negative        Review of Systems   Constitutional:  Negative for activity change, appetite change, chills, fatigue, fever and unexpected weight change.   Respiratory:  Negative for cough and shortness of breath.    Cardiovascular:  Negative for chest pain.   Gastrointestinal:  Negative for abdominal pain, constipation, diarrhea, nausea and vomiting.   Genitourinary:         Reports prolapse   Musculoskeletal:  Negative for arthralgias, back pain, gait problem and myalgias.   Skin:  Negative for color change and rash.   Neurological:  Negative for dizziness and headaches.   Hematological:  Negative for adenopathy.   Psychiatric/Behavioral:  Negative for agitation and confusion.    All other systems reviewed and are negative.   A complete review of systems is negative other than that noted above in the HPI.           Objective   /78 (BP Location: Left arm, Patient Position: Sitting, Cuff Size: Adult)   Pulse 67   Temp (!) 97.4 °F (36.3 °C)   Resp 18   Ht 5' 8\" (1.727 m)   Wt 75.6 " kg (166 lb 9.6 oz)   LMP  (LMP Unknown)   SpO2 99%   BMI 25.33 kg/m²     Pain Screening:  Pain Score: 0-No pain  ECOG    Physical Exam  Vitals reviewed.   Constitutional:       General: She is not in acute distress.     Appearance: Normal appearance. She is well-developed. She is not diaphoretic.   HENT:      Head: Normocephalic and atraumatic.   Cardiovascular:      Rate and Rhythm: Normal rate and regular rhythm.      Heart sounds: Normal heart sounds.   Pulmonary:      Effort: Pulmonary effort is normal.      Breath sounds: Normal breath sounds.   Chest:   Breasts:     Right: Skin change (surgical scars) present. No swelling, bleeding, inverted nipple, mass, nipple discharge or tenderness.      Left: Skin change (surgical scar) present. No swelling, bleeding, inverted nipple, mass, nipple discharge or tenderness.   Abdominal:      Palpations: Abdomen is soft. There is no mass.      Tenderness: There is no abdominal tenderness.   Musculoskeletal:         General: Normal range of motion.      Cervical back: Normal range of motion.   Lymphadenopathy:      Upper Body:      Right upper body: No supraclavicular or axillary adenopathy.      Left upper body: No supraclavicular or axillary adenopathy.   Skin:     General: Skin is warm and dry.      Findings: No rash.   Neurological:      Mental Status: She is alert and oriented to person, place, and time.   Psychiatric:         Speech: Speech normal.

## 2025-05-12 DIAGNOSIS — I10 BENIGN ESSENTIAL HTN: ICD-10-CM

## 2025-05-12 RX ORDER — CHLORTHALIDONE 25 MG/1
25 TABLET ORAL DAILY
Qty: 90 TABLET | Refills: 1 | Status: SHIPPED | OUTPATIENT
Start: 2025-05-12 | End: 2025-05-22 | Stop reason: SDUPTHER

## 2025-05-12 NOTE — TELEPHONE ENCOUNTER
Reason for call:   [x] Refill   [] Prior Auth  [] Other:     Office:   [x] PCP/Provider - [facility]   [] Specialty/Provider -     Medication:   ~ chlorthalidone 25 mg tablet - Take 1 tablet (25 mg total) by mouth daily     Pharmacy:   [facility] [id number]  ANSHUL Gregory - [address] [facility]   Does the patient have enough for 3 days?   [x] Yes   [] No - Send as HP to POD

## 2025-05-14 ENCOUNTER — TELEPHONE (OUTPATIENT)
Age: 70
End: 2025-05-14

## 2025-05-14 NOTE — TELEPHONE ENCOUNTER
Caller:  Brandy    Doctor: Sravanthi Acharya     Reason for call:  Patient calling in to ask if we can order testing before making an appt due to her VV that seem to be bigger.     Call back#: [phone]

## 2025-05-22 ENCOUNTER — CONSULT (OUTPATIENT)
Dept: CARDIOLOGY CLINIC | Facility: CLINIC | Age: 70
End: 2025-05-22
Payer: MEDICARE

## 2025-05-22 VITALS
DIASTOLIC BLOOD PRESSURE: 88 MMHG | WEIGHT: 168 LBS | HEART RATE: 63 BPM | OXYGEN SATURATION: 98 % | HEIGHT: 68 IN | BODY MASS INDEX: 25.46 KG/M2 | SYSTOLIC BLOOD PRESSURE: 142 MMHG

## 2025-05-22 DIAGNOSIS — I71.21 ANEURYSM OF ASCENDING AORTA WITHOUT RUPTURE (HCC): ICD-10-CM

## 2025-05-22 DIAGNOSIS — N18.31 STAGE 3A CHRONIC KIDNEY DISEASE (HCC): ICD-10-CM

## 2025-05-22 DIAGNOSIS — I10 BENIGN ESSENTIAL HTN: Primary | ICD-10-CM

## 2025-05-22 PROCEDURE — G2211 COMPLEX E/M VISIT ADD ON: HCPCS | Performed by: INTERNAL MEDICINE

## 2025-05-22 PROCEDURE — 99204 OFFICE O/P NEW MOD 45 MIN: CPT | Performed by: INTERNAL MEDICINE

## 2025-05-22 RX ORDER — VALSARTAN 40 MG/1
40 TABLET ORAL DAILY
Qty: 90 TABLET | Refills: 3 | Status: SHIPPED | OUTPATIENT
Start: 2025-05-22

## 2025-05-22 RX ORDER — CHLORTHALIDONE 25 MG/1
12.5 TABLET ORAL DAILY
Start: 2025-05-22

## 2025-05-22 NOTE — ASSESSMENT & PLAN NOTE
Blood pressures are excellent  She has vaginal issues, incontinence, urinary frequency, and was unaware of how potent chlorthalidone is as an antihypertensive diuretic  Will  cut chlorthalidone in half  Start valsartan 40 mg daily with the intent to replace chlorthalidone completely after a 4-week blood pressure check  She is getting blood work next week for preoperative vaginal intervention

## 2025-05-22 NOTE — PROGRESS NOTES
[facility]  Office Consultation  Brandy Stock 69 y.o. female MRN: [id number]        Assessment & Plan  Stage 3a chronic kidney disease (HCC)  Lab Results   Component Value Date    EGFR 62 02/25/2025    EGFR 61 09/03/2024    EGFR 64 12/14/2023    CREATININE 0.92 02/25/2025    CREATININE 0.94 09/03/2024    CREATININE 0.91 12/14/2023   Stable  Benign essential HTN  Blood pressures are excellent  She has vaginal issues, incontinence, urinary frequency, and was unaware of how potent chlorthalidone is as an antihypertensive diuretic  Will  cut chlorthalidone in half  Start valsartan 40 mg daily with the intent to replace chlorthalidone completely after a 4-week blood pressure check  She is getting blood work next week for preoperative vaginal intervention  Aneurysm of ascending aorta without rupture (HCC)  41 mm in 2021, 43 mm in 2025  This is pretty slow growth  Blood pressures are excellent    Plan    Start valsartan 40 mg daily  Cut chlorthalidone in half, take half a tablet daily for the next month with the valsartan  4-week follow-up blood pressure, if normal, stop chlorthalidone and raise valsartan to 80 mg daily  Please get an echocardiogram to workup the abnormal EKG  Please get an echocardiogram in 1 year to follow-up your aneurysm  3-month NP follow-up      Reason for Consult / Principal Problem: Abnormal EKG    HPI: Brandy Stock is a 69 y.o. year old female comes to see me for an abnormal EKG followed by normal stress test, but on top of that a history of mild ascending aortic aneurysm.  The EKG was obtained after she rushed the [employer] net unfortunately landing on the top portion of the net with her chest and sustaining rib and sternal fractures.  Those have healed well.  Imaging confirmed that her aneurysm has had no recent change and only 4.3 cm, it was as small as 4.1 cm back in 2021.  She has exceptional blood pressure but significant vaginal issues with urinary incontinence and  frequency, and was unaware of the potency of chlorthalidone as a diuretic.  This has been her only antihypertensive medication for years.  She has essentially normal cholesterol, , has not been assessed in 4 years.        Review of Systems   Constitutional:  Negative for appetite change, diaphoresis, fatigue and fever.   Respiratory:  Negative for chest tightness, shortness of breath and wheezing.    Cardiovascular:  Negative for chest pain, palpitations and leg swelling.   Gastrointestinal:  Negative for abdominal pain and blood in stool.   Genitourinary:  Positive for frequency and urgency.   Musculoskeletal:  Negative for arthralgias and joint swelling.   Skin:  Negative for rash.   Neurological:  Negative for dizziness, syncope and light-headedness.         Past Medical History[1]  Past Surgical History[2]  Social History     Substance and Sexual Activity   Alcohol Use Yes    Comment: social     Social History     Substance and Sexual Activity   Drug Use No     Tobacco Use History[3]  Family History[4]    Allergies:  Allergies[5]    Medications:   Current Medications[6]      Vitals:    05/22/25 0846   BP: 142/88   Pulse: 63   SpO2: 98%     Weight (last 2 days)     Date/Time Weight    05/22/25 0846 76.2 (168)        Physical Exam  Constitutional:       General: She is not in acute distress.     Appearance: Normal appearance. She is well-developed. She is not diaphoretic.   HENT:      Head: Normocephalic and atraumatic.     Eyes:      General: No scleral icterus.     Conjunctiva/sclera: Conjunctivae normal.      Pupils: Pupils are equal, round, and reactive to light.     Neck:      Thyroid: No thyromegaly.      Vascular: No JVD.      Trachea: No tracheal deviation.     Cardiovascular:      Rate and Rhythm: Normal rate and regular rhythm.      Heart sounds: Normal heart sounds. No murmur heard.     No friction rub. No gallop.   Pulmonary:      Effort: Pulmonary effort is normal. No respiratory distress.       "Breath sounds: Normal breath sounds. No wheezing, rhonchi or rales.   Abdominal:      General: Bowel sounds are normal. There is no distension.      Palpations: Abdomen is soft.      Tenderness: There is no abdominal tenderness.     Musculoskeletal:         General: No tenderness or deformity. Normal range of motion.      Cervical back: Normal range of motion and neck supple.      Right lower leg: Normal. No edema.      Left lower leg: Normal. No edema.     Skin:     General: Skin is warm and dry.      Findings: No erythema or rash.     Neurological:      Mental Status: She is alert and oriented to person, place, and time. Mental status is at baseline.      Cranial Nerves: No cranial nerve deficit.     Psychiatric:         Judgment: Judgment normal.           Laboratory Studies:    Laboratory studies personally reviewed    Cardiac testing:     EKG reviewed personally:   No results found for this visit on 05/22/25.  3/5/2025-sinus rhythm, left axis deviation, LVH, anterior infarct pattern-this is my personal interpretation    Echocardiogram:      Stress test:  3/25-treadmill stress test-normal, personally assessed    Holter:      Cardiac catheterization:        Marco A Blas MD    Portions of the record may have been created with voice recognition software.  Occasional wrong word or \"sound a like\" substitutions may have occurred due to the inherent limitations of voice recognition software.  Read the chart carefully and recognize, using context, where substitutions have occurred.         [1]  Past Medical History:  Diagnosis Date   • Allergic 1967   • Anemia 2024   • Arthritis    • Asymptomatic hyperuricemia    • BRCA1 negative    • BRCA2 negative    • Breast cancer (HCC) 01/17/2014    radiation   • Cancer (HCC) 2013   • Disease of thyroid gland    • History of radiation therapy 2014    breast cancer   • Hypertension    • Hypothyroid    • Knee pain, right    • Limb alert care status     no BP's /IV's right side   • " "Miscarriage 1990   • Mitral valve prolapse    • Numbness of anterior thigh     left and tingling   • Osteoarthritis    • Rotator cuff tendonitis, right 01/04/2023   • Scoliosis 2013   • Seasonal allergies     dust,mold,trees   • Urinary incontinence    • Varicella     Child   • Varicose veins of both lower extremities    • Wears glasses    [2]  Past Surgical History:  Procedure Laterality Date   • BREAST BIOPSY Left 01/16/2002   • BREAST BIOPSY Left 08/05/2005    high risk   • BREAST BIOPSY Right 12/26/2013    positive   • BREAST EXCISIONAL BIOPSY Left 04/06/2006    high risk   • BREAST EXCISIONAL BIOPSY Left 10/13/2006   • BREAST LUMPECTOMY Right 01/17/2014    malignant   • BREAST SURGERY Right     lumpectomy   • BUNIONECTOMY Bilateral    • COLONOSCOPY     • EXPLORATORY LAPAROTOMY      \"for fertility\"   • 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   • JOINT REPLACEMENT  2017    right knee   • KNEE ARTHROSCOPY Right    • LYMPH NODE BIOPSY  2014   • AK ARTHRP KNE CONDYLE&PLATU MEDIAL&LAT COMPARTMENTS Right 01/20/2017    Procedure: ARTHROPLASTY KNEE TOTAL;  Surgeon: Gabriel Razo MD;  Location: [facility];  Service: Orthopedics   • AK HYSTEROSCOPY BX ENDOMETRIUM&/POLYPC W/WO D&C N/A 07/29/2022    Procedure: DILATATION AND CURETTAGE (D&C) WITH HYSTEROSCOPY;  Surgeon: Ashley Morgan MD;  Location: [facility];  Service: Gynecology   • AK TENDON SHEATH INCISION Right 04/05/2022    Procedure: Right long and ring finger trigger finger release;  Surgeon: Matthew Jarvis MD;  Location: [facility];  Service: Orthopedics   • TONSILLECTOMY     • WISDOM TOOTH EXTRACTION     [3]  Social History  Tobacco Use   Smoking Status Never   Smokeless Tobacco Never   [4]  Family History  Problem Relation Name Age of Onset   • Cancer Mother Claudine         Bladder CA   • Throat cancer Father Mian 80   • Cancer Father Mian    • No Known Problems Daughter     • No Known Problems Daughter     • No Known Problems Maternal " Grandmother     • No Known Problems Maternal Grandfather     • No Known Problems Paternal Grandmother     • No Known Problems Paternal Grandfather     • Kidney disease Maternal Aunt Idalia Khan    • Kidney disease Maternal Aunt Brandy Hernandez    • No Known Problems Maternal Aunt     • No Known Problems Maternal Aunt     • Colon cancer Maternal Uncle Maikol 70   • Breast cancer additional onset Paternal Aunt Riri 55        Also maternal cousin Maricruz   • No Known Problems Paternal Aunt     • Breast cancer additional onset Cousin Maricruz 35   • Uterine cancer Cousin Maternal 61   • Lung cancer Cousin Maternal 76   [5]  Allergies  Allergen Reactions   • Actifed Cold-Allergy [Chlorpheniramine-Phenylephrine] Swelling and Rash     Throat and face swelled   • Triprolidine-Pseudoephedrine Hives     pseudoephedrine / triprolidine   • Penicillins Rash   • Vancomycin Other (See Comments)     redness   [6]    Current Outpatient Medications:   •  Ascorbic Acid (Vitamin C) 250 MG CHEW, , Disp: , Rfl:   •  Calcium Carbonate-Vit D-Min (CALTRATE 600+D PLUS MINERALS PO), , Disp: , Rfl:   •  chlorthalidone 25 mg tablet, Take 0.5 tablets (12.5 mg total) by mouth daily, Disp: , Rfl:   •  Cholecalciferol (Vitamin D3) 50 MCG (2000 UT) capsule, , Disp: , Rfl:   •  ferrous sulfate 324 (65 Fe) mg, Take 1 tablet (324 mg total) by mouth daily before breakfast, Disp: 30 tablet, Rfl: 2  •  Gemtesa 75 MG TABS, , Disp: , Rfl:   •  levothyroxine 75 mcg tablet, Take 1 tablet by mouth every other day, Disp: , Rfl:   •  levothyroxine 88 mcg tablet, Take 1 tablet (88 mcg total) by mouth every other day, Disp: 45 tablet, Rfl: 3  •  loratadine (CLARITIN) 10 mg tablet, Take 10 mg by mouth in the morning., Disp: , Rfl:   •  Multiple Vitamins-Minerals (MULTIVITAMIN ADULT PO), daily after breakfast, Disp: , Rfl:   •  potassium chloride (Klor-Con M10) 10 mEq tablet, TAKE 1 TABLET BY MOUTH DAILY, Disp: 100 tablet, Rfl: 1  •  thiamine (VITAMIN B1) 100 mg tablet,  Take 100 mg by mouth in the morning., Disp: , Rfl:   •  valsartan (DIOVAN) 40 mg tablet, Take 1 tablet (40 mg total) by mouth daily, Disp: 90 tablet, Rfl: 3

## 2025-05-22 NOTE — ASSESSMENT & PLAN NOTE
Lab Results   Component Value Date    EGFR 62 02/25/2025    EGFR 61 09/03/2024    EGFR 64 12/14/2023    CREATININE 0.92 02/25/2025    CREATININE 0.94 09/03/2024    CREATININE 0.91 12/14/2023   Stable

## 2025-05-23 ENCOUNTER — TELEPHONE (OUTPATIENT)
Age: 70
End: 2025-05-23

## 2025-05-23 NOTE — TELEPHONE ENCOUNTER
Low-dose valsartan with potassium is not an issue at all, in the future we may even be able to discontinue the potassium

## 2025-05-23 NOTE — TELEPHONE ENCOUNTER
Relayed message to patient per Dr. Blas - Low-dose valsartan with potassium is not an issue at all, in the future we may even be able to discontinue the potassium    Patient understood

## 2025-05-23 NOTE — TELEPHONE ENCOUNTER
FOLLOW UP: Pt called stating that she picked up valsartan from the pharmacy and she read the information pamphlet that states that if she is taking potassium she should discuss with her provider. Pt wants to make sure it is okay to take potassium with valsartan.     REASON FOR CONVERSATION: medication question      DISPOSITION: Discuss with Provider     Please advise

## 2025-05-29 ENCOUNTER — OFFICE VISIT (OUTPATIENT)
Dept: PODIATRY | Facility: CLINIC | Age: 70
End: 2025-05-29
Payer: MEDICARE

## 2025-05-29 ENCOUNTER — APPOINTMENT (OUTPATIENT)
Dept: LAB | Age: 70
End: 2025-05-29
Payer: MEDICARE

## 2025-05-29 ENCOUNTER — OFFICE VISIT (OUTPATIENT)
Dept: LAB | Age: 70
End: 2025-05-29
Attending: OBSTETRICS & GYNECOLOGY
Payer: MEDICARE

## 2025-05-29 VITALS — WEIGHT: 166 LBS | HEIGHT: 69 IN | BODY MASS INDEX: 24.59 KG/M2

## 2025-05-29 DIAGNOSIS — I51.9 MYXEDEMA HEART DISEASE: ICD-10-CM

## 2025-05-29 DIAGNOSIS — E34.9 ENDOCRINE DISORDER RELATED TO PUBERTY: ICD-10-CM

## 2025-05-29 DIAGNOSIS — E03.9 MYXEDEMA HEART DISEASE: ICD-10-CM

## 2025-05-29 DIAGNOSIS — M21.70 ACQUIRED UNEQUAL LIMB LENGTH: ICD-10-CM

## 2025-05-29 DIAGNOSIS — D50.8 IRON DEFICIENCY ANEMIA SECONDARY TO INADEQUATE DIETARY IRON INTAKE: ICD-10-CM

## 2025-05-29 DIAGNOSIS — Z01.810 PRE-OPERATIVE CARDIOVASCULAR EXAMINATION: ICD-10-CM

## 2025-05-29 DIAGNOSIS — R39.15 URGENCY OF URINATION: ICD-10-CM

## 2025-05-29 DIAGNOSIS — R73.03 DIABETES MELLITUS, LATENT: ICD-10-CM

## 2025-05-29 DIAGNOSIS — L84 CALLUS: Primary | ICD-10-CM

## 2025-05-29 DIAGNOSIS — E78.5 DYSLIPIDEMIA: ICD-10-CM

## 2025-05-29 DIAGNOSIS — M81.8 IDIOPATHIC OSTEOPOROSIS: ICD-10-CM

## 2025-05-29 LAB
ANION GAP SERPL CALCULATED.3IONS-SCNC: 8 MMOL/L (ref 4–13)
BUN SERPL-MCNC: 21 MG/DL (ref 5–25)
CALCIUM SERPL-MCNC: 9.7 MG/DL (ref 8.4–10.2)
CHLORIDE SERPL-SCNC: 100 MMOL/L (ref 96–108)
CHOLEST SERPL-MCNC: 221 MG/DL (ref ?–200)
CO2 SERPL-SCNC: 32 MMOL/L (ref 21–32)
CREAT SERPL-MCNC: 1 MG/DL (ref 0.6–1.3)
CRP SERPL QL: 3.7 MG/L
ERYTHROCYTE [DISTWIDTH] IN BLOOD BY AUTOMATED COUNT: 13 % (ref 11.6–15.1)
GFR SERPL CREATININE-BSD FRML MDRD: 56 ML/MIN/1.73SQ M
GLUCOSE P FAST SERPL-MCNC: 80 MG/DL (ref 65–99)
HCT VFR BLD AUTO: 40.9 % (ref 34.8–46.1)
HDLC SERPL-MCNC: 78 MG/DL
HGB BLD-MCNC: 13.7 G/DL (ref 11.5–15.4)
LDLC SERPL CALC-MCNC: 129 MG/DL (ref 0–100)
MCH RBC QN AUTO: 33.9 PG (ref 26.8–34.3)
MCHC RBC AUTO-ENTMCNC: 33.5 G/DL (ref 31.4–37.4)
MCV RBC AUTO: 101 FL (ref 82–98)
NONHDLC SERPL-MCNC: 143 MG/DL
PLATELET # BLD AUTO: 223 THOUSANDS/UL (ref 149–390)
PMV BLD AUTO: 10.5 FL (ref 8.9–12.7)
POTASSIUM SERPL-SCNC: 4.1 MMOL/L (ref 3.5–5.3)
RBC # BLD AUTO: 4.04 MILLION/UL (ref 3.81–5.12)
SODIUM SERPL-SCNC: 140 MMOL/L (ref 135–147)
T4 FREE SERPL-MCNC: 1.28 NG/DL (ref 0.61–1.12)
TRIGL SERPL-MCNC: 71 MG/DL (ref ?–150)
TSH SERPL DL<=0.05 MIU/L-ACNC: 5.24 UIU/ML (ref 0.45–4.5)
WBC # BLD AUTO: 5.26 THOUSAND/UL (ref 4.31–10.16)

## 2025-05-29 PROCEDURE — 80048 BASIC METABOLIC PNL TOTAL CA: CPT

## 2025-05-29 PROCEDURE — 99203 OFFICE O/P NEW LOW 30 MIN: CPT | Performed by: PODIATRIST

## 2025-05-29 PROCEDURE — 84443 ASSAY THYROID STIM HORMONE: CPT

## 2025-05-29 PROCEDURE — 83695 ASSAY OF LIPOPROTEIN(A): CPT

## 2025-05-29 PROCEDURE — 36415 COLL VENOUS BLD VENIPUNCTURE: CPT

## 2025-05-29 PROCEDURE — 86140 C-REACTIVE PROTEIN: CPT

## 2025-05-29 PROCEDURE — 93005 ELECTROCARDIOGRAM TRACING: CPT

## 2025-05-29 PROCEDURE — 84439 ASSAY OF FREE THYROXINE: CPT

## 2025-05-29 PROCEDURE — 80061 LIPID PANEL: CPT

## 2025-05-29 PROCEDURE — 85027 COMPLETE CBC AUTOMATED: CPT

## 2025-05-29 NOTE — PROGRESS NOTES
"Name: Brandy Stock      : 1955      MRN: [id number]  Encounter Provider: Jose Otoole DPM  Encounter Date: 2025   Encounter department: [facility] [place]    Patient was referred to [facility] physical therapy at [facility] for new orthotics.  This orthotic should have a 1/4 inch heel lift and metatarsal pads to decrease pressure on each forefoot.  Reappoint as needed  :  Assessment & Plan  Callus    Orders:    Ambulatory referral to Physical Therapy; Future    Acquired unequal limb length    Orders:    Ambulatory referral to Physical Therapy; Future  Orthotics      History of Present Illness   HPI  Brandy Stock is a 69 y.o. female who presents desirous of having a new pair of orthotics fabricated.  Patient has worn orthotics for years.  Current pair being worn are from the year .  Patient has a marked limb length discrepancy with the left the shorter limb.  She wears a 1/4 inch heel lift in the orthotic..  She develops painful calluses on the left foot and states that she goes for pedicures monthly to control the callus buildup.      Review of Systems   Genitourinary:         Stage III renal disease   Musculoskeletal:  Positive for arthralgias and gait problem.   Neurological:         Lumbar radiculopathy              Objective   Ht 5' 8.5\" (1.74 m)   Wt 75.3 kg (166 lb)   LMP  (LMP Unknown)   BMI 24.87 kg/m²      Physical Exam  Constitutional:       Appearance: Normal appearance.     Cardiovascular:      Pulses: Normal pulses.     Musculoskeletal:         General: Deformity present.      Comments: Limb length discrepancy noted with left shorter limb     Skin:     Findings: Lesion present.      Comments: Diffuse hyperkeratotic lesions plantar aspect left forefoot.               "

## 2025-05-30 LAB
ATRIAL RATE: 50 BPM
LPA SERPL-SCNC: 20.2 NMOL/L
P AXIS: 22 DEGREES
PR INTERVAL: 176 MS
QRS AXIS: -30 DEGREES
QRSD INTERVAL: 104 MS
QT INTERVAL: 438 MS
QTC INTERVAL: 400 MS
T WAVE AXIS: 36 DEGREES
VENTRICULAR RATE: 50 BPM

## 2025-05-30 PROCEDURE — 93010 ELECTROCARDIOGRAM REPORT: CPT | Performed by: INTERNAL MEDICINE

## 2025-06-10 ENCOUNTER — CONSULT (OUTPATIENT)
Dept: FAMILY MEDICINE CLINIC | Facility: CLINIC | Age: 70
End: 2025-06-10
Payer: MEDICARE

## 2025-06-10 VITALS
HEIGHT: 68 IN | RESPIRATION RATE: 18 BRPM | TEMPERATURE: 97.4 F | DIASTOLIC BLOOD PRESSURE: 72 MMHG | BODY MASS INDEX: 25.01 KG/M2 | HEART RATE: 69 BPM | SYSTOLIC BLOOD PRESSURE: 130 MMHG | WEIGHT: 165 LBS | OXYGEN SATURATION: 99 %

## 2025-06-10 DIAGNOSIS — N81.4 UTEROVAGINAL PROLAPSE: ICD-10-CM

## 2025-06-10 DIAGNOSIS — N39.3 FEMALE STRESS INCONTINENCE: ICD-10-CM

## 2025-06-10 DIAGNOSIS — Z01.818 PREOP EXAMINATION: Primary | ICD-10-CM

## 2025-06-10 PROCEDURE — 99214 OFFICE O/P EST MOD 30 MIN: CPT

## 2025-06-10 PROCEDURE — G2211 COMPLEX E/M VISIT ADD ON: HCPCS

## 2025-06-10 NOTE — PROGRESS NOTES
Pre-operative Clearance  Name: Brandy Stock      : 1955      MRN: [id number]  Encounter Provider: Tonya Gudino DO  Encounter Date: 6/10/2025   Encounter department: [facility]    :  Assessment & Plan  Preop examination         Uterovaginal prolapse         Female stress incontinence           Pre-operative Clearance:     Revised Cardiac Risk Index:  RCI RISK CLASS I (0 risk factors, risk of major cardiac complications approximately 0.5%)    Clearance:       Patient is medically optimized for proposed surgery.    Medication Instructions:   - ACE Inhibitors or ARBs: Continue this medication up to the evening before surgery/procedure, but do not take the morning of the day of surgery.  - Diuretics: Continue taking this medication up to the evening before surgery/procedure, but do not take in the morning of the day of surgery/procedure.  - Thyroid meds: Continue to take this medication on your normal schedule.  - Urinary Antispasmodics: Continue taking medication up to the evening before procedure/surgery, but do not take this medication on the morning of procedure/surgery.      Urinary Incontinence Plan of Care: Follows with urogyn. .       History of Present Illness     Pre-Op Examination     Surgery: EUA / VAGINAL EXTRAPERITONEAL COLPOPEXY (Vagina ); ANTERIOR POSTERIOR COLPORRHAPHY (Perineum); PUBOVAGINAL SLING (Vagina ); CYSTOSCOPY   Anticipated Date of Surgery: 25   Surgeon: Dr. Domínguez     Previous history of bleeding disorders or clots?: No    Previous Anesthesia reaction?: No    Prolonged steroid use in the last 6 months?: No      Assessment of Cardiac Risk:   - Unstable or severe angina or MI in the last 6 weeks or history of stent placement in the last year?: No    - Decompensated heart failure (e.g. New onset heart failure, NYHA  Class IV heart failure, or worsening existing heart failure)?: No    - Significant arrhythmias such as high grade AV block, symptomatic  "ventricular arrhythmia, newly recognized ventricular tachycardia, supraventricular tachycardia with resting heart rate >100, or symptomatic bradycardia?: No    - Severe heart valve disease including aortic stenosis or symptomatic mitral stenosis?: No      Pre-operative Risk Factors:  - Elevated-risk surgery: No    - History of cerebrovascular disease: No    - History of ischemic heart disease: No    - History of congestive heart failure: No    - Pre-operative treatment with insulin: No    - Pre-operative creatinine >2 mg/dL: No      Review of Systems   Constitutional:  Negative for chills and fever.   HENT:  Negative for congestion and rhinorrhea.    Eyes:  Negative for visual disturbance.   Respiratory:  Negative for cough and shortness of breath.    Cardiovascular:  Negative for chest pain and palpitations.   Gastrointestinal:  Negative for abdominal pain, constipation, diarrhea, nausea and vomiting.   Neurological:  Negative for dizziness, light-headedness and headaches.     Past Medical History   Past Medical History[1]  Past Surgical History[2]  Family History[3]  Social History[4]  Medications[5]  Allergies   Allergen Reactions    Actifed Cold-Allergy [Chlorpheniramine-Phenylephrine] Swelling and Rash     Throat and face swelled    Triprolidine-Pseudoephedrine Hives     pseudoephedrine / triprolidine    Penicillins Rash    Vancomycin Other (See Comments)     redness     Objective   /72 (BP Location: Left arm, Patient Position: Sitting, Cuff Size: Standard)   Pulse 69   Temp (!) 97.4 °F (36.3 °C) (Temporal)   Resp 18   Ht 5' 8\" (1.727 m)   Wt 74.8 kg (165 lb)   LMP  (LMP Unknown)   SpO2 99%   BMI 25.09 kg/m²     Physical Exam  Vitals reviewed.   Constitutional:       Appearance: Normal appearance.   HENT:      Head: Normocephalic and atraumatic.      Right Ear: External ear normal.      Left Ear: External ear normal.      Nose: Nose normal.      Mouth/Throat:      Pharynx: Oropharynx is clear. "     Eyes:      Extraocular Movements: Extraocular movements intact.      Conjunctiva/sclera: Conjunctivae normal.       Cardiovascular:      Rate and Rhythm: Normal rate and regular rhythm.      Pulses: Normal pulses.      Heart sounds: Normal heart sounds.   Pulmonary:      Effort: Pulmonary effort is normal.      Breath sounds: Normal breath sounds.   Abdominal:      Palpations: Abdomen is soft.     Musculoskeletal:      Cervical back: Neck supple.      Right lower leg: No edema.      Left lower leg: No edema.     Skin:     General: Skin is warm.     Neurological:      Mental Status: She is alert and oriented to person, place, and time.     Psychiatric:         Mood and Affect: Mood normal.         Behavior: Behavior normal.         Thought Content: Thought content normal.         Judgment: Judgment normal.           Tonya Gudino DO         [1]   Past Medical History:  Diagnosis Date    Allergic 1967    Anemia 2024    Arthritis     Asymptomatic hyperuricemia     BRCA1 negative     BRCA2 negative     Breast cancer ([facility]) 01/17/2014    radiation    Callus 1963    Cancer ([facility]) 2013    Disease of thyroid gland     Hammer toe 1979    History of radiation therapy 2014    breast cancer    Hypertension     Hypothyroid     Knee pain, right     Limb alert care status     no BP's /IV's right side    Miscarriage 1990    Mitral valve prolapse     Numbness of anterior thigh     left and tingling    Osteoarthritis     Plantar fasciitis 2016    Rotator cuff tendonitis, right 01/04/2023    Scoliosis 2013    Seasonal allergies     dust,mold,trees    Substance abuse ([facility])     Urinary incontinence     Varicella     Child    Varicose veins of both lower extremities     Wears glasses    [2]   Past Surgical History:  Procedure Laterality Date    ADENOIDECTOMY      BACK SURGERY  1/2024    Laminectomy    BREAST BIOPSY Left 01/16/2002    BREAST BIOPSY Left 08/05/2005    high risk    BREAST BIOPSY Right 12/26/2013    positive     "BREAST CYST EXCISION  Multi    BREAST EXCISIONAL BIOPSY Left 04/06/2006    high risk    BREAST EXCISIONAL BIOPSY Left 10/13/2006    BREAST LUMPECTOMY Right 01/17/2014    malignant    BREAST SURGERY Right     lumpectomy    BUNIONECTOMY Bilateral     COLONOSCOPY      EXPLORATORY LAPAROTOMY      \"for fertility\"    HAND SURGERY  4/2022    Neg outcome . 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    JOINT REPLACEMENT  2017    right knee    KNEE ARTHROSCOPY Right     LYMPH NODE BIOPSY  2014    ORTHOPEDIC SURGERY  1963    MD ARTHRP KNE CONDYLE&PLATU MEDIAL&LAT COMPARTMENTS Right 01/20/2017    Procedure: ARTHROPLASTY KNEE TOTAL;  Surgeon: Gabriel Razo MD;  Location: [facility];  Service: Orthopedics    MD HYSTEROSCOPY BX ENDOMETRIUM&/POLYPC W/WO D&C N/A 07/29/2022    Procedure: DILATATION AND CURETTAGE (D&C) WITH HYSTEROSCOPY;  Surgeon: Ashley Morgan MD;  Location: [facility];  Service: Gynecology    MD TENDON SHEATH INCISION Right 04/05/2022    Procedure: Right long and ring finger trigger finger release;  Surgeon: Matthew Jarvis MD;  Location: [facility];  Service: Orthopedics    SHOULDER SURGERY  1/23    Torn rotator cuff    TONSILLECTOMY      WISDOM TOOTH EXTRACTION     [3]   Family History  Problem Relation Name Age of Onset    Cancer Mother Claudine         Bladder CA    Uterine cancer Mother Claudine     Throat cancer Father Mian 80    Cancer Father Mian     No Known Problems Daughter      No Known Problems Daughter      No Known Problems Maternal Grandmother      No Known Problems Maternal Grandfather      No Known Problems Paternal Grandmother      No Known Problems Paternal Grandfather      Kidney disease Maternal Aunt Idalia Khan     Kidney disease Maternal Aunt Brandy Hernandez     No Known Problems Maternal Aunt      No Known Problems Maternal Aunt      Colon cancer Maternal Uncle Maikol 70    Breast cancer additional onset Paternal Aunt Riri 55        Also " maternal cousin Maricruz    No Known Problems Paternal Aunt      Breast cancer additional onset Cousin Maricruz 35    Uterine cancer Cousin Maternal 61    Lung cancer Cousin Maternal 76   [4]   Social History  Tobacco Use    Smoking status: Never    Smokeless tobacco: Never   Vaping Use    Vaping status: Never Used   Substance and Sexual Activity    Alcohol use: Yes     Comment: social    Drug use: No    Sexual activity: Not Currently     Partners: Male     Birth control/protection: None   [5]   Current Outpatient Medications on File Prior to Visit   Medication Sig    Ascorbic Acid (Vitamin C) 250 MG CHEW     Calcium Carbonate-Vit D-Min (CALTRATE 600+D PLUS MINERALS PO)     chlorthalidone 25 mg tablet Take 0.5 tablets (12.5 mg total) by mouth daily    Cholecalciferol (Vitamin D3) 50 MCG (2000 UT) capsule     ferrous sulfate 324 (65 Fe) mg Take 1 tablet (324 mg total) by mouth daily before breakfast    Gemtesa 75 MG TABS     levothyroxine 75 mcg tablet Take 1 tablet by mouth every other day    levothyroxine 88 mcg tablet Take 1 tablet (88 mcg total) by mouth every other day    loratadine (CLARITIN) 10 mg tablet Take 10 mg by mouth in the morning.    Multiple Vitamins-Minerals (MULTIVITAMIN ADULT PO) daily after breakfast    potassium chloride (Klor-Con M10) 10 mEq tablet TAKE 1 TABLET BY MOUTH DAILY    thiamine (VITAMIN B1) 100 mg tablet Take 100 mg by mouth in the morning.    valsartan (DIOVAN) 40 mg tablet Take 1 tablet (40 mg total) by mouth daily

## 2025-06-19 ENCOUNTER — CLINICAL SUPPORT (OUTPATIENT)
Dept: CARDIOLOGY CLINIC | Facility: CLINIC | Age: 70
End: 2025-06-19
Payer: MEDICARE

## 2025-06-19 ENCOUNTER — EVALUATION (OUTPATIENT)
Dept: PHYSICAL THERAPY | Facility: OTHER | Age: 70
End: 2025-06-19
Attending: PODIATRIST
Payer: MEDICARE

## 2025-06-19 VITALS
WEIGHT: 163.6 LBS | HEIGHT: 68 IN | DIASTOLIC BLOOD PRESSURE: 70 MMHG | SYSTOLIC BLOOD PRESSURE: 112 MMHG | BODY MASS INDEX: 24.8 KG/M2 | HEART RATE: 72 BPM

## 2025-06-19 DIAGNOSIS — M21.70 ACQUIRED UNEQUAL LIMB LENGTH: ICD-10-CM

## 2025-06-19 DIAGNOSIS — L84 CALLUS: ICD-10-CM

## 2025-06-19 DIAGNOSIS — I10 BENIGN ESSENTIAL HTN: Primary | ICD-10-CM

## 2025-06-19 PROCEDURE — 97760 ORTHOTIC MGMT&TRAING 1ST ENC: CPT | Performed by: PHYSICAL THERAPIST

## 2025-06-19 PROCEDURE — 99211 OFF/OP EST MAY X REQ PHY/QHP: CPT

## 2025-06-19 RX ORDER — VALSARTAN 80 MG/1
80 TABLET ORAL DAILY
Qty: 90 TABLET | Refills: 3 | Status: SHIPPED | OUTPATIENT
Start: 2025-06-19

## 2025-06-19 RX ORDER — ESTRADIOL 0.1 MG/G
CREAM VAGINAL
COMMUNITY
Start: 2025-01-29

## 2025-06-19 NOTE — PROGRESS NOTES
"Orthotic Evaluation    Today's date: 25  Patient name: Brandy Stock  : 1955  MRN: [id number]  Referring provider: Jose Otoole DPM  Dx:   Encounter Diagnosis     ICD-10-CM    1. Callus  L84 Ambulatory referral to Physical Therapy      2. Acquired unequal limb length  M21.70 Ambulatory referral to Physical Therapy                      Subjective:    Brandy presents today for orthotic evaluation. she complains of pain with functional activities including ambulation and leisure. The patient plans to use her orthotic for walking and standing. The orthotic will be placed in a standard, size 11 sneaker. Patient has a history of a1\" leg length difference and callusing that causes pain. She feels her feet have changed since the last pair was made and that her right foot is now slipping off the edge of the right device. \" on previous L foot     Objective:    Age: 69 y.o.  Weight: 160    Foot Posture Index Score    Right Foot  Talar dome - +2  Talonavicular bulge - +2  Medial longitudinal arch - +1  Malleolar curve - +1   Calcaneal eversion - +1  Too many toes - +1  Total Score R = 8    Left Foot  Talar dome - +1  Talonavicular bulge - +1  Medial longitudinal arch - 0  Malleolar curve - 0   Calcaneal eversion - 0  Too many toes - +1  Total Score L = 3    Reference Values  Normal =    0 to +5  Pronated =  +6 to +9 Highly Pronated =  10+  Supinated =   -1 to -4 Highly Supinated = -5 to -12    Objective     General Comments:      Ankle/Foot Comments   Heel raise- able to invert both calc  Mini squat excessive valgus on R  Gait- early excessive pronator on R, neutral on L early heel pan on L  Callusing in b/l forefoot, L>R  Fat pad atrophy b/l  Prior pairs of orthotics are well worn, rigid devices      Assessment:    Patient requires custom foot orthosis with heel lift and met pad to correct altered gait mechanics. Patient is not currently controlled by her current shoe wear. Patient was educated on the " design of the custom orthotic and it's ability to offload her current pathology. Patient was also educated on potential shoe wear changes with device, break-in period, and skin checks to avoid potential skin break down.     We discussed change in style of device, to allow for extra cushioning given her fat pad atrophy. Discussed that this can be changed if she is unhappy with the device. Durability will lessen in comparison to her previous rigid pair, but I believe the new design with further offload her pain while still correcting her biomechanical faults.     Orthotic goals:  1) Patient will have custom foot orthoses fitted to her shoe.   2) Patient will be compliant with break-in period of custom foot orthoses as prescribed by PT.   3) Patient will be compliant with custom foot orthoses use as prescribed by PT.     Plan:    Planned therapy interventions: orthotic fitting/training  Duration in visits: 2

## 2025-06-19 NOTE — PROGRESS NOTES
Pt is here today for a 4 week BP check per Dr. Blas, due to attempt to stop Chlorthalidone. All allergies and medications have been reviewed with the pt.     Today's Vitals: L BP: 112/70   P: 72     Reviewed vitals with Dr. Blas and was instructed to let the pt know to stop the Chlorthalidone as well as the Potassium, and to increase the Valsartan to 80 mg. Advised the pt of Dr. Blas's instructions. Pt is aware and understood.

## 2025-06-19 NOTE — PRE-PROCEDURE INSTRUCTIONS
Pre-Surgery Instructions:   Medication Instructions    Ascorbic Acid (Vitamin C) 250 MG CHEW Stop taking 7 days prior to surgery.    Calcium Carbonate-Vit D-Min (CALTRATE 600+D PLUS MINERALS PO) Stop taking 7 days prior to surgery.    chlorthalidone 25 mg tablet Hold day of surgery.    Cholecalciferol (Vitamin D3) 50 MCG (2000 UT) capsule Stop taking 7 days prior to surgery.    ferrous sulfate 324 (65 Fe) mg Hold day of surgery.    Gemtesa 75 MG TABS Take night before surgery    levothyroxine 75 mcg tablet Take day of surgery-if due    levothyroxine 88 mcg tablet Take day of surgery-if due    loratadine (CLARITIN) 10 mg tablet Take day of surgery.    Multiple Vitamins-Minerals (MULTIVITAMIN ADULT PO) Stop taking 7 days prior to surgery.    potassium chloride (Klor-Con M10) 10 mEq tablet Hold day of surgery.    thiamine (VITAMIN B1) 100 mg tablet Stop taking 7 days prior to surgery.    valsartan (DIOVAN) 40 mg tablet Hold day of surgery.      Medication instructions for day of surgery reviewed. Please take all instructed medications with only a sip of water. Please do not take any over the counter (non-prescribed) vitamins or supplements for one week prior to date of surgery.      You will receive a call one business day prior to surgery with an arrival time and hospital directions. If your surgery is scheduled on a Monday, the hospital will be calling you on the Friday prior to your surgery. If you have not heard from anyone by 8pm, please call the hospital supervisor through the hospital  at [phone]. ([place] [phone] or [place] [phone]).    Do not eat or drink anything after midnight the night before your surgery, including candy, mints, lifesavers, or chewing gum. Do not drink alcohol 24hrs before your surgery. Try not to smoke at least 24hrs before your surgery.       Follow the pre surgery showering instructions as listed in the “My Surgical Experience Booklet” or otherwise provided by  your surgeon's office. Do not use a blade to shave the surgical area 1 week before surgery. It is okay to use a clean electric clippers up to 24 hours before surgery. Do not apply any lotions, creams, including makeup, cologne, deodorant, or perfumes after showering on the day of your surgery. Do not use dry shampoo, hair spray, hair gel, or any type of hair products.     No contact lenses, eye make-up, or artificial eyelashes. Remove nail polish, including gel polish, and any artificial, gel, or acrylic nails if possible. Remove all jewelry including rings and body piercing jewelry.     Wear causal clothing that is easy to take on and off. Consider your type of surgery.    Keep any valuables, jewelry, piercings at home. Please bring any specially ordered equipment (sling, braces) if indicated.    Arrange for a responsible person to drive you to and from the hospital on the day of your surgery. Please confirm the visitor policy for the day of your procedure when you receive your phone call with an arrival time.     Call the surgeon's office with any new illnesses, exposures, or additional questions prior to surgery.    Please reference your “My Surgical Experience Booklet” for additional information to prepare for your upcoming surgery.

## 2025-06-26 ENCOUNTER — ANESTHESIA EVENT (OUTPATIENT)
Dept: PERIOP | Facility: HOSPITAL | Age: 70
End: 2025-06-26
Payer: MEDICARE

## 2025-06-27 ENCOUNTER — ANESTHESIA (OUTPATIENT)
Dept: PERIOP | Facility: HOSPITAL | Age: 70
End: 2025-06-27
Payer: MEDICARE

## 2025-06-27 ENCOUNTER — HOSPITAL ENCOUNTER (OUTPATIENT)
Facility: HOSPITAL | Age: 70
Setting detail: OUTPATIENT SURGERY
Discharge: HOME | End: 2025-06-27
Attending: OBSTETRICS & GYNECOLOGY | Admitting: OBSTETRICS & GYNECOLOGY
Payer: MEDICARE

## 2025-06-27 VITALS
BODY MASS INDEX: 24.89 KG/M2 | HEIGHT: 68 IN | WEIGHT: 164.24 LBS | SYSTOLIC BLOOD PRESSURE: 105 MMHG | DIASTOLIC BLOOD PRESSURE: 56 MMHG | HEART RATE: 56 BPM | OXYGEN SATURATION: 95 % | TEMPERATURE: 97.2 F | RESPIRATION RATE: 16 BRPM

## 2025-06-27 PROCEDURE — C1771 REP DEV, URINARY, W/SLING: HCPCS | Performed by: OBSTETRICS & GYNECOLOGY

## 2025-06-27 PROCEDURE — C2631 REP DEV, URINARY, W/O SLING: HCPCS | Performed by: OBSTETRICS & GYNECOLOGY

## 2025-06-27 DEVICE — IMPLANTABLE DEVICE
Type: IMPLANTABLE DEVICE | Site: VAGINA | Status: FUNCTIONAL
Brand: ANCHORSURE

## 2025-06-27 DEVICE — SLING TRANSVAGINAL DESARA BLUE 1.1 X 45CM: Type: IMPLANTABLE DEVICE | Site: VAGINA | Status: FUNCTIONAL

## 2025-06-27 RX ORDER — LIDOCAINE HYDROCHLORIDE 20 MG/ML
INJECTION, SOLUTION EPIDURAL; INFILTRATION; INTRACAUDAL; PERINEURAL AS NEEDED
Status: DISCONTINUED | OUTPATIENT
Start: 2025-06-27 | End: 2025-06-27

## 2025-06-27 RX ORDER — FENTANYL CITRATE 50 UG/ML
25 INJECTION, SOLUTION INTRAMUSCULAR; INTRAVENOUS
Status: DISCONTINUED | OUTPATIENT
Start: 2025-06-27 | End: 2025-06-27 | Stop reason: HOSPADM

## 2025-06-27 RX ORDER — GLYCOPYRROLATE 0.2 MG/ML
INJECTION INTRAMUSCULAR; INTRAVENOUS AS NEEDED
Status: DISCONTINUED | OUTPATIENT
Start: 2025-06-27 | End: 2025-06-27

## 2025-06-27 RX ORDER — CEFAZOLIN SODIUM 2 G/50ML
2000 SOLUTION INTRAVENOUS ONCE
Status: COMPLETED | OUTPATIENT
Start: 2025-06-27 | End: 2025-06-27

## 2025-06-27 RX ORDER — SODIUM CHLORIDE, SODIUM LACTATE, POTASSIUM CHLORIDE, CALCIUM CHLORIDE 600; 310; 30; 20 MG/100ML; MG/100ML; MG/100ML; MG/100ML
20 INJECTION, SOLUTION INTRAVENOUS CONTINUOUS
Status: DISCONTINUED | OUTPATIENT
Start: 2025-06-27 | End: 2025-06-27

## 2025-06-27 RX ORDER — MIDAZOLAM HYDROCHLORIDE 2 MG/2ML
INJECTION, SOLUTION INTRAMUSCULAR; INTRAVENOUS AS NEEDED
Status: DISCONTINUED | OUTPATIENT
Start: 2025-06-27 | End: 2025-06-27

## 2025-06-27 RX ORDER — ACETAMINOPHEN 325 MG/1
975 TABLET ORAL EVERY 6 HOURS PRN
Status: DISCONTINUED | OUTPATIENT
Start: 2025-06-27 | End: 2025-06-27 | Stop reason: HOSPADM

## 2025-06-27 RX ORDER — ONDANSETRON 2 MG/ML
INJECTION INTRAMUSCULAR; INTRAVENOUS AS NEEDED
Status: DISCONTINUED | OUTPATIENT
Start: 2025-06-27 | End: 2025-06-27

## 2025-06-27 RX ORDER — ACETAMINOPHEN 325 MG/1
975 TABLET ORAL ONCE
Status: COMPLETED | OUTPATIENT
Start: 2025-06-27 | End: 2025-06-27

## 2025-06-27 RX ORDER — DEXAMETHASONE SODIUM PHOSPHATE 10 MG/ML
INJECTION, SOLUTION INTRAMUSCULAR; INTRAVENOUS AS NEEDED
Status: DISCONTINUED | OUTPATIENT
Start: 2025-06-27 | End: 2025-06-27

## 2025-06-27 RX ORDER — MAGNESIUM HYDROXIDE 1200 MG/15ML
LIQUID ORAL AS NEEDED
Status: DISCONTINUED | OUTPATIENT
Start: 2025-06-27 | End: 2025-06-27 | Stop reason: HOSPADM

## 2025-06-27 RX ORDER — SODIUM CHLORIDE, SODIUM LACTATE, POTASSIUM CHLORIDE, CALCIUM CHLORIDE 600; 310; 30; 20 MG/100ML; MG/100ML; MG/100ML; MG/100ML
125 INJECTION, SOLUTION INTRAVENOUS CONTINUOUS
Status: DISCONTINUED | OUTPATIENT
Start: 2025-06-27 | End: 2025-06-27

## 2025-06-27 RX ORDER — KETOROLAC TROMETHAMINE 30 MG/ML
INJECTION, SOLUTION INTRAMUSCULAR; INTRAVENOUS AS NEEDED
Status: DISCONTINUED | OUTPATIENT
Start: 2025-06-27 | End: 2025-06-27

## 2025-06-27 RX ORDER — FENTANYL CITRATE 50 UG/ML
INJECTION, SOLUTION INTRAMUSCULAR; INTRAVENOUS AS NEEDED
Status: DISCONTINUED | OUTPATIENT
Start: 2025-06-27 | End: 2025-06-27

## 2025-06-27 RX ORDER — ONDANSETRON 2 MG/ML
4 INJECTION INTRAMUSCULAR; INTRAVENOUS EVERY 6 HOURS PRN
Status: DISCONTINUED | OUTPATIENT
Start: 2025-06-27 | End: 2025-06-27 | Stop reason: HOSPADM

## 2025-06-27 RX ORDER — PHENAZOPYRIDINE HYDROCHLORIDE 100 MG/1
100 TABLET, FILM COATED ORAL ONCE
Status: COMPLETED | OUTPATIENT
Start: 2025-06-27 | End: 2025-06-27

## 2025-06-27 RX ORDER — PROPOFOL 10 MG/ML
INJECTION, EMULSION INTRAVENOUS AS NEEDED
Status: DISCONTINUED | OUTPATIENT
Start: 2025-06-27 | End: 2025-06-27

## 2025-06-27 RX ORDER — VASOPRESSIN 20 [USP'U]/ML
INJECTION, SOLUTION INTRAVENOUS AS NEEDED
Status: DISCONTINUED | OUTPATIENT
Start: 2025-06-27 | End: 2025-06-27

## 2025-06-27 RX ADMIN — FENTANYL CITRATE 25 MCG: 50 INJECTION, SOLUTION INTRAMUSCULAR; INTRAVENOUS at 14:45

## 2025-06-27 RX ADMIN — FENTANYL CITRATE 25 MCG: 50 INJECTION, SOLUTION INTRAMUSCULAR; INTRAVENOUS at 13:32

## 2025-06-27 RX ADMIN — FENTANYL CITRATE 25 MCG: 50 INJECTION, SOLUTION INTRAMUSCULAR; INTRAVENOUS at 14:51

## 2025-06-27 RX ADMIN — MIDAZOLAM HYDROCHLORIDE 2 MG: 2 INJECTION, SOLUTION INTRAMUSCULAR; INTRAVENOUS at 13:11

## 2025-06-27 RX ADMIN — FENTANYL CITRATE 50 MCG: 50 INJECTION, SOLUTION INTRAMUSCULAR; INTRAVENOUS at 15:35

## 2025-06-27 RX ADMIN — KETOROLAC TROMETHAMINE 15 MG: 30 INJECTION, SOLUTION INTRAMUSCULAR; INTRAVENOUS at 16:17

## 2025-06-27 RX ADMIN — PHENAZOPYRIDINE 100 MG: 100 TABLET ORAL at 10:11

## 2025-06-27 RX ADMIN — SODIUM CHLORIDE, SODIUM LACTATE, POTASSIUM CHLORIDE, AND CALCIUM CHLORIDE: .6; .31; .03; .02 INJECTION, SOLUTION INTRAVENOUS at 14:28

## 2025-06-27 RX ADMIN — VASOPRESSIN 0.5 UNITS: 20 INJECTION, SOLUTION INTRAVENOUS at 15:50

## 2025-06-27 RX ADMIN — FENTANYL CITRATE 50 MCG: 50 INJECTION, SOLUTION INTRAMUSCULAR; INTRAVENOUS at 14:13

## 2025-06-27 RX ADMIN — FENTANYL CITRATE 25 MCG: 50 INJECTION, SOLUTION INTRAMUSCULAR; INTRAVENOUS at 13:42

## 2025-06-27 RX ADMIN — PROPOFOL 200 MG: 10 INJECTION, EMULSION INTRAVENOUS at 13:19

## 2025-06-27 RX ADMIN — CEFAZOLIN SODIUM 2000 MG: 2 SOLUTION INTRAVENOUS at 13:13

## 2025-06-27 RX ADMIN — VASOPRESSIN 0.5 UNITS: 20 INJECTION, SOLUTION INTRAVENOUS at 16:01

## 2025-06-27 RX ADMIN — ONDANSETRON 4 MG: 2 INJECTION INTRAMUSCULAR; INTRAVENOUS at 13:23

## 2025-06-27 RX ADMIN — GLYCOPYRROLATE 0.4 MG: 0.2 INJECTION INTRAMUSCULAR; INTRAVENOUS at 14:00

## 2025-06-27 RX ADMIN — LIDOCAINE HYDROCHLORIDE 100 MG: 20 INJECTION, SOLUTION EPIDURAL; INFILTRATION; INTRACAUDAL; PERINEURAL at 13:19

## 2025-06-27 RX ADMIN — SODIUM CHLORIDE, SODIUM LACTATE, POTASSIUM CHLORIDE, AND CALCIUM CHLORIDE 125 ML/HR: .6; .31; .03; .02 INJECTION, SOLUTION INTRAVENOUS at 10:30

## 2025-06-27 RX ADMIN — DEXAMETHASONE SODIUM PHOSPHATE 10 MG: 10 INJECTION, SOLUTION INTRAMUSCULAR; INTRAVENOUS at 13:23

## 2025-06-27 RX ADMIN — ACETAMINOPHEN 975 MG: 325 TABLET ORAL at 10:11

## 2025-06-27 NOTE — INTERVAL H&P NOTE
H&P reviewed. After examining the patient I find no changes in the patients condition since the H&P had been written.    Recorded planned procedure with patient of VEC, AR possible KS, sling. Reviewed types of slings and planned retropubic. Confirmed no changes since last visit, constipation but no refectory dysfunction. Reviewed post op precautions, instructions, restrictions. All questions answered to patient satisfaction. Plan to proceed as scheduled     Ubaldo Domínguez MD

## 2025-06-27 NOTE — ANESTHESIA PREPROCEDURE EVALUATION
Procedure:  EUA / VAGINAL EXTRAPERITONEAL COLPOPEXY (Vagina )  ANTERIOR POSTERIOR COLPORRHAPHY (Perineum)  PUBOVAGINAL SLING (Vagina )  CYSTOSCOPY (Bladder)    Relevant Problems   CARDIO   (+) Aneurysm of ascending aorta without rupture (HCC)   (+) Benign essential HTN   (+) 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      ENDO   (+) Acquired hypothyroidism      /RENAL   (+) Stage 3a chronic kidney disease (HCC)      HEMATOLOGY   (+) Iron deficiency anemia      MUSCULOSKELETAL   (+) Primary generalized (osteo)arthritis   (+) Primary osteoarthritis of left hip   (+) Primary osteoarthritis of right knee        Physical Exam    Airway     Mallampati score: III  TM Distance: >3 FB  Neck ROM: full      Cardiovascular      Dental   No notable dental hx     Pulmonary      Neurological      Other Findings  post-pubertal.      Anesthesia Plan  ASA Score- 2     Anesthesia Type- general with ASA Monitors.         Additional Monitors:     Airway Plan: LMA and LMA.           Plan Factors-Exercise tolerance (METS): >4 METS.    Chart reviewed. EKG reviewed.  Existing labs reviewed. Patient summary reviewed.    Patient is not a current smoker.              Induction- intravenous.    Postoperative Plan- .   Monitoring Plan - Monitoring plan - standard ASA monitoring  Post Operative Pain Plan - multimodal analgesia    Perioperative Resuscitation Plan - Level 1 - Full Code.       Informed Consent- Anesthetic plan and risks discussed with patient.

## 2025-06-27 NOTE — DISCHARGE INSTR - AVS FIRST PAGE
WHAT YOU NEED TO KNOW:   Sacrocolpopexy is an operation to correct uterine prolapse or to correct vaginal prolapse in women who have had a hysterectomy. This surgery offers long-term treatment of apical prolapse with success rates greater than 80 percent.     Avoid lifting anything that is too heavy to lift with one hand for six to eight weeks.     DISCHARGE INSTRUCTIONS:   Medicines:   Pain medicine:    You may need medicine to take away or decrease pain.     Learn how to take your medicine. Ask what medicine and how much you should take. Be sure you know how, when, and how often to take it.    Do not wait until the pain is severe before you take your medicine. Tell caregivers if your pain does not decrease.    Pain medicine can make you dizzy or sleepy. Prevent falls by calling someone when you get out of bed or if you need help.    Antibiotics:  This medicine is given to fight or prevent an infection caused by bacteria. Always take your antibiotics exactly as ordered by your healthcare provider. Do not stop taking your medicine unless directed by your healthcare provider. Never save antibiotics or take leftover antibiotics that were given to you for another illness.    Take your medicine as directed.  Contact your healthcare provider if you think your medicine is not helping or if you have side effects. Tell him or her if you are allergic to any medicine. Keep a list of the medicines, vitamins, and herbs you take. Include the amounts, and when and why you take them. Bring the list or the pill bottles to follow-up visits. Carry your medicine list with you in case of an emergency.  Follow up with your healthcare provider as directed:  Write down your questions so you remember to ask them during your visits.   Self-care:   Sex:  Do not have sex until your healthcare provider says it is okay.    Kegel exercises:  To do kegel exercises, squeeze your pelvic floor muscles for 5 to 10 seconds, then release. Regular  kegel exercises will help your pelvic floor muscles become stronger. This will help prevent you from leaking urine. Ask your healthcare provider when to start these exercises and how often to do them.    Sanitary pad:  Change your sanitary pad regularly. Keep track of how often you change the pad.    Corea catheter:  Keep the bag below your waist. This will help prevent infection and other problems caused by urine flowing back into your bladder. Do not pull on the catheter because this can cause pain and bleeding, and the catheter could come out. Keep the catheter tubing free of kinks so your urine will flow into the bag. Your healthcare provider will remove the catheter as soon as possible, to help prevent infection.    Wound care:  When you are allowed to bathe or shower, carefully wash your vaginal area with soap and water.     Do not put pressure on your abdomen:  This will help prevent damage to your surgery area. Do not strain, lift heavy objects, or stand for a very long time. Do not perform strenuous exercises, such as running and weight lifting.    Activity:  You may need to start walking within a few days after your procedure. Ask your healthcare provider when to start and how long you should walk. Ask about any other exercises that may be right for you.    Support socks:  You may need to wear support socks. These are tight socks that help increase the circulation in your legs until you are more active. This helps prevent blood clots.  Contact your healthcare provider if:   You soak a sanitary pad with blood every hour for 4 hours.    You have vaginal pain that does not go away even after you take pain medicine.    You have pus or a foul-smelling discharge from your genital area.     You see blood in your urine.    You have pain during sex.    You have a fever, chills, a cough, or feel weak and achy.    You have nausea and vomiting.    You have questions or concerns about your condition or care.  Seek care  immediately or call 911 if:   You feel something is bulging out into your vagina or rectum and not going back in.    You cannot urinate.    Your arm or leg feels warm, tender, and painful. It may look swollen and red.    You suddenly feel lightheaded and short of breath.    You have chest pain. You may have more pain when you take a deep breath or cough. You may cough up blood.  © 2017 [employer] Information is for End User's use only and may not be sold, redistributed or otherwise used for commercial purposes. All illustrations and images included in CareNotes® are the copyrighted property of [employer], [employer]. or [employer].  The above information is an  only. It is not intended as medical advice for individual conditions or treatments. Talk to your doctor, nurse or pharmacist before following any medical regimen to see if it is safe and effective for you.

## 2025-06-27 NOTE — ANESTHESIA POSTPROCEDURE EVALUATION
Post-Op Assessment Note    CV Status:  Stable  Pain Score: 0    Pain management: adequate       Mental Status:  Alert and awake   Hydration Status:  Euvolemic   PONV Controlled:  Controlled   Airway Patency:  Patent     Post Op Vitals Reviewed: Yes    No anethesia notable event occurred.    Staff: Anesthesiologist           Last Filed PACU Vitals:  Vitals Value Taken Time   Temp 98.7 °F (37.1 °C) 06/27/25 16:22   Pulse 60 06/27/25 16:54   /54 06/27/25 16:52   Resp 17 06/27/25 16:49   SpO2 95 % 06/27/25 16:54   Vitals shown include unfiled device data.    Modified Genet:     Vitals Value Taken Time   Activity 2 06/27/25 16:49   Respiration 2 06/27/25 16:49   Circulation 2 06/27/25 16:49   Consciousness 1 06/27/25 16:49   Oxygen Saturation 1 06/27/25 16:49     Modified Genet Score: 8

## 2025-06-27 NOTE — OP NOTE
OPERATIVE REPORT  PATIENT NAME: Brandy Stock    :  1955  MRN: [id number]  Pt Location: [facility]OM 01    SURGERY DATE: 2025    Surgeons and Role:     * Ubaldo Domínguez MD - Primary     * Vickey Lazaro MD - Assisting  First Assistant - role during surgery: retraction, suctioning irrigating, cleansing field with sponge, suturing, cutting sutures, adjusting light, following primary surgeon with suture handling    Preop Diagnosis:  Uterovaginal prolapse, incomplete [N81.2]  Female stress incontinence [N39.3]    Post-Op Diagnosis Codes:     * Uterovaginal prolapse, incomplete [N81.2]     * Female stress incontinence [N39.3]    Procedure(s):  EUA / VAGINAL EXTRAPERITONEAL COLPOPEXY  ANTERIOR  COLPORRHAPHY  PUBOVAGINAL SLING (retropubic, adriano blue)  CYSTOSCOPY    Specimen(s):  * No specimens in log *    Estimated Blood Loss:   50cc    Drains:  Urethral Catheter Double-lumen;Non-latex 16 Fr. (Active)   Number of days: 0       Anesthesia Type:   General/LMA    Operative Indications:  Uterovaginal prolapse, incomplete [N81.2]  Female stress incontinence [N39.3]    Operative Findings:  Exam under anesthesia noted stage 2 anterior predominant uterovaginal prolapse. Small grossly normal cervix, small mobile uterus.   During cystourethroscopy, a 360 degree survey was performed and showed intact bladder with normal mucosa without any defects, foreign objects, tumors, or stones. Moderate trabeculations were noted of the bladder with a wide mouth diverticulum near the left bladder dome. Bilateral orthotopic ureteral orifices identified with bilateral efflux of clear urine at end of case. Sling arms were able to move freely without puckering of bladder. Normal urethroscopy.   Floseal placed in sling dissection prior to closure for hemostasis.   Rectal exam without defects or foreign bodies. No suture bridge of sacrospinous hysteropexy.  At end of case:   Negative vaginal sweep,   Good hemostasis,   Excellent  apical suspension and resolution of prolapse.     Complications:   None apparent    Procedure and Technique:  The patient was taken to the operating room with an IV running. She was then placed in the dorsal supine position. Patient identified, informed consent affirmed, and safety checklist performed per protocol. Perioperative antibiotics were administered prior to making the initial incision. SCDs were in place and activated prior to initiation of anesthesia. Anesthesia was administered and found to be adequate. She was then repositioned in dorsal lithotomy position with legs in yellow fin gisele stirrups. Careful attention was paid to not hyperextend or hyperflex at the knees or hips. Bony prominences were padded.     The patient was prepared and draped in the usual sterile manner. Surgical time-out performed. Exam under anesthesia performed noting the above findings. A dawson catheter was inserted in the bladder utilizing sterile technique.?    First, the cervix was grasped with an allis clamp?and displaced to the right ischial spine to ensure that?it would reach.?The?anterior vaginal wall was grasped with an Allis clamp at the dependent portion of the cystocele just distal to the?cervix?and in?the midline at the base of the cystocele and the urethra. The anterior vaginal wall was injected with dilute solution of bupivacaine lidocaine with epinephrine solution. Using a scalpel, a vertical incision was made in the midline from the superior portion of the cystocele to the most dependent portion. Next, using a combination of sharp dissection with curved Sinclair scissor and blunt dissection was used to dissect the vaginal epithelium off the underlying tissue anteriorly. Dissection was carried out laterally until the epithelium was dissected entirely off the bladder on both sides. Dissection was carried out laterally until the epithelium was dissected entirely off the cervix and vagina just inferior laterally to the  cervix on the right. ?    At this point, the fibroareolar tissue lateral to the cervix was bluntly dissected to enter the pararectal space.??The ischial spine was palpated and a dorso-medial movement was utilized to further bluntly dissect the tissue overlying the sacrospinous ligament. ?With the right middle finger palpating the mid-sacrospinous ligament, approximately?2cm lateral to the ischial spine and 0.5cm inferior to the superior margin of the ligament was identified. ?Next, the Anchorsure device was loaded with an 0-PDS sutures and the anchor was placed through the sacrospinous ligament. Placement of this suture was confirmed to be about 1.5 cm medial to the spine in the middle of the ligament. A second anchor with two 0-PDS sutures was placed just superior and medial to the last anchor. Safe location of anchors was confirmed with direct palpation. Traction was applied to the sutures and strong placement confirmed.  All sutures were tagged to the drapes.?At this point, a digital rectal exam was performed to ensure no injury to the rectum, and the rectum was noted to be intact. ?Gloves were exchanged. Irrigation performed and hemostasis noted.     The sutures were then anchored.??Starting laterally, the 0-PDS suture was passed through the vaginal epithelium, muscularis and cervical stroma at ~7?o'clock. ?The?next 0-PDS?suture was passed through the vaginal epithelium,?cervical stroma and vaginal muscularis at ~8 o'clock and then the most medial suture was passed through the same structures at ~9 o'clock.?    Next, the vaginal muscularis and adventitia were plicated using horizontal mattress sutures of 2-0 PDS?and tied in the midline. A 2 layer plication was performed given the size of the cystocele to achieve reduction of the prolapse. The dissection was irrigated copiously and excellent hemostasis was noted.?      At this point, the dawson catheter was removed and a cystoscopy was performed noting above  findings. On cystoscopy, brisk efflux of pyridium stained urine was appreciated from both ureteral openings. ?Intact bladder mucosa was appreciated?on?inspection without injuries or?foreign bodies in the bladder. Normal urethroscopy. Cystoscopy was then concluded, bladder emptied of cystoscopy fluid, and dawson catheter replaced.      Next, the vaginal epithelium was trimmed bilaterally. The vaginal epithelium was then closed using a suture of 2-0 Vicryl?in a running-locked?fashion. Irrigation performed and excellent hemostasis was noted.?The PDS sacrospinous sutures were then tied down successfully suspending the?uterus and cervix?towards the right sacrospinous ligament. Excellent anterior vaginal support noted with resolution of cystocele.     Next, attention was turned to the pubovaginal sling (using the retropubic adriano blue TVT system). First, the sling exit sites on the mons were marked, cephalad to the pubic symphysis and ~2cm from the midline bilaterally. The midurethra was grasped using 2 allis clamps, one placed just inferior to the urethral meatus and the other ~2cm more proximal. Palpation of the dawson balloon confirmed midurethral location. Next, dilute bupivocaine with epinephrine was injected submucosally on the midurethra and lateral to the midurethra for hydrodissection as well as retropubically using a spinal needle. An ~1.5 cm skin incision was made in the vaginal epithelium underlying the midurethra between the previously placed allis clamps. The Metzenbaum scissors were used to create an ~1 cm tunnel between the vaginal mucosa and the inferior aspect of the pubic bone. The Dawson was then removed and the cysto obturator was used to deviate the urethra to the side opposite the trocar placement. The trocar was then placed through the endopelvic fascia and along the posterior aspect of the pubic bone until the trocar was brought out through the suprapubic skin at the marked exit site. The same  procedure was performed on the contralateral side without difficulty.      Cystoscopy was performed with the above findings. Brisk bilateral efflux from ureteral orfices was again noted. The bladder and urethra appeared normal with no lesions, defects, suture, sling, or other foreign body.  The sling arms were able to be moved freely while confirming no puckering or pulling of the bladder. The cysto fluid was then drained and the dawson catheter was reinserted.     The sling was positioned off-tension against the urethra. Irrigation was performed of the sling dissection tracts. The plastic sheath was then removed and the tape was left in place,?tension free,?with the help of a?rolando. The excess mesh was excised at the mons exit sites and epithelium at the exit sites lifted away from the mesh. Tension-free placement again confirmed. Given some ooze from the sling dissection, floseal was placed in the dissection bilaterally prior to closure.Hemostasis confirmed. The vaginal epithelium was then closed using 2-0 vicryl running locked stitch to close the epithelium in entirety. ?The 2 suprapubic epithelium stab incisions were closed with exophin. ?       The vagina was irrigated and suctioned dry.? Hemostasis confirmed. The apical suspension was then visualized using retractors. Hemostasis confirmed and the sutures from the?sacrospinous ligament colpopexy?were trimmed. ?The vagina was irrigated and excellent hemostasis was noted.       Lastly, the posterior vaginal wall and perineum were inspected and found to be well supported with a GH less than 4, decision that posterior colporrhaphy not indicted.     Rectal exam performed and noted no injury to rectum, good support, and no sacrospinous suture bridge.      A vaginal sweep was completed by me, and no foreign bodies were present in the vagina at the end of the case.     Sponge, instrument and needle counts were correct times 2. The patient was then extubated and taken to  the recovery room in stable condition.?      I was present and scrubbed for the entire procedure.      I spoke with the patient's Friend (Mary Kate) at the end of the procedure with patient's permission to do so given to me prior to the procedure.       I was present for the entire procedure.    Patient Disposition:  PACU in stable condition    Ubaldo Domínguez MD  [facility]  /[facility]    [address]  [place] [address]    [phone]            SIGNATURE: Ubaldo Domínguez MD  DATE: June 27, 2025  TIME: 5:01 PM

## 2025-07-03 ENCOUNTER — OFFICE VISIT (OUTPATIENT)
Dept: VASCULAR SURGERY | Facility: CLINIC | Age: 70
End: 2025-07-03
Payer: MEDICARE

## 2025-07-03 VITALS
BODY MASS INDEX: 25.16 KG/M2 | WEIGHT: 166 LBS | HEART RATE: 83 BPM | HEIGHT: 68 IN | OXYGEN SATURATION: 98 % | DIASTOLIC BLOOD PRESSURE: 82 MMHG | RESPIRATION RATE: 16 BRPM | SYSTOLIC BLOOD PRESSURE: 114 MMHG

## 2025-07-03 DIAGNOSIS — I83.811 VARICOSE VEINS OF RIGHT LOWER EXTREMITY WITH PAIN: Primary | ICD-10-CM

## 2025-07-03 PROCEDURE — 99213 OFFICE O/P EST LOW 20 MIN: CPT | Performed by: NURSE PRACTITIONER

## 2025-07-03 NOTE — PROGRESS NOTES
Name: Brandy Stock      : 1955      MRN: [id number]  Encounter Provider: SRIKANTH Beck  Encounter Date: 7/3/2025   Enc[facility]: [facility]  :  Assessment & Plan  Varicose veins of right lower extremity with pain  69 year old female HTN, HLD, 4.3 cm ascending thoracic aortic aneurysm, hypothroid, OA s/p R TKR, BLE varicose veins s/p R EVLT '12 (JDB) and remote hx of RLE venous ligation by Dr Bañuelos, recurrent varicosities, hx of RLE SVT/DVT '14, s/p right TKR '17    Patient returns to the office for evaluation of her veins     -R>L lower extremity varicosities, veins are soft noncompressible with palpable DP pulses bilaterally  -Occasional lower extremity swelling  -Overall symptoms well-controlled with [place] compression hose  -Continue 20 to 30 mmHg compression stockings, replace periodically to maintain elasticity  -Continue regular walking, elevate legs periodically to help manage swelling and venous return  -Follow-up in the office as needed for reevaluation or with any new symptoms         History of Present Illness   HPI  Brandy Stock is a 69 y.o. female who iss a returning patient last seen in . She states her varicose veins are worsening. She had her right knee replace in 2017 and states ever since then her veins have just gotten bigger and are unsightly. She denies any significant pain, fatigue, or open wounds. She does have some slight swelling above the knee which she notices when she wears her tighter compression stockings.  Overall venous symptoms well-controlled with the use of compression stockings.  20-30 compression not covered by insurance, she buys compression online    History obtained from: patient    Review of Systems   Constitutional:  Negative for fatigue.   Cardiovascular:  Positive for leg swelling.   Musculoskeletal:  Negative for gait problem.   Skin:  Positive for color change. Negative for wound.   Neurological:  Negative for  "weakness.     Medical History Reviewed by provider this encounter:  Tobacco  Allergies  Meds  Problems  Med Hx  Surg Hx  Fam Hx     .     Objective   I have reviewed and made appropriate changes to the review of systems input by the medical assistant.    Vitals:    07/03/25 0958   BP: 114/82   BP Location: Left arm   Patient Position: Sitting   Cuff Size: Standard   Pulse: 83   Resp: 16   SpO2: 98%   Weight: 75.3 kg (166 lb)   Height: 5' 8\" (1.727 m)       Problem List[1]    Past Surgical History[2]    Family History[3]    Social History     Socioeconomic History    Marital status: Single     Spouse name: Not on file    Number of children: Not on file    Years of education: Not on file    Highest education level: Not on file   Occupational History    Not on file   Tobacco Use    Smoking status: Never    Smokeless tobacco: Never   Vaping Use    Vaping status: Never Used   Substance and Sexual Activity    Alcohol use: Yes     Comment: social    Drug use: No    Sexual activity: Not Currently     Partners: Male     Birth control/protection: None   Other Topics Concern    Not on file   Social History Narrative    Not on file     Social Drivers of Health     Financial Resource Strain: Low Risk  (10/26/2023)    Overall Financial Resource Strain (CARDIA)     Difficulty of Paying Living Expenses: Not hard at all   Food Insecurity: No Food Insecurity (6/27/2025)    Nursing - Inadequate Food Risk Classification     Worried About Running Out of Food in the Last Year: Never true     Ran Out of Food in the Last Year: Never true     Ran Out of Food in the Last Year: Never true   Transportation Needs: No Transportation Needs (6/27/2025)    Nursing - Transportation Risk Classification     Lack of Transportation: Not on file     Lack of Transportation: No   Physical Activity: Not on file   Stress: Not on file   Social Connections: Not on file   Intimate Partner Violence: Unknown (6/27/2025)    Nursing IPS     Feels Physically " "and Emotionally Safe: Not on file     Physically Hurt by Someone: Not on file     Humiliated or Emotionally Abused by Someone: Not on file     Physically Hurt by Someone: No     Hurt or Threatened by Someone: No   Housing Stability: Unknown (6/27/2025)    Nursing: Inadequate Housing Risk Classification     Has Housing: Not on file     Worried About Losing Housing: Not on file     Unable to Get Utilities: Not on file     Unable to Pay for Housing in the Last Year: No     Has Housing: No       Allergies[4]    Current Medications[5]    /82 (BP Location: Left arm, Patient Position: Sitting, Cuff Size: Standard)   Pulse 83   Resp 16   Ht 5' 8\" (1.727 m)   Wt 75.3 kg (166 lb)   LMP  (LMP Unknown)   SpO2 98%   BMI 25.24 kg/m²      Physical Exam  Vitals and nursing note reviewed.   Constitutional:       Appearance: Normal appearance.   HENT:      Head: Normocephalic and atraumatic.     Eyes:      Extraocular Movements: Extraocular movements intact.       Cardiovascular:      Pulses:           Dorsalis pedis pulses are 2+ on the right side and 2+ on the left side.      Heart sounds: Normal heart sounds.   Pulmonary:      Effort: Pulmonary effort is normal.      Breath sounds: Normal breath sounds.     Musculoskeletal:         General: No swelling. Normal range of motion.      Comments: Truncal varicosities right medial mid thigh to proximal calf     Skin:     General: Skin is warm.     Neurological:      General: No focal deficit present.      Mental Status: She is alert and oriented to person, place, and time.     Psychiatric:         Mood and Affect: Mood normal.         Behavior: Behavior normal.                [1]   Patient Active Problem List  Diagnosis    Primary osteoarthritis of right knee    Meralgia paraesthetica, left    Acquired spondylolisthesis    Acquired hypothyroidism    Leukopenia    Lumbar radiculopathy    History of right breast cancer    Osteopenia of multiple sites    Seasonal allergies    " "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    Benign essential HTN    Urge incontinence of urine    Aneurysm of ascending aorta without rupture (HCC)    Lumbar spondylosis    Spinal stenosis of lumbar region    DDD (degenerative disc disease), lumbar    Primary osteoarthritis of left hip    Primary generalized (osteo)arthritis    Undifferentiated connective tissue disease (HCC)    Stage 3a chronic kidney disease (HCC)    Encounter for follow-up surveillance of breast cancer    Iron deficiency anemia    Callus    Acquired unequal limb length   [2]   Past Surgical History:  Procedure Laterality Date    ADENOIDECTOMY      BACK SURGERY  1/2024    Laminectomy    BREAST BIOPSY Left 01/16/2002    BREAST BIOPSY Left 08/05/2005    high risk    BREAST BIOPSY Right 12/26/2013    positive    BREAST CYST EXCISION  Multi    BREAST EXCISIONAL BIOPSY Left 04/06/2006    high risk    BREAST EXCISIONAL BIOPSY Left 10/13/2006    BREAST LUMPECTOMY Right 01/17/2014    malignant    BREAST SURGERY Right     lumpectomy    BUNIONECTOMY Bilateral     COLONOSCOPY      EXPLORATORY LAPAROTOMY      \"for fertility\"    HAND SURGERY  4/2022    Neg outcome . 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    JOINT REPLACEMENT  2017    right knee    KNEE ARTHROSCOPY Right     LYMPH NODE BIOPSY  2014    ORTHOPEDIC SURGERY  1963    NH ARTHRP KNE CONDYLE&PLATU MEDIAL&LAT COMPARTMENTS Right 01/20/2017    Procedure: ARTHROPLASTY KNEE TOTAL;  Surgeon: Gabriel Razo MD;  [facility];  Service: Orthopedics    NH CMBND ANTERPOST COLPORRAPHY W/CYSTO N/A 6/27/2025    Procedure: ANTERIOR  COLPORRHAPHY;  Surgeon: Ubaldo Domínguez MD;  [facility];  Service: UroGynecology           NH COLPOPEXY VAGINAL EXTRAPERITONEAL APPROACH N/A 6/27/2025    Procedure: EUA / VAGINAL EXTRAPERITONEAL COLPOPEXY;  Surgeon: Ubaldo Domínguez MD;  [facility];  Service: UroGynecology           NH CYSTOURETHROSCOPY N/A 6/27/2025 "    Procedure: CYSTOSCOPY;  Surgeon: Ubaldo Domínguez MD;  [facility];  Service: UroGynecology           MA HYSTEROSCOPY BX ENDOMETRIUM&/POLYPC W/WO D&C N/A 07/29/2022    Procedure: DILATATION AND CURETTAGE (D&C) WITH HYSTEROSCOPY;  Surgeon: Ashley Morgan MD;  [facility];  Service: Gynecology    MA SLING OPERATION STRESS INCONTINENCE N/A 6/27/2025    Procedure: PUBOVAGINAL SLING;  Surgeon: Ubaldo Domínguez MD;  [facility];  Service: UroGynecology           MA TENDON SHEATH INCISION Right 04/05/2022    Procedure: Right long and ring finger trigger finger release;  Surgeon: Matthew Jarvis MD;  [facility];  Service: Orthopedics    SHOULDER SURGERY  1/23    Torn rotator cuff    TONSILLECTOMY      WISDOM TOOTH EXTRACTION     [3]   Family History  Problem Relation Name Age of Onset    Cancer Mother Claudine         Bladder CA    Uterine cancer Mother Claudine     Throat cancer Father Mian 80    Cancer Father Mian     No Known Problems Daughter      No Known Problems Daughter      No Known Problems Maternal Grandmother      No Known Problems Maternal Grandfather      No Known Problems Paternal Grandmother      No Known Problems Paternal Grandfather      Kidney disease Maternal Aunt Idalia Khan     Kidney disease Maternal Aunt Brandy Hernandez     No Known Problems Maternal Aunt      No Known Problems Maternal Aunt      Colon cancer Maternal Uncle Maikol 70    Breast cancer additional onset Paternal Aunt Riri 55        Also maternal cousin Maricruz    No Known Problems Paternal Aunt      Breast cancer additional onset Cousin Maricruz 35    Uterine cancer Cousin Maternal 61    Lung cancer Cousin Maternal 76   [4]   Allergies  Allergen Reactions    Actifed Cold-Allergy [Chlorpheniramine-Phenylephrine] Swelling and Rash     Throat and face swelled    Penicillins Rash    Triprolidine-Pseudoephedrine Hives     pseudoephedrine / triprolidine    Vancomycin Other (See Comments)     redness    [5]   Current Outpatient Medications:     Ascorbic Acid (Vitamin C) 250 MG CHEW, , Disp: , Rfl:     Calcium Carbonate-Vit D-Min (CALTRATE 600+D PLUS MINERALS PO), , Disp: , Rfl:     Cholecalciferol (Vitamin D3) 50 MCG (2000 UT) capsule, , Disp: , Rfl:     estradiol (ESTRACE) 0.1 mg/g vaginal cream, Apply 0.5 grams (blueberry size) Vaginal Twice a week; Duration: 90 days, Disp: , Rfl:     ferrous sulfate 324 (65 Fe) mg, Take 1 tablet (324 mg total) by mouth daily before breakfast, Disp: 30 tablet, Rfl: 2    Gemtesa 75 MG TABS, daily at bedtime, Disp: , Rfl:     levothyroxine 75 mcg tablet, Take 1 tablet by mouth every other day, Disp: , Rfl:     levothyroxine 88 mcg tablet, Take 1 tablet (88 mcg total) by mouth every other day, Disp: 45 tablet, Rfl: 3    loratadine (CLARITIN) 10 mg tablet, Take 10 mg by mouth in the morning., Disp: , Rfl:     Multiple Vitamins-Minerals (MULTIVITAMIN ADULT PO), daily after breakfast, Disp: , Rfl:     thiamine (VITAMIN B1) 100 mg tablet, Take 100 mg by mouth in the morning., Disp: , Rfl:     valsartan (DIOVAN) 80 mg tablet, Take 1 tablet (80 mg total) by mouth daily, Disp: 90 tablet, Rfl: 3

## 2025-07-03 NOTE — PATIENT INSTRUCTIONS
Continue current measures with compression stockings, periodic leg elevation and regular walking.  Return to the office with any issues or as needed for re evaluation

## 2025-07-03 NOTE — ASSESSMENT & PLAN NOTE
69 year old female HTN, HLD, 4.3 cm ascending thoracic aortic aneurysm, hypothroid, OA s/p R TKR, BLE varicose veins s/p R EVLT '12 (JEZEQUIEL) and remote hx of RLE venous ligation by Dr Bañuelos, recurrent varicosities, hx of RLE SVT/DVT '14, s/p right TKR '17    Patient returns to the office for evaluation of her veins     -R>L lower extremity varicosities, veins are soft noncompressible with palpable DP pulses bilaterally  -Occasional lower extremity swelling  -Overall symptoms well-controlled with [place] compression hose  -Continue 20 to 30 mmHg compression stockings, replace periodically to maintain elasticity  -Continue regular walking, elevate legs periodically to help manage swelling and venous return  -Follow-up in the office as needed for reevaluation or with any new symptoms

## 2025-07-17 ENCOUNTER — OFFICE VISIT (OUTPATIENT)
Dept: PHYSICAL THERAPY | Facility: OTHER | Age: 70
End: 2025-07-17
Attending: PODIATRIST
Payer: COMMERCIAL

## 2025-07-17 DIAGNOSIS — L84 CALLUS: Primary | ICD-10-CM

## 2025-07-17 DIAGNOSIS — M21.70 ACQUIRED UNEQUAL LIMB LENGTH: ICD-10-CM

## 2025-07-17 PROCEDURE — L3010 FOOT LONGITUDINAL ARCH SUPPO: HCPCS | Performed by: PHYSICAL THERAPIST

## 2025-08-04 ENCOUNTER — OFFICE VISIT (OUTPATIENT)
Dept: CARDIOLOGY CLINIC | Facility: CLINIC | Age: 70
End: 2025-08-04
Payer: MEDICARE

## 2025-08-04 VITALS
HEART RATE: 72 BPM | SYSTOLIC BLOOD PRESSURE: 110 MMHG | HEIGHT: 68 IN | WEIGHT: 168 LBS | BODY MASS INDEX: 25.46 KG/M2 | OXYGEN SATURATION: 98 % | DIASTOLIC BLOOD PRESSURE: 60 MMHG

## 2025-08-04 DIAGNOSIS — I10 BENIGN ESSENTIAL HTN: Primary | ICD-10-CM

## 2025-08-04 DIAGNOSIS — I71.21 ANEURYSM OF ASCENDING AORTA WITHOUT RUPTURE (HCC): ICD-10-CM

## 2025-08-04 PROCEDURE — 99214 OFFICE O/P EST MOD 30 MIN: CPT | Performed by: NURSE PRACTITIONER

## 2025-08-13 ENCOUNTER — OFFICE VISIT (OUTPATIENT)
Dept: PHYSICAL THERAPY | Facility: OTHER | Age: 70
End: 2025-08-13
Attending: PODIATRIST

## (undated) DEVICE — SUT VICRYL 2-0 CT-1 36 IN J945H

## (undated) DEVICE — GLOVE INDICATOR PI UNDERGLOVE SZ 8 BLUE

## (undated) DEVICE — TRAY FOLEY 16FR URIMETER SURESTEP

## (undated) DEVICE — DISPOSABLE EQUIPMENT COVER: Brand: SMALL TOWEL DRAPE

## (undated) DEVICE — GLOVE SRG BIOGEL 7.5

## (undated) DEVICE — UNDER BUTTOCKS DRAPE W/FLUID CONTROL POUCH: Brand: CONVERTORS

## (undated) DEVICE — Device

## (undated) DEVICE — COBAN 4 IN STERILE

## (undated) DEVICE — INTENDED FOR TISSUE SEPARATION, AND OTHER PROCEDURES THAT REQUIRE A SHARP SURGICAL BLADE TO PUNCTURE OR CUT.: Brand: BARD-PARKER SAFETY BLADES SIZE 11, STERILE

## (undated) DEVICE — ELECTROSURGICAL DEVICE HOLSTER;FOR USE WITH MAXIMUM PEAK VOLTAGE OF 4000 V: Brand: FORCE TRIVERSE

## (undated) DEVICE — 3000CC GUARDIAN II: Brand: GUARDIAN

## (undated) DEVICE — VIOLET BRAIDED (POLYGLACTIN 910), SYNTHETIC ABSORBABLE SUTURE: Brand: COATED VICRYL

## (undated) DEVICE — SILVER-COATED ANTIMICROBIAL BARRIER DRESSING: Brand: ACTICOAT FLEX7 4" X 5"

## (undated) DEVICE — COBAN 6 IN STERILE

## (undated) DEVICE — DECANTER: Brand: UNBRANDED

## (undated) DEVICE — PVC URETHRAL CATHETER: Brand: DOVER

## (undated) DEVICE — INTENDED FOR TISSUE SEPARATION, AND OTHER PROCEDURES THAT REQUIRE A SHARP SURGICAL BLADE TO PUNCTURE OR CUT.: Brand: BARD-PARKER SAFETY BLADES SIZE 10, STERILE

## (undated) DEVICE — ALLENTOWN DR  LUCENTE S LAP PK: Brand: CARDINAL HEALTH

## (undated) DEVICE — SUT PROLENE 4-0 PS-2 18 IN 8682G

## (undated) DEVICE — 3M(TM) TRANSPORE SURGICAL TAPE 1527-1: Brand: 3M™ TRANSPORE™

## (undated) DEVICE — WEBRIL 6 IN UNSTERILE

## (undated) DEVICE — SAW BLADE RECIPROCATING 179

## (undated) DEVICE — SUT PDS II 0 CT-1 36 IN Z346H

## (undated) DEVICE — GLOVE INDICATOR PI UNDERGLOVE SZ 7 BLUE

## (undated) DEVICE — SPONGE PVP SCRUB WING STERILE

## (undated) DEVICE — GLOVE INDICATOR PI UNDERGLOVE SZ 8.5 BLUE

## (undated) DEVICE — CAPIT KNEE HIGH DEMAND - STRYKER

## (undated) DEVICE — HEX-LOCKING BLADE ELECTRODE: Brand: EDGE

## (undated) DEVICE — 3M™ IOBAN™ 2 ANTIMICROBIAL INCISE DRAPE 6648EZ: Brand: IOBAN™ 2

## (undated) DEVICE — VEST SURGEON DISPOSABLE

## (undated) DEVICE — 3M™ DURAPORE™ SURGICAL TAPE 1538-3, 3 INCH X 10 YARD (7,5CM X 9,1M), 4 ROLLS/BOX: Brand: 3M™ DURAPORE™

## (undated) DEVICE — STERILE BETHLEHEM PLASTIC HAND: Brand: CARDINAL HEALTH

## (undated) DEVICE — CEMENT MIXING CARTRIDGE PRISM II

## (undated) DEVICE — 3M™ STERI-DRAPE™ U-DRAPE 1015: Brand: STERI-DRAPE™

## (undated) DEVICE — MEDI-VAC YANKAUER SUCTION HANDLE W/BULBOUS AND CONTROL VENT: Brand: CARDINAL HEALTH

## (undated) DEVICE — LIGHT GLOVE GREEN

## (undated) DEVICE — KENDALL SCD SEQUENTIAL COMPRESSION COMFORT SLEEVES, KNEE LENGTH, MEDIUM: Brand: KENDALL SCD

## (undated) DEVICE — PACK TOTAL KNEE PBDS

## (undated) DEVICE — HOOD: Brand: FLYTE, SURGICOOL

## (undated) DEVICE — CUFF TOURNIQUET 30 X 4 IN QUICK CONNECT DISP 1BLA

## (undated) DEVICE — 10FR FRAZIER SUCTION HANDLE: Brand: CARDINAL HEALTH

## (undated) DEVICE — CYSTO TUBING SINGLE IRRIGATION

## (undated) DEVICE — NEEDLE 18 G X 1 1/2

## (undated) DEVICE — SAW BLADE OSCILLATING BRAZOL 167

## (undated) DEVICE — TUBING SUCTION 5MM X 12 FT

## (undated) DEVICE — SYRINGE 30ML LL

## (undated) DEVICE — PADDING CAST 4 IN  COTTON STRL

## (undated) DEVICE — PROXIMATE SKIN STAPLERS (35 WIDE) CONTAINS 35 STAINLESS STEEL STAPLES (FIXED HEAD): Brand: PROXIMATE

## (undated) DEVICE — GAUZE SPONGES,16 PLY: Brand: CURITY

## (undated) DEVICE — IMPERVIOUS STOCKINETTE: Brand: DEROYAL

## (undated) DEVICE — PADDING CAST 6IN COTTON STRL

## (undated) DEVICE — SCD SEQUENTIAL COMPRESSION COMFORT SLEEVE MEDIUM KNEE LENGTH: Brand: KENDALL SCD

## (undated) DEVICE — GLOVE SRG BIOGEL ORTHOPEDIC 7.5

## (undated) DEVICE — PREP SURGICAL PURPREP 26ML

## (undated) DEVICE — GAMMEX® NON-LATEX SENSITIVE SIZE 7.5, STERILE NEOPRENE POWDER-FREE SURGICAL GLOVE: Brand: GAMMEX

## (undated) DEVICE — COOL TEMP PAD

## (undated) DEVICE — GLOVE PI ULTRA TOUCH SZ.7.0

## (undated) DEVICE — ACE WRAP 6 IN UNSTERILE

## (undated) DEVICE — BIPOLAR SEALER 23-113-1 AQM 2.3: Brand: AQUAMANTYS™

## (undated) DEVICE — WET SKIN PREP TRAY: Brand: MEDLINE INDUSTRIES, INC.

## (undated) DEVICE — BETHLEHEM UNIVERSAL MINOR VAG: Brand: CARDINAL HEALTH

## (undated) DEVICE — GLOVE SRG BIOGEL 8

## (undated) DEVICE — CHLORAPREP HI-LITE 26ML ORANGE

## (undated) DEVICE — SURGICAL GOWN, XL SMARTSLEEVE: Brand: CONVERTORS

## (undated) DEVICE — SKIN MARKER DUAL TIP WITH RULER CAP, FLEXIBLE RULER AND LABELS: Brand: DEVON

## (undated) DEVICE — ABDOMINAL PAD: Brand: DERMACEA

## (undated) DEVICE — CAUTERY TIP POLISHER: Brand: DEVON

## (undated) DEVICE — SUT PDS II 2-0 CT-2 27 IN Z333H

## (undated) DEVICE — JP 3-SPRING RES W/10FR PVC DRAIN/TR: Brand: CARDINAL HEALTH

## (undated) DEVICE — REM POLYHESIVE ADULT PATIENT RETURN ELECTRODE: Brand: VALLEYLAB

## (undated) DEVICE — MAYO STAND COVER: Brand: CONVERTORS

## (undated) DEVICE — HOOD: Brand: FLYTE

## (undated) DEVICE — 3M™ TEGADERM™ TRANSPARENT FILM DRESSING FRAME STYLE, 1627, 4 IN X 10 IN (10 CM X 25 CM), 20/CT 4CT/CASE: Brand: 3M™ TEGADERM™

## (undated) DEVICE — ACE WRAP 4 IN STERILE

## (undated) DEVICE — OCCLUSIVE GAUZE STRIP,3% BISMUTH TRIBROMOPHENATE IN PETROLATUM BLEND: Brand: XEROFORM

## (undated) DEVICE — THE SIMPULSE SOLO SYSTEM WITH ULTREX RETRACTABLE SPLASH SHIELD TIP: Brand: SIMPULSE SOLO

## (undated) DEVICE — NEEDLE 25G X 1 1/2

## (undated) DEVICE — MEDI-VAC YANKAUER SUCTION HANDLE W/STRAIGHT TIP & CONTROL VENT: Brand: CARDINAL HEALTH

## (undated) DEVICE — PREMIUM DRY TRAY LF: Brand: MEDLINE INDUSTRIES, INC.

## (undated) DEVICE — HEMOSTATIC MATRIX SURGIFLO 8ML W/THROMBIN

## (undated) DEVICE — SPONGE LAP 18 X 18 IN